# Patient Record
Sex: FEMALE | Race: WHITE | NOT HISPANIC OR LATINO | URBAN - METROPOLITAN AREA
[De-identification: names, ages, dates, MRNs, and addresses within clinical notes are randomized per-mention and may not be internally consistent; named-entity substitution may affect disease eponyms.]

---

## 2017-08-09 ENCOUNTER — EMERGENCY (EMERGENCY)
Facility: HOSPITAL | Age: 52
LOS: 0 days | Discharge: HOME | End: 2017-08-09
Admitting: INTERNAL MEDICINE

## 2017-08-09 DIAGNOSIS — R07.9 CHEST PAIN, UNSPECIFIED: ICD-10-CM

## 2017-08-09 DIAGNOSIS — G89.29 OTHER CHRONIC PAIN: ICD-10-CM

## 2017-08-09 DIAGNOSIS — M54.9 DORSALGIA, UNSPECIFIED: ICD-10-CM

## 2017-08-09 DIAGNOSIS — E78.5 HYPERLIPIDEMIA, UNSPECIFIED: ICD-10-CM

## 2017-08-09 DIAGNOSIS — Z87.891 PERSONAL HISTORY OF NICOTINE DEPENDENCE: ICD-10-CM

## 2017-08-09 DIAGNOSIS — I10 ESSENTIAL (PRIMARY) HYPERTENSION: ICD-10-CM

## 2017-08-09 DIAGNOSIS — E11.9 TYPE 2 DIABETES MELLITUS WITHOUT COMPLICATIONS: ICD-10-CM

## 2017-08-09 DIAGNOSIS — I95.9 HYPOTENSION, UNSPECIFIED: ICD-10-CM

## 2017-08-09 DIAGNOSIS — Z98.890 OTHER SPECIFIED POSTPROCEDURAL STATES: ICD-10-CM

## 2017-08-11 ENCOUNTER — INPATIENT (INPATIENT)
Facility: HOSPITAL | Age: 52
LOS: 1 days | Discharge: LEFT BEFORE DISCHARGE | End: 2017-08-13
Attending: INTERNAL MEDICINE | Admitting: INTERNAL MEDICINE

## 2017-08-11 DIAGNOSIS — R07.9 CHEST PAIN, UNSPECIFIED: ICD-10-CM

## 2017-08-16 DIAGNOSIS — E03.9 HYPOTHYROIDISM, UNSPECIFIED: ICD-10-CM

## 2017-08-16 DIAGNOSIS — R55 SYNCOPE AND COLLAPSE: ICD-10-CM

## 2017-08-16 DIAGNOSIS — T40.2X5A ADVERSE EFFECT OF OTHER OPIOIDS, INITIAL ENCOUNTER: ICD-10-CM

## 2017-08-16 DIAGNOSIS — I10 ESSENTIAL (PRIMARY) HYPERTENSION: ICD-10-CM

## 2017-08-16 DIAGNOSIS — E11.9 TYPE 2 DIABETES MELLITUS WITHOUT COMPLICATIONS: ICD-10-CM

## 2017-08-16 DIAGNOSIS — E78.5 HYPERLIPIDEMIA, UNSPECIFIED: ICD-10-CM

## 2017-08-16 DIAGNOSIS — E83.42 HYPOMAGNESEMIA: ICD-10-CM

## 2017-08-16 DIAGNOSIS — G89.29 OTHER CHRONIC PAIN: ICD-10-CM

## 2017-08-16 DIAGNOSIS — E87.6 HYPOKALEMIA: ICD-10-CM

## 2017-08-16 DIAGNOSIS — E78.00 PURE HYPERCHOLESTEROLEMIA, UNSPECIFIED: ICD-10-CM

## 2017-08-16 DIAGNOSIS — F17.200 NICOTINE DEPENDENCE, UNSPECIFIED, UNCOMPLICATED: ICD-10-CM

## 2017-08-16 DIAGNOSIS — I25.2 OLD MYOCARDIAL INFARCTION: ICD-10-CM

## 2017-08-16 DIAGNOSIS — I95.9 HYPOTENSION, UNSPECIFIED: ICD-10-CM

## 2017-08-24 ENCOUNTER — OUTPATIENT (OUTPATIENT)
Dept: OUTPATIENT SERVICES | Facility: HOSPITAL | Age: 52
LOS: 1 days | Discharge: HOME | End: 2017-08-24

## 2017-08-24 DIAGNOSIS — R07.9 CHEST PAIN, UNSPECIFIED: ICD-10-CM

## 2017-08-25 DIAGNOSIS — I10 ESSENTIAL (PRIMARY) HYPERTENSION: ICD-10-CM

## 2017-08-25 DIAGNOSIS — E78.4 OTHER HYPERLIPIDEMIA: ICD-10-CM

## 2017-08-25 DIAGNOSIS — E11.9 TYPE 2 DIABETES MELLITUS WITHOUT COMPLICATIONS: ICD-10-CM

## 2017-08-25 DIAGNOSIS — E87.6 HYPOKALEMIA: ICD-10-CM

## 2017-09-08 ENCOUNTER — OUTPATIENT (OUTPATIENT)
Dept: OUTPATIENT SERVICES | Facility: HOSPITAL | Age: 52
LOS: 1 days | Discharge: HOME | End: 2017-09-08

## 2017-09-08 DIAGNOSIS — I95.9 HYPOTENSION, UNSPECIFIED: ICD-10-CM

## 2017-09-08 DIAGNOSIS — R07.9 CHEST PAIN, UNSPECIFIED: ICD-10-CM

## 2017-09-22 ENCOUNTER — OUTPATIENT (OUTPATIENT)
Dept: OUTPATIENT SERVICES | Facility: HOSPITAL | Age: 52
LOS: 1 days | Discharge: HOME | End: 2017-09-22

## 2017-09-22 DIAGNOSIS — R07.9 CHEST PAIN, UNSPECIFIED: ICD-10-CM

## 2017-09-25 DIAGNOSIS — Z01.419 ENCOUNTER FOR GYNECOLOGICAL EXAMINATION (GENERAL) (ROUTINE) WITHOUT ABNORMAL FINDINGS: ICD-10-CM

## 2017-11-09 PROBLEM — Z00.00 ENCOUNTER FOR PREVENTIVE HEALTH EXAMINATION: Status: ACTIVE | Noted: 2017-11-09

## 2017-11-10 ENCOUNTER — APPOINTMENT (OUTPATIENT)
Dept: UROLOGY | Facility: CLINIC | Age: 52
End: 2017-11-10

## 2017-11-27 ENCOUNTER — OUTPATIENT (OUTPATIENT)
Dept: OUTPATIENT SERVICES | Facility: HOSPITAL | Age: 52
LOS: 1 days | Discharge: HOME | End: 2017-11-27

## 2017-11-27 DIAGNOSIS — I10 ESSENTIAL (PRIMARY) HYPERTENSION: ICD-10-CM

## 2017-11-27 DIAGNOSIS — I25.2 OLD MYOCARDIAL INFARCTION: ICD-10-CM

## 2017-11-27 DIAGNOSIS — Z95.5 PRESENCE OF CORONARY ANGIOPLASTY IMPLANT AND GRAFT: ICD-10-CM

## 2017-11-27 DIAGNOSIS — E03.9 HYPOTHYROIDISM, UNSPECIFIED: ICD-10-CM

## 2017-11-27 DIAGNOSIS — K29.00 ACUTE GASTRITIS WITHOUT BLEEDING: ICD-10-CM

## 2017-11-27 DIAGNOSIS — M54.9 DORSALGIA, UNSPECIFIED: ICD-10-CM

## 2017-11-27 DIAGNOSIS — R07.9 CHEST PAIN, UNSPECIFIED: ICD-10-CM

## 2017-11-27 DIAGNOSIS — E11.9 TYPE 2 DIABETES MELLITUS WITHOUT COMPLICATIONS: ICD-10-CM

## 2017-11-27 DIAGNOSIS — E78.00 PURE HYPERCHOLESTEROLEMIA, UNSPECIFIED: ICD-10-CM

## 2017-11-27 DIAGNOSIS — I25.10 ATHEROSCLEROTIC HEART DISEASE OF NATIVE CORONARY ARTERY WITHOUT ANGINA PECTORIS: ICD-10-CM

## 2017-11-27 DIAGNOSIS — B37.81 CANDIDAL ESOPHAGITIS: ICD-10-CM

## 2017-11-27 DIAGNOSIS — R10.13 EPIGASTRIC PAIN: ICD-10-CM

## 2018-01-11 ENCOUNTER — OUTPATIENT (OUTPATIENT)
Dept: OUTPATIENT SERVICES | Facility: HOSPITAL | Age: 53
LOS: 1 days | Discharge: HOME | End: 2018-01-11

## 2018-01-11 DIAGNOSIS — Z12.31 ENCOUNTER FOR SCREENING MAMMOGRAM FOR MALIGNANT NEOPLASM OF BREAST: ICD-10-CM

## 2018-01-11 DIAGNOSIS — R07.9 CHEST PAIN, UNSPECIFIED: ICD-10-CM

## 2018-01-17 ENCOUNTER — OUTPATIENT (OUTPATIENT)
Dept: OUTPATIENT SERVICES | Facility: HOSPITAL | Age: 53
LOS: 1 days | Discharge: HOME | End: 2018-01-17

## 2018-01-17 DIAGNOSIS — M72.2 PLANTAR FASCIAL FIBROMATOSIS: ICD-10-CM

## 2018-01-17 DIAGNOSIS — R07.9 CHEST PAIN, UNSPECIFIED: ICD-10-CM

## 2018-01-22 ENCOUNTER — OUTPATIENT (OUTPATIENT)
Dept: OUTPATIENT SERVICES | Facility: HOSPITAL | Age: 53
LOS: 1 days | Discharge: HOME | End: 2018-01-22

## 2018-01-22 DIAGNOSIS — R92.8 OTHER ABNORMAL AND INCONCLUSIVE FINDINGS ON DIAGNOSTIC IMAGING OF BREAST: ICD-10-CM

## 2018-01-24 ENCOUNTER — OUTPATIENT (OUTPATIENT)
Dept: OUTPATIENT SERVICES | Facility: HOSPITAL | Age: 53
LOS: 1 days | Discharge: HOME | End: 2018-01-24

## 2018-01-24 DIAGNOSIS — R05 COUGH: ICD-10-CM

## 2018-02-04 DIAGNOSIS — R07.9 CHEST PAIN, UNSPECIFIED: ICD-10-CM

## 2018-03-01 ENCOUNTER — RESULT REVIEW (OUTPATIENT)
Age: 53
End: 2018-03-01

## 2018-03-01 ENCOUNTER — OUTPATIENT (OUTPATIENT)
Dept: OUTPATIENT SERVICES | Facility: HOSPITAL | Age: 53
LOS: 1 days | Discharge: HOME | End: 2018-03-01

## 2018-03-02 DIAGNOSIS — E87.6 HYPOKALEMIA: ICD-10-CM

## 2018-03-02 DIAGNOSIS — E46 UNSPECIFIED PROTEIN-CALORIE MALNUTRITION: ICD-10-CM

## 2018-03-02 DIAGNOSIS — I10 ESSENTIAL (PRIMARY) HYPERTENSION: ICD-10-CM

## 2018-03-02 DIAGNOSIS — E11.9 TYPE 2 DIABETES MELLITUS WITHOUT COMPLICATIONS: ICD-10-CM

## 2018-03-02 DIAGNOSIS — K21.9 GASTRO-ESOPHAGEAL REFLUX DISEASE WITHOUT ESOPHAGITIS: ICD-10-CM

## 2018-05-24 ENCOUNTER — OUTPATIENT (OUTPATIENT)
Dept: OUTPATIENT SERVICES | Facility: HOSPITAL | Age: 53
LOS: 1 days | Discharge: HOME | End: 2018-05-24

## 2018-05-24 DIAGNOSIS — M79.609 PAIN IN UNSPECIFIED LIMB: ICD-10-CM

## 2018-06-18 ENCOUNTER — APPOINTMENT (OUTPATIENT)
Dept: UROLOGY | Facility: CLINIC | Age: 53
End: 2018-06-18
Payer: MEDICAID

## 2018-06-18 ENCOUNTER — OUTPATIENT (OUTPATIENT)
Dept: OUTPATIENT SERVICES | Facility: HOSPITAL | Age: 53
LOS: 1 days | Discharge: HOME | End: 2018-06-18

## 2018-06-18 VITALS
SYSTOLIC BLOOD PRESSURE: 93 MMHG | DIASTOLIC BLOOD PRESSURE: 56 MMHG | WEIGHT: 190 LBS | HEIGHT: 60 IN | BODY MASS INDEX: 37.3 KG/M2 | HEART RATE: 76 BPM

## 2018-06-18 DIAGNOSIS — Z82.49 FAMILY HISTORY OF ISCHEMIC HEART DISEASE AND OTHER DISEASES OF THE CIRCULATORY SYSTEM: ICD-10-CM

## 2018-06-18 DIAGNOSIS — Z83.3 FAMILY HISTORY OF DIABETES MELLITUS: ICD-10-CM

## 2018-06-18 DIAGNOSIS — E07.9 DISORDER OF THYROID, UNSPECIFIED: ICD-10-CM

## 2018-06-18 DIAGNOSIS — E11.9 TYPE 2 DIABETES MELLITUS W/OUT COMPLICATIONS: ICD-10-CM

## 2018-06-18 DIAGNOSIS — I10 ESSENTIAL (PRIMARY) HYPERTENSION: ICD-10-CM

## 2018-06-18 DIAGNOSIS — Z82.0 FAMILY HISTORY OF EPILEPSY AND OTHER DISEASES OF THE NERVOUS SYSTEM: ICD-10-CM

## 2018-06-18 DIAGNOSIS — I25.2 OLD MYOCARDIAL INFARCTION: ICD-10-CM

## 2018-06-18 DIAGNOSIS — F17.210 NICOTINE DEPENDENCE, CIGARETTES, UNCOMPLICATED: ICD-10-CM

## 2018-06-18 PROCEDURE — 99202 OFFICE O/P NEW SF 15 MIN: CPT

## 2018-06-18 RX ORDER — TIZANIDINE 4 MG/1
4 TABLET ORAL
Qty: 90 | Refills: 0 | Status: ACTIVE | COMMUNITY
Start: 2017-06-30

## 2018-06-18 RX ORDER — PREGABALIN 150 MG/1
150 CAPSULE ORAL
Qty: 90 | Refills: 0 | Status: ACTIVE | COMMUNITY
Start: 2017-06-16

## 2018-06-18 RX ORDER — METFORMIN HYDROCHLORIDE 500 MG/1
500 TABLET, COATED ORAL
Qty: 60 | Refills: 0 | Status: COMPLETED | COMMUNITY
Start: 2016-12-15

## 2018-06-18 RX ORDER — ATORVASTATIN CALCIUM 40 MG/1
40 TABLET, FILM COATED ORAL
Qty: 30 | Refills: 0 | Status: ACTIVE | COMMUNITY
Start: 2016-12-15

## 2018-06-18 RX ORDER — DULOXETINE HYDROCHLORIDE 30 MG/1
30 CAPSULE, DELAYED RELEASE PELLETS ORAL
Refills: 0 | Status: ACTIVE | COMMUNITY

## 2018-06-18 RX ORDER — MULTIVITAMIN WITH FOLIC ACID 400 MCG
TABLET ORAL
Qty: 30 | Refills: 0 | Status: ACTIVE | COMMUNITY
Start: 2017-07-24

## 2018-06-18 RX ORDER — FEXOFENADINE HYDROCHLORIDE 180 MG/1
180 TABLET ORAL
Qty: 30 | Refills: 0 | Status: ACTIVE | COMMUNITY
Start: 2016-12-15

## 2018-06-18 RX ORDER — OMEPRAZOLE 40 MG/1
40 CAPSULE, DELAYED RELEASE ORAL
Qty: 30 | Refills: 0 | Status: COMPLETED | COMMUNITY
Start: 2017-10-24

## 2018-06-18 RX ORDER — NAPROXEN 500 MG/1
500 TABLET ORAL
Qty: 60 | Refills: 0 | Status: ACTIVE | COMMUNITY
Start: 2017-06-30

## 2018-06-18 RX ORDER — METOPROLOL SUCCINATE 100 MG/1
100 TABLET, EXTENDED RELEASE ORAL
Qty: 30 | Refills: 0 | Status: ACTIVE | COMMUNITY
Start: 2016-12-15

## 2018-06-18 RX ORDER — HYDROXYZINE HYDROCHLORIDE 10 MG/1
10 TABLET ORAL
Qty: 90 | Refills: 0 | Status: ACTIVE | COMMUNITY
Start: 2016-12-15

## 2018-06-18 RX ORDER — METFORMIN HYDROCHLORIDE 1000 MG/1
1000 TABLET, COATED ORAL
Qty: 60 | Refills: 0 | Status: COMPLETED | COMMUNITY
Start: 2017-02-15

## 2018-06-18 RX ORDER — DIAZEPAM 5 MG/1
5 TABLET ORAL
Refills: 0 | Status: ACTIVE | COMMUNITY

## 2018-06-18 RX ORDER — BENZONATATE 200 MG/1
200 CAPSULE ORAL
Qty: 90 | Refills: 0 | Status: ACTIVE | COMMUNITY
Start: 2016-12-15

## 2018-06-18 RX ORDER — PANTOPRAZOLE 40 MG/1
40 TABLET, DELAYED RELEASE ORAL
Refills: 0 | Status: ACTIVE | COMMUNITY

## 2018-06-18 RX ORDER — EZETIMIBE 10 MG/1
10 TABLET ORAL
Refills: 0 | Status: ACTIVE | COMMUNITY

## 2018-06-18 RX ORDER — BECLOMETHASONE DIPROPIONATE 80 UG/1
80 AEROSOL, METERED RESPIRATORY (INHALATION)
Refills: 0 | Status: ACTIVE | COMMUNITY

## 2018-06-18 RX ORDER — ALBUTEROL SULFATE 90 UG/1
108 (90 BASE) AEROSOL, METERED RESPIRATORY (INHALATION)
Qty: 18 | Refills: 0 | Status: ACTIVE | COMMUNITY
Start: 2016-12-15

## 2018-06-18 RX ORDER — OXYCODONE AND ACETAMINOPHEN 10; 325 MG/1; MG/1
10-325 TABLET ORAL
Qty: 30 | Refills: 0 | Status: ACTIVE | COMMUNITY
Start: 2017-07-12

## 2018-06-18 RX ORDER — ASPIRIN 81 MG
81 TABLET,CHEWABLE ORAL
Qty: 30 | Refills: 0 | Status: ACTIVE | COMMUNITY
Start: 2017-03-20

## 2018-06-18 RX ORDER — LEVOTHYROXINE SODIUM 0.1 MG/1
100 TABLET ORAL
Qty: 30 | Refills: 0 | Status: ACTIVE | COMMUNITY
Start: 2016-12-15

## 2018-06-18 NOTE — HISTORY OF PRESENT ILLNESS
[FreeTextEntry1] : Christa is a pleasant 52-year-old female who was referred for severe lower urinary tract symptoms to the point of nocturia times five as well is reportedly some microhematuria. The symptoms began about a year ago happen slowly getting worse even with her being put on Detrol. Currently her AUA symptom score includes\par Incomplete emptying – two\par Frequency – five\par Urgency – three\par Straining – one\par . She has no intermittency and she says the stream is strong voiding about a cup and a half per void. She’s waking up five times at night each time with almost a cup and a half and this is made him miserable. Her AUA symptom score totals 11/5/6. She has nothing that she could say started this, and she had seen another urologist but found follow-up and getting an answer was difficult so she stopped.\par  [Urinary Urgency] : urinary urgency [Urinary Frequency] : urinary frequency [Nocturia] : nocturia [Hematuria - Microscopic] : microscopic hematuria

## 2018-06-18 NOTE — LETTER HEADER
[Anil Ocampo MD] : Anil Ocampo MD [Director of Urology] : Director of Urology [[address]] : [address] [[address]] : [address] [[place] [address]] : [place] [address] [Tel [phone]] : Tel [phone] [Fax [phone]] : Fax [phone]

## 2018-06-18 NOTE — LETTER BODY
[Dear  ___] : Dear  [unfilled], [Consult Letter:] : I had the pleasure of evaluating your patient, [unfilled]. [Please see my note below.] : Please see my note below. [Consult Closing:] : Thank you very much for allowing me to participate in the care of this patient.  If you have any questions, please do not hesitate to contact me. [Sincerely,] : Sincerely, [FreeTextEntry2] : Jose Garza MD\par [address]\[place] [address]

## 2018-06-18 NOTE — ASSESSMENT
[FreeTextEntry1] : Her exam excluding a pelvic as she tells me she just saw the gynecologist was positive for peripheral edema. It’s not major but her right leg I cannot examine she has the brace on and the left leg had pitting edema up to the proximal tibia. If she is really voiding a cup and a half five times a night this is not going to be urologic but more sugar load, habit, or cardiac in origin. However giving her smoking history we have to make sure that is not something irritative that’s contributing to this.

## 2018-06-18 NOTE — PHYSICAL EXAM
[General Appearance - Well Developed] : well developed [General Appearance - Well Nourished] : well nourished [Normal Appearance] : normal appearance [Well Groomed] : well groomed [General Appearance - In No Acute Distress] : no acute distress [Heart Rate And Rhythm] : Heart rate and rhythm were normal [] : no respiratory distress [Respiration, Rhythm And Depth] : normal respiratory rhythm and effort [Exaggerated Use Of Accessory Muscles For Inspiration] : no accessory muscle use [Abdomen Soft] : soft [Abdomen Tenderness] : non-tender [Costovertebral Angle Tenderness] : no ~M costovertebral angle tenderness [FreeTextEntry1] : walks with brace [No Focal Deficits] : no focal deficits [Oriented To Time, Place, And Person] : oriented to person, place, and time [Affect] : the affect was normal [Mood] : the mood was normal [Not Anxious] : not anxious

## 2018-06-19 DIAGNOSIS — E11.9 TYPE 2 DIABETES MELLITUS WITHOUT COMPLICATIONS: ICD-10-CM

## 2018-06-19 DIAGNOSIS — R35.0 FREQUENCY OF MICTURITION: ICD-10-CM

## 2018-06-19 DIAGNOSIS — R33.9 RETENTION OF URINE, UNSPECIFIED: ICD-10-CM

## 2018-06-19 DIAGNOSIS — R39.15 URGENCY OF URINATION: ICD-10-CM

## 2018-06-19 DIAGNOSIS — R35.1 NOCTURIA: ICD-10-CM

## 2018-06-19 LAB
APPEARANCE: CLEAR
BILIRUBIN URINE: NEGATIVE
BLOOD URINE: NEGATIVE
COLOR: YELLOW
GLUCOSE QUALITATIVE U: NEGATIVE MG/DL
KETONES URINE: NEGATIVE
LEUKOCYTE ESTERASE URINE: NEGATIVE
NITRITE URINE: NEGATIVE
PH URINE: 6
PROTEIN URINE: NEGATIVE MG/DL
SPECIFIC GRAVITY URINE: 1.01
UROBILINOGEN URINE: 0.2 MG/DL (ref 0.2–?)

## 2018-06-21 LAB — BACTERIA UR CULT: NORMAL

## 2018-07-11 ENCOUNTER — LABORATORY RESULT (OUTPATIENT)
Age: 53
End: 2018-07-11

## 2018-07-11 ENCOUNTER — OUTPATIENT (OUTPATIENT)
Dept: OUTPATIENT SERVICES | Facility: HOSPITAL | Age: 53
LOS: 1 days | Discharge: HOME | End: 2018-07-11

## 2018-07-11 DIAGNOSIS — R35.0 FREQUENCY OF MICTURITION: ICD-10-CM

## 2018-07-11 DIAGNOSIS — E11.9 TYPE 2 DIABETES MELLITUS WITHOUT COMPLICATIONS: ICD-10-CM

## 2018-07-11 DIAGNOSIS — R35.1 NOCTURIA: ICD-10-CM

## 2018-07-11 DIAGNOSIS — R33.9 RETENTION OF URINE, UNSPECIFIED: ICD-10-CM

## 2018-08-01 ENCOUNTER — OUTPATIENT (OUTPATIENT)
Dept: OUTPATIENT SERVICES | Facility: HOSPITAL | Age: 53
LOS: 1 days | Discharge: HOME | End: 2018-08-01

## 2018-08-01 DIAGNOSIS — R35.0 FREQUENCY OF MICTURITION: ICD-10-CM

## 2018-08-01 DIAGNOSIS — N63.10 UNSPECIFIED LUMP IN THE RIGHT BREAST, UNSPECIFIED QUADRANT: ICD-10-CM

## 2018-08-01 DIAGNOSIS — R35.1 NOCTURIA: ICD-10-CM

## 2018-08-01 DIAGNOSIS — E11.9 TYPE 2 DIABETES MELLITUS WITHOUT COMPLICATIONS: ICD-10-CM

## 2018-08-01 DIAGNOSIS — R39.15 URGENCY OF URINATION: ICD-10-CM

## 2018-08-01 DIAGNOSIS — R33.9 RETENTION OF URINE, UNSPECIFIED: ICD-10-CM

## 2018-08-02 DIAGNOSIS — Z13.820 ENCOUNTER FOR SCREENING FOR OSTEOPOROSIS: ICD-10-CM

## 2018-08-02 DIAGNOSIS — Z78.0 ASYMPTOMATIC MENOPAUSAL STATE: ICD-10-CM

## 2018-08-02 DIAGNOSIS — F17.200 NICOTINE DEPENDENCE, UNSPECIFIED, UNCOMPLICATED: ICD-10-CM

## 2018-08-02 DIAGNOSIS — M89.9 DISORDER OF BONE, UNSPECIFIED: ICD-10-CM

## 2018-08-03 LAB
APPEARANCE: CLEAR
BACTERIA UR CULT: NORMAL
BILIRUBIN URINE: NEGATIVE
BLOOD URINE: NEGATIVE
COLOR: YELLOW
GLUCOSE QUALITATIVE U: NEGATIVE MG/DL
KETONES URINE: NEGATIVE
LEUKOCYTE ESTERASE URINE: NEGATIVE
NITRITE URINE: NEGATIVE
PH URINE: 6.5
PROTEIN URINE: NEGATIVE MG/DL
SPECIFIC GRAVITY URINE: 1.01
UROBILINOGEN URINE: 0.2 MG/DL (ref 0.2–?)

## 2018-08-04 ENCOUNTER — OUTPATIENT (OUTPATIENT)
Dept: OUTPATIENT SERVICES | Facility: HOSPITAL | Age: 53
LOS: 1 days | Discharge: HOME | End: 2018-08-04

## 2018-08-04 DIAGNOSIS — R35.0 FREQUENCY OF MICTURITION: ICD-10-CM

## 2018-08-04 DIAGNOSIS — R33.9 RETENTION OF URINE, UNSPECIFIED: ICD-10-CM

## 2018-08-04 DIAGNOSIS — E11.9 TYPE 2 DIABETES MELLITUS WITHOUT COMPLICATIONS: ICD-10-CM

## 2018-08-07 ENCOUNTER — OUTPATIENT (OUTPATIENT)
Dept: OUTPATIENT SERVICES | Facility: HOSPITAL | Age: 53
LOS: 1 days | Discharge: HOME | End: 2018-08-07

## 2018-08-07 DIAGNOSIS — R13.10 DYSPHAGIA, UNSPECIFIED: ICD-10-CM

## 2018-08-13 ENCOUNTER — APPOINTMENT (OUTPATIENT)
Dept: UROLOGY | Facility: CLINIC | Age: 53
End: 2018-08-13
Payer: MEDICARE

## 2018-08-13 LAB
BILIRUB UR QL STRIP: NORMAL
CLARITY UR: CLEAR
COLLECTION METHOD: NORMAL
GLUCOSE UR-MCNC: NORMAL
HCG UR QL: NORMAL EU/DL
HGB UR QL STRIP.AUTO: NORMAL
KETONES UR-MCNC: NORMAL
LEUKOCYTE ESTERASE UR QL STRIP: NORMAL
NITRITE UR QL STRIP: NORMAL
PH UR STRIP: 6
PROT UR STRIP-MCNC: NORMAL
SP GR UR STRIP: 1010

## 2018-08-13 PROCEDURE — 51741 ELECTRO-UROFLOWMETRY FIRST: CPT

## 2018-08-13 PROCEDURE — 99214 OFFICE O/P EST MOD 30 MIN: CPT | Mod: 25

## 2018-08-13 PROCEDURE — 81003 URINALYSIS AUTO W/O SCOPE: CPT | Mod: QW

## 2018-08-13 PROCEDURE — 51798 US URINE CAPACITY MEASURE: CPT

## 2018-08-13 RX ORDER — TOLTERODINE TARTRATE 4 MG/1
4 CAPSULE, EXTENDED RELEASE ORAL
Refills: 0 | Status: DISCONTINUED | COMMUNITY
End: 2018-08-13

## 2018-08-13 NOTE — LETTER HEADER
[FreeTextEntry3] : Anil Ocampo M.D.\par Director of Urology\par [facility]\par [address] [place] [address], [address]\[place] [address]

## 2018-08-13 NOTE — ASSESSMENT
[FreeTextEntry1] : We discussed her voiding symptoms in detail. She understands that she is voiding with Sandhya billy, as per her uroflow study, which makes it difficult to increase her dose of medication. After all the options she will begin Sanctura XR 60 mg po qd. All usage, dosage, MoA, and adverse events were reviewed. Additionally, she will begin behavior modifications with decreasing caffeine and fluid intake at night. She will keep a record of her I/O’s before starting the Sanctura and then repeated after she has been on the Sanctura for four weeks, just before coming in for follow up in 4 weeks for review.

## 2018-08-13 NOTE — HISTORY OF PRESENT ILLNESS
[Urinary Urgency] : urinary urgency [Urinary Frequency] : urinary frequency [Nocturia] : nocturia [FreeTextEntry1] : Christa is a 53 year old female who we have been following for bothersome urinary urgency, frequency, and nocturia. She is on Detrol LA 4 mg with some improvement in her frequency and urgency however, she reports 4 episodes of nocturia/night. She does drink fluids up until bed time, including soda. She understands how that could worsen her symptoms. She is here today for results of her sonogram, urines and uroflow study. She was supposed to keep a record of her intake and output but neglected to do so

## 2018-08-13 NOTE — LETTER BODY
[Dear  ___] : Dear  [unfilled], [Courtesy Letter:] : I had the pleasure of seeing your patient, [unfilled], in my office today. [Sincerely,] : Sincerely, [Please see my note below.] : Please see my note below. [FreeTextEntry2] : Jose Garza MD\par [address]\[place] [address]

## 2018-08-13 NOTE — PHYSICAL EXAM
[General Appearance - Well Developed] : well developed [General Appearance - Well Nourished] : well nourished [Normal Appearance] : normal appearance [Well Groomed] : well groomed [General Appearance - In No Acute Distress] : no acute distress [Abdomen Soft] : soft [Abdomen Tenderness] : non-tender [Costovertebral Angle Tenderness] : no ~M costovertebral angle tenderness [Urinary Bladder Findings] : the bladder was normal on palpation [Edema] : no peripheral edema [] : no respiratory distress [Respiration, Rhythm And Depth] : normal respiratory rhythm and effort [Exaggerated Use Of Accessory Muscles For Inspiration] : no accessory muscle use [Oriented To Time, Place, And Person] : oriented to person, place, and time [Affect] : the affect was normal [Mood] : the mood was normal [Not Anxious] : not anxious [No Focal Deficits] : no focal deficits [FreeTextEntry1] : Ambulating with cane

## 2018-09-19 ENCOUNTER — RX RENEWAL (OUTPATIENT)
Age: 53
End: 2018-09-19

## 2018-10-08 ENCOUNTER — APPOINTMENT (OUTPATIENT)
Dept: UROLOGY | Facility: CLINIC | Age: 53
End: 2018-10-08

## 2018-10-17 ENCOUNTER — RX RENEWAL (OUTPATIENT)
Age: 53
End: 2018-10-17

## 2018-11-01 ENCOUNTER — RX RENEWAL (OUTPATIENT)
Age: 53
End: 2018-11-01

## 2018-11-09 ENCOUNTER — RX RENEWAL (OUTPATIENT)
Age: 53
End: 2018-11-09

## 2018-11-12 ENCOUNTER — APPOINTMENT (OUTPATIENT)
Dept: UROLOGY | Facility: CLINIC | Age: 53
End: 2018-11-12
Payer: MEDICAID

## 2018-11-12 VITALS
BODY MASS INDEX: 37.3 KG/M2 | WEIGHT: 190 LBS | SYSTOLIC BLOOD PRESSURE: 99 MMHG | HEART RATE: 69 BPM | HEIGHT: 60 IN | DIASTOLIC BLOOD PRESSURE: 54 MMHG

## 2018-11-12 PROCEDURE — 51798 US URINE CAPACITY MEASURE: CPT

## 2018-11-12 PROCEDURE — 99213 OFFICE O/P EST LOW 20 MIN: CPT

## 2018-11-12 NOTE — LETTER HEADER
[FreeTextEntry3] : Anil Ocampo M.D.\par Director of Urology\par [facility]\par [address] [place] [address], [address]\[place] [address]

## 2018-11-12 NOTE — LETTER BODY
[Dear  ___] : Dear  [unfilled], [Courtesy Letter:] : I had the pleasure of seeing your patient, [unfilled], in my office today. [Please see my note below.] : Please see my note below. [Sincerely,] : Sincerely, [FreeTextEntry2] : Jose Garza MD\par [address]\[place] [address] \par

## 2018-11-12 NOTE — ASSESSMENT
[FreeTextEntry1] : She is on maximal medical therapy, don’t really have much else to offer her for medication viewpoint so I discussed with her seen Dr. Amy Murrieta a consideration of Botox or the interest in. She does not like the concept of injections or of a needle in her back but considering that she’s having episodes of urge incontinence, she will see what Dr. Murrieta has to say and then make a decision.

## 2018-11-12 NOTE — PHYSICAL EXAM
[General Appearance - Well Developed] : well developed [General Appearance - Well Nourished] : well nourished [Normal Appearance] : normal appearance [Well Groomed] : well groomed [General Appearance - In No Acute Distress] : no acute distress [] : no respiratory distress [Respiration, Rhythm And Depth] : normal respiratory rhythm and effort [Exaggerated Use Of Accessory Muscles For Inspiration] : no accessory muscle use [Oriented To Time, Place, And Person] : oriented to person, place, and time [Affect] : the affect was normal [Mood] : the mood was normal [Not Anxious] : not anxious [No Focal Deficits] : no focal deficits [FreeTextEntry1] : walks with brace

## 2019-01-03 ENCOUNTER — OUTPATIENT (OUTPATIENT)
Dept: OUTPATIENT SERVICES | Facility: HOSPITAL | Age: 54
LOS: 1 days | Discharge: HOME | End: 2019-01-03

## 2019-01-03 ENCOUNTER — APPOINTMENT (OUTPATIENT)
Age: 54
End: 2019-01-03

## 2019-01-03 VITALS
BODY MASS INDEX: 37.3 KG/M2 | WEIGHT: 190 LBS | HEIGHT: 60 IN | HEART RATE: 68 BPM | SYSTOLIC BLOOD PRESSURE: 117 MMHG | DIASTOLIC BLOOD PRESSURE: 73 MMHG

## 2019-01-03 DIAGNOSIS — Z87.448 PERSONAL HISTORY OF OTHER DISEASES OF URINARY SYSTEM: ICD-10-CM

## 2019-01-03 DIAGNOSIS — Z87.898 PERSONAL HISTORY OF OTHER SPECIFIED CONDITIONS: ICD-10-CM

## 2019-01-03 DIAGNOSIS — R33.9 RETENTION OF URINE, UNSPECIFIED: ICD-10-CM

## 2019-01-03 DIAGNOSIS — R35.1 NOCTURIA: ICD-10-CM

## 2019-01-03 RX ORDER — TROSPIUM CHLORIDE 20 MG/1
20 TABLET, FILM COATED ORAL
Qty: 60 | Refills: 2 | Status: DISCONTINUED | COMMUNITY
Start: 2018-08-13 | End: 2019-01-03

## 2019-01-04 DIAGNOSIS — R35.1 NOCTURIA: ICD-10-CM

## 2019-01-07 LAB — BACTERIA UR CULT: NORMAL

## 2019-01-10 ENCOUNTER — INPATIENT (INPATIENT)
Facility: HOSPITAL | Age: 54
LOS: 6 days | Discharge: HOME HEALTH | End: 2019-01-17
Attending: INTERNAL MEDICINE | Admitting: INTERNAL MEDICINE
Payer: MEDICARE

## 2019-01-10 VITALS
HEART RATE: 59 BPM | DIASTOLIC BLOOD PRESSURE: 54 MMHG | OXYGEN SATURATION: 95 % | TEMPERATURE: 98 F | RESPIRATION RATE: 18 BRPM | SYSTOLIC BLOOD PRESSURE: 121 MMHG

## 2019-01-10 DIAGNOSIS — Z98.890 OTHER SPECIFIED POSTPROCEDURAL STATES: Chronic | ICD-10-CM

## 2019-01-10 LAB
ALBUMIN SERPL ELPH-MCNC: 4.1 G/DL — SIGNIFICANT CHANGE UP (ref 3.5–5.2)
ALP SERPL-CCNC: 69 U/L — SIGNIFICANT CHANGE UP (ref 30–115)
ALT FLD-CCNC: 17 U/L — SIGNIFICANT CHANGE UP (ref 0–41)
ANION GAP SERPL CALC-SCNC: 17 MMOL/L — HIGH (ref 7–14)
APTT BLD: 35.5 SEC — SIGNIFICANT CHANGE UP (ref 27–39.2)
AST SERPL-CCNC: 30 U/L — SIGNIFICANT CHANGE UP (ref 0–41)
BASOPHILS # BLD AUTO: 0.06 K/UL — SIGNIFICANT CHANGE UP (ref 0–0.2)
BASOPHILS NFR BLD AUTO: 0.7 % — SIGNIFICANT CHANGE UP (ref 0–1)
BILIRUB SERPL-MCNC: 0.4 MG/DL — SIGNIFICANT CHANGE UP (ref 0.2–1.2)
BLD GP AB SCN SERPL QL: SIGNIFICANT CHANGE UP
BUN SERPL-MCNC: 6 MG/DL — LOW (ref 10–20)
CALCIUM SERPL-MCNC: 9 MG/DL — SIGNIFICANT CHANGE UP (ref 8.5–10.1)
CHLORIDE SERPL-SCNC: 92 MMOL/L — LOW (ref 98–110)
CO2 SERPL-SCNC: 22 MMOL/L — SIGNIFICANT CHANGE UP (ref 17–32)
CREAT SERPL-MCNC: 0.7 MG/DL — SIGNIFICANT CHANGE UP (ref 0.7–1.5)
EOSINOPHIL # BLD AUTO: 0.48 K/UL — SIGNIFICANT CHANGE UP (ref 0–0.7)
EOSINOPHIL NFR BLD AUTO: 5.2 % — SIGNIFICANT CHANGE UP (ref 0–8)
ERYTHROCYTE [SEDIMENTATION RATE] IN BLOOD: 3 MM/HR — SIGNIFICANT CHANGE UP (ref 0–20)
GLUCOSE SERPL-MCNC: 84 MG/DL — SIGNIFICANT CHANGE UP (ref 70–99)
HCG SERPL QL: NEGATIVE — SIGNIFICANT CHANGE UP
HCT VFR BLD CALC: 40.2 % — SIGNIFICANT CHANGE UP (ref 37–47)
HGB BLD-MCNC: 13 G/DL — SIGNIFICANT CHANGE UP (ref 12–16)
IMM GRANULOCYTES NFR BLD AUTO: 0.3 % — SIGNIFICANT CHANGE UP (ref 0.1–0.3)
INR BLD: 1.01 RATIO — SIGNIFICANT CHANGE UP (ref 0.65–1.3)
LYMPHOCYTES # BLD AUTO: 1.66 K/UL — SIGNIFICANT CHANGE UP (ref 1.2–3.4)
LYMPHOCYTES # BLD AUTO: 18.1 % — LOW (ref 20.5–51.1)
MCHC RBC-ENTMCNC: 28.9 PG — SIGNIFICANT CHANGE UP (ref 27–31)
MCHC RBC-ENTMCNC: 32.3 G/DL — SIGNIFICANT CHANGE UP (ref 32–37)
MCV RBC AUTO: 89.3 FL — SIGNIFICANT CHANGE UP (ref 81–99)
MONOCYTES # BLD AUTO: 0.63 K/UL — HIGH (ref 0.1–0.6)
MONOCYTES NFR BLD AUTO: 6.9 % — SIGNIFICANT CHANGE UP (ref 1.7–9.3)
NEUTROPHILS # BLD AUTO: 6.33 K/UL — SIGNIFICANT CHANGE UP (ref 1.4–6.5)
NEUTROPHILS NFR BLD AUTO: 68.8 % — SIGNIFICANT CHANGE UP (ref 42.2–75.2)
PLATELET # BLD AUTO: 244 K/UL — SIGNIFICANT CHANGE UP (ref 130–400)
POTASSIUM SERPL-MCNC: 4.9 MMOL/L — SIGNIFICANT CHANGE UP (ref 3.5–5)
POTASSIUM SERPL-SCNC: 4.9 MMOL/L — SIGNIFICANT CHANGE UP (ref 3.5–5)
PROT SERPL-MCNC: 6.8 G/DL — SIGNIFICANT CHANGE UP (ref 6–8)
PROTHROM AB SERPL-ACNC: 11.6 SEC — SIGNIFICANT CHANGE UP (ref 9.95–12.87)
RBC # BLD: 4.5 M/UL — SIGNIFICANT CHANGE UP (ref 4.2–5.4)
RBC # FLD: 14.7 % — HIGH (ref 11.5–14.5)
SODIUM SERPL-SCNC: 131 MMOL/L — LOW (ref 135–146)
TYPE + AB SCN PNL BLD: SIGNIFICANT CHANGE UP
WBC # BLD: 9.19 K/UL — SIGNIFICANT CHANGE UP (ref 4.8–10.8)
WBC # FLD AUTO: 9.19 K/UL — SIGNIFICANT CHANGE UP (ref 4.8–10.8)

## 2019-01-10 PROCEDURE — 93925 LOWER EXTREMITY STUDY: CPT | Mod: 26

## 2019-01-10 RX ORDER — ATORVASTATIN CALCIUM 80 MG/1
40 TABLET, FILM COATED ORAL AT BEDTIME
Qty: 0 | Refills: 0 | Status: DISCONTINUED | OUTPATIENT
Start: 2019-01-10 | End: 2019-01-11

## 2019-01-10 RX ORDER — METOPROLOL TARTRATE 50 MG
50 TABLET ORAL DAILY
Qty: 0 | Refills: 0 | Status: DISCONTINUED | OUTPATIENT
Start: 2019-01-10 | End: 2019-01-11

## 2019-01-10 RX ORDER — LISINOPRIL 2.5 MG/1
10 TABLET ORAL DAILY
Qty: 0 | Refills: 0 | Status: DISCONTINUED | OUTPATIENT
Start: 2019-01-10 | End: 2019-01-11

## 2019-01-10 RX ORDER — CHLORHEXIDINE GLUCONATE 213 G/1000ML
1 SOLUTION TOPICAL
Qty: 0 | Refills: 0 | Status: DISCONTINUED | OUTPATIENT
Start: 2019-01-10 | End: 2019-01-11

## 2019-01-10 RX ORDER — PANTOPRAZOLE SODIUM 20 MG/1
40 TABLET, DELAYED RELEASE ORAL
Qty: 0 | Refills: 0 | Status: DISCONTINUED | OUTPATIENT
Start: 2019-01-10 | End: 2019-01-11

## 2019-01-10 RX ORDER — ENOXAPARIN SODIUM 100 MG/ML
40 INJECTION SUBCUTANEOUS EVERY 24 HOURS
Qty: 0 | Refills: 0 | Status: DISCONTINUED | OUTPATIENT
Start: 2019-01-10 | End: 2019-01-11

## 2019-01-10 RX ORDER — ALBUTEROL 90 UG/1
2 AEROSOL, METERED ORAL EVERY 6 HOURS
Qty: 0 | Refills: 0 | Status: DISCONTINUED | OUTPATIENT
Start: 2019-01-10 | End: 2019-01-11

## 2019-01-10 RX ORDER — SENNA PLUS 8.6 MG/1
2 TABLET ORAL AT BEDTIME
Qty: 0 | Refills: 0 | Status: DISCONTINUED | OUTPATIENT
Start: 2019-01-10 | End: 2019-01-11

## 2019-01-10 RX ORDER — LEVOTHYROXINE SODIUM 125 MCG
100 TABLET ORAL DAILY
Qty: 0 | Refills: 0 | Status: DISCONTINUED | OUTPATIENT
Start: 2019-01-10 | End: 2019-01-11

## 2019-01-10 RX ORDER — OXYCODONE AND ACETAMINOPHEN 5; 325 MG/1; MG/1
2 TABLET ORAL EVERY 8 HOURS
Qty: 0 | Refills: 0 | Status: DISCONTINUED | OUTPATIENT
Start: 2019-01-10 | End: 2019-01-11

## 2019-01-10 RX ORDER — BUDESONIDE AND FORMOTEROL FUMARATE DIHYDRATE 160; 4.5 UG/1; UG/1
2 AEROSOL RESPIRATORY (INHALATION)
Qty: 0 | Refills: 0 | Status: DISCONTINUED | OUTPATIENT
Start: 2019-01-10 | End: 2019-01-11

## 2019-01-10 RX ORDER — DIAZEPAM 5 MG
5 TABLET ORAL
Qty: 0 | Refills: 0 | Status: DISCONTINUED | OUTPATIENT
Start: 2019-01-10 | End: 2019-01-11

## 2019-01-10 RX ORDER — DULOXETINE HYDROCHLORIDE 30 MG/1
60 CAPSULE, DELAYED RELEASE ORAL DAILY
Qty: 0 | Refills: 0 | Status: DISCONTINUED | OUTPATIENT
Start: 2019-01-10 | End: 2019-01-11

## 2019-01-10 RX ORDER — VANCOMYCIN HCL 1 G
1500 VIAL (EA) INTRAVENOUS EVERY 12 HOURS
Qty: 0 | Refills: 0 | Status: DISCONTINUED | OUTPATIENT
Start: 2019-01-10 | End: 2019-01-11

## 2019-01-10 RX ORDER — HYDROXYZINE HCL 10 MG
20 TABLET ORAL THREE TIMES A DAY
Qty: 0 | Refills: 0 | Status: DISCONTINUED | OUTPATIENT
Start: 2019-01-10 | End: 2019-01-10

## 2019-01-10 RX ORDER — HYDROXYZINE HCL 10 MG
20 TABLET ORAL THREE TIMES A DAY
Qty: 0 | Refills: 0 | Status: DISCONTINUED | OUTPATIENT
Start: 2019-01-10 | End: 2019-01-11

## 2019-01-10 RX ORDER — ASPIRIN/CALCIUM CARB/MAGNESIUM 324 MG
81 TABLET ORAL DAILY
Qty: 0 | Refills: 0 | Status: DISCONTINUED | OUTPATIENT
Start: 2019-01-10 | End: 2019-01-11

## 2019-01-10 RX ADMIN — ATORVASTATIN CALCIUM 40 MILLIGRAM(S): 80 TABLET, FILM COATED ORAL at 21:24

## 2019-01-10 RX ADMIN — Medication 150 MILLIGRAM(S): at 17:25

## 2019-01-10 RX ADMIN — Medication 150 MILLIGRAM(S): at 21:21

## 2019-01-10 RX ADMIN — Medication 300 MILLIGRAM(S): at 13:55

## 2019-01-10 RX ADMIN — Medication 50 MILLIGRAM(S): at 21:21

## 2019-01-10 RX ADMIN — OXYCODONE AND ACETAMINOPHEN 2 TABLET(S): 5; 325 TABLET ORAL at 17:25

## 2019-01-10 RX ADMIN — DULOXETINE HYDROCHLORIDE 60 MILLIGRAM(S): 30 CAPSULE, DELAYED RELEASE ORAL at 23:21

## 2019-01-10 RX ADMIN — LISINOPRIL 10 MILLIGRAM(S): 2.5 TABLET ORAL at 21:24

## 2019-01-10 NOTE — ED ADULT NURSE NOTE - OBJECTIVE STATEMENT
Pt sent in from podiatrist for IV abx and surgery for right charcot foot. Pt c/o pain to right foot, worse when ambulating

## 2019-01-10 NOTE — H&P ADULT - HISTORY OF PRESENT ILLNESS
52yo F coming from home with PMHx DM II on oral meds, HTN, neuropathy, sciatica, sent by podiatrist Dr. Diaz for right charcot foot, admission/OR. Per pt, has been having foot pain for 1 year 2/2 charcot joint and was managed conservatively. Pt was on a CAM boot for 6 months, she has constant pain and limping. She walks with a walker/cane and currently is under disability.   She went to see podiatrist in office yesterday and told to go to hospital for Charcot foot surgery and admission.   Pt discussed surgical treatment with Dr. Diaz.    Denies ulceration or skin breakdown. Denies fever. Denies headache, lightheadedness, CP, SOB, cough, nausea, vomiting, diarrhea, abd pain, dysuria, leg swelling.    In the ED she was hemodynamically stable and was given Vancomycin one dose for unknown reason.

## 2019-01-10 NOTE — ED PROVIDER NOTE - PROGRESS NOTE DETAILS
Patient came with note from podiatry Dr. Diaz. Requesting pre-op labs, foot XR, ABx, Arterial Doppler. Plan for OR tomorrow. Spoke with Podiatry resident who is aware of patient. Attending Note: 54 y/o F presents with R foot ulcer and pain that has progressed over x1 year. Pt sent in by podiatrist Dr. Diaz for further evaluation and admission. No fevers. No vomiting. Pt with no other complaints. PE: Vital Signs: I have reviewed the initial vital signs. Constitutional: NAD, well-nourished, appears stated age, no acute distress. EOMI, PERRLA. CV: regular rate, regular rhythm, well-perfused extremities, 2+ b/l DP and radial pulses equal. Lungs: BCTA, no increased WOB. Integ: R foot no ulceration or skin breaking, pt with small bump on bottom of foot, TTP. No fluctuance or erythema. No other tenderness. Pulses intact. Plan: Podiatry consult and likely admission.

## 2019-01-10 NOTE — H&P ADULT - NSHPPHYSICALEXAM_GEN_ALL_CORE
PHYSICAL EXAM:      Constitutional: obese    Respiratory: lungs B/L clear on auscultation    Cardiovascular: S1S2 normal although apex beat on the R side as patient has dextrocardia.    Gastrointestinal: Abd soft, non distended, non tender, BS+    Extremities: No pedal edema    Musculoskeletal: R flat foot, with some bony prominences but no ulcers or signs of inflammation.    Neurological: AAOX3, No FND

## 2019-01-10 NOTE — H&P ADULT - ASSESSMENT
# R Charcot foot of unknown etiology:   - Admit the patient under medicine    Labs: WBC, HG, INR, CR, ESR, K is normal  - Hyponatremia: No IV fluids as of now, fluid restriction as of now, and repeat in am.    XRAY foot: Charcot joint R foot, no OM, no Need of Abx    Plan  - Podiatry taking for surgery (Charcot correction Sx) tomorrow.  - NPO after 8am  - Pain Mgmnt: c/w naproxen, percocet as needed.  - Needs medical clearance (EKG NSR), patient gives no history of heart problems other than dextrocardia, although takes meds for CAD and follows with Dr. Chakraborty. Seems to be low to moderate risk for procedure (Pending attending clearance)    Peripheral neuropathy: c/w lyrica and duloxetine    DM II: Hold oral medications and switch to insulin lispro and lantus. (Will give half dose of lantus at night    Cardiovascular disease possibly CAD: c/w asa, lipitor, Metoprolol, Ramipril    H/O Pulmonary Nodules/Bronchospasm: follows Dr. Rao, was never told that she was having COPD/Asthma  c/w inhalers as of now, smoking cessation    Full code  DVT PPX: Lovenox  Diet: Carb consistent  Activity: OOB  CHG bath daily

## 2019-01-10 NOTE — H&P ADULT - PMH
Charcot's arthropathy  R foot  Chronic midline low back pain with bilateral sciatica    Dextrocardia    Diabetes 1.5, managed as type 2    Essential hypertension    Peripheral neuralgia

## 2019-01-10 NOTE — H&P ADULT - NSHPLABSRESULTS_GEN_ALL_CORE
13.0   9.19  )-----------( 244      ( 10 Ty 2019 11:18 )             40.2       01-10    131<L>  |  92<L>  |  6<L>  ----------------------------<  84  4.9   |  22  |  0.7    Ca    9.0      10 Ty 2019 11:18    TPro  6.8  /  Alb  4.1  /  TBili  0.4  /  DBili  x   /  AST  30  /  ALT  17  /  AlkPhos  69  01-10                  PT/INR - ( 10 Ty 2019 11:18 )   PT: 11.60 sec;   INR: 1.01 ratio         PTT - ( 10 Ty 2019 11:18 )  PTT:35.5 sec    Lactate Trend            CAPILLARY BLOOD GLUCOSE

## 2019-01-10 NOTE — ED PROVIDER NOTE - MEDICAL DECISION MAKING DETAILS
Patient presented with charcot foot, sent by podiatry for admission for medical clearance prior to operative repair. Patient otherwise without systemic symptoms, well appearing. Spoke with podiatry to confirm plan. Otherwise work up in ED negative for acute findings. Will admit for further management and monitoring.

## 2019-01-10 NOTE — ED PROVIDER NOTE - OBJECTIVE STATEMENT
54yo F with PMHx DM, HTN, neuropathy, sciatica, sent by podiatrist Dr. Diaz for right charcot foot, admission/OR. Per pt, has been having foot pain for 1 year, went to see podiatrist in office yesterday and told to go to hospital. Denies ulceration or skin breakdown. Denies fever. Denies headache, lightheadedness, CP, SOB, cough, nausea, vomiting, diarrhea, abd pain, dysuria, leg swelling.

## 2019-01-10 NOTE — PROGRESS NOTE ADULT - SUBJECTIVE AND OBJECTIVE BOX
Podiatry Pre-Operative Note   [id number] CALLY HARTMAN is a 53y year old Female patient presenting to the operating room for surgical management of the Right foot. The patient has failed all conservative measures and requires surgical intervention at this time.  PAST MEDICAL & SURGICAL HISTORY:  Dextrocardia  Chronic midline low back pain with bilateral sciatica  Peripheral neuralgia  Essential hypertension  Diabetes 1.5, managed as type 2  Charcot's arthropathy: R foot  H/O shoulder surgery: Right shoulder surgery   delivery delivered      Medications:   ALBUTerol    90 MICROgram(s) HFA Inhaler 2 Puff(s) Inhalation every 6 hours PRN  aspirin enteric coated 81 milliGRAM(s) Oral daily  atorvastatin 40 milliGRAM(s) Oral at bedtime  buDESOnide  80 MICROgram(s)/formoterol 4.5 MICROgram(s) Inhaler 2 Puff(s) Inhalation two times a day  chlorhexidine 4% Liquid 1 Application(s) Topical <User Schedule>  diazepam    Tablet 5 milliGRAM(s) Oral two times a day PRN  DULoxetine 60 milliGRAM(s) Oral daily  enoxaparin Injectable 40 milliGRAM(s) SubCutaneous every 24 hours  hydrOXYzine  Oral Tab/Cap - Peds 20 milliGRAM(s) Oral three times a day  levothyroxine 100 MICROGram(s) Oral daily  lisinopril 10 milliGRAM(s) Oral daily  metoprolol succinate ER 50 milliGRAM(s) Oral daily  oxyCODONE    5 mG/acetaminophen 325 mG 2 Tablet(s) Oral every 8 hours PRN  pantoprazole    Tablet 40 milliGRAM(s) Oral before breakfast  pregabalin 150 milliGRAM(s) Oral three times a day  senna 2 Tablet(s) Oral at bedtime PRN  vancomycin  IVPB 1500 milliGRAM(s) IV Intermittent every 12 hours    aspirin 81 mg oral delayed release tablet: 1 tab(s) orally once a day  atorvastatin 40 mg oral tablet: 1 tab(s) orally once a day  diazePAM 5 mg oral tablet: 1 tab(s) orally 2 times a day, As Needed  DULoxetine 60 mg oral delayed release capsule: 1 cap(s) orally once a day  ezetimibe 10 mg oral tablet: 1 tab(s) orally once a day  fexofenadine 180 mg oral tablet: 1 tab(s) orally once a day  hydrOXYzine hydrochloride 10 mg oral tablet: 2 tab(s) orally 3 times a day  Janumet 50 mg-1000 mg oral tablet: 1 tab(s) orally 2 times a day  levothyroxine 100 mcg (0.1 mg) oral capsule: 1 cap(s) orally once a day  Lyrica 150 mg oral capsule: 1 cap(s) orally 3 times a day  metoprolol succinate 50 mg oral tablet, extended release: 1 tab(s) orally once a day  Myrbetriq 25 mg oral tablet, extended release: 1 tab(s) orally once a day  naproxen 500 mg oral delayed release tablet: 1 tab(s) orally 2 times a day  pantoprazole 40 mg oral delayed release tablet: 1 tab(s) orally once a day  Percocet 10/325 oral tablet: 1 tab(s) orally every 8 hours, As Needed  Qvar 80 mcg/inh inhalation aerosol: 1 puff(s) inhaled 2 times a day  ramipril 2.5 mg oral capsule: 1 cap(s) orally once a day  tiZANidine 4 mg oral tablet: 2 tab(s) orally every 8 hours  trospium 20 mg oral tablet: 1 tab(s) orally 2 times a day  Ventolin HFA 90 mcg/inh inhalation aerosol: 2 puff(s) inhaled 4 times a day, As Needed    Allergies    No Known Allergies    Intolerances        Consent [x_]  H&P [_x]  Medical Clearance [_x]  EKG [_x]  CXR [_x]  UCG [_x]  T&S [_x]                          13.0   19  )-----------( 244      ( 10 Ty 2019 11:18 )             40.2     01-10    131<L>  |  92<L>  |  6<L>  ----------------------------<  84  4.9   |  22  |  0.7    Ca    9.0      10 Ty 2019 11:18    TPro  6.8  /  Alb  4.1  /  TBili  0.4  /  DBili  x   /  AST  30  /  ALT  17  /  AlkPhos  69  01-10    PT/INR - ( 10 Ty 2019 11:18 )   PT: 11.60 sec;   INR: 1.01 ratio         PTT - ( 10 Ty 2019 11:18 )  PTT:35.5 sec    CAPILLARY BLOOD GLUCOSE            T(C): 36.6 (01-10-19 @ 18:30), Max: 36.6 (01-10-19 @ 18:30)  HR: 83 (01-10-19 @ 18:30) (59 - 83)  BP: 102/51 (01-10-19 @ 18:30) (102/51 - 121/54)  RR: 18 (01-10-19 @ 18:30) (18 - 18)  SpO2: 95% (01-10-19 @ 18:30) (95% - 95%)    Surgeon: Dr. Rory Diaz DPM and Dr. shields  Assistant (s): [facility] Residents  Pre Operative Diagnosis: Charcot breakdown right foot  Planned Procedure: charcot breakdown w/ multiple osteotomies and/or internal and external fixator Right foot    The patient has been educated on the above procedure, with all risks and benefits described. No guarantees were given or implied for the aforementioned procedure.  The above assistant(s) have introduced themselves to the patient. The patient consents to their participation in the procedure listed above.     Pt to OR for Charcot reconstruction Surgery tomorrow  Medical clearance with stratification requested  NPO after 8am tomorrow  Please normalize labs for surgery  01-10-19 @ 18:39

## 2019-01-10 NOTE — H&P ADULT - NSHPREVIEWOFSYSTEMS_GEN_ALL_CORE
REVIEW OF SYSTEMS:    CONSTITUTIONAL: obese  EYES/ENT: No visual changes;  No vertigo or throat pain   NECK: No pain or stiffness  RESPIRATORY: No cough, wheezing, hemoptysis; No shortness of breath  CARDIOVASCULAR: No chest pain or palpitations  GASTROINTESTINAL: No abdominal or epigastric pain. No nausea, vomiting, or hematemesis; No diarrhea or constipation. No melena or hematochezia.  GENITOURINARY: No dysuria, frequency or hematuria  NEUROLOGICAL: Sciatica  SKIN: No itching, rashes

## 2019-01-10 NOTE — ED PROVIDER NOTE - ATTENDING CONTRIBUTION TO CARE
Attending Note: 54 y/o F presents with R foot ulcer and pain that has progressed over x1 year. Pt sent in by podiatrist Dr. Diaz for further evaluation and admission. No fevers. No vomiting. Pt with no other complaints. PE: Vital Signs: I have reviewed the initial vital signs. Constitutional: NAD, well-nourished, appears stated age, no acute distress. EOMI, PERRLA. CV: regular rate, regular rhythm, well-perfused extremities, 2+ b/l DP and radial pulses equal. Lungs: BCTA, no increased WOB. Integ: R foot no ulceration or skin breaking, pt with small bump on bottom of foot, TTP. No fluctuance or erythema. No other tenderness. Pulses intact. Plan: Podiatry consult and likely admission.

## 2019-01-10 NOTE — ED PROVIDER NOTE - NS ED ROS FT
Constitutional: No fever  Eyes:  No visual changes  ENMT:  No neck pain  Cardiac:  No chest pain  Respiratory:  No cough, SOB  GI:  No nausea, vomiting, diarrhea, abdominal pain  :  No dysuria  MS:  +foot pain  Neuro:  No headache or lightheadedness  Skin:  No skin rash  Endocrine: h/o DM  Except as documented in the HPI,  all other systems are negative

## 2019-01-10 NOTE — CONSULT NOTE ADULT - SUBJECTIVE AND OBJECTIVE BOX
PODIATRY CONSULT   CALLY HARTMAN is a 53y old  Female who presents with a chief complaint of Right foot pain  HPI:  52yo F with PMHx DM, HTN, neuropathy, sciatica, sent by podiatrist Dr. Diaz for right charcot foot, admission/OR. Per pt, has been having foot pain for 1 year, went to see podiatrist in office yesterday and told to go to hospital. Pt was on a CAM boot for 6 months. Pt discussed surgical treatment with Dr. Diaz.  Denies ulceration or skin breakdown. Denies fever. Denies headache, lightheadedness, CP, SOB, cough, nausea, vomiting, diarrhea, abd pain, dysuria, leg swelling.      PMH:   PSH:   Medication vancomycin  IVPB 1500 milliGRAM(s) IV Intermittent every 12 hours    Allergy: No Known Allergies        Labs:                        13.0   9.19  )-----------( 244      ( 10 Ty 2019 11:18 )             40.2                     O:   Derm: No Ulcerations. No active sign of infection. No skin lesions.  Vascular: Dorsalis Pedis and Posterior Tibial pulses 1/4.  Capillary re-fill time less then 3 seconds digits 1-5 bilateral.  B/L feet warm to touch  Neuro: Protective sensation diminished to the level of the digits bilateral.  MSK: Collapsed Mid R foot. Limited ROM R foot    < from: Xray Foot AP + Lateral + Oblique, Right (01.10.19 @ 11:30) >    EXAM:  XR FOOT COMP MIN 3 VIEWS RT            PROCEDURE DATE:  01/10/2019            INTERPRETATION:  Clinical History / Reason for exam: Charcot foot.    3 views.    Findings/  impression: No acute fracture or dislocation. Neuropathic degenerative  changes. First metatarsotarsal joint degenerative changes. Hallux valgus.   Lateral subluxation of the tarsometatarsal joints.. . .. Fifth metatarsal   neck and fifth metatarsal base old fractures.    KAIT VIDAL M.D., ATTENDING RADIOLOGIST  This document has been electronically signed. Ty 10 2019 11:49AM        < end of copied text >      Assessment:   Charcot R foot  P:  Chart reviewed and Patient evaluated  Discussed diagnosis and treatment with patient  X-rays reviewed: Charcot foot changes. See radiologist note   Arterial duplex ordered  Podiatry will follow while in house.  discussed care plan  with  Attending  Pt to OR for Charcot reconstruction Surgery  Medical clearance with stratification requested  NPO after 8am  Please normalize labs for surgery

## 2019-01-10 NOTE — ED PROVIDER NOTE - PHYSICAL EXAMINATION
Vital signs reviewed  GENERAL: Patient well appearing, NAD  HEAD: NCAT  EYES: Anicteric  ENT: MMM  NECK: Supple, non tender  RESPIRATORY: Normal respiratory effort. CTA B/L. No wheezing, rales, rhonchi  CARDIOVASCULAR: Regular rate and rhythm  ABDOMEN: Soft. Nondistended. Nontender. No guarding or rebound.  MUSCULOSKELETAL/EXTREMITIES: Brisk cap refill. 1+ pedal pulses. Mild distal foot TTP. Normal ROM. Small bump to sole of foot without ulceration  SKIN:  Warm and dry  NEURO: AAOx3. No gross FND.  PSYCHIATRIC: Cooperative. Affect appropriate.

## 2019-01-11 ENCOUNTER — RESULT REVIEW (OUTPATIENT)
Age: 54
End: 2019-01-11

## 2019-01-11 LAB
ANION GAP SERPL CALC-SCNC: 14 MMOL/L — SIGNIFICANT CHANGE UP (ref 7–14)
APPEARANCE UR: ABNORMAL
BILIRUB UR-MCNC: NEGATIVE — SIGNIFICANT CHANGE UP
BUN SERPL-MCNC: 7 MG/DL — LOW (ref 10–20)
CALCIUM SERPL-MCNC: 9.3 MG/DL — SIGNIFICANT CHANGE UP (ref 8.5–10.1)
CHLORIDE SERPL-SCNC: 98 MMOL/L — SIGNIFICANT CHANGE UP (ref 98–110)
CHOLEST SERPL-MCNC: 121 MG/DL — SIGNIFICANT CHANGE UP (ref 100–200)
CO2 SERPL-SCNC: 28 MMOL/L — SIGNIFICANT CHANGE UP (ref 17–32)
COLOR SPEC: YELLOW — SIGNIFICANT CHANGE UP
CREAT SERPL-MCNC: 0.6 MG/DL — LOW (ref 0.7–1.5)
CRP SERPL-MCNC: <0.1 MG/DL — SIGNIFICANT CHANGE UP (ref 0–0.4)
DIFF PNL FLD: NEGATIVE — SIGNIFICANT CHANGE UP
EPI CELLS # UR: ABNORMAL /HPF
ESTIMATED AVERAGE GLUCOSE: 126 MG/DL — HIGH (ref 68–114)
GLUCOSE BLDC GLUCOMTR-MCNC: 127 MG/DL — HIGH (ref 70–99)
GLUCOSE BLDC GLUCOMTR-MCNC: 154 MG/DL — HIGH (ref 70–99)
GLUCOSE BLDC GLUCOMTR-MCNC: 187 MG/DL — HIGH (ref 70–99)
GLUCOSE BLDC GLUCOMTR-MCNC: 96 MG/DL — SIGNIFICANT CHANGE UP (ref 70–99)
GLUCOSE SERPL-MCNC: 111 MG/DL — HIGH (ref 70–99)
GLUCOSE UR QL: 100 MG/DL
HBA1C BLD-MCNC: 6 % — HIGH (ref 4–5.6)
HDLC SERPL-MCNC: 49 MG/DL — LOW
KETONES UR-MCNC: NEGATIVE — SIGNIFICANT CHANGE UP
LEUKOCYTE ESTERASE UR-ACNC: NEGATIVE — SIGNIFICANT CHANGE UP
LIPID PNL WITH DIRECT LDL SERPL: 51 MG/DL — SIGNIFICANT CHANGE UP (ref 4–129)
MAGNESIUM SERPL-MCNC: 1.9 MG/DL — SIGNIFICANT CHANGE UP (ref 1.8–2.4)
NITRITE UR-MCNC: NEGATIVE — SIGNIFICANT CHANGE UP
OSMOLALITY UR: 200 MOS/KG — SIGNIFICANT CHANGE UP (ref 50–1400)
PH UR: 6.5 — SIGNIFICANT CHANGE UP (ref 5–8)
PHOSPHATE SERPL-MCNC: 3.6 MG/DL — SIGNIFICANT CHANGE UP (ref 2.1–4.9)
POTASSIUM SERPL-MCNC: 4.5 MMOL/L — SIGNIFICANT CHANGE UP (ref 3.5–5)
POTASSIUM SERPL-SCNC: 4.5 MMOL/L — SIGNIFICANT CHANGE UP (ref 3.5–5)
PROT UR-MCNC: NEGATIVE MG/DL — SIGNIFICANT CHANGE UP
SODIUM SERPL-SCNC: 140 MMOL/L — SIGNIFICANT CHANGE UP (ref 135–146)
SODIUM UR-SCNC: 32 MMOL/L — SIGNIFICANT CHANGE UP
SP GR SPEC: 1.01 — SIGNIFICANT CHANGE UP (ref 1.01–1.03)
TOTAL CHOLESTEROL/HDL RATIO MEASUREMENT: 2.5 RATIO — LOW (ref 4–5.5)
TRIGL SERPL-MCNC: 156 MG/DL — HIGH (ref 10–149)
UROBILINOGEN FLD QL: 0.2 MG/DL — SIGNIFICANT CHANGE UP (ref 0.2–0.2)

## 2019-01-11 RX ORDER — SODIUM CHLORIDE 9 MG/ML
1000 INJECTION INTRAMUSCULAR; INTRAVENOUS; SUBCUTANEOUS
Qty: 0 | Refills: 0 | Status: DISCONTINUED | OUTPATIENT
Start: 2019-01-11 | End: 2019-01-11

## 2019-01-11 RX ORDER — DULOXETINE HYDROCHLORIDE 30 MG/1
60 CAPSULE, DELAYED RELEASE ORAL DAILY
Qty: 0 | Refills: 0 | Status: DISCONTINUED | OUTPATIENT
Start: 2019-01-11 | End: 2019-01-17

## 2019-01-11 RX ORDER — OXYCODONE AND ACETAMINOPHEN 5; 325 MG/1; MG/1
2 TABLET ORAL EVERY 8 HOURS
Qty: 0 | Refills: 0 | Status: DISCONTINUED | OUTPATIENT
Start: 2019-01-11 | End: 2019-01-12

## 2019-01-11 RX ORDER — ATORVASTATIN CALCIUM 80 MG/1
40 TABLET, FILM COATED ORAL AT BEDTIME
Qty: 0 | Refills: 0 | Status: DISCONTINUED | OUTPATIENT
Start: 2019-01-11 | End: 2019-01-17

## 2019-01-11 RX ORDER — DIAZEPAM 5 MG
5 TABLET ORAL
Qty: 0 | Refills: 0 | Status: DISCONTINUED | OUTPATIENT
Start: 2019-01-11 | End: 2019-01-17

## 2019-01-11 RX ORDER — SENNA PLUS 8.6 MG/1
2 TABLET ORAL AT BEDTIME
Qty: 0 | Refills: 0 | Status: DISCONTINUED | OUTPATIENT
Start: 2019-01-11 | End: 2019-01-17

## 2019-01-11 RX ORDER — INFLUENZA VIRUS VACCINE 15; 15; 15; 15 UG/.5ML; UG/.5ML; UG/.5ML; UG/.5ML
0.5 SUSPENSION INTRAMUSCULAR ONCE
Qty: 0 | Refills: 0 | Status: DISCONTINUED | OUTPATIENT
Start: 2019-01-11 | End: 2019-01-17

## 2019-01-11 RX ORDER — ONDANSETRON 8 MG/1
4 TABLET, FILM COATED ORAL ONCE
Qty: 0 | Refills: 0 | Status: DISCONTINUED | OUTPATIENT
Start: 2019-01-11 | End: 2019-01-11

## 2019-01-11 RX ORDER — PANTOPRAZOLE SODIUM 20 MG/1
40 TABLET, DELAYED RELEASE ORAL
Qty: 0 | Refills: 0 | Status: DISCONTINUED | OUTPATIENT
Start: 2019-01-11 | End: 2019-01-17

## 2019-01-11 RX ORDER — CEFAZOLIN SODIUM 1 G
2000 VIAL (EA) INJECTION EVERY 8 HOURS
Qty: 0 | Refills: 0 | Status: DISCONTINUED | OUTPATIENT
Start: 2019-01-11 | End: 2019-01-17

## 2019-01-11 RX ORDER — HYDROMORPHONE HYDROCHLORIDE 2 MG/ML
0.5 INJECTION INTRAMUSCULAR; INTRAVENOUS; SUBCUTANEOUS
Qty: 0 | Refills: 0 | Status: DISCONTINUED | OUTPATIENT
Start: 2019-01-11 | End: 2019-01-11

## 2019-01-11 RX ORDER — METOPROLOL TARTRATE 50 MG
50 TABLET ORAL EVERY 12 HOURS
Qty: 0 | Refills: 0 | Status: DISCONTINUED | OUTPATIENT
Start: 2019-01-11 | End: 2019-01-12

## 2019-01-11 RX ORDER — METOPROLOL TARTRATE 50 MG
50 TABLET ORAL EVERY 12 HOURS
Qty: 0 | Refills: 0 | Status: DISCONTINUED | OUTPATIENT
Start: 2019-01-11 | End: 2019-01-11

## 2019-01-11 RX ORDER — ACETAMINOPHEN 500 MG
1000 TABLET ORAL ONCE
Qty: 0 | Refills: 0 | Status: COMPLETED | OUTPATIENT
Start: 2019-01-11 | End: 2019-01-11

## 2019-01-11 RX ORDER — LEVOTHYROXINE SODIUM 125 MCG
100 TABLET ORAL DAILY
Qty: 0 | Refills: 0 | Status: DISCONTINUED | OUTPATIENT
Start: 2019-01-11 | End: 2019-01-17

## 2019-01-11 RX ORDER — ASPIRIN/CALCIUM CARB/MAGNESIUM 324 MG
81 TABLET ORAL DAILY
Qty: 0 | Refills: 0 | Status: DISCONTINUED | OUTPATIENT
Start: 2019-01-11 | End: 2019-01-17

## 2019-01-11 RX ORDER — ALBUTEROL 90 UG/1
2 AEROSOL, METERED ORAL EVERY 6 HOURS
Qty: 0 | Refills: 0 | Status: DISCONTINUED | OUTPATIENT
Start: 2019-01-11 | End: 2019-01-17

## 2019-01-11 RX ORDER — ENOXAPARIN SODIUM 100 MG/ML
40 INJECTION SUBCUTANEOUS EVERY 24 HOURS
Qty: 0 | Refills: 0 | Status: DISCONTINUED | OUTPATIENT
Start: 2019-01-11 | End: 2019-01-17

## 2019-01-11 RX ORDER — LISINOPRIL 2.5 MG/1
10 TABLET ORAL DAILY
Qty: 0 | Refills: 0 | Status: DISCONTINUED | OUTPATIENT
Start: 2019-01-11 | End: 2019-01-12

## 2019-01-11 RX ORDER — HYDROMORPHONE HYDROCHLORIDE 2 MG/ML
0.25 INJECTION INTRAMUSCULAR; INTRAVENOUS; SUBCUTANEOUS
Qty: 0 | Refills: 0 | Status: DISCONTINUED | OUTPATIENT
Start: 2019-01-11 | End: 2019-01-11

## 2019-01-11 RX ORDER — HYDROXYZINE HCL 10 MG
10 TABLET ORAL THREE TIMES A DAY
Qty: 0 | Refills: 0 | Status: DISCONTINUED | OUTPATIENT
Start: 2019-01-11 | End: 2019-01-17

## 2019-01-11 RX ORDER — HYDROXYZINE HCL 10 MG
10 TABLET ORAL THREE TIMES A DAY
Qty: 0 | Refills: 0 | Status: DISCONTINUED | OUTPATIENT
Start: 2019-01-11 | End: 2019-01-11

## 2019-01-11 RX ORDER — CHLORHEXIDINE GLUCONATE 213 G/1000ML
1 SOLUTION TOPICAL
Qty: 0 | Refills: 0 | Status: DISCONTINUED | OUTPATIENT
Start: 2019-01-11 | End: 2019-01-17

## 2019-01-11 RX ORDER — BUDESONIDE AND FORMOTEROL FUMARATE DIHYDRATE 160; 4.5 UG/1; UG/1
2 AEROSOL RESPIRATORY (INHALATION)
Qty: 0 | Refills: 0 | Status: DISCONTINUED | OUTPATIENT
Start: 2019-01-11 | End: 2019-01-17

## 2019-01-11 RX ADMIN — Medication 150 MILLIGRAM(S): at 23:21

## 2019-01-11 RX ADMIN — OXYCODONE AND ACETAMINOPHEN 2 TABLET(S): 5; 325 TABLET ORAL at 23:21

## 2019-01-11 RX ADMIN — ATORVASTATIN CALCIUM 40 MILLIGRAM(S): 80 TABLET, FILM COATED ORAL at 22:42

## 2019-01-11 RX ADMIN — Medication 81 MILLIGRAM(S): at 11:35

## 2019-01-11 RX ADMIN — Medication 100 MICROGRAM(S): at 07:04

## 2019-01-11 RX ADMIN — LISINOPRIL 10 MILLIGRAM(S): 2.5 TABLET ORAL at 07:04

## 2019-01-11 RX ADMIN — OXYCODONE AND ACETAMINOPHEN 2 TABLET(S): 5; 325 TABLET ORAL at 10:03

## 2019-01-11 RX ADMIN — Medication 50 MILLIGRAM(S): at 10:04

## 2019-01-11 RX ADMIN — Medication 150 MILLIGRAM(S): at 07:04

## 2019-01-11 RX ADMIN — Medication 400 MILLIGRAM(S): at 22:43

## 2019-01-11 RX ADMIN — BUDESONIDE AND FORMOTEROL FUMARATE DIHYDRATE 2 PUFF(S): 160; 4.5 AEROSOL RESPIRATORY (INHALATION) at 10:05

## 2019-01-11 RX ADMIN — SODIUM CHLORIDE 75 MILLILITER(S): 9 INJECTION INTRAMUSCULAR; INTRAVENOUS; SUBCUTANEOUS at 21:33

## 2019-01-11 RX ADMIN — PANTOPRAZOLE SODIUM 40 MILLIGRAM(S): 20 TABLET, DELAYED RELEASE ORAL at 10:04

## 2019-01-11 RX ADMIN — Medication 10 MILLIGRAM(S): at 14:17

## 2019-01-11 RX ADMIN — DULOXETINE HYDROCHLORIDE 60 MILLIGRAM(S): 30 CAPSULE, DELAYED RELEASE ORAL at 11:35

## 2019-01-11 RX ADMIN — Medication 300 MILLIGRAM(S): at 08:07

## 2019-01-11 RX ADMIN — HYDROMORPHONE HYDROCHLORIDE 0.25 MILLIGRAM(S): 2 INJECTION INTRAMUSCULAR; INTRAVENOUS; SUBCUTANEOUS at 22:01

## 2019-01-11 RX ADMIN — DULOXETINE HYDROCHLORIDE 60 MILLIGRAM(S): 30 CAPSULE, DELAYED RELEASE ORAL at 22:42

## 2019-01-11 RX ADMIN — Medication 81 MILLIGRAM(S): at 22:42

## 2019-01-11 RX ADMIN — Medication 10 MILLIGRAM(S): at 22:42

## 2019-01-11 RX ADMIN — Medication 10 MILLIGRAM(S): at 07:04

## 2019-01-11 NOTE — PROGRESS NOTE ADULT - SUBJECTIVE AND OBJECTIVE BOX
CALLY HARTMAN 53y Female  MRN#: [id number]   CODE STATUS Full      SUBJECTIVE  Patient is a 53y old lady with history of DMII with diabetic neuropathy, HTN, COPD sent by her podiatrist for right Charcot foot reconstruction surgery.  Today is hospital day 1d, and this morning she is having no complaints and reports no overnight events.      OBJECTIVE  PAST MEDICAL & SURGICAL HISTORY  Dextrocardia  Chronic midline low back pain with bilateral sciatica  Peripheral neuralgia  Essential hypertension  Diabetes 1.5, managed as type 2  Charcot's arthropathy: R foot  H/O shoulder surgery: Right shoulder surgery   delivery delivered    ALLERGIES:  No Known Allergies    MEDICATIONS:  STANDING MEDICATIONS  aspirin enteric coated 81 milliGRAM(s) Oral daily  atorvastatin 40 milliGRAM(s) Oral at bedtime  buDESOnide  80 MICROgram(s)/formoterol 4.5 MICROgram(s) Inhaler 2 Puff(s) Inhalation two times a day  chlorhexidine 4% Liquid 1 Application(s) Topical <User Schedule>  DULoxetine 60 milliGRAM(s) Oral daily  enoxaparin Injectable 40 milliGRAM(s) SubCutaneous every 24 hours  hydrOXYzine  Oral Tab/Cap - Peds 10 milliGRAM(s) Oral three times a day  influenza   Vaccine 0.5 milliLiter(s) IntraMuscular once  levothyroxine 100 MICROGram(s) Oral daily  lisinopril 10 milliGRAM(s) Oral daily  metoprolol succinate ER 50 milliGRAM(s) Oral daily  pantoprazole    Tablet 40 milliGRAM(s) Oral before breakfast  pregabalin 150 milliGRAM(s) Oral three times a day  vancomycin  IVPB 1500 milliGRAM(s) IV Intermittent every 12 hours    PRN MEDICATIONS  ALBUTerol    90 MICROgram(s) HFA Inhaler 2 Puff(s) Inhalation every 6 hours PRN  diazepam    Tablet 5 milliGRAM(s) Oral two times a day PRN  oxyCODONE    5 mG/acetaminophen 325 mG 2 Tablet(s) Oral every 8 hours PRN  senna 2 Tablet(s) Oral at bedtime PRN      VITAL SIGNS: Last 24 Hours  T(C): 36.5 (2019 10:45), Max: 36.6 (10 Ty 2019 18:30)  T(F): 97.7 (2019 10:45), Max: 97.8 (10 Ty 2019 18:30)  HR: 88 (2019 10:45) (72 - 88)  BP: 118/66 (2019 10:45) (102/51 - 118/66)  BP(mean): --  RR: 17 (2019 10:45) (17 - 18)  SpO2: 94% (2019 10:45) (93% - 95%)    LABS:                        13.0   9.19  )-----------( 244      ( 10 Ty 2019 11:18 )             40.2     -10    131<L>  |  92<L>  |  6<L>  ----------------------------<  84  4.9   |  22  |  0.7    Ca    9.0      10 Ty 2019 11:18  Phos  3.6     11  Mg     1.9     11    TPro  6.8  /  Alb  4.1  /  TBili  0.4  /  DBili  x   /  AST  30  /  ALT  17  /  AlkPhos  69  01-10    PT/INR - ( 10 Ty 2019 11:18 )   PT: 11.60 sec;   INR: 1.01 ratio         PTT - ( 10 Ty 2019 11:18 )  PTT:35.5 sec              RADIOLOGY:  < from: VA Duplex Low Ext Arterial, Ltd, Right (01.10.19 @ 11:59) >  Normal arterial flow in the right lower extremity.    < end of copied text >      PHYSICAL EXAM:  Constitutional: obese  Respiratory: lungs B/L clear on auscultation  Cardiovascular: S1S2 normal , no murmurs  Gastrointestinal: Abd soft, non distended, non tender, BS+  Extremities: No pedal edema  Musculoskeletal: R flat foot, with some bony prominences but no ulcers or signs of inflammation.  Neurological: AAOX3, No FND    ASSESSMENT & PLAN    # R Charcot foot   -Secondary to diabetic neuropathy  -XRAY foot: Charcot joint R foot, no signs of OM  - Arterial doppler: normal flow  - Preop clearance: Patient with q waves on ECG, spoke to Dr Ontiveros her [facility] cardiologist. He confirmed that she had a nuclear stress stest back in  which was normal. He cleared the patient for surgery. Attending Hospitalist note to follow.  - Going for surgery today  - Pain Management: c/w naproxen, percocet as needed.    #Hyponatremia  -mild Na 131  -will repeat level and workup if still low (r/o hypothyroidism, r/o SIADH)    #Peripheral neuropathy  - c/w lyrica and duloxetine    #DM II  - Hold oral medications for now  - Start insulin if BS> 180    #COPD  - Not in exacerabtion right now  -c/w inhalers as of now    Full code  DVT PPX: Lovenox  Diet: Carb consistent  Activity: OOB  CHG bath daily

## 2019-01-11 NOTE — PACU DISCHARGE NOTE - COMMENTS
Patient transferred to PACU without incident. VS BP 99/48, , RR 18, O2 92% on 2L NC. Disposition as per Primary Team.

## 2019-01-11 NOTE — CONSULT NOTE ADULT - SUBJECTIVE AND OBJECTIVE BOX
Patient is a 53y old  Female who presents with a chief complaint of R charcot foot Sx (2019 11:32)      HPI:  54yo F coming from home with PMHx DM II on oral meds, HTN, neuropathy, sciatica, sent by podiatrist Dr. Diaz for right charcot foot, admission/OR. Per pt, has been having foot pain for 1 year 2/2 charcot joint and was managed conservatively. Pt was on a CAM boot for 6 months, she has constant pain and limping. She walks with a walker/cane and currently is under disability.   She went to see podiatrist in office yesterday and told to go to hospital for Charcot foot surgery and admission.   Pt discussed surgical treatment with Dr. Diaz.    Denies ulceration or skin breakdown. Denies fever. Denies headache, lightheadedness, CP, SOB, cough, nausea, vomiting, diarrhea, abd pain, dysuria, leg swelling.    In the ED she was hemodynamically stable and was given Vancomycin one dose for unknown reason. (10 Ty 2019 14:09)      PAST MEDICAL & SURGICAL HISTORY:  Dextrocardia  Chronic midline low back pain with bilateral sciatica  Peripheral neuralgia  Essential hypertension  Diabetes 1.5, managed as type 2  Charcot's arthropathy: R foot  H/O shoulder surgery: Right shoulder surgery   delivery delivered      PREVIOUS DIAGNOSTIC TESTING:      ECHO  FINDINGS:    STRESS  FINDINGS:    CATHETERIZATION  FINDINGS:    MEDICATIONS  (STANDING):  aspirin enteric coated 81 milliGRAM(s) Oral daily  atorvastatin 40 milliGRAM(s) Oral at bedtime  buDESOnide  80 MICROgram(s)/formoterol 4.5 MICROgram(s) Inhaler 2 Puff(s) Inhalation two times a day  chlorhexidine 4% Liquid 1 Application(s) Topical <User Schedule>  DULoxetine 60 milliGRAM(s) Oral daily  enoxaparin Injectable 40 milliGRAM(s) SubCutaneous every 24 hours  hydrOXYzine  Oral Tab/Cap - Peds 10 milliGRAM(s) Oral three times a day  influenza   Vaccine 0.5 milliLiter(s) IntraMuscular once  levothyroxine 100 MICROGram(s) Oral daily  lisinopril 10 milliGRAM(s) Oral daily  metoprolol succinate ER 50 milliGRAM(s) Oral every 12 hours  pantoprazole    Tablet 40 milliGRAM(s) Oral before breakfast  pregabalin 150 milliGRAM(s) Oral three times a day  vancomycin  IVPB 1500 milliGRAM(s) IV Intermittent every 12 hours    MEDICATIONS  (PRN):  ALBUTerol    90 MICROgram(s) HFA Inhaler 2 Puff(s) Inhalation every 6 hours PRN Bronchospasm  diazepam    Tablet 5 milliGRAM(s) Oral two times a day PRN anxiety  oxyCODONE    5 mG/acetaminophen 325 mG 2 Tablet(s) Oral every 8 hours PRN Moderate Pain (4 - 6)  senna 2 Tablet(s) Oral at bedtime PRN Constipation      FAMILY HISTORY:  No pertinent family history in first degree relatives      SOCIAL HISTORY:  CIGARETTES:    ALCOHOL:    REVIEW OF SYSTEMS:  CONSTITUTIONAL: No fever, weight loss, or fatigue  NECK: No pain or stiffness  RESPIRATORY: No cough, wheezing, chills or hemoptysis; No shortness of breath  CARDIOVASCULAR: No chest pain, palpitations, dizziness, or leg swelling  GASTROINTESTINAL: No abdominal or epigastric pain. No nausea, vomiting, or hematemesis; No diarrhea or constipation. No melena or hematochezia.  GENITOURINARY: No dysuria, frequency, hematuria, or incontinence  NEUROLOGICAL: No headaches, memory loss, loss of strength, numbness, or tremors  SKIN: No itching, burning, rashes, or lesions   ENDOCRINE: No heat or cold intolerance; No hair loss  MUSCULOSKELETAL: No joint pain or swelling; No muscle, back, or extremity pain  HEME/LYMPH: No easy bruising, or bleeding gums          Vital Signs Last 24 Hrs  T(C): 36.5 (2019 10:45), Max: 36.6 (10 Ty 2019 18:30)  T(F): 97.7 (2019 10:45), Max: 97.8 (10 Ty 2019 18:30)  HR: 88 (2019 10:45) (72 - 88)  BP: 118/66 (2019 10:45) (102/51 - 118/66)  BP(mean): --  RR: 17 (2019 10:45) (17 - 18)  SpO2: 94% (2019 10:45) (93% - 95%)        PHYSICAL EXAM:  GENERAL: NAD, well-groomed, well-developed  HEAD:  Atraumatic, Normocephalic  NECK: Supple, No JVD, Normal thyroid  NERVOUS SYSTEM:  Alert & Oriented X3, Good concentration  CHEST/LUNG: Clear to percussion bilaterally; No rales, rhonchi, wheezing, or rubs  HEART: Regular rate and rhythm; No murmurs, rubs, or gallops  ABDOMEN: Soft, Nontender, Nondistended; Bowel sounds present  EXTREMITIES:  2+ Peripheral Pulses, No clubbing, cyanosis, or edema  SKIN: No rashes or lesions    INTERPRETATION OF TELEMETRY:    ECG:    I&O's Detail      LABS:                        13.0   9.19  )-----------( 244      ( 10 Ty 2019 11:18 )             40.2     01-10    131<L>  |  92<L>  |  6<L>  ----------------------------<  84  4.9   |  22  |  0.7    Ca    9.0      10 Ty 2019 11:18  Phos  3.6     -11  Mg     1.9     -11    TPro  6.8  /  Alb  4.1  /  TBili  0.4  /  DBili  x   /  AST  30  /  ALT  17  /  AlkPhos  69  01-10        PT/INR - ( 10 Ty 2019 11:18 )   PT: 11.60 sec;   INR: 1.01 ratio         PTT - ( 10 Ty 2019 11:18 )  PTT:35.5 sec    I&O's Summary      RADIOLOGY & ADDITIONAL STUDIES:

## 2019-01-12 LAB
ANION GAP SERPL CALC-SCNC: 12 MMOL/L — SIGNIFICANT CHANGE UP (ref 7–14)
BASOPHILS # BLD AUTO: 0.03 K/UL — SIGNIFICANT CHANGE UP (ref 0–0.2)
BASOPHILS NFR BLD AUTO: 0.2 % — SIGNIFICANT CHANGE UP (ref 0–1)
BUN SERPL-MCNC: 7 MG/DL — LOW (ref 10–20)
CALCIUM SERPL-MCNC: 8.2 MG/DL — LOW (ref 8.5–10.1)
CHLORIDE SERPL-SCNC: 100 MMOL/L — SIGNIFICANT CHANGE UP (ref 98–110)
CO2 SERPL-SCNC: 24 MMOL/L — SIGNIFICANT CHANGE UP (ref 17–32)
CREAT SERPL-MCNC: 0.7 MG/DL — SIGNIFICANT CHANGE UP (ref 0.7–1.5)
EOSINOPHIL # BLD AUTO: 0.03 K/UL — SIGNIFICANT CHANGE UP (ref 0–0.7)
EOSINOPHIL NFR BLD AUTO: 0.2 % — SIGNIFICANT CHANGE UP (ref 0–8)
GLUCOSE BLDC GLUCOMTR-MCNC: 112 MG/DL — HIGH (ref 70–99)
GLUCOSE BLDC GLUCOMTR-MCNC: 177 MG/DL — HIGH (ref 70–99)
GLUCOSE BLDC GLUCOMTR-MCNC: 178 MG/DL — HIGH (ref 70–99)
GLUCOSE BLDC GLUCOMTR-MCNC: 180 MG/DL — HIGH (ref 70–99)
GLUCOSE SERPL-MCNC: 259 MG/DL — HIGH (ref 70–99)
HCT VFR BLD CALC: 31.3 % — LOW (ref 37–47)
HGB BLD-MCNC: 9.9 G/DL — LOW (ref 12–16)
IMM GRANULOCYTES NFR BLD AUTO: 0.5 % — HIGH (ref 0.1–0.3)
LYMPHOCYTES # BLD AUTO: 1.31 K/UL — SIGNIFICANT CHANGE UP (ref 1.2–3.4)
LYMPHOCYTES # BLD AUTO: 8.5 % — LOW (ref 20.5–51.1)
MCHC RBC-ENTMCNC: 29.1 PG — SIGNIFICANT CHANGE UP (ref 27–31)
MCHC RBC-ENTMCNC: 31.6 G/DL — LOW (ref 32–37)
MCV RBC AUTO: 92.1 FL — SIGNIFICANT CHANGE UP (ref 81–99)
MONOCYTES # BLD AUTO: 0.76 K/UL — HIGH (ref 0.1–0.6)
MONOCYTES NFR BLD AUTO: 5 % — SIGNIFICANT CHANGE UP (ref 1.7–9.3)
NEUTROPHILS # BLD AUTO: 13.12 K/UL — HIGH (ref 1.4–6.5)
NEUTROPHILS NFR BLD AUTO: 85.6 % — HIGH (ref 42.2–75.2)
NRBC # BLD: 0 /100 WBCS — SIGNIFICANT CHANGE UP (ref 0–0)
PLATELET # BLD AUTO: 201 K/UL — SIGNIFICANT CHANGE UP (ref 130–400)
POTASSIUM SERPL-MCNC: 4.3 MMOL/L — SIGNIFICANT CHANGE UP (ref 3.5–5)
POTASSIUM SERPL-SCNC: 4.3 MMOL/L — SIGNIFICANT CHANGE UP (ref 3.5–5)
RBC # BLD: 3.4 M/UL — LOW (ref 4.2–5.4)
RBC # FLD: 15.2 % — HIGH (ref 11.5–14.5)
SODIUM SERPL-SCNC: 136 MMOL/L — SIGNIFICANT CHANGE UP (ref 135–146)
WBC # BLD: 15.33 K/UL — HIGH (ref 4.8–10.8)
WBC # FLD AUTO: 15.33 K/UL — HIGH (ref 4.8–10.8)

## 2019-01-12 RX ORDER — INSULIN GLARGINE 100 [IU]/ML
30 INJECTION, SOLUTION SUBCUTANEOUS AT BEDTIME
Qty: 0 | Refills: 0 | Status: DISCONTINUED | OUTPATIENT
Start: 2019-01-12 | End: 2019-01-17

## 2019-01-12 RX ORDER — METOPROLOL TARTRATE 50 MG
50 TABLET ORAL DAILY
Qty: 0 | Refills: 0 | Status: DISCONTINUED | OUTPATIENT
Start: 2019-01-12 | End: 2019-01-12

## 2019-01-12 RX ORDER — SODIUM CHLORIDE 9 MG/ML
1000 INJECTION INTRAMUSCULAR; INTRAVENOUS; SUBCUTANEOUS
Qty: 0 | Refills: 0 | Status: COMPLETED | OUTPATIENT
Start: 2019-01-12 | End: 2019-01-12

## 2019-01-12 RX ORDER — MORPHINE SULFATE 50 MG/1
2 CAPSULE, EXTENDED RELEASE ORAL EVERY 4 HOURS
Qty: 0 | Refills: 0 | Status: DISCONTINUED | OUTPATIENT
Start: 2019-01-12 | End: 2019-01-13

## 2019-01-12 RX ORDER — SODIUM CHLORIDE 9 MG/ML
1000 INJECTION INTRAMUSCULAR; INTRAVENOUS; SUBCUTANEOUS
Qty: 0 | Refills: 0 | Status: DISCONTINUED | OUTPATIENT
Start: 2019-01-12 | End: 2019-01-14

## 2019-01-12 RX ORDER — METOPROLOL TARTRATE 50 MG
12.5 TABLET ORAL
Qty: 0 | Refills: 0 | Status: DISCONTINUED | OUTPATIENT
Start: 2019-01-12 | End: 2019-01-17

## 2019-01-12 RX ORDER — INSULIN LISPRO 100/ML
10 VIAL (ML) SUBCUTANEOUS
Qty: 0 | Refills: 0 | Status: DISCONTINUED | OUTPATIENT
Start: 2019-01-12 | End: 2019-01-17

## 2019-01-12 RX ORDER — SODIUM CHLORIDE 9 MG/ML
500 INJECTION INTRAMUSCULAR; INTRAVENOUS; SUBCUTANEOUS ONCE
Qty: 0 | Refills: 0 | Status: COMPLETED | OUTPATIENT
Start: 2019-01-12 | End: 2019-01-12

## 2019-01-12 RX ORDER — INSULIN LISPRO 100/ML
VIAL (ML) SUBCUTANEOUS
Qty: 0 | Refills: 0 | Status: DISCONTINUED | OUTPATIENT
Start: 2019-01-12 | End: 2019-01-17

## 2019-01-12 RX ORDER — MORPHINE SULFATE 50 MG/1
2 CAPSULE, EXTENDED RELEASE ORAL EVERY 6 HOURS
Qty: 0 | Refills: 0 | Status: DISCONTINUED | OUTPATIENT
Start: 2019-01-12 | End: 2019-01-12

## 2019-01-12 RX ORDER — MORPHINE SULFATE 50 MG/1
2 CAPSULE, EXTENDED RELEASE ORAL ONCE
Qty: 0 | Refills: 0 | Status: DISCONTINUED | OUTPATIENT
Start: 2019-01-12 | End: 2019-01-12

## 2019-01-12 RX ORDER — NICOTINE POLACRILEX 2 MG
1 GUM BUCCAL DAILY
Qty: 0 | Refills: 0 | Status: DISCONTINUED | OUTPATIENT
Start: 2019-01-12 | End: 2019-01-17

## 2019-01-12 RX ORDER — IBUPROFEN 200 MG
600 TABLET ORAL ONCE
Qty: 0 | Refills: 0 | Status: COMPLETED | OUTPATIENT
Start: 2019-01-12 | End: 2019-01-12

## 2019-01-12 RX ADMIN — Medication 1 PATCH: at 12:19

## 2019-01-12 RX ADMIN — BUDESONIDE AND FORMOTEROL FUMARATE DIHYDRATE 2 PUFF(S): 160; 4.5 AEROSOL RESPIRATORY (INHALATION) at 08:28

## 2019-01-12 RX ADMIN — DULOXETINE HYDROCHLORIDE 60 MILLIGRAM(S): 30 CAPSULE, DELAYED RELEASE ORAL at 11:29

## 2019-01-12 RX ADMIN — SODIUM CHLORIDE 70 MILLILITER(S): 9 INJECTION INTRAMUSCULAR; INTRAVENOUS; SUBCUTANEOUS at 10:08

## 2019-01-12 RX ADMIN — OXYCODONE AND ACETAMINOPHEN 2 TABLET(S): 5; 325 TABLET ORAL at 18:47

## 2019-01-12 RX ADMIN — MORPHINE SULFATE 2 MILLIGRAM(S): 50 CAPSULE, EXTENDED RELEASE ORAL at 17:50

## 2019-01-12 RX ADMIN — ATORVASTATIN CALCIUM 40 MILLIGRAM(S): 80 TABLET, FILM COATED ORAL at 22:40

## 2019-01-12 RX ADMIN — SODIUM CHLORIDE 100 MILLILITER(S): 9 INJECTION INTRAMUSCULAR; INTRAVENOUS; SUBCUTANEOUS at 22:37

## 2019-01-12 RX ADMIN — Medication 10 MILLIGRAM(S): at 15:17

## 2019-01-12 RX ADMIN — INSULIN GLARGINE 30 UNIT(S): 100 INJECTION, SOLUTION SUBCUTANEOUS at 22:40

## 2019-01-12 RX ADMIN — PANTOPRAZOLE SODIUM 40 MILLIGRAM(S): 20 TABLET, DELAYED RELEASE ORAL at 06:17

## 2019-01-12 RX ADMIN — Medication 150 MILLIGRAM(S): at 22:39

## 2019-01-12 RX ADMIN — SODIUM CHLORIDE 100 MILLILITER(S): 9 INJECTION INTRAMUSCULAR; INTRAVENOUS; SUBCUTANEOUS at 17:21

## 2019-01-12 RX ADMIN — OXYCODONE AND ACETAMINOPHEN 2 TABLET(S): 5; 325 TABLET ORAL at 19:17

## 2019-01-12 RX ADMIN — Medication 10 MILLIGRAM(S): at 06:17

## 2019-01-12 RX ADMIN — MORPHINE SULFATE 2 MILLIGRAM(S): 50 CAPSULE, EXTENDED RELEASE ORAL at 01:47

## 2019-01-12 RX ADMIN — Medication 150 MILLIGRAM(S): at 06:16

## 2019-01-12 RX ADMIN — MORPHINE SULFATE 2 MILLIGRAM(S): 50 CAPSULE, EXTENDED RELEASE ORAL at 21:44

## 2019-01-12 RX ADMIN — Medication 600 MILLIGRAM(S): at 12:20

## 2019-01-12 RX ADMIN — Medication 10 UNIT(S): at 17:06

## 2019-01-12 RX ADMIN — ENOXAPARIN SODIUM 40 MILLIGRAM(S): 100 INJECTION SUBCUTANEOUS at 22:40

## 2019-01-12 RX ADMIN — OXYCODONE AND ACETAMINOPHEN 2 TABLET(S): 5; 325 TABLET ORAL at 00:08

## 2019-01-12 RX ADMIN — Medication 150 MILLIGRAM(S): at 15:17

## 2019-01-12 RX ADMIN — Medication 1 PATCH: at 19:30

## 2019-01-12 RX ADMIN — Medication 600 MILLIGRAM(S): at 18:40

## 2019-01-12 RX ADMIN — Medication 100 MICROGRAM(S): at 06:17

## 2019-01-12 RX ADMIN — Medication 81 MILLIGRAM(S): at 11:29

## 2019-01-12 RX ADMIN — Medication 10 MILLIGRAM(S): at 22:40

## 2019-01-12 RX ADMIN — SODIUM CHLORIDE 1000 MILLILITER(S): 9 INJECTION INTRAMUSCULAR; INTRAVENOUS; SUBCUTANEOUS at 15:45

## 2019-01-12 RX ADMIN — MORPHINE SULFATE 2 MILLIGRAM(S): 50 CAPSULE, EXTENDED RELEASE ORAL at 17:20

## 2019-01-12 RX ADMIN — MORPHINE SULFATE 2 MILLIGRAM(S): 50 CAPSULE, EXTENDED RELEASE ORAL at 02:17

## 2019-01-12 RX ADMIN — OXYCODONE AND ACETAMINOPHEN 2 TABLET(S): 5; 325 TABLET ORAL at 09:28

## 2019-01-12 RX ADMIN — Medication 1: at 17:06

## 2019-01-12 RX ADMIN — MORPHINE SULFATE 2 MILLIGRAM(S): 50 CAPSULE, EXTENDED RELEASE ORAL at 21:29

## 2019-01-12 NOTE — PROGRESS NOTE ADULT - SUBJECTIVE AND OBJECTIVE BOX
CALLY HARTMAN  53y  Female      Patient is a 53y old  Female who presents with a chief complaint of R charcot foot Sx (2019 10:02)      INTERVAL HPI/OVERNIGHT EVENTS: had some bleeding at foot surgical site when placed leg down towards the ground this morning. denies palpitations sob or presyncope. pain is moderately controlled      REVIEW OF SYSTEMS:  as above  All other review of systems negative    T(C): 37.4 (19 @ 14:00), Max: 37.4 (19 @ 14:00)  HR: 94 (19 @ 14:00) (89 - 106)  BP: 83/50 (19 @ 14:00) (79/48 - 102/51)  RR: 18 (19 @ 14:00) ( - )  SpO2: 96% (19 @ 22:47) (96% - 96%)  Wt(kg): --Vital Signs Last 24 Hrs  T(C): 37.4 (2019 14:00), Max: 37.4 (2019 14:00)  T(F): 99.3 (2019 14:00), Max: 99.3 (2019 14:00)  HR: 94 (2019 14:00) (89 - 106)  BP: 83/50 (2019 14:00) (79/48 - 102/51)  BP(mean): --  RR: 18 (2019 14:00) ()  SpO2: 96% (2019 22:47) (96% - 96%)      19 @ 07:01  -  19 @ 07:00  --------------------------------------------------------  IN: 75 mL / OUT: 0 mL / NET: 75 mL        PHYSICAL EXAM:  GENERAL: NAD  PSYCH: no agitation, baseline mentation  NERVOUS SYSTEM:  Alert & Oriented X3, no new focal deficits  PULMONARY: Clear to percussion bilaterally; No rales, rhonchi, wheezing, or rubs  CARDIOVASCULAR: Regular rate and rhythm; No murmurs, rubs, or gallops  GI: Soft, Nontender, Nondistended; Bowel sounds present obese  EXTREMITIES:  R charcot foot wound dressed post op    Consultant(s) Notes Reviewed:  [x ] YES  [ ] NO    Discussed with Consultants/Other Providers [ x] YES     LABS                          9.9    15.33 )-----------( 201      ( 2019 10:03 )             31.3     01-12    136  |  100  |  7<L>  ----------------------------<  259<H>  4.3   |  24  |  0.7    Ca    8.2<L>      2019 10:03  Phos  3.6       Mg     1.9             Urinalysis Basic - ( 2019 16:00 )    Color: Yellow / Appearance: Cloudy / S.010 / pH: x  Gluc: x / Ketone: Negative  / Bili: Negative / Urobili: 0.2 mg/dL   Blood: x / Protein: Negative mg/dL / Nitrite: Negative   Leuk Esterase: Negative / RBC: x / WBC x   Sq Epi: x / Non Sq Epi: Occasional /HPF / Bacteria: x        Lactate Trend        CAPILLARY BLOOD GLUCOSE      POCT Blood Glucose.: 178 mg/dL (2019 12:36)        RADIOLOGY & ADDITIONAL TESTS:    Imaging Personally Reviewed:  [ ] YES  [ ] NO    HEALTH ISSUES - PROBLEM Dx:

## 2019-01-12 NOTE — PROGRESS NOTE ADULT - SUBJECTIVE AND OBJECTIVE BOX
CALLY HARTMAN 53y Female  MRN#: [id number]   CODE STATUS: Full      SUBJECTIVE  Patient is a 53y old lady with history of DMII with diabetic neuropathy, HTN, COPD sent by her podiatrist for right Charcot foot reconstruction surgery.  Patient went to OR yesterday with no post op complications  Today is hospital day 2, and this morning she is having no complaints and reports no overnight events.    OBJECTIVE  PAST MEDICAL & SURGICAL HISTORY  Dextrocardia  Chronic midline low back pain with bilateral sciatica  Peripheral neuralgia  Essential hypertension  Diabetes 1.5, managed as type 2  Charcot's arthropathy: R foot  H/O shoulder surgery: Right shoulder surgery   delivery delivered    ALLERGIES:  No Known Allergies    MEDICATIONS:  STANDING MEDICATIONS  aspirin enteric coated 81 milliGRAM(s) Oral daily  atorvastatin 40 milliGRAM(s) Oral at bedtime  buDESOnide  80 MICROgram(s)/formoterol 4.5 MICROgram(s) Inhaler 2 Puff(s) Inhalation two times a day  ceFAZolin   IVPB 2000 milliGRAM(s) IV Intermittent every 8 hours  chlorhexidine 4% Liquid 1 Application(s) Topical <User Schedule>  DULoxetine 60 milliGRAM(s) Oral daily  enoxaparin Injectable 40 milliGRAM(s) SubCutaneous every 24 hours  hydrOXYzine  Oral Tab/Cap - Peds 10 milliGRAM(s) Oral three times a day  influenza   Vaccine 0.5 milliLiter(s) IntraMuscular once  levothyroxine 100 MICROGram(s) Oral daily  metoprolol succinate ER 50 milliGRAM(s) Oral daily  pantoprazole    Tablet 40 milliGRAM(s) Oral before breakfast  pregabalin 150 milliGRAM(s) Oral three times a day  sodium chloride 0.9%. 1000 milliLiter(s) IV Continuous <Continuous>    PRN MEDICATIONS  ALBUTerol    90 MICROgram(s) HFA Inhaler 2 Puff(s) Inhalation every 6 hours PRN  diazepam    Tablet 5 milliGRAM(s) Oral two times a day PRN  morphine  - Injectable 2 milliGRAM(s) IV Push every 6 hours PRN  oxyCODONE    5 mG/acetaminophen 325 mG 2 Tablet(s) Oral every 8 hours PRN  senna 2 Tablet(s) Oral at bedtime PRN      VITAL SIGNS: Last 24 Hours  T(C): 36.2 (2019 05:52), Max: 37.2 (2019 21:33)  T(F): 97.1 (2019 05:52), Max: 99 (2019 21:33)  HR: 89 (2019 05:52) (80 - 106)  BP: 87/53 (2019 05:52) (79/48 - 118/66)  BP(mean): --  RR: 18 (2019 23:10) (11 - 24)  SpO2: 96% (2019 22:47) (94% - 96%)    LABS:                        13.0   9.19  )-----------( 244      ( 10 Ty 2019 11:18 )             40.2     01-11    140  |  98  |  7<L>  ----------------------------<  111<H>  4.5   |  28  |  0.6<L>    Ca    9.3      2019 11:16  Phos  3.6     11  Mg     1.9     11    TPro  6.8  /  Alb  4.1  /  TBili  0.4  /  DBili  x   /  AST  30  /  ALT  17  /  AlkPhos  69  01-10    PT/INR - ( 10 Ty 2019 11:18 )   PT: 11.60 sec;   INR: 1.01 ratio         PTT - ( 10 Ty 2019 11:18 )  PTT:35.5 sec  Urinalysis Basic - ( 2019 16:00 )    Color: Yellow / Appearance: Cloudy / S.010 / pH: x  Gluc: x / Ketone: Negative  / Bili: Negative / Urobili: 0.2 mg/dL   Blood: x / Protein: Negative mg/dL / Nitrite: Negative   Leuk Esterase: Negative / RBC: x / WBC x   Sq Epi: x / Non Sq Epi: Occasional /HPF / Bacteria: x                RADIOLOGY:      PHYSICAL EXAM:    GENERAL: NAD, well-developed, AAOx3  HEENT:  Atraumatic, Normocephalic. EOMI, PERRLA, conjunctiva and sclera clear, No JVD  PULMONARY: Clear to auscultation bilaterally; No wheeze  CARDIOVASCULAR: Regular rate and rhythm; No murmurs, rubs, or gallops  GASTROINTESTINAL: Soft, Nontender, Nondistended; Bowel sounds present  MUSCULOSKELETAL:  2+ Peripheral Pulses, No clubbing, cyanosis, or edema  NEUROLOGY: non-focal  SKIN: No rashes or lesions      ASSESSMENT & PLAN      # R Charcot foot   - S/p uncomplicated reconstruction yesterday  - Podiatry following  - Strict bedrest for 24 hrs  -Plan for first takedown on Monday  - Pain Management: c/w naproxen, gabapentin ,morphine percocet as needed.      #HTN  Patient currently with low BB  will hold isinopril for now  Received 1l of IV  NS      #Peripheral neuropathy  - c/w lyrica and duloxetine    #DM II  - Hold oral medications for now  - Will start basal bolus insulin    #COPD  - Not in exacerabtion right now  -c/w inhalers as of now    Full code  DVT PPX: Lovenox  Diet: Carb consistent  Activity: OOB  CHG bath daily CALLY HARTMAN 53y Female  MRN#: [id number]   CODE STATUS: Full    SUBJECTIVE  Patient is a 53y old lady with history of DMII with diabetic neuropathy, HTN, COPD sent by her podiatrist for right Charcot foot reconstruction surgery.  Patient went to OR yesterday with no post op complications  Today is hospital day 2, and this morning she is having no complaints and reports no overnight events.    OBJECTIVE  PAST MEDICAL & SURGICAL HISTORY  Dextrocardia  Chronic midline low back pain with bilateral sciatica  Peripheral neuralgia  Essential hypertension  Diabetes 1.5, managed as type 2  Charcot's arthropathy: R foot  H/O shoulder surgery: Right shoulder surgery   delivery delivered    ALLERGIES:  No Known Allergies    MEDICATIONS:  STANDING MEDICATIONS  aspirin enteric coated 81 milliGRAM(s) Oral daily  atorvastatin 40 milliGRAM(s) Oral at bedtime  buDESOnide  80 MICROgram(s)/formoterol 4.5 MICROgram(s) Inhaler 2 Puff(s) Inhalation two times a day  ceFAZolin   IVPB 2000 milliGRAM(s) IV Intermittent every 8 hours  chlorhexidine 4% Liquid 1 Application(s) Topical <User Schedule>  DULoxetine 60 milliGRAM(s) Oral daily  enoxaparin Injectable 40 milliGRAM(s) SubCutaneous every 24 hours  hydrOXYzine  Oral Tab/Cap - Peds 10 milliGRAM(s) Oral three times a day  influenza   Vaccine 0.5 milliLiter(s) IntraMuscular once  levothyroxine 100 MICROGram(s) Oral daily  metoprolol succinate ER 50 milliGRAM(s) Oral daily  pantoprazole    Tablet 40 milliGRAM(s) Oral before breakfast  pregabalin 150 milliGRAM(s) Oral three times a day  sodium chloride 0.9%. 1000 milliLiter(s) IV Continuous <Continuous>    PRN MEDICATIONS  ALBUTerol    90 MICROgram(s) HFA Inhaler 2 Puff(s) Inhalation every 6 hours PRN  diazepam    Tablet 5 milliGRAM(s) Oral two times a day PRN  morphine  - Injectable 2 milliGRAM(s) IV Push every 6 hours PRN  oxyCODONE    5 mG/acetaminophen 325 mG 2 Tablet(s) Oral every 8 hours PRN  senna 2 Tablet(s) Oral at bedtime PRN      VITAL SIGNS: Last 24 Hours  T(C): 36.2 (2019 05:52), Max: 37.2 (2019 21:33)  T(F): 97.1 (2019 05:52), Max: 99 (2019 21:33)  HR: 89 (2019 05:52) (80 - 106)  BP: 87/53 (2019 05:52) (79/48 - 118/66)  BP(mean): --  RR: 18 (2019 23:10) (11 - 24)  SpO2: 96% (2019 22:47) (94% - 96%)    LABS:                        13.0   9.19  )-----------( 244      ( 10 Ty 2019 11:18 )             40.2     01-11    140  |  98  |  7<L>  ----------------------------<  111<H>  4.5   |  28  |  0.6<L>    Ca    9.3      2019 11:16  Phos  3.6     -11  Mg     1.9     11    TPro  6.8  /  Alb  4.1  /  TBili  0.4  /  DBili  x   /  AST  30  /  ALT  17  /  AlkPhos  69  01-10    PT/INR - ( 10 Ty 2019 11:18 )   PT: 11.60 sec;   INR: 1.01 ratio         PTT - ( 10 Ty 2019 11:18 )  PTT:35.5 sec  Urinalysis Basic - ( 2019 16:00 )    Color: Yellow / Appearance: Cloudy / S.010 / pH: x  Gluc: x / Ketone: Negative  / Bili: Negative / Urobili: 0.2 mg/dL   Blood: x / Protein: Negative mg/dL / Nitrite: Negative   Leuk Esterase: Negative / RBC: x / WBC x   Sq Epi: x / Non Sq Epi: Occasional /HPF / Bacteria: x              PHYSICAL EXAM:    Constitutional: obese  Respiratory: lungs B/L clear on auscultation  Cardiovascular: S1S2 normal , no murmurs  Gastrointestinal: Abd soft, non distended, non tender, BS+  Extremities: No pedal edema  Musculoskeletal: R foot covered with cast, some trikethrough blood on plantar aspect  Neurological: AAOX3,    ASSESSMENT & PLAN      # R Charcot foot   - S/p uncomplicated reconstruction yesterday  - Podiatry following  - Strict bedrest for 24 hrs  -Plan for first takedown on Monday  - Pain Management: c/w naproxen, gabapentin ,morphine percocet as needed.      #HTN  Patient currently with low BB  will hold isinopril for now  Received 1l of IV  NS      #Peripheral neuropathy  - c/w lyrica and duloxetine    #DM II  - Hold oral medications for now  - Will start basal bolus insulin    #COPD  - Not in exacerabtion right now  -c/w inhalers as of now    Full code  DVT PPX: Lovenox  Diet: Carb consistent  Activity: OOB  CHG bath daily

## 2019-01-12 NOTE — PROGRESS NOTE ADULT - ASSESSMENT
54yo F coming from home with PMHx smoker, DM II on oral meds, HTN, neuropathy, sciatica, charcot foot sent in by podiatry for elective surgery for progressively worsening foot pain, course complicated by borderline hypotension    glycemic control  dvt ppx  home analgesia for neuralgia  nicotine patch while inhouse  smoking cessation counselling provided  decrease metoprolol dosing to 12.5 BID with hold parameters. gentle IVH 1L today 54yo F coming from home with PMHx smoker, DM II on oral meds, HTN, neuropathy, sciatica, charcot foot sent in by podiatry for elective surgery for progressively worsening foot pain, course complicated by borderline hypotension, leukocytosis likely reactive    glycemic control  dvt ppx  home analgesia for neuralgia  nicotine patch while inhouse  smoking cessation counselling provided  decrease metoprolol dosing to 12.5 BID with hold parameters. gentle IVH 1L today  f/u fever curve, wbc  send UCx, repeat CXR if febrile

## 2019-01-12 NOTE — PROGRESS NOTE ADULT - SUBJECTIVE AND OBJECTIVE BOX
POST OP Check    CALLY HARTMAN  MRN-[id number]                          13.0   9.19  )-----------( 244      ( 10 Ty 2019 11:18 )             40.2     01-11    140  |  98  |  7<L>  ----------------------------<  111<H>  4.5   |  28  |  0.6<L>    Ca    9.3      11 Jan 2019 11:16  Phos  3.6     01-11  Mg     1.9     01-11    TPro  6.8  /  Alb  4.1  /  TBili  0.4  /  DBili  x   /  AST  30  /  ALT  17  /  AlkPhos  69  01-10    Vital Signs Last 24 Hrs  T(C): 36.2 (12 Jan 2019 05:52), Max: 37.2 (11 Jan 2019 21:33)  T(F): 97.1 (12 Jan 2019 05:52), Max: 99 (11 Jan 2019 21:33)  HR: 89 (12 Jan 2019 05:52) (80 - 106)  BP: 87/53 (12 Jan 2019 05:52) (79/48 - 118/66)  BP(mean): --  RR: 18 (11 Jan 2019 23:10) (11 - 24)  SpO2: 96% (11 Jan 2019 22:47) (94% - 96%)    Patient seen at bedside NAD, AAOx3/resting comfortably with pain controlled. Patient tolerated procedure well without incident.   Patient denies nausea, vomiting, chills, fever, SOB.    Procedure: right foot charcot recon  Surgeon: Dr. Joe DANG Focused: CFT shows adequate perfusion b/l with no signs of ischemic compromise. No neuromuscular deficits appreciated. Patient with strikethrough bleeding on plantar aspect of cast with a line of demarcation noted at 1:33AM with very little spread of strikethrough noted beyond the line. Nurse relates that patient had foot down to use bedside commode and states that she did not bear any weight to the foot. Discussed with patient importance of strict bedrest for the next 24-48 hours. Patient requested catheter stating that she "couldn't pee in a mast". Will F/u with medicine. Will f/u with podiatry attending for any further recommendations. At this time it is still planned for first takedown on Monday.

## 2019-01-12 NOTE — PROGRESS NOTE ADULT - ATTENDING COMMENTS
I discussed with patient in length in regard to Charcot foot reconstruction   Patient understands that with severe bone deformation the right foot will develop plantar ulceration which can lead to severe bone infection, loss of limb and loss of life  Patient understands this is a salvage type procedure, which can yield to serious complication such as bone infection and loss of limb   At this time, the patient failed conservative treatment including off loading in CAM equalizer brace for 6 weeks with a walker, and [place] brace   At this time, the patient is starting to develop a plantar bony prominence and surgical intervention could resolved this issue keeping in mind the above complications  I discussed everything with patient and her ex- the details of the surgical procedure, recovery and all possible complications  Patient to OR Friday 1/11/19
Patient seen and examined independently of resident. Case discussed with housestaff, nursing and patient

## 2019-01-13 LAB
ANION GAP SERPL CALC-SCNC: 12 MMOL/L — SIGNIFICANT CHANGE UP (ref 7–14)
BASOPHILS # BLD AUTO: 0.03 K/UL — SIGNIFICANT CHANGE UP (ref 0–0.2)
BASOPHILS # BLD AUTO: 0.03 K/UL — SIGNIFICANT CHANGE UP (ref 0–0.2)
BASOPHILS NFR BLD AUTO: 0.3 % — SIGNIFICANT CHANGE UP (ref 0–1)
BASOPHILS NFR BLD AUTO: 0.4 % — SIGNIFICANT CHANGE UP (ref 0–1)
BUN SERPL-MCNC: 7 MG/DL — LOW (ref 10–20)
CALCIUM SERPL-MCNC: 8.2 MG/DL — LOW (ref 8.5–10.1)
CHLORIDE SERPL-SCNC: 103 MMOL/L — SIGNIFICANT CHANGE UP (ref 98–110)
CO2 SERPL-SCNC: 24 MMOL/L — SIGNIFICANT CHANGE UP (ref 17–32)
CREAT SERPL-MCNC: 0.6 MG/DL — LOW (ref 0.7–1.5)
EOSINOPHIL # BLD AUTO: 0.24 K/UL — SIGNIFICANT CHANGE UP (ref 0–0.7)
EOSINOPHIL # BLD AUTO: 0.36 K/UL — SIGNIFICANT CHANGE UP (ref 0–0.7)
EOSINOPHIL NFR BLD AUTO: 3 % — SIGNIFICANT CHANGE UP (ref 0–8)
EOSINOPHIL NFR BLD AUTO: 3.6 % — SIGNIFICANT CHANGE UP (ref 0–8)
GLUCOSE BLDC GLUCOMTR-MCNC: 151 MG/DL — HIGH (ref 70–99)
GLUCOSE BLDC GLUCOMTR-MCNC: 196 MG/DL — HIGH (ref 70–99)
GLUCOSE BLDC GLUCOMTR-MCNC: 67 MG/DL — LOW (ref 70–99)
GLUCOSE BLDC GLUCOMTR-MCNC: 81 MG/DL — SIGNIFICANT CHANGE UP (ref 70–99)
GLUCOSE SERPL-MCNC: 91 MG/DL — SIGNIFICANT CHANGE UP (ref 70–99)
HCT VFR BLD CALC: 27.8 % — LOW (ref 37–47)
HCT VFR BLD CALC: 31.2 % — LOW (ref 37–47)
HGB BLD-MCNC: 8.8 G/DL — LOW (ref 12–16)
HGB BLD-MCNC: 9.8 G/DL — LOW (ref 12–16)
IMM GRANULOCYTES NFR BLD AUTO: 0.4 % — HIGH (ref 0.1–0.3)
IMM GRANULOCYTES NFR BLD AUTO: 0.4 % — HIGH (ref 0.1–0.3)
LYMPHOCYTES # BLD AUTO: 1.44 K/UL — SIGNIFICANT CHANGE UP (ref 1.2–3.4)
LYMPHOCYTES # BLD AUTO: 1.68 K/UL — SIGNIFICANT CHANGE UP (ref 1.2–3.4)
LYMPHOCYTES # BLD AUTO: 14.4 % — LOW (ref 20.5–51.1)
LYMPHOCYTES # BLD AUTO: 20.8 % — SIGNIFICANT CHANGE UP (ref 20.5–51.1)
MCHC RBC-ENTMCNC: 29 PG — SIGNIFICANT CHANGE UP (ref 27–31)
MCHC RBC-ENTMCNC: 29 PG — SIGNIFICANT CHANGE UP (ref 27–31)
MCHC RBC-ENTMCNC: 31.4 G/DL — LOW (ref 32–37)
MCHC RBC-ENTMCNC: 31.7 G/DL — LOW (ref 32–37)
MCV RBC AUTO: 91.7 FL — SIGNIFICANT CHANGE UP (ref 81–99)
MCV RBC AUTO: 92.3 FL — SIGNIFICANT CHANGE UP (ref 81–99)
MONOCYTES # BLD AUTO: 0.8 K/UL — HIGH (ref 0.1–0.6)
MONOCYTES # BLD AUTO: 0.93 K/UL — HIGH (ref 0.1–0.6)
MONOCYTES NFR BLD AUTO: 9.3 % — SIGNIFICANT CHANGE UP (ref 1.7–9.3)
MONOCYTES NFR BLD AUTO: 9.9 % — HIGH (ref 1.7–9.3)
NEUTROPHILS # BLD AUTO: 5.3 K/UL — SIGNIFICANT CHANGE UP (ref 1.4–6.5)
NEUTROPHILS # BLD AUTO: 7.23 K/UL — HIGH (ref 1.4–6.5)
NEUTROPHILS NFR BLD AUTO: 65.5 % — SIGNIFICANT CHANGE UP (ref 42.2–75.2)
NEUTROPHILS NFR BLD AUTO: 72 % — SIGNIFICANT CHANGE UP (ref 42.2–75.2)
NRBC # BLD: 0 /100 WBCS — SIGNIFICANT CHANGE UP (ref 0–0)
NRBC # BLD: 0 /100 WBCS — SIGNIFICANT CHANGE UP (ref 0–0)
PLATELET # BLD AUTO: 168 K/UL — SIGNIFICANT CHANGE UP (ref 130–400)
PLATELET # BLD AUTO: 199 K/UL — SIGNIFICANT CHANGE UP (ref 130–400)
POTASSIUM SERPL-MCNC: 4.1 MMOL/L — SIGNIFICANT CHANGE UP (ref 3.5–5)
POTASSIUM SERPL-SCNC: 4.1 MMOL/L — SIGNIFICANT CHANGE UP (ref 3.5–5)
RBC # BLD: 3.03 M/UL — LOW (ref 4.2–5.4)
RBC # BLD: 3.38 M/UL — LOW (ref 4.2–5.4)
RBC # FLD: 15.5 % — HIGH (ref 11.5–14.5)
RBC # FLD: 15.8 % — HIGH (ref 11.5–14.5)
SODIUM SERPL-SCNC: 139 MMOL/L — SIGNIFICANT CHANGE UP (ref 135–146)
WBC # BLD: 10.03 K/UL — SIGNIFICANT CHANGE UP (ref 4.8–10.8)
WBC # BLD: 8.08 K/UL — SIGNIFICANT CHANGE UP (ref 4.8–10.8)
WBC # FLD AUTO: 10.03 K/UL — SIGNIFICANT CHANGE UP (ref 4.8–10.8)
WBC # FLD AUTO: 8.08 K/UL — SIGNIFICANT CHANGE UP (ref 4.8–10.8)

## 2019-01-13 RX ORDER — MORPHINE SULFATE 50 MG/1
30 CAPSULE, EXTENDED RELEASE ORAL EVERY 8 HOURS
Qty: 0 | Refills: 0 | Status: DISCONTINUED | OUTPATIENT
Start: 2019-01-13 | End: 2019-01-17

## 2019-01-13 RX ORDER — ACETAMINOPHEN 500 MG
650 TABLET ORAL ONCE
Qty: 0 | Refills: 0 | Status: COMPLETED | OUTPATIENT
Start: 2019-01-13 | End: 2019-01-13

## 2019-01-13 RX ORDER — MORPHINE SULFATE 50 MG/1
2 CAPSULE, EXTENDED RELEASE ORAL
Qty: 0 | Refills: 0 | Status: DISCONTINUED | OUTPATIENT
Start: 2019-01-13 | End: 2019-01-17

## 2019-01-13 RX ORDER — MORPHINE SULFATE 50 MG/1
30 CAPSULE, EXTENDED RELEASE ORAL
Qty: 0 | Refills: 0 | Status: DISCONTINUED | OUTPATIENT
Start: 2019-01-13 | End: 2019-01-13

## 2019-01-13 RX ADMIN — Medication 1 PATCH: at 12:00

## 2019-01-13 RX ADMIN — Medication 1 PATCH: at 06:32

## 2019-01-13 RX ADMIN — Medication 650 MILLIGRAM(S): at 23:20

## 2019-01-13 RX ADMIN — Medication 81 MILLIGRAM(S): at 11:37

## 2019-01-13 RX ADMIN — MORPHINE SULFATE 2 MILLIGRAM(S): 50 CAPSULE, EXTENDED RELEASE ORAL at 01:58

## 2019-01-13 RX ADMIN — Medication 650 MILLIGRAM(S): at 23:50

## 2019-01-13 RX ADMIN — Medication 1: at 17:12

## 2019-01-13 RX ADMIN — SODIUM CHLORIDE 100 MILLILITER(S): 9 INJECTION INTRAMUSCULAR; INTRAVENOUS; SUBCUTANEOUS at 22:47

## 2019-01-13 RX ADMIN — Medication 12.5 MILLIGRAM(S): at 05:32

## 2019-01-13 RX ADMIN — ENOXAPARIN SODIUM 40 MILLIGRAM(S): 100 INJECTION SUBCUTANEOUS at 22:46

## 2019-01-13 RX ADMIN — Medication 10 UNIT(S): at 08:20

## 2019-01-13 RX ADMIN — MORPHINE SULFATE 2 MILLIGRAM(S): 50 CAPSULE, EXTENDED RELEASE ORAL at 10:12

## 2019-01-13 RX ADMIN — Medication 10 UNIT(S): at 17:12

## 2019-01-13 RX ADMIN — Medication 12.5 MILLIGRAM(S): at 17:15

## 2019-01-13 RX ADMIN — Medication 100 MICROGRAM(S): at 05:32

## 2019-01-13 RX ADMIN — MORPHINE SULFATE 2 MILLIGRAM(S): 50 CAPSULE, EXTENDED RELEASE ORAL at 13:57

## 2019-01-13 RX ADMIN — Medication 10 MILLIGRAM(S): at 14:20

## 2019-01-13 RX ADMIN — PANTOPRAZOLE SODIUM 40 MILLIGRAM(S): 20 TABLET, DELAYED RELEASE ORAL at 05:32

## 2019-01-13 RX ADMIN — Medication 1 PATCH: at 11:37

## 2019-01-13 RX ADMIN — CHLORHEXIDINE GLUCONATE 1 APPLICATION(S): 213 SOLUTION TOPICAL at 05:31

## 2019-01-13 RX ADMIN — Medication 10 MILLIGRAM(S): at 22:46

## 2019-01-13 RX ADMIN — ATORVASTATIN CALCIUM 40 MILLIGRAM(S): 80 TABLET, FILM COATED ORAL at 22:46

## 2019-01-13 RX ADMIN — Medication 150 MILLIGRAM(S): at 05:35

## 2019-01-13 RX ADMIN — MORPHINE SULFATE 30 MILLIGRAM(S): 50 CAPSULE, EXTENDED RELEASE ORAL at 14:51

## 2019-01-13 RX ADMIN — BUDESONIDE AND FORMOTEROL FUMARATE DIHYDRATE 2 PUFF(S): 160; 4.5 AEROSOL RESPIRATORY (INHALATION) at 08:20

## 2019-01-13 RX ADMIN — Medication 150 MILLIGRAM(S): at 14:20

## 2019-01-13 RX ADMIN — MORPHINE SULFATE 30 MILLIGRAM(S): 50 CAPSULE, EXTENDED RELEASE ORAL at 22:45

## 2019-01-13 RX ADMIN — MORPHINE SULFATE 2 MILLIGRAM(S): 50 CAPSULE, EXTENDED RELEASE ORAL at 11:41

## 2019-01-13 RX ADMIN — Medication 10 MILLIGRAM(S): at 05:32

## 2019-01-13 RX ADMIN — Medication 1: at 08:20

## 2019-01-13 RX ADMIN — Medication 150 MILLIGRAM(S): at 22:45

## 2019-01-13 RX ADMIN — MORPHINE SULFATE 2 MILLIGRAM(S): 50 CAPSULE, EXTENDED RELEASE ORAL at 05:34

## 2019-01-13 RX ADMIN — Medication 1 PATCH: at 20:01

## 2019-01-13 RX ADMIN — MORPHINE SULFATE 2 MILLIGRAM(S): 50 CAPSULE, EXTENDED RELEASE ORAL at 05:49

## 2019-01-13 RX ADMIN — MORPHINE SULFATE 2 MILLIGRAM(S): 50 CAPSULE, EXTENDED RELEASE ORAL at 01:43

## 2019-01-13 RX ADMIN — MORPHINE SULFATE 2 MILLIGRAM(S): 50 CAPSULE, EXTENDED RELEASE ORAL at 13:47

## 2019-01-13 RX ADMIN — MORPHINE SULFATE 30 MILLIGRAM(S): 50 CAPSULE, EXTENDED RELEASE ORAL at 23:15

## 2019-01-13 RX ADMIN — DULOXETINE HYDROCHLORIDE 60 MILLIGRAM(S): 30 CAPSULE, DELAYED RELEASE ORAL at 11:37

## 2019-01-13 NOTE — PROGRESS NOTE ADULT - ASSESSMENT
1.	Complicated charcot's foot, planned reconstruction surgery  2.	Uncontrolled pain, on modified regimen  3.	Strict bedrest requested by podiatry team in order to reinforce strict NWB status on affected limb  4.	Type-2 diabetes mellitus, uncontrolled; on therapy   5.	Hypothyroidism, on therapy     P L A N   ·	Modified immediate analgesics (IV morphine) therapy   ·	Added long-acting agent (MS CONTIN)  ·	Patient understands she can't use bedside commode at this time as per podiatry recommendation for NWB status (noted minor bleed from foot)  ·	Case discussed with nursing staff assigned  ·	Planned reconstructive surgery on Monday

## 2019-01-13 NOTE — PROGRESS NOTE ADULT - SUBJECTIVE AND OBJECTIVE BOX
CALLY HARTMAN  53y, Female  Allergy: No Known Allergies    Hospital Day: 3d    Patient seen and examined earlier today.     PMH/PSH:  PAST MEDICAL & SURGICAL HISTORY:  Dextrocardia  Chronic midline low back pain with bilateral sciatica  Peripheral neuralgia  Essential hypertension  Diabetes 1.5, managed as type 2  Charcot's arthropathy: R foot  H/O shoulder surgery: Right shoulder surgery   delivery delivered      LAST 24-Hr EVENTS:    VITALS:  T(F): 96.2 (19 @ 05:47), Max: 99.3 (19 @ 14:00)  HR: 95 (19 @ 05:47)  BP: 118/66 (19 @ 05:47) (83/50 - 118/66)  RR: 17 (19 @ 05:47)  SpO2: 94% (19 @ 07:35)    TESTS & MEASUREMENTS:  Weight (Kg): 190 (19 @ 13:14)  BMI (kg/m2): 79.1 ()    19 @ 07:01  -  19 @ 07:00  --------------------------------------------------------  IN: 75 mL / OUT: 0 mL / NET: 75 mL                            9.8    10.03 )-----------( 199      ( 2019 09:54 )             31.2           139  |  103  |  7<L>  ----------------------------<  91  4.1   |  24  |  0.6<L>    Ca    8.2<L>      2019 09:54              Urinalysis Basic - ( 2019 16:00 )    Color: Yellow / Appearance: Cloudy / S.010 / pH: x  Gluc: x / Ketone: Negative  / Bili: Negative / Urobili: 0.2 mg/dL   Blood: x / Protein: Negative mg/dL / Nitrite: Negative   Leuk Esterase: Negative / RBC: x / WBC x   Sq Epi: x / Non Sq Epi: Occasional /HPF / Bacteria: x        RADIOLOGY & ADDITIONAL TESTS:    RECENT DIAGNOSTIC ORDERS:      MEDICATIONS:  MEDICATIONS  (STANDING):  aspirin enteric coated 81 milliGRAM(s) Oral daily  atorvastatin 40 milliGRAM(s) Oral at bedtime  buDESOnide  80 MICROgram(s)/formoterol 4.5 MICROgram(s) Inhaler 2 Puff(s) Inhalation two times a day  ceFAZolin   IVPB 2000 milliGRAM(s) IV Intermittent every 8 hours  chlorhexidine 4% Liquid 1 Application(s) Topical <User Schedule>  DULoxetine 60 milliGRAM(s) Oral daily  enoxaparin Injectable 40 milliGRAM(s) SubCutaneous every 24 hours  hydrOXYzine  Oral Tab/Cap - Peds 10 milliGRAM(s) Oral three times a day  influenza   Vaccine 0.5 milliLiter(s) IntraMuscular once  insulin glargine Injectable (LANTUS) 30 Unit(s) SubCutaneous at bedtime  insulin lispro (HumaLOG) corrective regimen sliding scale   SubCutaneous three times a day before meals  insulin lispro Injectable (HumaLOG) 10 Unit(s) SubCutaneous three times a day before meals  levothyroxine 100 MICROGram(s) Oral daily  metoprolol tartrate 12.5 milliGRAM(s) Oral two times a day  morphine  - Injectable 2 milliGRAM(s) IV Push every 4 hours  nicotine -  14 mG/24Hr(s) Patch 1 patch Transdermal daily  pantoprazole    Tablet 40 milliGRAM(s) Oral before breakfast  pregabalin 150 milliGRAM(s) Oral three times a day  sodium chloride 0.9%. 1000 milliLiter(s) (100 mL/Hr) IV Continuous <Continuous>    MEDICATIONS  (PRN):  ALBUTerol    90 MICROgram(s) HFA Inhaler 2 Puff(s) Inhalation every 6 hours PRN Bronchospasm  diazepam    Tablet 5 milliGRAM(s) Oral two times a day PRN anxiety  senna 2 Tablet(s) Oral at bedtime PRN Constipation      HOME MEDICATIONS:  aspirin 81 mg oral delayed release tablet (01-10)  atorvastatin 40 mg oral tablet (01-10)  diazePAM 5 mg oral tablet (01-10)  DULoxetine 60 mg oral delayed release capsule (01-10)  ezetimibe 10 mg oral tablet (01-10)  fexofenadine 180 mg oral tablet (01-10)  hydrOXYzine hydrochloride 10 mg oral tablet (01-10)  Janumet 50 mg-1000 mg oral tablet (01-10)  levothyroxine 100 mcg (0.1 mg) oral capsule (01-10)  Lyrica 150 mg oral capsule (01-10)  metoprolol succinate 50 mg oral tablet, extended release (01-10)  Myrbetriq 25 mg oral tablet, extended release (01-10)  naproxen 500 mg oral delayed release tablet (01-10)  pantoprazole 40 mg oral delayed release tablet (01-10)  Percocet 10/325 oral tablet (01-10)  Qvar 80 mcg/inh inhalation aerosol (01-10)  ramipril 2.5 mg oral capsule (01-10)  tiZANidine 4 mg oral tablet (01-10)  trospium 20 mg oral tablet (01-10)  Ventolin HFA 90 mcg/inh inhalation aerosol (01-10)      PHYSICAL EXAM:  GENERAL: A&O x3,  in NAD/P,   NECK: No Swelling  CHEST/LUNG: Good air entry, No wheezing  HEART: RRR, No murmurs  ABDOMEN: Soft, Bowel sounds present  EXTREMITIES:  No clubbing, DEVAN CALLY  53y, Female  Allergy: No Known Allergies    Hospital Day: 3d    Patient seen and examined earlier today.     LAST 24-Hr EVENTS:  Uncontrolled pain     VITALS:  T(F): 96.2 (19 @ 05:47), Max: 99.3 (19 @ 14:00)  HR: 95 (19 @ 05:47)  BP: 118/66 (19 @ 05:47) (83/50 - 118/66)  RR: 17 (19 @ 05:47)  SpO2: 94% (19 @ 07:35)    TESTS & MEASUREMENTS:  Weight (Kg): 190 (19 @ 13:14)  BMI (kg/m2): 79.1 ()    19 @ 07:  -  19 @ 07:00  --------------------------------------------------------  IN: 75 mL / OUT: 0 mL / NET: 75 mL                            9.8    10.03 )-----------( 199      ( 2019 09:54 )             31.2           139  |  103  |  7<L>  ----------------------------<  91  4.1   |  24  |  0.6<L>    Ca    8.2<L>      2019 09:54        Urinalysis Basic - ( 2019 16:00 )    Color: Yellow / Appearance: Cloudy / S.010 / pH: x  Gluc: x / Ketone: Negative  / Bili: Negative / Urobili: 0.2 mg/dL   Blood: x / Protein: Negative mg/dL / Nitrite: Negative   Leuk Esterase: Negative / RBC: x / WBC x   Sq Epi: x / Non Sq Epi: Occasional /HPF / Bacteria: x      MEDICATIONS:  MEDICATIONS  (STANDING):  aspirin enteric coated 81 milliGRAM(s) Oral daily  atorvastatin 40 milliGRAM(s) Oral at bedtime  buDESOnide  80 MICROgram(s)/formoterol 4.5 MICROgram(s) Inhaler 2 Puff(s) Inhalation two times a day  ceFAZolin   IVPB 2000 milliGRAM(s) IV Intermittent every 8 hours  chlorhexidine 4% Liquid 1 Application(s) Topical <User Schedule>  DULoxetine 60 milliGRAM(s) Oral daily  enoxaparin Injectable 40 milliGRAM(s) SubCutaneous every 24 hours  hydrOXYzine  Oral Tab/Cap - Peds 10 milliGRAM(s) Oral three times a day  influenza   Vaccine 0.5 milliLiter(s) IntraMuscular once  insulin glargine Injectable (LANTUS) 30 Unit(s) SubCutaneous at bedtime  insulin lispro (HumaLOG) corrective regimen sliding scale   SubCutaneous three times a day before meals  insulin lispro Injectable (HumaLOG) 10 Unit(s) SubCutaneous three times a day before meals  levothyroxine 100 MICROGram(s) Oral daily  metoprolol tartrate 12.5 milliGRAM(s) Oral two times a day  morphine  - Injectable 2 milliGRAM(s) IV Push every 4 hours  nicotine -  14 mG/24Hr(s) Patch 1 patch Transdermal daily  pantoprazole    Tablet 40 milliGRAM(s) Oral before breakfast  pregabalin 150 milliGRAM(s) Oral three times a day  sodium chloride 0.9%. 1000 milliLiter(s) (100 mL/Hr) IV Continuous <Continuous>    MEDICATIONS  (PRN):  ALBUTerol    90 MICROgram(s) HFA Inhaler 2 Puff(s) Inhalation every 6 hours PRN Bronchospasm  diazepam    Tablet 5 milliGRAM(s) Oral two times a day PRN anxiety  senna 2 Tablet(s) Oral at bedtime PRN Constipation      HOME MEDICATIONS:  aspirin 81 mg oral delayed release tablet (01-10)  atorvastatin 40 mg oral tablet (01-10)  diazePAM 5 mg oral tablet (01-10)  DULoxetine 60 mg oral delayed release capsule (01-10)  ezetimibe 10 mg oral tablet (01-10)  fexofenadine 180 mg oral tablet (01-10)  hydrOXYzine hydrochloride 10 mg oral tablet (01-10)  Janumet 50 mg-1000 mg oral tablet (01-10)  levothyroxine 100 mcg (0.1 mg) oral capsule (01-10)  Lyrica 150 mg oral capsule (01-10)  metoprolol succinate 50 mg oral tablet, extended release (01-10)  Myrbetriq 25 mg oral tablet, extended release (01-10)  naproxen 500 mg oral delayed release tablet (01-10)  pantoprazole 40 mg oral delayed release tablet (01-10)  Percocet 10/325 oral tablet (01-10)  Qvar 80 mcg/inh inhalation aerosol (01-10)  ramipril 2.5 mg oral capsule (01-10)  tiZANidine 4 mg oral tablet (01-10)  trospium 20 mg oral tablet (01-10)  Ventolin HFA 90 mcg/inh inhalation aerosol (01-10)      PHYSICAL EXAM:  GENERAL: A&O x3,  in NAD/P,   NECK: No Swelling  CHEST/LUNG: Good air entry, No wheezing  HEART: RRR, No murmurs  ABDOMEN: Soft, Bowel sounds present  EXTREMITIES:  No clubbing, ++ cast RLE

## 2019-01-13 NOTE — PROGRESS NOTE ADULT - SUBJECTIVE AND OBJECTIVE BOX
PODIATRY PROGRESS NOTE   [id number] CALLY HARTMAN is a pleasant well-nourished, well developed 53y Female in no acute distress, alert awake, and oriented to person, place and time.   Patient is a 53y old  Female who presents with a chief complaint of R charcot foot Sx (12 Jan 2019 15:08)    HPI:  52yo F coming from home with PMHx DM II on oral meds, HTN, neuropathy, sciatica, sent by podiatrist Dr. Diaz for right charcot foot, admission/OR. Per pt, has been having foot pain for 1 year 2/2 charcot joint and was managed conservatively. Pt was on a CAM boot for 6 months, she has constant pain and limping. She walks with a walker/cane and currently is under disability.   She went to see podiatrist in office yesterday and told to go to hospital for Charcot foot surgery and admission.   Pt discussed surgical treatment with Dr. Diaz.    Denies ulceration or skin breakdown. Denies fever. Denies headache, lightheadedness, CP, SOB, cough, nausea, vomiting, diarrhea, abd pain, dysuria, leg swelling.    In the ED she was hemodynamically stable and was given Vancomycin one dose for unknown reason. (10 Ty 2019 14:09)    pt was seen and assessed pm rounds with attending Dr. gayle.  pt complaints that she didn't receive her pain meds last night although she had 3 IV infuse and po morphine 30mg tablet at 2:51pm today.  RN states that her bp was low last night and was not able to give her pain meds at that time and sunrise was down last night.  pt states that she has a lot of pain right foot. Cast intact w/ ACE bandage.      Vital Signs Last 24 Hrs  T(C): 36.2 (13 Jan 2019 14:25), Max: 36.2 (13 Jan 2019 14:25)  T(F): 97.2 (13 Jan 2019 14:25), Max: 97.2 (13 Jan 2019 14:25)  HR: 104 (13 Jan 2019 14:25) (94 - 104)  BP: 121/58 (13 Jan 2019 14:25) (92/55 - 121/58)  BP(mean): --  RR: 18 (13 Jan 2019 14:25) (17 - 18)  SpO2: 94% (13 Jan 2019 07:35) (94% - 94%)                        9.8    10.03 )-----------( 199      ( 13 Jan 2019 09:54 )             31.2                 01-13    139  |  103  |  7<L>  ----------------------------<  91  4.1   |  24  |  0.6<L>    Ca    8.2<L>      13 Jan 2019 09:54        A:  s/p charcot recon R 1/11  P:  pt evaluated and treated  capillary return time <3 secs all 5 toes.  Ordered morphine 30mg ER tablet q8 hours, last dose was taken at 2:51pm today.    Podiatry will remove the cast tomorrow and evaluate the surgical site and reapply cast RLE.  Strict bedrest until tomorrow  req Physical Therapy tomorrow  req pain management  Instruction given to patient how to use incentive spirometry at bedside, pt strongly encourage to administer 4 times every hour for 10 sets when awake  Attending updated and added to note for review.   01-13-19 @ 16:59

## 2019-01-13 NOTE — PHARMACOTHERAPY INTERVENTION NOTE - COMMENTS
Provider was contacted with clarification of dose and frequency.  Ms contin isn't patient's home med.

## 2019-01-14 LAB
ALBUMIN SERPL ELPH-MCNC: 3.5 G/DL — SIGNIFICANT CHANGE UP (ref 3.5–5.2)
ALP SERPL-CCNC: 106 U/L — SIGNIFICANT CHANGE UP (ref 30–115)
ALT FLD-CCNC: 46 U/L — HIGH (ref 0–41)
ANION GAP SERPL CALC-SCNC: 16 MMOL/L — HIGH (ref 7–14)
AST SERPL-CCNC: 56 U/L — HIGH (ref 0–41)
BASOPHILS # BLD AUTO: 0.05 K/UL — SIGNIFICANT CHANGE UP (ref 0–0.2)
BASOPHILS NFR BLD AUTO: 0.6 % — SIGNIFICANT CHANGE UP (ref 0–1)
BILIRUB SERPL-MCNC: 0.2 MG/DL — SIGNIFICANT CHANGE UP (ref 0.2–1.2)
BUN SERPL-MCNC: 10 MG/DL — SIGNIFICANT CHANGE UP (ref 10–20)
CALCIUM SERPL-MCNC: 8.6 MG/DL — SIGNIFICANT CHANGE UP (ref 8.5–10.1)
CHLORIDE SERPL-SCNC: 98 MMOL/L — SIGNIFICANT CHANGE UP (ref 98–110)
CO2 SERPL-SCNC: 23 MMOL/L — SIGNIFICANT CHANGE UP (ref 17–32)
CREAT SERPL-MCNC: 0.7 MG/DL — SIGNIFICANT CHANGE UP (ref 0.7–1.5)
EOSINOPHIL # BLD AUTO: 0.29 K/UL — SIGNIFICANT CHANGE UP (ref 0–0.7)
EOSINOPHIL NFR BLD AUTO: 3.7 % — SIGNIFICANT CHANGE UP (ref 0–8)
GLUCOSE BLDC GLUCOMTR-MCNC: 129 MG/DL — HIGH (ref 70–99)
GLUCOSE BLDC GLUCOMTR-MCNC: 132 MG/DL — HIGH (ref 70–99)
GLUCOSE BLDC GLUCOMTR-MCNC: 139 MG/DL — HIGH (ref 70–99)
GLUCOSE BLDC GLUCOMTR-MCNC: 172 MG/DL — HIGH (ref 70–99)
GLUCOSE SERPL-MCNC: 127 MG/DL — HIGH (ref 70–99)
HCT VFR BLD CALC: 29.9 % — LOW (ref 37–47)
HGB BLD-MCNC: 9.4 G/DL — LOW (ref 12–16)
IMM GRANULOCYTES NFR BLD AUTO: 0.4 % — HIGH (ref 0.1–0.3)
LYMPHOCYTES # BLD AUTO: 1.68 K/UL — SIGNIFICANT CHANGE UP (ref 1.2–3.4)
LYMPHOCYTES # BLD AUTO: 21.3 % — SIGNIFICANT CHANGE UP (ref 20.5–51.1)
MAGNESIUM SERPL-MCNC: 1.8 MG/DL — SIGNIFICANT CHANGE UP (ref 1.8–2.4)
MCHC RBC-ENTMCNC: 29.5 PG — SIGNIFICANT CHANGE UP (ref 27–31)
MCHC RBC-ENTMCNC: 31.4 G/DL — LOW (ref 32–37)
MCV RBC AUTO: 93.7 FL — SIGNIFICANT CHANGE UP (ref 81–99)
MONOCYTES # BLD AUTO: 0.83 K/UL — HIGH (ref 0.1–0.6)
MONOCYTES NFR BLD AUTO: 10.5 % — HIGH (ref 1.7–9.3)
NEUTROPHILS # BLD AUTO: 5 K/UL — SIGNIFICANT CHANGE UP (ref 1.4–6.5)
NEUTROPHILS NFR BLD AUTO: 63.5 % — SIGNIFICANT CHANGE UP (ref 42.2–75.2)
NRBC # BLD: 0 /100 WBCS — SIGNIFICANT CHANGE UP (ref 0–0)
PLATELET # BLD AUTO: 190 K/UL — SIGNIFICANT CHANGE UP (ref 130–400)
POTASSIUM SERPL-MCNC: 4.5 MMOL/L — SIGNIFICANT CHANGE UP (ref 3.5–5)
POTASSIUM SERPL-SCNC: 4.5 MMOL/L — SIGNIFICANT CHANGE UP (ref 3.5–5)
PROT SERPL-MCNC: 5.9 G/DL — LOW (ref 6–8)
RBC # BLD: 3.19 M/UL — LOW (ref 4.2–5.4)
RBC # FLD: 15.6 % — HIGH (ref 11.5–14.5)
SODIUM SERPL-SCNC: 137 MMOL/L — SIGNIFICANT CHANGE UP (ref 135–146)
WBC # BLD: 7.88 K/UL — SIGNIFICANT CHANGE UP (ref 4.8–10.8)
WBC # FLD AUTO: 7.88 K/UL — SIGNIFICANT CHANGE UP (ref 4.8–10.8)

## 2019-01-14 RX ORDER — IBUPROFEN 200 MG
200 TABLET ORAL ONCE
Qty: 0 | Refills: 0 | Status: DISCONTINUED | OUTPATIENT
Start: 2019-01-14 | End: 2019-01-14

## 2019-01-14 RX ORDER — IBUPROFEN 200 MG
400 TABLET ORAL ONCE
Qty: 0 | Refills: 0 | Status: COMPLETED | OUTPATIENT
Start: 2019-01-14 | End: 2019-01-14

## 2019-01-14 RX ORDER — IBUPROFEN 200 MG
600 TABLET ORAL ONCE
Qty: 0 | Refills: 0 | Status: COMPLETED | OUTPATIENT
Start: 2019-01-14 | End: 2019-01-14

## 2019-01-14 RX ADMIN — Medication 12.5 MILLIGRAM(S): at 05:31

## 2019-01-14 RX ADMIN — MORPHINE SULFATE 30 MILLIGRAM(S): 50 CAPSULE, EXTENDED RELEASE ORAL at 05:29

## 2019-01-14 RX ADMIN — Medication 10 MILLIGRAM(S): at 13:50

## 2019-01-14 RX ADMIN — DULOXETINE HYDROCHLORIDE 60 MILLIGRAM(S): 30 CAPSULE, DELAYED RELEASE ORAL at 11:52

## 2019-01-14 RX ADMIN — MORPHINE SULFATE 30 MILLIGRAM(S): 50 CAPSULE, EXTENDED RELEASE ORAL at 14:30

## 2019-01-14 RX ADMIN — Medication 1 PATCH: at 11:52

## 2019-01-14 RX ADMIN — Medication 600 MILLIGRAM(S): at 18:45

## 2019-01-14 RX ADMIN — Medication 10 MILLIGRAM(S): at 05:30

## 2019-01-14 RX ADMIN — MORPHINE SULFATE 30 MILLIGRAM(S): 50 CAPSULE, EXTENDED RELEASE ORAL at 21:18

## 2019-01-14 RX ADMIN — MORPHINE SULFATE 30 MILLIGRAM(S): 50 CAPSULE, EXTENDED RELEASE ORAL at 13:50

## 2019-01-14 RX ADMIN — ATORVASTATIN CALCIUM 40 MILLIGRAM(S): 80 TABLET, FILM COATED ORAL at 21:18

## 2019-01-14 RX ADMIN — MORPHINE SULFATE 30 MILLIGRAM(S): 50 CAPSULE, EXTENDED RELEASE ORAL at 06:09

## 2019-01-14 RX ADMIN — Medication 1 PATCH: at 11:37

## 2019-01-14 RX ADMIN — CHLORHEXIDINE GLUCONATE 1 APPLICATION(S): 213 SOLUTION TOPICAL at 05:29

## 2019-01-14 RX ADMIN — PANTOPRAZOLE SODIUM 40 MILLIGRAM(S): 20 TABLET, DELAYED RELEASE ORAL at 05:31

## 2019-01-14 RX ADMIN — Medication 400 MILLIGRAM(S): at 06:40

## 2019-01-14 RX ADMIN — Medication 10 UNIT(S): at 12:03

## 2019-01-14 RX ADMIN — Medication 150 MILLIGRAM(S): at 05:29

## 2019-01-14 RX ADMIN — INSULIN GLARGINE 30 UNIT(S): 100 INJECTION, SOLUTION SUBCUTANEOUS at 22:04

## 2019-01-14 RX ADMIN — Medication 10 MILLIGRAM(S): at 21:18

## 2019-01-14 RX ADMIN — Medication 100 MICROGRAM(S): at 05:31

## 2019-01-14 RX ADMIN — Medication 10 UNIT(S): at 08:06

## 2019-01-14 RX ADMIN — Medication 10 UNIT(S): at 17:23

## 2019-01-14 RX ADMIN — Medication 400 MILLIGRAM(S): at 07:01

## 2019-01-14 RX ADMIN — Medication 150 MILLIGRAM(S): at 14:17

## 2019-01-14 RX ADMIN — Medication 150 MILLIGRAM(S): at 21:19

## 2019-01-14 RX ADMIN — Medication 1 PATCH: at 06:14

## 2019-01-14 RX ADMIN — BUDESONIDE AND FORMOTEROL FUMARATE DIHYDRATE 2 PUFF(S): 160; 4.5 AEROSOL RESPIRATORY (INHALATION) at 08:08

## 2019-01-14 RX ADMIN — Medication 1: at 17:24

## 2019-01-14 RX ADMIN — BUDESONIDE AND FORMOTEROL FUMARATE DIHYDRATE 2 PUFF(S): 160; 4.5 AEROSOL RESPIRATORY (INHALATION) at 21:17

## 2019-01-14 RX ADMIN — MORPHINE SULFATE 30 MILLIGRAM(S): 50 CAPSULE, EXTENDED RELEASE ORAL at 21:50

## 2019-01-14 RX ADMIN — Medication 81 MILLIGRAM(S): at 11:52

## 2019-01-14 NOTE — PROGRESS NOTE ADULT - SUBJECTIVE AND OBJECTIVE BOX
SUBJECTIVE:    Patient is a 53y old Female who presents with a chief complaint of R charcot foot Sx (2019 16:01)    Today patient complains of less pain in her foot. But does complain of a headache. Denies chest pain, SOB, abdominal pain, urinary complaints    PAST MEDICAL & SURGICAL HISTORY  Dextrocardia  Chronic midline low back pain with bilateral sciatica  Peripheral neuralgia  Essential hypertension  Diabetes 1.5, managed as type 2  Charcot's arthropathy: R foot  H/O shoulder surgery: Right shoulder surgery   delivery delivered       ALLERGIES:  No Known Allergies    MEDICATIONS:  STANDING MEDICATIONS  aspirin enteric coated 81 milliGRAM(s) Oral daily  atorvastatin 40 milliGRAM(s) Oral at bedtime  buDESOnide  80 MICROgram(s)/formoterol 4.5 MICROgram(s) Inhaler 2 Puff(s) Inhalation two times a day  ceFAZolin   IVPB 2000 milliGRAM(s) IV Intermittent every 8 hours  chlorhexidine 4% Liquid 1 Application(s) Topical <User Schedule>  DULoxetine 60 milliGRAM(s) Oral daily  enoxaparin Injectable 40 milliGRAM(s) SubCutaneous every 24 hours  hydrOXYzine  Oral Tab/Cap - Peds 10 milliGRAM(s) Oral three times a day  ibuprofen  Tablet. 200 milliGRAM(s) Oral once  influenza   Vaccine 0.5 milliLiter(s) IntraMuscular once  insulin glargine Injectable (LANTUS) 30 Unit(s) SubCutaneous at bedtime  insulin lispro (HumaLOG) corrective regimen sliding scale   SubCutaneous three times a day before meals  insulin lispro Injectable (HumaLOG) 10 Unit(s) SubCutaneous three times a day before meals  levothyroxine 100 MICROGram(s) Oral daily  metoprolol tartrate 12.5 milliGRAM(s) Oral two times a day  morphine ER Tablet 30 milliGRAM(s) Oral every 8 hours  nicotine -  14 mG/24Hr(s) Patch 1 patch Transdermal daily  pantoprazole    Tablet 40 milliGRAM(s) Oral before breakfast  pregabalin 150 milliGRAM(s) Oral three times a day    PRN MEDICATIONS  ALBUTerol    90 MICROgram(s) HFA Inhaler 2 Puff(s) Inhalation every 6 hours PRN  diazepam    Tablet 5 milliGRAM(s) Oral two times a day PRN  morphine  - Injectable 2 milliGRAM(s) IV Push every 3 hours PRN  senna 2 Tablet(s) Oral at bedtime PRN    VITALS:   T(F): 97.8  HR: 101  BP: 91/50  RR: 19  SpO2: --    LABS:                        9.8    10.03 )-----------( 199      ( 2019 09:54 )             31.2     01-13    139  |  103  |  7<L>  ----------------------------<  91  4.1   |  24  |  0.6<L>    Ca    8.2<L>      2019 09:54                            PHYSICAL EXAM:  GEN: NAD, comfortable  LUNGS: CTAB, no w/r/r  HEART: tachycardic, regular rhythm   ABD: soft, +BS  EXT: no edema  NEURO: AAOx3

## 2019-01-14 NOTE — PROGRESS NOTE ADULT - SUBJECTIVE AND OBJECTIVE BOX
PODIATRY PROGRESS NOTE   [id number] CALLY HARTMAN is a pleasant well-nourished, well developed 53y Female in no acute distress, alert awake, and oriented to person, place and time.   Patient is a 53y old  Female who presents with a chief complaint of R charcot foot Sx (14 Jan 2019 16:22)    HPI:  54yo F coming from home with PMHx DM II on oral meds, HTN, neuropathy, sciatica, sent by podiatrist Dr. Diaz for right charcot foot, admission/OR. Per pt, has been having foot pain for 1 year 2/2 charcot joint and was managed conservatively. Pt was on a CAM boot for 6 months, she has constant pain and limping. She walks with a walker/cane and currently is under disability.   She went to see podiatrist in office yesterday and told to go to hospital for Charcot foot surgery and admission.   Pt discussed surgical treatment with Dr. Diaz.    Denies ulceration or skin breakdown. Denies fever. Denies headache, lightheadedness, CP, SOB, cough, nausea, vomiting, diarrhea, abd pain, dysuria, leg swelling.    In the ED she was hemodynamically stable and was given Vancomycin one dose for unknown reason. (10 Ty 2019 14:09)    pt was seen and assessed at bedside pm rounds with Attending Dr. Diaz.  pt complaints pain but she is getting regular dose of pain medications and req consult for pain management.      Vital Signs Last 24 Hrs  T(C): 36.6 (14 Jan 2019 14:20), Max: 38.4 (14 Jan 2019 06:35)  T(F): 97.8 (14 Jan 2019 14:20), Max: 101.1 (14 Jan 2019 06:35)  HR: 101 (14 Jan 2019 14:20) (101 - 124)  BP: 91/50 (14 Jan 2019 14:20) (91/50 - 126/61)  RR: 19 (14 Jan 2019 14:20) (17 - 19)                        9.4    7.88  )-----------( 190      ( 14 Jan 2019 16:00 )             29.9                 01-13    139  |  103  |  7<L>  ----------------------------<  91  4.1   |  24  |  0.6<L>    Ca    8.2<L>      13 Jan 2019 09:54    derm: suture intact medial and dorsal lateral and dorsal aspect of the 1st/3rd/4th met head right foot.  no signs of infection, mild to moderate edema, no drainage, no erythema.   ecchymosis noted medial lateral aspect of the right ankle.  A:  s/p charcot recon right foot 1/11  P:  pt evaluated and treated  cast removed at bedside without any incident  surgical dressing removed and surgical site cleansed with peroxide   applied adaptic on the surgical sites and 4x4 guaze, webril, 3 inches and 4 inches of fiberglass and stockinette applied with right ankle at 90 degrees.  Wound Care Orders: no need to remove the cast for 2 weeks, pt will need to f/u as an outpatient with dr. gayle on 1/28/19 for removal of cast.  req Physical Therapy for transfer from bed to chair and bed to bathroom  non weight bearing right foot  pt good to go from podiatry standpoint  01-14-19 @ 17:06 PODIATRY PROGRESS NOTE   [id number] CALLY HARTMAN is a pleasant well-nourished, well developed 53y Female in no acute distress, alert awake, and oriented to person, place and time.   Patient is a 53y old  Female who presents with a chief complaint of R charcot foot Sx (14 Jan 2019 16:22)    HPI:  54yo F coming from home with PMHx DM II on oral meds, HTN, neuropathy, sciatica, sent by podiatrist Dr. Diaz for right charcot foot, admission/OR. Per pt, has been having foot pain for 1 year 2/2 charcot joint and was managed conservatively. Pt was on a CAM boot for 6 months, she has constant pain and limping. She walks with a walker/cane and currently is under disability.   She went to see podiatrist in office yesterday and told to go to hospital for Charcot foot surgery and admission.   Pt discussed surgical treatment with Dr. Diaz.    Denies ulceration or skin breakdown. Denies fever. Denies headache, lightheadedness, CP, SOB, cough, nausea, vomiting, diarrhea, abd pain, dysuria, leg swelling.    In the ED she was hemodynamically stable and was given Vancomycin one dose for unknown reason. (10 Ty 2019 14:09)    pt was seen and assessed at bedside pm rounds with Attending Dr. Diaz.  pt complaints pain but she is getting regular dose of pain medications and req consult for pain management.      Vital Signs Last 24 Hrs  T(C): 36.6 (14 Jan 2019 14:20), Max: 38.4 (14 Jan 2019 06:35)  T(F): 97.8 (14 Jan 2019 14:20), Max: 101.1 (14 Jan 2019 06:35)  HR: 101 (14 Jan 2019 14:20) (101 - 124)  BP: 91/50 (14 Jan 2019 14:20) (91/50 - 126/61)  RR: 19 (14 Jan 2019 14:20) (17 - 19)                        9.4    7.88  )-----------( 190      ( 14 Jan 2019 16:00 )             29.9                 01-13    139  |  103  |  7<L>  ----------------------------<  91  4.1   |  24  |  0.6<L>    Ca    8.2<L>      13 Jan 2019 09:54    derm: suture intact medial and dorsal lateral and dorsal aspect of the 1st/3rd/4th met head right foot.  no signs of infection, mild to moderate edema, no drainage, no erythema.   ecchymosis noted medial lateral aspect of the right ankle.  A:  s/p charcot recon right foot 1/11  P:  pt evaluated and treated  cast removed at bedside without any incident  surgical dressing removed and surgical site cleansed with peroxide   applied adaptic on the surgical sites and 4x4 guaze, webril, 3 inches and 4 inches of fiberglass and stockinette applied with right ankle at 90 degrees.  Wound Care Orders: no need to remove the cast for 2 weeks, pt will need to f/u as an outpatient with dr. gayle on 1/28/19 for removal of cast.  req Physical Therapy for transfer from bed to chair and bed to bathroom  non weight bearing right foot  req pain management  continue use of incentive spirometry, pt strongly encourage to administer 4 times every hour for 10 sets when awake  pt good to go from podiatry standpoint  01-14-19 @ 17:06

## 2019-01-14 NOTE — PROGRESS NOTE ADULT - SUBJECTIVE AND OBJECTIVE BOX
CALLY HARTMAN [id number] 53y Female     CHIEF PRESENTING COMPLAINT:  Patient is a 53y old  Female who presents with a chief complaint of R charcot foot Sx (13 Jan 2019 16:58)        SUBJECTIVE:  Patient was seen and examined at bedside. Reports severe pain right foot. No significant overnight events reported except for fever upto 101 F.       VITAL SIGNS:  Vital Signs Last 24 Hrs  T(C): 36.6 (14 Jan 2019 14:20), Max: 38.4 (14 Jan 2019 06:35)  T(F): 97.8 (14 Jan 2019 14:20), Max: 101.1 (14 Jan 2019 06:35)  HR: 101 (14 Jan 2019 14:20) (101 - 124)  BP: 91/50 (14 Jan 2019 14:20) (91/50 - 126/61)  BP(mean): --  RR: 19 (14 Jan 2019 14:20) (17 - 19)  SpO2: --    PHYSICAL EXAMINATION:  Not in acute distress  General: No pallor, or icterus, afebrile  HEENT:  CHRISTIAN, EOMI, no JVD, no Bruit.  Heart: S1+S2 audible, no murmur  Lungs: bilateral  fair air entry, no wheezing, no crepitations.  Abdomen: Soft, non-tender, non-distended , no  rigidity or guarding.  CNS: AAOx3, CN  grossly intact.  Extremities:  No edema  , RLE cast in place        REVIEW OF SYSTEMS:   Patient denies any headache, any vision complaints, runny nose, fever, chills, sore throat. Denies chest pain, shortness of breath, palpitation. Denies nausea, vomiting, abdominal pain, diarrhoea, Denies urinary burning, urgency, frequency, dysuria. Denies weakness in any part of the body or numbness.   At least 10 systems were reviewed in ROS. All systems reviewed  are within normal limits except for the complaints as described in Subjective.    MEDICATIONS:  MEDICATIONS  (STANDING):  aspirin enteric coated 81 milliGRAM(s) Oral daily  atorvastatin 40 milliGRAM(s) Oral at bedtime  buDESOnide  80 MICROgram(s)/formoterol 4.5 MICROgram(s) Inhaler 2 Puff(s) Inhalation two times a day  ceFAZolin   IVPB 2000 milliGRAM(s) IV Intermittent every 8 hours  chlorhexidine 4% Liquid 1 Application(s) Topical <User Schedule>  DULoxetine 60 milliGRAM(s) Oral daily  enoxaparin Injectable 40 milliGRAM(s) SubCutaneous every 24 hours  hydrOXYzine  Oral Tab/Cap - Peds 10 milliGRAM(s) Oral three times a day  ibuprofen  Tablet. 200 milliGRAM(s) Oral once  influenza   Vaccine 0.5 milliLiter(s) IntraMuscular once  insulin glargine Injectable (LANTUS) 30 Unit(s) SubCutaneous at bedtime  insulin lispro (HumaLOG) corrective regimen sliding scale   SubCutaneous three times a day before meals  insulin lispro Injectable (HumaLOG) 10 Unit(s) SubCutaneous three times a day before meals  levothyroxine 100 MICROGram(s) Oral daily  metoprolol tartrate 12.5 milliGRAM(s) Oral two times a day  morphine ER Tablet 30 milliGRAM(s) Oral every 8 hours  nicotine -  14 mG/24Hr(s) Patch 1 patch Transdermal daily  pantoprazole    Tablet 40 milliGRAM(s) Oral before breakfast  pregabalin 150 milliGRAM(s) Oral three times a day    MEDICATIONS  (PRN):  ALBUTerol    90 MICROgram(s) HFA Inhaler 2 Puff(s) Inhalation every 6 hours PRN Bronchospasm  diazepam    Tablet 5 milliGRAM(s) Oral two times a day PRN anxiety  morphine  - Injectable 2 milliGRAM(s) IV Push every 3 hours PRN Severe Pain (7 - 10)  senna 2 Tablet(s) Oral at bedtime PRN Constipation      LABS:                        9.8    10.03 )-----------( 199      ( 13 Jan 2019 09:54 )             31.2     01-13    139  |  103  |  7<L>  ----------------------------<  91  4.1   |  24  |  0.6<L>    Ca    8.2<L>      13 Jan 2019 09:54          CAPILLARY BLOOD GLUCOSE      POCT Blood Glucose.: 129 mg/dL (14 Jan 2019 11:36)  POCT Blood Glucose.: 139 mg/dL (14 Jan 2019 07:35)  POCT Blood Glucose.: 67 mg/dL (13 Jan 2019 21:18)  POCT Blood Glucose.: 151 mg/dL (13 Jan 2019 17:04)                          ASSESSMENT:  54yo F coming from home with PMHx smoker, DM II on oral meds, HTN, neuropathy, sciatica, charcot foot sent in by podiatry for elective surgery for     progressively worsening foot pain, course complicated by borderline hypotension, leukocytosis likely reactive          Assessment:  Complicated charcot's foot s/p reconstructive surgery on 01/11  Post-op pyexia, unclear etiology  Type-2 diabetes mellitus, uncontrolled; on therapy  Active nicotine dependence  hypothyroidism   HTN- controlled    PLAN:  - f/u with surgery for post-op evaluation ( clarify for any further procedure for reconstruction is warranted or no)  - send sepsis w/u, no clear etiology, CXR not showing infiltrates, f/u blood cx, CBC drawn   - pain management long acting opioids, NSAID and PRn morphine for breakthrough  -nicotine patch while inhouse  - activity as per podiatry  -Hypothyroidism- c/w levothyroxine 100 mcg daily  -DM2-well controlled on sliding scale, Lantus 30 and preprandial 1o U with meals  - DVT pro- Enoxaparin  Disposition- continues to require hospitalization

## 2019-01-14 NOTE — PROGRESS NOTE ADULT - ASSESSMENT
# R Charcot foot   - S/p uncomplicated reconstruction on Jan, 11th  - Podiatry following - today cast removed and new cast placed - no further in patient intervention as per podiatry  - Patient remains NWB on right lower ext - will get PT consult  - pain management    #Fever, tachycardia  - STAT CBC, BMP, blood cultures, CXR, UA, urinalysis, EKG  - patient has no resp, abdominal,  complaints  - CXR shows to acute pathology  - WBC is normal  - TSH in am  - spoke to attending - hold of abx while WBC is normal and continue to monitor    #DM II  - Hold oral medications for now  - Will start basal bolus insulin    #COPD  - Not in exacerbation right now  - c/w inhalers as of now    Full code  DVT PPX: Lovenox  Diet: Carb consistent  Activity: OOB  CHG bath daily

## 2019-01-15 LAB
ANION GAP SERPL CALC-SCNC: 12 MMOL/L — SIGNIFICANT CHANGE UP (ref 7–14)
APPEARANCE UR: CLEAR — SIGNIFICANT CHANGE UP
BASOPHILS # BLD AUTO: 0.04 K/UL — SIGNIFICANT CHANGE UP (ref 0–0.2)
BASOPHILS NFR BLD AUTO: 0.7 % — SIGNIFICANT CHANGE UP (ref 0–1)
BILIRUB UR-MCNC: NEGATIVE — SIGNIFICANT CHANGE UP
BUN SERPL-MCNC: 8 MG/DL — LOW (ref 10–20)
CALCIUM SERPL-MCNC: 8.6 MG/DL — SIGNIFICANT CHANGE UP (ref 8.5–10.1)
CHLORIDE SERPL-SCNC: 100 MMOL/L — SIGNIFICANT CHANGE UP (ref 98–110)
CO2 SERPL-SCNC: 27 MMOL/L — SIGNIFICANT CHANGE UP (ref 17–32)
COLOR SPEC: YELLOW — SIGNIFICANT CHANGE UP
CREAT SERPL-MCNC: 0.6 MG/DL — LOW (ref 0.7–1.5)
D DIMER BLD IA.RAPID-MCNC: 141 NG/ML DDU — SIGNIFICANT CHANGE UP (ref 0–230)
DIFF PNL FLD: NEGATIVE — SIGNIFICANT CHANGE UP
EOSINOPHIL # BLD AUTO: 0.33 K/UL — SIGNIFICANT CHANGE UP (ref 0–0.7)
EOSINOPHIL NFR BLD AUTO: 5.8 % — SIGNIFICANT CHANGE UP (ref 0–8)
GLUCOSE BLDC GLUCOMTR-MCNC: 103 MG/DL — HIGH (ref 70–99)
GLUCOSE BLDC GLUCOMTR-MCNC: 109 MG/DL — HIGH (ref 70–99)
GLUCOSE BLDC GLUCOMTR-MCNC: 163 MG/DL — HIGH (ref 70–99)
GLUCOSE BLDC GLUCOMTR-MCNC: 185 MG/DL — HIGH (ref 70–99)
GLUCOSE SERPL-MCNC: 170 MG/DL — HIGH (ref 70–99)
GLUCOSE UR QL: NEGATIVE MG/DL — SIGNIFICANT CHANGE UP
HCT VFR BLD CALC: 30.6 % — LOW (ref 37–47)
HGB BLD-MCNC: 9.7 G/DL — LOW (ref 12–16)
IMM GRANULOCYTES NFR BLD AUTO: 0.2 % — SIGNIFICANT CHANGE UP (ref 0.1–0.3)
KETONES UR-MCNC: NEGATIVE — SIGNIFICANT CHANGE UP
LEUKOCYTE ESTERASE UR-ACNC: NEGATIVE — SIGNIFICANT CHANGE UP
LYMPHOCYTES # BLD AUTO: 1.2 K/UL — SIGNIFICANT CHANGE UP (ref 1.2–3.4)
LYMPHOCYTES # BLD AUTO: 20.9 % — SIGNIFICANT CHANGE UP (ref 20.5–51.1)
MAGNESIUM SERPL-MCNC: 1.8 MG/DL — SIGNIFICANT CHANGE UP (ref 1.8–2.4)
MCHC RBC-ENTMCNC: 29.7 PG — SIGNIFICANT CHANGE UP (ref 27–31)
MCHC RBC-ENTMCNC: 31.7 G/DL — LOW (ref 32–37)
MCV RBC AUTO: 93.6 FL — SIGNIFICANT CHANGE UP (ref 81–99)
MONOCYTES # BLD AUTO: 0.43 K/UL — SIGNIFICANT CHANGE UP (ref 0.1–0.6)
MONOCYTES NFR BLD AUTO: 7.5 % — SIGNIFICANT CHANGE UP (ref 1.7–9.3)
NEUTROPHILS # BLD AUTO: 3.72 K/UL — SIGNIFICANT CHANGE UP (ref 1.4–6.5)
NEUTROPHILS NFR BLD AUTO: 64.9 % — SIGNIFICANT CHANGE UP (ref 42.2–75.2)
NITRITE UR-MCNC: NEGATIVE — SIGNIFICANT CHANGE UP
NRBC # BLD: 0 /100 WBCS — SIGNIFICANT CHANGE UP (ref 0–0)
PH UR: 6 — SIGNIFICANT CHANGE UP (ref 5–8)
PLATELET # BLD AUTO: 183 K/UL — SIGNIFICANT CHANGE UP (ref 130–400)
POTASSIUM SERPL-MCNC: 4.2 MMOL/L — SIGNIFICANT CHANGE UP (ref 3.5–5)
POTASSIUM SERPL-SCNC: 4.2 MMOL/L — SIGNIFICANT CHANGE UP (ref 3.5–5)
PROT UR-MCNC: NEGATIVE MG/DL — SIGNIFICANT CHANGE UP
RBC # BLD: 3.27 M/UL — LOW (ref 4.2–5.4)
RBC # FLD: 15.3 % — HIGH (ref 11.5–14.5)
SODIUM SERPL-SCNC: 139 MMOL/L — SIGNIFICANT CHANGE UP (ref 135–146)
SP GR SPEC: 1.01 — SIGNIFICANT CHANGE UP (ref 1.01–1.03)
TSH SERPL-MCNC: 0.37 UIU/ML — SIGNIFICANT CHANGE UP (ref 0.27–4.2)
UROBILINOGEN FLD QL: 0.2 MG/DL — SIGNIFICANT CHANGE UP (ref 0.2–0.2)
WBC # BLD: 5.73 K/UL — SIGNIFICANT CHANGE UP (ref 4.8–10.8)
WBC # FLD AUTO: 5.73 K/UL — SIGNIFICANT CHANGE UP (ref 4.8–10.8)

## 2019-01-15 RX ORDER — IBUPROFEN 200 MG
600 TABLET ORAL ONCE
Qty: 0 | Refills: 0 | Status: COMPLETED | OUTPATIENT
Start: 2019-01-15 | End: 2019-01-15

## 2019-01-15 RX ADMIN — INSULIN GLARGINE 30 UNIT(S): 100 INJECTION, SOLUTION SUBCUTANEOUS at 21:32

## 2019-01-15 RX ADMIN — Medication 600 MILLIGRAM(S): at 12:50

## 2019-01-15 RX ADMIN — Medication 10 MILLIGRAM(S): at 21:32

## 2019-01-15 RX ADMIN — Medication 600 MILLIGRAM(S): at 12:19

## 2019-01-15 RX ADMIN — Medication 10 UNIT(S): at 12:51

## 2019-01-15 RX ADMIN — Medication 10 MILLIGRAM(S): at 05:20

## 2019-01-15 RX ADMIN — ATORVASTATIN CALCIUM 40 MILLIGRAM(S): 80 TABLET, FILM COATED ORAL at 21:32

## 2019-01-15 RX ADMIN — Medication 100 MICROGRAM(S): at 05:20

## 2019-01-15 RX ADMIN — MORPHINE SULFATE 30 MILLIGRAM(S): 50 CAPSULE, EXTENDED RELEASE ORAL at 13:55

## 2019-01-15 RX ADMIN — Medication 1 PATCH: at 07:57

## 2019-01-15 RX ADMIN — Medication 10 MILLIGRAM(S): at 13:56

## 2019-01-15 RX ADMIN — MORPHINE SULFATE 30 MILLIGRAM(S): 50 CAPSULE, EXTENDED RELEASE ORAL at 05:20

## 2019-01-15 RX ADMIN — Medication 150 MILLIGRAM(S): at 13:55

## 2019-01-15 RX ADMIN — Medication 150 MILLIGRAM(S): at 21:33

## 2019-01-15 RX ADMIN — Medication 1 PATCH: at 10:00

## 2019-01-15 RX ADMIN — DULOXETINE HYDROCHLORIDE 60 MILLIGRAM(S): 30 CAPSULE, DELAYED RELEASE ORAL at 12:20

## 2019-01-15 RX ADMIN — BUDESONIDE AND FORMOTEROL FUMARATE DIHYDRATE 2 PUFF(S): 160; 4.5 AEROSOL RESPIRATORY (INHALATION) at 08:49

## 2019-01-15 RX ADMIN — MORPHINE SULFATE 30 MILLIGRAM(S): 50 CAPSULE, EXTENDED RELEASE ORAL at 21:31

## 2019-01-15 RX ADMIN — BUDESONIDE AND FORMOTEROL FUMARATE DIHYDRATE 2 PUFF(S): 160; 4.5 AEROSOL RESPIRATORY (INHALATION) at 21:31

## 2019-01-15 RX ADMIN — Medication 10 UNIT(S): at 08:45

## 2019-01-15 RX ADMIN — Medication 81 MILLIGRAM(S): at 12:22

## 2019-01-15 RX ADMIN — PANTOPRAZOLE SODIUM 40 MILLIGRAM(S): 20 TABLET, DELAYED RELEASE ORAL at 05:21

## 2019-01-15 RX ADMIN — Medication 150 MILLIGRAM(S): at 05:21

## 2019-01-15 RX ADMIN — Medication 10 UNIT(S): at 17:24

## 2019-01-15 RX ADMIN — CHLORHEXIDINE GLUCONATE 1 APPLICATION(S): 213 SOLUTION TOPICAL at 05:24

## 2019-01-15 RX ADMIN — MORPHINE SULFATE 30 MILLIGRAM(S): 50 CAPSULE, EXTENDED RELEASE ORAL at 22:11

## 2019-01-15 NOTE — PROGRESS NOTE ADULT - SUBJECTIVE AND OBJECTIVE BOX
CALLY HARTMAN [id number] 53y Female     CHIEF PRESENTING COMPLAINT:  Patient is a 53y old  Female who presents with a chief complaint of R charcot foot Sx (2019 17:05)        SUBJECTIVE:  Patient was seen and examined at bedside. Reports improvement in  presenting complaint. Pt already out of bed.   No significant overnight events reported.       VITAL SIGNS:  Vital Signs Last 24 Hrs  T(C): 36.3 (15 Ty 2019 06:33), Max: 36.7 (2019 21:16)  T(F): 97.4 (15 Ty 2019 06:33), Max: 98 (2019 21:16)  HR: 103 (15 Ty 2019 06:33) (97 - 104)  BP: 98/49 (15 Ty 2019 06:33) (91/50 - 114/55)  BP(mean): --  RR: 17 (15 Ty 2019 06:33) (17 - 19)  SpO2: --    PHYSICAL EXAMINATION:  Not in acute distress  General: No pallor, or icterus, afebrile  HEENT:  CHRISTIAN, EOMI, no JVD, no Bruit.  Heart: S1+S2 audible, no murmur  Lungs: bilateral  fair air entry, no wheezing, no crepitations.  Abdomen: Soft, non-tender, non-distended , no  rigidity or guarding.  CNS: AAOx3, CN  grossly intact.  Extremities:  No edema  , RLE cast in place        REVIEW OF SYSTEMS:   Patient denies any headache, any vision complaints, runny nose, fever, chills, sore throat. Denies chest pain, shortness of breath, palpitation. Denies nausea, vomiting, abdominal pain, diarrhoea, Denies urinary burning, urgency, frequency, dysuria. Denies weakness in any part of the body or numbness.   At least 10 systems were reviewed in ROS. All systems reviewed  are within normal limits except for the complaints as described in Subjective.    MEDICATIONS:  MEDICATIONS  (STANDING):  aspirin enteric coated 81 milliGRAM(s) Oral daily  atorvastatin 40 milliGRAM(s) Oral at bedtime  buDESOnide  80 MICROgram(s)/formoterol 4.5 MICROgram(s) Inhaler 2 Puff(s) Inhalation two times a day  ceFAZolin   IVPB 2000 milliGRAM(s) IV Intermittent every 8 hours  chlorhexidine 4% Liquid 1 Application(s) Topical <User Schedule>  DULoxetine 60 milliGRAM(s) Oral daily  enoxaparin Injectable 40 milliGRAM(s) SubCutaneous every 24 hours  hydrOXYzine  Oral Tab/Cap - Peds 10 milliGRAM(s) Oral three times a day  ibuprofen  Tablet. 600 milliGRAM(s) Oral once  influenza   Vaccine 0.5 milliLiter(s) IntraMuscular once  insulin glargine Injectable (LANTUS) 30 Unit(s) SubCutaneous at bedtime  insulin lispro (HumaLOG) corrective regimen sliding scale   SubCutaneous three times a day before meals  insulin lispro Injectable (HumaLOG) 10 Unit(s) SubCutaneous three times a day before meals  levothyroxine 100 MICROGram(s) Oral daily  metoprolol tartrate 12.5 milliGRAM(s) Oral two times a day  morphine ER Tablet 30 milliGRAM(s) Oral every 8 hours  nicotine -  14 mG/24Hr(s) Patch 1 patch Transdermal daily  pantoprazole    Tablet 40 milliGRAM(s) Oral before breakfast  pregabalin 150 milliGRAM(s) Oral three times a day    MEDICATIONS  (PRN):  ALBUTerol    90 MICROgram(s) HFA Inhaler 2 Puff(s) Inhalation every 6 hours PRN Bronchospasm  diazepam    Tablet 5 milliGRAM(s) Oral two times a day PRN anxiety  morphine  - Injectable 2 milliGRAM(s) IV Push every 3 hours PRN Severe Pain (7 - 10)  senna 2 Tablet(s) Oral at bedtime PRN Constipation      LABS:                        9.7    5.73  )-----------( 183      ( 15 Ty 2019 07:41 )             30.6     01-15    139  |  100  |  8<L>  ----------------------------<  170<H>  4.2   |  27  |  0.6<L>    Ca    8.6      15 Ty 2019 07:41  Mg     1.8     01-15    TPro  5.9<L>  /  Alb  3.5  /  TBili  0.2  /  DBili  x   /  AST  56<H>  /  ALT  46<H>  /  AlkPhos  106  01-14      Urinalysis Basic - ( 15 Ty 2019 01:00 )    Color: Yellow / Appearance: Clear / S.010 / pH: x  Gluc: x / Ketone: Negative  / Bili: Negative / Urobili: 0.2 mg/dL   Blood: x / Protein: Negative mg/dL / Nitrite: Negative   Leuk Esterase: Negative / RBC: x / WBC x   Sq Epi: x / Non Sq Epi: x / Bacteria: x      CAPILLARY BLOOD GLUCOSE      POCT Blood Glucose.: 185 mg/dL (15 Ty 2019 07:27)  POCT Blood Glucose.: 132 mg/dL (2019 21:47)  POCT Blood Glucose.: 172 mg/dL (2019 16:38)  POCT Blood Glucose.: 129 mg/dL (2019 11:36)        Urinalysis Basic - ( 15 Ty 2019 01:00 )    Color: Yellow / Appearance: Clear / S.010 / pH: x  Gluc: x / Ketone: Negative  / Bili: Negative / Urobili: 0.2 mg/dL   Blood: x / Protein: Negative mg/dL / Nitrite: Negative   Leuk Esterase: Negative / RBC: x / WBC x   Sq Epi: x / Non Sq Epi: x / Bacteria: x                      ASSESSMENT:

## 2019-01-15 NOTE — PROGRESS NOTE ADULT - SUBJECTIVE AND OBJECTIVE BOX
SUBJECTIVE:    Patient is a 53y old Female who presents with a chief complaint of R charcot foot Sx (15 Ty 2019 11:30)    Stable, no acute events.    PAST MEDICAL & SURGICAL HISTORY  Dextrocardia  Chronic midline low back pain with bilateral sciatica  Peripheral neuralgia  Essential hypertension  Diabetes 1.5, managed as type 2  Charcot's arthropathy: R foot  H/O shoulder surgery: Right shoulder surgery   delivery delivered       ALLERGIES:  No Known Allergies    MEDICATIONS:  STANDING MEDICATIONS  aspirin enteric coated 81 milliGRAM(s) Oral daily  atorvastatin 40 milliGRAM(s) Oral at bedtime  buDESOnide  80 MICROgram(s)/formoterol 4.5 MICROgram(s) Inhaler 2 Puff(s) Inhalation two times a day  ceFAZolin   IVPB 2000 milliGRAM(s) IV Intermittent every 8 hours  chlorhexidine 4% Liquid 1 Application(s) Topical <User Schedule>  DULoxetine 60 milliGRAM(s) Oral daily  enoxaparin Injectable 40 milliGRAM(s) SubCutaneous every 24 hours  hydrOXYzine  Oral Tab/Cap - Peds 10 milliGRAM(s) Oral three times a day  influenza   Vaccine 0.5 milliLiter(s) IntraMuscular once  insulin glargine Injectable (LANTUS) 30 Unit(s) SubCutaneous at bedtime  insulin lispro (HumaLOG) corrective regimen sliding scale   SubCutaneous three times a day before meals  insulin lispro Injectable (HumaLOG) 10 Unit(s) SubCutaneous three times a day before meals  levothyroxine 100 MICROGram(s) Oral daily  metoprolol tartrate 12.5 milliGRAM(s) Oral two times a day  morphine ER Tablet 30 milliGRAM(s) Oral every 8 hours  nicotine -  14 mG/24Hr(s) Patch 1 patch Transdermal daily  pantoprazole    Tablet 40 milliGRAM(s) Oral before breakfast  pregabalin 150 milliGRAM(s) Oral three times a day    PRN MEDICATIONS  ALBUTerol    90 MICROgram(s) HFA Inhaler 2 Puff(s) Inhalation every 6 hours PRN  diazepam    Tablet 5 milliGRAM(s) Oral two times a day PRN  morphine  - Injectable 2 milliGRAM(s) IV Push every 3 hours PRN  senna 2 Tablet(s) Oral at bedtime PRN    VITALS:   T(F): 96.7  HR: 120  BP: 112/75  RR: 19  SpO2: --    LABS:                        9.7    5.73  )-----------( 183      ( 15 Ty 2019 07:41 )             30.6     01-15    139  |  100  |  8<L>  ----------------------------<  170<H>  4.2   |  27  |  0.6<L>    Ca    8.6      15 Ty 2019 07:41  Mg     1.8     01-15    TPro  5.9<L>  /  Alb  3.5  /  TBili  0.2  /  DBili  x   /  AST  56<H>  /  ALT  46<H>  /  AlkPhos  106        Urinalysis Basic - ( 15 Ty 2019 01:00 )    Color: Yellow / Appearance: Clear / S.010 / pH: x  Gluc: x / Ketone: Negative  / Bili: Negative / Urobili: 0.2 mg/dL   Blood: x / Protein: Negative mg/dL / Nitrite: Negative   Leuk Esterase: Negative / RBC: x / WBC x   Sq Epi: x / Non Sq Epi: x / Bacteria: x                    01-15-19 @ 07:01  -  01-15-19 @ 15:56  --------------------------------------------------------  IN: 0 mL / OUT: 400 mL / NET: -400 mL          PHYSICAL EXAM:  GEN: NAD, comfortable  LUNGS: CTAB, no w/r/r  HEART: RRR, no m/r/g  ABD: soft, NT/ND, +BS  EXT: no edema, PP b/l  NEURO: AAOx3 SUBJECTIVE:    Patient is a 53y old Female who presents with a chief complaint of R charcot foot Sx (15 Ty 2019 11:30)    Stable, no acute events.    PAST MEDICAL & SURGICAL HISTORY  Dextrocardia  Chronic midline low back pain with bilateral sciatica  Peripheral neuralgia  Essential hypertension  Diabetes 1.5, managed as type 2  Charcot's arthropathy: R foot  H/O shoulder surgery: Right shoulder surgery   delivery delivered       ALLERGIES:  No Known Allergies    MEDICATIONS:  STANDING MEDICATIONS  aspirin enteric coated 81 milliGRAM(s) Oral daily  atorvastatin 40 milliGRAM(s) Oral at bedtime  buDESOnide  80 MICROgram(s)/formoterol 4.5 MICROgram(s) Inhaler 2 Puff(s) Inhalation two times a day  ceFAZolin   IVPB 2000 milliGRAM(s) IV Intermittent every 8 hours  chlorhexidine 4% Liquid 1 Application(s) Topical <User Schedule>  DULoxetine 60 milliGRAM(s) Oral daily  enoxaparin Injectable 40 milliGRAM(s) SubCutaneous every 24 hours  hydrOXYzine  Oral Tab/Cap - Peds 10 milliGRAM(s) Oral three times a day  influenza   Vaccine 0.5 milliLiter(s) IntraMuscular once  insulin glargine Injectable (LANTUS) 30 Unit(s) SubCutaneous at bedtime  insulin lispro (HumaLOG) corrective regimen sliding scale   SubCutaneous three times a day before meals  insulin lispro Injectable (HumaLOG) 10 Unit(s) SubCutaneous three times a day before meals  levothyroxine 100 MICROGram(s) Oral daily  metoprolol tartrate 12.5 milliGRAM(s) Oral two times a day  morphine ER Tablet 30 milliGRAM(s) Oral every 8 hours  nicotine -  14 mG/24Hr(s) Patch 1 patch Transdermal daily  pantoprazole    Tablet 40 milliGRAM(s) Oral before breakfast  pregabalin 150 milliGRAM(s) Oral three times a day    PRN MEDICATIONS  ALBUTerol    90 MICROgram(s) HFA Inhaler 2 Puff(s) Inhalation every 6 hours PRN  diazepam    Tablet 5 milliGRAM(s) Oral two times a day PRN  morphine  - Injectable 2 milliGRAM(s) IV Push every 3 hours PRN  senna 2 Tablet(s) Oral at bedtime PRN    VITALS:   T(F): 96.7  HR: 120  BP: 112/75  RR: 19  SpO2: --    LABS:                        9.7    5.73  )-----------( 183      ( 15 Ty 2019 07:41 )             30.6     01-15    139  |  100  |  8<L>  ----------------------------<  170<H>  4.2   |  27  |  0.6<L>    Ca    8.6      15 Ty 2019 07:41  Mg     1.8     01-15    TPro  5.9<L>  /  Alb  3.5  /  TBili  0.2  /  DBili  x   /  AST  56<H>  /  ALT  46<H>  /  AlkPhos  106        Urinalysis Basic - ( 15 Ty 2019 01:00 )    Color: Yellow / Appearance: Clear / S.010 / pH: x  Gluc: x / Ketone: Negative  / Bili: Negative / Urobili: 0.2 mg/dL   Blood: x / Protein: Negative mg/dL / Nitrite: Negative   Leuk Esterase: Negative / RBC: x / WBC x   Sq Epi: x / Non Sq Epi: x / Bacteria: x                    01-15-19 @ 07:01  -  01-15-19 @ 15:56  --------------------------------------------------------  IN: 0 mL / OUT: 400 mL / NET: -400 mL          PHYSICAL EXAM:  GEN: NAD, comfortable  LUNGS: CTAB, no w/r/r  HEART: RRR, no m/r/g  ABD: soft, NT/ND, +BS  EXT:  NEURO: AAOx3

## 2019-01-15 NOTE — PROGRESS NOTE ADULT - ASSESSMENT
52yo F coming from home with PMHx smoker, DM II on oral meds, HTN, neuropathy, sciatica, charcot foot sent in by podiatry for elective surgery for     progressively worsening foot pain, course complicated by borderline hypotension, leukocytosis likely reactive          Assessment:  Complicated charcot's foot s/p reconstructive surgery on 01/11  Post-op pyexia, unclear etiology  Type-2 diabetes mellitus, uncontrolled; on therapy  Active nicotine dependence  hypothyroidism   HTN- controlled    PLAN:  -OOB  - f/u with surgery for post-op   - Afebrile since yesterday, CXR not showing infiltrates, f/u blood cx  - pain management long acting opioids, NSAID and PRn morphine for breakthrough  -nicotine patch while inhouse  - activity as per podiatry  -Hypothyroidism- c/w levothyroxine 100 mcg daily  -DM2-well controlled on sliding scale, Lantus 30 and preprandial 1o U with meals  - DVT pro- Enoxaparin  Disposition- stable for discharge pending PT assessment. May require home services/PT

## 2019-01-15 NOTE — PROGRESS NOTE ADULT - ASSESSMENT
# R Charcot foot   - S/p uncomplicated reconstruction on Jan, 11th  - Podiatry following - today cast removed and new cast placed - no further in patient intervention as per podiatry  - Patient remains NWB on right lower ext - will get PT consult  - pain management    #Fever, tachycardia  - STAT CBC, BMP, blood cultures, CXR, UA, urinalysis, EKG  - patient has no resp, abdominal,  complaints  - CXR shows to acute pathology  - WBC is normal  - TSH in am  - spoke to attending - hold of abx while WBC is normal and continue to monitor    #DM II  - Hold oral medications for now  - Will start basal bolus insulin    #COPD  - Not in exacerbation right now  - c/w inhalers as of now    Full code  DVT PPX: Lovenox  Diet: Carb consistent  Activity: OOB  CHG bath daily    54 y/o female with Charcot foot who is non-weighting bearing right foot. The patient has a mobility limitation that significantly impairs the patients ability to participate in one or more MRADLs such as toileting, eating, dressing, and bathing in customary locations in the home. The patients home provides adequate access between rooms for the use of the manual wheelchair. The wheelchair will significantly improve the patients ability to participate in MRADLs and will be used on a regular basis in the home. The patients mobility limitation cannot be resolved by the use of a walker or cane. The patients mobility limitation cannot be resolved by the use of a walker or cane. The patient has sufficient upper extremity function to safely propel the manual wheelchair in the home during a typical day. The patient has agreed to use the manual wheelchair that is provided in the home. # R Charcot foot   - S/p uncomplicated reconstruction on Jan, 11th  - Podiatry following no further in patient intervention as per podiatry  - PT following  - pain management    #Fever, tachycardia  - resolved - workup negative thus far    #DM II  - Hold oral medications for now  - Will start basal bolus insulin    #COPD  - Not in exacerbation right now  - c/w inhalers as of now    Full code  DVT PPX: Lovenox  Diet: Carb consistent  Activity: OOB  CHG bath daily    52 y/o female with Charcot foot who is non-weighting bearing right foot. The patient has a mobility limitation that significantly impairs the patients ability to participate in one or more MRADLs such as toileting, eating, dressing, and bathing in customary locations in the home. The patients home provides adequate access between rooms for the use of the manual wheelchair. The wheelchair will significantly improve the patients ability to participate in MRADLs and will be used on a regular basis in the home. The patients mobility limitation cannot be resolved by the use of a walker or cane. The patients mobility limitation cannot be resolved by the use of a walker or cane. The patient has sufficient upper extremity function to safely propel the manual wheelchair in the home during a typical day. The patient has agreed to use the manual wheelchair that is provided in the home. # R Charcot foot   - S/p uncomplicated reconstruction on Jan, 11th  - Podiatry following no further in patient intervention as per podiatry  - PT following  - pain management    #Fever, tachycardia  - fever has resolved  - tachycardia persists - EKG shows sinus tachycardia, d-dimer is negative, patient does not seem volume deplete, TSH WNL  - possibly pain related    #DM II  - Hold oral medications for now  - Will start basal bolus insulin    #COPD  - Not in exacerbation right now  - c/w inhalers as of now    Full code  DVT PPX: Lovenox  Diet: Carb consistent  Activity: OOB  CHG bath daily    52 y/o female with Charcot foot who is non-weighting bearing right foot. The patient has a mobility limitation that significantly impairs the patients ability to participate in one or more MRADLs such as toileting, eating, dressing, and bathing in customary locations in the home. The patients home provides adequate access between rooms for the use of the manual wheelchair. The wheelchair will significantly improve the patients ability to participate in MRADLs and will be used on a regular basis in the home. The patients mobility limitation cannot be resolved by the use of a walker or cane. The patients mobility limitation cannot be resolved by the use of a walker or cane. The patient has sufficient upper extremity function to safely propel the manual wheelchair in the home during a typical day. The patient has agreed to use the manual wheelchair that is provided in the home.

## 2019-01-15 NOTE — PHYSICAL THERAPY INITIAL EVALUATION ADULT - SPECIFY REASON(S)
Chart reviewed. Pt. currently on strict bedrest. PT evaluation on hold pending OOB orders as appropriate. Will follow.

## 2019-01-15 NOTE — PHYSICAL THERAPY INITIAL EVALUATION ADULT - PERTINENT HX OF CURRENT PROBLEM, REHAB EVAL
Patient is a 53y old  Female who presents with a chief complaint of R charcot foot Sx (14 Jan 2019 17:05)

## 2019-01-15 NOTE — PHYSICAL THERAPY INITIAL EVALUATION ADULT - LEVEL OF INDEPENDENCE: SIT/STAND, REHAB EVAL
From EOB; home care JANN Godoy and JANN Harris in room to discuss acquisition of w/c amd b/s commode for home, and how to personally obtain RW (pt already has Rollator at home) PT recommended use of RW and not Rollator due to NWB RLE for safety precautions and fall prevention due to Rollator having 4 wheels with potentially increased risk to fall. Pt verbalized understanding./supervision

## 2019-01-15 NOTE — PHYSICAL THERAPY INITIAL EVALUATION ADULT - LEVEL OF INDEPENDENCE: STAIR NEGOTIATION, REHAB EVAL
Pt declined performing stair negotiation stating she is entering home via ambulette assistance, however PT thoroughly verbally reviewed stair negotiation via bump up/down technique with demonstration in room using b/s chair and floor. PT educated pt on use of step stool and chair with arm rests and top of stairs to assist with standing up. Pt verbalized understanding, states she has friends and neightbors that can assist as needed.

## 2019-01-16 ENCOUNTER — TRANSCRIPTION ENCOUNTER (OUTPATIENT)
Age: 54
End: 2019-01-16

## 2019-01-16 LAB
ANION GAP SERPL CALC-SCNC: 15 MMOL/L — HIGH (ref 7–14)
BASOPHILS # BLD AUTO: 0.04 K/UL — SIGNIFICANT CHANGE UP (ref 0–0.2)
BASOPHILS NFR BLD AUTO: 0.5 % — SIGNIFICANT CHANGE UP (ref 0–1)
BUN SERPL-MCNC: 7 MG/DL — LOW (ref 10–20)
CALCIUM SERPL-MCNC: 8.7 MG/DL — SIGNIFICANT CHANGE UP (ref 8.5–10.1)
CHLORIDE SERPL-SCNC: 103 MMOL/L — SIGNIFICANT CHANGE UP (ref 98–110)
CO2 SERPL-SCNC: 27 MMOL/L — SIGNIFICANT CHANGE UP (ref 17–32)
CREAT SERPL-MCNC: 0.6 MG/DL — LOW (ref 0.7–1.5)
EOSINOPHIL # BLD AUTO: 0.47 K/UL — SIGNIFICANT CHANGE UP (ref 0–0.7)
EOSINOPHIL NFR BLD AUTO: 5.6 % — SIGNIFICANT CHANGE UP (ref 0–8)
GLUCOSE BLDC GLUCOMTR-MCNC: 100 MG/DL — HIGH (ref 70–99)
GLUCOSE BLDC GLUCOMTR-MCNC: 104 MG/DL — HIGH (ref 70–99)
GLUCOSE BLDC GLUCOMTR-MCNC: 116 MG/DL — HIGH (ref 70–99)
GLUCOSE BLDC GLUCOMTR-MCNC: 73 MG/DL — SIGNIFICANT CHANGE UP (ref 70–99)
GLUCOSE BLDC GLUCOMTR-MCNC: 99 MG/DL — SIGNIFICANT CHANGE UP (ref 70–99)
GLUCOSE SERPL-MCNC: 116 MG/DL — HIGH (ref 70–99)
HCT VFR BLD CALC: 33 % — LOW (ref 37–47)
HGB BLD-MCNC: 10.3 G/DL — LOW (ref 12–16)
IMM GRANULOCYTES NFR BLD AUTO: 0.4 % — HIGH (ref 0.1–0.3)
LYMPHOCYTES # BLD AUTO: 1.52 K/UL — SIGNIFICANT CHANGE UP (ref 1.2–3.4)
LYMPHOCYTES # BLD AUTO: 18 % — LOW (ref 20.5–51.1)
MAGNESIUM SERPL-MCNC: 1.9 MG/DL — SIGNIFICANT CHANGE UP (ref 1.8–2.4)
MCHC RBC-ENTMCNC: 29.4 PG — SIGNIFICANT CHANGE UP (ref 27–31)
MCHC RBC-ENTMCNC: 31.2 G/DL — LOW (ref 32–37)
MCV RBC AUTO: 94.3 FL — SIGNIFICANT CHANGE UP (ref 81–99)
MONOCYTES # BLD AUTO: 0.73 K/UL — HIGH (ref 0.1–0.6)
MONOCYTES NFR BLD AUTO: 8.7 % — SIGNIFICANT CHANGE UP (ref 1.7–9.3)
NEUTROPHILS # BLD AUTO: 5.64 K/UL — SIGNIFICANT CHANGE UP (ref 1.4–6.5)
NEUTROPHILS NFR BLD AUTO: 66.8 % — SIGNIFICANT CHANGE UP (ref 42.2–75.2)
NRBC # BLD: 0 /100 WBCS — SIGNIFICANT CHANGE UP (ref 0–0)
PLATELET # BLD AUTO: 243 K/UL — SIGNIFICANT CHANGE UP (ref 130–400)
POTASSIUM SERPL-MCNC: 4.9 MMOL/L — SIGNIFICANT CHANGE UP (ref 3.5–5)
POTASSIUM SERPL-SCNC: 4.9 MMOL/L — SIGNIFICANT CHANGE UP (ref 3.5–5)
RBC # BLD: 3.5 M/UL — LOW (ref 4.2–5.4)
RBC # FLD: 15.6 % — HIGH (ref 11.5–14.5)
SODIUM SERPL-SCNC: 145 MMOL/L — SIGNIFICANT CHANGE UP (ref 135–146)
WBC # BLD: 8.43 K/UL — SIGNIFICANT CHANGE UP (ref 4.8–10.8)
WBC # FLD AUTO: 8.43 K/UL — SIGNIFICANT CHANGE UP (ref 4.8–10.8)

## 2019-01-16 RX ADMIN — ATORVASTATIN CALCIUM 40 MILLIGRAM(S): 80 TABLET, FILM COATED ORAL at 21:16

## 2019-01-16 RX ADMIN — MORPHINE SULFATE 30 MILLIGRAM(S): 50 CAPSULE, EXTENDED RELEASE ORAL at 21:14

## 2019-01-16 RX ADMIN — Medication 10 UNIT(S): at 18:53

## 2019-01-16 RX ADMIN — MORPHINE SULFATE 30 MILLIGRAM(S): 50 CAPSULE, EXTENDED RELEASE ORAL at 06:39

## 2019-01-16 RX ADMIN — Medication 150 MILLIGRAM(S): at 13:36

## 2019-01-16 RX ADMIN — Medication 10 MILLIGRAM(S): at 13:36

## 2019-01-16 RX ADMIN — Medication 10 MILLIGRAM(S): at 06:40

## 2019-01-16 RX ADMIN — MORPHINE SULFATE 30 MILLIGRAM(S): 50 CAPSULE, EXTENDED RELEASE ORAL at 13:36

## 2019-01-16 RX ADMIN — ENOXAPARIN SODIUM 40 MILLIGRAM(S): 100 INJECTION SUBCUTANEOUS at 22:15

## 2019-01-16 RX ADMIN — Medication 100 MICROGRAM(S): at 06:39

## 2019-01-16 RX ADMIN — Medication 12.5 MILLIGRAM(S): at 06:39

## 2019-01-16 RX ADMIN — Medication 150 MILLIGRAM(S): at 21:16

## 2019-01-16 RX ADMIN — Medication 81 MILLIGRAM(S): at 11:40

## 2019-01-16 RX ADMIN — Medication 10 UNIT(S): at 08:31

## 2019-01-16 RX ADMIN — BUDESONIDE AND FORMOTEROL FUMARATE DIHYDRATE 2 PUFF(S): 160; 4.5 AEROSOL RESPIRATORY (INHALATION) at 08:34

## 2019-01-16 RX ADMIN — Medication 10 UNIT(S): at 12:27

## 2019-01-16 RX ADMIN — Medication 150 MILLIGRAM(S): at 06:39

## 2019-01-16 RX ADMIN — DULOXETINE HYDROCHLORIDE 60 MILLIGRAM(S): 30 CAPSULE, DELAYED RELEASE ORAL at 11:40

## 2019-01-16 RX ADMIN — Medication 10 MILLIGRAM(S): at 21:15

## 2019-01-16 RX ADMIN — PANTOPRAZOLE SODIUM 40 MILLIGRAM(S): 20 TABLET, DELAYED RELEASE ORAL at 06:41

## 2019-01-16 RX ADMIN — Medication 12.5 MILLIGRAM(S): at 18:53

## 2019-01-16 NOTE — PROGRESS NOTE ADULT - ASSESSMENT
52yo F with Past Medical History Dextrocardia, Chronic Back Pain/Sciatica, HTN, DMII, and Charcot Foot referred by her podiatrist for worsening Charcot Foot.  Status post surgical repair (1/11/19).    RLE Charcot Foot status post surgical repair: status post application of cast to the RLE.  Follow-up with Podiatry-Dr. Ortiz as outpatient for cast removal on 1/28/19.  Follow-up with physical therapy  Tachycardia: no further fevers reported.  Tachycardia has not resolved.  Her case was discussed with PGY-I; check CT Chest to exclude pulmonary embolism  Chronic Back Pain/Sciatica: continue Morphine ER 30mg q12 and Lyrica 150mg q8h  DMII: continue Lantus/Lispro and monitor FS with meals  Hypothyroidism: continue Synthroid  GI/DVT prophylaxis  Discharge home with services if CT Chest negative for PE

## 2019-01-16 NOTE — PROGRESS NOTE ADULT - ASSESSMENT
# R Charcot foot   - S/p uncomplicated reconstruction on Jan, 11th  - Podiatry following no further in patient intervention as per podiatry  - PT following  - pain management    #Fever, tachycardia  - fever has resolved  - tachycardia persists - EKG shows sinus tachycardia, d-dimer is negative, patient does not seem volume deplete, TSH WNL  - will send for CTA to rule out PE  - possibly pain related    #DM II  - Hold oral medications for now  - Will start basal bolus insulin    #COPD  - Not in exacerbation right now  - c/w inhalers as of now    Full code  DVT PPX: Lovenox  Diet: Carb consistent  Activity: OOB  CHG bath daily

## 2019-01-16 NOTE — PROGRESS NOTE ADULT - REASON FOR ADMISSION
R charcot foot Sx

## 2019-01-16 NOTE — DISCHARGE NOTE ADULT - MEDICATION SUMMARY - MEDICATIONS TO TAKE
I will START or STAY ON the medications listed below when I get home from the hospital:    Percocet 10/325 oral tablet  -- 1 tab(s) by mouth every 8 hours, As Needed  -- Indication: For Pain    aspirin 81 mg oral delayed release tablet  -- 1 tab(s) by mouth once a day  -- Indication: For Coronary artery disease prevention    ramipril 2.5 mg oral capsule  -- 1 cap(s) by mouth once a day  -- Indication: For Blood pressure    Lyrica 150 mg oral capsule  -- 1 cap(s) by mouth 3 times a day  -- Indication: For Anxiety    diazePAM 5 mg oral tablet  -- 1 tab(s) by mouth 2 times a day, As Needed  -- Indication: For Anxiety    DULoxetine 60 mg oral delayed release capsule  -- 1 cap(s) by mouth once a day  -- Indication: For Depression    Janumet 50 mg-1000 mg oral tablet  -- 1 tab(s) by mouth 2 times a day  -- Indication: For Diabetes    fexofenadine 180 mg oral tablet  -- 1 tab(s) by mouth once a day  -- Indication: For Breathing    atorvastatin 40 mg oral tablet  -- 1 tab(s) by mouth once a day  -- Indication: For Elevated cholesterol    ezetimibe 10 mg oral tablet  -- 1 tab(s) by mouth once a day  -- Indication: For Elevated cholesterol    hydrOXYzine hydrochloride 10 mg oral tablet  -- 2 tab(s) by mouth 3 times a day  -- Indication: For Allergy    metoprolol succinate 50 mg oral tablet, extended release  -- 1 tab(s) by mouth once a day  -- Indication: For Blood pressure    Ventolin HFA 90 mcg/inh inhalation aerosol  -- 2 puff(s) inhaled 4 times a day, As Needed  -- Indication: For Breathing    tiZANidine 4 mg oral tablet  -- 2 tab(s) by mouth every 8 hours  -- Indication: For Muscle relaxant    pantoprazole 40 mg oral delayed release tablet  -- 1 tab(s) by mouth once a day  -- Indication: For Heartburn    Qvar 80 mcg/inh inhalation aerosol  -- 1 puff(s) inhaled 2 times a day  -- Indication: For Breathing    levothyroxine 100 mcg (0.1 mg) oral capsule  -- 1 cap(s) by mouth once a day  -- Indication: For Hypothyroidism    trospium 20 mg oral tablet  -- 1 tab(s) by mouth 2 times a day  -- Indication: For Urinary incontinence    Myrbetriq 25 mg oral tablet, extended release  -- 1 tab(s) by mouth once a day  -- Indication: For Urinary incontinence I will START or STAY ON the medications listed below when I get home from the hospital:    aspirin 81 mg oral delayed release tablet  -- 1 tab(s) by mouth once a day  -- Indication: For Coronary artery disease prevention    Percocet 10/325 oral tablet  -- 1 tab(s) by mouth every 8 hours, As Needed MDD:30/975  -- Indication: For Pain    ramipril 2.5 mg oral capsule  -- 1 cap(s) by mouth once a day  -- Indication: For Blood pressure    Lyrica 150 mg oral capsule  -- 1 cap(s) by mouth 3 times a day  -- Indication: For Anxiety    diazePAM 5 mg oral tablet  -- 1 tab(s) by mouth 2 times a day, As Needed  -- Indication: For Anxiety    DULoxetine 60 mg oral delayed release capsule  -- 1 cap(s) by mouth once a day  -- Indication: For Depression    Janumet 50 mg-1000 mg oral tablet  -- 1 tab(s) by mouth 2 times a day  -- Indication: For Diabetes    fexofenadine 180 mg oral tablet  -- 1 tab(s) by mouth once a day  -- Indication: For Breathing    atorvastatin 40 mg oral tablet  -- 1 tab(s) by mouth once a day  -- Indication: For Elevated cholesterol    ezetimibe 10 mg oral tablet  -- 1 tab(s) by mouth once a day  -- Indication: For Elevated cholesterol    hydrOXYzine hydrochloride 10 mg oral tablet  -- 2 tab(s) by mouth 3 times a day  -- Indication: For Allergy    Metoprolol Tartrate 25 mg oral tablet  -- 1 tab(s) by mouth 2 times a day   -- It is very important that you take or use this exactly as directed.  Do not skip doses or discontinue unless directed by your doctor.  May cause drowsiness.  Alcohol may intensify this effect.  Use care when operating dangerous machinery.  Some non-prescription drugs may aggravate your condition.  Read all labels carefully.  If a warning appears, check with your doctor before taking.  Take with food or milk.  This drug may impair the ability to drive or operate machinery.  Use care until you become familiar with its effects.    -- Indication: For Blood pressure    Ventolin HFA 90 mcg/inh inhalation aerosol  -- 2 puff(s) inhaled 4 times a day, As Needed  -- Indication: For Breathing    tiZANidine 4 mg oral tablet  -- 2 tab(s) by mouth every 8 hours  -- Indication: For Muscle relaxant    pantoprazole 40 mg oral delayed release tablet  -- 1 tab(s) by mouth once a day  -- Indication: For Heartburn    Qvar 80 mcg/inh inhalation aerosol  -- 1 puff(s) inhaled 2 times a day  -- Indication: For Breathing    levothyroxine 100 mcg (0.1 mg) oral capsule  -- 1 cap(s) by mouth once a day  -- Indication: For Hypothyroidism    trospium 20 mg oral tablet  -- 1 tab(s) by mouth 2 times a day  -- Indication: For Urinary incontinence    Myrbetriq 25 mg oral tablet, extended release  -- 1 tab(s) by mouth once a day  -- Indication: For Urinary incontinence (0) swallows foods and liquids w/o difficulty

## 2019-01-16 NOTE — DISCHARGE NOTE ADULT - CARE PLAN
Principal Discharge DX:	Charcot foot due to diabetes mellitus  Goal:	Continue follow up  Assessment and plan of treatment:	You had surgery of your right foot and had a cast placed on your right foot. There is no need to remove the cast for 2 weeks. You will need to follow up as an outpatient with Dr. Anderson on 1/28/19 for removal of cast. You need to continue to avoid putting any weight on your right foot for the interval period.  Secondary Diagnosis:	Diabetes 1.5, managed as type 2  Goal:	Continue monitoring  Assessment and plan of treatment:	Please resume your regular diabetes Principal Discharge DX:	Charcot foot due to diabetes mellitus  Goal:	Continue follow up  Assessment and plan of treatment:	You had surgery of your right foot and had a cast placed on your right foot. There is no need to remove the cast for 2 weeks. You will need to follow up as an outpatient with Dr. Anderson on 1/28/19 for removal of cast. You need to continue to avoid putting any weight on your right foot for the interval period.  Secondary Diagnosis:	Diabetes 1.5, managed as type 2  Goal:	Continue monitoring  Assessment and plan of treatment:	Please resume your regular diabetes medication that you have been taking prior to admission. Please follow up with your primary care doctor within 2 weeks of discharge from the hospital. Principal Discharge DX:	Charcot foot due to diabetes mellitus  Goal:	Continue follow up  Assessment and plan of treatment:	You had surgery of your right foot and had a cast placed on your right foot. There is no need to remove the cast for 2 weeks. You will need to follow up as an outpatient with Dr. Anderson on 1/28/19 for removal of cast. You need to continue to avoid putting any weight on your right foot for the interval period.  Secondary Diagnosis:	Diabetes  Goal:	Continue monitoring  Assessment and plan of treatment:	Please resume your regular diabetes medication that you have been taking prior to admission. Please follow up with your primary care doctor within 2 weeks of discharge from the hospital. Principal Discharge DX:	Charcot foot due to diabetes mellitus  Goal:	Continue follow up  Assessment and plan of treatment:	You had surgery of your right foot and had a cast placed on your right foot. There is no need to remove the cast for 2 weeks. You will need to follow up as an outpatient with Dr. Anderson on 1/28/19 for removal of cast. You need to continue to avoid putting any weight on your right foot for the interval period.  Secondary Diagnosis:	Diabetes  Goal:	Continue monitoring  Assessment and plan of treatment:	Please resume your regular diabetes medication that you have been taking prior to admission. Please follow up with your primary care doctor within 2 weeks of discharge from the hospital.  Secondary Diagnosis:	Tachycardia  Goal:	Continue monitoring  Assessment and plan of treatment:	Your blood pressure was low and so your metoprolol dose was decreased however this caused elevation in your heart rate. We are discharging you with metoprolol 25 twice daily. Please make an appointment with your primary care doctor within 1 week of discharge to monitor your heart rate. Principal Discharge DX:	Charcot foot due to diabetes mellitus  Goal:	Continue follow up  Assessment and plan of treatment:	You had surgery of your right foot and had a cast placed on your right foot. There is no need to remove the cast for 2 weeks. You will need to follow up as an outpatient with Dr. Anderson on 1/28/19 for removal of cast. You need to continue to avoid putting any weight on your right foot for the interval period.  Secondary Diagnosis:	Diabetes  Goal:	Continue monitoring  Assessment and plan of treatment:	Please resume your regular diabetes medication that you have been taking prior to admission. Please follow up with your primary care doctor within 2 weeks of discharge from the hospital.  Secondary Diagnosis:	Tachycardia  Goal:	Continue monitoring  Assessment and plan of treatment:	Your blood pressure was low and so your metoprolol dose was decreased however this caused elevation in your heart rate. We are discharging you with metoprolol 25 twice daily. Please monitor how your BP and HR is on this dose and make an appointment with your cardiologist in 1 week to see if you need to then go back up to 50mg of metoprolol tartarate BID.   HR is partially high also d/t the anxiety. Patient thinks she will feel better on going home.

## 2019-01-16 NOTE — DISCHARGE NOTE ADULT - PATIENT PORTAL LINK FT
You can access the [facility] Patient Portal, offered by [facility], by registering with the following website: [email or web address]

## 2019-01-16 NOTE — DISCHARGE NOTE ADULT - ADDITIONAL INSTRUCTIONS
Please follow up with Dr. Anderson on 1/28/19. Please also follow up with your family doctor within 1 week of discharge from the hospital.

## 2019-01-16 NOTE — PROGRESS NOTE ADULT - SUBJECTIVE AND OBJECTIVE BOX
CALLY HARTMAN MRN-[id number]    Hospitalist Note  54yo F with Past Medical History Dextrocardia, Chronic Back Pain/Sciatica, HTN, DMII, and Charcot Foot referred by her podiatrist for worsening Charcot Foot.  Status post surgical repair (1/11/19).    Overnight events/Updates: Her post-operative course was complicated by fevers, which have since resolved off antibiotics, and persistent tachycardia.  The patient appears comfortable, and her pain is well controlled    Vital Signs Last 24 Hrs  T(C): 36.9 (16 Jan 2019 13:17), Max: 37.1 (16 Jan 2019 05:50)  T(F): 98.5 (16 Jan 2019 13:17), Max: 98.7 (16 Jan 2019 05:50)  HR: 106 (16 Jan 2019 13:17) (106 - 119)  BP: 108/59 (16 Jan 2019 13:17) (104/55 - 117/63)  BP(mean): --  RR: 19 (16 Jan 2019 13:17) (17 - 19)  SpO2: --    Physical Examination:  General: AAO x 3  HEENT: PERRLA, EOMI  CV= S1 & S2 appreciated, tachycardia  Lungs=CTA BL  Abdominal Examination= Obese, + BS, Soft, NT/ND  Extremity Examination= dressing to the right lower extremity    ROS: No chest pain, no shortness of breath.  All other systems reviewed and are within normal limits except for the complaints in the HPI.    MEDICATIONS  (STANDING):  aspirin enteric coated 81 milliGRAM(s) Oral daily  atorvastatin 40 milliGRAM(s) Oral at bedtime  buDESOnide  80 MICROgram(s)/formoterol 4.5 MICROgram(s) Inhaler 2 Puff(s) Inhalation two times a day  ceFAZolin   IVPB 2000 milliGRAM(s) IV Intermittent every 8 hours  chlorhexidine 4% Liquid 1 Application(s) Topical <User Schedule>  DULoxetine 60 milliGRAM(s) Oral daily  enoxaparin Injectable 40 milliGRAM(s) SubCutaneous every 24 hours  hydrOXYzine  Oral Tab/Cap - Peds 10 milliGRAM(s) Oral three times a day  influenza   Vaccine 0.5 milliLiter(s) IntraMuscular once  insulin glargine Injectable (LANTUS) 30 Unit(s) SubCutaneous at bedtime  insulin lispro (HumaLOG) corrective regimen sliding scale   SubCutaneous three times a day before meals  insulin lispro Injectable (HumaLOG) 10 Unit(s) SubCutaneous three times a day before meals  levothyroxine 100 MICROGram(s) Oral daily  metoprolol tartrate 12.5 milliGRAM(s) Oral two times a day  morphine ER Tablet 30 milliGRAM(s) Oral every 8 hours  nicotine -  14 mG/24Hr(s) Patch 1 patch Transdermal daily  pantoprazole    Tablet 40 milliGRAM(s) Oral before breakfast  pregabalin 150 milliGRAM(s) Oral three times a day    MEDICATIONS  (PRN):  ALBUTerol    90 MICROgram(s) HFA Inhaler 2 Puff(s) Inhalation every 6 hours PRN Bronchospasm  diazepam    Tablet 5 milliGRAM(s) Oral two times a day PRN anxiety  morphine  - Injectable 2 milliGRAM(s) IV Push every 3 hours PRN Severe Pain (7 - 10)  senna 2 Tablet(s) Oral at bedtime PRN Constipation                            10.3   8.43  )-----------( 243      ( 16 Jan 2019 06:25 )             33.0     01-16    145  |  103  |  7<L>  ----------------------------<  116<H>  4.9   |  27  |  0.6<L>    Ca    8.7      16 Jan 2019 06:25  Mg     1.9     01-16    TPro  5.9<L>  /  Alb  3.5  /  TBili  0.2  /  DBili  x   /  AST  56<H>  /  ALT  46<H>  /  AlkPhos  106  01-14      Case discussed with housestaff & family  JAN Trent [id number]

## 2019-01-16 NOTE — PROGRESS NOTE ADULT - PROVIDER SPECIALTY LIST ADULT
Hospitalist
Internal Medicine
Podiatry
Internal Medicine

## 2019-01-16 NOTE — DISCHARGE NOTE ADULT - NSFTFHOME1RD_GEN_ALL_CORE
Fall risk/Pain greater than 7 on scale of 10 on ambulation/Chest pain/weakness during/after ambulation greater than 20 feet

## 2019-01-16 NOTE — DISCHARGE NOTE ADULT - HOSPITAL COURSE
54yo F coming from home with PMHx DM II on oral meds, HTN, neuropathy, sciatica, sent by podiatrist Dr. Diaz for right charcot foot. Patient had surgery on 1/11/2019 and underwent: charcot joint repair, right foot with fusion of medial column, right 2nd met beam, right 4th met beam, and calcaneal cuboid articularion with tendo achilles lengthening. During surgery she had cast placed which was replaced on Jan 14th and will need to be taken off  weeks from now in podiatry office. Post op she had two episodes of fever however infectious work up was negative - normal WBC, CXR normal, UA normal, blood culture no growth. So antibiotics were not given and she has been symptom free for over 24 hrs. 52yo F coming from home with PMHx DM II on oral meds, HTN, neuropathy, sciatica, sent by podiatrist Dr. Diaz for right charcot foot. Patient had surgery on 1/11/2019 and underwent: charcot joint repair, right foot with fusion of medial column, right 2nd met beam, right 4th met beam, and calcaneal cuboid articularion with tendo achilles lengthening. During surgery she had cast placed which was replaced on Jan 14th and will need to be taken off  weeks from now in podiatry office. Post op she had two episodes of fever however infectious work up was negative - normal WBC, CXR normal, UA normal, blood culture no growth. So antibiotics were not given and she has been symptom free for over 24 hrs.     Attending Note:    Patient seen and examined independently. I personally had a face-to-face encounter with the patient, examined the patient myself and reviewed the plan of care with the housestaff. Agree with Resident's note but my note supersedes the resident's note in matters hereby listed.     S : No CP/SOB  Other ROS neg.    Corroborate with earlier mentioned exam.    Labs/Rad : Reviewed.       D/c to home.

## 2019-01-16 NOTE — PROGRESS NOTE ADULT - SUBJECTIVE AND OBJECTIVE BOX
SUBJECTIVE:    Patient is a 53y old Female who presents with a chief complaint of R charcot foot Sx (2019 15:19)    Stable, no acute events.    PAST MEDICAL & SURGICAL HISTORY  Dextrocardia  Chronic midline low back pain with bilateral sciatica  Peripheral neuralgia  Essential hypertension  Diabetes 1.5, managed as type 2  Charcot's arthropathy: R foot  H/O shoulder surgery: Right shoulder surgery   delivery delivered       ALLERGIES:  No Known Allergies    MEDICATIONS:  STANDING MEDICATIONS  aspirin enteric coated 81 milliGRAM(s) Oral daily  atorvastatin 40 milliGRAM(s) Oral at bedtime  buDESOnide  80 MICROgram(s)/formoterol 4.5 MICROgram(s) Inhaler 2 Puff(s) Inhalation two times a day  ceFAZolin   IVPB 2000 milliGRAM(s) IV Intermittent every 8 hours  chlorhexidine 4% Liquid 1 Application(s) Topical <User Schedule>  DULoxetine 60 milliGRAM(s) Oral daily  enoxaparin Injectable 40 milliGRAM(s) SubCutaneous every 24 hours  hydrOXYzine  Oral Tab/Cap - Peds 10 milliGRAM(s) Oral three times a day  influenza   Vaccine 0.5 milliLiter(s) IntraMuscular once  insulin glargine Injectable (LANTUS) 30 Unit(s) SubCutaneous at bedtime  insulin lispro (HumaLOG) corrective regimen sliding scale   SubCutaneous three times a day before meals  insulin lispro Injectable (HumaLOG) 10 Unit(s) SubCutaneous three times a day before meals  levothyroxine 100 MICROGram(s) Oral daily  metoprolol tartrate 12.5 milliGRAM(s) Oral two times a day  morphine ER Tablet 30 milliGRAM(s) Oral every 8 hours  nicotine -  14 mG/24Hr(s) Patch 1 patch Transdermal daily  pantoprazole    Tablet 40 milliGRAM(s) Oral before breakfast  pregabalin 150 milliGRAM(s) Oral three times a day    PRN MEDICATIONS  ALBUTerol    90 MICROgram(s) HFA Inhaler 2 Puff(s) Inhalation every 6 hours PRN  diazepam    Tablet 5 milliGRAM(s) Oral two times a day PRN  morphine  - Injectable 2 milliGRAM(s) IV Push every 3 hours PRN  senna 2 Tablet(s) Oral at bedtime PRN    VITALS:   T(F): 98.5  HR: 106  BP: 108/59  RR: 19  SpO2: --    LABS:                        10.3   8.43  )-----------( 243      ( 2019 06:25 )             33.0         145  |  103  |  7<L>  ----------------------------<  116<H>  4.9   |  27  |  0.6<L>    Ca    8.7      2019 06:25  Mg     1.9             Urinalysis Basic - ( 15 Ty 2019 01:00 )    Color: Yellow / Appearance: Clear / S.010 / pH: x  Gluc: x / Ketone: Negative  / Bili: Negative / Urobili: 0.2 mg/dL   Blood: x / Protein: Negative mg/dL / Nitrite: Negative   Leuk Esterase: Negative / RBC: x / WBC x   Sq Epi: x / Non Sq Epi: x / Bacteria: x            Culture - Urine (collected 15 Ty 2019 01:00)  Source: .Urine Catheterized  Preliminary Report (2019 11:29):    10,000 - 49,000 CFU/mL Morganella morganii    Culture - Blood (collected 2019 16:00)  Source: .Blood Blood  Preliminary Report (2019 01:01):    No growth to date.              01-15-19 @ 07:19 @ 07:00  --------------------------------------------------------  IN: 0 mL / OUT: 400 mL / NET: -400 mL    19 @ 07:01  19 @ 18:49  --------------------------------------------------------  IN: 0 mL / OUT: 250 mL / NET: -250 mL          PHYSICAL EXAM:  GEN: NAD, comfortable  LUNGS: CTAB, no w/r/r  HEART: regular ryhthym, irregular rate, no m/r/g  ABD: soft, NT/ND, +BS  EXT: right  NEURO: AAOx3

## 2019-01-16 NOTE — DISCHARGE NOTE ADULT - PLAN OF CARE
Continue follow up You had surgery of your right foot and had a cast placed on your right foot. There is no need to remove the cast for 2 weeks. You will need to follow up as an outpatient with Dr. Anderson on 1/28/19 for removal of cast. You need to continue to avoid putting any weight on your right foot for the interval period. Continue monitoring Please resume your regular diabetes Please resume your regular diabetes medication that you have been taking prior to admission. Please follow up with your primary care doctor within 2 weeks of discharge from the hospital. Your blood pressure was low and so your metoprolol dose was decreased however this caused elevation in your heart rate. We are discharging you with metoprolol 25 twice daily. Please make an appointment with your primary care doctor within 1 week of discharge to monitor your heart rate. Your blood pressure was low and so your metoprolol dose was decreased however this caused elevation in your heart rate. We are discharging you with metoprolol 25 twice daily. Please monitor how your BP and HR is on this dose and make an appointment with your cardiologist in 1 week to see if you need to then go back up to 50mg of metoprolol tartarate BID.   HR is partially high also d/t the anxiety. Patient thinks she will feel better on going home.

## 2019-01-17 VITALS — HEART RATE: 105 BPM | SYSTOLIC BLOOD PRESSURE: 121 MMHG | DIASTOLIC BLOOD PRESSURE: 59 MMHG

## 2019-01-17 LAB
-  AMIKACIN: SIGNIFICANT CHANGE UP
-  AMOXICILLIN/CLAVULANIC ACID: SIGNIFICANT CHANGE UP
-  AMPICILLIN/SULBACTAM: SIGNIFICANT CHANGE UP
-  AMPICILLIN: SIGNIFICANT CHANGE UP
-  AZTREONAM: SIGNIFICANT CHANGE UP
-  CEFAZOLIN: SIGNIFICANT CHANGE UP
-  CEFEPIME: SIGNIFICANT CHANGE UP
-  CEFOXITIN: SIGNIFICANT CHANGE UP
-  CEFTRIAXONE: SIGNIFICANT CHANGE UP
-  CIPROFLOXACIN: SIGNIFICANT CHANGE UP
-  ERTAPENEM: SIGNIFICANT CHANGE UP
-  GENTAMICIN: SIGNIFICANT CHANGE UP
-  IMIPENEM: SIGNIFICANT CHANGE UP
-  LEVOFLOXACIN: SIGNIFICANT CHANGE UP
-  MEROPENEM: SIGNIFICANT CHANGE UP
-  NITROFURANTOIN: SIGNIFICANT CHANGE UP
-  PIPERACILLIN/TAZOBACTAM: SIGNIFICANT CHANGE UP
-  TIGECYCLINE: SIGNIFICANT CHANGE UP
-  TOBRAMYCIN: SIGNIFICANT CHANGE UP
-  TRIMETHOPRIM/SULFAMETHOXAZOLE: SIGNIFICANT CHANGE UP
ANION GAP SERPL CALC-SCNC: 11 MMOL/L — SIGNIFICANT CHANGE UP (ref 7–14)
BASOPHILS # BLD AUTO: 0.06 K/UL — SIGNIFICANT CHANGE UP (ref 0–0.2)
BASOPHILS NFR BLD AUTO: 0.6 % — SIGNIFICANT CHANGE UP (ref 0–1)
BUN SERPL-MCNC: 11 MG/DL — SIGNIFICANT CHANGE UP (ref 10–20)
CALCIUM SERPL-MCNC: 9.5 MG/DL — SIGNIFICANT CHANGE UP (ref 8.5–10.1)
CHLORIDE SERPL-SCNC: 96 MMOL/L — LOW (ref 98–110)
CO2 SERPL-SCNC: 31 MMOL/L — SIGNIFICANT CHANGE UP (ref 17–32)
CREAT SERPL-MCNC: 0.7 MG/DL — SIGNIFICANT CHANGE UP (ref 0.7–1.5)
CULTURE RESULTS: SIGNIFICANT CHANGE UP
EOSINOPHIL # BLD AUTO: 0.57 K/UL — SIGNIFICANT CHANGE UP (ref 0–0.7)
EOSINOPHIL NFR BLD AUTO: 5.5 % — SIGNIFICANT CHANGE UP (ref 0–8)
GLUCOSE BLDC GLUCOMTR-MCNC: 189 MG/DL — HIGH (ref 70–99)
GLUCOSE BLDC GLUCOMTR-MCNC: 74 MG/DL — SIGNIFICANT CHANGE UP (ref 70–99)
GLUCOSE SERPL-MCNC: 119 MG/DL — HIGH (ref 70–99)
HCT VFR BLD CALC: 34.2 % — LOW (ref 37–47)
HGB BLD-MCNC: 10.3 G/DL — LOW (ref 12–16)
IMM GRANULOCYTES NFR BLD AUTO: 0.5 % — HIGH (ref 0.1–0.3)
LYMPHOCYTES # BLD AUTO: 1.78 K/UL — SIGNIFICANT CHANGE UP (ref 1.2–3.4)
LYMPHOCYTES # BLD AUTO: 17.3 % — LOW (ref 20.5–51.1)
MAGNESIUM SERPL-MCNC: 2.4 MG/DL — SIGNIFICANT CHANGE UP (ref 1.8–2.4)
MCHC RBC-ENTMCNC: 28.3 PG — SIGNIFICANT CHANGE UP (ref 27–31)
MCHC RBC-ENTMCNC: 30.1 G/DL — LOW (ref 32–37)
MCV RBC AUTO: 94 FL — SIGNIFICANT CHANGE UP (ref 81–99)
METHOD TYPE: SIGNIFICANT CHANGE UP
MONOCYTES # BLD AUTO: 0.89 K/UL — HIGH (ref 0.1–0.6)
MONOCYTES NFR BLD AUTO: 8.6 % — SIGNIFICANT CHANGE UP (ref 1.7–9.3)
NEUTROPHILS # BLD AUTO: 6.95 K/UL — HIGH (ref 1.4–6.5)
NEUTROPHILS NFR BLD AUTO: 67.5 % — SIGNIFICANT CHANGE UP (ref 42.2–75.2)
NRBC # BLD: 0 /100 WBCS — SIGNIFICANT CHANGE UP (ref 0–0)
ORGANISM # SPEC MICROSCOPIC CNT: SIGNIFICANT CHANGE UP
ORGANISM # SPEC MICROSCOPIC CNT: SIGNIFICANT CHANGE UP
PLATELET # BLD AUTO: 268 K/UL — SIGNIFICANT CHANGE UP (ref 130–400)
POTASSIUM SERPL-MCNC: 5 MMOL/L — SIGNIFICANT CHANGE UP (ref 3.5–5)
POTASSIUM SERPL-SCNC: 5 MMOL/L — SIGNIFICANT CHANGE UP (ref 3.5–5)
RBC # BLD: 3.64 M/UL — LOW (ref 4.2–5.4)
RBC # FLD: 15.1 % — HIGH (ref 11.5–14.5)
SODIUM SERPL-SCNC: 138 MMOL/L — SIGNIFICANT CHANGE UP (ref 135–146)
SPECIMEN SOURCE: SIGNIFICANT CHANGE UP
WBC # BLD: 10.3 K/UL — SIGNIFICANT CHANGE UP (ref 4.8–10.8)
WBC # FLD AUTO: 10.3 K/UL — SIGNIFICANT CHANGE UP (ref 4.8–10.8)

## 2019-01-17 RX ORDER — CHLORHEXIDINE GLUCONATE 213 G/1000ML
1 SOLUTION TOPICAL
Qty: 0 | Refills: 0 | Status: DISCONTINUED | OUTPATIENT
Start: 2019-01-17 | End: 2019-01-17

## 2019-01-17 RX ORDER — METOPROLOL TARTRATE 50 MG
1 TABLET ORAL
Qty: 0 | Refills: 0 | COMMUNITY

## 2019-01-17 RX ORDER — METOPROLOL TARTRATE 50 MG
1 TABLET ORAL
Qty: 60 | Refills: 0 | OUTPATIENT
Start: 2019-01-17 | End: 2019-02-15

## 2019-01-17 RX ADMIN — Medication 10 MILLIGRAM(S): at 16:07

## 2019-01-17 RX ADMIN — Medication 100 MICROGRAM(S): at 05:25

## 2019-01-17 RX ADMIN — MORPHINE SULFATE 30 MILLIGRAM(S): 50 CAPSULE, EXTENDED RELEASE ORAL at 05:25

## 2019-01-17 RX ADMIN — PANTOPRAZOLE SODIUM 40 MILLIGRAM(S): 20 TABLET, DELAYED RELEASE ORAL at 05:25

## 2019-01-17 RX ADMIN — MORPHINE SULFATE 30 MILLIGRAM(S): 50 CAPSULE, EXTENDED RELEASE ORAL at 16:06

## 2019-01-17 RX ADMIN — Medication 150 MILLIGRAM(S): at 16:09

## 2019-01-17 RX ADMIN — Medication 10 MILLIGRAM(S): at 05:25

## 2019-01-17 RX ADMIN — DULOXETINE HYDROCHLORIDE 60 MILLIGRAM(S): 30 CAPSULE, DELAYED RELEASE ORAL at 12:22

## 2019-01-17 RX ADMIN — BUDESONIDE AND FORMOTEROL FUMARATE DIHYDRATE 2 PUFF(S): 160; 4.5 AEROSOL RESPIRATORY (INHALATION) at 08:21

## 2019-01-17 RX ADMIN — Medication 81 MILLIGRAM(S): at 12:23

## 2019-01-17 RX ADMIN — Medication 10 UNIT(S): at 08:17

## 2019-01-17 RX ADMIN — Medication 1: at 08:18

## 2019-01-17 RX ADMIN — Medication 150 MILLIGRAM(S): at 06:29

## 2019-01-17 NOTE — DIETITIAN INITIAL EVALUATION ADULT. - SOURCE
Pt reports stable appetite and PO intake, typically consumes 1 large meal daily. Reports this is typical of pt lifestyle, however, pt w/ 7% wt loss within 3-6 months- reports likely d/t diabetes dx or change of lifestyle (pt stopped working, at home more often), exact etiology unknown. Reports taking multivit/min at home. NKFA. Reports last BM 1/11- not constipated, typical for pt pattern. Pt reports sometimes w/ trouble swallowing. Will discuss w/ LIP. Pt w/ R foot wound as pt s/p surgery. BMI: 33.3. IBW: 47.7kg./patient

## 2019-01-17 NOTE — DIETITIAN INITIAL EVALUATION ADULT. - ENERGY NEEDS
Energy needs: 1350-1490kcal (MSJx1.0-1.1AF) pt obese, pt w/ mild PCM- wt gain not desirable  Protein needs: 57-72g (1.2-1.5g/kgIBW) pt w/ mild PCM, pt obese  Fluid needs: 1 ml/kcal

## 2019-01-17 NOTE — DIETITIAN INITIAL EVALUATION ADULT. - NS AS NUTRI INTERV MEALS SNACK
Cotninue w/ carb consistent diet modifier. Discussed importance of eating more than 1 meal daily- pt denies need for PO supplement

## 2019-01-17 NOTE — CHART NOTE - NSCHARTNOTEFT_GEN_A_CORE
Upon Nutritional Assessment by the Registered Dietitian your patient was determined to meet criteria / has evidence of the following diagnosis/diagnoses:          [x]  Mild Protein Calorie Malnutrition        [ ]  Moderate Protein Calorie Malnutrition        [ ] Severe Protein Calorie Malnutrition        [ ] Unspecified Protein Calorie Malnutrition        [ ] Underweight / BMI <19        [ ] Morbid Obesity / BMI > 40    Nutrient: Malnutrition; Mild PCM in context of chronic illness?	     Etiology: unknown etiology- pt reports possibly d/t new DM dx and change of lifestyle	     Signs/Symptoms: pt w/ 7% wt loss in 3-6 months, moderate temple wasting	     Findings as based on:  •  Comprehensive nutrition assessment and consultation XX  •  Calorie counts (nutrient intake analysis)  •  Food acceptance and intake status from observations by staff  •  Follow up  •  Patient education  •  Intervention secondary to interdisciplinary rounds  •   concerns      Treatment:    The following diet has been recommended: Continue w/ carb consistent diet modifier. Consider multivit/min as pt w/ PO of 1 meal daily for 1+ years. Pt denies need for PO supplement- RD discussed importance of eating more than 1 meal daily.       PROVIDER Section:     By signing this assessment you are acknowledging and agree with the diagnosis/diagnoses assigned by the Registered Dietitian    Comments:

## 2019-01-17 NOTE — DIETITIAN INITIAL EVALUATION ADULT. - PERTINENT MEDS FT
lovenox, ancef, morphine, lantus, humalog sliding scale, humalog injectable, lopressor, albuterol, lipitor, synthroid, protonix,

## 2019-01-17 NOTE — DIETITIAN INITIAL EVALUATION ADULT. - MD RECOMMEND
Continue w/ carb consistent diet modifier. Consider multivit/min as pt consumed approximately 1 meal daily

## 2019-01-17 NOTE — DIETITIAN INITIAL EVALUATION ADULT. - OTHER INFO
Pt presents w/ chief complaint of R charcot foot sx- s/p uncomplicated reconstruction 1/11. Pt w/ DM1.5, treated as type 2.

## 2019-01-20 LAB
CULTURE RESULTS: SIGNIFICANT CHANGE UP
SPECIMEN SOURCE: SIGNIFICANT CHANGE UP

## 2019-01-28 PROBLEM — M14.60 CHARCOT'S JOINT, UNSPECIFIED SITE: Chronic | Status: ACTIVE | Noted: 2019-01-10

## 2019-01-28 PROBLEM — Q24.0 DEXTROCARDIA: Chronic | Status: ACTIVE | Noted: 2019-01-10

## 2019-01-28 PROBLEM — M79.2 NEURALGIA AND NEURITIS, UNSPECIFIED: Chronic | Status: ACTIVE | Noted: 2019-01-10

## 2019-01-28 PROBLEM — E10.9 TYPE 1 DIABETES MELLITUS WITHOUT COMPLICATIONS: Chronic | Status: ACTIVE | Noted: 2019-01-10

## 2019-01-28 PROBLEM — I10 ESSENTIAL (PRIMARY) HYPERTENSION: Chronic | Status: ACTIVE | Noted: 2019-01-10

## 2019-01-28 PROBLEM — M54.41 LUMBAGO WITH SCIATICA, RIGHT SIDE: Chronic | Status: ACTIVE | Noted: 2019-01-10

## 2019-01-30 ENCOUNTER — INPATIENT (INPATIENT)
Facility: HOSPITAL | Age: 54
LOS: 19 days | Discharge: HOME HEALTH | End: 2019-02-19
Attending: HOSPITALIST | Admitting: HOSPITALIST
Payer: MEDICARE

## 2019-01-30 VITALS
HEART RATE: 75 BPM | DIASTOLIC BLOOD PRESSURE: 85 MMHG | RESPIRATION RATE: 18 BRPM | SYSTOLIC BLOOD PRESSURE: 115 MMHG | TEMPERATURE: 97 F | OXYGEN SATURATION: 95 %

## 2019-01-30 DIAGNOSIS — G89.29 OTHER CHRONIC PAIN: ICD-10-CM

## 2019-01-30 DIAGNOSIS — E11.42 TYPE 2 DIABETES MELLITUS WITH DIABETIC POLYNEUROPATHY: ICD-10-CM

## 2019-01-30 DIAGNOSIS — E11.621 TYPE 2 DIABETES MELLITUS WITH FOOT ULCER: ICD-10-CM

## 2019-01-30 DIAGNOSIS — B95.61 METHICILLIN SUSCEPTIBLE STAPHYLOCOCCUS AUREUS INFECTION AS THE CAUSE OF DISEASES CLASSIFIED ELSEWHERE: ICD-10-CM

## 2019-01-30 DIAGNOSIS — F17.210 NICOTINE DEPENDENCE, CIGARETTES, UNCOMPLICATED: ICD-10-CM

## 2019-01-30 DIAGNOSIS — Z79.84 LONG TERM (CURRENT) USE OF ORAL HYPOGLYCEMIC DRUGS: ICD-10-CM

## 2019-01-30 DIAGNOSIS — L97.429 NON-PRESSURE CHRONIC ULCER OF LEFT HEEL AND MIDFOOT WITH UNSPECIFIED SEVERITY: ICD-10-CM

## 2019-01-30 DIAGNOSIS — T81.49XA INFECTION FOLLOWING A PROCEDURE, OTHER SURGICAL SITE, INITIAL ENCOUNTER: ICD-10-CM

## 2019-01-30 DIAGNOSIS — Z79.891 LONG TERM (CURRENT) USE OF OPIATE ANALGESIC: ICD-10-CM
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
## 2019-01-30 DIAGNOSIS — L29.9 PRURITUS, UNSPECIFIED: ICD-10-CM

## 2019-01-30 DIAGNOSIS — M54.31 SCIATICA, RIGHT SIDE: ICD-10-CM

## 2019-01-30 DIAGNOSIS — E78.5 HYPERLIPIDEMIA, UNSPECIFIED: ICD-10-CM

## 2019-01-30 DIAGNOSIS — E03.9 HYPOTHYROIDISM, UNSPECIFIED: ICD-10-CM

## 2019-01-30 DIAGNOSIS — M54.32 SCIATICA, LEFT SIDE: ICD-10-CM

## 2019-01-30 DIAGNOSIS — Z71.6 TOBACCO ABUSE COUNSELING: ICD-10-CM

## 2019-01-30 DIAGNOSIS — T81.31XA DISRUPTION OF EXTERNAL OPERATION (SURGICAL) WOUND, NOT ELSEWHERE CLASSIFIED, INITIAL ENCOUNTER: ICD-10-CM

## 2019-01-30 DIAGNOSIS — Z79.82 LONG TERM (CURRENT) USE OF ASPIRIN: ICD-10-CM

## 2019-01-30 DIAGNOSIS — E11.610 TYPE 2 DIABETES MELLITUS WITH DIABETIC NEUROPATHIC ARTHROPATHY: Chronic | ICD-10-CM

## 2019-01-30 DIAGNOSIS — E11.610 TYPE 2 DIABETES MELLITUS WITH DIABETIC NEUROPATHIC ARTHROPATHY: ICD-10-CM

## 2019-01-30 DIAGNOSIS — I25.10 ATHEROSCLEROTIC HEART DISEASE OF NATIVE CORONARY ARTERY WITHOUT ANGINA PECTORIS: ICD-10-CM

## 2019-01-30 DIAGNOSIS — M71.22 SYNOVIAL CYST OF POPLITEAL SPACE [BAKER], LEFT KNEE: ICD-10-CM

## 2019-01-30 DIAGNOSIS — J44.9 CHRONIC OBSTRUCTIVE PULMONARY DISEASE, UNSPECIFIED: ICD-10-CM

## 2019-01-30 DIAGNOSIS — I80.8 PHLEBITIS AND THROMBOPHLEBITIS OF OTHER SITES: ICD-10-CM

## 2019-01-30 DIAGNOSIS — E11.40 TYPE 2 DIABETES MELLITUS WITH DIABETIC NEUROPATHY, UNSPECIFIED: ICD-10-CM

## 2019-01-30 DIAGNOSIS — M54.5 LOW BACK PAIN: ICD-10-CM

## 2019-01-30 DIAGNOSIS — I10 ESSENTIAL (PRIMARY) HYPERTENSION: ICD-10-CM

## 2019-01-30 DIAGNOSIS — Z98.890 OTHER SPECIFIED POSTPROCEDURAL STATES: Chronic | ICD-10-CM

## 2019-01-30 DIAGNOSIS — Z28.21 IMMUNIZATION NOT CARRIED OUT BECAUSE OF PATIENT REFUSAL: ICD-10-CM

## 2019-01-30 LAB
ANION GAP SERPL CALC-SCNC: 17 MMOL/L — HIGH (ref 7–14)
BASOPHILS # BLD AUTO: 0.06 K/UL — SIGNIFICANT CHANGE UP (ref 0–0.2)
BASOPHILS NFR BLD AUTO: 0.6 % — SIGNIFICANT CHANGE UP (ref 0–1)
BUN SERPL-MCNC: 6 MG/DL — LOW (ref 10–20)
CALCIUM SERPL-MCNC: 9 MG/DL — SIGNIFICANT CHANGE UP (ref 8.5–10.1)
CHLORIDE SERPL-SCNC: 87 MMOL/L — LOW (ref 98–110)
CO2 SERPL-SCNC: 27 MMOL/L — SIGNIFICANT CHANGE UP (ref 17–32)
CREAT SERPL-MCNC: 1 MG/DL — SIGNIFICANT CHANGE UP (ref 0.7–1.5)
EOSINOPHIL # BLD AUTO: 0.56 K/UL — SIGNIFICANT CHANGE UP (ref 0–0.7)
EOSINOPHIL NFR BLD AUTO: 5.7 % — SIGNIFICANT CHANGE UP (ref 0–8)
GLUCOSE SERPL-MCNC: 73 MG/DL — SIGNIFICANT CHANGE UP (ref 70–99)
HCT VFR BLD CALC: 36.8 % — LOW (ref 37–47)
HGB BLD-MCNC: 11.7 G/DL — LOW (ref 12–16)
IMM GRANULOCYTES NFR BLD AUTO: 0.6 % — HIGH (ref 0.1–0.3)
LACTATE SERPL-SCNC: 1.9 MMOL/L — SIGNIFICANT CHANGE UP (ref 0.5–2.2)
LYMPHOCYTES # BLD AUTO: 1.42 K/UL — SIGNIFICANT CHANGE UP (ref 1.2–3.4)
LYMPHOCYTES # BLD AUTO: 14.4 % — LOW (ref 20.5–51.1)
MCHC RBC-ENTMCNC: 27.5 PG — SIGNIFICANT CHANGE UP (ref 27–31)
MCHC RBC-ENTMCNC: 31.8 G/DL — LOW (ref 32–37)
MCV RBC AUTO: 86.6 FL — SIGNIFICANT CHANGE UP (ref 81–99)
MONOCYTES # BLD AUTO: 0.61 K/UL — HIGH (ref 0.1–0.6)
MONOCYTES NFR BLD AUTO: 6.2 % — SIGNIFICANT CHANGE UP (ref 1.7–9.3)
NEUTROPHILS # BLD AUTO: 7.16 K/UL — HIGH (ref 1.4–6.5)
NEUTROPHILS NFR BLD AUTO: 72.5 % — SIGNIFICANT CHANGE UP (ref 42.2–75.2)
NRBC # BLD: 0 /100 WBCS — SIGNIFICANT CHANGE UP (ref 0–0)
PLATELET # BLD AUTO: 407 K/UL — HIGH (ref 130–400)
POTASSIUM SERPL-MCNC: 4.8 MMOL/L — SIGNIFICANT CHANGE UP (ref 3.5–5)
POTASSIUM SERPL-SCNC: 4.8 MMOL/L — SIGNIFICANT CHANGE UP (ref 3.5–5)
RBC # BLD: 4.25 M/UL — SIGNIFICANT CHANGE UP (ref 4.2–5.4)
RBC # FLD: 15.7 % — HIGH (ref 11.5–14.5)
SODIUM SERPL-SCNC: 131 MMOL/L — LOW (ref 135–146)
SURGICAL PATHOLOGY STUDY: SIGNIFICANT CHANGE UP
WBC # BLD: 9.87 K/UL — SIGNIFICANT CHANGE UP (ref 4.8–10.8)
WBC # FLD AUTO: 9.87 K/UL — SIGNIFICANT CHANGE UP (ref 4.8–10.8)

## 2019-01-30 RX ORDER — METOPROLOL TARTRATE 50 MG
25 TABLET ORAL
Qty: 0 | Refills: 0 | Status: DISCONTINUED | OUTPATIENT
Start: 2019-01-30 | End: 2019-02-01

## 2019-01-30 RX ORDER — PIPERACILLIN AND TAZOBACTAM 4; .5 G/20ML; G/20ML
4.5 INJECTION, POWDER, LYOPHILIZED, FOR SOLUTION INTRAVENOUS ONCE
Qty: 0 | Refills: 0 | Status: COMPLETED | OUTPATIENT
Start: 2019-01-30 | End: 2019-01-30

## 2019-01-30 RX ORDER — OXYCODONE AND ACETAMINOPHEN 5; 325 MG/1; MG/1
1 TABLET ORAL ONCE
Qty: 0 | Refills: 0 | Status: DISCONTINUED | OUTPATIENT
Start: 2019-01-30 | End: 2019-01-30

## 2019-01-30 RX ORDER — VANCOMYCIN HCL 1 G
1250 VIAL (EA) INTRAVENOUS EVERY 12 HOURS
Qty: 0 | Refills: 0 | Status: DISCONTINUED | OUTPATIENT
Start: 2019-01-31 | End: 2019-02-03

## 2019-01-30 RX ORDER — LORATADINE 10 MG/1
10 TABLET ORAL DAILY
Qty: 0 | Refills: 0 | Status: DISCONTINUED | OUTPATIENT
Start: 2019-01-30 | End: 2019-02-08

## 2019-01-30 RX ORDER — DULOXETINE HYDROCHLORIDE 30 MG/1
60 CAPSULE, DELAYED RELEASE ORAL DAILY
Qty: 0 | Refills: 0 | Status: DISCONTINUED | OUTPATIENT
Start: 2019-01-30 | End: 2019-02-08

## 2019-01-30 RX ORDER — ALBUTEROL 90 UG/1
2 AEROSOL, METERED ORAL EVERY 6 HOURS
Qty: 0 | Refills: 0 | Status: DISCONTINUED | OUTPATIENT
Start: 2019-01-30 | End: 2019-02-08

## 2019-01-30 RX ORDER — DIAZEPAM 5 MG
5 TABLET ORAL
Qty: 0 | Refills: 0 | Status: DISCONTINUED | OUTPATIENT
Start: 2019-01-30 | End: 2019-01-30

## 2019-01-30 RX ORDER — BUDESONIDE AND FORMOTEROL FUMARATE DIHYDRATE 160; 4.5 UG/1; UG/1
2 AEROSOL RESPIRATORY (INHALATION)
Qty: 0 | Refills: 0 | Status: DISCONTINUED | OUTPATIENT
Start: 2019-01-30 | End: 2019-02-08

## 2019-01-30 RX ORDER — ASPIRIN/CALCIUM CARB/MAGNESIUM 324 MG
81 TABLET ORAL DAILY
Qty: 0 | Refills: 0 | Status: DISCONTINUED | OUTPATIENT
Start: 2019-01-30 | End: 2019-02-08

## 2019-01-30 RX ORDER — LISINOPRIL 2.5 MG/1
10 TABLET ORAL DAILY
Qty: 0 | Refills: 0 | Status: DISCONTINUED | OUTPATIENT
Start: 2019-01-30 | End: 2019-02-01

## 2019-01-30 RX ORDER — PANTOPRAZOLE SODIUM 20 MG/1
40 TABLET, DELAYED RELEASE ORAL
Qty: 0 | Refills: 0 | Status: DISCONTINUED | OUTPATIENT
Start: 2019-01-30 | End: 2019-02-08

## 2019-01-30 RX ORDER — LEVOTHYROXINE SODIUM 125 MCG
100 TABLET ORAL DAILY
Qty: 0 | Refills: 0 | Status: DISCONTINUED | OUTPATIENT
Start: 2019-01-30 | End: 2019-02-08

## 2019-01-30 RX ORDER — OXYBUTYNIN CHLORIDE 5 MG
5 TABLET ORAL
Qty: 0 | Refills: 0 | Status: DISCONTINUED | OUTPATIENT
Start: 2019-01-30 | End: 2019-02-08

## 2019-01-30 RX ORDER — CHLORHEXIDINE GLUCONATE 213 G/1000ML
1 SOLUTION TOPICAL
Qty: 0 | Refills: 0 | Status: DISCONTINUED | OUTPATIENT
Start: 2019-01-30 | End: 2019-02-08

## 2019-01-30 RX ORDER — OXYCODONE AND ACETAMINOPHEN 5; 325 MG/1; MG/1
2 TABLET ORAL EVERY 8 HOURS
Qty: 0 | Refills: 0 | Status: DISCONTINUED | OUTPATIENT
Start: 2019-01-30 | End: 2019-01-31

## 2019-01-30 RX ORDER — ATORVASTATIN CALCIUM 80 MG/1
40 TABLET, FILM COATED ORAL AT BEDTIME
Qty: 0 | Refills: 0 | Status: DISCONTINUED | OUTPATIENT
Start: 2019-01-30 | End: 2019-02-08

## 2019-01-30 RX ORDER — SODIUM CHLORIDE 9 MG/ML
3 INJECTION INTRAMUSCULAR; INTRAVENOUS; SUBCUTANEOUS EVERY 8 HOURS
Qty: 0 | Refills: 0 | Status: DISCONTINUED | OUTPATIENT
Start: 2019-01-30 | End: 2019-02-08

## 2019-01-30 RX ORDER — HYDROXYZINE HCL 10 MG
10 TABLET ORAL THREE TIMES A DAY
Qty: 0 | Refills: 0 | Status: DISCONTINUED | OUTPATIENT
Start: 2019-01-30 | End: 2019-02-08

## 2019-01-30 RX ORDER — ENOXAPARIN SODIUM 100 MG/ML
40 INJECTION SUBCUTANEOUS DAILY
Qty: 0 | Refills: 0 | Status: DISCONTINUED | OUTPATIENT
Start: 2019-01-30 | End: 2019-02-08

## 2019-01-30 RX ORDER — VANCOMYCIN HCL 1 G
1000 VIAL (EA) INTRAVENOUS ONCE
Qty: 0 | Refills: 0 | Status: COMPLETED | OUTPATIENT
Start: 2019-01-30 | End: 2019-01-30

## 2019-01-30 RX ORDER — SODIUM CHLORIDE 9 MG/ML
1000 INJECTION INTRAMUSCULAR; INTRAVENOUS; SUBCUTANEOUS ONCE
Qty: 0 | Refills: 0 | Status: COMPLETED | OUTPATIENT
Start: 2019-01-30 | End: 2019-01-30

## 2019-01-30 RX ADMIN — Medication 25 MILLIGRAM(S): at 18:40

## 2019-01-30 RX ADMIN — Medication 1000 MILLIGRAM(S): at 17:33

## 2019-01-30 RX ADMIN — OXYCODONE AND ACETAMINOPHEN 1 TABLET(S): 5; 325 TABLET ORAL at 17:33

## 2019-01-30 RX ADMIN — PIPERACILLIN AND TAZOBACTAM 200 GRAM(S): 4; .5 INJECTION, POWDER, LYOPHILIZED, FOR SOLUTION INTRAVENOUS at 21:35

## 2019-01-30 RX ADMIN — Medication 150 MILLIGRAM(S): at 21:38

## 2019-01-30 RX ADMIN — OXYCODONE AND ACETAMINOPHEN 2 TABLET(S): 5; 325 TABLET ORAL at 19:34

## 2019-01-30 RX ADMIN — LORATADINE 10 MILLIGRAM(S): 10 TABLET ORAL at 18:42

## 2019-01-30 RX ADMIN — OXYCODONE AND ACETAMINOPHEN 1 TABLET(S): 5; 325 TABLET ORAL at 15:08

## 2019-01-30 RX ADMIN — ATORVASTATIN CALCIUM 40 MILLIGRAM(S): 80 TABLET, FILM COATED ORAL at 21:32

## 2019-01-30 RX ADMIN — Medication 250 MILLIGRAM(S): at 15:08

## 2019-01-30 RX ADMIN — Medication 10 MILLIGRAM(S): at 21:32

## 2019-01-30 RX ADMIN — DULOXETINE HYDROCHLORIDE 60 MILLIGRAM(S): 30 CAPSULE, DELAYED RELEASE ORAL at 18:40

## 2019-01-30 RX ADMIN — PIPERACILLIN AND TAZOBACTAM 200 GRAM(S): 4; .5 INJECTION, POWDER, LYOPHILIZED, FOR SOLUTION INTRAVENOUS at 17:00

## 2019-01-30 RX ADMIN — SODIUM CHLORIDE 3 MILLILITER(S): 9 INJECTION INTRAMUSCULAR; INTRAVENOUS; SUBCUTANEOUS at 21:40

## 2019-01-30 RX ADMIN — SODIUM CHLORIDE 3 MILLILITER(S): 9 INJECTION INTRAMUSCULAR; INTRAVENOUS; SUBCUTANEOUS at 15:08

## 2019-01-30 RX ADMIN — SODIUM CHLORIDE 1000 MILLILITER(S): 9 INJECTION INTRAMUSCULAR; INTRAVENOUS; SUBCUTANEOUS at 18:47

## 2019-01-30 NOTE — H&P ADULT - NSHPREVIEWOFSYSTEMS_GEN_ALL_CORE
REVIEW OF SYSTEMS:    CONSTITUTIONAL: No weakness or fevers.  +mild chills earlier today but none currently  EYES/ENT: No visual changes;  No vertigo or throat pain   NECK: No pain or stiffness  RESPIRATORY: No cough, wheezing, hemoptysis; No shortness of breath  CARDIOVASCULAR: No chest pain or palpitations  GASTROINTESTINAL: No abdominal or epigastric pain. No nausea, vomiting, or hematemesis; No diarrhea or constipation. No melena or hematochezia.  GENITOURINARY: No dysuria, frequency or hematuria  NEUROLOGICAL: No numbness or weakness

## 2019-01-30 NOTE — H&P ADULT - NSHPPHYSICALEXAM_GEN_ALL_CORE
PHYSICAL EXAM:    GENERAL: NAD, speaks in full sentences, no signs of respiratory distress  HEAD:  Atraumatic, Normocephalic  EYES: EOMI, PERRLA, conjunctiva and sclera clear  NECK: Supple, No JVD  CHEST/LUNG: cta b/l; No wheeze; No crackles; No accessory muscles used  HEART: rrr; No murmurs;   ABDOMEN: soft, NT, +BS  EXTREMITIES:  Right foot erythema, warmth; wound dehiscence with drainage.  Foot redressed by podiatry   NEUROLOGY: non-focal

## 2019-01-30 NOTE — PATIENT PROFILE ADULT - NSTOBACCOQUITATTEMPT_GEN_A_CORE_SD
none/pt states she wants to quit but not at this moment. pt states she will consult with PCP about a cessation program.

## 2019-01-30 NOTE — CONSULT NOTE ADULT - SUBJECTIVE AND OBJECTIVE BOX
PODIATRY CONSULT   CALLY HARTMAN is 53y old  Female who presents with a chief complaint of Right foot infection.   HPI:  52 y/o female with hx DM, CAD, HTN, Hypothyroid, s/p Reconstruction surgery R foot for charcot foot 19. Pt left the hospital with no ABx and developed dehiscence and infection from the surgical sites. Pt went to [facility] for a dose of IV ABx one week after D/C. Today pt f/u with Dr. Diaz at his office who sent her for IV ABx, Local wound care, and PICC line. Pt says her foot was painful, red, swollen, and drainage from the surgical sites. Denies n/v/c/d/sob.         CHARCOT FOOT DUE TO DIABETES MELLITUS FOOT INFECTION  Dextrocardia  Chronic midline low back pain with bilateral sciatica  Peripheral neuralgia  Essential hypertension  Diabetes 1.5, managed as type 2  Charcot's arthropathy      PMH: CHARCOT FOOT DUE TO DIABETES MELLITUS FOOT INFECTION  Dextrocardia  Chronic midline low back pain with bilateral sciatica  Peripheral neuralgia  Essential hypertension  Diabetes 1.5, managed as type 2  Charcot's arthropathy    PSH: Charcot foot due to diabetes mellitus  H/O shoulder surgery   delivery delivered    Medication piperacillin/tazobactam IVPB. 4.5 Gram(s) IV Intermittent once    Allergy: No Known Allergies        Labs:                        11.7   9.87  )-----------( 407      ( 2019 16:01 )             36.8       01-    131<L>  |  87<L>  |  6<L>  ----------------------------<  73  4.8   |  27  |  1.0    Ca    9.0      2019 16:01              O:   Derm: Surgical sites R foot 1st and 2nd ray dorsal and medial midfoot - Dehiscence, probe to deep tissue, Erythema, edema, Serous drainage,  painful, Discoloration. wound Bases with fibrotic tissue present. No pus was expressed. No sutures present  Lateral midfoot and posterior heel ulcers - stable , sutures intact, no signs of infection  Vascular: Right Dorsalis Pedis and Posterior Tibial pulses diminished. left foot DP and Pt palpable .  Capillary re-fill time less then 3 seconds digits 1-5 bilateral.  R foot warm to touch  Neuro: Protective sensation intact to the level of the digits bilateral. pain on plaption of R foot   MSK: Muscle strength 5/5 all major muscle groups bilateral.        Assessment:   S/p Charcot Recon, Infected, dehisced.  P:  Chart reviewed and Patient evaluated  Wound flush with normal saline and peroxide  Applied iodoform packing, betadine, Adaptic, kerlix, webril, Posterior splint, ACE.   NWB R foot  Obtained wound culture to be sent to Pathology  X-rays Pending report  Recommend ID consult  Continue IV abx as per ID  recommend PICC line  Podiatry will follow while in house.  Podiatry will continue with local wound Care.    will discuss care plan  with Attending

## 2019-01-30 NOTE — PATIENT PROFILE ADULT - NSPROEDALEARNPREF_GEN_A_NUR
What Is The Reason For Today's Visit?: Skin Lesions
What Is The Reason For Today's Visit? (Being Monitored For X): concerning skin lesions on an annual basis
How Severe Are Your Spot(S)?: mild
verbal instruction

## 2019-01-30 NOTE — ED ADULT NURSE NOTE - PSH
delivery delivered    Charcot foot due to diabetes mellitus    H/O shoulder surgery  Right shoulder surgery

## 2019-01-30 NOTE — ED PROVIDER NOTE - ATTENDING CONTRIBUTION TO CARE
54 y/o female with hx of DM, CAD, HTN, Dextrocardia, Charcot Foot presents to ED for evaluation of right foot infection.  Patient was seen and evaluated by her podiatrist and was sent to ED for admission an IV abx.  PE:  agree with above.  A/P:  Cellulitis, s/p surgery.  Podiatry consult, IV Abx and ADMIT.

## 2019-01-30 NOTE — ED ADULT NURSE NOTE - OBJECTIVE STATEMENT
patient has  right foot infection from recent surgery,deniesn/v/f/d/sob states has chills. foot is red swollen and has draining

## 2019-01-30 NOTE — H&P ADULT - NSHPLABSRESULTS_GEN_ALL_CORE
Labs:                        11.7   9.87  )-----------( 407      ( 30 Jan 2019 16:01 )             36.8             01-30    131<L>  |  87<L>  |  6<L>  ----------------------------<  73  4.8   |  27  |  1.0    Ca    9.0      30 Jan 2019 16:01

## 2019-01-30 NOTE — H&P ADULT - HISTORY OF PRESENT ILLNESS
54 yo female with hx DM, CAD, HTN, Hypothyroid, s/p Reconstruction surgery R foot for charcot foot on 1/11/19. Patient was discharged on 1/18/19, not on antibiotics at time of discharge. Since discharge developed dehiscence and infection at surgical sites. Today she had f/u with Dr. Diaz at his office who sent her to hospital for IV antibiotics. Pt reports few days after discharge foot become swollen, red, painful.  She reports pus and small amount of blood draining from the surgical sites. Denies fevers, sweating, sob, chest pain, nausea, vomiting, diarrhea.

## 2019-01-30 NOTE — ED PROVIDER NOTE - OBJECTIVE STATEMENT
54 y/o female with hx DM, CAD, HTN, Hypothyroid, s/p Reconstruction surgery R foot for charcot foot presents to the ED c/o "I saw my podiatrist today and he sent me here for worsening foot infection. The wound opened and there is drainage." no fever/ chills/ weakness

## 2019-01-30 NOTE — H&P ADULT - ASSESSMENT
52 yo female with hx DM, CAD, HTN, Hypothyroid, s/p reconstruction surgery R foot for charcot foot on 1/11/19.   Readmitted today with R foot erythema, swelling, pain, drainage      #right foot infection following reconstructive surgery   -apyrexic, vitals stable, no elevated WBC  -IV vancomycin 1250mg q12 and zosyn 4.5 q8  -ID consult  -podiatry follow up  -percocet for pain    #diabetes   Hold janumet   monitor glucose, if elevated start lispro, glargine regimen.  Last glucose 73  c/w lyrica and duloxetine for peripheral neuropathy    # history of bronchospasm: follows Dr. Rao, was never told that she was having COPD/Asthma  c/w inhalers    #HTN  c/w metoprolol, ramipril    #hyopthyroidism  -c/w levothyroxine    Full code  DVT PPX: Lovenox  Diet: Carb consistent 52 yo female with hx DM, CAD, HTN, Hypothyroid, s/p reconstruction surgery R foot for charcot foot on 1/11/19.   Readmitted today with R foot erythema, swelling, pain, drainage      #right foot infection following reconstructive surgery   -apyrexic, vitals stable, no elevated WBC  -IV vancomycin 1250mg q12 and zosyn 4.5 q8  -ID consult  -podiatry follow up  -percocet for pain    #diabetes   Hold janumet   monitor glucose, if elevated start lispro, glargine regimen.  Last glucose 73  c/w lyrica and duloxetine for peripheral neuropathy    # history of bronchospasm: follows Dr. Rao, was never told that she was having COPD/Asthma  c/w inhalers   on qvar at home, symbicort in hospital      #HTN  c/w metoprolol, ramipril    #hyopthyroidism  -c/w levothyroxine    Full code  DVT PPX: Lovenox  Diet: Carb consistent

## 2019-01-31 ENCOUNTER — TRANSCRIPTION ENCOUNTER (OUTPATIENT)
Age: 54
End: 2019-01-31

## 2019-01-31 DIAGNOSIS — G58.8 OTHER SPECIFIED MONONEUROPATHIES: ICD-10-CM

## 2019-01-31 DIAGNOSIS — E11.40 TYPE 2 DIABETES MELLITUS WITH DIABETIC NEUROPATHY, UNSPECIFIED: ICD-10-CM

## 2019-01-31 DIAGNOSIS — I10 ESSENTIAL (PRIMARY) HYPERTENSION: ICD-10-CM

## 2019-01-31 DIAGNOSIS — E11.65 TYPE 2 DIABETES MELLITUS WITH HYPERGLYCEMIA: ICD-10-CM

## 2019-01-31 DIAGNOSIS — I95.81 POSTPROCEDURAL HYPOTENSION: ICD-10-CM

## 2019-01-31 DIAGNOSIS — D72.829 ELEVATED WHITE BLOOD CELL COUNT, UNSPECIFIED: ICD-10-CM

## 2019-01-31 DIAGNOSIS — I25.10 ATHEROSCLEROTIC HEART DISEASE OF NATIVE CORONARY ARTERY WITHOUT ANGINA PECTORIS: ICD-10-CM

## 2019-01-31 DIAGNOSIS — E11.610 TYPE 2 DIABETES MELLITUS WITH DIABETIC NEUROPATHIC ARTHROPATHY: ICD-10-CM

## 2019-01-31 DIAGNOSIS — Q24.0 DEXTROCARDIA: ICD-10-CM

## 2019-01-31 DIAGNOSIS — E03.9 HYPOTHYROIDISM, UNSPECIFIED: ICD-10-CM

## 2019-01-31 DIAGNOSIS — E87.1 HYPO-OSMOLALITY AND HYPONATREMIA: ICD-10-CM

## 2019-01-31 DIAGNOSIS — J44.9 CHRONIC OBSTRUCTIVE PULMONARY DISEASE, UNSPECIFIED: ICD-10-CM

## 2019-01-31 DIAGNOSIS — F17.200 NICOTINE DEPENDENCE, UNSPECIFIED, UNCOMPLICATED: ICD-10-CM

## 2019-01-31 DIAGNOSIS — Z79.84 LONG TERM (CURRENT) USE OF ORAL HYPOGLYCEMIC DRUGS: ICD-10-CM

## 2019-01-31 DIAGNOSIS — R00.0 TACHYCARDIA, UNSPECIFIED: ICD-10-CM

## 2019-01-31 DIAGNOSIS — R50.9 FEVER, UNSPECIFIED: ICD-10-CM

## 2019-01-31 DIAGNOSIS — M54.32 SCIATICA, LEFT SIDE: ICD-10-CM

## 2019-01-31 DIAGNOSIS — M54.31 SCIATICA, RIGHT SIDE: ICD-10-CM

## 2019-01-31 LAB
ALBUMIN SERPL ELPH-MCNC: 2.8 G/DL — LOW (ref 3.5–5.2)
ALP SERPL-CCNC: 100 U/L — SIGNIFICANT CHANGE UP (ref 30–115)
ALT FLD-CCNC: 14 U/L — SIGNIFICANT CHANGE UP (ref 0–41)
ANION GAP SERPL CALC-SCNC: 13 MMOL/L — SIGNIFICANT CHANGE UP (ref 7–14)
AST SERPL-CCNC: 18 U/L — SIGNIFICANT CHANGE UP (ref 0–41)
BILIRUB SERPL-MCNC: <0.2 MG/DL — SIGNIFICANT CHANGE UP (ref 0.2–1.2)
BUN SERPL-MCNC: 7 MG/DL — LOW (ref 10–20)
CALCIUM SERPL-MCNC: 8.5 MG/DL — SIGNIFICANT CHANGE UP (ref 8.5–10.1)
CHLORIDE SERPL-SCNC: 101 MMOL/L — SIGNIFICANT CHANGE UP (ref 98–110)
CO2 SERPL-SCNC: 27 MMOL/L — SIGNIFICANT CHANGE UP (ref 17–32)
CREAT SERPL-MCNC: 1 MG/DL — SIGNIFICANT CHANGE UP (ref 0.7–1.5)
GLUCOSE BLDC GLUCOMTR-MCNC: 142 MG/DL — HIGH (ref 70–99)
GLUCOSE BLDC GLUCOMTR-MCNC: 145 MG/DL — HIGH (ref 70–99)
GLUCOSE BLDC GLUCOMTR-MCNC: 154 MG/DL — HIGH (ref 70–99)
GLUCOSE BLDC GLUCOMTR-MCNC: 81 MG/DL — SIGNIFICANT CHANGE UP (ref 70–99)
GLUCOSE SERPL-MCNC: 77 MG/DL — SIGNIFICANT CHANGE UP (ref 70–99)
HCT VFR BLD CALC: 32.1 % — LOW (ref 37–47)
HGB BLD-MCNC: 10.1 G/DL — LOW (ref 12–16)
MCHC RBC-ENTMCNC: 27.5 PG — SIGNIFICANT CHANGE UP (ref 27–31)
MCHC RBC-ENTMCNC: 31.5 G/DL — LOW (ref 32–37)
MCV RBC AUTO: 87.5 FL — SIGNIFICANT CHANGE UP (ref 81–99)
NRBC # BLD: 0 /100 WBCS — SIGNIFICANT CHANGE UP (ref 0–0)
PLATELET # BLD AUTO: 334 K/UL — SIGNIFICANT CHANGE UP (ref 130–400)
POTASSIUM SERPL-MCNC: 4.8 MMOL/L — SIGNIFICANT CHANGE UP (ref 3.5–5)
POTASSIUM SERPL-SCNC: 4.8 MMOL/L — SIGNIFICANT CHANGE UP (ref 3.5–5)
PROT SERPL-MCNC: 5.4 G/DL — LOW (ref 6–8)
RBC # BLD: 3.67 M/UL — LOW (ref 4.2–5.4)
RBC # FLD: 15.8 % — HIGH (ref 11.5–14.5)
SODIUM SERPL-SCNC: 141 MMOL/L — SIGNIFICANT CHANGE UP (ref 135–146)
WBC # BLD: 8.12 K/UL — SIGNIFICANT CHANGE UP (ref 4.8–10.8)
WBC # FLD AUTO: 8.12 K/UL — SIGNIFICANT CHANGE UP (ref 4.8–10.8)

## 2019-01-31 RX ORDER — MORPHINE SULFATE 50 MG/1
15 CAPSULE, EXTENDED RELEASE ORAL
Qty: 0 | Refills: 0 | Status: DISCONTINUED | OUTPATIENT
Start: 2019-01-31 | End: 2019-02-06

## 2019-01-31 RX ORDER — PIPERACILLIN AND TAZOBACTAM 4; .5 G/20ML; G/20ML
4.5 INJECTION, POWDER, LYOPHILIZED, FOR SOLUTION INTRAVENOUS EVERY 8 HOURS
Qty: 0 | Refills: 0 | Status: DISCONTINUED | OUTPATIENT
Start: 2019-01-31 | End: 2019-01-31

## 2019-01-31 RX ORDER — OXYCODONE AND ACETAMINOPHEN 5; 325 MG/1; MG/1
2 TABLET ORAL EVERY 6 HOURS
Qty: 0 | Refills: 0 | Status: DISCONTINUED | OUTPATIENT
Start: 2019-01-31 | End: 2019-02-07

## 2019-01-31 RX ORDER — DOCUSATE SODIUM 100 MG
100 CAPSULE ORAL THREE TIMES A DAY
Qty: 0 | Refills: 0 | Status: DISCONTINUED | OUTPATIENT
Start: 2019-01-31 | End: 2019-02-06

## 2019-01-31 RX ORDER — CEFEPIME 1 G/1
2000 INJECTION, POWDER, FOR SOLUTION INTRAMUSCULAR; INTRAVENOUS EVERY 8 HOURS
Qty: 0 | Refills: 0 | Status: DISCONTINUED | OUTPATIENT
Start: 2019-01-31 | End: 2019-01-31

## 2019-01-31 RX ORDER — CEFEPIME 1 G/1
2000 INJECTION, POWDER, FOR SOLUTION INTRAMUSCULAR; INTRAVENOUS EVERY 12 HOURS
Qty: 0 | Refills: 0 | Status: DISCONTINUED | OUTPATIENT
Start: 2019-01-31 | End: 2019-02-08

## 2019-01-31 RX ORDER — CEFEPIME 1 G/1
INJECTION, POWDER, FOR SOLUTION INTRAMUSCULAR; INTRAVENOUS
Qty: 0 | Refills: 0 | Status: COMPLETED | OUTPATIENT
Start: 2019-01-31 | End: 2019-01-31

## 2019-01-31 RX ORDER — CEFEPIME 1 G/1
2000 INJECTION, POWDER, FOR SOLUTION INTRAMUSCULAR; INTRAVENOUS ONCE
Qty: 0 | Refills: 0 | Status: COMPLETED | OUTPATIENT
Start: 2019-01-31 | End: 2019-01-31

## 2019-01-31 RX ORDER — SENNA PLUS 8.6 MG/1
2 TABLET ORAL AT BEDTIME
Qty: 0 | Refills: 0 | Status: DISCONTINUED | OUTPATIENT
Start: 2019-01-31 | End: 2019-02-08

## 2019-01-31 RX ADMIN — Medication 5 MILLIGRAM(S): at 05:43

## 2019-01-31 RX ADMIN — SODIUM CHLORIDE 3 MILLILITER(S): 9 INJECTION INTRAMUSCULAR; INTRAVENOUS; SUBCUTANEOUS at 22:24

## 2019-01-31 RX ADMIN — Medication 10 MILLIGRAM(S): at 22:06

## 2019-01-31 RX ADMIN — SODIUM CHLORIDE 3 MILLILITER(S): 9 INJECTION INTRAMUSCULAR; INTRAVENOUS; SUBCUTANEOUS at 05:52

## 2019-01-31 RX ADMIN — OXYCODONE AND ACETAMINOPHEN 2 TABLET(S): 5; 325 TABLET ORAL at 13:18

## 2019-01-31 RX ADMIN — Medication 10 MILLIGRAM(S): at 06:01

## 2019-01-31 RX ADMIN — Medication 25 MILLIGRAM(S): at 17:48

## 2019-01-31 RX ADMIN — LISINOPRIL 10 MILLIGRAM(S): 2.5 TABLET ORAL at 05:44

## 2019-01-31 RX ADMIN — Medication 5 MILLIGRAM(S): at 17:48

## 2019-01-31 RX ADMIN — MORPHINE SULFATE 15 MILLIGRAM(S): 50 CAPSULE, EXTENDED RELEASE ORAL at 22:05

## 2019-01-31 RX ADMIN — OXYCODONE AND ACETAMINOPHEN 2 TABLET(S): 5; 325 TABLET ORAL at 06:00

## 2019-01-31 RX ADMIN — ATORVASTATIN CALCIUM 40 MILLIGRAM(S): 80 TABLET, FILM COATED ORAL at 22:06

## 2019-01-31 RX ADMIN — OXYCODONE AND ACETAMINOPHEN 2 TABLET(S): 5; 325 TABLET ORAL at 20:23

## 2019-01-31 RX ADMIN — Medication 150 MILLIGRAM(S): at 22:04

## 2019-01-31 RX ADMIN — Medication 81 MILLIGRAM(S): at 11:37

## 2019-01-31 RX ADMIN — Medication 166.67 MILLIGRAM(S): at 17:48

## 2019-01-31 RX ADMIN — OXYCODONE AND ACETAMINOPHEN 2 TABLET(S): 5; 325 TABLET ORAL at 19:53

## 2019-01-31 RX ADMIN — PANTOPRAZOLE SODIUM 40 MILLIGRAM(S): 20 TABLET, DELAYED RELEASE ORAL at 06:01

## 2019-01-31 RX ADMIN — Medication 10 MILLIGRAM(S): at 13:19

## 2019-01-31 RX ADMIN — Medication 150 MILLIGRAM(S): at 05:51

## 2019-01-31 RX ADMIN — Medication 150 MILLIGRAM(S): at 13:19

## 2019-01-31 RX ADMIN — SODIUM CHLORIDE 3 MILLILITER(S): 9 INJECTION INTRAMUSCULAR; INTRAVENOUS; SUBCUTANEOUS at 13:20

## 2019-01-31 RX ADMIN — DULOXETINE HYDROCHLORIDE 60 MILLIGRAM(S): 30 CAPSULE, DELAYED RELEASE ORAL at 11:37

## 2019-01-31 RX ADMIN — OXYCODONE AND ACETAMINOPHEN 2 TABLET(S): 5; 325 TABLET ORAL at 13:48

## 2019-01-31 RX ADMIN — LORATADINE 10 MILLIGRAM(S): 10 TABLET ORAL at 11:37

## 2019-01-31 RX ADMIN — BUDESONIDE AND FORMOTEROL FUMARATE DIHYDRATE 2 PUFF(S): 160; 4.5 AEROSOL RESPIRATORY (INHALATION) at 10:44

## 2019-01-31 RX ADMIN — Medication 100 MICROGRAM(S): at 05:43

## 2019-01-31 RX ADMIN — CEFEPIME 100 MILLIGRAM(S): 1 INJECTION, POWDER, FOR SOLUTION INTRAMUSCULAR; INTRAVENOUS at 11:36

## 2019-01-31 RX ADMIN — CEFEPIME 100 MILLIGRAM(S): 1 INJECTION, POWDER, FOR SOLUTION INTRAMUSCULAR; INTRAVENOUS at 17:14

## 2019-01-31 RX ADMIN — MORPHINE SULFATE 15 MILLIGRAM(S): 50 CAPSULE, EXTENDED RELEASE ORAL at 22:35

## 2019-01-31 RX ADMIN — CHLORHEXIDINE GLUCONATE 1 APPLICATION(S): 213 SOLUTION TOPICAL at 05:42

## 2019-01-31 RX ADMIN — Medication 166.67 MILLIGRAM(S): at 05:42

## 2019-01-31 RX ADMIN — BUDESONIDE AND FORMOTEROL FUMARATE DIHYDRATE 2 PUFF(S): 160; 4.5 AEROSOL RESPIRATORY (INHALATION) at 22:07

## 2019-01-31 NOTE — MEDICAL STUDENT PROGRESS NOTE(EDUCATION) - NS MD HP STUD ASPLAN ASSES FT
Patient is a 54 yo f HD #with a pmh significant for Diabetes mellitus 1.5, CAD, HTN, DLD, COPD not on home O2, chronic midline low back pain with bilateral sciatica hyothyroid, s/p R. foot reconstruction surgery Jan. 11th 2/2 worsening charcot's arthropathy presenting with worsening pain and pus filled drainage from surgical site, and wound dehiscence, without fever/chills/elevated wbc and non-significant for x-ray admitted with a diagnosis of charcot foot 2/2 DM foot infection being trx with IV vanc and cefepime and pain control

## 2019-01-31 NOTE — DISCHARGE NOTE ADULT - PLAN OF CARE
symptom control, evaluation, prevention of complications Pt was diagnosed with  surgical wound infection s/p reconstruction of right foot 1/11/19. Wound cultures showed few MRSA. Blood cultures were negative. Pt was started on iv antibiotics during hospitalization course, PICC line was placed and is being discharged on IV ceftriaxone 2gm Q24 (to stop after 3/12/19).  Debridement  02/08/2019  excisional debridement of soft tissue right foot with oasis application and wound vac therapy was also done. Wound vac was placed and changed on 2/15, 2/17 and 2/18.  Visiting nurse would come 3 times/week to change the dressing.  dressing with Rukhsana/Moist to dry/Kerlix/webril/Posterior splint by Visiting nurse.  Pt is advised to follow up with Dr Diaz Surgery as out pt. Pt was diagnosed with  surgical wound infection s/p reconstruction of right foot 1/11/19. Wound cultures showed few MRSA. Blood cultures were negative. Pt was started on iv antibiotics during hospitalization course, PICC line was placed and is being discharged on IV ceftriaxone 2gm Q24 (to stop after 3/12/19).  Debridement  02/08/2019  excisional debridement of soft tissue right foot with oasis application and wound vac therapy was also done. Wound vac was placed and changed on 2/15, 2/17 and 2/18.  Visiting nurse would come 3 times/week to change the dressing.  dressing with Rukhsana/Moist to dry/Kerlix/webril/Posterior splint by Visiting nurse.  Pt is advised to follow up with Dr Diaz Surgery as out pt.  ID follow-up [address] [phone]  Weekly CBC, BMP Pt was diagnosed with  surgical wound infection s/p reconstruction of right foot 1/11/19. Wound cultures showed few MRSA. Blood cultures were negative. Pt was started on iv antibiotics during hospitalization course, PICC line was placed and is being discharged on IV clindamycin 900 mg every 8 hours (to stop after 3/12/19).  Debridement  02/08/2019  excisional debridement of soft tissue right foot with oasis application and wound vac therapy was also done. Wound vac was placed and changed on 2/15, 2/17 and 2/18.  Visiting nurse would come 3 times/week to change the dressing.  dressing with Rukhsana/Moist to dry/Kerlix/webril/Posterior splint by Visiting nurse.  Pt is advised to follow up with Dr Diaz Surgery as out pt.  ID follow-up [id number] [phone]  get Weekly CBC, BMP Pt was diagnosed with  surgical wound infection s/p reconstruction of right foot 1/11/19. Wound cultures showed few MSSA. Blood cultures were negative. Pt was started on iv antibiotics during hospitalization course, PICC line was placed and is being discharged on IV clindamycin 900 mg every 8 hours (to stop after 3/12/19).  Debridement  02/08/2019  excisional debridement of soft tissue right foot with oasis application and wound vac therapy was also done. Wound vac was placed and changed on 2/15, 2/17 and 2/18.  Visiting nurse would come 3 times/week to change the dressing.  dressing with Rukhsana/Moist to dry/Kerlix/webril/Posterior splint by Visiting nurse.  Pt is advised to follow up with Dr Diaz Surgery as out pt.  ID follow-up [id number] [phone]  get Weekly CBC, BMP

## 2019-01-31 NOTE — DISCHARGE NOTE ADULT - CARE PLAN
Principal Discharge DX:	Charcot foot due to diabetes mellitus  Goal:	symptom control, evaluation, prevention of complications  Assessment and plan of treatment:	Pt was diagnosed with  surgical wound infection s/p reconstruction of right foot 1/11/19. Wound cultures showed few MRSA. Blood cultures were negative. Pt was started on iv antibiotics during hospitalization course, PICC line was placed and is being discharged on IV ceftriaxone 2gm Q24 (to stop after 3/12/19).  Debridement  02/08/2019  excisional debridement of soft tissue right foot with oasis application and wound vac therapy was also done. Wound vac was placed and changed on 2/15, 2/17 and 2/18.  Visiting nurse would come 3 times/week to change the dressing.  dressing with Rukhsana/Moist to dry/Kerlix/webril/Posterior splint by Visiting nurse.  Pt is advised to follow up with Dr Diaz Surgery as out pt. Principal Discharge DX:	Charcot foot due to diabetes mellitus  Goal:	symptom control, evaluation, prevention of complications  Assessment and plan of treatment:	Pt was diagnosed with  surgical wound infection s/p reconstruction of right foot 1/11/19. Wound cultures showed few MRSA. Blood cultures were negative. Pt was started on iv antibiotics during hospitalization course, PICC line was placed and is being discharged on IV ceftriaxone 2gm Q24 (to stop after 3/12/19).  Debridement  02/08/2019  excisional debridement of soft tissue right foot with oasis application and wound vac therapy was also done. Wound vac was placed and changed on 2/15, 2/17 and 2/18.  Visiting nurse would come 3 times/week to change the dressing.  dressing with Rukhsana/Moist to dry/Kerlix/webril/Posterior splint by Visiting nurse.  Pt is advised to follow up with Dr Diaz Surgery as out pt.  ID follow-up [address] [phone]  Weekly CBC, BMP Principal Discharge DX:	Charcot foot due to diabetes mellitus  Goal:	symptom control, evaluation, prevention of complications  Assessment and plan of treatment:	Pt was diagnosed with  surgical wound infection s/p reconstruction of right foot 1/11/19. Wound cultures showed few MRSA. Blood cultures were negative. Pt was started on iv antibiotics during hospitalization course, PICC line was placed and is being discharged on IV clindamycin 900 mg every 8 hours (to stop after 3/12/19).  Debridement  02/08/2019  excisional debridement of soft tissue right foot with oasis application and wound vac therapy was also done. Wound vac was placed and changed on 2/15, 2/17 and 2/18.  Visiting nurse would come 3 times/week to change the dressing.  dressing with Rukhsana/Moist to dry/Kerlix/webril/Posterior splint by Visiting nurse.  Pt is advised to follow up with Dr Diaz Surgery as out pt.  ID follow-up [address] [phone]  get Weekly CBC, BMP Principal Discharge DX:	Charcot foot due to diabetes mellitus  Goal:	symptom control, evaluation, prevention of complications  Assessment and plan of treatment:	Pt was diagnosed with  surgical wound infection s/p reconstruction of right foot 1/11/19. Wound cultures showed few MSSA. Blood cultures were negative. Pt was started on iv antibiotics during hospitalization course, PICC line was placed and is being discharged on IV clindamycin 900 mg every 8 hours (to stop after 3/12/19).  Debridement  02/08/2019  excisional debridement of soft tissue right foot with oasis application and wound vac therapy was also done. Wound vac was placed and changed on 2/15, 2/17 and 2/18.  Visiting nurse would come 3 times/week to change the dressing.  dressing with Rukhsana/Moist to dry/Kerlix/webril/Posterior splint by Visiting nurse.  Pt is advised to follow up with Dr Diaz Surgery as out pt.  ID follow-up [address] [phone]  get Weekly CBC, BMP

## 2019-01-31 NOTE — DISCHARGE NOTE ADULT - MEDICATION SUMMARY - MEDICATIONS TO TAKE
I will START or STAY ON the medications listed below when I get home from the hospital:    aspirin 81 mg oral delayed release tablet  -- 1 tab(s) by mouth once a day  -- Indication: For CAD    oxyCODONE 20 mg oral tablet, extended release  -- 1 tab(s) by mouth every 8 hours   -- Indication: For PAIN    Percocet 5/325 oral tablet  -- 1 tab(s) by mouth every 6 hours, As Needed MDD:4  -- Caution federal law prohibits the transfer of this drug to any person other  than the person for whom it was prescribed.  May cause drowsiness.  Alcohol may intensify this effect.  Use care when operating dangerous machinery.  This prescription cannot be refilled.  This product contains acetaminophen.  Do not use  with any other product containing acetaminophen to prevent possible liver damage.  Using more of this medication than prescribed may cause serious breathing problems.    -- Indication: For PAIN    Lyrica 150 mg oral capsule  -- 1 cap(s) by mouth 3 times a day MDD:3  -- Check with your doctor before becoming pregnant.  Do not drink alcoholic beverages when taking this medication.  May cause drowsiness or dizziness.  This drug may impair the ability to drive or operate machinery.  Use care until you become familiar with its effects.    -- Indication: For NEUROPATHY    DULoxetine 60 mg oral delayed release capsule  -- 1 cap(s) by mouth once a day  -- Indication: For NEUROPATHY    Janumet 50 mg-1000 mg oral tablet  -- 1 tab(s) by mouth 2 times a day  -- Indication: For DM    ezetimibe 10 mg oral tablet  -- 1 tab(s) by mouth once a day  -- Indication: For DYSLIPIDEMIA    atorvastatin 40 mg oral tablet  -- 1 tab(s) by mouth once a day (at bedtime)  -- Indication: For DYSLIPIDEMIA    hydrOXYzine hydrochloride 10 mg oral tablet  -- 1 tab(s) by mouth 3 times a day, As Needed  -- Indication: For ANXIETY    Metoprolol Tartrate 25 mg oral tablet  -- 0.5 tab(s) by mouth 2 times a day   -- It is very important that you take or use this exactly as directed.  Do not skip doses or discontinue unless directed by your doctor.  May cause drowsiness.  Alcohol may intensify this effect.  Use care when operating dangerous machinery.  Some non-prescription drugs may aggravate your condition.  Read all labels carefully.  If a warning appears, check with your doctor before taking.  Take with food or milk.  This drug may impair the ability to drive or operate machinery.  Use care until you become familiar with its effects.    -- Indication: For HEART RATE CONTROL    budesonide-formoterol 80 mcg-4.5 mcg/inh inhalation aerosol  -- 2 puff(s) inhaled 2 times a day  -- Indication: For SHORTNESS OF BREATH    Ventolin HFA 90 mcg/inh inhalation aerosol  -- 2 puff(s) inhaled 4 times a day, As Needed  -- Indication: For SHORTNESS OF BREATH    docusate sodium 100 mg oral capsule  -- 1 cap(s) by mouth 2 times a day  -- Indication: For CONSTIPATION    senna oral tablet  -- 2 tab(s) by mouth once a day (at bedtime)  -- Indication: For CONSTIPATION    clindamycin  -- 900 milligram(s) intravenous every 8 hours  -- Indication: For INFECTION    methocarbamol 500 mg oral tablet  -- 1 tab(s) by mouth 3 times a day, As Needed   -- Indication: For MUSCLE RELAXANT    tiZANidine 4 mg oral tablet  -- 2 tab(s) by mouth every 8 hours  -- Indication: For MUSCLE RELAXANT    pantoprazole 40 mg oral delayed release tablet  -- 1 tab(s) by mouth once a day  -- Indication: For GERD    Qvar 80 mcg/inh inhalation aerosol  -- 1 puff(s) inhaled 2 times a day  -- Indication: For SHORTNESS OF BREATH    levothyroxine 100 mcg (0.1 mg) oral capsule  -- 1 cap(s) by mouth once a day  -- Indication: For HYPOTHYROIDISM I will START or STAY ON the medications listed below when I get home from the hospital:    aspirin 81 mg oral delayed release tablet  -- 1 tab(s) by mouth once a day  -- Indication: For Cad    DULoxetine 60 mg oral delayed release capsule  -- 1 cap(s) by mouth once a day  -- Indication: For NEUROPATHY    Janumet 50 mg-1000 mg oral tablet  -- 1 tab(s) by mouth 2 times a day  -- Indication: For DM    atorvastatin 40 mg oral tablet  -- 1 tab(s) by mouth once a day (at bedtime)  -- Indication: For DYSLIPIDEMIA    ezetimibe 10 mg oral tablet  -- 1 tab(s) by mouth once a day  -- Indication: For DYSLIPIDEMIA    hydrOXYzine hydrochloride 10 mg oral tablet  -- 1 tab(s) by mouth 3 times a day, As Needed  -- Indication: For ANXIETY    Metoprolol Tartrate 25 mg oral tablet  -- 0.5 tab(s) by mouth 2 times a day   -- It is very important that you take or use this exactly as directed.  Do not skip doses or discontinue unless directed by your doctor.  May cause drowsiness.  Alcohol may intensify this effect.  Use care when operating dangerous machinery.  Some non-prescription drugs may aggravate your condition.  Read all labels carefully.  If a warning appears, check with your doctor before taking.  Take with food or milk.  This drug may impair the ability to drive or operate machinery.  Use care until you become familiar with its effects.    -- Indication: For HEART RATE CONTROL    budesonide-formoterol 80 mcg-4.5 mcg/inh inhalation aerosol  -- 2 puff(s) inhaled 2 times a day  -- Indication: For SHORTNESS OF BREATH    Ventolin HFA 90 mcg/inh inhalation aerosol  -- 2 puff(s) inhaled 4 times a day, As Needed  -- Indication: For SHORTNESS OF BREATH    docusate sodium 100 mg oral capsule  -- 1 cap(s) by mouth 2 times a day  -- Indication: For CONSTIPATION    senna oral tablet  -- 2 tab(s) by mouth once a day (at bedtime)  -- Indication: For CONSTIPATION    clindamycin  -- 900 milligram(s) intravenous every 8 hours  -- Indication: For INFECTION    tiZANidine 4 mg oral tablet  -- 2 tab(s) by mouth every 8 hours  -- Indication: For MUSCLE RELAXANT    pantoprazole 40 mg oral delayed release tablet  -- 1 tab(s) by mouth once a day  -- Indication: For GERD    Qvar 80 mcg/inh inhalation aerosol  -- 1 puff(s) inhaled 2 times a day  -- Indication: For SHORTNESS OF BREATH    levothyroxine 100 mcg (0.1 mg) oral capsule  -- 1 cap(s) by mouth once a day  -- Indication: For HYPOTHYROIDISM I will START or STAY ON the medications listed below when I get home from the hospital:    aspirin 81 mg oral delayed release tablet  -- 1 tab(s) by mouth once a day  -- Indication: For Cad    oxyCODONE 20 mg oral tablet, extended release  -- 1 tab(s) by mouth every 8 hours, stop after 2/25  -- Indication: For pain     Percocet 5/325 oral tablet  -- 1 tab(s) by mouth every 6 hours, stop after 2/25  -- Indication: For pain    Lyrica 150 mg oral capsule  -- 1 tab(s) by mouth 3 times a day, stop after 2/25  -- Indication: For neuropathy    DULoxetine 60 mg oral delayed release capsule  -- 1 cap(s) by mouth once a day  -- Indication: For NEUROPATHY    Janumet 50 mg-1000 mg oral tablet  -- 1 tab(s) by mouth 2 times a day  -- Indication: For DM    atorvastatin 40 mg oral tablet  -- 1 tab(s) by mouth once a day (at bedtime)  -- Indication: For DYSLIPIDEMIA    ezetimibe 10 mg oral tablet  -- 1 tab(s) by mouth once a day  -- Indication: For DYSLIPIDEMIA    hydrOXYzine hydrochloride 10 mg oral tablet  -- 1 tab(s) by mouth 3 times a day, As Needed  -- Indication: For ANXIETY    Metoprolol Tartrate 25 mg oral tablet  -- 0.5 tab(s) by mouth 2 times a day   -- It is very important that you take or use this exactly as directed.  Do not skip doses or discontinue unless directed by your doctor.  May cause drowsiness.  Alcohol may intensify this effect.  Use care when operating dangerous machinery.  Some non-prescription drugs may aggravate your condition.  Read all labels carefully.  If a warning appears, check with your doctor before taking.  Take with food or milk.  This drug may impair the ability to drive or operate machinery.  Use care until you become familiar with its effects.    -- Indication: For HEART RATE CONTROL    budesonide-formoterol 80 mcg-4.5 mcg/inh inhalation aerosol  -- 2 puff(s) inhaled 2 times a day  -- Indication: For SHORTNESS OF BREATH    Ventolin HFA 90 mcg/inh inhalation aerosol  -- 2 puff(s) inhaled 4 times a day, As Needed  -- Indication: For SHORTNESS OF BREATH    docusate sodium 100 mg oral capsule  -- 1 cap(s) by mouth 2 times a day  -- Indication: For CONSTIPATION    senna oral tablet  -- 2 tab(s) by mouth once a day (at bedtime)  -- Indication: For CONSTIPATION    clindamycin  -- 900 milligram(s) intravenous every 8 hours  -- Indication: For INFECTION    tiZANidine 4 mg oral tablet  -- 2 tab(s) by mouth every 8 hours  -- Indication: For MUSCLE RELAXANT    pantoprazole 40 mg oral delayed release tablet  -- 1 tab(s) by mouth once a day  -- Indication: For GERD    Qvar 80 mcg/inh inhalation aerosol  -- 1 puff(s) inhaled 2 times a day  -- Indication: For SHORTNESS OF BREATH    levothyroxine 100 mcg (0.1 mg) oral capsule  -- 1 cap(s) by mouth once a day  -- Indication: For HYPOTHYROIDISM

## 2019-01-31 NOTE — DISCHARGE NOTE ADULT - ADDITIONAL INSTRUCTIONS
Follow up with Dr Diaz in 1 week.  Visiting nurses is going to comes 3 times/week to change vac and dressing. Follow up with Dr Diaz in 1 week.  Visiting nurses is going to comes 3 times/week to change vac and dressing.  Follow up with ID - ID follow-up [address] [phone]  and get Weekly CBC, BMP Follow up with Dr Diaz in 1 week.  Visiting nurses is going to comes 3 times/week to change vac and dressing.    Follow up with ID - ID follow-up [address] [phone]  and get Weekly CBC, BMP Follow up with Dr Diaz in 1 week.  Visiting nurses is going to comes 3 times/week to change vac and dressing.    Follow up with ID next week on Tuesday 2/26 - ID follow-up [address] [phone]  and get Weekly CBC, BMP Follow up with Dr Diaz in 1 week.  Visiting nurses is going to comes 3 times/week to change vac and dressing.    Follow up with ID next week on Tuesday 2/26 - ID follow-up [address] [phone]  and get Weekly CBC, BMP    Non weight bearing on the left leg

## 2019-01-31 NOTE — DISCHARGE NOTE ADULT - PATIENT PORTAL LINK FT
You can access the [facility] Patient Portal, offered by [facility], by registering with the following website: [email or web address]

## 2019-01-31 NOTE — MEDICAL STUDENT PROGRESS NOTE(EDUCATION) - NS MD HP STUD ASPLAN PLAN FT
#Surgical wound infection  - evidence of wound dehiscence and erythema with reported drainage   - Vancomycin 1250 mg IV q12h + Cefepime 2000 mg IV q12h as per ID  - Possible picc placement as per podiatry  - f/u wound cx  - f/u blood cx  - Pain control with home regimen including PO morphine 15mg BID PRN + Naproxen 500 mg BID PO PRN     #Diabetes  - c/w Janumet 50 mg-1000 mg PO BID  - Monitor fingersticks     #CAD/ DLD  - c/w aspirin + statin + ezetimibe    #HTN  - c/w ramipril + metoprolol    #COPD  - Qvar 80 mcg/inh inhalation BID PRN + Ventolin HFA 90 mcg/inh i2 puff(s) inhaled 4 times a day PRN #Surgical wound infection  - evidence of wound dehiscence and erythema with reported drainage   - Vancomycin 1250 mg IV q12h + Cefepime 2000 mg IV q12h as per ID  - Possible picc placement as per podiatry  - f/u wound cx  - f/u blood cx  - Pain control with home regimen including PO morphine 15mg BID PRN + Naproxen 500 mg BID PO PRN     #Diabetes  - c/w Janumet 50 mg-1000 mg PO BID  - Monitor fingersticks     #CAD/ DLD  - c/w aspirin + statin + ezetimibe    #HTN  - c/w ramipril + metoprolol    #COPD  - Qvar 80 mcg/inh inhalation BID PRN + Ventolin HFA 90 mcg/inh i2 puff(s) inhaled 4 times a day PRN    #Sciatica/Peripheral neuralgia   - c/w duloxetine   - c/w lyrica    Dispo: from home  DVT ppx: Lovenox  GI ppx: Protonics   Pharmacy [facility] #Surgical wound infection  - evidence of wound dehiscence and erythema with reported drainage   - Vancomycin 1250 mg IV q12h (1st dose 01/30 night, today day 2/?) + Cefepime 2000 mg IV q12h (today 01/31 day 1/?) as per ID  - Begin rifampin per ID  - Possible picc placement as per podiatry  - f/u wound cx  - f/u blood cx  - Pain control with home regimen including PO morphine 15mg BID PRN + Naproxen 500 mg BID PO PRN     #Diabetes  - c/w Janumet 50 mg-1000 mg PO BID  - Monitor fingersticks     #CAD/ DLD  - c/w aspirin + statin + ezetimibe    #HTN  - c/w ramipril + metoprolol    #COPD  - Qvar 80 mcg/inh inhalation BID PRN + Ventolin HFA 90 mcg/inh i2 puff(s) inhaled 4 times a day PRN    #Sciatica/Peripheral neuralgia   - c/w duloxetine   - c/w lyrica    Dispo: from home  DVT ppx: Lovenox  GI ppx: Protonics   Pharmacy [facility] #Surgical wound infection  - evidence of wound dehiscence and erythema with reported drainage   - Vancomycin 1250 mg IV q12h (1st dose 01/30 night, today day 2/?) + Cefepime 2000 mg IV q12h (today 01/31 day 1/?) as per ID  - Begin rifampin 600 mg IV day number 1 01/31  - Possible picc placement as per podiatry  - f/u wound cx  - f/u blood cx  - Pain control with home regimen including PO morphine 15mg BID PRN + Naproxen 500 mg BID PO PRN     #Diabetes  - c/w Janumet 50 mg-1000 mg PO BID  - Monitor fingersticks     #CAD/ DLD  - c/w aspirin + statin + ezetimibe    #HTN  - c/w ramipril + metoprolol    #COPD  - Qvar 80 mcg/inh inhalation BID PRN + Ventolin HFA 90 mcg/inh i2 puff(s) inhaled 4 times a day PRN    #Sciatica/Peripheral neuralgia   - c/w duloxetine   - c/w lyrica    Dispo: from home  DVT ppx: Lovenox  GI ppx: Protonics   Pharmacy [facility] #Surgical wound infection s/p reconstruction of R foot 1/11/19  - evidence of wound dehiscence and erythema with reported drainage   - Vancomycin 1250 mg IV q12h (Day 1) + Cefepime 2000 mg IV q12h (Day 1) as per ID  - Begin rifampin 600 mg PO day number 1 01/31  - Will need PICC placement, PICC team consulted  - f/u wound cx  - f/u blood cx  - Check ESR/CRP   - Pain control with home regimen including PO morphine 15mg BID PRN + Naproxen 500 mg BID PO PRN + Percocet 10 q 6 PRN  - Podiatry and ID following     #Diabetes  - hold Janumet 50 mg-1000 mg PO BID  - Monitor fingersticks   - Will start insulin coverage if FSG consistently > 150    #CAD/ DLD  - c/w aspirin + statin  - also takes ezetimibe at home    #HTN-   - c/w ACE-i + metoprolol    #COPD  - symbicort and albuterol PRN   - Home meds: Qvar & Ventolin    #Sciatica/Peripheral neuralgia   - c/w duloxetine and lyrica    Dispo: from home  DVT ppx: Lovenox  GI ppx: Protonics   Pharmacy [facility]

## 2019-01-31 NOTE — DISCHARGE NOTE ADULT - CARE PROVIDER_API CALL
Rory Diaz (DPM)  Surgery  [address] [place] [address]  Phone: [phone]  Fax: [phone]  Follow Up Time: Rory Diaz (DPMALACHI)  Surgery  [address] [place] [address]  Phone: [phone]  Fax: [phone]  Follow Up Time:     Velasquez Millard)  Infectious Disease; Internal Medicine  [address] [place] [id number]  Phone: [phone]  Fax: [phone]  Follow Up Time:

## 2019-01-31 NOTE — DISCHARGE NOTE ADULT - HOSPITAL COURSE
52 yo female with hx DM, CAD, HTN, Hypothyroid, s/p Reconstruction surgery R foot for charcot foot on 1/11/19. Patient was discharged on 1/18/19, not on antibiotics at time of discharge. Since discharge developed dehiscence and infection at surgical sites. She had f/u with Dr. Diaz at his office who sent her to hospital for IV antibiotics. Pt reports few days after discharge foot become swollen, red, painful.  She reported pus and small amount of blood draining from the surgical sites.     >>> 52 yo female with hx DM, CAD, HTN, Hypothyroid, s/p Reconstruction surgery R foot for charcot foot on 1/11/19. Patient was discharged on 1/18/19, not on antibiotics at time of discharge. Since discharge developed dehiscence and infection at surgical sites. She had f/u with Dr. Diaz at his office who sent her to hospital for IV antibiotics. Pt reports few days after discharge foot become swollen, red, painful.  She reported pus and small amount of blood draining from the surgical sites.     >>>Pt was diagnosed with surgical wound infection s/p reconstruction of right foot 1/11/19. Wound cultures showed few MRSA. Blood cultures were negative. Pt was started on iv antibiotics during hospitalization course, PICC line was placed and is being discharged on IV ceftriaxone 2gm Q24 (to stop after 3/12/19).  Podiatry did daily wound dressing changes. Debridement  02/08/2019  excisional debridement of soft tissue right foot with oasis application and wound vac therapy was also done. Wound vac was changed on 2/15, 2/17 and 2/18.  Pt is advised to follow up with Dr Diaz Surgery as out pt.  Visiting nurse would go 3 times/week to change wound vac and dressing. 52 yo female with hx DM, CAD, HTN, Hypothyroid, s/p Reconstruction surgery R foot for charcot foot on 1/11/19. Patient was discharged on 1/18/19, not on antibiotics at time of discharge. Since discharge developed dehiscence and infection at surgical sites. She had f/u with Dr. Diaz at his office who sent her to hospital for IV antibiotics. Pt reports few days after discharge foot become swollen, red, painful.  She reported pus and small amount of blood draining from the surgical sites.     >>>Pt was diagnosed with surgical wound infection s/p reconstruction of right foot 1/11/19. Wound cultures showed few MRSA. Blood cultures were negative. Pt was started on iv antibiotics during hospitalization course, PICC line was placed and is being discharged on IV clindamycin 900mg Q8 (to stop after 3/12/19).  Podiatry did daily wound dressing changes. Debridement  02/08/2019  excisional debridement of soft tissue right foot with oasis application and wound vac therapy was also done. Wound vac was changed on 2/15, 2/17 and 2/18.  Pt is advised to follow up with Dr Diaz Surgery as out pt.  Visiting nurse would go 3 times/week to change wound vac and dressing. 54 yo female with hx DM, CAD, HTN, Hypothyroid, s/p Reconstruction surgery R foot for charcot foot on 1/11/19. Patient was discharged on 1/18/19, not on antibiotics at time of discharge. Since discharge developed dehiscence and infection at surgical sites. She had f/u with Dr. Diaz at his office who sent her to hospital for IV antibiotics. Pt reports few days after discharge foot become swollen, red, painful.  She reported pus and small amount of blood draining from the surgical sites.     >>>Pt was diagnosed with surgical wound infection s/p reconstruction of right foot 1/11/19. Wound cultures showed few MRSA. Blood cultures were negative. Pt was started on iv antibiotics during hospitalization course, PICC line was placed and is being discharged on IV clindamycin 900mg Q8 (to stop after 3/12/19).  Podiatry did daily wound dressing changes. Debridement  02/08/2019  excisional debridement of soft tissue right foot with oasis application and wound vac therapy was also done. Wound vac was changed on 2/15, 2/17 and 2/18.  Pt is advised to follow up with Dr Diaz Surgery as out pt.  Visiting nurse would go 3 times/week to change wound vac and dressing.  Pt is advised to follow up with Dr Millard in 1 week.  Pt is also advised to get weekly cbc and bmp. 52 yo female with hx DM, CAD, HTN, Hypothyroid, s/p Reconstruction surgery R foot for charcot foot on 1/11/19. Patient was discharged on 1/18/19, not on antibiotics at time of discharge. Since discharge developed dehiscence and infection at surgical sites. She had f/u with Dr. Diaz at his office who sent her to hospital for IV antibiotics. Pt reports few days after discharge foot become swollen, red, painful.  She reported pus and small amount of blood draining from the surgical sites.     >>>Pt was diagnosed with surgical wound infection s/p reconstruction of right foot 1/11/19. Wound cultures showed few MSSA. Blood cultures were negative. Pt was started on iv antibiotics during hospitalization course, PICC line was placed and is being discharged on IV clindamycin 900mg Q8 (to stop after 3/12/19).  Podiatry did daily wound dressing changes. Debridement  02/08/2019  excisional debridement of soft tissue right foot with oasis application and wound vac therapy was also done. Wound vac was changed on 2/15, 2/17 and 2/18.  Pt is advised to follow up with Dr Diaz Surgery as out pt.  Visiting nurse would go 3 times/week to change wound vac and dressing.  Pt is advised to follow up with Dr Millard in 1 week.  Pt is also advised to get weekly cbc and bmp.

## 2019-01-31 NOTE — CONSULT NOTE ADULT - ATTENDING COMMENTS
53F    Charcot foot s/p recent surgery with hardware  Chronic midline low back pain with bilateral sciatica  Peripheral neuralgia  Essential hypertension  Diabetes   H/O shoulder surgery: Right shoulder surgery    Admitted with wound dehiscence   Sepsis ruled out on admission    - Add rifampin 600mg PO daily given hardware  - Vanc 1.25 q12h  - Please check vanc trough 30 min prior to 4th dose  - Cefepime 2g q12h  - f/u wcx  - Podiatry following  - esr/crp  - Will need PICC    Spectra [id number]

## 2019-01-31 NOTE — CONSULT NOTE ADULT - CONSULT REASON
recent reconstructive surgery for Charcot foot  readmitted with erythema, drainage over surgical site  started on vancomycin and zosyn

## 2019-01-31 NOTE — MEDICAL STUDENT PROGRESS NOTE(EDUCATION) - SUBJECTIVE AND OBJECTIVE BOX
Subjective  Patient is a 54 yo f with a pmh significant for Diabetes mellitus 1.5, CAD, HTN, DLD, COPD not on home O2, chronic midline low back pain with bilateral sciatica hyothyroid, s/p R. foot reconstruction surgery Jan. 11th 2/2 worsening charcot's arthropathy presenting with worsening pain and pus filled drainage from surgical site     HPI: Patient is a 54 yo f with a pmh significant for Diabetes mellitus 1.5, CAD, HTN, DLD, COPD not on home O2, chronic midline low back pain with bilateral sciatica hyothyroid, s/p R. foot reconstruction surgery Jan. 11th 2/2 worsening charcot's arthropathy. Patient present states she went to f/u with her podiatrist Dr. Diaz last Friday who suspected infection at the wound site and sent her to the ED. Patient went to [facility] and stated she received a course of IV antibiotics and was then sent some from the ED. Early this week she mentions she began noticing pus streaking out of her cast, and that her foot was becoming more painful. She then went back to Dr. Diaz who again suggested she go to the ED due to wound dehiscience and infection. In the ED the patient received IV zosyn as well as IV vancomycin. Physical exam noted wound dehiscence as well as infection of surgical sites. The patient denied fevers/chills, n/v, abdominal pain, changes in bowel movements, as well as headache, dysuria, shortness of breath, and chest pain.     Besides the pertinent positives and negatives described above, ROS wnl    Interval History:  The patient reports that her pain has been moderately controlled with percocet overnight, stating without the pain medication pain reaches "100/10." She states she did not have diarrhea overnight, chills, fever, SOB, chest pain, or any more drainage from the wound. The patient had an episode of hypotension with bp going down to 82/48. Patient was not symptomatic including feeling dizzy, confused, light headed, or nauseous and  was encouraged to eat crackers and drink more fluids, after which bp hafsa to 98/56. No acute events overnight.     PAST MEDICAL & SURGICAL HISTORY  Charcot's arthropathy  R foot  Chronic midline low back pain with bilateral sciatica    Dextrocardia    Diabetes 1.5, managed as type 2    Essential hypertension    Peripheral neuralgia  COPD  DLD  CAD    ALLERGIES:  No known allergies    MEDICATIONS:  Standing medications:  Metoprolol Tartrate 25 mg oral tablet: 1 tab(s) orally 2 times a day   ramipril 2.5 mg oral capsule: 1 cap(s) orally once a day  Lyrica 150 mg oral capsule: 1 cap(s) orally 3 times a day  hydrOXYzine hydrochloride 10 mg oral tablet: 2 tab(s) orally 3 times a day  fexofenadine 180 mg oral tablet: 1 tab(s) orally once a day  aspirin 81 mg oral delayed release tablet: 1 tab(s) orally once a day  atorvastatin 40 mg oral tablet: 1 tab(s) orally once a day  levothyroxine 100 mcg (0.1 mg) oral capsule: 1 cap(s) orally once a day  tiZANidine 4 mg oral tablet: 2 tab(s) orally every 8 hours  pantoprazole 40 mg oral delayed release tablet: 1 tab(s) orally once a day  DULoxetine 60 mg oral delayed release capsule: 1 cap(s) orally once a day  Janumet 50 mg-1000 mg oral tablet: 1 tab(s) orally 2 times a day  diazePAM 5 mg oral tablet: 1 tab(s) orally 2 times a day, As Needed  Myrbetriq 25 mg oral tablet, extended release: 1 tab(s) orally once a day  ezetimibe 10 mg oral tablet: 1 tab(s) orally once a day  Vancomycin 1250 mg IV in dextrose 5% 250 ml q12h infused over 90 min  Cefepime 2000 mg IV in dextrose 5% 50 ml q12h over infused 30 min    PRN medications:  Percocet 10/325 oral tablet: 1 tab(s) PO q8h PRN MDD:30/975  Morphine 15 mg PO BID PRN  Naproxen 500 mg PO BID PRN  Qvar 80 mcg/inh inhalation aerosol: 1 puff(s) inhaled 2 times a day  Ventolin HFA 90 mcg/inh inhalation aerosol: 2 puff(s) inhaled 4 times a day, As Needed    VITALS: (31 Jan 2019 05:31)  T(F): 99.1  HR: 87  BP: 98/52  RR: 18 SpO2- 96% ra    LABS:                        10.1   8.12----------( 334   (31 Jan 2019 06:12)             32.1    (31 Jan 2019 06:12)    141 |  101  |  7  ----------------------------<  145  4.8|  27 |  1.0    Ca    8.5     (31 Jan 2019 06:12)    Lactate- 1.9 (30 Jan 2019 16:01)    TPro  5.4  /  Alb  2.8  TBili  <.2  /  DBili  x   /  AST  18 /  ALT 14/  AlkPhos  100    (31 Jan 2019 06:12)        12 Lead ECG- 01/30/2019 @ 14:13  Diagnosis Line Normal sinus rhythm  Anterolateral infarct , age undetermined  Abnormal ECG      Radiology:  EXAM:  XR FOOT COMP MIN 3 VIEWS RT        PROCEDURE DATE:  01/30/2019      Impression: Status post Charcot reconstruction with talar first metatarsal, calcaneal   cuboid and cuneiform second metatarsal arthrodeses. No evidence of   hardware complication.  Overall stable exam.    PHYSICAL EXAM:  GEN: Patient lying in bed, appears comfortable  LUNGS: Clear to auscultation, no wheezing or crackles    HEART: Regular, normal S1/S2 no rubs, murmurs, or gallops  ABD: Soft, non-distended, bowel sounds present, no tenderness to palpation  EXT: R. foot was bound, waiting for podiatry wound change to visualize, however as per podiatry evidence of wound dehiscence, as well as erythema overlying surgical site with no drainage currently apparent    NEURO: AAOX3, 5/5 muscle strength bilaterally Subjective  Patient is a 54 yo f with a pmh significant for Diabetes mellitus 1.5, CAD, HTN, DLD, COPD not on home O2, chronic midline low back pain with bilateral sciatica hyothyroid, s/p R. foot reconstruction surgery Jan. 11th 2/2 worsening charcot's arthropathy presenting with worsening pain and pus filled drainage from surgical site     HPI: Patient is a 54 yo f with a pmh significant for Diabetes mellitus 1.5, CAD, HTN, DLD, COPD not on home O2, chronic midline low back pain with bilateral sciatica, hyothyroid, s/p R. foot reconstruction surgery Jan. 11th 2/2 worsening charcot's arthropathy. Patient present states she went to f/u with her podiatrist Dr. Diaz last Friday who suspected infection at the wound site and sent her to the ED. Patient went to [facility] and stated she received a course of IV antibiotics and was then sent some from the ED. Early this week she mentions she began noticing pus streaking out of her cast, and that her foot was becoming more painful. She then went back to Dr. Diaz who again suggested she go to the ED due to wound dehiscence and infection. In the ED the patient received IV zosyn as well as IV vancomycin. Physical exam noted wound dehiscence as well as infection of surgical sites. The patient denied fevers/chills, n/v, abdominal pain, changes in bowel movements, as well as headache, dysuria, shortness of breath, and chest pain.     Besides the pertinent positives and negatives described above, ROS wnl    Interval History:  The patient reports that her pain has been moderately controlled with percocet overnight, stating without the pain medication pain reaches "100/10." She states she did not have diarrhea overnight, chills, fever, SOB, chest pain, or any more drainage from the wound. The patient had an episode of hypotension with bp going down to 82/48. Patient was not symptomatic including feeling dizzy, confused, light headed, or nauseous and  was encouraged to eat crackers and drink more fluids, after which bp hafsa to 98/56. No acute events overnight.     PAST MEDICAL & SURGICAL HISTORY  Charcot's arthropathy  R foot  Chronic midline low back pain with bilateral sciatica    Dextrocardia    Diabetes 1.5, managed as type 2    Essential hypertension    Peripheral neuralgia  COPD  DLD  CAD    ALLERGIES:  No known allergies    MEDICATIONS:  Standing medications:  Metoprolol Tartrate 25 mg oral tablet: 1 tab(s) orally 2 times a day   ramipril 2.5 mg oral capsule: 1 cap(s) orally once a day  Lyrica 150 mg oral capsule: 1 cap(s) orally 3 times a day  hydrOXYzine hydrochloride 10 mg oral tablet: 2 tab(s) orally 3 times a day  fexofenadine 180 mg oral tablet: 1 tab(s) orally once a day  aspirin 81 mg oral delayed release tablet: 1 tab(s) orally once a day  atorvastatin 40 mg oral tablet: 1 tab(s) orally once a day  levothyroxine 100 mcg (0.1 mg) oral capsule: 1 cap(s) orally once a day  tiZANidine 4 mg oral tablet: 2 tab(s) orally every 8 hours  pantoprazole 40 mg oral delayed release tablet: 1 tab(s) orally once a day  DULoxetine 60 mg oral delayed release capsule: 1 cap(s) orally once a day  Janumet 50 mg-1000 mg oral tablet: 1 tab(s) orally 2 times a day  diazePAM 5 mg oral tablet: 1 tab(s) orally 2 times a day, As Needed  Myrbetriq 25 mg oral tablet, extended release: 1 tab(s) orally once a day  ezetimibe 10 mg oral tablet: 1 tab(s) orally once a day  Vancomycin 1250 mg IV in dextrose 5% 250 ml q12h infused over 90 min  Cefepime 2000 mg IV in dextrose 5% 50 ml q12h over infused 30 min    PRN medications:  Percocet 10/325 oral tablet: 1 tab(s) PO q8h PRN MDD:30/975  Morphine 15 mg PO BID PRN  Naproxen 500 mg PO BID PRN  Qvar 80 mcg/inh inhalation aerosol: 1 puff(s) inhaled 2 times a day  Ventolin HFA 90 mcg/inh inhalation aerosol: 2 puff(s) inhaled 4 times a day, As Needed    VITALS: (31 Jan 2019 05:31)  T(F): 99.1  HR: 87  BP: 98/52  RR: 18 SpO2- 96% ra    LABS:                        10.1   8.12----------( 334   (31 Jan 2019 06:12)             32.1    (31 Jan 2019 06:12)    141 |  101  |  7  ----------------------------<  145  4.8|  27 |  1.0    Ca    8.5     (31 Jan 2019 06:12)    Lactate- 1.9 (30 Jan 2019 16:01)    TPro  5.4  /  Alb  2.8  TBili  <.2  /  DBili  x   /  AST  18 /  ALT 14/  AlkPhos  100    (31 Jan 2019 06:12)        12 Lead ECG- 01/30/2019 @ 14:13  Diagnosis Line Normal sinus rhythm  Anterolateral infarct , age undetermined  Abnormal ECG      Radiology:  EXAM:  XR FOOT COMP MIN 3 VIEWS RT        PROCEDURE DATE:  01/30/2019      Impression: Status post Charcot reconstruction with talar first metatarsal, calcaneal   cuboid and cuneiform second metatarsal arthrodeses. No evidence of   hardware complication.  Overall stable exam.    PHYSICAL EXAM:  GEN: Patient lying in bed, appears comfortable  LUNGS: Clear to auscultation, no wheezing or crackles    HEART: Regular, normal S1/S2 no rubs, murmurs, or gallops  ABD: Soft, non-distended, bowel sounds present, no tenderness to palpation  EXT: R. foot was bound, waiting for podiatry wound change to visualize, however as per podiatry evidence of wound dehiscence, as well as erythema overlying surgical site with no drainage currently apparent    NEURO: AAOX3, 5/5 muscle strength bilaterally Subjective  Patient is a 54 yo f with a pmh significant for Diabetes mellitus 1.5, CAD, HTN, DLD, COPD not on home O2, chronic midline low back pain with bilateral sciatica hyothyroid, s/p R. foot reconstruction surgery Jan. 11th 2/2 worsening charcot's arthropathy presenting with worsening pain and pus filled drainage from surgical site     HPI: Patient is a 54 yo f with a pmh significant for Diabetes mellitus 1.5, CAD, HTN, DLD, COPD not on home O2, chronic midline low back pain with bilateral sciatica, hyothyroid, s/p R. foot reconstruction surgery Jan. 11th 2/2 worsening charcot's arthropathy. Patient present states she went to f/u with her podiatrist Dr. Diaz last Friday who suspected infection at the wound site and sent her to the ED. Patient went to [facility] and stated she received a course of IV antibiotics and was then sent some from the ED. Early this week she mentions she began noticing pus streaking out of her cast, and that her foot was becoming more painful. She then went back to Dr. Diaz who again suggested she go to the ED due to wound dehiscence and infection. In the ED the patient received IV zosyn as well as IV vancomycin. Physical exam noted wound dehiscence as well as infection of surgical sites. The patient denied fevers/chills, n/v, abdominal pain, changes in bowel movements, as well as headache, dysuria, shortness of breath, and chest pain.     Besides the pertinent positives and negatives described above, ROS wnl    Interval History:  The patient reports that her pain has been moderately controlled with percocet overnight, stating without the pain medication pain reaches "100/10." She states she did not have diarrhea overnight, chills, fever, SOB, chest pain, or any more drainage from the wound. The patient had an episode of hypotension with bp going down to 82/48. Patient was not symptomatic including feeling dizzy, confused, light headed, or nauseous and  was encouraged to eat crackers and drink more fluids, after which bp hafsa to 98/56. No acute events overnight.     PAST MEDICAL & SURGICAL HISTORY  Charcot's arthropathy  R foot  Chronic midline low back pain with bilateral sciatica    Dextrocardia    Diabetes 1.5, managed as type 2    Essential hypertension    Peripheral neuralgia  COPD  DLD  CAD    ALLERGIES:  No known allergies    MEDICATIONS:  Standing medications:  Metoprolol Tartrate 25 mg oral tablet: 1 tab(s) orally 2 times a day   ramipril 2.5 mg oral capsule: 1 cap(s) orally once a day  Lyrica 150 mg oral capsule: 1 cap(s) orally 3 times a day  hydrOXYzine hydrochloride 10 mg oral tablet: 2 tab(s) orally 3 times a day  fexofenadine 180 mg oral tablet: 1 tab(s) orally once a day  aspirin 81 mg oral delayed release tablet: 1 tab(s) orally once a day  atorvastatin 40 mg oral tablet: 1 tab(s) orally once a day  levothyroxine 100 mcg (0.1 mg) oral capsule: 1 cap(s) orally once a day  tiZANidine 4 mg oral tablet: 2 tab(s) orally every 8 hours  pantoprazole 40 mg oral delayed release tablet: 1 tab(s) orally once a day  DULoxetine 60 mg oral delayed release capsule: 1 cap(s) orally once a day  Janumet 50 mg-1000 mg oral tablet: 1 tab(s) orally 2 times a day  diazePAM 5 mg oral tablet: 1 tab(s) orally 2 times a day, As Needed  Myrbetriq 25 mg oral tablet, extended release: 1 tab(s) orally once a day  ezetimibe 10 mg oral tablet: 1 tab(s) orally once a day  Vancomycin 1250 mg IV in dextrose 5% 250 ml q12h infused over 90 min  Cefepime 2000 mg IV in dextrose 5% 50 ml q12h over infused 30 min  Rifampin 600 mg IV in dextrose 5% 100 ml q24h infused over 60 minutes    PRN medications:  Percocet 10/325 oral tablet: 1 tab(s) PO q8h PRN MDD:30/975  Morphine 15 mg PO BID PRN  Naproxen 500 mg PO BID PRN  Qvar 80 mcg/inh inhalation aerosol: 1 puff(s) inhaled 2 times a day  Ventolin HFA 90 mcg/inh inhalation aerosol: 2 puff(s) inhaled 4 times a day, As Needed    VITALS: (31 Jan 2019 05:31)  T(F): 99.1  HR: 87  BP: 98/52  RR: 18 SpO2- 96% ra    LABS:                        10.1   8.12----------( 334   (31 Jan 2019 06:12)             32.1    (31 Jan 2019 06:12)    141 |  101  |  7  ----------------------------<  145  4.8|  27 |  1.0    Ca    8.5     (31 Jan 2019 06:12)    Lactate- 1.9 (30 Jan 2019 16:01)    TPro  5.4  /  Alb  2.8  TBili  <.2  /  DBili  x   /  AST  18 /  ALT 14/  AlkPhos  100    (31 Jan 2019 06:12)        12 Lead ECG- 01/30/2019 @ 14:13  Diagnosis Line Normal sinus rhythm  Anterolateral infarct , age undetermined  Abnormal ECG      Radiology:  EXAM:  XR FOOT COMP MIN 3 VIEWS RT        PROCEDURE DATE:  01/30/2019      Impression: Status post Charcot reconstruction with talar first metatarsal, calcaneal   cuboid and cuneiform second metatarsal arthrodeses. No evidence of   hardware complication.  Overall stable exam.    PHYSICAL EXAM:  GEN: Patient lying in bed, appears comfortable  LUNGS: Clear to auscultation, no wheezing or crackles    HEART: Regular, normal S1/S2 no rubs, murmurs, or gallops  ABD: Soft, non-distended, bowel sounds present, no tenderness to palpation  EXT: R. foot was bound, waiting for podiatry wound change to visualize, however as per podiatry evidence of wound dehiscence, as well as erythema overlying surgical site with no drainage currently apparent    NEURO: AAOX3, 5/5 muscle strength bilaterally Subjective  Patient is a 52 yo f with a pmh significant for Diabetes mellitus 1.5, CAD, HTN, DLD, COPD not on home O2, chronic midline low back pain with bilateral sciatica hyothyroid, s/p R. foot reconstruction surgery Jan. 11th 2/2 worsening charcot's arthropathy presenting with worsening pain and pus filled drainage from surgical site     HPI: Patient is a 52 yo f with a pmh significant for Diabetes mellitus 1.5, CAD, HTN, DLD, COPD not on home O2, chronic midline low back pain with bilateral sciatica, hyothyroid, s/p R. foot reconstruction surgery Jan. 11th 2/2 worsening charcot's arthropathy. Patient present states she went to f/u with her podiatrist Dr. Diaz last Friday who suspected infection at the wound site and sent her to the ED. Patient went to [facility] and stated she received a course of IV antibiotics and was then sent some from the ED. Early this week she mentions she began noticing pus streaking out of her cast, and that her foot was becoming more painful. She then went back to Dr. Diaz who again suggested she go to the ED due to wound dehiscence and infection. In the ED the patient received IV zosyn as well as IV vancomycin. Physical exam noted wound dehiscence as well as infection of surgical sites. The patient denied fevers/chills, n/v, abdominal pain, changes in bowel movements, as well as headache, dysuria, shortness of breath, and chest pain.     Besides the pertinent positives and negatives described above, ROS wnl    Interval History:  The patient reports that her pain has been moderately controlled with percocet overnight, stating without the pain medication pain reaches "100/10." She states she did not have diarrhea overnight, chills, fever, SOB, chest pain, or any more drainage from the wound. The patient had an episode of hypotension with bp going down to 82/48. Patient was not symptomatic including feeling dizzy, confused, light headed, or nauseous and  was encouraged to eat crackers and drink more fluids, after which bp hafsa to 98/56. Otherwise, no acute events overnight.     PAST MEDICAL & SURGICAL HISTORY  Charcot's arthropathy  R foot  Chronic midline low back pain with bilateral sciatica    Dextrocardia    Diabetes 1.5, managed as type 2    Essential hypertension    Peripheral neuralgia  COPD  DLD  CAD    ALLERGIES:  No known allergies    MEDICATIONS:  Standing medications:  Metoprolol Tartrate 25 mg oral tablet: 1 tab(s) orally 2 times a day   ramipril 2.5 mg oral capsule: 1 cap(s) orally once a day  Lyrica 150 mg oral capsule: 1 cap(s) orally 3 times a day  hydrOXYzine hydrochloride 10 mg oral tablet: 2 tab(s) orally 3 times a day  fexofenadine 180 mg oral tablet: 1 tab(s) orally once a day  aspirin 81 mg oral delayed release tablet: 1 tab(s) orally once a day  atorvastatin 40 mg oral tablet: 1 tab(s) orally once a day  levothyroxine 100 mcg (0.1 mg) oral capsule: 1 cap(s) orally once a day  tiZANidine 4 mg oral tablet: 2 tab(s) orally every 8 hours  pantoprazole 40 mg oral delayed release tablet: 1 tab(s) orally once a day  DULoxetine 60 mg oral delayed release capsule: 1 cap(s) orally once a day  Janumet 50 mg-1000 mg oral tablet: 1 tab(s) orally 2 times a day  diazePAM 5 mg oral tablet: 1 tab(s) orally 2 times a day, As Needed  Myrbetriq 25 mg oral tablet, extended release: 1 tab(s) orally once a day  ezetimibe 10 mg oral tablet: 1 tab(s) orally once a day  Vancomycin 1250 mg IV in dextrose 5% 250 ml q12h infused over 90 min  Cefepime 2000 mg IV in dextrose 5% 50 ml q12h over infused 30 min  Rifampin 600 mg IV in dextrose 5% 100 ml q24h infused over 60 minutes    PRN medications:  Percocet 10/325 oral tablet: 1 tab(s) PO q8h PRN MDD:30/975  Morphine 15 mg PO BID PRN  Naproxen 500 mg PO BID PRN  Qvar 80 mcg/inh inhalation aerosol: 1 puff(s) inhaled 2 times a day  Ventolin HFA 90 mcg/inh inhalation aerosol: 2 puff(s) inhaled 4 times a day, As Needed    VITALS: (31 Jan 2019 05:31)  T(F): 99.1  HR: 87  BP: 98/52  RR: 18 SpO2- 96% ra    LABS:                        10.1   8.12----------( 334   (31 Jan 2019 06:12)             32.1    (31 Jan 2019 06:12)    141 |  101  |  7  ----------------------------<  145  4.8|  27 |  1.0    Ca    8.5     (31 Jan 2019 06:12)    Lactate- 1.9 (30 Jan 2019 16:01)    TPro  5.4  /  Alb  2.8  TBili  <.2  /  DBili  x   /  AST  18 /  ALT 14/  AlkPhos  100    (31 Jan 2019 06:12)        12 Lead ECG- 01/30/2019 @ 14:13  Diagnosis Line Normal sinus rhythm  Anterolateral infarct , age undetermined  Abnormal ECG      Radiology:  EXAM:  XR FOOT COMP MIN 3 VIEWS RT        PROCEDURE DATE:  01/30/2019      Impression: Status post Charcot reconstruction with talar first metatarsal, calcaneal   cuboid and cuneiform second metatarsal arthrodeses. No evidence of   hardware complication.  Overall stable exam.    PHYSICAL EXAM:  GEN: Patient lying in bed, appears comfortable  LUNGS: Clear to auscultation, no wheezing or crackles    HEART: Regular, normal S1/S2 no rubs, murmurs, or gallops  ABD: Soft, non-distended, bowel sounds present, no tenderness to palpation  EXT: R. foot was bound, waiting for podiatry wound change to visualize, however as per podiatry evidence of wound dehiscence, as well as erythema overlying surgical site with no drainage currently apparent    NEURO: AAOX3, 5/5 muscle strength bilaterally

## 2019-01-31 NOTE — CONSULT NOTE ADULT - ASSESSMENT
52 y/o F PMHx significant for DM II, CAD, HTN, hypothyroidism, s/p R foot reconstruction surgery for Charcot foot on 1/11/19. D/c 1/18/19 without antibiotics. Noticed tender, erythematous & edematous foot after d/c and went to [facility] on 1/25 where she got IV antibiotics for 2-3 hours (doesn't know name of antibiotics). Went to f/u with Dr. Diaz at office and was sent to ED yesterday for dehiscence and infection at surgical site. Denies fever, chills, SOB, CP, N/V, D/C, recent travel or sick contacts.    IMPRESSION:  Right foot dehiscence and infection at surgical site s/p reconstruction surgery  Afebrile, vitals WNL and no elevated WBC    RECOMMENDATIONS:  F/U wound cultures  F/U blood cultures 54 y/o F PMHx significant for DM II, CAD, HTN, hypothyroidism, s/p R foot reconstruction surgery for Charcot foot on 1/11/19. D/c 1/18/19 without antibiotics. Noticed tender, erythematous & edematous foot after d/c and went to [facility] on 1/25 where she got IV antibiotics for 2-3 hours (doesn't know name of antibiotics). Went to f/u with Dr. Diaz at office and was sent to ED yesterday for dehiscence and infection at surgical site. Denies fever, chills, SOB, CP, N/V, D/C, recent travel or sick contacts.    IMPRESSION:  Right foot dehiscence and infection at surgical site s/p reconstruction surgery  Afebrile, vitals WNL and no elevated WBC  No evidence of sepsis    RECOMMENDATIONS:  Rifampin 600 mg PO q24  Continue IV Vancomycin 1.25 g q12 and cefepime 2 g q12  F/U wound cultures  F/U blood cultures

## 2019-01-31 NOTE — PROGRESS NOTE ADULT - ASSESSMENT
CALLY HARTMAN 53y Female  MRN#: [id number]       SUBJECTIVE  Patient is a 53y old Female who presents with a chief complaint of right foot infection (2019 12:29)  Currently admitted to medicine with the primary diagnosis of dehiscence and infection s/p Charcot foot revision    Today is hospital day 1d, and this morning she is _________ and reports ________ overnight events.     OBJECTIVE  PAST MEDICAL & SURGICAL HISTORY  Dextrocardia  Chronic midline low back pain with bilateral sciatica  Peripheral neuralgia  Essential hypertension  Diabetes 1.5, managed as type 2  Charcot's arthropathy: R foot  Charcot foot due to diabetes mellitus  H/O shoulder surgery: Right shoulder surgery   delivery delivered    ALLERGIES:  No Known Allergies    MEDICATIONS:  STANDING MEDICATIONS  aspirin enteric coated 81 milliGRAM(s) Oral daily  atorvastatin 40 milliGRAM(s) Oral at bedtime  buDESOnide  80 MICROgram(s)/formoterol 4.5 MICROgram(s) Inhaler 2 Puff(s) Inhalation two times a day  cefepime   IVPB 2000 milliGRAM(s) IV Intermittent every 12 hours  chlorhexidine 4% Liquid 1 Application(s) Topical <User Schedule>  DULoxetine 60 milliGRAM(s) Oral daily  enoxaparin Injectable 40 milliGRAM(s) SubCutaneous daily  hydrOXYzine  Oral Tab/Cap - Peds 10 milliGRAM(s) Oral three times a day  levothyroxine 100 MICROGram(s) Oral daily  lisinopril 10 milliGRAM(s) Oral daily  loratadine 10 milliGRAM(s) Oral daily  metoprolol tartrate 25 milliGRAM(s) Oral two times a day  morphine ER Tablet 15 milliGRAM(s) Oral two times a day  oxybutynin 5 milliGRAM(s) Oral two times a day  pantoprazole    Tablet 40 milliGRAM(s) Oral before breakfast  pregabalin 150 milliGRAM(s) Oral three times a day  rifampin IVPB      rifampin IVPB 600 milliGRAM(s) IV Intermittent once  sodium chloride 0.9% lock flush 3 milliLiter(s) IV Push every 8 hours  vancomycin  IVPB 1250 milliGRAM(s) IV Intermittent every 12 hours    PRN MEDICATIONS  ALBUTerol    90 MICROgram(s) HFA Inhaler 2 Puff(s) Inhalation every 6 hours PRN  diazepam    Tablet 5 milliGRAM(s) Oral two times a day PRN  oxyCODONE    5 mG/acetaminophen 325 mG 2 Tablet(s) Oral every 6 hours PRN      VITAL SIGNS: Last 24 Hours  T(C): 36 (2019 05:31), Max: 37.3 (2019 19:37)  T(F): 96.8 (2019 05:31), Max: 99.1 (2019 19:37)  HR: 84 (2019 06:46) (83 - 101)  BP: 98/56 (2019 08:42) (82/48 - 110/64)  BP(mean): --  RR: 18 (2019 05:31) (17 - 18)  SpO2: 96% (2019 23:19) (96% - 96%)    LABS:                        10.1   8.12  )-----------( 334      ( 2019 06:12 )             32.1         141  |  101  |  7<L>  ----------------------------<  77  4.8   |  27  |  1.0    Ca    8.5      2019 06:12    TPro  5.4<L>  /  Alb  2.8<L>  /  TBili  <0.2  /  DBili  x   /  AST  18  /  ALT  14  /  AlkPhos  100            Lactate, Blood: 1.9 mmol/L (19 @ 16:01)          RADIOLOGY:      PHYSICAL EXAM:    GENERAL: NAD, well-developed, AAOx3  HEENT:  Atraumatic, Normocephalic. EOMI, PERRLA, conjunctiva and sclera clear, No JVD  PULMONARY: Clear to auscultation bilaterally; No wheeze  CARDIOVASCULAR: Regular rate and rhythm; No murmurs, rubs, or gallops  GASTROINTESTINAL: Soft, Nontender, Nondistended; Bowel sounds present  MUSCULOSKELETAL:  2+ Peripheral Pulses, No clubbing, cyanosis, or edema  NEUROLOGY: non-focal  SKIN: No rashes or lesions      ADMISSION SUMMARY  Patient is a 53y old Female who presents with a chief complaint of right foot infection (2019 12:29)  Currently admitted to medicine with the primary diagnosis of Charcot foot due to diabetes mellitus  Hospital course has been complicated by _______.       ASSESSMENT & PLAN    1 CALLY HARTMAN 53y Female  MRN#: [id number]     SUBJECTIVE  Patient is a 53y old Female who presents with a chief complaint of right foot infection (31 Jan 2019 12:29)  Currently admitted to medicine with the primary diagnosis of dehiscence and infection s/p Charcot foot revision    Today is hospital day 1d, her pain in       MEDICATIONS:  STANDING MEDICATIONS  aspirin enteric coated 81 milliGRAM(s) Oral daily  atorvastatin 40 milliGRAM(s) Oral at bedtime  buDESOnide  80 MICROgram(s)/formoterol 4.5 MICROgram(s) Inhaler 2 Puff(s) Inhalation two times a day  cefepime   IVPB 2000 milliGRAM(s) IV Intermittent every 12 hours  chlorhexidine 4% Liquid 1 Application(s) Topical <User Schedule>  DULoxetine 60 milliGRAM(s) Oral daily  enoxaparin Injectable 40 milliGRAM(s) SubCutaneous daily  hydrOXYzine  Oral Tab/Cap - Peds 10 milliGRAM(s) Oral three times a day  levothyroxine 100 MICROGram(s) Oral daily  lisinopril 10 milliGRAM(s) Oral daily  loratadine 10 milliGRAM(s) Oral daily  metoprolol tartrate 25 milliGRAM(s) Oral two times a day  morphine ER Tablet 15 milliGRAM(s) Oral two times a day  oxybutynin 5 milliGRAM(s) Oral two times a day  pantoprazole    Tablet 40 milliGRAM(s) Oral before breakfast  pregabalin 150 milliGRAM(s) Oral three times a day  rifampin IVPB      rifampin IVPB 600 milliGRAM(s) IV Intermittent once  sodium chloride 0.9% lock flush 3 milliLiter(s) IV Push every 8 hours  vancomycin  IVPB 1250 milliGRAM(s) IV Intermittent every 12 hours    PRN MEDICATIONS  ALBUTerol    90 MICROgram(s) HFA Inhaler 2 Puff(s) Inhalation every 6 hours PRN  diazepam    Tablet 5 milliGRAM(s) Oral two times a day PRN  oxyCODONE    5 mG/acetaminophen 325 mG 2 Tablet(s) Oral every 6 hours PRN      VITAL SIGNS: Last 24 Hours  T(C): 36 (31 Jan 2019 05:31), Max: 37.3 (30 Jan 2019 19:37)  T(F): 96.8 (31 Jan 2019 05:31), Max: 99.1 (30 Jan 2019 19:37)  HR: 84 (31 Jan 2019 06:46) (83 - 101)  BP: 98/56 (31 Jan 2019 08:42) (82/48 - 110/64)  BP(mean): --  RR: 18 (31 Jan 2019 05:31) (17 - 18)  SpO2: 96% (30 Jan 2019 23:19) (96% - 96%)    LABS:                        10.1   8.12  )-----------( 334      ( 31 Jan 2019 06:12 )             32.1     01-31    141  |  101  |  7<L>  ----------------------------<  77  4.8   |  27  |  1.0    Ca    8.5      31 Jan 2019 06:12    TPro  5.4<L>  /  Alb  2.8<L>  /  TBili  <0.2  /  DBili  x   /  AST  18  /  ALT  14  /  AlkPhos  100  01-31          Lactate, Blood: 1.9 mmol/L (01-30-19 @ 16:01)          RADIOLOGY:  EXAM:  XR FOOT COMP MIN 3 VIEWS RT        PROCEDURE DATE:  01/30/2019      Impression: Status post Charcot reconstruction with talar first metatarsal, calcaneal   cuboid and cuneiform second metatarsal arthrodeses. No evidence of   hardware complication.  Overall stable exam.    PHYSICAL EXAM:    GEN: Patient lying in bed, appears comfortable  LUNGS: Clear to auscultation, no wheezing or crackles    HEART: Regular, normal S1/S2 no rubs, murmurs, or gallops  ABD: Soft, non-distended, bowel sounds present, no tenderness to palpation  EXT: R. foot was bound, waiting for podiatry wound change to visualize, however as per podiatry evidence of wound dehiscence, as well as erythema overlying surgical site with no drainage currently apparent    NEURO: AAOX3, 5/5 muscle strength bilaterally        ADMISSION SUMMARY  Patient is a 52 yo f HD #with a pmh significant for Diabetes mellitus 1.5, CAD, HTN, DLD, COPD not on home O2, chronic midline low back pain with bilateral sciatica hyothyroid, s/p R. foot reconstruction surgery Jan. 11th 2/2 worsening charcot's arthropathy presenting with worsening pain and pus filled drainage from surgical site, and wound dehiscence, without fever/chills/elevated wbc and non-significant for x-ray admitted with a diagnosis of charcot foot 2/2 DM foot infection being trx with IV vanc and cefepime and pain control      ASSESSMENT & PLAN  #Surgical wound infection  - evidence of wound dehiscence and erythema with reported drainage   - Vancomycin 1250 mg IV q12h + Cefepime 2000 mg IV q12h as per ID + Rifampin 600 mg q daily   - Possible picc placement as per podiatry  - f/u wound cx  - f/u blood cx  - Pain control with home regimen including PO morphine 15mg BID PRN + Naproxen 500 mg BID PO PRN     #Diabetes  - c/w Janumet 50 mg-1000 mg PO BID  - Monitor fingersticks   - Will start insulin if FSG consistently > 200     #CAD/ DLD  -   - c/w aspirin + statin + ezetimibe    #HTN  - c/w ACE-I (takes ramipril at home, taking lisinopril here) + metoprolol    #COPD  - Home meds Qvar 80 mcg/inh inhalation BID PRN + Ventolin HFA 90 mcg/inh i2 puff(s) inhaled 4 times a day PRN  - On symbicort and albuterol PRN here     #Sciatica/Peripheral neuralgia   - c/w duloxetine   - c/w lyrica    Dispo: from home  DVT ppx: Lovenox  GI ppx: Protonics   Pharmacy [facility] CALLY HARTMAN 53y Female  MRN#: [id number]     SUBJECTIVE  Patient is a 53y old Female who presents with a chief complaint of right foot infection (31 Jan 2019 12:29)  Currently admitted to medicine with the primary diagnosis of dehiscence and infection s/p Charcot foot revision    Today is hospital day 1d, she still continues to have pain in right foot. Controlled with home morphine and percocet dosing.   BP dipped this morning to 82/48, but patient was asymptomatic, reporting no dizziness, no light headedness,  headache, or nausea. She improved after she was given crackers and fluids. BP hafsa to 98/56.         MEDICATIONS:  STANDING MEDICATIONS  aspirin enteric coated 81 milliGRAM(s) Oral daily  atorvastatin 40 milliGRAM(s) Oral at bedtime  buDESOnide  80 MICROgram(s)/formoterol 4.5 MICROgram(s) Inhaler 2 Puff(s) Inhalation two times a day  cefepime   IVPB 2000 milliGRAM(s) IV Intermittent every 12 hours  chlorhexidine 4% Liquid 1 Application(s) Topical <User Schedule>  DULoxetine 60 milliGRAM(s) Oral daily  enoxaparin Injectable 40 milliGRAM(s) SubCutaneous daily  hydrOXYzine  Oral Tab/Cap - Peds 10 milliGRAM(s) Oral three times a day  levothyroxine 100 MICROGram(s) Oral daily  lisinopril 10 milliGRAM(s) Oral daily  loratadine 10 milliGRAM(s) Oral daily  metoprolol tartrate 25 milliGRAM(s) Oral two times a day  morphine ER Tablet 15 milliGRAM(s) Oral two times a day  oxybutynin 5 milliGRAM(s) Oral two times a day  pantoprazole    Tablet 40 milliGRAM(s) Oral before breakfast  pregabalin 150 milliGRAM(s) Oral three times a day  rifampin IVPB      rifampin IVPB 600 milliGRAM(s) IV Intermittent once  sodium chloride 0.9% lock flush 3 milliLiter(s) IV Push every 8 hours  vancomycin  IVPB 1250 milliGRAM(s) IV Intermittent every 12 hours    PRN MEDICATIONS  ALBUTerol    90 MICROgram(s) HFA Inhaler 2 Puff(s) Inhalation every 6 hours PRN  diazepam    Tablet 5 milliGRAM(s) Oral two times a day PRN  oxyCODONE    5 mG/acetaminophen 325 mG 2 Tablet(s) Oral every 6 hours PRN      VITAL SIGNS: Last 24 Hours  T(C): 36 (31 Jan 2019 05:31), Max: 37.3 (30 Jan 2019 19:37)  T(F): 96.8 (31 Jan 2019 05:31), Max: 99.1 (30 Jan 2019 19:37)  HR: 84 (31 Jan 2019 06:46) (83 - 101)  BP: 98/56 (31 Jan 2019 08:42) (82/48 - 110/64)  BP(mean): --  RR: 18 (31 Jan 2019 05:31) (17 - 18)  SpO2: 96% (30 Jan 2019 23:19) (96% - 96%)    LABS:                        10.1   8.12  )-----------( 334      ( 31 Jan 2019 06:12 )             32.1     01-31    141  |  101  |  7<L>  ----------------------------<  77  4.8   |  27  |  1.0    Ca    8.5      31 Jan 2019 06:12    TPro  5.4<L>  /  Alb  2.8<L>  /  TBili  <0.2  /  DBili  x   /  AST  18  /  ALT  14  /  AlkPhos  100  01-31          Lactate, Blood: 1.9 mmol/L (01-30-19 @ 16:01)          RADIOLOGY:  EXAM:  XR FOOT COMP MIN 3 VIEWS RT        PROCEDURE DATE:  01/30/2019      Impression: Status post Charcot reconstruction with talar first metatarsal, calcaneal   cuboid and cuneiform second metatarsal arthrodeses. No evidence of   hardware complication.  Overall stable exam.    PHYSICAL EXAM:    GEN: Patient lying in bed, appears comfortable  LUNGS: Clear to auscultation, no wheezing or crackles    HEART: Regular, normal S1/S2 no rubs, murmurs, or gallops  ABD: Soft, non-distended, bowel sounds present, no tenderness to palpation  EXT: R. foot was bound, waiting for podiatry wound change to visualize, however as per podiatry evidence of wound dehiscence, as well as erythema overlying surgical site with no drainage currently apparent    NEURO: AAOX3, 5/5 muscle strength bilaterally        ADMISSION SUMMARY  Patient is a 54 yo f HD #with a pmh significant for Diabetes mellitus 1.5, CAD, HTN, DLD, COPD not on home O2, chronic midline low back pain with bilateral sciatica hyothyroid, s/p R. foot reconstruction surgery Jan. 11th 2/2 worsening charcot's arthropathy presenting with worsening pain and pus filled drainage from surgical site, and wound dehiscence, without fever/chills/elevated wbc and non-significant for x-ray admitted with a diagnosis of charcot foot 2/2 DM foot infection being trx with IV vanc and cefepime and PO vanc and pain control      ASSESSMENT & PLAN  #Surgical wound infection s/p reconstruction of R foot 1/11/19  - evidence of wound dehiscence and erythema with reported drainage   - Vancomycin 1250 mg IV q12h (Day 1) + Cefepime 2000 mg IV q12h (Day 1) as per ID  - Begin rifampin 600 mg PO (Day 1)- for biofilm prevention  - Will need PICC placement, PICC team consulted, will likely due on Monday   - f/u wound cx  - f/u blood cx  - Check ESR/CRP   - Pain control with home regimen including PO morphine 15mg BID PRN + Naproxen 500 mg BID PO PRN + Percocet 10 q 6 PRN  - Podiatry and ID following     #Diabetes  - hold Janumet 50 mg-1000 mg PO BID  - Monitor fingersticks   - Will start insulin coverage if FSG consistently > 150    #CAD/ DLD  - c/w aspirin + statin  - takes ezetimibe at home    #HTN-   - c/w ACE-i + metoprolol    #COPD  - symbicort and albuterol PRN   - Home meds: Qvar & Ventolin    #Sciatica/Peripheral neuralgia   - c/w duloxetine and lyrica    Dispo: from home  DVT ppx: Lovenox  GI ppx: Protonics   Pharmacy [facility]

## 2019-01-31 NOTE — DISCHARGE NOTE ADULT - MEDICATION SUMMARY - MEDICATIONS TO STOP TAKING
I will STOP taking the medications listed below when I get home from the hospital:    ramipril 2.5 mg oral capsule  -- 1 cap(s) by mouth once a day    trospium 20 mg oral tablet  -- 1 tab(s) by mouth 2 times a day    diazePAM 5 mg oral tablet  -- 1 tab(s) by mouth 2 times a day, As Needed    Myrbetriq 25 mg oral tablet, extended release  -- 1 tab(s) by mouth once a day I will STOP taking the medications listed below when I get home from the hospital:    ramipril 2.5 mg oral capsule  -- 1 cap(s) by mouth once a day    trospium 20 mg oral tablet  -- 1 tab(s) by mouth 2 times a day    diazePAM 5 mg oral tablet  -- 1 tab(s) by mouth 2 times a day, As Needed    Myrbetriq 25 mg oral tablet, extended release  -- 1 tab(s) by mouth once a day    Percocet 10/325 oral tablet  -- 1 tab(s) by mouth every 8 hours, As Needed MDD:[id number]/975

## 2019-01-31 NOTE — DISCHARGE NOTE ADULT - PROVIDER TOKENS
PROVIDER:[TOKEN:[[id number]]] PROVIDER:[TOKEN:[[id number]]],PROVIDER:[TOKEN:[[id number]]]

## 2019-01-31 NOTE — CONSULT NOTE ADULT - SUBJECTIVE AND OBJECTIVE BOX
CALLY HARTMAN  53y, Female  Allergy: No Known Allergies      HPI:  52 yo female with hx DM, CAD, HTN, Hypothyroid, s/p Reconstruction surgery R foot for charcot foot on 19. Patient was discharged on 19, not on antibiotics at time of discharge. Since discharge developed dehiscence and infection at surgical sites. Today she had f/u with Dr. Diaz at his office who sent her to hospital for IV antibiotics. Pt reports few days after discharge foot become swollen, red, painful.  She reports pus and small amount of blood draining from the surgical sites. Denies fevers, sweating, sob, chest pain, nausea, vomiting, diarrhea. (2019 17:34)    FAMILY HISTORY:    PAST MEDICAL & SURGICAL HISTORY:  Dextrocardia  Chronic midline low back pain with bilateral sciatica  Peripheral neuralgia  Essential hypertension  Diabetes 1.5, managed as type 2  Charcot's arthropathy: R foot  Charcot foot due to diabetes mellitus  H/O shoulder surgery: Right shoulder surgery   delivery delivered        VITALS:  T(F): 96.8, Max: 99.1 (19 @ 19:37)  HR: 84  BP: 98/56  RR: 18Vital Signs Last 24 Hrs  T(C): 36 (2019 05:31), Max: 37.3 (2019 19:37)  T(F): 96.8 (2019 05:31), Max: 99.1 (2019 19:37)  HR: 84 (2019 06:46) (75 - 101)  BP: 98/56 (2019 08:42) (82/48 - 115/85)  BP(mean): --  RR: 18 (2019 05:31) (17 - 18)  SpO2: 96% (2019 23:19) (95% - 96%)    PHYSICAL EXAM:  GENERAL: NAD, well-groomed, well-developed  CHEST/LUNG: Clear to auscultation bilaterally; No rales, rhonchi, wheezing, or rubs  HEART: Regular rate and rhythm; No murmurs, rubs, or gallops  ABDOMEN: Soft, Nontender, Nondistended; Bowel sounds present  EXTREMITIES:        TESTS & MEASUREMENTS:                        10.1   8.12  )-----------( 334      ( 2019 06:12 )             32.1         141  |  101  |  7<L>  ----------------------------<  77  4.8   |  27  |  1.0    Ca    8.5      2019 06:12    TPro  5.4<L>  /  Alb  2.8<L>  /  TBili  <0.2  /  DBili  x   /  AST  18  /  ALT  14  /  AlkPhos  100      LIVER FUNCTIONS - ( 2019 06:12 )  Alb: 2.8 g/dL / Pro: 5.4 g/dL / ALK PHOS: 100 U/L / ALT: 14 U/L / AST: 18 U/L / GGT: x                   RADIOLOGY & ADDITIONAL TESTS:    ANTIBIOTICS:  cefepime   IVPB      cefepime   IVPB 2000 milliGRAM(s) IV Intermittent once  cefepime   IVPB 2000 milliGRAM(s) IV Intermittent every 8 hours  vancomycin  IVPB 1250 milliGRAM(s) IV Intermittent every 12 hours CALLY HARTMAN  53y, Female  Allergy: No Known Allergies      HPI:  54 yo female with hx DM, CAD, HTN, Hypothyroid, s/p Reconstruction surgery R foot for charcot foot on 19. Patient was discharged on 19, not on antibiotics at time of discharge. Since discharge developed dehiscence and infection at surgical sites. Today she had f/u with Dr. Diaz at his office who sent her to hospital for IV antibiotics. Pt reports few days after discharge foot become swollen, red, painful.  She reports pus and small amount of blood draining from the surgical sites. Denies fevers, sweating, sob, chest pain, nausea, vomiting, diarrhea. (2019 17:34)    FAMILY HISTORY:    PAST MEDICAL & SURGICAL HISTORY:  Dextrocardia  Chronic midline low back pain with bilateral sciatica  Peripheral neuralgia  Essential hypertension  Diabetes 1.5, managed as type 2  Charcot's arthropathy: R foot  Charcot foot due to diabetes mellitus  H/O shoulder surgery: Right shoulder surgery   delivery delivered        VITALS:  T(F): 96.8, Max: 99.1 (19 @ 19:37)  HR: 84  BP: 98/56  RR: 18Vital Signs Last 24 Hrs  T(C): 36 (2019 05:31), Max: 37.3 (2019 19:37)  T(F): 96.8 (2019 05:31), Max: 99.1 (2019 19:37)  HR: 84 (2019 06:46) (75 - 101)  BP: 98/56 (2019 08:42) (82/48 - 115/85)  BP(mean): --  RR: 18 (2019 05:31) (17 - 18)  SpO2: 96% (2019 23:19) (95% - 96%)    PHYSICAL EXAM:  GENERAL: NAD, well-groomed, well-developed  CHEST/LUNG: Clear to auscultation bilaterally; No rales, rhonchi, wheezing, or rubs  HEART: Regular rate and rhythm; No murmurs, rubs, or gallops  ABDOMEN: Soft, Nontender, Nondistended; Bowel sounds present  EXTREMITIES:  Right foot wound dehiscence at surgical site, erythematous, edematous and tender      TESTS & MEASUREMENTS:                        10.1   8.12  )-----------( 334      ( 2019 06:12 )             32.1         141  |  101  |  7<L>  ----------------------------<  77  4.8   |  27  |  1.0    Ca    8.5      2019 06:12    TPro  5.4<L>  /  Alb  2.8<L>  /  TBili  <0.2  /  DBili  x   /  AST  18  /  ALT  14  /  AlkPhos  100      LIVER FUNCTIONS - ( 2019 06:12 )  Alb: 2.8 g/dL / Pro: 5.4 g/dL / ALK PHOS: 100 U/L / ALT: 14 U/L / AST: 18 U/L / GGT: x                   RADIOLOGY & ADDITIONAL TESTS:    ANTIBIOTICS:  cefepime   IVPB      cefepime   IVPB 2000 milliGRAM(s) IV Intermittent once  cefepime   IVPB 2000 milliGRAM(s) IV Intermittent every 8 hours  vancomycin  IVPB 1250 milliGRAM(s) IV Intermittent every 12 hours CALLY HARTMAN  53y, Female  Allergy: No Known Allergies      HPI:  52 yo female with hx DM, CAD, HTN, Hypothyroid, s/p Reconstruction surgery R foot for charcot foot on 19. Patient was discharged on 19, not on antibiotics at time of discharge. Since discharge developed dehiscence and infection at surgical sites. Today she had f/u with Dr. Diaz at his office who sent her to hospital for IV antibiotics. Pt reports few days after discharge foot become swollen, red, painful.  She reports pus and small amount of blood draining from the surgical sites. Denies fevers, sweating, sob, chest pain, nausea, vomiting, diarrhea. (2019 17:34)    FAMILY HISTORY:    PAST MEDICAL & SURGICAL HISTORY:  Dextrocardia  Chronic midline low back pain with bilateral sciatica  Peripheral neuralgia  Essential hypertension  Diabetes 1.5, managed as type 2  Charcot's arthropathy: R foot  Charcot foot due to diabetes mellitus  H/O shoulder surgery: Right shoulder surgery   delivery delivered        VITALS:  T(F): 96.8, Max: 99.1 (19 @ 19:37)  HR: 84  BP: 98/56  RR: 18Vital Signs Last 24 Hrs  T(C): 36 (2019 05:31), Max: 37.3 (2019 19:37)  T(F): 96.8 (2019 05:31), Max: 99.1 (2019 19:37)  HR: 84 (2019 06:46) (75 - 101)  BP: 98/56 (2019 08:42) (82/48 - 115/85)  BP(mean): --  RR: 18 (2019 05:31) (17 - 18)  SpO2: 96% (2019 23:19) (95% - 96%)    PHYSICAL EXAM:  GENERAL: NAD, well-groomed, well-developed  CHEST/LUNG: Clear to auscultation bilaterally; No rales, rhonchi, wheezing, or rubs  HEART: Regular rate and rhythm; No murmurs, rubs, or gallops  ABDOMEN: Soft, Nontender, Nondistended; Bowel sounds present  EXTREMITIES:  Right foot wound dehiscence at surgical site, erythematous, edematous and tender, sutures in place, no purulence      TESTS & MEASUREMENTS:                        10.1   8.12  )-----------( 334      ( 2019 06:12 )             32.1         141  |  101  |  7<L>  ----------------------------<  77  4.8   |  27  |  1.0    Ca    8.5      2019 06:12    TPro  5.4<L>  /  Alb  2.8<L>  /  TBili  <0.2  /  DBili  x   /  AST  18  /  ALT  14  /  AlkPhos  100      LIVER FUNCTIONS - ( 2019 06:12 )  Alb: 2.8 g/dL / Pro: 5.4 g/dL / ALK PHOS: 100 U/L / ALT: 14 U/L / AST: 18 U/L / GGT: x                   RADIOLOGY & ADDITIONAL TESTS:    ANTIBIOTICS:  cefepime   IVPB      cefepime   IVPB 2000 milliGRAM(s) IV Intermittent once  cefepime   IVPB 2000 milliGRAM(s) IV Intermittent every 8 hours  vancomycin  IVPB 1250 milliGRAM(s) IV Intermittent every 12 hours

## 2019-02-01 LAB
ALBUMIN SERPL ELPH-MCNC: 2.9 G/DL — LOW (ref 3.5–5.2)
ALP SERPL-CCNC: 101 U/L — SIGNIFICANT CHANGE UP (ref 30–115)
ALT FLD-CCNC: 12 U/L — SIGNIFICANT CHANGE UP (ref 0–41)
ANION GAP SERPL CALC-SCNC: 15 MMOL/L — HIGH (ref 7–14)
AST SERPL-CCNC: 16 U/L — SIGNIFICANT CHANGE UP (ref 0–41)
BASOPHILS # BLD AUTO: 0.07 K/UL — SIGNIFICANT CHANGE UP (ref 0–0.2)
BASOPHILS NFR BLD AUTO: 0.9 % — SIGNIFICANT CHANGE UP (ref 0–1)
BILIRUB SERPL-MCNC: 0.4 MG/DL — SIGNIFICANT CHANGE UP (ref 0.2–1.2)
BUN SERPL-MCNC: 8 MG/DL — LOW (ref 10–20)
CALCIUM SERPL-MCNC: 8.8 MG/DL — SIGNIFICANT CHANGE UP (ref 8.5–10.1)
CHLORIDE SERPL-SCNC: 104 MMOL/L — SIGNIFICANT CHANGE UP (ref 98–110)
CO2 SERPL-SCNC: 24 MMOL/L — SIGNIFICANT CHANGE UP (ref 17–32)
CREAT SERPL-MCNC: 1 MG/DL — SIGNIFICANT CHANGE UP (ref 0.7–1.5)
CRP SERPL-MCNC: 0.44 MG/DL — HIGH (ref 0–0.4)
EOSINOPHIL # BLD AUTO: 0.63 K/UL — SIGNIFICANT CHANGE UP (ref 0–0.7)
EOSINOPHIL NFR BLD AUTO: 8.1 % — HIGH (ref 0–8)
ERYTHROCYTE [SEDIMENTATION RATE] IN BLOOD: 14 MM/HR — SIGNIFICANT CHANGE UP (ref 0–20)
GLUCOSE BLDC GLUCOMTR-MCNC: 119 MG/DL — HIGH (ref 70–99)
GLUCOSE BLDC GLUCOMTR-MCNC: 191 MG/DL — HIGH (ref 70–99)
GLUCOSE BLDC GLUCOMTR-MCNC: 95 MG/DL — SIGNIFICANT CHANGE UP (ref 70–99)
GLUCOSE BLDC GLUCOMTR-MCNC: 98 MG/DL — SIGNIFICANT CHANGE UP (ref 70–99)
GLUCOSE SERPL-MCNC: 94 MG/DL — SIGNIFICANT CHANGE UP (ref 70–99)
HCT VFR BLD CALC: 30.7 % — LOW (ref 37–47)
HGB BLD-MCNC: 9.6 G/DL — LOW (ref 12–16)
IMM GRANULOCYTES NFR BLD AUTO: 0.9 % — HIGH (ref 0.1–0.3)
LYMPHOCYTES # BLD AUTO: 1.92 K/UL — SIGNIFICANT CHANGE UP (ref 1.2–3.4)
LYMPHOCYTES # BLD AUTO: 24.6 % — SIGNIFICANT CHANGE UP (ref 20.5–51.1)
MAGNESIUM SERPL-MCNC: 1.7 MG/DL — LOW (ref 1.8–2.4)
MCHC RBC-ENTMCNC: 27.8 PG — SIGNIFICANT CHANGE UP (ref 27–31)
MCHC RBC-ENTMCNC: 31.3 G/DL — LOW (ref 32–37)
MCV RBC AUTO: 89 FL — SIGNIFICANT CHANGE UP (ref 81–99)
MONOCYTES # BLD AUTO: 0.71 K/UL — HIGH (ref 0.1–0.6)
MONOCYTES NFR BLD AUTO: 9.1 % — SIGNIFICANT CHANGE UP (ref 1.7–9.3)
MRSA PCR RESULT.: NEGATIVE — SIGNIFICANT CHANGE UP
NEUTROPHILS # BLD AUTO: 4.39 K/UL — SIGNIFICANT CHANGE UP (ref 1.4–6.5)
NEUTROPHILS NFR BLD AUTO: 56.4 % — SIGNIFICANT CHANGE UP (ref 42.2–75.2)
NRBC # BLD: 0 /100 WBCS — SIGNIFICANT CHANGE UP (ref 0–0)
PLATELET # BLD AUTO: 328 K/UL — SIGNIFICANT CHANGE UP (ref 130–400)
POTASSIUM SERPL-MCNC: 4.6 MMOL/L — SIGNIFICANT CHANGE UP (ref 3.5–5)
POTASSIUM SERPL-SCNC: 4.6 MMOL/L — SIGNIFICANT CHANGE UP (ref 3.5–5)
PROT SERPL-MCNC: 5.5 G/DL — LOW (ref 6–8)
RBC # BLD: 3.45 M/UL — LOW (ref 4.2–5.4)
RBC # FLD: 15.9 % — HIGH (ref 11.5–14.5)
SODIUM SERPL-SCNC: 143 MMOL/L — SIGNIFICANT CHANGE UP (ref 135–146)
TSH SERPL-MCNC: 4.58 UIU/ML — HIGH (ref 0.27–4.2)
VANCOMYCIN FLD-MCNC: 16.3 UG/ML — HIGH (ref 5–10)
WBC # BLD: 7.79 K/UL — SIGNIFICANT CHANGE UP (ref 4.8–10.8)
WBC # FLD AUTO: 7.79 K/UL — SIGNIFICANT CHANGE UP (ref 4.8–10.8)

## 2019-02-01 RX ORDER — BACITRACIN ZINC 500 UNIT/G
1 OINTMENT IN PACKET (EA) TOPICAL THREE TIMES A DAY
Qty: 0 | Refills: 0 | Status: DISCONTINUED | OUTPATIENT
Start: 2019-02-01 | End: 2019-02-08

## 2019-02-01 RX ORDER — METOPROLOL TARTRATE 50 MG
12.5 TABLET ORAL
Qty: 0 | Refills: 0 | Status: DISCONTINUED | OUTPATIENT
Start: 2019-02-01 | End: 2019-02-08

## 2019-02-01 RX ADMIN — DULOXETINE HYDROCHLORIDE 60 MILLIGRAM(S): 30 CAPSULE, DELAYED RELEASE ORAL at 11:52

## 2019-02-01 RX ADMIN — Medication 12.5 MILLIGRAM(S): at 17:51

## 2019-02-01 RX ADMIN — Medication 10 MILLIGRAM(S): at 21:23

## 2019-02-01 RX ADMIN — BUDESONIDE AND FORMOTEROL FUMARATE DIHYDRATE 2 PUFF(S): 160; 4.5 AEROSOL RESPIRATORY (INHALATION) at 08:05

## 2019-02-01 RX ADMIN — Medication 5 MILLIGRAM(S): at 05:13

## 2019-02-01 RX ADMIN — CEFEPIME 100 MILLIGRAM(S): 1 INJECTION, POWDER, FOR SOLUTION INTRAMUSCULAR; INTRAVENOUS at 17:43

## 2019-02-01 RX ADMIN — MORPHINE SULFATE 15 MILLIGRAM(S): 50 CAPSULE, EXTENDED RELEASE ORAL at 18:03

## 2019-02-01 RX ADMIN — BUDESONIDE AND FORMOTEROL FUMARATE DIHYDRATE 2 PUFF(S): 160; 4.5 AEROSOL RESPIRATORY (INHALATION) at 20:26

## 2019-02-01 RX ADMIN — SODIUM CHLORIDE 3 MILLILITER(S): 9 INJECTION INTRAMUSCULAR; INTRAVENOUS; SUBCUTANEOUS at 21:23

## 2019-02-01 RX ADMIN — Medication 10 MILLIGRAM(S): at 13:33

## 2019-02-01 RX ADMIN — Medication 150 MILLIGRAM(S): at 05:17

## 2019-02-01 RX ADMIN — SODIUM CHLORIDE 3 MILLILITER(S): 9 INJECTION INTRAMUSCULAR; INTRAVENOUS; SUBCUTANEOUS at 15:20

## 2019-02-01 RX ADMIN — Medication 81 MILLIGRAM(S): at 11:52

## 2019-02-01 RX ADMIN — SODIUM CHLORIDE 3 MILLILITER(S): 9 INJECTION INTRAMUSCULAR; INTRAVENOUS; SUBCUTANEOUS at 05:17

## 2019-02-01 RX ADMIN — MORPHINE SULFATE 15 MILLIGRAM(S): 50 CAPSULE, EXTENDED RELEASE ORAL at 17:50

## 2019-02-01 RX ADMIN — Medication 5 MILLIGRAM(S): at 17:51

## 2019-02-01 RX ADMIN — ATORVASTATIN CALCIUM 40 MILLIGRAM(S): 80 TABLET, FILM COATED ORAL at 21:22

## 2019-02-01 RX ADMIN — CEFEPIME 100 MILLIGRAM(S): 1 INJECTION, POWDER, FOR SOLUTION INTRAMUSCULAR; INTRAVENOUS at 05:13

## 2019-02-01 RX ADMIN — Medication 1 APPLICATION(S): at 21:23

## 2019-02-01 RX ADMIN — PANTOPRAZOLE SODIUM 40 MILLIGRAM(S): 20 TABLET, DELAYED RELEASE ORAL at 05:13

## 2019-02-01 RX ADMIN — OXYCODONE AND ACETAMINOPHEN 2 TABLET(S): 5; 325 TABLET ORAL at 12:19

## 2019-02-01 RX ADMIN — Medication 10 MILLIGRAM(S): at 05:13

## 2019-02-01 RX ADMIN — OXYCODONE AND ACETAMINOPHEN 2 TABLET(S): 5; 325 TABLET ORAL at 11:49

## 2019-02-01 RX ADMIN — OXYCODONE AND ACETAMINOPHEN 2 TABLET(S): 5; 325 TABLET ORAL at 05:30

## 2019-02-01 RX ADMIN — OXYCODONE AND ACETAMINOPHEN 2 TABLET(S): 5; 325 TABLET ORAL at 17:49

## 2019-02-01 RX ADMIN — OXYCODONE AND ACETAMINOPHEN 2 TABLET(S): 5; 325 TABLET ORAL at 18:19

## 2019-02-01 RX ADMIN — Medication 150 MILLIGRAM(S): at 13:35

## 2019-02-01 RX ADMIN — LORATADINE 10 MILLIGRAM(S): 10 TABLET ORAL at 11:52

## 2019-02-01 RX ADMIN — Medication 150 MILLIGRAM(S): at 21:22

## 2019-02-01 RX ADMIN — Medication 166.67 MILLIGRAM(S): at 17:43

## 2019-02-01 RX ADMIN — Medication 100 MICROGRAM(S): at 05:13

## 2019-02-01 NOTE — MEDICAL STUDENT PROGRESS NOTE(EDUCATION) - NS MD HP STUD ASPLAN ASSES FT
Patient is a 52 yo f HD #3 with a pmh significant for Diabetes mellitus 1.5, CAD, HTN, DLD, COPD not on home O2, chronic midline low back pain with bilateral sciatica hyothyroid, s/p R. foot reconstruction surgery Jan. 11th 2/2 worsening charcot's arthropathy presenting with worsening pain and pus filled drainage from surgical site, and wound dehiscence, without fever/chills/elevated wbc and non-significant for x-ray admitted with a diagnosis of charcot foot 2/2 DM foot infection being trx with IV vanc, rifampin, and cefepime, with pain control using patient's home regimen Patient is a 52 yo f HD #3 with a pmh significant for Diabetes mellitus 1.5, CAD, HTN, DLD, COPD not on home O2, chronic midline low back pain with bilateral sciatica hyothyroid, s/p R. foot reconstruction surgery Jan. 11th 2/2 worsening charcot's arthropathy presenting with worsening pain and pus filled drainage from surgical site, and wound dehiscence, without fever/chills/elevated wbc and non-significant for x-ray admitted with a diagnosis of charcot's foot 2/2 DM foot infection being trx with IV vanc, rifampin, and cefepime, with pain control using patient's home regimen

## 2019-02-01 NOTE — PROGRESS NOTE ADULT - ASSESSMENT
52 yo female with hx DM2 nonadherent to dietary restrictions, active smoker, CAD, HTN, Hypothyroid, s/p Reconstruction surgery R foot for charcot foot returns with wound dehiscence in R foot with hardware, no sepsis on admission, now with new rash to L foot    continue with abx coverage recommended by ID  continue with local wound care recommended by podiatry  educated patient that she is at risk to introduce cellulitis/ infection to the left foot if she continues to scratch; recommended fragrance free moisturizer  would repeat ultrasound duplex of LE's in light of edema  continue with DVT prophylaxis   smoking cessation once again recommended  diabetic dietary restrictions again recommended- also reaching out to registered dietician to reinforce  plan for PICC line on Monday if possible

## 2019-02-01 NOTE — MEDICAL STUDENT PROGRESS NOTE(EDUCATION) - SUBJECTIVE AND OBJECTIVE BOX
Subjective  Patient is a 52 yo f with a pmh significant for Diabetes mellitus 1.5, CAD, HTN, DLD, COPD not on home O2, chronic midline low back pain with bilateral sciatica hyothyroid, s/p R. foot reconstruction surgery Jan. 11th 2/2 worsening charcot's arthropathy presenting with worsening pain and pus filled drainage from surgical site     Interval History:  The patient reports that her pain has been controlled overnight, however when I was talking to her mentioned it was a 10/10. She states she did not have diarrhea overnight, chills, fever, SOB, chest pain, or any more drainage from the wound that she noted. The patient had an episode of hypotension with bp going down to 92/53. Patient was not symptomatic including feeling dizzy, confused, light headed, or nauseous. Otherwise, no acute events overnight.     PAST MEDICAL & SURGICAL HISTORY  Charcot's arthropathy  R foot  Chronic midline low back pain with bilateral sciatica    Dextrocardia    Diabetes 1.5, managed as type 2    Essential hypertension    Peripheral neuralgia  COPD  DLD  CAD    ALLERGIES:  No known allergies    MEDICATIONS:  Standing medications:  Metoprolol Tartrate 12.5 mg oral tablet: 1 tab(s) orally 2 times a day   Lyrica 150 mg oral capsule: 1 cap(s) orally 3 times a day  hydrOXYzine hydrochloride 10 mg oral tablet: 2 tab(s) orally 3 times a day  fexofenadine 180 mg oral tablet: 1 tab(s) orally once a day  aspirin 81 mg oral delayed release tablet: 1 tab(s) orally once a day  atorvastatin 40 mg oral tablet: 1 tab(s) orally once a day  levothyroxine 100 mcg (0.1 mg) oral capsule: 1 cap(s) orally once a day  tiZANidine 4 mg oral tablet: 2 tab(s) orally every 8 hours  pantoprazole 40 mg oral delayed release tablet: 1 tab(s) orally once a day  DULoxetine 60 mg oral delayed release capsule: 1 cap(s) orally once a day  Janumet 50 mg-1000 mg oral tablet: 1 tab(s) orally 2 times a day  diazePAM 5 mg oral tablet: 1 tab(s) orally 2 times a day, As Needed  Myrbetriq 25 mg oral tablet, extended release: 1 tab(s) orally once a day  ezetimibe 10 mg oral tablet: 1 tab(s) orally once a day  Vancomycin 1250 mg IV in dextrose 5% 250 ml q12h infused over 90 min  Cefepime 2000 mg IV in dextrose 5% 50 ml q12h over infused 30 min  Rifampin 600 mg IV in dextrose 5% 100 ml q24h infused over 60 minutes    PRN medications:  Percocet 10/325 oral tablet: 1 tab(s) PO q8h PRN MDD:30/975  Morphine 15 mg PO BID PRN  Naproxen 500 mg PO BID PRN  Qvar 80 mcg/inh inhalation aerosol: 1 puff(s) inhaled 2 times a day  Ventolin HFA 90 mcg/inh inhalation aerosol: 2 puff(s) inhaled 4 times a day, As Needed    VITALS: (01 Feb 2019 05:26)  T(F): 98.7  HR: 95  BP: 92/53  RR: 18    LABS:                        9.6   7.79----------( 328   (01 Feb 2019 05:45)             30.7    ( 328   (01 Feb 2019 05:45)    143 |  104  |  8  ----------------------------<  119  4.6|  24 |  1.0    Ca    8.8    (01 Feb 2019 05:45)  Mg   1.7    (01 Feb 2019 05:45)    Lactate- 1.9 (30 Jan 2019 16:01)    TPro  5.5  /  Alb  2.9  TBili  .4  /  DBili  x   /  AST  16 /  ALT 12/  AlkPhos  101   (01 Feb 2019 05:45)        12 Lead ECG- 01/30/2019 @ 14:13  Diagnosis Line Normal sinus rhythm  Anterolateral infarct , age undetermined  Abnormal ECG      Radiology:  EXAM:  XR FOOT COMP MIN 3 VIEWS RT        PROCEDURE DATE:  01/30/2019      Impression: Status post Charcot reconstruction with talar first metatarsal, calcaneal   cuboid and cuneiform second metatarsal arthrodeses. No evidence of   hardware complication.  Overall stable exam.    PHYSICAL EXAM:  GEN: Patient lying in bed, appears comfortable  LUNGS: Clear to auscultation, no wheezing or crackles    HEART: Regular, normal S1/S2 no rubs, murmurs, or gallops  ABD: Soft, non-distended, bowel sounds present, no tenderness to palpation  EXT: R. foot wound dehiscence, erythema, improving overall  NEURO: AAOX3, 5/5 muscle strength bilaterally Subjective  Patient is a 54 yo f with a pmh significant for Diabetes mellitus 1.5, CAD, HTN, DLD, COPD not on home O2, chronic midline low back pain with bilateral sciatica hyothyroid, s/p R. foot reconstruction surgery Jan. 11th 2/2 worsening charcot's arthropathy presenting with worsening pain and pus filled drainage from surgical site     Interval History:  The patient reports that her pain has been controlled overnight, however she mentioned it was a 10/10 this morning. She states she did not have diarrhea overnight, chills, fever, SOB, chest pain, or any more drainage from the wound that she noted. The patient had an episode of hypotension with bp going down to 92/53. Patient was not symptomatic including feeling dizzy, confused, light headed, or nauseous. Otherwise, no acute events overnight.     PAST MEDICAL & SURGICAL HISTORY  Charcot's arthropathy  R foot  Chronic midline low back pain with bilateral sciatica    Dextrocardia    Diabetes 1.5, managed as type 2    Essential hypertension    Peripheral neuralgia  COPD  DLD  CAD    ALLERGIES:  No known allergies    MEDICATIONS:  Standing medications:  Metoprolol Tartrate 12.5 mg oral tablet: 1 tab(s) orally 2 times a day   Lyrica 150 mg oral capsule: 1 cap(s) orally 3 times a day  hydrOXYzine hydrochloride 10 mg oral tablet: 2 tab(s) orally 3 times a day  fexofenadine 180 mg oral tablet: 1 tab(s) orally once a day  aspirin 81 mg oral delayed release tablet: 1 tab(s) orally once a day  atorvastatin 40 mg oral tablet: 1 tab(s) orally once a day  levothyroxine 100 mcg (0.1 mg) oral capsule: 1 cap(s) orally once a day  tiZANidine 4 mg oral tablet: 2 tab(s) orally every 8 hours  pantoprazole 40 mg oral delayed release tablet: 1 tab(s) orally once a day  DULoxetine 60 mg oral delayed release capsule: 1 cap(s) orally once a day  Janumet 50 mg-1000 mg oral tablet: 1 tab(s) orally 2 times a day  diazePAM 5 mg oral tablet: 1 tab(s) orally 2 times a day, As Needed  Myrbetriq 25 mg oral tablet, extended release: 1 tab(s) orally once a day  ezetimibe 10 mg oral tablet: 1 tab(s) orally once a day  Vancomycin 1250 mg IV in dextrose 5% 250 ml q12h infused over 90 min  Cefepime 2000 mg IV in dextrose 5% 50 ml q12h over infused 30 min  Rifampin 600 mg IV in dextrose 5% 100 ml q24h infused over 60 minutes    PRN medications:  Percocet 10/325 oral tablet: 1 tab(s) PO q8h PRN MDD:30/975  Morphine 15 mg PO BID PRN  Naproxen 500 mg PO BID PRN  Qvar 80 mcg/inh inhalation aerosol: 1 puff(s) inhaled 2 times a day  Ventolin HFA 90 mcg/inh inhalation aerosol: 2 puff(s) inhaled 4 times a day, As Needed    VITALS: (01 Feb 2019 05:26)  T(F): 98.7  HR: 95  BP: 92/53  RR: 18    LABS:                        9.6   7.79----------( 328   (01 Feb 2019 05:45)             30.7    ( 328   (01 Feb 2019 05:45)    143 |  104  |  8  ----------------------------<  119  4.6|  24 |  1.0    Ca    8.8    (01 Feb 2019 05:45)  Mg   1.7    (01 Feb 2019 05:45)    Lactate- 1.9 (30 Jan 2019 16:01)    TPro  5.5  /  Alb  2.9  TBili  .4  /  DBili  x   /  AST  16 /  ALT 12/  AlkPhos  101   (01 Feb 2019 05:45)        12 Lead ECG- 01/30/2019 @ 14:13  Diagnosis Line Normal sinus rhythm  Anterolateral infarct , age undetermined  Abnormal ECG      Radiology:  EXAM:  XR FOOT COMP MIN 3 VIEWS RT        PROCEDURE DATE:  01/30/2019      Impression: Status post Charcot reconstruction with talar first metatarsal, calcaneal   cuboid and cuneiform second metatarsal arthrodeses. No evidence of   hardware complication.  Overall stable exam.    PHYSICAL EXAM:  GEN: Patient lying in bed, appears comfortable  LUNGS: Clear to auscultation, no wheezing or crackles    HEART: Regular, normal S1/S2 no rubs, murmurs, or gallops  ABD: Soft, non-distended, bowel sounds present, no tenderness to palpation  EXT: R. foot wound dehiscence, erythema, improving overall  NEURO: AAOX3, 5/5 muscle strength bilaterally

## 2019-02-01 NOTE — PROGRESS NOTE ADULT - ASSESSMENT
CALLY HARTMAN 53y Female  MRN#: [id number]       SUBJECTIVE  Patient is a 53y old Female who presents with a chief complaint of right foot infection (31 Jan 2019 12:29)  Currently admitted to medicine with the primary diagnosis of dehiscence and infection s/p Charcot foot revision    Today is hospital day 2d, and this morning she is _________ and reports ________ overnight events.     MEDICATIONS:  STANDING MEDICATIONS  aspirin enteric coated 81 milliGRAM(s) Oral daily  atorvastatin 40 milliGRAM(s) Oral at bedtime  BACItracin   Ointment 1 Application(s) Topical three times a day  buDESOnide  80 MICROgram(s)/formoterol 4.5 MICROgram(s) Inhaler 2 Puff(s) Inhalation two times a day  cefepime   IVPB 2000 milliGRAM(s) IV Intermittent every 12 hours  chlorhexidine 4% Liquid 1 Application(s) Topical <User Schedule>  docusate sodium 100 milliGRAM(s) Oral three times a day  DULoxetine 60 milliGRAM(s) Oral daily  enoxaparin Injectable 40 milliGRAM(s) SubCutaneous daily  hydrOXYzine  Oral Tab/Cap - Peds 10 milliGRAM(s) Oral three times a day  levothyroxine 100 MICROGram(s) Oral daily  loratadine 10 milliGRAM(s) Oral daily  metoprolol tartrate 12.5 milliGRAM(s) Oral two times a day  morphine ER Tablet 15 milliGRAM(s) Oral two times a day  oxybutynin 5 milliGRAM(s) Oral two times a day  pantoprazole    Tablet 40 milliGRAM(s) Oral before breakfast  pregabalin 150 milliGRAM(s) Oral three times a day  rifampin 600 milliGRAM(s) Oral daily  senna 2 Tablet(s) Oral at bedtime  sodium chloride 0.9% lock flush 3 milliLiter(s) IV Push every 8 hours  vancomycin  IVPB 1250 milliGRAM(s) IV Intermittent every 12 hours    PRN MEDICATIONS  ALBUTerol    90 MICROgram(s) HFA Inhaler 2 Puff(s) Inhalation every 6 hours PRN  diazepam    Tablet 5 milliGRAM(s) Oral two times a day PRN  oxyCODONE    5 mG/acetaminophen 325 mG 2 Tablet(s) Oral every 6 hours PRN      VITAL SIGNS: Last 24 Hours  T(C): 36.2 (01 Feb 2019 15:04), Max: 36.5 (31 Jan 2019 21:56)  T(F): 97.2 (01 Feb 2019 15:04), Max: 97.7 (31 Jan 2019 21:56)  HR: 107 (01 Feb 2019 13:56) (85 - 107)  BP: 124/61 (01 Feb 2019 13:56) (92/53 - 124/61)  BP(mean): --  RR: 18 (01 Feb 2019 13:56) (18 - 18)  SpO2: --    LABS:                        9.6    7.79  )-----------( 328      ( 01 Feb 2019 05:45 )             30.7     02-01    143  |  104  |  8<L>  ----------------------------<  94  4.6   |  24  |  1.0    Ca    8.8      01 Feb 2019 05:45  Mg     1.7     02-01    TPro  5.5<L>  /  Alb  2.9<L>  /  TBili  0.4  /  DBili  x   /  AST  16  /  ALT  12  /  AlkPhos  101  02-01          Sedimentation Rate, Erythrocyte: 14 mm/hr (02-01-19 @ 05:45)      Culture - Other (collected 30 Jan 2019 13:57)  Source: .Other Right 2nd toe ucler  Preliminary Report (31 Jan 2019 20:44):    Insufficient growth-culture reincubated    Culture - Blood (collected 30 Jan 2019 13:56)  Source: .Blood Blood  Preliminary Report (31 Jan 2019 23:01):    No growth to date.          RADIOLOGY:  RADIOLOGY:  EXAM:  XR FOOT COMP MIN 3 VIEWS RT        PROCEDURE DATE:  01/30/2019      Impression: Status post Charcot reconstruction with talar first metatarsal, calcaneal   cuboid and cuneiform second metatarsal arthrodeses. No evidence of   hardware complication.  Overall stable exam.    PHYSICAL EXAM:    GEN: Patient lying in bed, appears comfortable  LUNGS: Clear to auscultation, no wheezing or crackles    HEART: Regular, normal S1/S2 no rubs, murmurs, or gallops  ABD: Soft, non-distended, bowel sounds present, no tenderness to palpation  EXT: R. foot was bound, waiting for podiatry wound change to visualize, however as per podiatry evidence of wound dehiscence, as well as erythema overlying surgical site with no drainage currently apparent    NEURO: AAOX3, 5/5 muscle strength bilaterally        ADMISSION SUMMARY  Patient is a 54 yo f HD #with a pmh significant for Diabetes mellitus 1.5, CAD, HTN, DLD, COPD not on home O2, chronic midline low back pain with bilateral sciatica hyothyroid, s/p R. foot reconstruction surgery Jan. 11th 2/2 worsening charcot's arthropathy presenting with worsening pain and pus filled drainage from surgical site, and wound dehiscence, without fever/chills/elevated wbc and non-significant for x-ray admitted with a diagnosis of charcot foot 2/2 DM foot infection being trx with IV vanc and cefepime and PO vanc and pain control      ASSESSMENT & PLAN  #Surgical wound infection s/p reconstruction of R foot 1/11/19  - evidence of wound dehiscence and erythema with reported drainage   - Vancomycin 1250 mg IV q12h (Day 1) + Cefepime 2000 mg IV q12h (Day 1) as per ID  - Begin rifampin 600 mg PO (Day 1)- for biofilm prevention  - Will need PICC placement, PICC team consulted, will likely due on Monday   - f/u wound cx  - f/u blood cx  - Check ESR/CRP   - Pain control with home regimen including PO morphine 15mg BID PRN + Naproxen 500 mg BID PO PRN + Percocet 10 q 6 PRN  - Podiatry and ID following     #Diabetes  - hold Janumet 50 mg-1000 mg PO BID  - Monitor fingersticks   - Will start insulin coverage if FSG consistently > 150    #CAD/ DLD  - c/w aspirin + statin  - takes ezetimibe at home    #HTN-   - c/w ACE-i + metoprolol    #COPD  - symbicort and albuterol PRN   - Home meds: Qvar & Ventolin    #Sciatica/Peripheral neuralgia   - c/w duloxetine and lyrica    Dispo: from home  DVT ppx: Lovenox  GI ppx: Protonics   Pharmacy [facility] CALLY HARTMAN 53y Female  MRN#: [id number]       SUBJECTIVE  Patient is a 53y old Female who presents with a chief complaint of right foot infection (31 Jan 2019 12:29)  Currently admitted to medicine with the primary diagnosis of dehiscence and infection s/p Charcot foot revision    Today is hospital day 2d,  no acute events overnight. Patient's BP was low again this morning and BP meds were held. She was asymptomatic at that time, denies headache, dizziness, shortness of breath, or chest pain.   Pain is well controlled with current pain regimen.  However, she did ask about some new onset redness and itchiness on her left foot.     MEDICATIONS:  STANDING MEDICATIONS  aspirin enteric coated 81 milliGRAM(s) Oral daily  atorvastatin 40 milliGRAM(s) Oral at bedtime  BACItracin   Ointment 1 Application(s) Topical three times a day  buDESOnide  80 MICROgram(s)/formoterol 4.5 MICROgram(s) Inhaler 2 Puff(s) Inhalation two times a day  cefepime   IVPB 2000 milliGRAM(s) IV Intermittent every 12 hours  chlorhexidine 4% Liquid 1 Application(s) Topical <User Schedule>  docusate sodium 100 milliGRAM(s) Oral three times a day  DULoxetine 60 milliGRAM(s) Oral daily  enoxaparin Injectable 40 milliGRAM(s) SubCutaneous daily  hydrOXYzine  Oral Tab/Cap - Peds 10 milliGRAM(s) Oral three times a day  levothyroxine 100 MICROGram(s) Oral daily  loratadine 10 milliGRAM(s) Oral daily  metoprolol tartrate 12.5 milliGRAM(s) Oral two times a day  morphine ER Tablet 15 milliGRAM(s) Oral two times a day  oxybutynin 5 milliGRAM(s) Oral two times a day  pantoprazole    Tablet 40 milliGRAM(s) Oral before breakfast  pregabalin 150 milliGRAM(s) Oral three times a day  rifampin 600 milliGRAM(s) Oral daily  senna 2 Tablet(s) Oral at bedtime  sodium chloride 0.9% lock flush 3 milliLiter(s) IV Push every 8 hours  vancomycin  IVPB 1250 milliGRAM(s) IV Intermittent every 12 hours    PRN MEDICATIONS  ALBUTerol    90 MICROgram(s) HFA Inhaler 2 Puff(s) Inhalation every 6 hours PRN  diazepam    Tablet 5 milliGRAM(s) Oral two times a day PRN  oxyCODONE    5 mG/acetaminophen 325 mG 2 Tablet(s) Oral every 6 hours PRN      VITAL SIGNS: Last 24 Hours  T(C): 36.2 (01 Feb 2019 15:04), Max: 36.5 (31 Jan 2019 21:56)  T(F): 97.2 (01 Feb 2019 15:04), Max: 97.7 (31 Jan 2019 21:56)  HR: 107 (01 Feb 2019 13:56) (85 - 107)  BP: 124/61 (01 Feb 2019 13:56) (92/53 - 124/61)  BP(mean): --  RR: 18 (01 Feb 2019 13:56) (18 - 18)  SpO2: --    LABS:                        9.6    7.79  )-----------( 328      ( 01 Feb 2019 05:45 )             30.7     02-01    143  |  104  |  8<L>  ----------------------------<  94  4.6   |  24  |  1.0    Ca    8.8      01 Feb 2019 05:45  Mg     1.7     02-01    TPro  5.5<L>  /  Alb  2.9<L>  /  TBili  0.4  /  DBili  x   /  AST  16  /  ALT  12  /  AlkPhos  101  02-01          Sedimentation Rate, Erythrocyte: 14 mm/hr (02-01-19 @ 05:45)      Culture - Other (collected 30 Jan 2019 13:57)  Source: .Other Right 2nd toe ucler  Preliminary Report (31 Jan 2019 20:44):    Insufficient growth-culture reincubated    Culture - Blood (collected 30 Jan 2019 13:56)  Source: .Blood Blood  Preliminary Report (31 Jan 2019 23:01):    No growth to date.          RADIOLOGY:  EXAM:  XR FOOT COMP MIN 3 VIEWS RT        PROCEDURE DATE:  01/30/2019      Impression: Status post Charcot reconstruction with talar first metatarsal, calcaneal   cuboid and cuneiform second metatarsal arthrodeses. No evidence of   hardware complication.  Overall stable exam.    PHYSICAL EXAM:    GEN: Patient lying in bed, appears comfortable  LUNGS: Clear to auscultation, no wheezing or crackles    HEART: Regular, normal S1/S2 no rubs, murmurs, or gallops  ABD: Soft, non-distended, bowel sounds present, no tenderness to palpation  EXT: R. foot was bound, waiting for podiatry wound change to visualize, however as per podiatry evidence of wound dehiscence, as well as erythema overlying surgical site with no drainage currently apparent    LLE: blanching patches erythema on right foot up to ankle associated with itchiness, no burning,   NEURO: AAOX3, 5/5 muscle strength bilaterally        ADMISSION SUMMARY  Patient is a 54 yo f HD #with a pmh significant for Diabetes mellitus 1.5, CAD, HTN, DLD, COPD not on home O2, chronic midline low back pain with bilateral sciatica hyothyroid, s/p R. foot reconstruction surgery Jan. 11th 2/2 worsening charcot's arthropathy presenting with worsening pain and pus filled drainage from surgical site, and wound dehiscence, without fever/chills/elevated wbc and non-significant for x-ray admitted with a diagnosis of charcot foot 2/2 DM foot infection being trx with IV vanc and cefepime and PO vanc and pain control      ASSESSMENT & PLAN  #Surgical wound infection s/p reconstruction of R foot 1/11/19  - evidence of wound dehiscence and erythema with reported drainage   - Vancomycin 1250 mg IV q12h (Day 2) + Cefepime 2000 mg IV q12h (Day 2) + Rifampin 600 mg PO (Day2) as per ID  - Will need PICC placement, PICC team consulted, plan for PICC on Monday   - ESR WNL  - f/u wound cx  - Pain control with home regimen including PO morphine 15mg BID PRN + Naproxen 500 mg BID PO PRN + Percocet 10 q 6 PRN  - Podiatry and ID following     #Diabetes  - hold Janumet 50 mg-1000 mg PO BID  - Monitor fingersticks   - Will start insulin coverage if FSG consistently > 150    #Dry Skin on Right LE  - gave topical ointment  - will also give bacitracin to put on fingernails in order to prevent spread of infection to feet      #CAD/ DLD  - c/w aspirin + statin  - takes ezetimibe at home    #HTN-   - c/w ACE-i + metoprolol    #COPD  - symbicort and albuterol PRN   - Home meds: Qvar & Ventolin    #Sciatica/Peripheral neuralgia   - c/w duloxetine and lyrica    Dispo: from home  DVT ppx: Lovenox  GI ppx: Protonics   Pharmacy [facility]

## 2019-02-01 NOTE — MEDICAL STUDENT PROGRESS NOTE(EDUCATION) - NS MD HP STUD ASPLAN PLAN FT
#Surgical wound infection s/p reconstruction of R foot 1/11/19  - evidence of wound dehiscence and erythema with reported drainage   - Vancomycin 1250 mg IV q12h (Day 2) + Cefepime 2000 mg IV q12h (Day 2) as per ID  - Rifampin 600 mg PO day number 1 01/31. Day #2 today  - Picc placement most likely Monday 02/04  - f/u wound cx  - blood cx neg  - ESR wnl   - watch for fever and trend CBC  - Pain control with home regimen including PO morphine + Naproxen  + Percocet   - Podiatry and ID following     #Diabetes  - hold Janumet 50 mg-1000 mg PO BID  - Monitor fingersticks   - Will start insulin coverage if FSG consistently > 150    #CAD/ DLD  - c/w aspirin + statin  - also takes ezetimibe at home    #HTN-   - Ptnt having episodes of hypotension  - Will hold aceI and dec metoprolol to min dose     #COPD  - symbicort and albuterol PRN   - Home meds: Qvar & Ventolin    #Sciatica/Peripheral neuralgia   - c/w duloxetine and lyrica    Dispo: from home  DVT ppx: Lovenox  GI ppx: Protonics   Pharmacy [facility] #Surgical wound infection s/p reconstruction of R foot 1/11/19  - evidence of wound dehiscence and erythema with reported drainage   - Vancomycin 1250 mg IV q12h (Day 2) + Cefepime 2000 mg IV q12h (Day 2) as per ID  - Rifampin 600 mg PO day number 1 01/31. Day #2 today  - Picc placement most likely Monday 02/04  - f/u wound cx  - blood cx neg  - ESR wnl   - watch for fever and trend CBC  - Pain control with home regimen including PO morphine + Naproxen  + Percocet   - Podiatry and ID following     #Diabetes  - hold Janumet 50 mg-1000 mg PO BID  - Monitor fingersticks   - Will start insulin coverage if FSG consistently > 150    #CAD/ DLD  - c/w aspirin + statin  - also takes ezetimibe at home    #HTN-   - Ptnt having episodes of hypotension  - Will hold ace-I and dec metoprolol to min dose, hold parameters (systolic BP <90, and/or HR< 50)     #COPD  - symbicort and albuterol PRN   - Home meds: Qvar & Ventolin    #Sciatica/Peripheral neuralgia   - c/w duloxetine and lyrica    Dispo: from home  DVT ppx: Lovenox  GI ppx: Protonics   Pharmacy [facility]

## 2019-02-01 NOTE — PROGRESS NOTE ADULT - ASSESSMENT
53F    Charcot foot s/p recent surgery with hardware  Chronic midline low back pain with bilateral sciatica  Peripheral neuralgia  Essential hypertension  Diabetes   H/O shoulder surgery: Right shoulder surgery    Admitted with wound dehiscence   Sepsis ruled out on admission  wcx reincubated  ESR 14    - Please send MRSA nasal PCR  - Rifampin 600mg PO daily given hardware  - Vanc 1.25 q12h  - Please check vanc trough 30 min prior to 4th dose  - Cefepime 2g q12h  - f/u wcx, thus far needed to be re-intubated  - Podiatry following  - Will need PICC    Spectra [id number]

## 2019-02-02 LAB
-  AMPICILLIN/SULBACTAM: SIGNIFICANT CHANGE UP
-  CEFAZOLIN: SIGNIFICANT CHANGE UP
-  CLINDAMYCIN: SIGNIFICANT CHANGE UP
-  ERYTHROMYCIN: SIGNIFICANT CHANGE UP
-  GENTAMICIN: SIGNIFICANT CHANGE UP
-  OXACILLIN: SIGNIFICANT CHANGE UP
-  PENICILLIN: SIGNIFICANT CHANGE UP
-  RIFAMPIN: SIGNIFICANT CHANGE UP
-  TETRACYCLINE: SIGNIFICANT CHANGE UP
-  TRIMETHOPRIM/SULFAMETHOXAZOLE: SIGNIFICANT CHANGE UP
-  VANCOMYCIN: SIGNIFICANT CHANGE UP
ALBUMIN SERPL ELPH-MCNC: 3.4 G/DL — LOW (ref 3.5–5.2)
ALP SERPL-CCNC: 112 U/L — SIGNIFICANT CHANGE UP (ref 30–115)
ALT FLD-CCNC: 12 U/L — SIGNIFICANT CHANGE UP (ref 0–41)
ANION GAP SERPL CALC-SCNC: 14 MMOL/L — SIGNIFICANT CHANGE UP (ref 7–14)
AST SERPL-CCNC: 19 U/L — SIGNIFICANT CHANGE UP (ref 0–41)
BASOPHILS # BLD AUTO: 0.1 K/UL — SIGNIFICANT CHANGE UP (ref 0–0.2)
BASOPHILS NFR BLD AUTO: 0.9 % — SIGNIFICANT CHANGE UP (ref 0–1)
BILIRUB SERPL-MCNC: 0.5 MG/DL — SIGNIFICANT CHANGE UP (ref 0.2–1.2)
BUN SERPL-MCNC: 6 MG/DL — LOW (ref 10–20)
CALCIUM SERPL-MCNC: 9.2 MG/DL — SIGNIFICANT CHANGE UP (ref 8.5–10.1)
CHLORIDE SERPL-SCNC: 97 MMOL/L — LOW (ref 98–110)
CO2 SERPL-SCNC: 28 MMOL/L — SIGNIFICANT CHANGE UP (ref 17–32)
CREAT SERPL-MCNC: 0.7 MG/DL — SIGNIFICANT CHANGE UP (ref 0.7–1.5)
CULTURE RESULTS: SIGNIFICANT CHANGE UP
EOSINOPHIL # BLD AUTO: 0.69 K/UL — SIGNIFICANT CHANGE UP (ref 0–0.7)
EOSINOPHIL NFR BLD AUTO: 6.2 % — SIGNIFICANT CHANGE UP (ref 0–8)
GLUCOSE BLDC GLUCOMTR-MCNC: 168 MG/DL — HIGH (ref 70–99)
GLUCOSE BLDC GLUCOMTR-MCNC: 174 MG/DL — HIGH (ref 70–99)
GLUCOSE BLDC GLUCOMTR-MCNC: 209 MG/DL — HIGH (ref 70–99)
GLUCOSE SERPL-MCNC: 122 MG/DL — HIGH (ref 70–99)
HCT VFR BLD CALC: 36.8 % — LOW (ref 37–47)
HGB BLD-MCNC: 11.7 G/DL — LOW (ref 12–16)
IMM GRANULOCYTES NFR BLD AUTO: 0.6 % — HIGH (ref 0.1–0.3)
LYMPHOCYTES # BLD AUTO: 19.9 % — LOW (ref 20.5–51.1)
LYMPHOCYTES # BLD AUTO: 2.22 K/UL — SIGNIFICANT CHANGE UP (ref 1.2–3.4)
MAGNESIUM SERPL-MCNC: 1.8 MG/DL — SIGNIFICANT CHANGE UP (ref 1.8–2.4)
MCHC RBC-ENTMCNC: 28.1 PG — SIGNIFICANT CHANGE UP (ref 27–31)
MCHC RBC-ENTMCNC: 31.8 G/DL — LOW (ref 32–37)
MCV RBC AUTO: 88.5 FL — SIGNIFICANT CHANGE UP (ref 81–99)
METHOD TYPE: SIGNIFICANT CHANGE UP
MONOCYTES # BLD AUTO: 0.77 K/UL — HIGH (ref 0.1–0.6)
MONOCYTES NFR BLD AUTO: 6.9 % — SIGNIFICANT CHANGE UP (ref 1.7–9.3)
NEUTROPHILS # BLD AUTO: 7.31 K/UL — HIGH (ref 1.4–6.5)
NEUTROPHILS NFR BLD AUTO: 65.5 % — SIGNIFICANT CHANGE UP (ref 42.2–75.2)
NRBC # BLD: 0 /100 WBCS — SIGNIFICANT CHANGE UP (ref 0–0)
ORGANISM # SPEC MICROSCOPIC CNT: SIGNIFICANT CHANGE UP
ORGANISM # SPEC MICROSCOPIC CNT: SIGNIFICANT CHANGE UP
PLATELET # BLD AUTO: 393 K/UL — SIGNIFICANT CHANGE UP (ref 130–400)
POTASSIUM SERPL-MCNC: 4.6 MMOL/L — SIGNIFICANT CHANGE UP (ref 3.5–5)
POTASSIUM SERPL-SCNC: 4.6 MMOL/L — SIGNIFICANT CHANGE UP (ref 3.5–5)
PROT SERPL-MCNC: 6.8 G/DL — SIGNIFICANT CHANGE UP (ref 6–8)
RBC # BLD: 4.16 M/UL — LOW (ref 4.2–5.4)
RBC # FLD: 15.9 % — HIGH (ref 11.5–14.5)
SODIUM SERPL-SCNC: 139 MMOL/L — SIGNIFICANT CHANGE UP (ref 135–146)
SPECIMEN SOURCE: SIGNIFICANT CHANGE UP
WBC # BLD: 11.16 K/UL — HIGH (ref 4.8–10.8)
WBC # FLD AUTO: 11.16 K/UL — HIGH (ref 4.8–10.8)

## 2019-02-02 PROCEDURE — 93970 EXTREMITY STUDY: CPT | Mod: 26

## 2019-02-02 RX ORDER — PETROLATUM,WHITE
1 JELLY (GRAM) TOPICAL
Qty: 0 | Refills: 0 | Status: DISCONTINUED | OUTPATIENT
Start: 2019-02-02 | End: 2019-02-08

## 2019-02-02 RX ORDER — HYDROCORTISONE 1 %
1 OINTMENT (GRAM) TOPICAL
Qty: 0 | Refills: 0 | Status: DISCONTINUED | OUTPATIENT
Start: 2019-02-02 | End: 2019-02-08

## 2019-02-02 RX ORDER — DIPHENHYDRAMINE HCL 50 MG
25 CAPSULE ORAL EVERY 6 HOURS
Qty: 0 | Refills: 0 | Status: DISCONTINUED | OUTPATIENT
Start: 2019-02-02 | End: 2019-02-05

## 2019-02-02 RX ADMIN — MORPHINE SULFATE 15 MILLIGRAM(S): 50 CAPSULE, EXTENDED RELEASE ORAL at 05:22

## 2019-02-02 RX ADMIN — Medication 1 APPLICATION(S): at 05:23

## 2019-02-02 RX ADMIN — CEFEPIME 100 MILLIGRAM(S): 1 INJECTION, POWDER, FOR SOLUTION INTRAMUSCULAR; INTRAVENOUS at 17:27

## 2019-02-02 RX ADMIN — OXYCODONE AND ACETAMINOPHEN 2 TABLET(S): 5; 325 TABLET ORAL at 16:49

## 2019-02-02 RX ADMIN — OXYCODONE AND ACETAMINOPHEN 2 TABLET(S): 5; 325 TABLET ORAL at 23:05

## 2019-02-02 RX ADMIN — Medication 1 APPLICATION(S): at 13:10

## 2019-02-02 RX ADMIN — OXYCODONE AND ACETAMINOPHEN 2 TABLET(S): 5; 325 TABLET ORAL at 16:19

## 2019-02-02 RX ADMIN — Medication 12.5 MILLIGRAM(S): at 05:20

## 2019-02-02 RX ADMIN — Medication 10 MILLIGRAM(S): at 13:09

## 2019-02-02 RX ADMIN — LORATADINE 10 MILLIGRAM(S): 10 TABLET ORAL at 11:42

## 2019-02-02 RX ADMIN — ATORVASTATIN CALCIUM 40 MILLIGRAM(S): 80 TABLET, FILM COATED ORAL at 21:37

## 2019-02-02 RX ADMIN — CHLORHEXIDINE GLUCONATE 1 APPLICATION(S): 213 SOLUTION TOPICAL at 05:20

## 2019-02-02 RX ADMIN — Medication 1 APPLICATION(S): at 17:28

## 2019-02-02 RX ADMIN — OXYCODONE AND ACETAMINOPHEN 2 TABLET(S): 5; 325 TABLET ORAL at 08:28

## 2019-02-02 RX ADMIN — Medication 1 APPLICATION(S): at 13:11

## 2019-02-02 RX ADMIN — OXYCODONE AND ACETAMINOPHEN 2 TABLET(S): 5; 325 TABLET ORAL at 08:58

## 2019-02-02 RX ADMIN — Medication 1 APPLICATION(S): at 21:37

## 2019-02-02 RX ADMIN — Medication 150 MILLIGRAM(S): at 21:40

## 2019-02-02 RX ADMIN — Medication 5 MILLIGRAM(S): at 05:20

## 2019-02-02 RX ADMIN — SODIUM CHLORIDE 3 MILLILITER(S): 9 INJECTION INTRAMUSCULAR; INTRAVENOUS; SUBCUTANEOUS at 05:24

## 2019-02-02 RX ADMIN — Medication 100 MICROGRAM(S): at 05:21

## 2019-02-02 RX ADMIN — OXYCODONE AND ACETAMINOPHEN 2 TABLET(S): 5; 325 TABLET ORAL at 23:35

## 2019-02-02 RX ADMIN — Medication 150 MILLIGRAM(S): at 13:08

## 2019-02-02 RX ADMIN — DULOXETINE HYDROCHLORIDE 60 MILLIGRAM(S): 30 CAPSULE, DELAYED RELEASE ORAL at 11:42

## 2019-02-02 RX ADMIN — Medication 150 MILLIGRAM(S): at 05:22

## 2019-02-02 RX ADMIN — Medication 166.67 MILLIGRAM(S): at 17:27

## 2019-02-02 RX ADMIN — MORPHINE SULFATE 15 MILLIGRAM(S): 50 CAPSULE, EXTENDED RELEASE ORAL at 18:00

## 2019-02-02 RX ADMIN — SODIUM CHLORIDE 3 MILLILITER(S): 9 INJECTION INTRAMUSCULAR; INTRAVENOUS; SUBCUTANEOUS at 13:05

## 2019-02-02 RX ADMIN — Medication 5 MILLIGRAM(S): at 17:29

## 2019-02-02 RX ADMIN — MORPHINE SULFATE 15 MILLIGRAM(S): 50 CAPSULE, EXTENDED RELEASE ORAL at 17:30

## 2019-02-02 RX ADMIN — Medication 10 MILLIGRAM(S): at 05:22

## 2019-02-02 RX ADMIN — SODIUM CHLORIDE 3 MILLILITER(S): 9 INJECTION INTRAMUSCULAR; INTRAVENOUS; SUBCUTANEOUS at 22:23

## 2019-02-02 RX ADMIN — BUDESONIDE AND FORMOTEROL FUMARATE DIHYDRATE 2 PUFF(S): 160; 4.5 AEROSOL RESPIRATORY (INHALATION) at 19:50

## 2019-02-02 RX ADMIN — Medication 81 MILLIGRAM(S): at 11:43

## 2019-02-02 RX ADMIN — MORPHINE SULFATE 15 MILLIGRAM(S): 50 CAPSULE, EXTENDED RELEASE ORAL at 05:52

## 2019-02-02 RX ADMIN — Medication 10 MILLIGRAM(S): at 21:37

## 2019-02-02 RX ADMIN — PANTOPRAZOLE SODIUM 40 MILLIGRAM(S): 20 TABLET, DELAYED RELEASE ORAL at 06:16

## 2019-02-02 RX ADMIN — Medication 100 MILLIGRAM(S): at 05:21

## 2019-02-02 RX ADMIN — Medication 12.5 MILLIGRAM(S): at 17:29

## 2019-02-02 NOTE — PROGRESS NOTE ADULT - ASSESSMENT
52 yo female with hx DM2 nonadherent to dietary restrictions, active smoker, CAD, HTN, Hypothyroid, s/p Reconstruction surgery R foot for charcot foot returns with wound dehiscence in R foot with hardware, no sepsis on admission, now with new rash to L foot    continue with abx coverage recommended by ID  continue with local wound care recommended by podiatry  educated patient that she is at risk to introduce cellulitis/ infection to the left foot if she continues to scratch; appreciated podiatry f/u, creams/benadryl ordered   RUE possible resolving thrombophlebitis- c/w elevation and local hot packs; clinical exam is not impressive  would repeat ultrasound duplex of LE's in light of edema- pending result  continue with DVT prophylaxis   smoking cessation once again recommended  diabetic dietary restrictions again recommended- also reached out to registered dietician to reinforce  plan for PICC line on Monday if possible

## 2019-02-02 NOTE — PROGRESS NOTE ADULT - ASSESSMENT
CALLY HARTMAN 53y Female  MRN#: [id number]     SUBJECTIVE  Patient is a 53y old Female who presents with a chief complaint of right foot infection (31 Jan 2019 12:29)  Currently admitted to medicine with the primary diagnosis of dehiscence and infection s/p Charcot foot revision    Today is hospital day 3d,     MEDICATIONS:  STANDING MEDICATIONS  aspirin enteric coated 81 milliGRAM(s) Oral daily  atorvastatin 40 milliGRAM(s) Oral at bedtime  BACItracin   Ointment 1 Application(s) Topical three times a day  buDESOnide  80 MICROgram(s)/formoterol 4.5 MICROgram(s) Inhaler 2 Puff(s) Inhalation two times a day  cefepime   IVPB 2000 milliGRAM(s) IV Intermittent every 12 hours  chlorhexidine 4% Liquid 1 Application(s) Topical <User Schedule>  docusate sodium 100 milliGRAM(s) Oral three times a day  DULoxetine 60 milliGRAM(s) Oral daily  enoxaparin Injectable 40 milliGRAM(s) SubCutaneous daily  hydrOXYzine  Oral Tab/Cap - Peds 10 milliGRAM(s) Oral three times a day  levothyroxine 100 MICROGram(s) Oral daily  loratadine 10 milliGRAM(s) Oral daily  metoprolol tartrate 12.5 milliGRAM(s) Oral two times a day  morphine ER Tablet 15 milliGRAM(s) Oral two times a day  oxybutynin 5 milliGRAM(s) Oral two times a day  pantoprazole    Tablet 40 milliGRAM(s) Oral before breakfast  pregabalin 150 milliGRAM(s) Oral three times a day  rifampin 600 milliGRAM(s) Oral daily  senna 2 Tablet(s) Oral at bedtime  sodium chloride 0.9% lock flush 3 milliLiter(s) IV Push every 8 hours  vancomycin  IVPB 1250 milliGRAM(s) IV Intermittent every 12 hours    PRN MEDICATIONS  ALBUTerol    90 MICROgram(s) HFA Inhaler 2 Puff(s) Inhalation every 6 hours PRN  diazepam    Tablet 5 milliGRAM(s) Oral two times a day PRN  oxyCODONE    5 mG/acetaminophen 325 mG 2 Tablet(s) Oral every 6 hours PRN      VITAL SIGNS: Last 24 Hours  T(C): 36.1 (02 Feb 2019 05:09), Max: 36.4 (01 Feb 2019 21:37)  T(F): 97 (02 Feb 2019 05:09), Max: 97.5 (01 Feb 2019 21:37)  HR: 107 (02 Feb 2019 05:09) (76 - 107)  BP: 104/56 (02 Feb 2019 05:09) (104/56 - 124/61)  BP(mean): --  RR: 18 (02 Feb 2019 05:09) (18 - 18)  SpO2: 94% (01 Feb 2019 19:49) (94% - 94%)    LABS:                        11.7   11.16 )-----------( 393      ( 02 Feb 2019 06:03 )             36.8     02-02    139  |  97<L>  |  6<L>  ----------------------------<  122<H>  4.6   |  28  |  0.7    Ca    9.2      02 Feb 2019 06:03  Mg     1.8     02-02    TPro  6.8  /  Alb  3.4<L>  /  TBili  0.5  /  DBili  x   /  AST  19  /  ALT  12  /  AlkPhos  112  02-02              Culture - Other (collected 30 Jan 2019 13:57)  Source: .Other Right 2nd toe ucler  Preliminary Report (01 Feb 2019 18:28):    Few Staphylococcus aureus    Culture - Blood (collected 30 Jan 2019 13:56)  Source: .Blood Blood  Preliminary Report (31 Jan 2019 23:01):    No growth to date.      RADIOLOGY:  EXAM:  XR FOOT COMP MIN 3 VIEWS RT        PROCEDURE DATE:  01/30/2019      Impression: Status post Charcot reconstruction with talar first metatarsal, calcaneal   cuboid and cuneiform second metatarsal arthrodeses. No evidence of   hardware complication.  Overall stable exam.    PHYSICAL EXAM:    GEN: Patient lying in bed, appears comfortable  LUNGS: Clear to auscultation, no wheezing or crackles    HEART: Regular, normal S1/S2 no rubs, murmurs, or gallops  ABD: Soft, non-distended, bowel sounds present, no tenderness to palpation  EXT: R. foot was bound, waiting for podiatry wound change to visualize, however as per podiatry evidence of wound dehiscence, as well as erythema overlying surgical site with no drainage currently apparent    LLE: blanching patches erythema on right foot up to ankle associated with itchiness, no burning,   NEURO: AAOX3, 5/5 muscle strength bilaterally        ADMISSION SUMMARY  Patient is a 52 yo f HD #with a pmh significant for Diabetes mellitus 1.5, CAD, HTN, DLD, COPD not on home O2, chronic midline low back pain with bilateral sciatica hyothyroid, s/p R. foot reconstruction surgery Jan. 11th 2/2 worsening charcot's arthropathy presenting with worsening pain and pus filled drainage from surgical site, and wound dehiscence, without fever/chills/elevated wbc and non-significant for x-ray admitted with a diagnosis of charcot foot 2/2 DM foot infection being trx with IV vanc and cefepime and PO vanc and pain control      ASSESSMENT & PLAN  #Surgical wound infection s/p reconstruction of R foot 1/11/19  - evidence of wound dehiscence and erythema with reported drainage   - Vancomycin 1250 mg IV q12h (Day 3) + Cefepime 2000 mg IV q12h (Day 3) + Rifampin 600 mg PO (Day3) as per ID  - Will need PICC placement, PICC team consulted, plan for PICC on Monday   - ESR WNL  - f/u wound cx  - Pain control with home regimen including PO morphine 15mg BID PRN + Naproxen 500 mg BID PO PRN + Percocet 10 q 6 PRN  - Podiatry and ID following     #Diabetes  - hold Janumet 50 mg-1000 mg PO BID  - Monitor fingersticks   - Will start insulin coverage if FSG consistently > 150    #Dry Skin on Right LE  - gave topical ointment  - will also give bacitracin to put on fingernails in order to prevent spread of infection to feet      #CAD/ DLD  - c/w aspirin + statin  - takes ezetimibe at home    #HTN-   - c/w ACE-i + metoprolol    #COPD  - symbicort and albuterol PRN   - Home meds: Qvar & Ventolin    #Sciatica/Peripheral neuralgia   - c/w duloxetine and lyrica    Dispo: from home  DVT ppx: Lovenox  GI ppx: Protonics   Pharmacy [facility] CALLY HARTMAN 53y Female  MRN#: [id number]     SUBJECTIVE  Patient is a 53y old Female who presents with a chief complaint of right foot infection (31 Jan 2019 12:29)  Currently admitted to medicine with the primary diagnosis of dehiscence and infection s/p Charcot foot revision    Today is hospital day 3d, no acute events overnight. Pain well controlled with current regimen.   Left foot- still remains erythematous and painful but not itchy and no burning.     MEDICATIONS:  STANDING MEDICATIONS  aspirin enteric coated 81 milliGRAM(s) Oral daily  atorvastatin 40 milliGRAM(s) Oral at bedtime  BACItracin   Ointment 1 Application(s) Topical three times a day  buDESOnide  80 MICROgram(s)/formoterol 4.5 MICROgram(s) Inhaler 2 Puff(s) Inhalation two times a day  cefepime   IVPB 2000 milliGRAM(s) IV Intermittent every 12 hours  chlorhexidine 4% Liquid 1 Application(s) Topical <User Schedule>  docusate sodium 100 milliGRAM(s) Oral three times a day  DULoxetine 60 milliGRAM(s) Oral daily  enoxaparin Injectable 40 milliGRAM(s) SubCutaneous daily  hydrOXYzine  Oral Tab/Cap - Peds 10 milliGRAM(s) Oral three times a day  levothyroxine 100 MICROGram(s) Oral daily  loratadine 10 milliGRAM(s) Oral daily  metoprolol tartrate 12.5 milliGRAM(s) Oral two times a day  morphine ER Tablet 15 milliGRAM(s) Oral two times a day  oxybutynin 5 milliGRAM(s) Oral two times a day  pantoprazole    Tablet 40 milliGRAM(s) Oral before breakfast  pregabalin 150 milliGRAM(s) Oral three times a day  rifampin 600 milliGRAM(s) Oral daily  senna 2 Tablet(s) Oral at bedtime  sodium chloride 0.9% lock flush 3 milliLiter(s) IV Push every 8 hours  vancomycin  IVPB 1250 milliGRAM(s) IV Intermittent every 12 hours    PRN MEDICATIONS  ALBUTerol    90 MICROgram(s) HFA Inhaler 2 Puff(s) Inhalation every 6 hours PRN  diazepam    Tablet 5 milliGRAM(s) Oral two times a day PRN  oxyCODONE    5 mG/acetaminophen 325 mG 2 Tablet(s) Oral every 6 hours PRN      VITAL SIGNS: Last 24 Hours  T(C): 36.1 (02 Feb 2019 05:09), Max: 36.4 (01 Feb 2019 21:37)  T(F): 97 (02 Feb 2019 05:09), Max: 97.5 (01 Feb 2019 21:37)  HR: 107 (02 Feb 2019 05:09) (76 - 107)  BP: 104/56 (02 Feb 2019 05:09) (104/56 - 124/61)  BP(mean): --  RR: 18 (02 Feb 2019 05:09) (18 - 18)  SpO2: 94% (01 Feb 2019 19:49) (94% - 94%)    LABS:                        11.7   11.16 )-----------( 393      ( 02 Feb 2019 06:03 )             36.8     02-02    139  |  97<L>  |  6<L>  ----------------------------<  122<H>  4.6   |  28  |  0.7    Ca    9.2      02 Feb 2019 06:03  Mg     1.8     02-02    TPro  6.8  /  Alb  3.4<L>  /  TBili  0.5  /  DBili  x   /  AST  19  /  ALT  12  /  AlkPhos  112  02-02        Culture - Other (collected 30 Jan 2019 13:57)  Source: .Other Right 2nd toe ucler  Preliminary Report (01 Feb 2019 18:28):    Few Staphylococcus aureus    Culture - Blood (collected 30 Jan 2019 13:56)  Source: .Blood Blood  Preliminary Report (31 Jan 2019 23:01):    No growth to date.      RADIOLOGY:  EXAM:  XR FOOT COMP MIN 3 VIEWS RT        PROCEDURE DATE:  01/30/2019      Impression: Status post Charcot reconstruction with talar first metatarsal, calcaneal   cuboid and cuneiform second metatarsal arthrodeses. No evidence of   hardware complication.  Overall stable exam.    PHYSICAL EXAM:    GEN: Patient lying in bed, appears comfortable  LUNGS: Clear to auscultation, no wheezing or crackles    HEART: Regular, normal S1/S2 no rubs, murmurs, or gallops  ABD: Soft, non-distended, bowel sounds present, no tenderness to palpation  EXT: R. foot was bound with Ace-wrap, dressing clean/dry/intact  As per podiatry note: "Surgical sites R foot 1st and 2nd ray dorsal and medial midfoot - Dehiscence, probe to deep tissue, Improved  Erythema and  edema,  painful,  wound Bases with fibrotic tissue present. No pus was expressed. No sutures present"  LLE: patches of erythema and teleangiectasia on right foot up to just below the ankle- no pruritus or burning   NEURO: AAOX3, 5/5 muscle strength bilaterally      ADMISSION SUMMARY  Patient is a 54 yo f HD #with a pmh significant for Diabetes mellitus 1.5, CAD, HTN, DLD, COPD not on home O2, chronic midline low back pain with bilateral sciatica hyothyroid, s/p R. foot reconstruction surgery Jan. 11th 2/2 worsening charcot's arthropathy presenting with worsening pain and pus filled drainage from surgical site, and wound dehiscence, without fever/chills/elevated wbc and non-significant for x-ray admitted with a diagnosis of charcot foot 2/2 DM foot infection being trx with IV vanc and cefepime and PO vanc and pain control. will need PICC placed for long term abx.       ASSESSMENT & PLAN  #Surgical wound infection s/p reconstruction of R foot 1/11/19  - evidence of wound dehiscence and erythema with reported drainage   - Vancomycin 1250 mg IV q12h (Day 3) + Cefepime 2000 mg IV q12h (Day 3) + Rifampin 600 mg PO (Day3) as per ID  - Will need PICC placement, PICC team consulted, plan for PICC on Monday   - ESR WNL  - wound cx- few Staph aureus   - Pain control with home regimen including PO morphine 15mg BID PRN + Naproxen 500 mg BID PO PRN + Percocet 10 q 6 PRN  - Podiatry and ID following     #Diabetes  - hold Janumet 50 mg-1000 mg PO BID  - Monitor fingersticks   - Will start insulin coverage if FSG consistently > 180    #Right LE Erythema likely xerotic skin  - gave topical ointment  - will also give bacitracin to put on fingernails in order to prevent spread of infection to feet  - continue to monitor for local spread  - If podiatry attending agrees, can give topical steroids      #CAD/ DLD  - c/w aspirin + statin  - takes ezetimibe at home    #HTN-   - cw metoprolol 12 BID  - Held lisinopil given that patient had a few episodes of hypotension    #COPD  - symbicort and albuterol PRN   - Home meds: Qvar & Ventolin    #Sciatica/Peripheral neuralgia   - c/w duloxetine and lyrica    Dispo: from home, will likely need PT/Rehab eval  DVT ppx: Lovenox  GI ppx: Protonics   Pharmacy [facility]

## 2019-02-03 LAB
ALBUMIN SERPL ELPH-MCNC: 3.2 G/DL — LOW (ref 3.5–5.2)
ALP SERPL-CCNC: 103 U/L — SIGNIFICANT CHANGE UP (ref 30–115)
ALT FLD-CCNC: 11 U/L — SIGNIFICANT CHANGE UP (ref 0–41)
ANION GAP SERPL CALC-SCNC: 16 MMOL/L — HIGH (ref 7–14)
AST SERPL-CCNC: 16 U/L — SIGNIFICANT CHANGE UP (ref 0–41)
BILIRUB SERPL-MCNC: 0.3 MG/DL — SIGNIFICANT CHANGE UP (ref 0.2–1.2)
BUN SERPL-MCNC: 7 MG/DL — LOW (ref 10–20)
CALCIUM SERPL-MCNC: 8.6 MG/DL — SIGNIFICANT CHANGE UP (ref 8.5–10.1)
CHLORIDE SERPL-SCNC: 98 MMOL/L — SIGNIFICANT CHANGE UP (ref 98–110)
CO2 SERPL-SCNC: 25 MMOL/L — SIGNIFICANT CHANGE UP (ref 17–32)
CREAT SERPL-MCNC: 0.7 MG/DL — SIGNIFICANT CHANGE UP (ref 0.7–1.5)
GLUCOSE BLDC GLUCOMTR-MCNC: 126 MG/DL — HIGH (ref 70–99)
GLUCOSE BLDC GLUCOMTR-MCNC: 158 MG/DL — HIGH (ref 70–99)
GLUCOSE BLDC GLUCOMTR-MCNC: 171 MG/DL — HIGH (ref 70–99)
GLUCOSE SERPL-MCNC: 105 MG/DL — HIGH (ref 70–99)
HCT VFR BLD CALC: 34.4 % — LOW (ref 37–47)
HGB BLD-MCNC: 10.7 G/DL — LOW (ref 12–16)
INR BLD: 1.08 RATIO — SIGNIFICANT CHANGE UP (ref 0.65–1.3)
MAGNESIUM SERPL-MCNC: 1.7 MG/DL — LOW (ref 1.8–2.4)
MCHC RBC-ENTMCNC: 27.3 PG — SIGNIFICANT CHANGE UP (ref 27–31)
MCHC RBC-ENTMCNC: 31.1 G/DL — LOW (ref 32–37)
MCV RBC AUTO: 87.8 FL — SIGNIFICANT CHANGE UP (ref 81–99)
NRBC # BLD: 0 /100 WBCS — SIGNIFICANT CHANGE UP (ref 0–0)
PLATELET # BLD AUTO: 377 K/UL — SIGNIFICANT CHANGE UP (ref 130–400)
POTASSIUM SERPL-MCNC: 4.8 MMOL/L — SIGNIFICANT CHANGE UP (ref 3.5–5)
POTASSIUM SERPL-SCNC: 4.8 MMOL/L — SIGNIFICANT CHANGE UP (ref 3.5–5)
PROT SERPL-MCNC: 5.9 G/DL — LOW (ref 6–8)
PROTHROM AB SERPL-ACNC: 12.4 SEC — SIGNIFICANT CHANGE UP (ref 9.95–12.87)
RBC # BLD: 3.92 M/UL — LOW (ref 4.2–5.4)
RBC # FLD: 15.9 % — HIGH (ref 11.5–14.5)
SODIUM SERPL-SCNC: 139 MMOL/L — SIGNIFICANT CHANGE UP (ref 135–146)
WBC # BLD: 10.33 K/UL — SIGNIFICANT CHANGE UP (ref 4.8–10.8)
WBC # FLD AUTO: 10.33 K/UL — SIGNIFICANT CHANGE UP (ref 4.8–10.8)

## 2019-02-03 RX ORDER — SIMETHICONE 80 MG/1
80 TABLET, CHEWABLE ORAL
Qty: 0 | Refills: 0 | Status: DISCONTINUED | OUTPATIENT
Start: 2019-02-03 | End: 2019-02-06

## 2019-02-03 RX ADMIN — Medication 81 MILLIGRAM(S): at 11:24

## 2019-02-03 RX ADMIN — Medication 12.5 MILLIGRAM(S): at 17:18

## 2019-02-03 RX ADMIN — Medication 5 MILLIGRAM(S): at 05:08

## 2019-02-03 RX ADMIN — CEFEPIME 100 MILLIGRAM(S): 1 INJECTION, POWDER, FOR SOLUTION INTRAMUSCULAR; INTRAVENOUS at 17:18

## 2019-02-03 RX ADMIN — Medication 166.67 MILLIGRAM(S): at 17:18

## 2019-02-03 RX ADMIN — Medication 10 MILLIGRAM(S): at 06:00

## 2019-02-03 RX ADMIN — Medication 1 APPLICATION(S): at 17:19

## 2019-02-03 RX ADMIN — LORATADINE 10 MILLIGRAM(S): 10 TABLET ORAL at 11:24

## 2019-02-03 RX ADMIN — Medication 1 APPLICATION(S): at 05:14

## 2019-02-03 RX ADMIN — Medication 10 MILLIGRAM(S): at 22:10

## 2019-02-03 RX ADMIN — Medication 10 MILLIGRAM(S): at 13:16

## 2019-02-03 RX ADMIN — ATORVASTATIN CALCIUM 40 MILLIGRAM(S): 80 TABLET, FILM COATED ORAL at 22:08

## 2019-02-03 RX ADMIN — Medication 25 MILLIGRAM(S): at 11:24

## 2019-02-03 RX ADMIN — Medication 5 MILLIGRAM(S): at 17:18

## 2019-02-03 RX ADMIN — OXYCODONE AND ACETAMINOPHEN 2 TABLET(S): 5; 325 TABLET ORAL at 15:58

## 2019-02-03 RX ADMIN — DULOXETINE HYDROCHLORIDE 60 MILLIGRAM(S): 30 CAPSULE, DELAYED RELEASE ORAL at 11:24

## 2019-02-03 RX ADMIN — MORPHINE SULFATE 15 MILLIGRAM(S): 50 CAPSULE, EXTENDED RELEASE ORAL at 17:17

## 2019-02-03 RX ADMIN — SODIUM CHLORIDE 3 MILLILITER(S): 9 INJECTION INTRAMUSCULAR; INTRAVENOUS; SUBCUTANEOUS at 06:05

## 2019-02-03 RX ADMIN — Medication 1 APPLICATION(S): at 05:06

## 2019-02-03 RX ADMIN — Medication 150 MILLIGRAM(S): at 22:13

## 2019-02-03 RX ADMIN — Medication 150 MILLIGRAM(S): at 13:16

## 2019-02-03 RX ADMIN — OXYCODONE AND ACETAMINOPHEN 2 TABLET(S): 5; 325 TABLET ORAL at 09:47

## 2019-02-03 RX ADMIN — OXYCODONE AND ACETAMINOPHEN 2 TABLET(S): 5; 325 TABLET ORAL at 17:02

## 2019-02-03 RX ADMIN — OXYCODONE AND ACETAMINOPHEN 2 TABLET(S): 5; 325 TABLET ORAL at 23:33

## 2019-02-03 RX ADMIN — SODIUM CHLORIDE 3 MILLILITER(S): 9 INJECTION INTRAMUSCULAR; INTRAVENOUS; SUBCUTANEOUS at 17:02

## 2019-02-03 RX ADMIN — CHLORHEXIDINE GLUCONATE 1 APPLICATION(S): 213 SOLUTION TOPICAL at 05:08

## 2019-02-03 RX ADMIN — MORPHINE SULFATE 15 MILLIGRAM(S): 50 CAPSULE, EXTENDED RELEASE ORAL at 06:29

## 2019-02-03 RX ADMIN — MORPHINE SULFATE 15 MILLIGRAM(S): 50 CAPSULE, EXTENDED RELEASE ORAL at 05:59

## 2019-02-03 RX ADMIN — SIMETHICONE 80 MILLIGRAM(S): 80 TABLET, CHEWABLE ORAL at 22:19

## 2019-02-03 RX ADMIN — OXYCODONE AND ACETAMINOPHEN 2 TABLET(S): 5; 325 TABLET ORAL at 10:17

## 2019-02-03 RX ADMIN — MORPHINE SULFATE 15 MILLIGRAM(S): 50 CAPSULE, EXTENDED RELEASE ORAL at 19:03

## 2019-02-03 RX ADMIN — Medication 166.67 MILLIGRAM(S): at 05:05

## 2019-02-03 RX ADMIN — Medication 100 MICROGRAM(S): at 05:07

## 2019-02-03 RX ADMIN — Medication 1 APPLICATION(S): at 22:08

## 2019-02-03 RX ADMIN — BUDESONIDE AND FORMOTEROL FUMARATE DIHYDRATE 2 PUFF(S): 160; 4.5 AEROSOL RESPIRATORY (INHALATION) at 20:21

## 2019-02-03 RX ADMIN — PANTOPRAZOLE SODIUM 40 MILLIGRAM(S): 20 TABLET, DELAYED RELEASE ORAL at 05:08

## 2019-02-03 RX ADMIN — Medication 150 MILLIGRAM(S): at 05:59

## 2019-02-03 RX ADMIN — SODIUM CHLORIDE 3 MILLILITER(S): 9 INJECTION INTRAMUSCULAR; INTRAVENOUS; SUBCUTANEOUS at 22:22

## 2019-02-03 RX ADMIN — Medication 1 APPLICATION(S): at 05:30

## 2019-02-03 RX ADMIN — Medication 12.5 MILLIGRAM(S): at 06:00

## 2019-02-03 RX ADMIN — CEFEPIME 100 MILLIGRAM(S): 1 INJECTION, POWDER, FOR SOLUTION INTRAMUSCULAR; INTRAVENOUS at 05:06

## 2019-02-03 NOTE — PROGRESS NOTE ADULT - ASSESSMENT
54 yo female with hx DM2 nonadherent to dietary restrictions, active smoker, CAD, HTN, Hypothyroid, s/p Reconstruction surgery R foot for charcot foot returns with wound dehiscence in R foot with hardware, no sepsis on admission, now with new rash to L foot    continue with abx coverage recommended by ID - plan for PICC monday  continue with local wound care recommended by podiatry  previously educated patient that she is at risk to introduce cellulitis/ infection to the left foot if she continues to scratch; appreciated podiatry f/u, creams/benadryl ordered   RUE possible resolving thrombophlebitis- c/w elevation and local hot packs; clinical exam is not impressive; improved  ultrasound duplex of LE's in light of edema- pending official result- prelim showing no DVT but L bakers cyst  continue with DVT prophylaxis   smoking cessation recommended  diabetic dietary restrictions

## 2019-02-04 LAB
ALBUMIN SERPL ELPH-MCNC: 3.1 G/DL — LOW (ref 3.5–5.2)
ALP SERPL-CCNC: 104 U/L — SIGNIFICANT CHANGE UP (ref 30–115)
ALT FLD-CCNC: 11 U/L — SIGNIFICANT CHANGE UP (ref 0–41)
ANION GAP SERPL CALC-SCNC: 16 MMOL/L — HIGH (ref 7–14)
AST SERPL-CCNC: 17 U/L — SIGNIFICANT CHANGE UP (ref 0–41)
BILIRUB SERPL-MCNC: 0.2 MG/DL — SIGNIFICANT CHANGE UP (ref 0.2–1.2)
BUN SERPL-MCNC: 7 MG/DL — LOW (ref 10–20)
CALCIUM SERPL-MCNC: 9.2 MG/DL — SIGNIFICANT CHANGE UP (ref 8.5–10.1)
CHLORIDE SERPL-SCNC: 100 MMOL/L — SIGNIFICANT CHANGE UP (ref 98–110)
CO2 SERPL-SCNC: 25 MMOL/L — SIGNIFICANT CHANGE UP (ref 17–32)
CREAT SERPL-MCNC: 0.6 MG/DL — LOW (ref 0.7–1.5)
CULTURE RESULTS: SIGNIFICANT CHANGE UP
GLUCOSE BLDC GLUCOMTR-MCNC: 136 MG/DL — HIGH (ref 70–99)
GLUCOSE BLDC GLUCOMTR-MCNC: 170 MG/DL — HIGH (ref 70–99)
GLUCOSE BLDC GLUCOMTR-MCNC: 184 MG/DL — HIGH (ref 70–99)
GLUCOSE BLDC GLUCOMTR-MCNC: 218 MG/DL — HIGH (ref 70–99)
GLUCOSE SERPL-MCNC: 111 MG/DL — HIGH (ref 70–99)
HCT VFR BLD CALC: 34.4 % — LOW (ref 37–47)
HGB BLD-MCNC: 10.8 G/DL — LOW (ref 12–16)
MAGNESIUM SERPL-MCNC: 1.9 MG/DL — SIGNIFICANT CHANGE UP (ref 1.8–2.4)
MCHC RBC-ENTMCNC: 27.3 PG — SIGNIFICANT CHANGE UP (ref 27–31)
MCHC RBC-ENTMCNC: 31.4 G/DL — LOW (ref 32–37)
MCV RBC AUTO: 87.1 FL — SIGNIFICANT CHANGE UP (ref 81–99)
NRBC # BLD: 0 /100 WBCS — SIGNIFICANT CHANGE UP (ref 0–0)
PLATELET # BLD AUTO: 365 K/UL — SIGNIFICANT CHANGE UP (ref 130–400)
POTASSIUM SERPL-MCNC: 4.3 MMOL/L — SIGNIFICANT CHANGE UP (ref 3.5–5)
POTASSIUM SERPL-SCNC: 4.3 MMOL/L — SIGNIFICANT CHANGE UP (ref 3.5–5)
PROT SERPL-MCNC: 6 G/DL — SIGNIFICANT CHANGE UP (ref 6–8)
RBC # BLD: 3.95 M/UL — LOW (ref 4.2–5.4)
RBC # FLD: 15.9 % — HIGH (ref 11.5–14.5)
SODIUM SERPL-SCNC: 141 MMOL/L — SIGNIFICANT CHANGE UP (ref 135–146)
SPECIMEN SOURCE: SIGNIFICANT CHANGE UP
WBC # BLD: 10.52 K/UL — SIGNIFICANT CHANGE UP (ref 4.8–10.8)
WBC # FLD AUTO: 10.52 K/UL — SIGNIFICANT CHANGE UP (ref 4.8–10.8)

## 2019-02-04 RX ORDER — VANCOMYCIN HCL 1 G
1250 VIAL (EA) INTRAVENOUS EVERY 12 HOURS
Qty: 0 | Refills: 0 | Status: DISCONTINUED | OUTPATIENT
Start: 2019-02-04 | End: 2019-02-04

## 2019-02-04 RX ADMIN — OXYCODONE AND ACETAMINOPHEN 2 TABLET(S): 5; 325 TABLET ORAL at 21:21

## 2019-02-04 RX ADMIN — Medication 150 MILLIGRAM(S): at 13:44

## 2019-02-04 RX ADMIN — MORPHINE SULFATE 15 MILLIGRAM(S): 50 CAPSULE, EXTENDED RELEASE ORAL at 18:27

## 2019-02-04 RX ADMIN — Medication 5 MILLIGRAM(S): at 05:23

## 2019-02-04 RX ADMIN — BUDESONIDE AND FORMOTEROL FUMARATE DIHYDRATE 2 PUFF(S): 160; 4.5 AEROSOL RESPIRATORY (INHALATION) at 21:24

## 2019-02-04 RX ADMIN — Medication 1 APPLICATION(S): at 18:09

## 2019-02-04 RX ADMIN — Medication 100 MICROGRAM(S): at 05:23

## 2019-02-04 RX ADMIN — ATORVASTATIN CALCIUM 40 MILLIGRAM(S): 80 TABLET, FILM COATED ORAL at 21:19

## 2019-02-04 RX ADMIN — OXYCODONE AND ACETAMINOPHEN 2 TABLET(S): 5; 325 TABLET ORAL at 08:04

## 2019-02-04 RX ADMIN — Medication 150 MILLIGRAM(S): at 21:23

## 2019-02-04 RX ADMIN — Medication 150 MILLIGRAM(S): at 05:28

## 2019-02-04 RX ADMIN — Medication 12.5 MILLIGRAM(S): at 18:03

## 2019-02-04 RX ADMIN — Medication 25 MILLIGRAM(S): at 23:35

## 2019-02-04 RX ADMIN — Medication 81 MILLIGRAM(S): at 12:11

## 2019-02-04 RX ADMIN — CEFEPIME 100 MILLIGRAM(S): 1 INJECTION, POWDER, FOR SOLUTION INTRAMUSCULAR; INTRAVENOUS at 05:21

## 2019-02-04 RX ADMIN — Medication 5 MILLIGRAM(S): at 18:04

## 2019-02-04 RX ADMIN — Medication 1 APPLICATION(S): at 13:46

## 2019-02-04 RX ADMIN — CEFEPIME 100 MILLIGRAM(S): 1 INJECTION, POWDER, FOR SOLUTION INTRAMUSCULAR; INTRAVENOUS at 18:02

## 2019-02-04 RX ADMIN — SODIUM CHLORIDE 3 MILLILITER(S): 9 INJECTION INTRAMUSCULAR; INTRAVENOUS; SUBCUTANEOUS at 13:47

## 2019-02-04 RX ADMIN — Medication 12.5 MILLIGRAM(S): at 05:22

## 2019-02-04 RX ADMIN — OXYCODONE AND ACETAMINOPHEN 2 TABLET(S): 5; 325 TABLET ORAL at 08:34

## 2019-02-04 RX ADMIN — MORPHINE SULFATE 15 MILLIGRAM(S): 50 CAPSULE, EXTENDED RELEASE ORAL at 05:57

## 2019-02-04 RX ADMIN — MORPHINE SULFATE 15 MILLIGRAM(S): 50 CAPSULE, EXTENDED RELEASE ORAL at 18:57

## 2019-02-04 RX ADMIN — Medication 10 MILLIGRAM(S): at 05:22

## 2019-02-04 RX ADMIN — OXYCODONE AND ACETAMINOPHEN 2 TABLET(S): 5; 325 TABLET ORAL at 00:03

## 2019-02-04 RX ADMIN — Medication 10 MILLIGRAM(S): at 21:19

## 2019-02-04 RX ADMIN — Medication 1 APPLICATION(S): at 05:22

## 2019-02-04 RX ADMIN — OXYCODONE AND ACETAMINOPHEN 2 TABLET(S): 5; 325 TABLET ORAL at 16:30

## 2019-02-04 RX ADMIN — Medication 1 APPLICATION(S): at 06:16

## 2019-02-04 RX ADMIN — Medication 1 APPLICATION(S): at 06:17

## 2019-02-04 RX ADMIN — SODIUM CHLORIDE 3 MILLILITER(S): 9 INJECTION INTRAMUSCULAR; INTRAVENOUS; SUBCUTANEOUS at 21:24

## 2019-02-04 RX ADMIN — Medication 1 APPLICATION(S): at 21:19

## 2019-02-04 RX ADMIN — Medication 10 MILLIGRAM(S): at 13:45

## 2019-02-04 RX ADMIN — PANTOPRAZOLE SODIUM 40 MILLIGRAM(S): 20 TABLET, DELAYED RELEASE ORAL at 05:22

## 2019-02-04 RX ADMIN — SODIUM CHLORIDE 3 MILLILITER(S): 9 INJECTION INTRAMUSCULAR; INTRAVENOUS; SUBCUTANEOUS at 06:17

## 2019-02-04 RX ADMIN — LORATADINE 10 MILLIGRAM(S): 10 TABLET ORAL at 12:10

## 2019-02-04 RX ADMIN — DULOXETINE HYDROCHLORIDE 60 MILLIGRAM(S): 30 CAPSULE, DELAYED RELEASE ORAL at 12:10

## 2019-02-04 RX ADMIN — CHLORHEXIDINE GLUCONATE 1 APPLICATION(S): 213 SOLUTION TOPICAL at 05:23

## 2019-02-04 RX ADMIN — OXYCODONE AND ACETAMINOPHEN 2 TABLET(S): 5; 325 TABLET ORAL at 16:00

## 2019-02-04 RX ADMIN — BUDESONIDE AND FORMOTEROL FUMARATE DIHYDRATE 2 PUFF(S): 160; 4.5 AEROSOL RESPIRATORY (INHALATION) at 08:08

## 2019-02-04 RX ADMIN — OXYCODONE AND ACETAMINOPHEN 2 TABLET(S): 5; 325 TABLET ORAL at 21:51

## 2019-02-04 RX ADMIN — MORPHINE SULFATE 15 MILLIGRAM(S): 50 CAPSULE, EXTENDED RELEASE ORAL at 05:27

## 2019-02-04 NOTE — PROCEDURE NOTE - NSPOSTCAREGUIDE_GEN_A_CORE
Verbal/written post procedure instructions were given to patient/caregiver/Instructed patient/caregiver regarding signs and symptoms of infection/Instructed patient/caregiver to follow-up with primary care physician/Care for catheter as per unit/ICU protocols/Keep the cast/splint/dressing clean and dry

## 2019-02-04 NOTE — DIETITIAN INITIAL EVALUATION ADULT. - PERTINENT LABORATORY DATA
2/4: RBC-3.95, H/H-10.8/34.4, BUN-7, creat-0.6, gluc-111; per previous admit records - 1/11: HpoY3D-7.0%

## 2019-02-04 NOTE — DIETITIAN INITIAL EVALUATION ADULT. - SOURCE
Fair appetite & po intake PTP. Reports recent DM nutrition education. Takes MVI at home, no other supplements. NKFA. Report recent intentional wt loss d/t adopting healthier eating habits for DM control.

## 2019-02-04 NOTE — PROGRESS NOTE ADULT - ASSESSMENT
CALLY HARTMAN   53y   Female    MRN#: [id number]         SUBJECTIVE  Patient is a 53y old Female who presents with a chief complaint of right foot infection (2019 13:42)  Currently admitted to medicine with the primary diagnosis of dehiscence and infection s/p Charcot foot revision  Hospital course has been uncomplicated so far : patient was being treated with Cefepime and Vancomycin - vancomycin discontinued today as cultures (+) for MSSA. Venous duplex done for RLE erythema and was negative for DVT. Patient scheduled for PICC line placement today and will be discharged on IV ertapenem   Today is hospital day 5d, and this morning she endorses right foot pain and denies dyspnea, chest pain, abdominal pain. Patient scheduled to have a PICC line placed today     Present Today:           Bautista Catheter X          Central Line X          IV Fluids X          Drains X      OBJECTIVE  ------------------------------------    PAST MEDICAL & SURGICAL HISTORY  Dextrocardia  Chronic midline low back pain with bilateral sciatica  Peripheral neuralgia  Essential hypertension  Diabetes 1.5, managed as type 2  Charcot's arthropathy: R foot  Charcot foot due to diabetes mellitus  H/O shoulder surgery: Right shoulder surgery   delivery delivered      ALLERGIES:  No Known Allergies      MEDICATIONS:  STANDING MEDICATIONS  AQUAPHOR (petrolatum Ointment) 1 Application(s) Topical two times a day  aspirin enteric coated 81 milliGRAM(s) Oral daily  atorvastatin 40 milliGRAM(s) Oral at bedtime  BACItracin   Ointment 1 Application(s) Topical three times a day  buDESOnide  80 MICROgram(s)/formoterol 4.5 MICROgram(s) Inhaler 2 Puff(s) Inhalation two times a day  cefepime   IVPB 2000 milliGRAM(s) IV Intermittent every 12 hours  chlorhexidine 4% Liquid 1 Application(s) Topical <User Schedule>  docusate sodium 100 milliGRAM(s) Oral three times a day  DULoxetine 60 milliGRAM(s) Oral daily  enoxaparin Injectable 40 milliGRAM(s) SubCutaneous daily  hydrocortisone 1% Cream 1 Application(s) Topical two times a day  hydrOXYzine  Oral Tab/Cap - Peds 10 milliGRAM(s) Oral three times a day  levothyroxine 100 MICROGram(s) Oral daily  loratadine 10 milliGRAM(s) Oral daily  metoprolol tartrate 12.5 milliGRAM(s) Oral two times a day  morphine ER Tablet 15 milliGRAM(s) Oral two times a day  oxybutynin 5 milliGRAM(s) Oral two times a day  pantoprazole    Tablet 40 milliGRAM(s) Oral before breakfast  pregabalin 150 milliGRAM(s) Oral three times a day  rifampin 600 milliGRAM(s) Oral daily  senna 2 Tablet(s) Oral at bedtime  sodium chloride 0.9% lock flush 3 milliLiter(s) IV Push every 8 hours    PRN MEDICATIONS  ALBUTerol    90 MICROgram(s) HFA Inhaler 2 Puff(s) Inhalation every 6 hours PRN  diazepam    Tablet 5 milliGRAM(s) Oral two times a day PRN  diphenhydrAMINE 25 milliGRAM(s) Oral every 6 hours PRN  oxyCODONE    5 mG/acetaminophen 325 mG 2 Tablet(s) Oral every 6 hours PRN  simethicone 80 milliGRAM(s) Chew two times a day PRN        LABS:                        10.8   10.52 )-----------( 365      ( 2019 05:11 )             34.4     02-04    141  |  100  |  7<L>  ----------------------------<  111<H>  4.3   |  25  |  0.6<L>    Ca    9.2      2019 05:11  Mg     1.9     -    TPro  6.0  /  Alb  3.1<L>  /  TBili  0.2  /  DBili  x   /  AST  17  /  ALT  11  /  AlkPhos  104  02-04    PT/INR - ( 2019 06:55 )   PT: 12.40 sec;   INR: 1.08 ratio         RADIOLOGY:    No imaging studies performed today     PHYSICAL EXAM:  VITAL SIGNS: Last 24 Hours  T(C): 36.5 (2019 05:00), Max: 36.5 (2019 05:00)  T(F): 97.7 (2019 05:00), Max: 97.7 (2019 05:00)  HR: 106 (2019 05:00) (101 - 107)  BP: 104/53 (2019 05:00) (104/53 - 136/76)  BP(mean): --  RR: 18 (2019 05:00) (18 - 18)  SpO2: --    GENERAL: NAD, alert   PULMONARY: Clear to auscultation bilaterally; No wheeze - no respiratory distress   CARDIOVASCULAR: Regular rate and rhythm, (+) peripheral pulses   GASTROINTESTINAL: Soft, Nontender, Nondistended  MUSCULOSKELETAL:  No lower extremity edema. bandage Right foot       ASSESSMENT & PLAN    #Surgical wound infection s/p reconstruction of R foot 19  - evidence of wound dehiscence and erythema with reported drainage   - Vancomycin 1250 mg IV q12h discontinued today as culture positive for MSSA / Cefepime 2000 mg IV q12h (Day 3) + Rifampin 600 mg PO (Day3) as per ID / Patient to be discharged on Ertapenem 1 g IV daily - scheduled for PICC line placement today   - ESR 14  - wound cx : few Staph aureus MSSA - Blood cultures negative   - Pain control with home regimen including PO morphine 15mg BID PRN + Naproxen 500 mg BID PO PRN + Percocet 10 q 6 PRN  - Podiatry : NWB R foot - IV ABx per ID - PICC line - local wound care with wet to dry packing, Kerlix, Webril, posterior splint and ACE    #Diabetes  - holding Janumet 50 mg-1000 mg PO BID  - fingersticks controlled - Not on insulin coverage currently   - Will start insulin coverage if FSG consistently > 180    #Right LE Erythema likely xerotic skin  - bacitracin to put on fingernails in order to prevent spread of infection to feet  - venous duplex LE negative for DVT; Left Bakers cyst       #CAD/ DLD  - c/w aspirin + statin  - takes ezetimibe at home    #HTN-   - cw metoprolol 12 BID  - Lisinopril on hold as previous episodes of hypotension     #COPD  - Symbicort and albuterol PRN   - Home meds: Qvar & Ventolin    #Sciatica/Peripheral neuralgia   - c/w duloxetine and Lyrica    Dispo: from home, will likely need PT/Rehab eval  DVT ppx: Lovenox  GI ppx: Protonics   Pharmacy [facility]      # DVT prophylaxis : Lovenox   # GI prophylaxis : Protonix   # Activity : Full weight bearing LLE and non weight bearing RLE  # Diet : Consistent carbohydrate with evening snack   # Code : FULL   # Disposition : From home - can be discharged once PICC line placed and set up for home care for IV Abx CALLY HARTMAN   53y   Female    MRN#: [id number]         SUBJECTIVE  Patient is a 53y old Female who presents with a chief complaint of right foot infection (2019 13:42)  Currently admitted to medicine with the primary diagnosis of dehiscence and infection s/p Charcot foot revision  Hospital course has been uncomplicated so far : patient was being treated with Cefepime and Vancomycin - vancomycin discontinued today as cultures (+) for MSSA. Venous duplex done for RLE erythema and was negative for DVT. Patient scheduled for PICC line placement today and will be discharged on IV ertapenem   Today is hospital day 5d, and this morning she endorses right foot pain and denies dyspnea, chest pain, abdominal pain. Patient scheduled to have a PICC line placed today     Present Today:           Bautista Catheter X          Central Line X          IV Fluids X          Drains X      OBJECTIVE  ------------------------------------    PAST MEDICAL & SURGICAL HISTORY  Dextrocardia  Chronic midline low back pain with bilateral sciatica  Peripheral neuralgia  Essential hypertension  Diabetes 1.5, managed as type 2  Charcot's arthropathy: R foot  Charcot foot due to diabetes mellitus  H/O shoulder surgery: Right shoulder surgery   delivery delivered      ALLERGIES:  No Known Allergies      MEDICATIONS:  STANDING MEDICATIONS  AQUAPHOR (petrolatum Ointment) 1 Application(s) Topical two times a day  aspirin enteric coated 81 milliGRAM(s) Oral daily  atorvastatin 40 milliGRAM(s) Oral at bedtime  BACItracin   Ointment 1 Application(s) Topical three times a day  buDESOnide  80 MICROgram(s)/formoterol 4.5 MICROgram(s) Inhaler 2 Puff(s) Inhalation two times a day  cefepime   IVPB 2000 milliGRAM(s) IV Intermittent every 12 hours  chlorhexidine 4% Liquid 1 Application(s) Topical <User Schedule>  docusate sodium 100 milliGRAM(s) Oral three times a day  DULoxetine 60 milliGRAM(s) Oral daily  enoxaparin Injectable 40 milliGRAM(s) SubCutaneous daily  hydrocortisone 1% Cream 1 Application(s) Topical two times a day  hydrOXYzine  Oral Tab/Cap - Peds 10 milliGRAM(s) Oral three times a day  levothyroxine 100 MICROGram(s) Oral daily  loratadine 10 milliGRAM(s) Oral daily  metoprolol tartrate 12.5 milliGRAM(s) Oral two times a day  morphine ER Tablet 15 milliGRAM(s) Oral two times a day  oxybutynin 5 milliGRAM(s) Oral two times a day  pantoprazole    Tablet 40 milliGRAM(s) Oral before breakfast  pregabalin 150 milliGRAM(s) Oral three times a day  rifampin 600 milliGRAM(s) Oral daily  senna 2 Tablet(s) Oral at bedtime  sodium chloride 0.9% lock flush 3 milliLiter(s) IV Push every 8 hours    PRN MEDICATIONS  ALBUTerol    90 MICROgram(s) HFA Inhaler 2 Puff(s) Inhalation every 6 hours PRN  diazepam    Tablet 5 milliGRAM(s) Oral two times a day PRN  diphenhydrAMINE 25 milliGRAM(s) Oral every 6 hours PRN  oxyCODONE    5 mG/acetaminophen 325 mG 2 Tablet(s) Oral every 6 hours PRN  simethicone 80 milliGRAM(s) Chew two times a day PRN        LABS:                        10.8   10.52 )-----------( 365      ( 2019 05:11 )             34.4     02-04    141  |  100  |  7<L>  ----------------------------<  111<H>  4.3   |  25  |  0.6<L>    Ca    9.2      2019 05:11  Mg     1.9     -    TPro  6.0  /  Alb  3.1<L>  /  TBili  0.2  /  DBili  x   /  AST  17  /  ALT  11  /  AlkPhos  104  02-04    PT/INR - ( 2019 06:55 )   PT: 12.40 sec;   INR: 1.08 ratio         RADIOLOGY:    No imaging studies performed today     PHYSICAL EXAM:  VITAL SIGNS: Last 24 Hours  T(C): 36.5 (2019 05:00), Max: 36.5 (2019 05:00)  T(F): 97.7 (2019 05:00), Max: 97.7 (2019 05:00)  HR: 106 (2019 05:00) (101 - 107)  BP: 104/53 (2019 05:00) (104/53 - 136/76)  BP(mean): --  RR: 18 (2019 05:00) (18 - 18)  SpO2: --    GENERAL: NAD, alert   PULMONARY: Clear to auscultation bilaterally; No wheeze - no respiratory distress   CARDIOVASCULAR: Regular rate and rhythm, (+) peripheral pulses   GASTROINTESTINAL: Soft, Nontender, Nondistended  MUSCULOSKELETAL:  No lower extremity edema. bandage Right foot       ASSESSMENT & PLAN    #Surgical wound infection s/p reconstruction of R foot 19  - evidence of wound dehiscence and erythema with reported drainage   - Vancomycin 1250 mg IV q12h discontinued today as culture positive for MSSA / Cefepime 2000 mg IV q12h (Day 3) + Rifampin 600 mg PO (Day3) as per ID / Patient to be discharged on Ertapenem 1 g IV daily - scheduled for PICC line placement today   - ESR 14  - wound cx : few Staph aureus MSSA - Blood cultures negative   - Pain control with home regimen including PO morphine 15mg BID PRN + Naproxen 500 mg BID PO PRN + Percocet 10 q 6 PRN  - Podiatry : NWB R foot - IV ABx per ID - PICC line - local wound care with wet to dry packing, Kerlix, Webril, posterior splint and ACE    #Diabetes  - holding Janumet 50 mg-1000 mg PO BID  - fingersticks controlled - Not on insulin coverage currently   - Will start insulin coverage if FSG consistently > 180    #Right LE Erythema likely xerotic skin  - bacitracin to put on fingernails in order to prevent spread of infection to feet  - venous duplex LE negative for DVT; Left Bakers cyst       #CAD/ DLD  - c/w aspirin + statin  - takes ezetimibe at home    #HTN-   - cw metoprolol 12 BID  - Lisinopril on hold as previous episodes of hypotension     #COPD  - Symbicort and albuterol PRN   - Home meds: Qvar & Ventolin    #Sciatica/Peripheral neuralgia   - c/w duloxetine and Lyrica    # DVT prophylaxis : Lovenox   # GI prophylaxis : Protonix   # Activity : Full weight bearing LLE and non weight bearing RLE  # Diet : Consistent carbohydrate with evening snack   # Code : FULL   # Disposition : From home - can be discharged once PICC line placed and set up for home care for IV Abx

## 2019-02-04 NOTE — PROGRESS NOTE ADULT - ASSESSMENT
53F    Charcot foot s/p recent surgery with hardware  Chronic midline low back pain with bilateral sciatica  Peripheral neuralgia  Essential hypertension  Diabetes   H/O shoulder surgery: Right shoulder surgery    Admitted with wound dehiscence   Sepsis ruled out on admission  wcx MSSA  ESR 14    - Rifampin 600mg PO daily given hardware  - Cefepime 2g q12h for now  - Podiatry following  - Will need PICC and plan for outpatient Ceftriaxone 2g q24h x 6 weeks   - ID follow-up [address] [phone]  - Weekly CBC, BMP    Spectra [phone]

## 2019-02-04 NOTE — PROCEDURE NOTE - NSINFORMCONSENT_GEN_A_CORE
patient/Benefits, risks, and possible complications of procedure explained to patient/caregiver who verbalized understanding and gave written consent.

## 2019-02-04 NOTE — DIETITIAN INITIAL EVALUATION ADULT. - NUTRITIONGOAL OUTCOME1
Pt to maintain at least 75% po intake over next 7 days; to demonstrate understanding of DM education

## 2019-02-04 NOTE — DIETITIAN INITIAL EVALUATION ADULT. - OTHER INFO
Reason for RD assessment: Nutrition services consult. Pertinent Medical Information: p/w R foot infection. Admitted with dehiscence and infection s/p Charcot foot revision. Surgical wound infection s/p reconstruction of R foot 1/11/19. Right LE Erythema likely xerotic skin. Can be discharged once PICC line placed and set up for home care.

## 2019-02-04 NOTE — DIETITIAN INITIAL EVALUATION ADULT. - PHYSICAL APPEARANCE
BMI: 32.9. Alert & oriented. Last BM 2/2. No chewing/swallowing difficulty reported at this time. R foot wound.

## 2019-02-04 NOTE — PROCEDURE NOTE - NSPROCDETAILS_GEN_ALL_CORE
sterile dressing applied/ultrasound guidance/supine position/sterile technique, catheter placed/location identified, draped/prepped, sterile technique used

## 2019-02-04 NOTE — CHART NOTE - NSCHARTNOTEFT_GEN_A_CORE
patient not in room x 3 attempt, will reevaluate tomorrow  PICC, abx per ID, podiatry dressing changes, glycemic control, dvt ppx, analgesia

## 2019-02-05 LAB
ANION GAP SERPL CALC-SCNC: 19 MMOL/L — HIGH (ref 7–14)
BASOPHILS # BLD AUTO: 0.13 K/UL — SIGNIFICANT CHANGE UP (ref 0–0.2)
BASOPHILS NFR BLD AUTO: 0.9 % — SIGNIFICANT CHANGE UP (ref 0–1)
BUN SERPL-MCNC: 8 MG/DL — LOW (ref 10–20)
CALCIUM SERPL-MCNC: 8.9 MG/DL — SIGNIFICANT CHANGE UP (ref 8.5–10.1)
CHLORIDE SERPL-SCNC: 97 MMOL/L — LOW (ref 98–110)
CO2 SERPL-SCNC: 22 MMOL/L — SIGNIFICANT CHANGE UP (ref 17–32)
CREAT SERPL-MCNC: 0.7 MG/DL — SIGNIFICANT CHANGE UP (ref 0.7–1.5)
EOSINOPHIL # BLD AUTO: 0.89 K/UL — HIGH (ref 0–0.7)
EOSINOPHIL NFR BLD AUTO: 6.5 % — SIGNIFICANT CHANGE UP (ref 0–8)
GLUCOSE BLDC GLUCOMTR-MCNC: 111 MG/DL — HIGH (ref 70–99)
GLUCOSE BLDC GLUCOMTR-MCNC: 118 MG/DL — HIGH (ref 70–99)
GLUCOSE BLDC GLUCOMTR-MCNC: 150 MG/DL — HIGH (ref 70–99)
GLUCOSE BLDC GLUCOMTR-MCNC: 200 MG/DL — HIGH (ref 70–99)
GLUCOSE SERPL-MCNC: 211 MG/DL — HIGH (ref 70–99)
HCT VFR BLD CALC: 35.4 % — LOW (ref 37–47)
HGB BLD-MCNC: 11 G/DL — LOW (ref 12–16)
IMM GRANULOCYTES NFR BLD AUTO: 0.7 % — HIGH (ref 0.1–0.3)
LYMPHOCYTES # BLD AUTO: 1.87 K/UL — SIGNIFICANT CHANGE UP (ref 1.2–3.4)
LYMPHOCYTES # BLD AUTO: 13.6 % — LOW (ref 20.5–51.1)
MAGNESIUM SERPL-MCNC: 1.8 MG/DL — SIGNIFICANT CHANGE UP (ref 1.8–2.4)
MCHC RBC-ENTMCNC: 27.5 PG — SIGNIFICANT CHANGE UP (ref 27–31)
MCHC RBC-ENTMCNC: 31.1 G/DL — LOW (ref 32–37)
MCV RBC AUTO: 88.5 FL — SIGNIFICANT CHANGE UP (ref 81–99)
MONOCYTES # BLD AUTO: 1.1 K/UL — HIGH (ref 0.1–0.6)
MONOCYTES NFR BLD AUTO: 8 % — SIGNIFICANT CHANGE UP (ref 1.7–9.3)
NEUTROPHILS # BLD AUTO: 9.66 K/UL — HIGH (ref 1.4–6.5)
NEUTROPHILS NFR BLD AUTO: 70.3 % — SIGNIFICANT CHANGE UP (ref 42.2–75.2)
NRBC # BLD: 0 /100 WBCS — SIGNIFICANT CHANGE UP (ref 0–0)
PLATELET # BLD AUTO: 433 K/UL — HIGH (ref 130–400)
POTASSIUM SERPL-MCNC: 4.7 MMOL/L — SIGNIFICANT CHANGE UP (ref 3.5–5)
POTASSIUM SERPL-SCNC: 4.7 MMOL/L — SIGNIFICANT CHANGE UP (ref 3.5–5)
RBC # BLD: 4 M/UL — LOW (ref 4.2–5.4)
RBC # FLD: 15.9 % — HIGH (ref 11.5–14.5)
SODIUM SERPL-SCNC: 138 MMOL/L — SIGNIFICANT CHANGE UP (ref 135–146)
WBC # BLD: 13.74 K/UL — HIGH (ref 4.8–10.8)
WBC # FLD AUTO: 13.74 K/UL — HIGH (ref 4.8–10.8)

## 2019-02-05 RX ORDER — FLUOCINONIDE/EMOLLIENT BASE 0.05 %
1 CREAM (GRAM) TOPICAL
Qty: 0 | Refills: 0 | Status: DISCONTINUED | OUTPATIENT
Start: 2019-02-05 | End: 2019-02-08

## 2019-02-05 RX ORDER — DIPHENHYDRAMINE HCL 50 MG
50 CAPSULE ORAL EVERY 6 HOURS
Qty: 0 | Refills: 0 | Status: DISCONTINUED | OUTPATIENT
Start: 2019-02-05 | End: 2019-02-08

## 2019-02-05 RX ORDER — IBUPROFEN 200 MG
600 TABLET ORAL EVERY 6 HOURS
Qty: 0 | Refills: 0 | Status: DISCONTINUED | OUTPATIENT
Start: 2019-02-05 | End: 2019-02-08

## 2019-02-05 RX ADMIN — Medication 1 APPLICATION(S): at 17:36

## 2019-02-05 RX ADMIN — MORPHINE SULFATE 15 MILLIGRAM(S): 50 CAPSULE, EXTENDED RELEASE ORAL at 06:02

## 2019-02-05 RX ADMIN — Medication 1 APPLICATION(S): at 05:26

## 2019-02-05 RX ADMIN — OXYCODONE AND ACETAMINOPHEN 2 TABLET(S): 5; 325 TABLET ORAL at 09:23

## 2019-02-05 RX ADMIN — Medication 1 APPLICATION(S): at 22:22

## 2019-02-05 RX ADMIN — Medication 81 MILLIGRAM(S): at 11:43

## 2019-02-05 RX ADMIN — CHLORHEXIDINE GLUCONATE 1 APPLICATION(S): 213 SOLUTION TOPICAL at 05:26

## 2019-02-05 RX ADMIN — Medication 5 MILLIGRAM(S): at 05:27

## 2019-02-05 RX ADMIN — Medication 5 MILLIGRAM(S): at 17:36

## 2019-02-05 RX ADMIN — Medication 600 MILLIGRAM(S): at 18:35

## 2019-02-05 RX ADMIN — LORATADINE 10 MILLIGRAM(S): 10 TABLET ORAL at 11:43

## 2019-02-05 RX ADMIN — Medication 1 APPLICATION(S): at 13:17

## 2019-02-05 RX ADMIN — Medication 100 MILLIGRAM(S): at 13:18

## 2019-02-05 RX ADMIN — OXYCODONE AND ACETAMINOPHEN 2 TABLET(S): 5; 325 TABLET ORAL at 22:50

## 2019-02-05 RX ADMIN — OXYCODONE AND ACETAMINOPHEN 2 TABLET(S): 5; 325 TABLET ORAL at 18:35

## 2019-02-05 RX ADMIN — Medication 1 APPLICATION(S): at 17:37

## 2019-02-05 RX ADMIN — Medication 100 MICROGRAM(S): at 05:33

## 2019-02-05 RX ADMIN — Medication 1 APPLICATION(S): at 05:27

## 2019-02-05 RX ADMIN — Medication 50 MILLIGRAM(S): at 15:05

## 2019-02-05 RX ADMIN — ATORVASTATIN CALCIUM 40 MILLIGRAM(S): 80 TABLET, FILM COATED ORAL at 22:21

## 2019-02-05 RX ADMIN — Medication 50 MILLIGRAM(S): at 23:12

## 2019-02-05 RX ADMIN — Medication 10 MILLIGRAM(S): at 13:18

## 2019-02-05 RX ADMIN — OXYCODONE AND ACETAMINOPHEN 2 TABLET(S): 5; 325 TABLET ORAL at 22:20

## 2019-02-05 RX ADMIN — SODIUM CHLORIDE 3 MILLILITER(S): 9 INJECTION INTRAMUSCULAR; INTRAVENOUS; SUBCUTANEOUS at 05:33

## 2019-02-05 RX ADMIN — PANTOPRAZOLE SODIUM 40 MILLIGRAM(S): 20 TABLET, DELAYED RELEASE ORAL at 05:27

## 2019-02-05 RX ADMIN — Medication 12.5 MILLIGRAM(S): at 05:27

## 2019-02-05 RX ADMIN — MORPHINE SULFATE 15 MILLIGRAM(S): 50 CAPSULE, EXTENDED RELEASE ORAL at 05:32

## 2019-02-05 RX ADMIN — Medication 10 MILLIGRAM(S): at 22:21

## 2019-02-05 RX ADMIN — Medication 1 APPLICATION(S): at 05:29

## 2019-02-05 RX ADMIN — Medication 12.5 MILLIGRAM(S): at 17:35

## 2019-02-05 RX ADMIN — MORPHINE SULFATE 15 MILLIGRAM(S): 50 CAPSULE, EXTENDED RELEASE ORAL at 17:39

## 2019-02-05 RX ADMIN — CEFEPIME 100 MILLIGRAM(S): 1 INJECTION, POWDER, FOR SOLUTION INTRAMUSCULAR; INTRAVENOUS at 17:38

## 2019-02-05 RX ADMIN — SODIUM CHLORIDE 3 MILLILITER(S): 9 INJECTION INTRAMUSCULAR; INTRAVENOUS; SUBCUTANEOUS at 13:24

## 2019-02-05 RX ADMIN — Medication 150 MILLIGRAM(S): at 13:21

## 2019-02-05 RX ADMIN — Medication 600 MILLIGRAM(S): at 15:08

## 2019-02-05 RX ADMIN — DULOXETINE HYDROCHLORIDE 60 MILLIGRAM(S): 30 CAPSULE, DELAYED RELEASE ORAL at 11:43

## 2019-02-05 RX ADMIN — BUDESONIDE AND FORMOTEROL FUMARATE DIHYDRATE 2 PUFF(S): 160; 4.5 AEROSOL RESPIRATORY (INHALATION) at 20:55

## 2019-02-05 RX ADMIN — Medication 150 MILLIGRAM(S): at 22:20

## 2019-02-05 RX ADMIN — CEFEPIME 100 MILLIGRAM(S): 1 INJECTION, POWDER, FOR SOLUTION INTRAMUSCULAR; INTRAVENOUS at 05:27

## 2019-02-05 RX ADMIN — SODIUM CHLORIDE 3 MILLILITER(S): 9 INJECTION INTRAMUSCULAR; INTRAVENOUS; SUBCUTANEOUS at 21:15

## 2019-02-05 RX ADMIN — Medication 150 MILLIGRAM(S): at 05:32

## 2019-02-05 RX ADMIN — BUDESONIDE AND FORMOTEROL FUMARATE DIHYDRATE 2 PUFF(S): 160; 4.5 AEROSOL RESPIRATORY (INHALATION) at 13:24

## 2019-02-05 RX ADMIN — Medication 10 MILLIGRAM(S): at 05:28

## 2019-02-05 NOTE — PROGRESS NOTE ADULT - ASSESSMENT
CALLY HARTMAN 53y Female  MRN#: [id number]   CODE STATUS: Full code      SUBJECTIVE    Patient is a 52 yo F who presents with a chief complaint of painful nonhealing right foot ulcer.  Currently admitted to medicine with the primary diagnosis of wound infection.    Interval History: Today is hospital day 6d. Overnight there were no events. Today she is doing well; no new events.    HPI:   HPI:  52 yo female with hx DM, CAD, HTN, Hypothyroid, s/p Reconstruction surgery R foot for charcot foot on 19. Patient was discharged on 19, not on antibiotics at time of discharge. Since discharge developed dehiscence and infection at surgical sites. Today she had f/u with Dr. Diaz at his office who sent her to hospital for IV antibiotics. Pt reports few days after discharge foot become swollen, red, painful.  She reports pus and small amount of blood draining from the surgical sites. Denies fevers, sweating, sob, chest pain, nausea, vomiting, diarrhea. (2019 17:34)    Hospital Course:       PAST MEDICAL & SURGICAL HISTORY  Dextrocardia  Chronic midline low back pain with bilateral sciatica  Peripheral neuralgia  Essential hypertension  Diabetes 1.5, managed as type 2  Charcot's arthropathy: R foot  Charcot foot due to diabetes mellitus  H/O shoulder surgery: Right shoulder surgery   delivery delivered      ALLERGIES:  No Known Allergies      MEDICATIONS:  STANDING MEDICATIONS  AQUAPHOR (petrolatum Ointment) 1 Application(s) Topical two times a day  aspirin enteric coated 81 milliGRAM(s) Oral daily  atorvastatin 40 milliGRAM(s) Oral at bedtime  BACItracin   Ointment 1 Application(s) Topical three times a day  buDESOnide  80 MICROgram(s)/formoterol 4.5 MICROgram(s) Inhaler 2 Puff(s) Inhalation two times a day  cefepime   IVPB 2000 milliGRAM(s) IV Intermittent every 12 hours  chlorhexidine 4% Liquid 1 Application(s) Topical <User Schedule>  docusate sodium 100 milliGRAM(s) Oral three times a day  DULoxetine 60 milliGRAM(s) Oral daily  enoxaparin Injectable 40 milliGRAM(s) SubCutaneous daily  fluocinonide 0.05% Ointment 1 Application(s) Topical two times a day  hydrocortisone 1% Cream 1 Application(s) Topical two times a day  hydrOXYzine  Oral Tab/Cap - Peds 10 milliGRAM(s) Oral three times a day  levothyroxine 100 MICROGram(s) Oral daily  loratadine 10 milliGRAM(s) Oral daily  metoprolol tartrate 12.5 milliGRAM(s) Oral two times a day  morphine ER Tablet 15 milliGRAM(s) Oral two times a day  oxybutynin 5 milliGRAM(s) Oral two times a day  pantoprazole    Tablet 40 milliGRAM(s) Oral before breakfast  pregabalin 150 milliGRAM(s) Oral three times a day  rifampin 600 milliGRAM(s) Oral daily  senna 2 Tablet(s) Oral at bedtime  sodium chloride 0.9% lock flush 3 milliLiter(s) IV Push every 8 hours    PRN MEDICATIONS  ALBUTerol    90 MICROgram(s) HFA Inhaler 2 Puff(s) Inhalation every 6 hours PRN  diazepam    Tablet 5 milliGRAM(s) Oral two times a day PRN  diphenhydrAMINE 50 milliGRAM(s) Oral every 6 hours PRN  ibuprofen  Tablet. 600 milliGRAM(s) Oral every 6 hours PRN  oxyCODONE    5 mG/acetaminophen 325 mG 2 Tablet(s) Oral every 6 hours PRN  simethicone 80 milliGRAM(s) Chew two times a day PRN        OBJECTIVE    VITAL SIGNS: Last 24 Hours  T(C): 37.1 (2019 13:05), Max: 37.1 (2019 06:05)  T(F): 98.7 (2019 13:05), Max: 98.8 (2019 06:05)  HR: 109 (2019 13:05) (98 - 114)  BP: 110/65 (2019 13:05) (110/65 - 135/61)  BP(mean): --  RR: 18 (2019 13:05) (18 - 18)  SpO2: --      PHYSICAL EXAM:    GENERAL: No acute distress.  HEENT:  Atraumatic, Normocephalic. EOMI, PERRLA.  PULMONARY: Clear to auscultation bilaterally.  CARDIOVASCULAR: Regular rate and rhythm; No murmurs, rubs, or gallops.  MUSCULOSKELETAL:  Refused physical exam of the foot as does not want dressing removed multiple times per day.      LABS:                        11.0   13.74 )-----------( 433      ( 2019 08:08 )             35.4     02-05    138  |  97<L>  |  8<L>  ----------------------------<  211<H>  4.7   |  22  |  0.7    Ca    8.9      2019 08:08  Mg     1.8     02-05    TPro  6.0  /  Alb  3.1<L>  /  TBili  0.2  /  DBili  x   /  AST  17  /  ALT  11  /  AlkPhos  104  02-04      RADIOLOGY:        ASSESSMENT AND PLAN    Patient is a 52 yo F who presents with a chief complaint of painful nonhealing right foot ulcer.  Currently admitted to medicine with the primary diagnosis of wound infection.    Surgical wound infection s/p reconstruction of R foot 19  - Evidence of wound dehiscence and erythema with reported drainage   - wound cx : few Staph aureus MSSA - Blood cultures negative   - Per ID c/w cefepime + rifampin while inpatient. Rpcephin 1 g Q24H on discharge (PICC line placed).  - Pain control with home regimen including PO morphine 15mg BID PRN + Percocet 10 q 6 PRN + motrin 600 mg PO PRN  - Podiatry: Local wound care with wet to dry packing, kerlix, webril, posterior splint and ACE. Will need to remain inpatient likely through end of week.    Diabetes  - Monitor FS  - Will start insulin coverage if FSG consistently > 180    Right LE Erythema likely xerotic skin  - bacitracin to put on fingernails in order to prevent spread of infection to feet  - venous duplex LE negative for DVT; Left Bakers cyst     CAD/ DLD  - C/w aspirin + statin  - Takes ezetimibe at home    HTN  - C/w metoprolol 12 BID  - Lisinopril on hold as previous episodes of hypotension     COPD  - Symbicort and albuterol PRN   - Home meds: Qvar & Ventolin    Sciatica/Peripheral neuralgia   - c/w duloxetine and Lyrica    DVT prophylaxis : Lovenox   GI prophylaxis : Protonix       Activity : Full weight bearing LLE and non weight bearing RLE  Diet : Consistent carbohydrate with evening snack    Disposition : Home with PICC line and IV antibiotics when

## 2019-02-05 NOTE — PROGRESS NOTE ADULT - ASSESSMENT
52 yo female with hx DM2 nonadherent to dietary restrictions, active smoker, CAD, HTN, Hypothyroid, s/p Reconstruction surgery R foot for charcot foot returns with wound dehiscence in R foot with hardware, no sepsis on admission, now with new rash to L foot    continue with abx coverage recommended by ID, s/p PICC, growing MSSA in wound  continue with local wound care recommended by podiatry- daily follow up/ will await podiatry clearance prior to a discharge plan, must ensure no further surgical revision needed on this admission  elevate when in bed  using knee scooter for NWB to the RLE  continue with DVT prophylaxis   smoking cessation recommended  diabetic dietary restrictions   for itch using both atarax and benadryl, watch for antihistamine synergy/ patient notes that she still has itching and has not experienced lethargy for urinary retention

## 2019-02-06 LAB
GLUCOSE BLDC GLUCOMTR-MCNC: 144 MG/DL — HIGH (ref 70–99)
GLUCOSE BLDC GLUCOMTR-MCNC: 180 MG/DL — HIGH (ref 70–99)
GLUCOSE BLDC GLUCOMTR-MCNC: 220 MG/DL — HIGH (ref 70–99)
GLUCOSE BLDC GLUCOMTR-MCNC: 256 MG/DL — HIGH (ref 70–99)
HCT VFR BLD CALC: 35.1 % — LOW (ref 37–47)
HGB BLD-MCNC: 10.8 G/DL — LOW (ref 12–16)
MCHC RBC-ENTMCNC: 27.2 PG — SIGNIFICANT CHANGE UP (ref 27–31)
MCHC RBC-ENTMCNC: 30.8 G/DL — LOW (ref 32–37)
MCV RBC AUTO: 88.4 FL — SIGNIFICANT CHANGE UP (ref 81–99)
NRBC # BLD: 0 /100 WBCS — SIGNIFICANT CHANGE UP (ref 0–0)
PLATELET # BLD AUTO: 374 K/UL — SIGNIFICANT CHANGE UP (ref 130–400)
RBC # BLD: 3.97 M/UL — LOW (ref 4.2–5.4)
RBC # FLD: 15.9 % — HIGH (ref 11.5–14.5)
WBC # BLD: 13.06 K/UL — HIGH (ref 4.8–10.8)
WBC # FLD AUTO: 13.06 K/UL — HIGH (ref 4.8–10.8)

## 2019-02-06 RX ORDER — OXYCODONE HYDROCHLORIDE 5 MG/1
10 TABLET ORAL EVERY 12 HOURS
Qty: 0 | Refills: 0 | Status: DISCONTINUED | OUTPATIENT
Start: 2019-02-06 | End: 2019-02-07

## 2019-02-06 RX ADMIN — Medication 1 APPLICATION(S): at 05:46

## 2019-02-06 RX ADMIN — Medication 1 APPLICATION(S): at 13:53

## 2019-02-06 RX ADMIN — Medication 1 APPLICATION(S): at 17:42

## 2019-02-06 RX ADMIN — Medication 150 MILLIGRAM(S): at 05:47

## 2019-02-06 RX ADMIN — CEFEPIME 100 MILLIGRAM(S): 1 INJECTION, POWDER, FOR SOLUTION INTRAMUSCULAR; INTRAVENOUS at 17:43

## 2019-02-06 RX ADMIN — OXYCODONE AND ACETAMINOPHEN 2 TABLET(S): 5; 325 TABLET ORAL at 08:06

## 2019-02-06 RX ADMIN — SODIUM CHLORIDE 3 MILLILITER(S): 9 INJECTION INTRAMUSCULAR; INTRAVENOUS; SUBCUTANEOUS at 05:49

## 2019-02-06 RX ADMIN — Medication 81 MILLIGRAM(S): at 11:51

## 2019-02-06 RX ADMIN — MORPHINE SULFATE 15 MILLIGRAM(S): 50 CAPSULE, EXTENDED RELEASE ORAL at 05:47

## 2019-02-06 RX ADMIN — DULOXETINE HYDROCHLORIDE 60 MILLIGRAM(S): 30 CAPSULE, DELAYED RELEASE ORAL at 11:51

## 2019-02-06 RX ADMIN — MORPHINE SULFATE 15 MILLIGRAM(S): 50 CAPSULE, EXTENDED RELEASE ORAL at 06:18

## 2019-02-06 RX ADMIN — Medication 150 MILLIGRAM(S): at 13:52

## 2019-02-06 RX ADMIN — ATORVASTATIN CALCIUM 40 MILLIGRAM(S): 80 TABLET, FILM COATED ORAL at 21:24

## 2019-02-06 RX ADMIN — Medication 12.5 MILLIGRAM(S): at 17:40

## 2019-02-06 RX ADMIN — Medication 1 APPLICATION(S): at 05:43

## 2019-02-06 RX ADMIN — OXYCODONE AND ACETAMINOPHEN 2 TABLET(S): 5; 325 TABLET ORAL at 08:36

## 2019-02-06 RX ADMIN — LORATADINE 10 MILLIGRAM(S): 10 TABLET ORAL at 11:51

## 2019-02-06 RX ADMIN — OXYCODONE HYDROCHLORIDE 10 MILLIGRAM(S): 5 TABLET ORAL at 17:48

## 2019-02-06 RX ADMIN — Medication 1 APPLICATION(S): at 21:26

## 2019-02-06 RX ADMIN — Medication 10 MILLIGRAM(S): at 21:24

## 2019-02-06 RX ADMIN — OXYCODONE AND ACETAMINOPHEN 2 TABLET(S): 5; 325 TABLET ORAL at 20:47

## 2019-02-06 RX ADMIN — Medication 1 APPLICATION(S): at 05:45

## 2019-02-06 RX ADMIN — BUDESONIDE AND FORMOTEROL FUMARATE DIHYDRATE 2 PUFF(S): 160; 4.5 AEROSOL RESPIRATORY (INHALATION) at 21:25

## 2019-02-06 RX ADMIN — Medication 10 MILLIGRAM(S): at 13:52

## 2019-02-06 RX ADMIN — Medication 5 MILLIGRAM(S): at 05:44

## 2019-02-06 RX ADMIN — PANTOPRAZOLE SODIUM 40 MILLIGRAM(S): 20 TABLET, DELAYED RELEASE ORAL at 05:44

## 2019-02-06 RX ADMIN — OXYCODONE AND ACETAMINOPHEN 2 TABLET(S): 5; 325 TABLET ORAL at 20:17

## 2019-02-06 RX ADMIN — OXYCODONE AND ACETAMINOPHEN 2 TABLET(S): 5; 325 TABLET ORAL at 13:54

## 2019-02-06 RX ADMIN — SODIUM CHLORIDE 3 MILLILITER(S): 9 INJECTION INTRAMUSCULAR; INTRAVENOUS; SUBCUTANEOUS at 13:55

## 2019-02-06 RX ADMIN — Medication 10 MILLIGRAM(S): at 05:44

## 2019-02-06 RX ADMIN — SODIUM CHLORIDE 3 MILLILITER(S): 9 INJECTION INTRAMUSCULAR; INTRAVENOUS; SUBCUTANEOUS at 21:27

## 2019-02-06 RX ADMIN — CEFEPIME 100 MILLIGRAM(S): 1 INJECTION, POWDER, FOR SOLUTION INTRAMUSCULAR; INTRAVENOUS at 05:45

## 2019-02-06 RX ADMIN — Medication 5 MILLIGRAM(S): at 17:40

## 2019-02-06 RX ADMIN — Medication 50 MILLIGRAM(S): at 09:15

## 2019-02-06 RX ADMIN — Medication 12.5 MILLIGRAM(S): at 05:44

## 2019-02-06 RX ADMIN — OXYCODONE AND ACETAMINOPHEN 2 TABLET(S): 5; 325 TABLET ORAL at 14:24

## 2019-02-06 RX ADMIN — Medication 50 MILLIGRAM(S): at 17:41

## 2019-02-06 RX ADMIN — Medication 100 MICROGRAM(S): at 05:49

## 2019-02-06 RX ADMIN — Medication 1 APPLICATION(S): at 17:43

## 2019-02-06 RX ADMIN — Medication 150 MILLIGRAM(S): at 21:24

## 2019-02-06 NOTE — PROGRESS NOTE ADULT - ASSESSMENT
CALLY HARTMAN 53y Female  MRN#: [id number]   CODE STATUS: Full code      SUBJECTIVE    Patient is a 54 yo F who presents with a chief complaint of painful nonhealing right foot ulcer.  Currently admitted to medicine with the primary diagnosis of wound infection.    Interval History: Today is hospital day 7d. Overnight there were no events. Today she continues to complain of episodic pruritis BID. Rash improving.    HPI:   HPI:  54 yo female with hx DM, CAD, HTN, Hypothyroid, s/p Reconstruction surgery R foot for charcot foot on 19. Patient was discharged on 19, not on antibiotics at time of discharge. Since discharge developed dehiscence and infection at surgical sites. Today she had f/u with Dr. Diaz at his office who sent her to hospital for IV antibiotics. Pt reports few days after discharge foot become swollen, red, painful.  She reports pus and small amount of blood draining from the surgical sites. Denies fevers, sweating, sob, chest pain, nausea, vomiting, diarrhea. (2019 17:34)    Hospital Course:       PAST MEDICAL & SURGICAL HISTORY  Dextrocardia  Chronic midline low back pain with bilateral sciatica  Peripheral neuralgia  Essential hypertension  Diabetes 1.5, managed as type 2  Charcot's arthropathy: R foot  Charcot foot due to diabetes mellitus  H/O shoulder surgery: Right shoulder surgery   delivery delivered      ALLERGIES:  No Known Allergies      MEDICATIONS:  STANDING MEDICATIONS  AQUAPHOR (petrolatum Ointment) 1 Application(s) Topical two times a day  aspirin enteric coated 81 milliGRAM(s) Oral daily  atorvastatin 40 milliGRAM(s) Oral at bedtime  BACItracin   Ointment 1 Application(s) Topical three times a day  buDESOnide  80 MICROgram(s)/formoterol 4.5 MICROgram(s) Inhaler 2 Puff(s) Inhalation two times a day  cefepime   IVPB 2000 milliGRAM(s) IV Intermittent every 12 hours  chlorhexidine 4% Liquid 1 Application(s) Topical <User Schedule>  DULoxetine 60 milliGRAM(s) Oral daily  enoxaparin Injectable 40 milliGRAM(s) SubCutaneous daily  fluocinonide 0.05% Ointment 1 Application(s) Topical two times a day  hydrocortisone 1% Cream 1 Application(s) Topical two times a day  hydrOXYzine  Oral Tab/Cap - Peds 10 milliGRAM(s) Oral three times a day  levothyroxine 100 MICROGram(s) Oral daily  loratadine 10 milliGRAM(s) Oral daily  metoprolol tartrate 12.5 milliGRAM(s) Oral two times a day  morphine ER Tablet 15 milliGRAM(s) Oral two times a day  oxybutynin 5 milliGRAM(s) Oral two times a day  pantoprazole    Tablet 40 milliGRAM(s) Oral before breakfast  pregabalin 150 milliGRAM(s) Oral three times a day  rifampin 600 milliGRAM(s) Oral daily  senna 2 Tablet(s) Oral at bedtime  sodium chloride 0.9% lock flush 3 milliLiter(s) IV Push every 8 hours    PRN MEDICATIONS  ALBUTerol    90 MICROgram(s) HFA Inhaler 2 Puff(s) Inhalation every 6 hours PRN  diazepam    Tablet 5 milliGRAM(s) Oral two times a day PRN  diphenhydrAMINE 50 milliGRAM(s) Oral every 6 hours PRN  ibuprofen  Tablet. 600 milliGRAM(s) Oral every 6 hours PRN  oxyCODONE    5 mG/acetaminophen 325 mG 2 Tablet(s) Oral every 6 hours PRN        OBJECTIVE    VITAL SIGNS: Last 24 Hours  T(C): 37.1 (2019 13:01), Max: 37.1 (2019 13:01)  T(F): 98.7 (2019 13:01), Max: 98.7 (2019 13:01)  HR: 119 (2019 13:01) (85 - 119)  BP: 127/60 (2019 13:01) (114/56 - 129/58)  BP(mean): --  RR: 18 (2019 13:01) (18 - 18)  SpO2: --      PHYSICAL EXAM:    GENERAL: No acute distress.  HEENT:  Atraumatic, Normocephalic. EOMI, PERRLA.  PULMONARY: Clear to auscultation bilaterally.  CARDIOVASCULAR: Regular rate and rhythm; No murmurs, rubs, or gallops.  MUSCULOSKELETAL:  Refused physical exam of the right foot as does not want dressing removed multiple times per day. Left foot with decreased rash.      LABS:                        10.8   13.06 )-----------( 374      ( 2019 06:23 )             35.1     02-    138  |  97<L>  |  8<L>  ----------------------------<  211<H>  4.7   |  22  |  0.7    Ca    8.9      2019 08:08  Mg     1.8           RADIOLOGY:        ASSESSMENT AND PLAN    Patient is a 54 yo F who presents with a chief complaint of painful nonhealing right foot ulcer.  Currently admitted to medicine with the primary diagnosis of wound infection.    Surgical wound infection s/p reconstruction of R foot 19  - Evidence of wound dehiscence and erythema with reported drainage   - Wound cx: few Staph aureus MSSA - Blood cultures negative   - Per ID c/w cefepime + rifampin while inpatient. Rocephin 1 g Q24H on discharge (PICC line placed).  - Pain control: Switch MS contin to oxycontin given possible allergy. C/w percocet 10 q 6 PRN + motrin 600 mg PO PRN  - Podiatry: Local wound care with wet to dry packing, kerlix, webril, posterior splint and ACE. Will need to remain inpatient likely through end of week.    Lower extremity rash  - Lidex and benadryl PRN    Diabetes  - Monitor FS  - Will start insulin coverage if FSG consistently > 180    Right LE Erythema likely xerotic skin  - bacitracin to put on fingernails in order to prevent spread of infection to feet  - venous duplex LE negative for DVT; Left Bakers cyst     CAD/ DLD  - C/w aspirin + statin  - Takes ezetimibe at home    HTN  - C/w metoprolol 12 BID  - Lisinopril on hold as previous episodes of hypotension     COPD  - Symbicort and albuterol PRN   - Home meds: Qvar & Ventolin    Sciatica/Peripheral neuralgia   - c/w duloxetine and Lyrica    DVT prophylaxis : Lovenox   GI prophylaxis : Protonix       Activity : Full weight bearing LLE and non weight bearing RLE  Diet : Consistent carbohydrate with evening snack    Disposition : Home with PICC line and IV antibiotics when

## 2019-02-06 NOTE — PROGRESS NOTE ADULT - ASSESSMENT
52 yo female with hx DM2 nonadherent to dietary restrictions, active smoker, CAD, HTN, Hypothyroid, s/p Reconstruction surgery R foot for charcot foot returns with wound dehiscence in R foot with hardware, no sepsis on admission,   now with new rash to L foot    continue with abx coverage recommended by ID, s/p PICC, growing MSSA in wound  continue with local wound care recommended by podiatry- daily follow up/ will await podiatry clearance prior to a discharge plan, must ensure no further surgical revision needed on this admission  elevate when in bed  using knee scooter for NWB to the RLE  continue with DVT prophylaxis   smoking cessation recommended  diabetic dietary restrictions   for itch using both atarax and benadryl, watch for antihistamine synergy/ patient notes that she still has itching and has not experienced lethargy for urinary retention >> Would switch morphine ER to Oxycontin with breakthrough

## 2019-02-07 LAB
ANION GAP SERPL CALC-SCNC: 20 MMOL/L — HIGH (ref 7–14)
BASOPHILS # BLD AUTO: 0.14 K/UL — SIGNIFICANT CHANGE UP (ref 0–0.2)
BASOPHILS NFR BLD AUTO: 1.3 % — HIGH (ref 0–1)
BUN SERPL-MCNC: 7 MG/DL — LOW (ref 10–20)
CALCIUM SERPL-MCNC: 9.4 MG/DL — SIGNIFICANT CHANGE UP (ref 8.5–10.1)
CHLORIDE SERPL-SCNC: 96 MMOL/L — LOW (ref 98–110)
CO2 SERPL-SCNC: 22 MMOL/L — SIGNIFICANT CHANGE UP (ref 17–32)
CREAT SERPL-MCNC: 0.6 MG/DL — LOW (ref 0.7–1.5)
EOSINOPHIL # BLD AUTO: 0.61 K/UL — SIGNIFICANT CHANGE UP (ref 0–0.7)
EOSINOPHIL NFR BLD AUTO: 5.5 % — SIGNIFICANT CHANGE UP (ref 0–8)
GLUCOSE BLDC GLUCOMTR-MCNC: 123 MG/DL — HIGH (ref 70–99)
GLUCOSE BLDC GLUCOMTR-MCNC: 249 MG/DL — HIGH (ref 70–99)
GLUCOSE BLDC GLUCOMTR-MCNC: 276 MG/DL — HIGH (ref 70–99)
GLUCOSE BLDC GLUCOMTR-MCNC: 82 MG/DL — SIGNIFICANT CHANGE UP (ref 70–99)
GLUCOSE SERPL-MCNC: 111 MG/DL — HIGH (ref 70–99)
HCT VFR BLD CALC: 37.9 % — SIGNIFICANT CHANGE UP (ref 37–47)
HGB BLD-MCNC: 11.7 G/DL — LOW (ref 12–16)
IMM GRANULOCYTES NFR BLD AUTO: 0.4 % — HIGH (ref 0.1–0.3)
LYMPHOCYTES # BLD AUTO: 1.59 K/UL — SIGNIFICANT CHANGE UP (ref 1.2–3.4)
LYMPHOCYTES # BLD AUTO: 14.3 % — LOW (ref 20.5–51.1)
MCHC RBC-ENTMCNC: 27.3 PG — SIGNIFICANT CHANGE UP (ref 27–31)
MCHC RBC-ENTMCNC: 30.9 G/DL — LOW (ref 32–37)
MCV RBC AUTO: 88.6 FL — SIGNIFICANT CHANGE UP (ref 81–99)
MONOCYTES # BLD AUTO: 0.87 K/UL — HIGH (ref 0.1–0.6)
MONOCYTES NFR BLD AUTO: 7.8 % — SIGNIFICANT CHANGE UP (ref 1.7–9.3)
NEUTROPHILS # BLD AUTO: 7.87 K/UL — HIGH (ref 1.4–6.5)
NEUTROPHILS NFR BLD AUTO: 70.7 % — SIGNIFICANT CHANGE UP (ref 42.2–75.2)
NRBC # BLD: 0 /100 WBCS — SIGNIFICANT CHANGE UP (ref 0–0)
PLATELET # BLD AUTO: 405 K/UL — HIGH (ref 130–400)
POTASSIUM SERPL-MCNC: 4.5 MMOL/L — SIGNIFICANT CHANGE UP (ref 3.5–5)
POTASSIUM SERPL-SCNC: 4.5 MMOL/L — SIGNIFICANT CHANGE UP (ref 3.5–5)
RBC # BLD: 4.28 M/UL — SIGNIFICANT CHANGE UP (ref 4.2–5.4)
RBC # FLD: 15.9 % — HIGH (ref 11.5–14.5)
SODIUM SERPL-SCNC: 138 MMOL/L — SIGNIFICANT CHANGE UP (ref 135–146)
WBC # BLD: 11.12 K/UL — HIGH (ref 4.8–10.8)
WBC # FLD AUTO: 11.12 K/UL — HIGH (ref 4.8–10.8)

## 2019-02-07 RX ORDER — OXYCODONE AND ACETAMINOPHEN 5; 325 MG/1; MG/1
2 TABLET ORAL EVERY 6 HOURS
Qty: 0 | Refills: 0 | Status: DISCONTINUED | OUTPATIENT
Start: 2019-02-07 | End: 2019-02-08

## 2019-02-07 RX ORDER — OXYCODONE HYDROCHLORIDE 5 MG/1
20 TABLET ORAL EVERY 12 HOURS
Qty: 0 | Refills: 0 | Status: DISCONTINUED | OUTPATIENT
Start: 2019-02-07 | End: 2019-02-08

## 2019-02-07 RX ORDER — OXYCODONE HYDROCHLORIDE 5 MG/1
15 TABLET ORAL EVERY 12 HOURS
Qty: 0 | Refills: 0 | Status: DISCONTINUED | OUTPATIENT
Start: 2019-02-07 | End: 2019-02-07

## 2019-02-07 RX ORDER — SALICYLIC ACID 0.5 %
1 CLEANSER (GRAM) TOPICAL DAILY
Qty: 0 | Refills: 0 | Status: DISCONTINUED | OUTPATIENT
Start: 2019-02-07 | End: 2019-02-08

## 2019-02-07 RX ADMIN — BUDESONIDE AND FORMOTEROL FUMARATE DIHYDRATE 2 PUFF(S): 160; 4.5 AEROSOL RESPIRATORY (INHALATION) at 21:23

## 2019-02-07 RX ADMIN — Medication 1 APPLICATION(S): at 05:24

## 2019-02-07 RX ADMIN — OXYCODONE AND ACETAMINOPHEN 2 TABLET(S): 5; 325 TABLET ORAL at 08:51

## 2019-02-07 RX ADMIN — Medication 5 MILLIGRAM(S): at 17:38

## 2019-02-07 RX ADMIN — Medication 10 MILLIGRAM(S): at 05:23

## 2019-02-07 RX ADMIN — SODIUM CHLORIDE 3 MILLILITER(S): 9 INJECTION INTRAMUSCULAR; INTRAVENOUS; SUBCUTANEOUS at 05:30

## 2019-02-07 RX ADMIN — Medication 1 APPLICATION(S): at 21:22

## 2019-02-07 RX ADMIN — Medication 1 APPLICATION(S): at 17:39

## 2019-02-07 RX ADMIN — SODIUM CHLORIDE 3 MILLILITER(S): 9 INJECTION INTRAMUSCULAR; INTRAVENOUS; SUBCUTANEOUS at 13:35

## 2019-02-07 RX ADMIN — CHLORHEXIDINE GLUCONATE 1 APPLICATION(S): 213 SOLUTION TOPICAL at 05:23

## 2019-02-07 RX ADMIN — Medication 1 APPLICATION(S): at 21:23

## 2019-02-07 RX ADMIN — OXYCODONE AND ACETAMINOPHEN 2 TABLET(S): 5; 325 TABLET ORAL at 21:55

## 2019-02-07 RX ADMIN — Medication 100 MICROGRAM(S): at 05:23

## 2019-02-07 RX ADMIN — Medication 10 MILLIGRAM(S): at 13:41

## 2019-02-07 RX ADMIN — OXYCODONE AND ACETAMINOPHEN 2 TABLET(S): 5; 325 TABLET ORAL at 15:13

## 2019-02-07 RX ADMIN — Medication 10 MILLIGRAM(S): at 21:22

## 2019-02-07 RX ADMIN — Medication 81 MILLIGRAM(S): at 11:47

## 2019-02-07 RX ADMIN — Medication 12.5 MILLIGRAM(S): at 17:38

## 2019-02-07 RX ADMIN — Medication 150 MILLIGRAM(S): at 21:22

## 2019-02-07 RX ADMIN — LORATADINE 10 MILLIGRAM(S): 10 TABLET ORAL at 11:47

## 2019-02-07 RX ADMIN — CEFEPIME 100 MILLIGRAM(S): 1 INJECTION, POWDER, FOR SOLUTION INTRAMUSCULAR; INTRAVENOUS at 05:23

## 2019-02-07 RX ADMIN — OXYCODONE HYDROCHLORIDE 20 MILLIGRAM(S): 5 TABLET ORAL at 18:09

## 2019-02-07 RX ADMIN — OXYCODONE AND ACETAMINOPHEN 2 TABLET(S): 5; 325 TABLET ORAL at 21:26

## 2019-02-07 RX ADMIN — OXYCODONE AND ACETAMINOPHEN 2 TABLET(S): 5; 325 TABLET ORAL at 08:21

## 2019-02-07 RX ADMIN — Medication 5 MILLIGRAM(S): at 05:23

## 2019-02-07 RX ADMIN — OXYCODONE HYDROCHLORIDE 10 MILLIGRAM(S): 5 TABLET ORAL at 05:22

## 2019-02-07 RX ADMIN — Medication 1 APPLICATION(S): at 13:41

## 2019-02-07 RX ADMIN — DULOXETINE HYDROCHLORIDE 60 MILLIGRAM(S): 30 CAPSULE, DELAYED RELEASE ORAL at 11:47

## 2019-02-07 RX ADMIN — Medication 150 MILLIGRAM(S): at 13:40

## 2019-02-07 RX ADMIN — ATORVASTATIN CALCIUM 40 MILLIGRAM(S): 80 TABLET, FILM COATED ORAL at 21:22

## 2019-02-07 RX ADMIN — Medication 1 APPLICATION(S): at 05:26

## 2019-02-07 RX ADMIN — SODIUM CHLORIDE 3 MILLILITER(S): 9 INJECTION INTRAMUSCULAR; INTRAVENOUS; SUBCUTANEOUS at 21:24

## 2019-02-07 RX ADMIN — BUDESONIDE AND FORMOTEROL FUMARATE DIHYDRATE 2 PUFF(S): 160; 4.5 AEROSOL RESPIRATORY (INHALATION) at 08:15

## 2019-02-07 RX ADMIN — PANTOPRAZOLE SODIUM 40 MILLIGRAM(S): 20 TABLET, DELAYED RELEASE ORAL at 05:23

## 2019-02-07 RX ADMIN — Medication 12.5 MILLIGRAM(S): at 05:23

## 2019-02-07 RX ADMIN — OXYCODONE HYDROCHLORIDE 10 MILLIGRAM(S): 5 TABLET ORAL at 05:52

## 2019-02-07 RX ADMIN — OXYCODONE AND ACETAMINOPHEN 2 TABLET(S): 5; 325 TABLET ORAL at 15:43

## 2019-02-07 RX ADMIN — CEFEPIME 100 MILLIGRAM(S): 1 INJECTION, POWDER, FOR SOLUTION INTRAMUSCULAR; INTRAVENOUS at 17:38

## 2019-02-07 NOTE — PROGRESS NOTE ADULT - ASSESSMENT
CALLY HARTMAN 53y Female  MRN#: [id number]   CODE STATUS: Full code      SUBJECTIVE    Patient is a 54 yo F who presents with a chief complaint of painful nonhealing right foot ulcer.  Currently admitted to medicine with the primary diagnosis of wound infection.    Interval History: Today is hospital day 8d. Overnight there were no events. Today she is doing well; no longer with pruritis. Complains of frequent breakthrough pain on oxycontin.    HPI:   HPI:  54 yo female with hx DM, CAD, HTN, Hypothyroid, s/p Reconstruction surgery R foot for charcot foot on 19. Patient was discharged on 19, not on antibiotics at time of discharge. Since discharge developed dehiscence and infection at surgical sites. Today she had f/u with Dr. Diaz at his office who sent her to hospital for IV antibiotics. Pt reports few days after discharge foot become swollen, red, painful.  She reports pus and small amount of blood draining from the surgical sites. Denies fevers, sweating, sob, chest pain, nausea, vomiting, diarrhea. (2019 17:34)    Hospital Course:       PAST MEDICAL & SURGICAL HISTORY  Dextrocardia  Chronic midline low back pain with bilateral sciatica  Peripheral neuralgia  Essential hypertension  Diabetes 1.5, managed as type 2  Charcot's arthropathy: R foot  Charcot foot due to diabetes mellitus  H/O shoulder surgery: Right shoulder surgery   delivery delivered      ALLERGIES:  No Known Allergies      MEDICATIONS:  STANDING MEDICATIONS  AQUAPHOR (petrolatum Ointment) 1 Application(s) Topical two times a day  aspirin enteric coated 81 milliGRAM(s) Oral daily  atorvastatin 40 milliGRAM(s) Oral at bedtime  BACItracin   Ointment 1 Application(s) Topical three times a day  buDESOnide  80 MICROgram(s)/formoterol 4.5 MICROgram(s) Inhaler 2 Puff(s) Inhalation two times a day  cefepime   IVPB 2000 milliGRAM(s) IV Intermittent every 12 hours  chlorhexidine 4% Liquid 1 Application(s) Topical <User Schedule>  DULoxetine 60 milliGRAM(s) Oral daily  enoxaparin Injectable 40 milliGRAM(s) SubCutaneous daily  fluocinonide 0.05% Ointment 1 Application(s) Topical two times a day  hydrocortisone 1% Cream 1 Application(s) Topical two times a day  hydrOXYzine  Oral Tab/Cap - Peds 10 milliGRAM(s) Oral three times a day  levothyroxine 100 MICROGram(s) Oral daily  loratadine 10 milliGRAM(s) Oral daily  metoprolol tartrate 12.5 milliGRAM(s) Oral two times a day  oxybutynin 5 milliGRAM(s) Oral two times a day  oxyCODONE  ER Tablet 15 milliGRAM(s) Oral every 12 hours  pantoprazole    Tablet 40 milliGRAM(s) Oral before breakfast  pregabalin 150 milliGRAM(s) Oral three times a day  rifampin 600 milliGRAM(s) Oral daily  senna 2 Tablet(s) Oral at bedtime  sodium chloride 0.9% lock flush 3 milliLiter(s) IV Push every 8 hours  vitamin A &amp; D Ointment 1 Application(s) Topical daily    PRN MEDICATIONS  ALBUTerol    90 MICROgram(s) HFA Inhaler 2 Puff(s) Inhalation every 6 hours PRN  diphenhydrAMINE 50 milliGRAM(s) Oral every 6 hours PRN  ibuprofen  Tablet. 600 milliGRAM(s) Oral every 6 hours PRN  oxyCODONE    5 mG/acetaminophen 325 mG 2 Tablet(s) Oral every 6 hours PRN        OBJECTIVE    VITAL SIGNS: Last 24 Hours  T(C): 36.2 (2019 14:41), Max: 37.1 (2019 04:38)  T(F): 97.2 (2019 14:41), Max: 98.8 (2019 04:38)  HR: 100 (2019 17:37) (93 - 113)  BP: 144/75 (2019 17:37) (104/60 - 144/75)  BP(mean): --  RR: 18 (2019 14:41) (18 - 18)  SpO2: 97% (2019 10:39) (97% - 97%)      PHYSICAL EXAM:    GENERAL: No acute distress.  HEENT:  Atraumatic, normocephalic. EOMI, PERRLA.  PULMONARY: Clear to auscultation bilaterally.  CARDIOVASCULAR: Regular rate and rhythm; No murmurs, rubs, or gallops.  MUSCULOSKELETAL:  Refused physical exam of the right foot as does not want dressing removed multiple times per day. Left foot with decreased rash.      LABS:                        11.7   11.12 )-----------( 405      ( 2019 12:04 )             37.9     02-07    138  |  96<L>  |  7<L>  ----------------------------<  111<H>  4.5   |  22  |  0.6<L>    Ca    9.4      2019 12:04      RADIOLOGY:        ASSESSMENT AND PLAN    Patient is a 54 yo F who presents with a chief complaint of painful nonhealing right foot ulcer.  Currently admitted to medicine with the primary diagnosis of wound infection.    Surgical wound infection s/p reconstruction of R foot 19  - Evidence of wound dehiscence and erythema with reported drainage   - Wound cx: few Staph aureus MSSA - Blood cultures negative   - Per ID c/w cefepime + rifampin while inpatient. Rocephin 1 g Q24H on discharge (PICC line placed).  - Pain control: Increase oxycontin to 20 mg BID (15 not available). C/w percocet 10 q 6 PRN + motrin 600 mg PO PRN  - Podiatry: For further debridement tomorrow; NPO after midnight.    Lower extremity rash  - Lidex and benadryl PRN    Diabetes  - Monitor FS  - Will start insulin coverage if FSG consistently > 180    Right LE Erythema likely xerotic skin  - bacitracin to put on fingernails in order to prevent spread of infection to feet  - venous duplex LE negative for DVT; Left Bakers cyst     CAD/ DLD  - C/w aspirin + statin  - Takes ezetimibe at home    HTN  - C/w metoprolol 12 BID  - Lisinopril on hold as previous episodes of hypotension     COPD  - Symbicort and albuterol PRN   - Home meds: Qvar & Ventolin    Sciatica/Peripheral neuralgia   - c/w duloxetine and Lyrica    DVT prophylaxis : Lovenox   GI prophylaxis : Protonix       Activity : Full weight bearing LLE and non weight bearing RLE  Diet : Consistent carbohydrate with evening snack    Disposition : Home with PICC line and IV antibiotics when cleared by podiatry.

## 2019-02-07 NOTE — PROGRESS NOTE ADULT - ASSESSMENT
54 yo female with hx DM2 nonadherent to dietary restrictions, active smoker, CAD, HTN, Hypothyroid, s/p Reconstruction surgery R foot for charcot foot returns with wound dehiscence in R foot with hardware, no sepsis on admission,   now with new rash to L foot which is improving.    continue with abx coverage recommended by ID, s/p PICC, growing MSSA in wound  continue with local wound care recommended by podiatry- daily follow up/ will await podiatry clearance prior to a discharge plan, must ensure no further surgical revision needed on this admission  elevate when in bed  using knee scooter for NWB to the RLE  continue with DVT prophylaxis   smoking cessation recommended  diabetic dietary restrictions   for itch using both atarax and benadryl, watch for antihistamine synergy/ patient notes that she still has itching and has not experienced lethargy for urinary retention >>    switched morphine ER to Oxycontin with breakthrough > will increase oxycontin to 15 mg q12.    Will be awaiting Ex Dbx tomorrow by podiatry.

## 2019-02-08 LAB
ANION GAP SERPL CALC-SCNC: 17 MMOL/L — HIGH (ref 7–14)
APTT BLD: 36 SEC — SIGNIFICANT CHANGE UP (ref 27–39.2)
BLD GP AB SCN SERPL QL: SIGNIFICANT CHANGE UP
BUN SERPL-MCNC: 6 MG/DL — LOW (ref 10–20)
CALCIUM SERPL-MCNC: 9.5 MG/DL — SIGNIFICANT CHANGE UP (ref 8.5–10.1)
CHLORIDE SERPL-SCNC: 101 MMOL/L — SIGNIFICANT CHANGE UP (ref 98–110)
CO2 SERPL-SCNC: 23 MMOL/L — SIGNIFICANT CHANGE UP (ref 17–32)
CREAT SERPL-MCNC: 0.6 MG/DL — LOW (ref 0.7–1.5)
GLUCOSE BLDC GLUCOMTR-MCNC: 120 MG/DL — HIGH (ref 70–99)
GLUCOSE BLDC GLUCOMTR-MCNC: 152 MG/DL — HIGH (ref 70–99)
GLUCOSE BLDC GLUCOMTR-MCNC: 154 MG/DL — HIGH (ref 70–99)
GLUCOSE SERPL-MCNC: 102 MG/DL — HIGH (ref 70–99)
HCG SERPL QL: NEGATIVE — SIGNIFICANT CHANGE UP
HCT VFR BLD CALC: 34.2 % — LOW (ref 37–47)
HGB BLD-MCNC: 10.8 G/DL — LOW (ref 12–16)
INR BLD: 1.11 RATIO — SIGNIFICANT CHANGE UP (ref 0.65–1.3)
MAGNESIUM SERPL-MCNC: 1.6 MG/DL — LOW (ref 1.8–2.4)
MCHC RBC-ENTMCNC: 28 PG — SIGNIFICANT CHANGE UP (ref 27–31)
MCHC RBC-ENTMCNC: 31.6 G/DL — LOW (ref 32–37)
MCV RBC AUTO: 88.6 FL — SIGNIFICANT CHANGE UP (ref 81–99)
NRBC # BLD: 0 /100 WBCS — SIGNIFICANT CHANGE UP (ref 0–0)
PHOSPHATE SERPL-MCNC: 4.1 MG/DL — SIGNIFICANT CHANGE UP (ref 2.1–4.9)
PLATELET # BLD AUTO: 355 K/UL — SIGNIFICANT CHANGE UP (ref 130–400)
POTASSIUM SERPL-MCNC: 4.1 MMOL/L — SIGNIFICANT CHANGE UP (ref 3.5–5)
POTASSIUM SERPL-SCNC: 4.1 MMOL/L — SIGNIFICANT CHANGE UP (ref 3.5–5)
PROTHROM AB SERPL-ACNC: 12.7 SEC — SIGNIFICANT CHANGE UP (ref 9.95–12.87)
RBC # BLD: 3.86 M/UL — LOW (ref 4.2–5.4)
RBC # FLD: 16 % — HIGH (ref 11.5–14.5)
SODIUM SERPL-SCNC: 141 MMOL/L — SIGNIFICANT CHANGE UP (ref 135–146)
TYPE + AB SCN PNL BLD: SIGNIFICANT CHANGE UP
WBC # BLD: 10.83 K/UL — HIGH (ref 4.8–10.8)
WBC # FLD AUTO: 10.83 K/UL — HIGH (ref 4.8–10.8)

## 2019-02-08 RX ORDER — MORPHINE SULFATE 50 MG/1
2 CAPSULE, EXTENDED RELEASE ORAL
Qty: 0 | Refills: 0 | Status: DISCONTINUED | OUTPATIENT
Start: 2019-02-08 | End: 2019-02-08

## 2019-02-08 RX ORDER — OXYCODONE HYDROCHLORIDE 5 MG/1
5 TABLET ORAL ONCE
Qty: 0 | Refills: 0 | Status: DISCONTINUED | OUTPATIENT
Start: 2019-02-08 | End: 2019-02-08

## 2019-02-08 RX ORDER — LEVOTHYROXINE SODIUM 125 MCG
100 TABLET ORAL DAILY
Qty: 0 | Refills: 0 | Status: DISCONTINUED | OUTPATIENT
Start: 2019-02-08 | End: 2019-02-19

## 2019-02-08 RX ORDER — OXYBUTYNIN CHLORIDE 5 MG
5 TABLET ORAL
Qty: 0 | Refills: 0 | Status: DISCONTINUED | OUTPATIENT
Start: 2019-02-08 | End: 2019-02-19

## 2019-02-08 RX ORDER — ENOXAPARIN SODIUM 100 MG/ML
40 INJECTION SUBCUTANEOUS DAILY
Qty: 0 | Refills: 0 | Status: DISCONTINUED | OUTPATIENT
Start: 2019-02-08 | End: 2019-02-19

## 2019-02-08 RX ORDER — BUDESONIDE AND FORMOTEROL FUMARATE DIHYDRATE 160; 4.5 UG/1; UG/1
2 AEROSOL RESPIRATORY (INHALATION)
Qty: 0 | Refills: 0 | Status: DISCONTINUED | OUTPATIENT
Start: 2019-02-08 | End: 2019-02-19

## 2019-02-08 RX ORDER — HYDROXYZINE HCL 10 MG
10 TABLET ORAL THREE TIMES A DAY
Qty: 0 | Refills: 0 | Status: DISCONTINUED | OUTPATIENT
Start: 2019-02-08 | End: 2019-02-19

## 2019-02-08 RX ORDER — HYDROMORPHONE HYDROCHLORIDE 2 MG/ML
0.5 INJECTION INTRAMUSCULAR; INTRAVENOUS; SUBCUTANEOUS
Qty: 0 | Refills: 0 | Status: DISCONTINUED | OUTPATIENT
Start: 2019-02-08 | End: 2019-02-08

## 2019-02-08 RX ORDER — ATORVASTATIN CALCIUM 80 MG/1
40 TABLET, FILM COATED ORAL AT BEDTIME
Qty: 0 | Refills: 0 | Status: DISCONTINUED | OUTPATIENT
Start: 2019-02-08 | End: 2019-02-19

## 2019-02-08 RX ORDER — SALICYLIC ACID 0.5 %
1 CLEANSER (GRAM) TOPICAL DAILY
Qty: 0 | Refills: 0 | Status: DISCONTINUED | OUTPATIENT
Start: 2019-02-08 | End: 2019-02-19

## 2019-02-08 RX ORDER — ALBUTEROL 90 UG/1
2 AEROSOL, METERED ORAL EVERY 6 HOURS
Qty: 0 | Refills: 0 | Status: DISCONTINUED | OUTPATIENT
Start: 2019-02-08 | End: 2019-02-19

## 2019-02-08 RX ORDER — PETROLATUM,WHITE
1 JELLY (GRAM) TOPICAL
Qty: 0 | Refills: 0 | Status: DISCONTINUED | OUTPATIENT
Start: 2019-02-08 | End: 2019-02-19

## 2019-02-08 RX ORDER — HYDROCORTISONE 1 %
1 OINTMENT (GRAM) TOPICAL
Qty: 0 | Refills: 0 | Status: DISCONTINUED | OUTPATIENT
Start: 2019-02-08 | End: 2019-02-19

## 2019-02-08 RX ORDER — DULOXETINE HYDROCHLORIDE 30 MG/1
60 CAPSULE, DELAYED RELEASE ORAL DAILY
Qty: 0 | Refills: 0 | Status: DISCONTINUED | OUTPATIENT
Start: 2019-02-08 | End: 2019-02-19

## 2019-02-08 RX ORDER — FLUOCINONIDE/EMOLLIENT BASE 0.05 %
1 CREAM (GRAM) TOPICAL
Qty: 0 | Refills: 0 | Status: DISCONTINUED | OUTPATIENT
Start: 2019-02-08 | End: 2019-02-19

## 2019-02-08 RX ORDER — OXYCODONE AND ACETAMINOPHEN 5; 325 MG/1; MG/1
2 TABLET ORAL EVERY 6 HOURS
Qty: 0 | Refills: 0 | Status: DISCONTINUED | OUTPATIENT
Start: 2019-02-08 | End: 2019-02-15

## 2019-02-08 RX ORDER — IBUPROFEN 200 MG
600 TABLET ORAL EVERY 6 HOURS
Qty: 0 | Refills: 0 | Status: DISCONTINUED | OUTPATIENT
Start: 2019-02-08 | End: 2019-02-19

## 2019-02-08 RX ORDER — PANTOPRAZOLE SODIUM 20 MG/1
40 TABLET, DELAYED RELEASE ORAL
Qty: 0 | Refills: 0 | Status: DISCONTINUED | OUTPATIENT
Start: 2019-02-08 | End: 2019-02-19

## 2019-02-08 RX ORDER — MAGNESIUM SULFATE 500 MG/ML
2 VIAL (ML) INJECTION ONCE
Qty: 0 | Refills: 0 | Status: COMPLETED | OUTPATIENT
Start: 2019-02-08 | End: 2019-02-08

## 2019-02-08 RX ORDER — OXYCODONE HYDROCHLORIDE 5 MG/1
20 TABLET ORAL EVERY 12 HOURS
Qty: 0 | Refills: 0 | Status: DISCONTINUED | OUTPATIENT
Start: 2019-02-08 | End: 2019-02-13

## 2019-02-08 RX ORDER — BACITRACIN ZINC 500 UNIT/G
1 OINTMENT IN PACKET (EA) TOPICAL THREE TIMES A DAY
Qty: 0 | Refills: 0 | Status: DISCONTINUED | OUTPATIENT
Start: 2019-02-08 | End: 2019-02-19

## 2019-02-08 RX ORDER — SODIUM CHLORIDE 9 MG/ML
3 INJECTION INTRAMUSCULAR; INTRAVENOUS; SUBCUTANEOUS EVERY 8 HOURS
Qty: 0 | Refills: 0 | Status: DISCONTINUED | OUTPATIENT
Start: 2019-02-08 | End: 2019-02-19

## 2019-02-08 RX ORDER — ONDANSETRON 8 MG/1
4 TABLET, FILM COATED ORAL ONCE
Qty: 0 | Refills: 0 | Status: DISCONTINUED | OUTPATIENT
Start: 2019-02-08 | End: 2019-02-08

## 2019-02-08 RX ORDER — DIPHENHYDRAMINE HCL 50 MG
50 CAPSULE ORAL EVERY 6 HOURS
Qty: 0 | Refills: 0 | Status: DISCONTINUED | OUTPATIENT
Start: 2019-02-08 | End: 2019-02-12

## 2019-02-08 RX ORDER — METOPROLOL TARTRATE 50 MG
12.5 TABLET ORAL
Qty: 0 | Refills: 0 | Status: DISCONTINUED | OUTPATIENT
Start: 2019-02-08 | End: 2019-02-19

## 2019-02-08 RX ORDER — SENNA PLUS 8.6 MG/1
2 TABLET ORAL AT BEDTIME
Qty: 0 | Refills: 0 | Status: DISCONTINUED | OUTPATIENT
Start: 2019-02-08 | End: 2019-02-19

## 2019-02-08 RX ORDER — CEFEPIME 1 G/1
2000 INJECTION, POWDER, FOR SOLUTION INTRAMUSCULAR; INTRAVENOUS EVERY 12 HOURS
Qty: 0 | Refills: 0 | Status: DISCONTINUED | OUTPATIENT
Start: 2019-02-08 | End: 2019-02-17

## 2019-02-08 RX ORDER — ASPIRIN/CALCIUM CARB/MAGNESIUM 324 MG
81 TABLET ORAL DAILY
Qty: 0 | Refills: 0 | Status: DISCONTINUED | OUTPATIENT
Start: 2019-02-08 | End: 2019-02-19

## 2019-02-08 RX ORDER — SODIUM CHLORIDE 9 MG/ML
1000 INJECTION, SOLUTION INTRAVENOUS
Qty: 0 | Refills: 0 | Status: DISCONTINUED | OUTPATIENT
Start: 2019-02-08 | End: 2019-02-08

## 2019-02-08 RX ADMIN — Medication 150 MILLIGRAM(S): at 22:17

## 2019-02-08 RX ADMIN — Medication 1 APPLICATION(S): at 05:33

## 2019-02-08 RX ADMIN — CEFEPIME 100 MILLIGRAM(S): 1 INJECTION, POWDER, FOR SOLUTION INTRAMUSCULAR; INTRAVENOUS at 22:34

## 2019-02-08 RX ADMIN — Medication 5 MILLIGRAM(S): at 05:32

## 2019-02-08 RX ADMIN — OXYCODONE AND ACETAMINOPHEN 2 TABLET(S): 5; 325 TABLET ORAL at 10:44

## 2019-02-08 RX ADMIN — Medication 81 MILLIGRAM(S): at 12:30

## 2019-02-08 RX ADMIN — Medication 150 MILLIGRAM(S): at 05:31

## 2019-02-08 RX ADMIN — OXYCODONE HYDROCHLORIDE 20 MILLIGRAM(S): 5 TABLET ORAL at 06:04

## 2019-02-08 RX ADMIN — Medication 1 APPLICATION(S): at 12:32

## 2019-02-08 RX ADMIN — CEFEPIME 100 MILLIGRAM(S): 1 INJECTION, POWDER, FOR SOLUTION INTRAMUSCULAR; INTRAVENOUS at 05:32

## 2019-02-08 RX ADMIN — Medication 600 MILLIGRAM(S): at 12:58

## 2019-02-08 RX ADMIN — OXYCODONE AND ACETAMINOPHEN 2 TABLET(S): 5; 325 TABLET ORAL at 10:14

## 2019-02-08 RX ADMIN — HYDROMORPHONE HYDROCHLORIDE 0.5 MILLIGRAM(S): 2 INJECTION INTRAMUSCULAR; INTRAVENOUS; SUBCUTANEOUS at 17:20

## 2019-02-08 RX ADMIN — Medication 1 APPLICATION(S): at 05:34

## 2019-02-08 RX ADMIN — Medication 100 MICROGRAM(S): at 05:32

## 2019-02-08 RX ADMIN — SODIUM CHLORIDE 3 MILLILITER(S): 9 INJECTION INTRAMUSCULAR; INTRAVENOUS; SUBCUTANEOUS at 22:18

## 2019-02-08 RX ADMIN — OXYCODONE HYDROCHLORIDE 20 MILLIGRAM(S): 5 TABLET ORAL at 05:32

## 2019-02-08 RX ADMIN — Medication 12.5 MILLIGRAM(S): at 05:32

## 2019-02-08 RX ADMIN — PANTOPRAZOLE SODIUM 40 MILLIGRAM(S): 20 TABLET, DELAYED RELEASE ORAL at 05:32

## 2019-02-08 RX ADMIN — Medication 25 GRAM(S): at 10:15

## 2019-02-08 RX ADMIN — HYDROMORPHONE HYDROCHLORIDE 0.5 MILLIGRAM(S): 2 INJECTION INTRAMUSCULAR; INTRAVENOUS; SUBCUTANEOUS at 17:05

## 2019-02-08 RX ADMIN — ATORVASTATIN CALCIUM 40 MILLIGRAM(S): 80 TABLET, FILM COATED ORAL at 22:17

## 2019-02-08 RX ADMIN — SODIUM CHLORIDE 100 MILLILITER(S): 9 INJECTION, SOLUTION INTRAVENOUS at 17:33

## 2019-02-08 RX ADMIN — CHLORHEXIDINE GLUCONATE 1 APPLICATION(S): 213 SOLUTION TOPICAL at 05:31

## 2019-02-08 RX ADMIN — Medication 600 MILLIGRAM(S): at 12:28

## 2019-02-08 RX ADMIN — Medication 10 MILLIGRAM(S): at 05:32

## 2019-02-08 RX ADMIN — DULOXETINE HYDROCHLORIDE 60 MILLIGRAM(S): 30 CAPSULE, DELAYED RELEASE ORAL at 12:30

## 2019-02-08 RX ADMIN — SODIUM CHLORIDE 3 MILLILITER(S): 9 INJECTION INTRAMUSCULAR; INTRAVENOUS; SUBCUTANEOUS at 06:04

## 2019-02-08 RX ADMIN — HYDROMORPHONE HYDROCHLORIDE 0.5 MILLIGRAM(S): 2 INJECTION INTRAMUSCULAR; INTRAVENOUS; SUBCUTANEOUS at 17:30

## 2019-02-08 RX ADMIN — SODIUM CHLORIDE 3 MILLILITER(S): 9 INJECTION INTRAMUSCULAR; INTRAVENOUS; SUBCUTANEOUS at 14:01

## 2019-02-08 RX ADMIN — Medication 10 MILLIGRAM(S): at 22:17

## 2019-02-08 NOTE — PROGRESS NOTE ADULT - ASSESSMENT
54 yo female with hx DM2 nonadherent to dietary restrictions, active smoker, CAD, HTN, Hypothyroid, s/p Reconstruction surgery R foot for charcot foot returns with wound dehiscence in R foot with hardware, no sepsis on admission,   now with new rash to L foot which is improving.    continue with abx coverage recommended by ID, s/p PICC, growing MSSA in wound  continue with local wound care recommended by podiatry- daily follow up/ will await podiatry clearance prior to a discharge plan, must ensure no further surgical revision needed on this admission  elevate when in bed  using knee scooter for NWB to the RLE  continue with DVT prophylaxis   smoking cessation recommended  diabetic dietary restrictions   for itch using both atarax and benadryl, watch for antihistamine synergy/ patient notes that she still has itching and has not experienced lethargy for urinary retention >>    switched morphine ER to Oxycontin with breakthrough >  increased oxycontin dose.    Awaiting Ex Dbx for which patient poses moderate risk .

## 2019-02-08 NOTE — CHART NOTE - NSCHARTNOTEFT_GEN_A_CORE
Anesthesia Post Op Assessment  		(    ) Intubated           TV _____	Rate _sv____	FiO2_40% FM____  		(   x ) Patent airway. Full return of protective reflexes  		(   x )Full recovery from anesthesia/sedation to baseline status      Cardiovascular Function:  		BP:	100/51	                  Pulse:		100                  RR:		12                  Temp:		98.3                  O2Sat:         99%      Mental Status:  	        (  x  ) awake		  ( x   ) alert		 (    ) drowsy	               (    ) sedated      Nausea/Vomiting:  		(    ) Yes, See post-op orders		   ( x   ) No      Pain Scale: (0-10):			Treatment:     (    ) None	            ( x   ) See Post-Op/PCA Orders      Post-operative Fluids: 	   (    ) Oral	          (  x  ) See post-op Orders        Comments:    Uneventful. No complications from anesthesia.  Discharge when criteria met.

## 2019-02-08 NOTE — PROGRESS NOTE ADULT - ASSESSMENT
53F    Charcot foot s/p recent surgery with hardware  Chronic midline low back pain with bilateral sciatica  Peripheral neuralgia  Essential hypertension  Diabetes   H/O shoulder surgery: Right shoulder surgery    Admitted with wound dehiscence   Sepsis ruled out on admission  wcx MSSA  ESR 14    - Rifampin 600mg PO daily given hardware  - Cefepime 2g q12h for now  - OR today, please send cultures  - Podiatry following  - Will need PICC and plan for outpatient Ceftriaxone 2g q24h x 6 weeks   - ID follow-up [address] [phone]  - Weekly CBC, BMP    Spectra [phone]

## 2019-02-08 NOTE — BRIEF OPERATIVE NOTE - PROCEDURE
<<-----Click on this checkbox to enter Procedure Debridement  02/08/2019  excisional debridement of soft tissue right foot with oasis application and wound vac therapy  Active  MMILAD

## 2019-02-08 NOTE — PROGRESS NOTE ADULT - ASSESSMENT
CALLY HARTMAN 53y Female  MRN#: [id number]   CODE STATUS: Full code      SUBJECTIVE    Patient is a 52 yo F who presents with a chief complaint of painful nonhealing right foot ulcer.  Currently admitted to medicine with the primary diagnosis of wound infection.    Interval History: Today is hospital day 9d. Overnight there were no events. Today she is doing well; no longer with pruritis.    HPI:   HPI:  52 yo female with hx DM, CAD, HTN, Hypothyroid, s/p Reconstruction surgery R foot for charcot foot on 19. Patient was discharged on 19, not on antibiotics at time of discharge. Since discharge developed dehiscence and infection at surgical sites. Today she had f/u with Dr. Diaz at his office who sent her to hospital for IV antibiotics. Pt reports few days after discharge foot become swollen, red, painful.  She reports pus and small amount of blood draining from the surgical sites. Denies fevers, sweating, sob, chest pain, nausea, vomiting, diarrhea. (2019 17:34)    Hospital Course:       PAST MEDICAL & SURGICAL HISTORY  Dextrocardia  Chronic midline low back pain with bilateral sciatica  Peripheral neuralgia  Essential hypertension  Diabetes 1.5, managed as type 2  Charcot's arthropathy: R foot  Charcot foot due to diabetes mellitus  H/O shoulder surgery: Right shoulder surgery   delivery delivered      ALLERGIES:  No Known Allergies      MEDICATIONS:  STANDING MEDICATIONS  lactated ringers. 1000 milliLiter(s) IV Continuous <Continuous>    PRN MEDICATIONS  HYDROmorphone  Injectable 0.5 milliGRAM(s) IV Push every 15 minutes PRN  morphine  - Injectable 2 milliGRAM(s) IV Push every 10 minutes PRN  ondansetron Injectable 4 milliGRAM(s) IV Push once PRN  oxyCODONE    IR 5 milliGRAM(s) Oral once PRN        OBJECTIVE    VITAL SIGNS: Last 24 Hours  T(C): 37.2 (2019 07:17), Max: 37.2 (2019 07:17)  T(F): 98.9 (2019 07:17), Max: 98.9 (2019 07:17)  HR: 96 (2019 14:28) (73 - 117)  BP: 128/71 (2019 14:28) (116/53 - 145/70)  BP(mean): --  RR: 18 (2019 14:28) (18 - 18)  SpO2: 94% (2019 07:17) (94% - 94%)      PHYSICAL EXAM:    GENERAL: No acute distress.  HEENT:  Atraumatic, normocephalic. EOMI, PERRLA.  PULMONARY: Clear to auscultation bilaterally.  CARDIOVASCULAR: Regular rate and rhythm; No murmurs, rubs, or gallops.  MUSCULOSKELETAL:  Refused physical exam of the right foot as does not want dressing removed multiple times per day. Left foot with decreased rash.      LABS:                        10.8   10.83 )-----------( 355      ( 2019 02:52 )             34.2     02-08    141  |  101  |  6<L>  ----------------------------<  102<H>  4.1   |  23  |  0.6<L>    Ca    9.5      2019 02:52  Phos  4.1     02-08  Mg     1.6     02-08      PT/INR - ( 2019 02:52 )   PT: 12.70 sec;   INR: 1.11 ratio         PTT - ( 2019 02:52 )  PTT:36.0 sec      RADIOLOGY:        ASSESSMENT AND PLAN    Patient is a 52 yo F who presents with a chief complaint of painful nonhealing right foot ulcer.  Currently admitted to medicine with the primary diagnosis of wound infection.    Surgical wound infection s/p reconstruction of R foot 19  - Evidence of wound dehiscence and erythema with reported drainage   - Wound cx: few Staph aureus MSSA - blood cultures negative   - Per ID c/w cefepime + rifampin while inpatient. Rocephin 1 g Q24H on discharge (PICC line placed).  - Pain control: C/w oxycontin 20 mg BID (15 not available). C/w percocet 10 q 6 PRN + motrin 600 mg PO PRN.  - Podiatry: For further debridement today    Lower extremity rash  - Lidex and benadryl PRN  - Improving, possibly attributable to MS contin    Diabetes  - Monitor FS  - Will start insulin coverage if FSG consistently > 180    Right LE Erythema likely xerotic skin  - Bacitracin to put on fingernails in order to prevent spread of infection to feet  - Venous duplex LE negative for DVT; Left Bakers cyst     CAD/ DLD  - C/w aspirin + statin  - Takes ezetimibe at home    HTN  - C/w metoprolol 12.5 BID  - Lisinopril on hold as previous episodes of hypotension     COPD  - Symbicort and albuterol PRN   - Home meds: Qvar & Ventolin    Sciatica/Peripheral neuralgia   - c/w duloxetine and Lyrica    DVT prophylaxis : Lovenox   GI prophylaxis : Protonix       Activity : Full weight bearing LLE and non weight bearing RLE  Diet : Consistent carbohydrate with evening snack    Disposition : Home with PICC line and IV antibiotics when cleared by podiatry.

## 2019-02-08 NOTE — PRE-ANESTHESIA EVALUATION ADULT - NSANTHOSAYNRD_GEN_A_CORE
No. HILARY screening performed.  STOP BANG Legend: 0-2 = LOW Risk; 3-4 = INTERMEDIATE Risk; 5-8 = HIGH Risk

## 2019-02-08 NOTE — PRE-ANESTHESIA EVALUATION ADULT - NSPROPOSEDPROCEDFT_GEN_ALL_CORE
soft tissue and bone debridement right foot excisional debridement of soft tissue and bone with possible application of wound vac and possible application of skin graft, right foot

## 2019-02-09 LAB
GLUCOSE BLDC GLUCOMTR-MCNC: 116 MG/DL — HIGH (ref 70–99)
GLUCOSE BLDC GLUCOMTR-MCNC: 140 MG/DL — HIGH (ref 70–99)
GLUCOSE BLDC GLUCOMTR-MCNC: 349 MG/DL — HIGH (ref 70–99)
GLUCOSE BLDC GLUCOMTR-MCNC: 96 MG/DL — SIGNIFICANT CHANGE UP (ref 70–99)
HCT VFR BLD CALC: 33 % — LOW (ref 37–47)
HGB BLD-MCNC: 10.3 G/DL — LOW (ref 12–16)
MAGNESIUM SERPL-MCNC: 2 MG/DL — SIGNIFICANT CHANGE UP (ref 1.8–2.4)
MCHC RBC-ENTMCNC: 27.3 PG — SIGNIFICANT CHANGE UP (ref 27–31)
MCHC RBC-ENTMCNC: 31.2 G/DL — LOW (ref 32–37)
MCV RBC AUTO: 87.5 FL — SIGNIFICANT CHANGE UP (ref 81–99)
NRBC # BLD: 0 /100 WBCS — SIGNIFICANT CHANGE UP (ref 0–0)
PLATELET # BLD AUTO: 334 K/UL — SIGNIFICANT CHANGE UP (ref 130–400)
RBC # BLD: 3.77 M/UL — LOW (ref 4.2–5.4)
RBC # FLD: 15.9 % — HIGH (ref 11.5–14.5)
WBC # BLD: 8.37 K/UL — SIGNIFICANT CHANGE UP (ref 4.8–10.8)
WBC # FLD AUTO: 8.37 K/UL — SIGNIFICANT CHANGE UP (ref 4.8–10.8)

## 2019-02-09 RX ORDER — ONDANSETRON 8 MG/1
4 TABLET, FILM COATED ORAL EVERY 6 HOURS
Qty: 0 | Refills: 0 | Status: COMPLETED | OUTPATIENT
Start: 2019-02-09 | End: 2019-02-10

## 2019-02-09 RX ADMIN — Medication 600 MILLIGRAM(S): at 15:35

## 2019-02-09 RX ADMIN — OXYCODONE HYDROCHLORIDE 20 MILLIGRAM(S): 5 TABLET ORAL at 20:39

## 2019-02-09 RX ADMIN — Medication 150 MILLIGRAM(S): at 05:45

## 2019-02-09 RX ADMIN — OXYCODONE HYDROCHLORIDE 20 MILLIGRAM(S): 5 TABLET ORAL at 06:14

## 2019-02-09 RX ADMIN — Medication 1 APPLICATION(S): at 05:42

## 2019-02-09 RX ADMIN — Medication 100 MICROGRAM(S): at 05:42

## 2019-02-09 RX ADMIN — SODIUM CHLORIDE 3 MILLILITER(S): 9 INJECTION INTRAMUSCULAR; INTRAVENOUS; SUBCUTANEOUS at 21:53

## 2019-02-09 RX ADMIN — OXYCODONE AND ACETAMINOPHEN 2 TABLET(S): 5; 325 TABLET ORAL at 18:41

## 2019-02-09 RX ADMIN — Medication 1 APPLICATION(S): at 18:47

## 2019-02-09 RX ADMIN — Medication 10 MILLIGRAM(S): at 05:42

## 2019-02-09 RX ADMIN — Medication 600 MILLIGRAM(S): at 15:30

## 2019-02-09 RX ADMIN — Medication 81 MILLIGRAM(S): at 12:00

## 2019-02-09 RX ADMIN — OXYCODONE AND ACETAMINOPHEN 2 TABLET(S): 5; 325 TABLET ORAL at 09:53

## 2019-02-09 RX ADMIN — SODIUM CHLORIDE 3 MILLILITER(S): 9 INJECTION INTRAMUSCULAR; INTRAVENOUS; SUBCUTANEOUS at 06:14

## 2019-02-09 RX ADMIN — Medication 10 MILLIGRAM(S): at 22:09

## 2019-02-09 RX ADMIN — Medication 1 APPLICATION(S): at 18:45

## 2019-02-09 RX ADMIN — SENNA PLUS 2 TABLET(S): 8.6 TABLET ORAL at 22:08

## 2019-02-09 RX ADMIN — SODIUM CHLORIDE 3 MILLILITER(S): 9 INJECTION INTRAMUSCULAR; INTRAVENOUS; SUBCUTANEOUS at 15:21

## 2019-02-09 RX ADMIN — Medication 10 MILLIGRAM(S): at 15:31

## 2019-02-09 RX ADMIN — OXYCODONE HYDROCHLORIDE 20 MILLIGRAM(S): 5 TABLET ORAL at 20:09

## 2019-02-09 RX ADMIN — OXYCODONE AND ACETAMINOPHEN 2 TABLET(S): 5; 325 TABLET ORAL at 10:42

## 2019-02-09 RX ADMIN — BUDESONIDE AND FORMOTEROL FUMARATE DIHYDRATE 2 PUFF(S): 160; 4.5 AEROSOL RESPIRATORY (INHALATION) at 20:10

## 2019-02-09 RX ADMIN — CEFEPIME 100 MILLIGRAM(S): 1 INJECTION, POWDER, FOR SOLUTION INTRAMUSCULAR; INTRAVENOUS at 20:09

## 2019-02-09 RX ADMIN — CEFEPIME 100 MILLIGRAM(S): 1 INJECTION, POWDER, FOR SOLUTION INTRAMUSCULAR; INTRAVENOUS at 09:54

## 2019-02-09 RX ADMIN — ATORVASTATIN CALCIUM 40 MILLIGRAM(S): 80 TABLET, FILM COATED ORAL at 22:08

## 2019-02-09 RX ADMIN — Medication 12.5 MILLIGRAM(S): at 18:43

## 2019-02-09 RX ADMIN — Medication 12.5 MILLIGRAM(S): at 05:42

## 2019-02-09 RX ADMIN — DULOXETINE HYDROCHLORIDE 60 MILLIGRAM(S): 30 CAPSULE, DELAYED RELEASE ORAL at 12:01

## 2019-02-09 RX ADMIN — Medication 1 APPLICATION(S): at 22:09

## 2019-02-09 RX ADMIN — Medication 5 MILLIGRAM(S): at 05:42

## 2019-02-09 RX ADMIN — Medication 150 MILLIGRAM(S): at 18:48

## 2019-02-09 RX ADMIN — Medication 1 APPLICATION(S): at 05:41

## 2019-02-09 RX ADMIN — OXYCODONE AND ACETAMINOPHEN 2 TABLET(S): 5; 325 TABLET ORAL at 18:57

## 2019-02-09 RX ADMIN — Medication 1 APPLICATION(S): at 15:33

## 2019-02-09 RX ADMIN — OXYCODONE HYDROCHLORIDE 20 MILLIGRAM(S): 5 TABLET ORAL at 05:45

## 2019-02-09 RX ADMIN — PANTOPRAZOLE SODIUM 40 MILLIGRAM(S): 20 TABLET, DELAYED RELEASE ORAL at 05:46

## 2019-02-09 RX ADMIN — Medication 1 APPLICATION(S): at 15:59

## 2019-02-09 RX ADMIN — Medication 5 MILLIGRAM(S): at 18:43

## 2019-02-09 NOTE — PROGRESS NOTE ADULT - ASSESSMENT
54 yo female with hx DM2 nonadherent to dietary restrictions, active smoker, CAD, HTN, Hypothyroid, s/p Reconstruction surgery R foot for charcot foot returns with wound dehiscence in R foot with hardware, no sepsis on admission,   now with new rash to L foot which is improving.    continue with abx coverage recommended by ID, s/p PICC, growing MSSA in wound  continue with local wound care recommended by podiatry- daily follow up/ will await podiatry clearance prior to a discharge plan,    elevate when in bed  using knee scooter for NWB to the RLE  continue with DVT prophylaxis   smoking cessation recommended  diabetic dietary restrictions   for itch using both atarax and benadryl, watch for antihistamine synergy/ patient notes that she still has itching and has not experienced lethargy for urinary retention >>    switched morphine ER to Oxycontin with breakthrough >  increased oxycontin dose.    S/p  Ex Dbx right foot yesterday,  currently on wound vac .

## 2019-02-10 LAB
GLUCOSE BLDC GLUCOMTR-MCNC: 116 MG/DL — HIGH (ref 70–99)
GLUCOSE BLDC GLUCOMTR-MCNC: 118 MG/DL — HIGH (ref 70–99)
GLUCOSE BLDC GLUCOMTR-MCNC: 144 MG/DL — HIGH (ref 70–99)
GLUCOSE BLDC GLUCOMTR-MCNC: 267 MG/DL — HIGH (ref 70–99)

## 2019-02-10 RX ORDER — SODIUM CHLORIDE 9 MG/ML
1000 INJECTION, SOLUTION INTRAVENOUS
Qty: 0 | Refills: 0 | Status: DISCONTINUED | OUTPATIENT
Start: 2019-02-10 | End: 2019-02-19

## 2019-02-10 RX ORDER — DEXTROSE 50 % IN WATER 50 %
15 SYRINGE (ML) INTRAVENOUS ONCE
Qty: 0 | Refills: 0 | Status: DISCONTINUED | OUTPATIENT
Start: 2019-02-10 | End: 2019-02-19

## 2019-02-10 RX ORDER — CHLORHEXIDINE GLUCONATE 213 G/1000ML
1 SOLUTION TOPICAL
Qty: 0 | Refills: 0 | Status: DISCONTINUED | OUTPATIENT
Start: 2019-02-10 | End: 2019-02-19

## 2019-02-10 RX ORDER — INSULIN LISPRO 100/ML
VIAL (ML) SUBCUTANEOUS
Qty: 0 | Refills: 0 | Status: DISCONTINUED | OUTPATIENT
Start: 2019-02-10 | End: 2019-02-19

## 2019-02-10 RX ORDER — DEXTROSE 50 % IN WATER 50 %
25 SYRINGE (ML) INTRAVENOUS ONCE
Qty: 0 | Refills: 0 | Status: DISCONTINUED | OUTPATIENT
Start: 2019-02-10 | End: 2019-02-19

## 2019-02-10 RX ORDER — GLUCAGON INJECTION, SOLUTION 0.5 MG/.1ML
1 INJECTION, SOLUTION SUBCUTANEOUS ONCE
Qty: 0 | Refills: 0 | Status: DISCONTINUED | OUTPATIENT
Start: 2019-02-10 | End: 2019-02-19

## 2019-02-10 RX ORDER — DEXTROSE 50 % IN WATER 50 %
12.5 SYRINGE (ML) INTRAVENOUS ONCE
Qty: 0 | Refills: 0 | Status: DISCONTINUED | OUTPATIENT
Start: 2019-02-10 | End: 2019-02-19

## 2019-02-10 RX ADMIN — Medication 12.5 MILLIGRAM(S): at 18:11

## 2019-02-10 RX ADMIN — BUDESONIDE AND FORMOTEROL FUMARATE DIHYDRATE 2 PUFF(S): 160; 4.5 AEROSOL RESPIRATORY (INHALATION) at 07:37

## 2019-02-10 RX ADMIN — Medication 1 APPLICATION(S): at 21:52

## 2019-02-10 RX ADMIN — Medication 1 APPLICATION(S): at 18:13

## 2019-02-10 RX ADMIN — Medication 1 APPLICATION(S): at 05:55

## 2019-02-10 RX ADMIN — OXYCODONE HYDROCHLORIDE 20 MILLIGRAM(S): 5 TABLET ORAL at 18:34

## 2019-02-10 RX ADMIN — Medication 1 APPLICATION(S): at 05:58

## 2019-02-10 RX ADMIN — OXYCODONE HYDROCHLORIDE 20 MILLIGRAM(S): 5 TABLET ORAL at 06:24

## 2019-02-10 RX ADMIN — SODIUM CHLORIDE 3 MILLILITER(S): 9 INJECTION INTRAMUSCULAR; INTRAVENOUS; SUBCUTANEOUS at 05:59

## 2019-02-10 RX ADMIN — Medication 1 APPLICATION(S): at 05:56

## 2019-02-10 RX ADMIN — CHLORHEXIDINE GLUCONATE 1 APPLICATION(S): 213 SOLUTION TOPICAL at 05:56

## 2019-02-10 RX ADMIN — OXYCODONE AND ACETAMINOPHEN 2 TABLET(S): 5; 325 TABLET ORAL at 21:53

## 2019-02-10 RX ADMIN — PANTOPRAZOLE SODIUM 40 MILLIGRAM(S): 20 TABLET, DELAYED RELEASE ORAL at 06:22

## 2019-02-10 RX ADMIN — Medication 10 MILLIGRAM(S): at 13:24

## 2019-02-10 RX ADMIN — Medication 1 APPLICATION(S): at 11:39

## 2019-02-10 RX ADMIN — Medication 10 MILLIGRAM(S): at 21:52

## 2019-02-10 RX ADMIN — CEFEPIME 100 MILLIGRAM(S): 1 INJECTION, POWDER, FOR SOLUTION INTRAMUSCULAR; INTRAVENOUS at 18:19

## 2019-02-10 RX ADMIN — Medication 100 MICROGRAM(S): at 05:58

## 2019-02-10 RX ADMIN — Medication 10 MILLIGRAM(S): at 05:56

## 2019-02-10 RX ADMIN — OXYCODONE AND ACETAMINOPHEN 2 TABLET(S): 5; 325 TABLET ORAL at 10:40

## 2019-02-10 RX ADMIN — Medication 5 MILLIGRAM(S): at 18:19

## 2019-02-10 RX ADMIN — Medication 50 MILLIGRAM(S): at 11:39

## 2019-02-10 RX ADMIN — Medication 81 MILLIGRAM(S): at 11:39

## 2019-02-10 RX ADMIN — Medication 150 MILLIGRAM(S): at 15:07

## 2019-02-10 RX ADMIN — Medication 5 MILLIGRAM(S): at 05:57

## 2019-02-10 RX ADMIN — BUDESONIDE AND FORMOTEROL FUMARATE DIHYDRATE 2 PUFF(S): 160; 4.5 AEROSOL RESPIRATORY (INHALATION) at 21:51

## 2019-02-10 RX ADMIN — DULOXETINE HYDROCHLORIDE 60 MILLIGRAM(S): 30 CAPSULE, DELAYED RELEASE ORAL at 11:40

## 2019-02-10 RX ADMIN — OXYCODONE HYDROCHLORIDE 20 MILLIGRAM(S): 5 TABLET ORAL at 05:54

## 2019-02-10 RX ADMIN — Medication 12.5 MILLIGRAM(S): at 05:57

## 2019-02-10 RX ADMIN — OXYCODONE AND ACETAMINOPHEN 2 TABLET(S): 5; 325 TABLET ORAL at 22:23

## 2019-02-10 RX ADMIN — OXYCODONE AND ACETAMINOPHEN 2 TABLET(S): 5; 325 TABLET ORAL at 11:35

## 2019-02-10 RX ADMIN — SODIUM CHLORIDE 3 MILLILITER(S): 9 INJECTION INTRAMUSCULAR; INTRAVENOUS; SUBCUTANEOUS at 13:27

## 2019-02-10 RX ADMIN — SODIUM CHLORIDE 3 MILLILITER(S): 9 INJECTION INTRAMUSCULAR; INTRAVENOUS; SUBCUTANEOUS at 21:57

## 2019-02-10 RX ADMIN — CEFEPIME 100 MILLIGRAM(S): 1 INJECTION, POWDER, FOR SOLUTION INTRAMUSCULAR; INTRAVENOUS at 05:58

## 2019-02-10 RX ADMIN — ATORVASTATIN CALCIUM 40 MILLIGRAM(S): 80 TABLET, FILM COATED ORAL at 21:52

## 2019-02-10 RX ADMIN — ONDANSETRON 4 MILLIGRAM(S): 8 TABLET, FILM COATED ORAL at 14:39

## 2019-02-10 RX ADMIN — Medication 150 MILLIGRAM(S): at 21:52

## 2019-02-10 RX ADMIN — Medication 150 MILLIGRAM(S): at 05:57

## 2019-02-10 NOTE — PROGRESS NOTE ADULT - ASSESSMENT
ASSESSMENT AND PLAN    Patient is a 54 yo F who presents with a chief complaint of painful nonhealing right foot ulcer.  Currently admitted to medicine with the primary diagnosis of wound infection.    Surgical wound infection s/p reconstruction of R foot 1/11/19  - Evidence of wound dehiscence and erythema with reported drainage   - Wound cx: few staph aureus MSSA - blood cultures negative   - Per ID c/w cefepime + rifampin while inpatient. Rocephin 1 g Q24H on discharge (PICC line placed).  - Pain control: C/w oxycontin 20 mg BID (15 not available). C/w percocet 10 q 6 PRN + motrin 600 mg PO PRN.  - Podiatry following    Lower extremity rash and cyclic pruritis  - Lidex BID and benadryl PRN  - Improving, possibly attributable to MS contin    Diabetes  - Monitor FS  - Will start insulin coverage if FSG consistently > 180    Right LE Erythema likely xerotic skin  - Bacitracin to put on fingernails in order to prevent spread of infection to feet  - Venous duplex LE negative for DVT; Left Bakers cyst     CAD/ DLD  - C/w aspirin + statin  - Takes ezetimibe at home    HTN  - C/w metoprolol 12.5 BID  - Lisinopril on hold as previous episodes of hypotension     COPD  - Symbicort and albuterol PRN   - Home meds: Qvar & Ventolin    Sciatica/Peripheral neuralgia   - c/w duloxetine and Lyrica    DVT prophylaxis : Lovenox   GI prophylaxis : Protonix       Activity : Full weight bearing LLE and non weight bearing RLE  Diet : Consistent carbohydrate with evening snack    Disposition : Home with PICC line and IV antibiotics when cleared by podiatry.

## 2019-02-10 NOTE — PROGRESS NOTE ADULT - ASSESSMENT
CALLY HARTMAN 53y Female  MRN#: [id number]   CODE STATUS: Full code      SUBJECTIVE    Patient is a 52 yo F who presents with a chief complaint of painful nonhealing right foot ulcer.  Currently admitted to medicine with the primary diagnosis of wound infection.    Interval History: Today is hospital day 11d. Overnight there were no events. Today she is doing well s/p further debridement. Pain is controlled.    HPI:   HPI:  52 yo female with hx DM, CAD, HTN, Hypothyroid, s/p Reconstruction surgery R foot for charcot foot on 19. Patient was discharged on 19, not on antibiotics at time of discharge. Since discharge developed dehiscence and infection at surgical sites. Today she had f/u with Dr. Diaz at his office who sent her to hospital for IV antibiotics. Pt reports few days after discharge foot become swollen, red, painful.  She reports pus and small amount of blood draining from the surgical sites. Denies fevers, sweating, sob, chest pain, nausea, vomiting, diarrhea. (2019 17:34)    Hospital Course:       PAST MEDICAL & SURGICAL HISTORY  Dextrocardia  Chronic midline low back pain with bilateral sciatica  Peripheral neuralgia  Essential hypertension  Diabetes 1.5, managed as type 2  Charcot's arthropathy: R foot  Charcot foot due to diabetes mellitus  H/O shoulder surgery: Right shoulder surgery   delivery delivered      ALLERGIES:  No Known Allergies      MEDICATIONS:  STANDING MEDICATIONS  AQUAPHOR (petrolatum Ointment) 1 Application(s) Topical two times a day  aspirin enteric coated 81 milliGRAM(s) Oral daily  atorvastatin 40 milliGRAM(s) Oral at bedtime  BACItracin   Ointment 1 Application(s) Topical three times a day  buDESOnide  80 MICROgram(s)/formoterol 4.5 MICROgram(s) Inhaler 2 Puff(s) Inhalation two times a day  cefepime   IVPB 2000 milliGRAM(s) IV Intermittent every 12 hours  DULoxetine 60 milliGRAM(s) Oral daily  enoxaparin Injectable 40 milliGRAM(s) SubCutaneous daily  fluocinonide 0.05% Ointment 1 Application(s) Topical two times a day  hydrocortisone 1% Cream 1 Application(s) Topical two times a day  hydrOXYzine  Oral Tab/Cap - Peds 10 milliGRAM(s) Oral three times a day  levothyroxine 100 MICROGram(s) Oral daily  metoprolol tartrate 12.5 milliGRAM(s) Oral two times a day  oxybutynin 5 milliGRAM(s) Oral two times a day  oxyCODONE  ER Tablet 20 milliGRAM(s) Oral every 12 hours  pantoprazole    Tablet 40 milliGRAM(s) Oral before breakfast  pregabalin 150 milliGRAM(s) Oral three times a day  rifampin 600 milliGRAM(s) Oral daily  senna 2 Tablet(s) Oral at bedtime  sodium chloride 0.9% lock flush 3 milliLiter(s) IV Push every 8 hours  vitamin A &amp; D Ointment 1 Application(s) Topical daily    PRN MEDICATIONS  ALBUTerol    90 MICROgram(s) HFA Inhaler 2 Puff(s) Inhalation every 6 hours PRN  diphenhydrAMINE 50 milliGRAM(s) Oral every 6 hours PRN  ibuprofen  Tablet. 600 milliGRAM(s) Oral every 6 hours PRN  ondansetron Injectable 4 milliGRAM(s) IV Push every 6 hours PRN  oxyCODONE    5 mG/acetaminophen 325 mG 2 Tablet(s) Oral every 6 hours PRN        OBJECTIVE    VITAL SIGNS: Last 24 Hours  T(C): 35.8 (2019 22:00), Max: 36.8 (2019 05:23)  T(F): 96.5 (2019 22:00), Max: 98.2 (2019 05:23)  HR: 85 (2019 22:00) (85 - 115)  BP: 103/56 (2019 22:00) (103/56 - 148/71)  BP(mean): --  RR: 18 (2019 12:30) (18 - 18)  SpO2: 95% (2019 20:05) (95% - 95%)      PHYSICAL EXAM:    GENERAL: No acute distress.  HEENT:  Atraumatic, normocephalic. EOMI, PERRLA.  PULMONARY: Clear to auscultation bilaterally.  CARDIOVASCULAR: Regular rate and rhythm; No murmurs, rubs, or gallops.  MUSCULOSKELETAL:  Refused physical exam of the right foot as does not want dressing removed multiple times per day. Left foot with decreased rash.      LABS:                        10.3   8.37  )-----------( 334      ( 2019 06:55 )             33.0     02-08    141  |  101  |  6<L>  ----------------------------<  102<H>  4.1   |  23  |  0.6<L>    Ca    9.5      2019 02:52  Phos  4.1     02-08  Mg     2.0     02-09      PT/INR - ( 2019 02:52 )   PT: 12.70 sec;   INR: 1.11 ratio         PTT - ( 2019 02:52 )  PTT:36.0 sec      RADIOLOGY:        ASSESSMENT AND PLAN    Patient is a 52 yo F who presents with a chief complaint of painful nonhealing right foot ulcer.  Currently admitted to medicine with the primary diagnosis of wound infection.    Surgical wound infection s/p reconstruction of R foot 19  - Evidence of wound dehiscence and erythema with reported drainage   - Wound cx: few staph aureus MSSA - blood cultures negative   - Per ID c/w cefepime + rifampin while inpatient. Rocephin 1 g Q24H on discharge (PICC line placed).  - Pain control: C/w oxycontin 20 mg BID (15 not available). C/w percocet 10 q 6 PRN + motrin 600 mg PO PRN.  - Podiatry following    Lower extremity rash and cyclic pruritis  - Lidex BID and benadryl PRN  - Improving, possibly attributable to MS contin    Diabetes  - Monitor FS  - Will start insulin coverage if FSG consistently > 180    Right LE Erythema likely xerotic skin  - Bacitracin to put on fingernails in order to prevent spread of infection to feet  - Venous duplex LE negative for DVT; Left Bakers cyst     CAD/ DLD  - C/w aspirin + statin  - Takes ezetimibe at home    HTN  - C/w metoprolol 12.5 BID  - Lisinopril on hold as previous episodes of hypotension     COPD  - Symbicort and albuterol PRN   - Home meds: Qvar & Ventolin    Sciatica/Peripheral neuralgia   - c/w duloxetine and Lyrica    DVT prophylaxis : Lovenox   GI prophylaxis : Protonix       Activity : Full weight bearing LLE and non weight bearing RLE  Diet : Consistent carbohydrate with evening snack    Disposition : Home with PICC line and IV antibiotics when cleared by podiatry.

## 2019-02-11 LAB
GLUCOSE BLDC GLUCOMTR-MCNC: 141 MG/DL — HIGH (ref 70–99)
GLUCOSE BLDC GLUCOMTR-MCNC: 175 MG/DL — HIGH (ref 70–99)
GLUCOSE BLDC GLUCOMTR-MCNC: 180 MG/DL — HIGH (ref 70–99)

## 2019-02-11 RX ORDER — DIPHENHYDRAMINE HCL 50 MG
50 CAPSULE ORAL EVERY 4 HOURS
Qty: 0 | Refills: 0 | Status: DISCONTINUED | OUTPATIENT
Start: 2019-02-11 | End: 2019-02-17

## 2019-02-11 RX ORDER — METHOCARBAMOL 500 MG/1
500 TABLET, FILM COATED ORAL THREE TIMES A DAY
Refills: 0 | Status: DISCONTINUED | OUTPATIENT
Start: 2019-02-11 | End: 2019-02-19

## 2019-02-11 RX ORDER — DOCUSATE SODIUM 100 MG
100 CAPSULE ORAL
Refills: 0 | Status: DISCONTINUED | OUTPATIENT
Start: 2019-02-11 | End: 2019-02-19

## 2019-02-11 RX ADMIN — CEFEPIME 100 MILLIGRAM(S): 1 INJECTION, POWDER, FOR SOLUTION INTRAMUSCULAR; INTRAVENOUS at 05:52

## 2019-02-11 RX ADMIN — CHLORHEXIDINE GLUCONATE 1 APPLICATION(S): 213 SOLUTION TOPICAL at 05:47

## 2019-02-11 RX ADMIN — OXYCODONE AND ACETAMINOPHEN 2 TABLET(S): 5; 325 TABLET ORAL at 21:16

## 2019-02-11 RX ADMIN — Medication 1 APPLICATION(S): at 12:44

## 2019-02-11 RX ADMIN — Medication 50 MILLIGRAM(S): at 09:50

## 2019-02-11 RX ADMIN — Medication 600 MILLIGRAM(S): at 14:03

## 2019-02-11 RX ADMIN — Medication 100 MICROGRAM(S): at 05:47

## 2019-02-11 RX ADMIN — OXYCODONE HYDROCHLORIDE 20 MILLIGRAM(S): 5 TABLET ORAL at 05:51

## 2019-02-11 RX ADMIN — Medication 12.5 MILLIGRAM(S): at 05:47

## 2019-02-11 RX ADMIN — Medication 10 MILLIGRAM(S): at 13:32

## 2019-02-11 RX ADMIN — ATORVASTATIN CALCIUM 40 MILLIGRAM(S): 80 TABLET, FILM COATED ORAL at 22:01

## 2019-02-11 RX ADMIN — OXYCODONE AND ACETAMINOPHEN 2 TABLET(S): 5; 325 TABLET ORAL at 14:31

## 2019-02-11 RX ADMIN — Medication 1 APPLICATION(S): at 05:50

## 2019-02-11 RX ADMIN — OXYCODONE AND ACETAMINOPHEN 2 TABLET(S): 5; 325 TABLET ORAL at 14:01

## 2019-02-11 RX ADMIN — METHOCARBAMOL 500 MILLIGRAM(S): 500 TABLET, FILM COATED ORAL at 18:17

## 2019-02-11 RX ADMIN — DULOXETINE HYDROCHLORIDE 60 MILLIGRAM(S): 30 CAPSULE, DELAYED RELEASE ORAL at 12:42

## 2019-02-11 RX ADMIN — Medication 5 MILLIGRAM(S): at 05:48

## 2019-02-11 RX ADMIN — OXYCODONE HYDROCHLORIDE 20 MILLIGRAM(S): 5 TABLET ORAL at 17:53

## 2019-02-11 RX ADMIN — Medication 600 MILLIGRAM(S): at 13:33

## 2019-02-11 RX ADMIN — PANTOPRAZOLE SODIUM 40 MILLIGRAM(S): 20 TABLET, DELAYED RELEASE ORAL at 06:16

## 2019-02-11 RX ADMIN — Medication 150 MILLIGRAM(S): at 22:00

## 2019-02-11 RX ADMIN — Medication 10 MILLIGRAM(S): at 05:48

## 2019-02-11 RX ADMIN — OXYCODONE AND ACETAMINOPHEN 2 TABLET(S): 5; 325 TABLET ORAL at 07:53

## 2019-02-11 RX ADMIN — SODIUM CHLORIDE 3 MILLILITER(S): 9 INJECTION INTRAMUSCULAR; INTRAVENOUS; SUBCUTANEOUS at 22:06

## 2019-02-11 RX ADMIN — Medication 50 MILLIGRAM(S): at 20:47

## 2019-02-11 RX ADMIN — Medication 1 APPLICATION(S): at 22:01

## 2019-02-11 RX ADMIN — Medication 1 APPLICATION(S): at 05:49

## 2019-02-11 RX ADMIN — Medication 150 MILLIGRAM(S): at 13:32

## 2019-02-11 RX ADMIN — SODIUM CHLORIDE 3 MILLILITER(S): 9 INJECTION INTRAMUSCULAR; INTRAVENOUS; SUBCUTANEOUS at 13:26

## 2019-02-11 RX ADMIN — SODIUM CHLORIDE 3 MILLILITER(S): 9 INJECTION INTRAMUSCULAR; INTRAVENOUS; SUBCUTANEOUS at 06:16

## 2019-02-11 RX ADMIN — OXYCODONE AND ACETAMINOPHEN 2 TABLET(S): 5; 325 TABLET ORAL at 08:24

## 2019-02-11 RX ADMIN — OXYCODONE HYDROCHLORIDE 20 MILLIGRAM(S): 5 TABLET ORAL at 06:21

## 2019-02-11 RX ADMIN — Medication 10 MILLIGRAM(S): at 22:01

## 2019-02-11 RX ADMIN — Medication 1 APPLICATION(S): at 05:48

## 2019-02-11 RX ADMIN — BUDESONIDE AND FORMOTEROL FUMARATE DIHYDRATE 2 PUFF(S): 160; 4.5 AEROSOL RESPIRATORY (INHALATION) at 07:54

## 2019-02-11 RX ADMIN — Medication 1 APPLICATION(S): at 13:32

## 2019-02-11 RX ADMIN — Medication 50 MILLIGRAM(S): at 15:35

## 2019-02-11 RX ADMIN — Medication 150 MILLIGRAM(S): at 05:51

## 2019-02-11 RX ADMIN — METHOCARBAMOL 500 MILLIGRAM(S): 500 TABLET, FILM COATED ORAL at 22:01

## 2019-02-11 RX ADMIN — Medication 81 MILLIGRAM(S): at 12:42

## 2019-02-11 RX ADMIN — OXYCODONE AND ACETAMINOPHEN 2 TABLET(S): 5; 325 TABLET ORAL at 20:46

## 2019-02-11 RX ADMIN — Medication 50 MILLIGRAM(S): at 02:52

## 2019-02-11 NOTE — PROGRESS NOTE ADULT - ASSESSMENT
CALLY HARTMAN 53y Female  MRN#: [id number]   CODE STATUS: Full code      SUBJECTIVE    Patient is a 54 yo F who presents with a chief complaint of painful nonhealing right foot ulcer.  Currently admitted to medicine with the primary diagnosis of wound infection.    Interval History: Today is hospital day 12d. Overnight there were no events. Today she continues to do well after her debridement. Pain controlled. Complaining of some pruritis since last night; benadryl provides some relief; LLE rash resolved.    HPI:   HPI:  54 yo female with hx DM, CAD, HTN, Hypothyroid, s/p Reconstruction surgery R foot for charcot foot on 19. Patient was discharged on 19, not on antibiotics at time of discharge. Since discharge developed dehiscence and infection at surgical sites. Today she had f/u with Dr. Diaz at his office who sent her to hospital for IV antibiotics. Pt reports few days after discharge foot become swollen, red, painful.  She reports pus and small amount of blood draining from the surgical sites. Denies fevers, sweating, sob, chest pain, nausea, vomiting, diarrhea. (2019 17:34)    Hospital Course:       PAST MEDICAL & SURGICAL HISTORY  Dextrocardia  Chronic midline low back pain with bilateral sciatica  Peripheral neuralgia  Essential hypertension  Diabetes 1.5, managed as type 2  Charcot's arthropathy: R foot  Charcot foot due to diabetes mellitus  H/O shoulder surgery: Right shoulder surgery   delivery delivered      ALLERGIES:  No Known Allergies      MEDICATIONS:  STANDING MEDICATIONS  AQUAPHOR (petrolatum Ointment) 1 Application(s) Topical two times a day  aspirin enteric coated 81 milliGRAM(s) Oral daily  atorvastatin 40 milliGRAM(s) Oral at bedtime  BACItracin   Ointment 1 Application(s) Topical three times a day  buDESOnide  80 MICROgram(s)/formoterol 4.5 MICROgram(s) Inhaler 2 Puff(s) Inhalation two times a day  cefepime   IVPB 2000 milliGRAM(s) IV Intermittent every 12 hours  chlorhexidine 4% Liquid 1 Application(s) Topical <User Schedule>  dextrose 5%. 1000 milliLiter(s) IV Continuous <Continuous>  dextrose 50% Injectable 12.5 Gram(s) IV Push once  dextrose 50% Injectable 25 Gram(s) IV Push once  dextrose 50% Injectable 25 Gram(s) IV Push once  docusate sodium 100 milliGRAM(s) Oral two times a day  DULoxetine 60 milliGRAM(s) Oral daily  enoxaparin Injectable 40 milliGRAM(s) SubCutaneous daily  fluocinonide 0.05% Ointment 1 Application(s) Topical two times a day  hydrocortisone 1% Cream 1 Application(s) Topical two times a day  hydrOXYzine  Oral Tab/Cap - Peds 10 milliGRAM(s) Oral three times a day  insulin lispro (HumaLOG) corrective regimen sliding scale   SubCutaneous three times a day before meals  levothyroxine 100 MICROGram(s) Oral daily  metoprolol tartrate 12.5 milliGRAM(s) Oral two times a day  oxybutynin 5 milliGRAM(s) Oral two times a day  oxyCODONE  ER Tablet 20 milliGRAM(s) Oral every 12 hours  pantoprazole    Tablet 40 milliGRAM(s) Oral before breakfast  pregabalin 150 milliGRAM(s) Oral three times a day  rifampin 600 milliGRAM(s) Oral daily  senna 2 Tablet(s) Oral at bedtime  sodium chloride 0.9% lock flush 3 milliLiter(s) IV Push every 8 hours  vitamin A &amp; D Ointment 1 Application(s) Topical daily    PRN MEDICATIONS  ALBUTerol    90 MICROgram(s) HFA Inhaler 2 Puff(s) Inhalation every 6 hours PRN  dextrose 40% Gel 15 Gram(s) Oral once PRN  diphenhydrAMINE 50 milliGRAM(s) Oral every 4 hours PRN  diphenhydrAMINE 50 milliGRAM(s) Oral every 6 hours PRN  glucagon  Injectable 1 milliGRAM(s) IntraMuscular once PRN  ibuprofen  Tablet. 600 milliGRAM(s) Oral every 6 hours PRN  oxyCODONE    5 mG/acetaminophen 325 mG 2 Tablet(s) Oral every 6 hours PRN        OBJECTIVE    VITAL SIGNS: Last 24 Hours  T(C): 36.6 (2019 12:15), Max: 37.2 (2019 06:15)  T(F): 97.9 (2019 12:15), Max: 98.9 (2019 06:15)  HR: 107 (2019 12:15) (80 - 107)  BP: 107/69 (2019 12:15) (106/62 - 112/55)  BP(mean): --  RR: 18 (2019 12:15) (18 - 18)  SpO2: 95% (10 Feb 2019 20:03) (95% - 95%)      PHYSICAL EXAM:    GENERAL: No acute distress. Scratching on occasion.  HEENT:  Atraumatic, normocephalic. EOMI, PERRLA.  PULMONARY: Clear to auscultation bilaterally.  CARDIOVASCULAR: Regular rate and rhythm; No murmurs, rubs, or gallops.  MUSCULOSKELETAL:  Refused physical exam of the right foot as does not want dressing removed multiple times per day. Left foot with resolving erythematous papules (now macular in appearance). No other rashes.      LABS:      RADIOLOGY:        ASSESSMENT AND PLAN    Patient is a 54 yo F who presents with a chief complaint of painful nonhealing right foot ulcer.  Currently admitted to medicine with the primary diagnosis of wound infection.    Surgical wound infection s/p reconstruction of R foot 19  - Initial evidence of wound dehiscence and erythema with reported drainage   - Wound cx: few MSSA. Blood cultures negative.  - Per ID c/w cefepime + rifampin while inpatient. Rocephin 1 g Q24H on discharge (PICC line placed).  - Pain control: C/w oxycontin 20 mg BID (15 not available). C/w percocet 10 q 6 PRN + motrin 600 mg PO PRN.  - Podiatry following. Per team to make decision tomorrow regarding inpatient wound care/further procedures.    Lower extremity rash and cyclic pruritis  - Lidex BID to rash. Increase benadryl to Q4H PRN.  - Initial symptoms improved, possibly attributable to MS contin  - New pruritis may be contact irritant effect; has been counseled to change sheets/practice conventional hygiene    Diabetes  - Monitor FS  - Start insulin coverage if FS > 180    Right LE Erythema likely xerotic skin  - Bacitracin to fingernails  - Venous duplex LE negative for DVT    CAD/ DLD  - C/w aspirin + statin  - Takes ezetimibe at home    HTN  - C/w metoprolol 12.5 BID  - Lisinopril on hold as previous episodes of hypotension     COPD  - Symbicort and albuterol PRN   - Home meds: Qvar & Ventolin    Sciatica/Peripheral neuralgia   - c/w duloxetine and Lyrica    DVT prophylaxis : Lovenox   GI prophylaxis : Protonix       Activity : Full weight bearing LLE and non weight bearing RLE. F/u podiatry for change.  Diet : Consistent carbohydrate with evening snack    Disposition : Home with PICC line and IV antibiotics when cleared by podiatry.

## 2019-02-11 NOTE — CHART NOTE - NSCHARTNOTEFT_GEN_A_CORE
Limited Dietitian Follow Up    Pt admitted w/ nonhealing foot ulcer. Now doing well s/p debridement. Pt on carb consistent (evening snack) diet modifier. POCT typically <180. Consistently w/ 100% PO intake. Last BM 2/10, no GI distress. No changes in chew swallow ability. Pt not at risk, f/u 7 days

## 2019-02-12 LAB
ANION GAP SERPL CALC-SCNC: 18 MMOL/L — HIGH (ref 7–14)
BUN SERPL-MCNC: 5 MG/DL — LOW (ref 10–20)
CALCIUM SERPL-MCNC: 9.1 MG/DL — SIGNIFICANT CHANGE UP (ref 8.5–10.1)
CHLORIDE SERPL-SCNC: 96 MMOL/L — LOW (ref 98–110)
CO2 SERPL-SCNC: 21 MMOL/L — SIGNIFICANT CHANGE UP (ref 17–32)
CREAT SERPL-MCNC: 0.7 MG/DL — SIGNIFICANT CHANGE UP (ref 0.7–1.5)
GLUCOSE BLDC GLUCOMTR-MCNC: 122 MG/DL — HIGH (ref 70–99)
GLUCOSE BLDC GLUCOMTR-MCNC: 136 MG/DL — HIGH (ref 70–99)
GLUCOSE BLDC GLUCOMTR-MCNC: 170 MG/DL — HIGH (ref 70–99)
GLUCOSE BLDC GLUCOMTR-MCNC: 171 MG/DL — HIGH (ref 70–99)
GLUCOSE SERPL-MCNC: 228 MG/DL — HIGH (ref 70–99)
HCT VFR BLD CALC: 35.7 % — LOW (ref 37–47)
HGB BLD-MCNC: 10.8 G/DL — LOW (ref 12–16)
MAGNESIUM SERPL-MCNC: 1.8 MG/DL — SIGNIFICANT CHANGE UP (ref 1.8–2.4)
MCHC RBC-ENTMCNC: 27.8 PG — SIGNIFICANT CHANGE UP (ref 27–31)
MCHC RBC-ENTMCNC: 30.3 G/DL — LOW (ref 32–37)
MCV RBC AUTO: 91.8 FL — SIGNIFICANT CHANGE UP (ref 81–99)
NRBC # BLD: 0 /100 WBCS — SIGNIFICANT CHANGE UP (ref 0–0)
PLATELET # BLD AUTO: 357 K/UL — SIGNIFICANT CHANGE UP (ref 130–400)
POTASSIUM SERPL-MCNC: 4.5 MMOL/L — SIGNIFICANT CHANGE UP (ref 3.5–5)
POTASSIUM SERPL-SCNC: 4.5 MMOL/L — SIGNIFICANT CHANGE UP (ref 3.5–5)
RBC # BLD: 3.89 M/UL — LOW (ref 4.2–5.4)
RBC # FLD: 16.3 % — HIGH (ref 11.5–14.5)
SODIUM SERPL-SCNC: 135 MMOL/L — SIGNIFICANT CHANGE UP (ref 135–146)
WBC # BLD: 7.94 K/UL — SIGNIFICANT CHANGE UP (ref 4.8–10.8)
WBC # FLD AUTO: 7.94 K/UL — SIGNIFICANT CHANGE UP (ref 4.8–10.8)

## 2019-02-12 RX ORDER — DOCUSATE SODIUM 100 MG
1 CAPSULE ORAL
Qty: 28 | Refills: 0
Start: 2019-02-12 | End: 2019-02-25

## 2019-02-12 RX ORDER — TROSPIUM CHLORIDE 20 MG/1
1 TABLET, FILM COATED ORAL
Qty: 0 | Refills: 0 | COMMUNITY

## 2019-02-12 RX ORDER — SENNA PLUS 8.6 MG/1
2 TABLET ORAL
Qty: 28 | Refills: 0
Start: 2019-02-12 | End: 2019-02-25

## 2019-02-12 RX ORDER — ONDANSETRON 8 MG/1
4 TABLET, FILM COATED ORAL
Qty: 0 | Refills: 0 | Status: DISCONTINUED | OUTPATIENT
Start: 2019-02-12 | End: 2019-02-14

## 2019-02-12 RX ORDER — OXYCODONE HYDROCHLORIDE 5 MG/1
1 TABLET ORAL
Qty: 0 | Refills: 0 | COMMUNITY
Start: 2019-02-12

## 2019-02-12 RX ADMIN — Medication 50 MILLIGRAM(S): at 10:01

## 2019-02-12 RX ADMIN — Medication 10 MILLIGRAM(S): at 05:40

## 2019-02-12 RX ADMIN — Medication 12.5 MILLIGRAM(S): at 05:40

## 2019-02-12 RX ADMIN — Medication 1 APPLICATION(S): at 13:37

## 2019-02-12 RX ADMIN — Medication 50 MILLIGRAM(S): at 22:53

## 2019-02-12 RX ADMIN — Medication 5 MILLIGRAM(S): at 17:13

## 2019-02-12 RX ADMIN — OXYCODONE HYDROCHLORIDE 20 MILLIGRAM(S): 5 TABLET ORAL at 17:43

## 2019-02-12 RX ADMIN — METHOCARBAMOL 500 MILLIGRAM(S): 500 TABLET, FILM COATED ORAL at 22:00

## 2019-02-12 RX ADMIN — Medication 600 MILLIGRAM(S): at 23:25

## 2019-02-12 RX ADMIN — Medication 50 MILLIGRAM(S): at 18:40

## 2019-02-12 RX ADMIN — METHOCARBAMOL 500 MILLIGRAM(S): 500 TABLET, FILM COATED ORAL at 13:37

## 2019-02-12 RX ADMIN — Medication 1 APPLICATION(S): at 22:00

## 2019-02-12 RX ADMIN — Medication 81 MILLIGRAM(S): at 12:04

## 2019-02-12 RX ADMIN — OXYCODONE AND ACETAMINOPHEN 2 TABLET(S): 5; 325 TABLET ORAL at 10:30

## 2019-02-12 RX ADMIN — Medication 1 APPLICATION(S): at 05:41

## 2019-02-12 RX ADMIN — ONDANSETRON 4 MILLIGRAM(S): 8 TABLET, FILM COATED ORAL at 12:05

## 2019-02-12 RX ADMIN — DULOXETINE HYDROCHLORIDE 60 MILLIGRAM(S): 30 CAPSULE, DELAYED RELEASE ORAL at 12:04

## 2019-02-12 RX ADMIN — Medication 150 MILLIGRAM(S): at 14:24

## 2019-02-12 RX ADMIN — Medication 10 MILLIGRAM(S): at 22:00

## 2019-02-12 RX ADMIN — Medication 1 APPLICATION(S): at 05:40

## 2019-02-12 RX ADMIN — Medication 5 MILLIGRAM(S): at 05:42

## 2019-02-12 RX ADMIN — Medication 1 APPLICATION(S): at 12:09

## 2019-02-12 RX ADMIN — OXYCODONE HYDROCHLORIDE 20 MILLIGRAM(S): 5 TABLET ORAL at 06:10

## 2019-02-12 RX ADMIN — OXYCODONE AND ACETAMINOPHEN 2 TABLET(S): 5; 325 TABLET ORAL at 18:40

## 2019-02-12 RX ADMIN — Medication 150 MILLIGRAM(S): at 22:00

## 2019-02-12 RX ADMIN — ATORVASTATIN CALCIUM 40 MILLIGRAM(S): 80 TABLET, FILM COATED ORAL at 22:00

## 2019-02-12 RX ADMIN — SODIUM CHLORIDE 3 MILLILITER(S): 9 INJECTION INTRAMUSCULAR; INTRAVENOUS; SUBCUTANEOUS at 22:06

## 2019-02-12 RX ADMIN — Medication 1 APPLICATION(S): at 17:13

## 2019-02-12 RX ADMIN — Medication 600 MILLIGRAM(S): at 22:55

## 2019-02-12 RX ADMIN — SODIUM CHLORIDE 3 MILLILITER(S): 9 INJECTION INTRAMUSCULAR; INTRAVENOUS; SUBCUTANEOUS at 06:41

## 2019-02-12 RX ADMIN — Medication 12.5 MILLIGRAM(S): at 17:13

## 2019-02-12 RX ADMIN — CEFEPIME 100 MILLIGRAM(S): 1 INJECTION, POWDER, FOR SOLUTION INTRAMUSCULAR; INTRAVENOUS at 06:37

## 2019-02-12 RX ADMIN — OXYCODONE HYDROCHLORIDE 20 MILLIGRAM(S): 5 TABLET ORAL at 05:40

## 2019-02-12 RX ADMIN — OXYCODONE AND ACETAMINOPHEN 2 TABLET(S): 5; 325 TABLET ORAL at 10:00

## 2019-02-12 RX ADMIN — METHOCARBAMOL 500 MILLIGRAM(S): 500 TABLET, FILM COATED ORAL at 05:39

## 2019-02-12 RX ADMIN — PANTOPRAZOLE SODIUM 40 MILLIGRAM(S): 20 TABLET, DELAYED RELEASE ORAL at 05:42

## 2019-02-12 RX ADMIN — Medication 150 MILLIGRAM(S): at 06:38

## 2019-02-12 RX ADMIN — OXYCODONE HYDROCHLORIDE 20 MILLIGRAM(S): 5 TABLET ORAL at 17:13

## 2019-02-12 RX ADMIN — SODIUM CHLORIDE 3 MILLILITER(S): 9 INJECTION INTRAMUSCULAR; INTRAVENOUS; SUBCUTANEOUS at 13:38

## 2019-02-12 RX ADMIN — Medication 50 MILLIGRAM(S): at 05:40

## 2019-02-12 RX ADMIN — Medication 100 MICROGRAM(S): at 05:40

## 2019-02-12 RX ADMIN — Medication 10 MILLIGRAM(S): at 13:36

## 2019-02-12 RX ADMIN — CEFEPIME 100 MILLIGRAM(S): 1 INJECTION, POWDER, FOR SOLUTION INTRAMUSCULAR; INTRAVENOUS at 17:13

## 2019-02-12 NOTE — PROGRESS NOTE ADULT - ASSESSMENT
ASSESSMENT AND PLAN    Patient is a 54 yo F who presents with a chief complaint of painful nonhealing right foot ulcer.  Currently admitted to medicine with the primary diagnosis of wound infection.    Surgical wound infection s/p reconstruction of R foot 1/11/19  - Evidence of wound dehiscence and erythema with reported drainage   - Wound cx: few staph aureus MSSA - blood cultures negative   - Per ID c/w cefepime + rifampin while inpatient. Rocephin 1 g Q24H on discharge (PICC line placed).  - Pain control: C/w oxycontin 20 mg BID (15 not available). C/w percocet 10 q 6 PRN + motrin 600 mg PO PRN.  - Podiatry following    Lower extremity rash and cyclic pruritis  - Lidex BID and benadryl PRN  - Improving, possibly attributable to MS contin    Diabetes  - Monitor FS  - Will start insulin coverage if FSG consistently > 180    Right LE Erythema likely xerotic skin  - Bacitracin to put on fingernails in order to prevent spread of infection to feet  - Venous duplex LE negative for DVT; Left Bakers cyst     CAD/ DLD  - C/w aspirin + statin  - Takes ezetimibe at home    HTN  - C/w metoprolol 12.5 BID  - Lisinopril on hold as previous episodes of hypotension     COPD  - Symbicort and albuterol PRN   - Home meds: Qvar & Ventolin    Sciatica/Peripheral neuralgia   - c/w duloxetine and Lyrica    DVT prophylaxis : Lovenox   GI prophylaxis : Protonix       Activity : Full weight bearing LLE and non weight bearing RLE  Diet : Consistent carbohydrate with evening snack    Disposition : Home with PICC line and IV antibiotics  with/without Wound vac .

## 2019-02-12 NOTE — PROGRESS NOTE ADULT - ASSESSMENT
CALLY HARTMAN 53y Female  MRN#: [id number]   CODE STATUS: Full code      SUBJECTIVE    Patient is a 52 yo F who presents with a chief complaint of painful nonhealing right foot ulcer.  Currently admitted to medicine with the primary diagnosis of wound infection.    Interval History: Today is hospital day 13d. Overnight there were no events. Today she states her pain is controlled but is concerned about discharge in the near future and outpatient wound care.    HPI:   HPI:  52 yo female with hx DM, CAD, HTN, Hypothyroid, s/p Reconstruction surgery R foot for charcot foot on 19. Patient was discharged on 19, not on antibiotics at time of discharge. Since discharge developed dehiscence and infection at surgical sites. Today she had f/u with Dr. Diaz at his office who sent her to hospital for IV antibiotics. Pt reports few days after discharge foot become swollen, red, painful.  She reports pus and small amount of blood draining from the surgical sites. Denies fevers, sweating, sob, chest pain, nausea, vomiting, diarrhea. (2019 17:34)    Hospital Course:       PAST MEDICAL & SURGICAL HISTORY  Dextrocardia  Chronic midline low back pain with bilateral sciatica  Peripheral neuralgia  Essential hypertension  Diabetes 1.5, managed as type 2  Charcot's arthropathy: R foot  Charcot foot due to diabetes mellitus  H/O shoulder surgery: Right shoulder surgery   delivery delivered      ALLERGIES:  No Known Allergies      MEDICATIONS:  STANDING MEDICATIONS  AQUAPHOR (petrolatum Ointment) 1 Application(s) Topical two times a day  aspirin enteric coated 81 milliGRAM(s) Oral daily  atorvastatin 40 milliGRAM(s) Oral at bedtime  BACItracin   Ointment 1 Application(s) Topical three times a day  buDESOnide  80 MICROgram(s)/formoterol 4.5 MICROgram(s) Inhaler 2 Puff(s) Inhalation two times a day  cefepime   IVPB 2000 milliGRAM(s) IV Intermittent every 12 hours  chlorhexidine 4% Liquid 1 Application(s) Topical <User Schedule>  dextrose 5%. 1000 milliLiter(s) IV Continuous <Continuous>  dextrose 50% Injectable 12.5 Gram(s) IV Push once  dextrose 50% Injectable 25 Gram(s) IV Push once  dextrose 50% Injectable 25 Gram(s) IV Push once  docusate sodium 100 milliGRAM(s) Oral two times a day  DULoxetine 60 milliGRAM(s) Oral daily  enoxaparin Injectable 40 milliGRAM(s) SubCutaneous daily  fluocinonide 0.05% Ointment 1 Application(s) Topical two times a day  hydrocortisone 1% Cream 1 Application(s) Topical two times a day  hydrOXYzine  Oral Tab/Cap - Peds 10 milliGRAM(s) Oral three times a day  insulin lispro (HumaLOG) corrective regimen sliding scale   SubCutaneous three times a day before meals  levothyroxine 100 MICROGram(s) Oral daily  methocarbamol 500 milliGRAM(s) Oral three times a day  metoprolol tartrate 12.5 milliGRAM(s) Oral two times a day  oxybutynin 5 milliGRAM(s) Oral two times a day  oxyCODONE  ER Tablet 20 milliGRAM(s) Oral every 12 hours  pantoprazole    Tablet 40 milliGRAM(s) Oral before breakfast  pregabalin 150 milliGRAM(s) Oral three times a day  rifampin 600 milliGRAM(s) Oral daily  senna 2 Tablet(s) Oral at bedtime  sodium chloride 0.9% lock flush 3 milliLiter(s) IV Push every 8 hours  vitamin A &amp; D Ointment 1 Application(s) Topical daily    PRN MEDICATIONS  ALBUTerol    90 MICROgram(s) HFA Inhaler 2 Puff(s) Inhalation every 6 hours PRN  dextrose 40% Gel 15 Gram(s) Oral once PRN  diphenhydrAMINE 50 milliGRAM(s) Oral every 4 hours PRN  glucagon  Injectable 1 milliGRAM(s) IntraMuscular once PRN  ibuprofen  Tablet. 600 milliGRAM(s) Oral every 6 hours PRN  ondansetron    Tablet 4 milliGRAM(s) Oral two times a day PRN  oxyCODONE    5 mG/acetaminophen 325 mG 2 Tablet(s) Oral every 6 hours PRN        OBJECTIVE    VITAL SIGNS: Last 24 Hours  T(C): 36.3 (2019 14:00), Max: 36.8 (2019 06:02)  T(F): 97.3 (2019 14:00), Max: 98.2 (2019 06:02)  HR: 79 (2019 14:00) (79 - 102)  BP: 142/64 (2019 14:00) (113/69 - 142/64)  BP(mean): --  RR: 18 (2019 14:00) (18 - 18)  SpO2: --      PHYSICAL EXAM:    GENERAL: No acute distress. Scratching on occasion.  HEENT:  Atraumatic, normocephalic. EOMI, PERRLA.  PULMONARY: Clear to auscultation bilaterally.  CARDIOVASCULAR: Regular rate and rhythm; No murmurs, rubs, or gallops.  MUSCULOSKELETAL: Wound vac in place. Refused more detailed physical exam of the right foot. Left foot without rash.      LABS:                        10.8   7.94  )-----------( 357      ( 2019 06:44 )             35.7     02-12    135  |  96<L>  |  5<L>  ----------------------------<  228<H>  4.5   |  21  |  0.7    Ca    9.1      2019 06:44  Mg     1.8     02-12      RADIOLOGY:        ASSESSMENT AND PLAN    Patient is a 52 yo F who presents with a chief complaint of painful nonhealing right foot ulcer.  Currently admitted to medicine with the primary diagnosis of wound infection.    Surgical wound infection s/p reconstruction of R foot 19  - Wound cx: few MSSA. Blood cultures negative.  - Per ID c/w cefepime + rifampin while inpatient. Rocephin 1 g Q24H on discharge (PICC line placed) for 6 weeks total.  - Pain control: C/w oxycontin 20 mg BID (15 not available). C/w percocet 10 q 6 PRN + motrin 600 mg PO PRN + robaxin 500 mg Q8H for muscle cramps.  - Awaiting plan from podiatry    Lower extremity rash and cyclic pruritis  - C/w benadryl. D/c lidex tomorrow as rash essentially resolved.  - Initial symptoms improved, possibly attributable to MS contin  - Resolved    Diabetes  - Monitor FS  - Start insulin coverage if FS > 180    Right LE Erythema likely xerotic skin  - Bacitracin to fingernails  - Venous duplex LE negative for DVT    CAD/ DLD  - C/w aspirin + statin  - Takes ezetimibe at home    HTN  - C/w metoprolol 12.5 BID  - Lisinopril on hold as previous episodes of hypotension     COPD  - Symbicort and albuterol PRN   - Home meds: Qvar & Ventolin    Sciatica/Peripheral neuralgia   - c/w duloxetine and Lyrica    DVT prophylaxis : Lovenox   GI prophylaxis : Protonix       Activity: Full weight bearing LLE and non weight bearing RLE until cleared by podiatry.  Diet: Consistent carbohydrate with evening snack    Disposition: Home with PICC line and IV antibiotics when cleared by podiatry. CALLY HARTMAN 53y Female  MRN#: [id number]   CODE STATUS: Full code      SUBJECTIVE    Patient is a 54 yo F who presents with a chief complaint of painful nonhealing right foot ulcer.  Currently admitted to medicine with the primary diagnosis of wound infection.    Interval History: Today is hospital day 13d. Overnight there were no events. Today she states her pain is controlled but is concerned about discharge in the near future and outpatient wound care.    HPI:   HPI:  54 yo female with hx DM, CAD, HTN, Hypothyroid, s/p Reconstruction surgery R foot for charcot foot on 19. Patient was discharged on 19, not on antibiotics at time of discharge. Since discharge developed dehiscence and infection at surgical sites. Today she had f/u with Dr. Diaz at his office who sent her to hospital for IV antibiotics. Pt reports few days after discharge foot become swollen, red, painful.  She reports pus and small amount of blood draining from the surgical sites. Denies fevers, sweating, sob, chest pain, nausea, vomiting, diarrhea. (2019 17:34)    Hospital Course:       PAST MEDICAL & SURGICAL HISTORY  Dextrocardia  Chronic midline low back pain with bilateral sciatica  Peripheral neuralgia  Essential hypertension  Diabetes 1.5, managed as type 2  Charcot's arthropathy: R foot  Charcot foot due to diabetes mellitus  H/O shoulder surgery: Right shoulder surgery   delivery delivered      ALLERGIES:  No Known Allergies      MEDICATIONS:  STANDING MEDICATIONS  AQUAPHOR (petrolatum Ointment) 1 Application(s) Topical two times a day  aspirin enteric coated 81 milliGRAM(s) Oral daily  atorvastatin 40 milliGRAM(s) Oral at bedtime  BACItracin   Ointment 1 Application(s) Topical three times a day  buDESOnide  80 MICROgram(s)/formoterol 4.5 MICROgram(s) Inhaler 2 Puff(s) Inhalation two times a day  cefepime   IVPB 2000 milliGRAM(s) IV Intermittent every 12 hours  chlorhexidine 4% Liquid 1 Application(s) Topical <User Schedule>  dextrose 5%. 1000 milliLiter(s) IV Continuous <Continuous>  dextrose 50% Injectable 12.5 Gram(s) IV Push once  dextrose 50% Injectable 25 Gram(s) IV Push once  dextrose 50% Injectable 25 Gram(s) IV Push once  docusate sodium 100 milliGRAM(s) Oral two times a day  DULoxetine 60 milliGRAM(s) Oral daily  enoxaparin Injectable 40 milliGRAM(s) SubCutaneous daily  fluocinonide 0.05% Ointment 1 Application(s) Topical two times a day  hydrocortisone 1% Cream 1 Application(s) Topical two times a day  hydrOXYzine  Oral Tab/Cap - Peds 10 milliGRAM(s) Oral three times a day  insulin lispro (HumaLOG) corrective regimen sliding scale   SubCutaneous three times a day before meals  levothyroxine 100 MICROGram(s) Oral daily  methocarbamol 500 milliGRAM(s) Oral three times a day  metoprolol tartrate 12.5 milliGRAM(s) Oral two times a day  oxybutynin 5 milliGRAM(s) Oral two times a day  oxyCODONE  ER Tablet 20 milliGRAM(s) Oral every 12 hours  pantoprazole    Tablet 40 milliGRAM(s) Oral before breakfast  pregabalin 150 milliGRAM(s) Oral three times a day  rifampin 600 milliGRAM(s) Oral daily  senna 2 Tablet(s) Oral at bedtime  sodium chloride 0.9% lock flush 3 milliLiter(s) IV Push every 8 hours  vitamin A &amp; D Ointment 1 Application(s) Topical daily    PRN MEDICATIONS  ALBUTerol    90 MICROgram(s) HFA Inhaler 2 Puff(s) Inhalation every 6 hours PRN  dextrose 40% Gel 15 Gram(s) Oral once PRN  diphenhydrAMINE 50 milliGRAM(s) Oral every 4 hours PRN  glucagon  Injectable 1 milliGRAM(s) IntraMuscular once PRN  ibuprofen  Tablet. 600 milliGRAM(s) Oral every 6 hours PRN  ondansetron    Tablet 4 milliGRAM(s) Oral two times a day PRN  oxyCODONE    5 mG/acetaminophen 325 mG 2 Tablet(s) Oral every 6 hours PRN        OBJECTIVE    VITAL SIGNS: Last 24 Hours  T(C): 36.3 (2019 14:00), Max: 36.8 (2019 06:02)  T(F): 97.3 (2019 14:00), Max: 98.2 (2019 06:02)  HR: 79 (2019 14:00) (79 - 102)  BP: 142/64 (2019 14:00) (113/69 - 142/64)  BP(mean): --  RR: 18 (2019 14:00) (18 - 18)  SpO2: --      PHYSICAL EXAM:    GENERAL: No acute distress. Scratching on occasion.  HEENT:  Atraumatic, normocephalic. EOMI, PERRLA.  PULMONARY: Clear to auscultation bilaterally.  CARDIOVASCULAR: Regular rate and rhythm; No murmurs, rubs, or gallops.  MUSCULOSKELETAL: Wound vac in place. Refused more detailed physical exam of the right foot. Left foot without rash.      LABS:                        10.8   7.94  )-----------( 357      ( 2019 06:44 )             35.7     02-12    135  |  96<L>  |  5<L>  ----------------------------<  228<H>  4.5   |  21  |  0.7    Ca    9.1      2019 06:44  Mg     1.8     02-12      RADIOLOGY:        ASSESSMENT AND PLAN    Patient is a 54 yo F who presents with a chief complaint of painful nonhealing right foot ulcer.  Currently admitted to medicine with the primary diagnosis of wound infection.    Surgical wound infection s/p reconstruction of R foot 19  - Wound cx: few MSSA. Blood cultures negative.  - Per ID c/w cefepime + rifampin while inpatient. Rocephin 1 g Q24H on discharge (PICC line placed) for 6 weeks total.  - Pain control: C/w oxycontin 20 mg BID (15 not available). C/w percocet 10 q 6 PRN + motrin 600 mg PO PRN + robaxin 500 mg Q8H for muscle cramps.  - Per podiatry will need additional take down on Friday, further plan from there    Lower extremity rash and cyclic pruritis  - C/w benadryl. D/c lidex tomorrow as rash essentially resolved.  - Initial symptoms improved, possibly attributable to MS contin  - Resolved    Diabetes  - Monitor FS  - Start insulin coverage if FS > 180    Right LE Erythema likely xerotic skin  - Bacitracin to fingernails  - Venous duplex LE negative for DVT    CAD/ DLD  - C/w aspirin + statin  - Takes ezetimibe at home    HTN  - C/w metoprolol 12.5 BID  - Lisinopril on hold as previous episodes of hypotension     COPD  - Symbicort and albuterol PRN   - Home meds: Qvar & Ventolin    Sciatica/Peripheral neuralgia   - c/w duloxetine and Lyrica    DVT prophylaxis : Lovenox   GI prophylaxis : Protonix       Activity: Full weight bearing LLE and non weight bearing RLE until cleared by podiatry.  Diet: Consistent carbohydrate with evening snack    Disposition: Home with PICC line and IV antibiotics when cleared by podiatry.

## 2019-02-13 LAB
GLUCOSE BLDC GLUCOMTR-MCNC: 147 MG/DL — HIGH (ref 70–99)
GLUCOSE BLDC GLUCOMTR-MCNC: 159 MG/DL — HIGH (ref 70–99)
GLUCOSE BLDC GLUCOMTR-MCNC: 249 MG/DL — HIGH (ref 70–99)
GLUCOSE BLDC GLUCOMTR-MCNC: 84 MG/DL — SIGNIFICANT CHANGE UP (ref 70–99)
HCT VFR BLD CALC: 35.4 % — LOW (ref 37–47)
HGB BLD-MCNC: 10.7 G/DL — LOW (ref 12–16)
MCHC RBC-ENTMCNC: 27.2 PG — SIGNIFICANT CHANGE UP (ref 27–31)
MCHC RBC-ENTMCNC: 30.2 G/DL — LOW (ref 32–37)
MCV RBC AUTO: 90.1 FL — SIGNIFICANT CHANGE UP (ref 81–99)
NRBC # BLD: 0 /100 WBCS — SIGNIFICANT CHANGE UP (ref 0–0)
PLATELET # BLD AUTO: 363 K/UL — SIGNIFICANT CHANGE UP (ref 130–400)
RBC # BLD: 3.93 M/UL — LOW (ref 4.2–5.4)
RBC # FLD: 16.1 % — HIGH (ref 11.5–14.5)
WBC # BLD: 8.83 K/UL — SIGNIFICANT CHANGE UP (ref 4.8–10.8)
WBC # FLD AUTO: 8.83 K/UL — SIGNIFICANT CHANGE UP (ref 4.8–10.8)

## 2019-02-13 RX ORDER — OXYCODONE HYDROCHLORIDE 5 MG/1
20 TABLET ORAL EVERY 8 HOURS
Qty: 0 | Refills: 0 | Status: DISCONTINUED | OUTPATIENT
Start: 2019-02-13 | End: 2019-02-19

## 2019-02-13 RX ADMIN — OXYCODONE AND ACETAMINOPHEN 2 TABLET(S): 5; 325 TABLET ORAL at 21:31

## 2019-02-13 RX ADMIN — Medication 1 APPLICATION(S): at 13:54

## 2019-02-13 RX ADMIN — Medication 12.5 MILLIGRAM(S): at 05:52

## 2019-02-13 RX ADMIN — ATORVASTATIN CALCIUM 40 MILLIGRAM(S): 80 TABLET, FILM COATED ORAL at 21:31

## 2019-02-13 RX ADMIN — Medication 1 APPLICATION(S): at 05:52

## 2019-02-13 RX ADMIN — Medication 600 MILLIGRAM(S): at 20:14

## 2019-02-13 RX ADMIN — DULOXETINE HYDROCHLORIDE 60 MILLIGRAM(S): 30 CAPSULE, DELAYED RELEASE ORAL at 12:32

## 2019-02-13 RX ADMIN — Medication 150 MILLIGRAM(S): at 05:51

## 2019-02-13 RX ADMIN — Medication 1 APPLICATION(S): at 18:15

## 2019-02-13 RX ADMIN — OXYCODONE HYDROCHLORIDE 20 MILLIGRAM(S): 5 TABLET ORAL at 06:21

## 2019-02-13 RX ADMIN — Medication 5 MILLIGRAM(S): at 05:53

## 2019-02-13 RX ADMIN — Medication 10 MILLIGRAM(S): at 05:51

## 2019-02-13 RX ADMIN — Medication 81 MILLIGRAM(S): at 12:32

## 2019-02-13 RX ADMIN — Medication 600 MILLIGRAM(S): at 20:44

## 2019-02-13 RX ADMIN — Medication 5 MILLIGRAM(S): at 17:21

## 2019-02-13 RX ADMIN — Medication 1 APPLICATION(S): at 18:16

## 2019-02-13 RX ADMIN — SODIUM CHLORIDE 3 MILLILITER(S): 9 INJECTION INTRAMUSCULAR; INTRAVENOUS; SUBCUTANEOUS at 06:03

## 2019-02-13 RX ADMIN — Medication 1 APPLICATION(S): at 18:13

## 2019-02-13 RX ADMIN — METHOCARBAMOL 500 MILLIGRAM(S): 500 TABLET, FILM COATED ORAL at 13:55

## 2019-02-13 RX ADMIN — METHOCARBAMOL 500 MILLIGRAM(S): 500 TABLET, FILM COATED ORAL at 05:52

## 2019-02-13 RX ADMIN — Medication 150 MILLIGRAM(S): at 13:54

## 2019-02-13 RX ADMIN — Medication 10 MILLIGRAM(S): at 21:30

## 2019-02-13 RX ADMIN — PANTOPRAZOLE SODIUM 40 MILLIGRAM(S): 20 TABLET, DELAYED RELEASE ORAL at 05:53

## 2019-02-13 RX ADMIN — Medication 12.5 MILLIGRAM(S): at 17:19

## 2019-02-13 RX ADMIN — SODIUM CHLORIDE 3 MILLILITER(S): 9 INJECTION INTRAMUSCULAR; INTRAVENOUS; SUBCUTANEOUS at 21:35

## 2019-02-13 RX ADMIN — BUDESONIDE AND FORMOTEROL FUMARATE DIHYDRATE 2 PUFF(S): 160; 4.5 AEROSOL RESPIRATORY (INHALATION) at 08:09

## 2019-02-13 RX ADMIN — CEFEPIME 100 MILLIGRAM(S): 1 INJECTION, POWDER, FOR SOLUTION INTRAMUSCULAR; INTRAVENOUS at 18:12

## 2019-02-13 RX ADMIN — Medication 1 APPLICATION(S): at 21:32

## 2019-02-13 RX ADMIN — ONDANSETRON 4 MILLIGRAM(S): 8 TABLET, FILM COATED ORAL at 17:05

## 2019-02-13 RX ADMIN — OXYCODONE AND ACETAMINOPHEN 2 TABLET(S): 5; 325 TABLET ORAL at 10:41

## 2019-02-13 RX ADMIN — OXYCODONE HYDROCHLORIDE 20 MILLIGRAM(S): 5 TABLET ORAL at 17:18

## 2019-02-13 RX ADMIN — Medication 10 MILLIGRAM(S): at 13:54

## 2019-02-13 RX ADMIN — Medication 100 MICROGRAM(S): at 05:52

## 2019-02-13 RX ADMIN — Medication 150 MILLIGRAM(S): at 21:31

## 2019-02-13 RX ADMIN — OXYCODONE AND ACETAMINOPHEN 2 TABLET(S): 5; 325 TABLET ORAL at 22:01

## 2019-02-13 RX ADMIN — CEFEPIME 100 MILLIGRAM(S): 1 INJECTION, POWDER, FOR SOLUTION INTRAMUSCULAR; INTRAVENOUS at 05:51

## 2019-02-13 RX ADMIN — SODIUM CHLORIDE 3 MILLILITER(S): 9 INJECTION INTRAMUSCULAR; INTRAVENOUS; SUBCUTANEOUS at 14:23

## 2019-02-13 RX ADMIN — Medication 50 MILLIGRAM(S): at 21:30

## 2019-02-13 RX ADMIN — METHOCARBAMOL 500 MILLIGRAM(S): 500 TABLET, FILM COATED ORAL at 21:30

## 2019-02-13 RX ADMIN — OXYCODONE HYDROCHLORIDE 20 MILLIGRAM(S): 5 TABLET ORAL at 05:51

## 2019-02-13 NOTE — PROGRESS NOTE ADULT - ASSESSMENT
54 yo F who presents with a chief complaint of painful nonhealing right foot ulcer.  Currently admitted to medicine with the primary diagnosis of wound infection.    Surgical wound dehiscence with infection s/p reconstruction of R charcot foot 1/11/19  - Wound cx: few staph aureus MSSA - blood cultures negative   - Per ID c/w cefepime + rifampin while inpatient. Rocephin 1 g Q24H on discharge (PICC line placed).  - Pain control: C/w oxycontin 20 mg uptitrating to TID. C/w percocet 10 q 6 PRN + motrin 600 mg PO PRN.  - Podiatry following- possibly more surgery Friday?, will f/u attending recs  also on neuralgia regimen with home lyrica at high dose, and muscle relaxant  patient reports having chronically high medication tolerance- from what I have seen in the hospital, I would say this is accurate    Lower extremity rash and cyclic pruritis  - Lidex BID and benadryl PRN  - Improving, possibly attributable to MS contin    Diabetes  - Monitor FS  - Will start insulin coverage if FSG consistently > 180    Right LE Erythema likely xerotic skin  - Bacitracin to put on fingernails in order to prevent spread of infection to feet  - Venous duplex LE negative for DVT; Left Bakers cyst     CAD/ DLD  - C/w aspirin + statin  - Takes ezetimibe at home    HTN  - C/w metoprolol 12.5 BID  - Lisinopril on hold as previous episodes of hypotension     COPD  - Symbicort and albuterol PRN   - Home meds: Qvar & Ventolin    Sciatica/Peripheral neuralgia   - c/w duloxetine and Lyrica    DVT prophylaxis : Lovenox   GI prophylaxis : Protonix       Activity : Full weight bearing LLE and non weight bearing RLE  Diet : Consistent carbohydrate with evening snack    Disposition : PICC abx for home, wound care for home when no further inpatient surgical interventions will be needed

## 2019-02-13 NOTE — PHARMACOTHERAPY INTERVENTION NOTE - COMMENTS
recommended [facility]  to change oxycontin  20 mg po q8h to   q12h , as per attending  kelli baughs this

## 2019-02-13 NOTE — PROGRESS NOTE ADULT - ASSESSMENT
CALLY HARTMAN 53y Female  MRN#: [id number]   CODE STATUS: Full code      SUBJECTIVE    Patient is a 54 yo F who presents with a chief complaint of painful nonhealing right foot ulcer.  Currently admitted to medicine with the primary diagnosis of wound infection.    Interval History: Today is hospital day 14d. Overnight there were no events. Today she is doing well; still concerned about her wound healing. No pain or pruritis.    HPI:   HPI:  54 yo female with hx DM, CAD, HTN, Hypothyroid, s/p Reconstruction surgery R foot for charcot foot on 19. Patient was discharged on 19, not on antibiotics at time of discharge. Since discharge developed dehiscence and infection at surgical sites. Today she had f/u with Dr. Diaz at his office who sent her to hospital for IV antibiotics. Pt reports few days after discharge foot become swollen, red, painful.  She reports pus and small amount of blood draining from the surgical sites. Denies fevers, sweating, sob, chest pain, nausea, vomiting, diarrhea. (2019 17:34)    Hospital Course:       PAST MEDICAL & SURGICAL HISTORY  Dextrocardia  Chronic midline low back pain with bilateral sciatica  Peripheral neuralgia  Essential hypertension  Diabetes 1.5, managed as type 2  Charcot's arthropathy: R foot  Charcot foot due to diabetes mellitus  H/O shoulder surgery: Right shoulder surgery   delivery delivered      ALLERGIES:  No Known Allergies      MEDICATIONS:  STANDING MEDICATIONS  AQUAPHOR (petrolatum Ointment) 1 Application(s) Topical two times a day  aspirin enteric coated 81 milliGRAM(s) Oral daily  atorvastatin 40 milliGRAM(s) Oral at bedtime  BACItracin   Ointment 1 Application(s) Topical three times a day  buDESOnide  80 MICROgram(s)/formoterol 4.5 MICROgram(s) Inhaler 2 Puff(s) Inhalation two times a day  cefepime   IVPB 2000 milliGRAM(s) IV Intermittent every 12 hours  chlorhexidine 4% Liquid 1 Application(s) Topical <User Schedule>  dextrose 5%. 1000 milliLiter(s) IV Continuous <Continuous>  dextrose 50% Injectable 12.5 Gram(s) IV Push once  dextrose 50% Injectable 25 Gram(s) IV Push once  dextrose 50% Injectable 25 Gram(s) IV Push once  docusate sodium 100 milliGRAM(s) Oral two times a day  DULoxetine 60 milliGRAM(s) Oral daily  enoxaparin Injectable 40 milliGRAM(s) SubCutaneous daily  fluocinonide 0.05% Ointment 1 Application(s) Topical two times a day  hydrocortisone 1% Cream 1 Application(s) Topical two times a day  hydrOXYzine  Oral Tab/Cap - Peds 10 milliGRAM(s) Oral three times a day  insulin lispro (HumaLOG) corrective regimen sliding scale   SubCutaneous three times a day before meals  levothyroxine 100 MICROGram(s) Oral daily  methocarbamol 500 milliGRAM(s) Oral three times a day  metoprolol tartrate 12.5 milliGRAM(s) Oral two times a day  oxybutynin 5 milliGRAM(s) Oral two times a day  oxyCODONE  ER Tablet 20 milliGRAM(s) Oral every 12 hours  pantoprazole    Tablet 40 milliGRAM(s) Oral before breakfast  pregabalin 150 milliGRAM(s) Oral three times a day  rifampin 600 milliGRAM(s) Oral daily  senna 2 Tablet(s) Oral at bedtime  sodium chloride 0.9% lock flush 3 milliLiter(s) IV Push every 8 hours  vitamin A &amp; D Ointment 1 Application(s) Topical daily    PRN MEDICATIONS  ALBUTerol    90 MICROgram(s) HFA Inhaler 2 Puff(s) Inhalation every 6 hours PRN  dextrose 40% Gel 15 Gram(s) Oral once PRN  diphenhydrAMINE 50 milliGRAM(s) Oral every 4 hours PRN  glucagon  Injectable 1 milliGRAM(s) IntraMuscular once PRN  ibuprofen  Tablet. 600 milliGRAM(s) Oral every 6 hours PRN  ondansetron    Tablet 4 milliGRAM(s) Oral two times a day PRN  oxyCODONE    5 mG/acetaminophen 325 mG 2 Tablet(s) Oral every 6 hours PRN        OBJECTIVE    VITAL SIGNS: Last 24 Hours  T(C): 35.5 (2019 14:19), Max: 36.2 (2019 05:40)  T(F): 95.9 (2019 14:19), Max: 97.2 (2019 05:40)  HR: 104 (2019 14:19) (88 - 105)  BP: 114/57 (2019 14:19) (114/57 - 132/63)  BP(mean): --  RR: 18 (2019 14:19) (18 - 18)  SpO2: --      PHYSICAL EXAM:    GENERAL: No acute distress. Scratching on occasion.  HEENT:  Atraumatic, normocephalic. EOMI, PERRLA.  PULMONARY: Clear to auscultation bilaterally.  CARDIOVASCULAR: Regular rate and rhythm; No murmurs, rubs, or gallops.  MUSCULOSKELETAL: Right foot with dressing in place. Refused more detailed physical exam of the right foot.      LABS:                        10.7   8.83  )-----------( 363      ( 2019 07:38 )             35.4     02-12    135  |  96<L>  |  5<L>  ----------------------------<  228<H>  4.5   |  21  |  0.7    Ca    9.1      2019 06:44  Mg     1.8     -      RADIOLOGY:        ASSESSMENT AND PLAN    Patient is a 54 yo F who presents with a chief complaint of painful nonhealing right foot ulcer.  Currently admitted to medicine with the primary diagnosis of wound infection.    Surgical wound infection s/p reconstruction of R foot 19  - Wound cx: few MSSA. Blood cultures negative.  - Per ID c/w cefepime + rifampin while inpatient. Rocephin 1 g Q24H on discharge (PICC line placed) for 6 weeks total.  - Pain control: C/w oxycontin 20 mg BID (15 not available). C/w percocet 10 q 6 PRN + motrin 600 mg PO PRN + robaxin 500 mg Q8H.  - Per podiatry will need additional take down on Friday, further plan from there.    Lower extremity rash and cyclic pruritis  - C/w benadryl. D/c lidex.  - Initial symptoms improved, possibly attributable to MS contin  - Resolved    Diabetes  - Monitor FS  - Start insulin coverage if FS > 180    Right LE Erythema likely xerotic skin  - Bacitracin to fingernails  - Venous duplex LE negative for DVT    CAD/ DLD  - C/w aspirin + statin  - Takes ezetimibe at home    HTN  - C/w metoprolol 12.5 BID  - Lisinopril on hold as previous episodes of hypotension     COPD  - Symbicort and albuterol PRN   - Home meds: Qvar & Ventolin    Sciatica/Peripheral neuralgia   - c/w duloxetine and Lyrica    DVT prophylaxis : Lovenox   GI prophylaxis : Protonix       Activity: Full weight bearing LLE and non weight bearing RLE until cleared by podiatry.  Diet: Consistent carbohydrate with evening snack    Disposition: Home with PICC line and IV antibiotics when cleared by podiatry.

## 2019-02-14 ENCOUNTER — APPOINTMENT (OUTPATIENT)
Age: 54
End: 2019-02-14

## 2019-02-14 LAB
GLUCOSE BLDC GLUCOMTR-MCNC: 128 MG/DL — HIGH (ref 70–99)
GLUCOSE BLDC GLUCOMTR-MCNC: 146 MG/DL — HIGH (ref 70–99)
GLUCOSE BLDC GLUCOMTR-MCNC: 167 MG/DL — HIGH (ref 70–99)
GLUCOSE BLDC GLUCOMTR-MCNC: 175 MG/DL — HIGH (ref 70–99)

## 2019-02-14 RX ORDER — ONDANSETRON 8 MG/1
4 TABLET, FILM COATED ORAL
Qty: 0 | Refills: 0 | Status: DISCONTINUED | OUTPATIENT
Start: 2019-02-14 | End: 2019-02-17

## 2019-02-14 RX ADMIN — OXYCODONE AND ACETAMINOPHEN 2 TABLET(S): 5; 325 TABLET ORAL at 17:37

## 2019-02-14 RX ADMIN — Medication 5 MILLIGRAM(S): at 17:32

## 2019-02-14 RX ADMIN — PANTOPRAZOLE SODIUM 40 MILLIGRAM(S): 20 TABLET, DELAYED RELEASE ORAL at 06:02

## 2019-02-14 RX ADMIN — OXYCODONE HYDROCHLORIDE 20 MILLIGRAM(S): 5 TABLET ORAL at 06:00

## 2019-02-14 RX ADMIN — OXYCODONE AND ACETAMINOPHEN 2 TABLET(S): 5; 325 TABLET ORAL at 10:55

## 2019-02-14 RX ADMIN — Medication 50 MILLIGRAM(S): at 12:29

## 2019-02-14 RX ADMIN — CEFEPIME 100 MILLIGRAM(S): 1 INJECTION, POWDER, FOR SOLUTION INTRAMUSCULAR; INTRAVENOUS at 17:31

## 2019-02-14 RX ADMIN — Medication 5 MILLIGRAM(S): at 06:03

## 2019-02-14 RX ADMIN — Medication 150 MILLIGRAM(S): at 06:00

## 2019-02-14 RX ADMIN — METHOCARBAMOL 500 MILLIGRAM(S): 500 TABLET, FILM COATED ORAL at 21:49

## 2019-02-14 RX ADMIN — Medication 1: at 12:33

## 2019-02-14 RX ADMIN — Medication 10 MILLIGRAM(S): at 21:49

## 2019-02-14 RX ADMIN — BUDESONIDE AND FORMOTEROL FUMARATE DIHYDRATE 2 PUFF(S): 160; 4.5 AEROSOL RESPIRATORY (INHALATION) at 21:38

## 2019-02-14 RX ADMIN — Medication 1: at 17:35

## 2019-02-14 RX ADMIN — OXYCODONE HYDROCHLORIDE 20 MILLIGRAM(S): 5 TABLET ORAL at 22:52

## 2019-02-14 RX ADMIN — ONDANSETRON 4 MILLIGRAM(S): 8 TABLET, FILM COATED ORAL at 22:51

## 2019-02-14 RX ADMIN — ATORVASTATIN CALCIUM 40 MILLIGRAM(S): 80 TABLET, FILM COATED ORAL at 21:49

## 2019-02-14 RX ADMIN — SODIUM CHLORIDE 3 MILLILITER(S): 9 INJECTION INTRAMUSCULAR; INTRAVENOUS; SUBCUTANEOUS at 06:29

## 2019-02-14 RX ADMIN — Medication 1 APPLICATION(S): at 21:48

## 2019-02-14 RX ADMIN — Medication 150 MILLIGRAM(S): at 21:48

## 2019-02-14 RX ADMIN — Medication 1 APPLICATION(S): at 06:02

## 2019-02-14 RX ADMIN — Medication 1 APPLICATION(S): at 06:01

## 2019-02-14 RX ADMIN — SODIUM CHLORIDE 3 MILLILITER(S): 9 INJECTION INTRAMUSCULAR; INTRAVENOUS; SUBCUTANEOUS at 22:44

## 2019-02-14 RX ADMIN — OXYCODONE HYDROCHLORIDE 20 MILLIGRAM(S): 5 TABLET ORAL at 15:00

## 2019-02-14 RX ADMIN — CEFEPIME 100 MILLIGRAM(S): 1 INJECTION, POWDER, FOR SOLUTION INTRAMUSCULAR; INTRAVENOUS at 06:03

## 2019-02-14 RX ADMIN — Medication 1 APPLICATION(S): at 17:34

## 2019-02-14 RX ADMIN — BUDESONIDE AND FORMOTEROL FUMARATE DIHYDRATE 2 PUFF(S): 160; 4.5 AEROSOL RESPIRATORY (INHALATION) at 08:32

## 2019-02-14 RX ADMIN — METHOCARBAMOL 500 MILLIGRAM(S): 500 TABLET, FILM COATED ORAL at 06:00

## 2019-02-14 RX ADMIN — Medication 50 MILLIGRAM(S): at 21:49

## 2019-02-14 RX ADMIN — OXYCODONE HYDROCHLORIDE 20 MILLIGRAM(S): 5 TABLET ORAL at 15:30

## 2019-02-14 RX ADMIN — Medication 10 MILLIGRAM(S): at 13:51

## 2019-02-14 RX ADMIN — Medication 12.5 MILLIGRAM(S): at 17:32

## 2019-02-14 RX ADMIN — Medication 100 MICROGRAM(S): at 06:00

## 2019-02-14 RX ADMIN — Medication 12.5 MILLIGRAM(S): at 06:00

## 2019-02-14 RX ADMIN — Medication 150 MILLIGRAM(S): at 15:00

## 2019-02-14 RX ADMIN — Medication 1 APPLICATION(S): at 17:33

## 2019-02-14 RX ADMIN — OXYCODONE AND ACETAMINOPHEN 2 TABLET(S): 5; 325 TABLET ORAL at 11:25

## 2019-02-14 RX ADMIN — Medication 1 APPLICATION(S): at 13:50

## 2019-02-14 RX ADMIN — DULOXETINE HYDROCHLORIDE 60 MILLIGRAM(S): 30 CAPSULE, DELAYED RELEASE ORAL at 12:30

## 2019-02-14 RX ADMIN — Medication 1 APPLICATION(S): at 06:03

## 2019-02-14 RX ADMIN — METHOCARBAMOL 500 MILLIGRAM(S): 500 TABLET, FILM COATED ORAL at 13:52

## 2019-02-14 RX ADMIN — Medication 10 MILLIGRAM(S): at 06:00

## 2019-02-14 RX ADMIN — SODIUM CHLORIDE 3 MILLILITER(S): 9 INJECTION INTRAMUSCULAR; INTRAVENOUS; SUBCUTANEOUS at 15:01

## 2019-02-14 RX ADMIN — Medication 1 APPLICATION(S): at 12:35

## 2019-02-14 RX ADMIN — Medication 100 MILLIGRAM(S): at 17:31

## 2019-02-14 RX ADMIN — Medication 81 MILLIGRAM(S): at 12:34

## 2019-02-14 RX ADMIN — OXYCODONE HYDROCHLORIDE 20 MILLIGRAM(S): 5 TABLET ORAL at 06:30

## 2019-02-14 RX ADMIN — OXYCODONE AND ACETAMINOPHEN 2 TABLET(S): 5; 325 TABLET ORAL at 18:10

## 2019-02-14 RX ADMIN — OXYCODONE HYDROCHLORIDE 20 MILLIGRAM(S): 5 TABLET ORAL at 21:48

## 2019-02-14 NOTE — PROGRESS NOTE ADULT - ASSESSMENT
CALLY HARTMAN 53y Female  MRN#: [id number]   CODE STATUS: Full code      SUBJECTIVE    Patient is a 52 yo F who presents with a chief complaint of painful nonhealing right foot ulcer.  Currently admitted to medicine with the primary diagnosis of wound infection.    Interval History: Today is hospital day 15d. Overnight there were no events. Today she is doing well; some pruritis on left arm at PICC dressing.    HPI:   HPI:  52 yo female with hx DM, CAD, HTN, Hypothyroid, s/p Reconstruction surgery R foot for charcot foot on 19. Patient was discharged on 19, not on antibiotics at time of discharge. Since discharge developed dehiscence and infection at surgical sites. Today she had f/u with Dr. Diaz at his office who sent her to hospital for IV antibiotics. Pt reports few days after discharge foot become swollen, red, painful.  She reports pus and small amount of blood draining from the surgical sites. Denies fevers, sweating, sob, chest pain, nausea, vomiting, diarrhea. (2019 17:34)    Hospital Course:       PAST MEDICAL & SURGICAL HISTORY  Dextrocardia  Chronic midline low back pain with bilateral sciatica  Peripheral neuralgia  Essential hypertension  Diabetes 1.5, managed as type 2  Charcot's arthropathy: R foot  Charcot foot due to diabetes mellitus  H/O shoulder surgery: Right shoulder surgery   delivery delivered      ALLERGIES:  No Known Allergies      MEDICATIONS:  STANDING MEDICATIONS  AQUAPHOR (petrolatum Ointment) 1 Application(s) Topical two times a day  aspirin enteric coated 81 milliGRAM(s) Oral daily  atorvastatin 40 milliGRAM(s) Oral at bedtime  BACItracin   Ointment 1 Application(s) Topical three times a day  buDESOnide  80 MICROgram(s)/formoterol 4.5 MICROgram(s) Inhaler 2 Puff(s) Inhalation two times a day  cefepime   IVPB 2000 milliGRAM(s) IV Intermittent every 12 hours  chlorhexidine 4% Liquid 1 Application(s) Topical <User Schedule>  dextrose 5%. 1000 milliLiter(s) IV Continuous <Continuous>  dextrose 50% Injectable 12.5 Gram(s) IV Push once  dextrose 50% Injectable 25 Gram(s) IV Push once  dextrose 50% Injectable 25 Gram(s) IV Push once  docusate sodium 100 milliGRAM(s) Oral two times a day  DULoxetine 60 milliGRAM(s) Oral daily  enoxaparin Injectable 40 milliGRAM(s) SubCutaneous daily  fluocinonide 0.05% Ointment 1 Application(s) Topical two times a day  hydrocortisone 1% Cream 1 Application(s) Topical two times a day  hydrOXYzine  Oral Tab/Cap - Peds 10 milliGRAM(s) Oral three times a day  insulin lispro (HumaLOG) corrective regimen sliding scale   SubCutaneous three times a day before meals  levothyroxine 100 MICROGram(s) Oral daily  methocarbamol 500 milliGRAM(s) Oral three times a day  metoprolol tartrate 12.5 milliGRAM(s) Oral two times a day  oxybutynin 5 milliGRAM(s) Oral two times a day  oxyCODONE  ER Tablet 20 milliGRAM(s) Oral every 8 hours  pantoprazole    Tablet 40 milliGRAM(s) Oral before breakfast  pregabalin 150 milliGRAM(s) Oral three times a day  rifampin 600 milliGRAM(s) Oral daily  senna 2 Tablet(s) Oral at bedtime  sodium chloride 0.9% lock flush 3 milliLiter(s) IV Push every 8 hours  vitamin A &amp; D Ointment 1 Application(s) Topical daily    PRN MEDICATIONS  ALBUTerol    90 MICROgram(s) HFA Inhaler 2 Puff(s) Inhalation every 6 hours PRN  dextrose 40% Gel 15 Gram(s) Oral once PRN  diphenhydrAMINE 50 milliGRAM(s) Oral every 4 hours PRN  glucagon  Injectable 1 milliGRAM(s) IntraMuscular once PRN  ibuprofen  Tablet. 600 milliGRAM(s) Oral every 6 hours PRN  ondansetron    Tablet 4 milliGRAM(s) Oral two times a day PRN  oxyCODONE    5 mG/acetaminophen 325 mG 2 Tablet(s) Oral every 6 hours PRN        OBJECTIVE    VITAL SIGNS: Last 24 Hours  T(C): 36.6 (2019 14:22), Max: 37 (2019 21:55)  T(F): 97.9 (2019 14:22), Max: 98.6 (2019 21:55)  HR: 105 (2019 17:28) (91 - 106)  BP: 132/62 (2019 17:28) (107/56 - 132/62)  BP(mean): --  RR: 18 (2019 14:22) (18 - 18)  SpO2: --      PHYSICAL EXAM:    GENERAL: No acute distress.  HEENT:  Atraumatic, normocephalic. EOMI, PERRLA.  PULMONARY: Clear to auscultation bilaterally.  CARDIOVASCULAR: Regular rate and rhythm; No murmurs, rubs, or gallops.  MUSCULOSKELETAL: Right foot with dressing in place. Refused more detailed physical exam of the right foot.      LABS:                        10.7   8.83  )-----------( 363      ( 2019 07:38 )             35.4       RADIOLOGY:        ASSESSMENT AND PLAN    Patient is a 52 yo F who presents with a chief complaint of painful nonhealing right foot ulcer.  Currently admitted to medicine with the primary diagnosis of wound infection.    Surgical wound infection s/p reconstruction of R foot 19  - Wound cx: few MSSA. Blood cultures negative.  - Per ID c/w cefepime + rifampin while inpatient. Rocephin 1 g Q24H on discharge (PICC line placed) for 6 weeks total.  - Pain control: C/w oxycontin 20 mg BID (15 not available). C/w percocet 10 q 6 PRN + motrin 600 mg PO PRN + robaxin 500 mg Q8H.  - Per podiatry will need additional take down on Friday, further plan from there. Likely cleared Monday per current estimate.    Lower extremity rash and cyclic pruritis  - C/w benadryl.  - Initial symptoms improved, possibly attributable to MS contin  - Resolved    Diabetes  - Monitor FS  - Start insulin coverage if FS > 180    Right LE erythema likely xerotic skin  - Bacitracin to fingernails  - Venous duplex LE negative for DVT    CAD/ DLD  - C/w aspirin + statin  - Takes ezetimibe at home    HTN  - C/w metoprolol 12.5 BID  - Lisinopril on hold as previous episodes of hypotension     COPD  - Symbicort and albuterol PRN   - Home meds: Qvar & Ventolin    Sciatica/Peripheral neuralgia   - c/w duloxetine and Lyrica    DVT prophylaxis : Lovenox   GI prophylaxis : Protonix       Activity: Full weight bearing LLE and weight bearing as tolerated RLE (weight on heel) per podiatry. Ambulate with assist.  Diet: Consistent carbohydrate with evening snack    Disposition: Home with PICC line and IV antibiotics when cleared by podiatry.

## 2019-02-14 NOTE — PROGRESS NOTE ADULT - ASSESSMENT
52 yo F who presents with a chief complaint of painful nonhealing right foot ulcer.  Currently admitted to medicine with the primary diagnosis of wound infection.    Surgical wound dehiscence with infection s/p reconstruction of R charcot foot 1/11/19  - Wound cx: few staph aureus MSSA - blood cultures negative   - Per ID c/w cefepime + rifampin while inpatient. Rocephin 1 g Q24H on discharge (PICC line placed).  - Pain control: C/w oxycontin 20 mg uptitrating to TID. C/w percocet 10 q 6 PRN + motrin 600 mg PO PRN.  - Podiatry following- possibly more surgery Friday?, will f/u attending recs  also on neuralgia regimen with home lyrica at high dose, and muscle relaxant  patient reports having chronically high medication tolerance- from what I have seen in the hospital, I would say this is accurate    Lower extremity rash and cyclic pruritis  - Lidex BID and benadryl PRN  - Improving, possibly attributable to MS contin    Diabetes  - Monitor FS  - Will start insulin coverage if FSG consistently > 180    Right LE Erythema likely xerotic skin  - Bacitracin to put on fingernails in order to prevent spread of infection to feet  - Venous duplex LE negative for DVT; Left Bakers cyst     CAD/ DLD  - C/w aspirin + statin  - Takes ezetimibe at home    HTN  - C/w metoprolol 12.5 BID  - Lisinopril on hold as previous episodes of hypotension     COPD  - Symbicort and albuterol PRN   - Home meds: Qvar & Ventolin    Sciatica/Peripheral neuralgia   - c/w duloxetine and Lyrica    DVT prophylaxis : Lovenox   GI prophylaxis : Protonix       Activity : Full weight bearing LLE and non weight bearing RLE  Diet : Consistent carbohydrate with evening snack    Disposition : PICC abx for home, wound care for home when no further inpatient surgical interventions will be needed; will reevaluate wound on Monday, appreciated podiatry follow up

## 2019-02-15 LAB
GLUCOSE BLDC GLUCOMTR-MCNC: 113 MG/DL — HIGH (ref 70–99)
GLUCOSE BLDC GLUCOMTR-MCNC: 157 MG/DL — HIGH (ref 70–99)
GLUCOSE BLDC GLUCOMTR-MCNC: 246 MG/DL — HIGH (ref 70–99)

## 2019-02-15 RX ADMIN — OXYCODONE AND ACETAMINOPHEN 2 TABLET(S): 5; 325 TABLET ORAL at 21:15

## 2019-02-15 RX ADMIN — Medication 1 APPLICATION(S): at 22:37

## 2019-02-15 RX ADMIN — ONDANSETRON 4 MILLIGRAM(S): 8 TABLET, FILM COATED ORAL at 17:48

## 2019-02-15 RX ADMIN — OXYCODONE AND ACETAMINOPHEN 2 TABLET(S): 5; 325 TABLET ORAL at 12:41

## 2019-02-15 RX ADMIN — Medication 1 APPLICATION(S): at 05:42

## 2019-02-15 RX ADMIN — Medication 1 APPLICATION(S): at 05:43

## 2019-02-15 RX ADMIN — Medication 10 MILLIGRAM(S): at 22:37

## 2019-02-15 RX ADMIN — METHOCARBAMOL 500 MILLIGRAM(S): 500 TABLET, FILM COATED ORAL at 05:43

## 2019-02-15 RX ADMIN — OXYCODONE HYDROCHLORIDE 20 MILLIGRAM(S): 5 TABLET ORAL at 14:56

## 2019-02-15 RX ADMIN — Medication 5 MILLIGRAM(S): at 17:48

## 2019-02-15 RX ADMIN — Medication 12.5 MILLIGRAM(S): at 05:44

## 2019-02-15 RX ADMIN — SODIUM CHLORIDE 3 MILLILITER(S): 9 INJECTION INTRAMUSCULAR; INTRAVENOUS; SUBCUTANEOUS at 14:22

## 2019-02-15 RX ADMIN — Medication 1 APPLICATION(S): at 17:50

## 2019-02-15 RX ADMIN — Medication 5 MILLIGRAM(S): at 05:44

## 2019-02-15 RX ADMIN — BUDESONIDE AND FORMOTEROL FUMARATE DIHYDRATE 2 PUFF(S): 160; 4.5 AEROSOL RESPIRATORY (INHALATION) at 08:24

## 2019-02-15 RX ADMIN — SODIUM CHLORIDE 3 MILLILITER(S): 9 INJECTION INTRAMUSCULAR; INTRAVENOUS; SUBCUTANEOUS at 22:38

## 2019-02-15 RX ADMIN — CEFEPIME 100 MILLIGRAM(S): 1 INJECTION, POWDER, FOR SOLUTION INTRAMUSCULAR; INTRAVENOUS at 05:42

## 2019-02-15 RX ADMIN — PANTOPRAZOLE SODIUM 40 MILLIGRAM(S): 20 TABLET, DELAYED RELEASE ORAL at 09:08

## 2019-02-15 RX ADMIN — Medication 12.5 MILLIGRAM(S): at 17:49

## 2019-02-15 RX ADMIN — Medication 50 MILLIGRAM(S): at 22:36

## 2019-02-15 RX ADMIN — Medication 1 APPLICATION(S): at 11:23

## 2019-02-15 RX ADMIN — Medication 1 APPLICATION(S): at 13:53

## 2019-02-15 RX ADMIN — Medication 1 APPLICATION(S): at 17:49

## 2019-02-15 RX ADMIN — Medication 100 MICROGRAM(S): at 05:44

## 2019-02-15 RX ADMIN — CHLORHEXIDINE GLUCONATE 1 APPLICATION(S): 213 SOLUTION TOPICAL at 05:42

## 2019-02-15 RX ADMIN — Medication 50 MILLIGRAM(S): at 11:27

## 2019-02-15 RX ADMIN — SODIUM CHLORIDE 3 MILLILITER(S): 9 INJECTION INTRAMUSCULAR; INTRAVENOUS; SUBCUTANEOUS at 05:44

## 2019-02-15 RX ADMIN — BUDESONIDE AND FORMOTEROL FUMARATE DIHYDRATE 2 PUFF(S): 160; 4.5 AEROSOL RESPIRATORY (INHALATION) at 22:35

## 2019-02-15 RX ADMIN — OXYCODONE HYDROCHLORIDE 20 MILLIGRAM(S): 5 TABLET ORAL at 05:42

## 2019-02-15 RX ADMIN — METHOCARBAMOL 500 MILLIGRAM(S): 500 TABLET, FILM COATED ORAL at 13:54

## 2019-02-15 RX ADMIN — Medication 150 MILLIGRAM(S): at 22:35

## 2019-02-15 RX ADMIN — DULOXETINE HYDROCHLORIDE 60 MILLIGRAM(S): 30 CAPSULE, DELAYED RELEASE ORAL at 11:20

## 2019-02-15 RX ADMIN — METHOCARBAMOL 500 MILLIGRAM(S): 500 TABLET, FILM COATED ORAL at 22:37

## 2019-02-15 RX ADMIN — CEFEPIME 100 MILLIGRAM(S): 1 INJECTION, POWDER, FOR SOLUTION INTRAMUSCULAR; INTRAVENOUS at 17:46

## 2019-02-15 RX ADMIN — OXYCODONE HYDROCHLORIDE 20 MILLIGRAM(S): 5 TABLET ORAL at 13:56

## 2019-02-15 RX ADMIN — Medication 81 MILLIGRAM(S): at 11:21

## 2019-02-15 RX ADMIN — Medication 150 MILLIGRAM(S): at 13:53

## 2019-02-15 RX ADMIN — Medication 150 MILLIGRAM(S): at 05:42

## 2019-02-15 RX ADMIN — ONDANSETRON 4 MILLIGRAM(S): 8 TABLET, FILM COATED ORAL at 05:46

## 2019-02-15 RX ADMIN — OXYCODONE AND ACETAMINOPHEN 2 TABLET(S): 5; 325 TABLET ORAL at 20:45

## 2019-02-15 RX ADMIN — OXYCODONE HYDROCHLORIDE 20 MILLIGRAM(S): 5 TABLET ORAL at 22:35

## 2019-02-15 RX ADMIN — OXYCODONE AND ACETAMINOPHEN 2 TABLET(S): 5; 325 TABLET ORAL at 11:21

## 2019-02-15 RX ADMIN — ATORVASTATIN CALCIUM 40 MILLIGRAM(S): 80 TABLET, FILM COATED ORAL at 22:37

## 2019-02-15 RX ADMIN — Medication 10 MILLIGRAM(S): at 05:43

## 2019-02-15 NOTE — PROGRESS NOTE ADULT - ASSESSMENT
53F    Charcot foot s/p recent surgery with hardware  Chronic midline low back pain with bilateral sciatica  Peripheral neuralgia  Essential hypertension  Diabetes   H/O shoulder surgery: Right shoulder surgery    Admitted with wound dehiscence   Sepsis ruled out on admission  wcx MSSA, ucx with <100k morganella  ESR 14  Debridement  02/08/2019  excisional debridement of soft tissue right foot with oasis application and wound vac therapy    - Podiatry following  - Rifampin 600mg PO daily given hardware  - Cefepime 2g q12h   - PICC and plan for outpatient Cefepime 2g q12h ********  - ID follow-up [address] [phone]  - Weekly CBC, BMP    Spectra [id number]    Charcot foot s/p recent surgery with hardware  Chronic midline low back pain with bilateral sciatica  Peripheral neuralgia  Essential hypertension  Diabetes   H/O shoulder surgery: Right shoulder surgery    Admitted with wound dehiscence   Sepsis ruled out on admission  wcx MSSA, ucx with <100k morganella  ESR 14  Debridement  02/08/2019  excisional debridement of soft tissue right foot with oasis application and wound vac therapy    - Podiatry following  - Rifampin 600mg PO daily given hardware  - Cefepime 2g q12h   - PICC and plan for outpatient Ceftriaxone 2g q24h to complete 6 weeks (3/12/19)  - ID follow-up [address] [phone]  - Weekly CBC, BMP    Spectra [id number]

## 2019-02-15 NOTE — PROGRESS NOTE ADULT - ASSESSMENT
Patient is a 54 yo F who presents with a chief complaint of painful nonhealing right foot ulcer.  Currently admitted to medicine with the primary diagnosis of wound infection.    # Surgical wound infection s/p reconstruction of R foot 1/11/19  - Wound cx: few MSSA. Blood cultures negative.  - Per ID c/w cefepime + rifampin while inpatient. Rocephin 1 g Q24H on discharge (PICC line placed) for 6 weeks total.  - Pain control: C/w oxycontin 20 mg BID (15 not available). C/w percocet 10 q 6 PRN + motrin 600 mg PO PRN + robaxin 500 mg Q8H.  - Per podiatry will need additional take down on Friday, further plan from there. Likely cleared Monday per current estimate.    Lower extremity rash and cyclic pruritis  - C/w benadryl.  - Initial symptoms improved, possibly attributable to MS contin  - Resolved    Diabetes  - Monitor FS  - Start insulin coverage if FS > 180    Right LE erythema likely xerotic skin  - Bacitracin to fingernails  - Venous duplex LE negative for DVT    CAD/ DLD  - C/w aspirin + statin  - Takes ezetimibe at home    HTN  - C/w metoprolol 12.5 BID  - Lisinopril on hold as previous episodes of hypotension     COPD  - Symbicort and albuterol PRN   - Home meds: Qvar & Ventolin    Sciatica/Peripheral neuralgia   - c/w duloxetine and Lyrica    DVT prophylaxis : Lovenox   GI prophylaxis : Protonix       Activity: Full weight bearing LLE and weight bearing as tolerated RLE (weight on heel) per podiatry. Ambulate with assist.  Diet: Consistent carbohydrate with evening snack    Disposition: Home with PICC line and IV antibiotics when cleared by podiatry. Patient is a 54 yo F who presents with a chief complaint of painful nonhealing right foot ulcer.  Currently admitted to medicine with the primary diagnosis of wound infection.    # Surgical wound infection s/p reconstruction of R foot 1/11/19  - Wound cx: few MSSA. Blood cultures negative.  - Per ID c/w cefepime + rifampin while inpatient. Rocephin 2 g Q24H on discharge (PICC line placed) for 6 weeks total (3/12/19)  - Pain control: C/w oxycontin 20 mg BID (15 not available). C/w percocet 10 q 6 PRN + motrin 600 mg PO PRN + robaxin 500 mg Q8H.  - podiatry changed the wound vac today, planned another on 2/17  - ID follow-up [address] [phone]  - Weekly CBC, BMP as per ID ON DISCHARGE    # Lower extremity rash and cyclic pruritis  - C/w benadryl.  - Initial symptoms improved, possibly attributable to MS contin  - Resolved    # Diabetes  - Monitor FS AC + HS  - On insulin    # Right LE erythema likely xerotic skin  - Bacitracin to fingernails  - Venous duplex LE negative for DVT    # CAD/ DLD  - C/w aspirin + statin  - Takes ezetimibe at home    # HTN  - C/w metoprolol 12.5 BID  - Lisinopril on hold as previous episodes of hypotension     # COPD  - Symbicort and albuterol PRN   - Home meds: Qvar & Ventolin    # Sciatica/Peripheral neuralgia   - c/w duloxetine and Lyrica        DVT prophylaxis : Lovenox   GI prophylaxis : Protonix       Activity: Full weight bearing LLE and weight bearing as tolerated RLE (weight on heel) per podiatry. Ambulate with assist.  Diet: Consistent carbohydrate with evening snack    Disposition: Home with PICC line and IV antibiotics when cleared by podiatry.

## 2019-02-16 LAB
GLUCOSE BLDC GLUCOMTR-MCNC: 127 MG/DL — HIGH (ref 70–99)
GLUCOSE BLDC GLUCOMTR-MCNC: 142 MG/DL — HIGH (ref 70–99)
GLUCOSE BLDC GLUCOMTR-MCNC: 257 MG/DL — HIGH (ref 70–99)
T4 AB SER-ACNC: 7 UG/DL — SIGNIFICANT CHANGE UP (ref 4.6–12)
TSH SERPL-MCNC: 4.43 UIU/ML — HIGH (ref 0.27–4.2)

## 2019-02-16 RX ORDER — OXYCODONE AND ACETAMINOPHEN 5; 325 MG/1; MG/1
2 TABLET ORAL EVERY 4 HOURS
Qty: 0 | Refills: 0 | Status: DISCONTINUED | OUTPATIENT
Start: 2019-02-16 | End: 2019-02-19

## 2019-02-16 RX ORDER — OXYCODONE AND ACETAMINOPHEN 5; 325 MG/1; MG/1
1 TABLET ORAL EVERY 6 HOURS
Qty: 0 | Refills: 0 | Status: DISCONTINUED | OUTPATIENT
Start: 2019-02-16 | End: 2019-02-16

## 2019-02-16 RX ADMIN — OXYCODONE AND ACETAMINOPHEN 2 TABLET(S): 5; 325 TABLET ORAL at 21:14

## 2019-02-16 RX ADMIN — ONDANSETRON 4 MILLIGRAM(S): 8 TABLET, FILM COATED ORAL at 17:07

## 2019-02-16 RX ADMIN — Medication 50 MILLIGRAM(S): at 20:29

## 2019-02-16 RX ADMIN — METHOCARBAMOL 500 MILLIGRAM(S): 500 TABLET, FILM COATED ORAL at 14:17

## 2019-02-16 RX ADMIN — OXYCODONE HYDROCHLORIDE 20 MILLIGRAM(S): 5 TABLET ORAL at 21:53

## 2019-02-16 RX ADMIN — Medication 10 MILLIGRAM(S): at 14:17

## 2019-02-16 RX ADMIN — SODIUM CHLORIDE 3 MILLILITER(S): 9 INJECTION INTRAMUSCULAR; INTRAVENOUS; SUBCUTANEOUS at 22:41

## 2019-02-16 RX ADMIN — OXYCODONE AND ACETAMINOPHEN 2 TABLET(S): 5; 325 TABLET ORAL at 21:44

## 2019-02-16 RX ADMIN — PANTOPRAZOLE SODIUM 40 MILLIGRAM(S): 20 TABLET, DELAYED RELEASE ORAL at 06:06

## 2019-02-16 RX ADMIN — Medication 12.5 MILLIGRAM(S): at 17:11

## 2019-02-16 RX ADMIN — Medication 100 MICROGRAM(S): at 06:06

## 2019-02-16 RX ADMIN — OXYCODONE HYDROCHLORIDE 20 MILLIGRAM(S): 5 TABLET ORAL at 22:23

## 2019-02-16 RX ADMIN — OXYCODONE HYDROCHLORIDE 20 MILLIGRAM(S): 5 TABLET ORAL at 06:11

## 2019-02-16 RX ADMIN — CEFEPIME 100 MILLIGRAM(S): 1 INJECTION, POWDER, FOR SOLUTION INTRAMUSCULAR; INTRAVENOUS at 06:04

## 2019-02-16 RX ADMIN — Medication 1 APPLICATION(S): at 17:14

## 2019-02-16 RX ADMIN — OXYCODONE AND ACETAMINOPHEN 2 TABLET(S): 5; 325 TABLET ORAL at 11:30

## 2019-02-16 RX ADMIN — CEFEPIME 100 MILLIGRAM(S): 1 INJECTION, POWDER, FOR SOLUTION INTRAMUSCULAR; INTRAVENOUS at 18:07

## 2019-02-16 RX ADMIN — Medication 81 MILLIGRAM(S): at 14:15

## 2019-02-16 RX ADMIN — METHOCARBAMOL 500 MILLIGRAM(S): 500 TABLET, FILM COATED ORAL at 21:16

## 2019-02-16 RX ADMIN — Medication 50 MILLIGRAM(S): at 10:52

## 2019-02-16 RX ADMIN — Medication 1 APPLICATION(S): at 06:04

## 2019-02-16 RX ADMIN — OXYCODONE AND ACETAMINOPHEN 2 TABLET(S): 5; 325 TABLET ORAL at 10:52

## 2019-02-16 RX ADMIN — OXYCODONE HYDROCHLORIDE 20 MILLIGRAM(S): 5 TABLET ORAL at 14:24

## 2019-02-16 RX ADMIN — SODIUM CHLORIDE 3 MILLILITER(S): 9 INJECTION INTRAMUSCULAR; INTRAVENOUS; SUBCUTANEOUS at 06:18

## 2019-02-16 RX ADMIN — Medication 5 MILLIGRAM(S): at 06:07

## 2019-02-16 RX ADMIN — Medication 5 MILLIGRAM(S): at 17:11

## 2019-02-16 RX ADMIN — Medication 10 MILLIGRAM(S): at 06:07

## 2019-02-16 RX ADMIN — CHLORHEXIDINE GLUCONATE 1 APPLICATION(S): 213 SOLUTION TOPICAL at 06:04

## 2019-02-16 RX ADMIN — DULOXETINE HYDROCHLORIDE 60 MILLIGRAM(S): 30 CAPSULE, DELAYED RELEASE ORAL at 14:15

## 2019-02-16 RX ADMIN — Medication 12.5 MILLIGRAM(S): at 06:07

## 2019-02-16 RX ADMIN — SODIUM CHLORIDE 3 MILLILITER(S): 9 INJECTION INTRAMUSCULAR; INTRAVENOUS; SUBCUTANEOUS at 17:17

## 2019-02-16 RX ADMIN — METHOCARBAMOL 500 MILLIGRAM(S): 500 TABLET, FILM COATED ORAL at 06:07

## 2019-02-16 RX ADMIN — OXYCODONE HYDROCHLORIDE 20 MILLIGRAM(S): 5 TABLET ORAL at 15:00

## 2019-02-16 RX ADMIN — Medication 10 MILLIGRAM(S): at 21:17

## 2019-02-16 RX ADMIN — BUDESONIDE AND FORMOTEROL FUMARATE DIHYDRATE 2 PUFF(S): 160; 4.5 AEROSOL RESPIRATORY (INHALATION) at 20:13

## 2019-02-16 RX ADMIN — BUDESONIDE AND FORMOTEROL FUMARATE DIHYDRATE 2 PUFF(S): 160; 4.5 AEROSOL RESPIRATORY (INHALATION) at 14:13

## 2019-02-16 RX ADMIN — Medication 1 APPLICATION(S): at 17:13

## 2019-02-16 RX ADMIN — ATORVASTATIN CALCIUM 40 MILLIGRAM(S): 80 TABLET, FILM COATED ORAL at 21:15

## 2019-02-16 RX ADMIN — Medication 1 APPLICATION(S): at 21:16

## 2019-02-16 NOTE — PROGRESS NOTE ADULT - ASSESSMENT
54 yo F who presents with a chief complaint of painful nonhealing right foot ulcer.  Currently admitted to medicine with the primary diagnosis of wound infection.    Surgical wound dehiscence with infection s/p reconstruction of R charcot foot 1/11/19  - Wound cx: few staph aureus MSSA - blood cultures negative   - Per ID c/w cefepime + rifampin while inpatient. Rocephin 1 g Q24H on discharge (PICC line placed).  - Pain control: C/w oxycontin 20 mg uptitrated to TID. C/w percocet 10 q 6 PRN + motrin 600 mg PO PRN.  - Podiatry following- appreciated f/u, wound vac change tomorrow with dressing take down  also on neuralgia regimen with home lyrica at high dose, and muscle relaxant  pain well controlled    Lower extremity rash and cyclic pruritis  - Lidex BID and benadryl PRN  - Improving, possibly attributable to MS contin  - suggested patient gets allergy testing with allergist/immunologist as outpatient    Diabetes  - Monitor FS  - Will start insulin coverage if FSG consistently > 180    Right LE Erythema likely xerotic skin  - Bacitracin to put on fingernails in order to prevent spread of infection to feet  - Venous duplex LE negative for DVT; Left Bakers cyst     CAD/ DLD  - C/w aspirin + statin  - Takes ezetimibe at home    HTN  - C/w metoprolol 12.5 BID  - Lisinopril on hold as previous episodes of hypotension     COPD  - Symbicort and albuterol PRN   - Home meds: Qvar & Ventolin    Sciatica/Peripheral neuralgia   - c/w duloxetine and Lyrica    DVT prophylaxis : Lovenox   GI prophylaxis : Protonix       Activity : Full weight bearing LLE and non weight bearing RLE  Diet : Consistent carbohydrate with evening snack    Disposition : PICC abx for home, wound care for home when no further inpatient surgical interventions will be needed; will reevaluate wound on Sunday/ wound vac change

## 2019-02-17 LAB
ANION GAP SERPL CALC-SCNC: 15 MMOL/L — HIGH (ref 7–14)
BUN SERPL-MCNC: 5 MG/DL — LOW (ref 10–20)
CALCIUM SERPL-MCNC: 9.3 MG/DL — SIGNIFICANT CHANGE UP (ref 8.5–10.1)
CHLORIDE SERPL-SCNC: 105 MMOL/L — SIGNIFICANT CHANGE UP (ref 98–110)
CO2 SERPL-SCNC: 21 MMOL/L — SIGNIFICANT CHANGE UP (ref 17–32)
CREAT SERPL-MCNC: 0.6 MG/DL — LOW (ref 0.7–1.5)
GLUCOSE BLDC GLUCOMTR-MCNC: 118 MG/DL — HIGH (ref 70–99)
GLUCOSE BLDC GLUCOMTR-MCNC: 124 MG/DL — HIGH (ref 70–99)
GLUCOSE BLDC GLUCOMTR-MCNC: 206 MG/DL — HIGH (ref 70–99)
GLUCOSE SERPL-MCNC: 226 MG/DL — HIGH (ref 70–99)
HCT VFR BLD CALC: 36.7 % — LOW (ref 37–47)
HGB BLD-MCNC: 11.2 G/DL — LOW (ref 12–16)
MCHC RBC-ENTMCNC: 27.6 PG — SIGNIFICANT CHANGE UP (ref 27–31)
MCHC RBC-ENTMCNC: 30.5 G/DL — LOW (ref 32–37)
MCV RBC AUTO: 90.4 FL — SIGNIFICANT CHANGE UP (ref 81–99)
NRBC # BLD: 0 /100 WBCS — SIGNIFICANT CHANGE UP (ref 0–0)
PLATELET # BLD AUTO: 330 K/UL — SIGNIFICANT CHANGE UP (ref 130–400)
POTASSIUM SERPL-MCNC: 4.5 MMOL/L — SIGNIFICANT CHANGE UP (ref 3.5–5)
POTASSIUM SERPL-SCNC: 4.5 MMOL/L — SIGNIFICANT CHANGE UP (ref 3.5–5)
RBC # BLD: 4.06 M/UL — LOW (ref 4.2–5.4)
RBC # FLD: 16.2 % — HIGH (ref 11.5–14.5)
SODIUM SERPL-SCNC: 141 MMOL/L — SIGNIFICANT CHANGE UP (ref 135–146)
WBC # BLD: 7.42 K/UL — SIGNIFICANT CHANGE UP (ref 4.8–10.8)
WBC # FLD AUTO: 7.42 K/UL — SIGNIFICANT CHANGE UP (ref 4.8–10.8)

## 2019-02-17 RX ORDER — CEFTRIAXONE 500 MG/1
1 INJECTION, POWDER, FOR SOLUTION INTRAMUSCULAR; INTRAVENOUS EVERY 24 HOURS
Qty: 0 | Refills: 0 | Status: DISCONTINUED | OUTPATIENT
Start: 2019-02-17 | End: 2019-02-18

## 2019-02-17 RX ORDER — DIPHENHYDRAMINE HCL 50 MG
25 CAPSULE ORAL EVERY 4 HOURS
Qty: 0 | Refills: 0 | Status: DISCONTINUED | OUTPATIENT
Start: 2019-02-17 | End: 2019-02-19

## 2019-02-17 RX ORDER — ONDANSETRON 8 MG/1
4 TABLET, FILM COATED ORAL EVERY 6 HOURS
Qty: 0 | Refills: 0 | Status: DISCONTINUED | OUTPATIENT
Start: 2019-02-17 | End: 2019-02-19

## 2019-02-17 RX ADMIN — METHOCARBAMOL 500 MILLIGRAM(S): 500 TABLET, FILM COATED ORAL at 05:23

## 2019-02-17 RX ADMIN — METHOCARBAMOL 500 MILLIGRAM(S): 500 TABLET, FILM COATED ORAL at 21:05

## 2019-02-17 RX ADMIN — Medication 5 MILLIGRAM(S): at 05:24

## 2019-02-17 RX ADMIN — Medication 100 MILLIGRAM(S): at 05:23

## 2019-02-17 RX ADMIN — METHOCARBAMOL 500 MILLIGRAM(S): 500 TABLET, FILM COATED ORAL at 14:05

## 2019-02-17 RX ADMIN — SODIUM CHLORIDE 3 MILLILITER(S): 9 INJECTION INTRAMUSCULAR; INTRAVENOUS; SUBCUTANEOUS at 21:53

## 2019-02-17 RX ADMIN — SODIUM CHLORIDE 3 MILLILITER(S): 9 INJECTION INTRAMUSCULAR; INTRAVENOUS; SUBCUTANEOUS at 06:13

## 2019-02-17 RX ADMIN — OXYCODONE HYDROCHLORIDE 20 MILLIGRAM(S): 5 TABLET ORAL at 14:35

## 2019-02-17 RX ADMIN — SODIUM CHLORIDE 3 MILLILITER(S): 9 INJECTION INTRAMUSCULAR; INTRAVENOUS; SUBCUTANEOUS at 15:19

## 2019-02-17 RX ADMIN — Medication 1 APPLICATION(S): at 19:03

## 2019-02-17 RX ADMIN — ONDANSETRON 4 MILLIGRAM(S): 8 TABLET, FILM COATED ORAL at 05:23

## 2019-02-17 RX ADMIN — DULOXETINE HYDROCHLORIDE 60 MILLIGRAM(S): 30 CAPSULE, DELAYED RELEASE ORAL at 12:27

## 2019-02-17 RX ADMIN — Medication 1 APPLICATION(S): at 19:06

## 2019-02-17 RX ADMIN — SENNA PLUS 2 TABLET(S): 8.6 TABLET ORAL at 21:05

## 2019-02-17 RX ADMIN — Medication 1 APPLICATION(S): at 05:24

## 2019-02-17 RX ADMIN — Medication 81 MILLIGRAM(S): at 12:27

## 2019-02-17 RX ADMIN — BUDESONIDE AND FORMOTEROL FUMARATE DIHYDRATE 2 PUFF(S): 160; 4.5 AEROSOL RESPIRATORY (INHALATION) at 09:20

## 2019-02-17 RX ADMIN — ONDANSETRON 4 MILLIGRAM(S): 8 TABLET, FILM COATED ORAL at 15:17

## 2019-02-17 RX ADMIN — CEFEPIME 100 MILLIGRAM(S): 1 INJECTION, POWDER, FOR SOLUTION INTRAMUSCULAR; INTRAVENOUS at 05:21

## 2019-02-17 RX ADMIN — Medication 1 APPLICATION(S): at 05:25

## 2019-02-17 RX ADMIN — PANTOPRAZOLE SODIUM 40 MILLIGRAM(S): 20 TABLET, DELAYED RELEASE ORAL at 05:23

## 2019-02-17 RX ADMIN — Medication 12.5 MILLIGRAM(S): at 17:45

## 2019-02-17 RX ADMIN — Medication 12.5 MILLIGRAM(S): at 05:23

## 2019-02-17 RX ADMIN — OXYCODONE HYDROCHLORIDE 20 MILLIGRAM(S): 5 TABLET ORAL at 05:30

## 2019-02-17 RX ADMIN — Medication 50 MILLIGRAM(S): at 14:07

## 2019-02-17 RX ADMIN — OXYCODONE HYDROCHLORIDE 20 MILLIGRAM(S): 5 TABLET ORAL at 21:39

## 2019-02-17 RX ADMIN — ATORVASTATIN CALCIUM 40 MILLIGRAM(S): 80 TABLET, FILM COATED ORAL at 21:04

## 2019-02-17 RX ADMIN — OXYCODONE HYDROCHLORIDE 20 MILLIGRAM(S): 5 TABLET ORAL at 14:05

## 2019-02-17 RX ADMIN — Medication 1 APPLICATION(S): at 21:04

## 2019-02-17 RX ADMIN — Medication 1 APPLICATION(S): at 19:04

## 2019-02-17 RX ADMIN — Medication 10 MILLIGRAM(S): at 14:06

## 2019-02-17 RX ADMIN — BUDESONIDE AND FORMOTEROL FUMARATE DIHYDRATE 2 PUFF(S): 160; 4.5 AEROSOL RESPIRATORY (INHALATION) at 21:03

## 2019-02-17 RX ADMIN — Medication 100 MICROGRAM(S): at 05:23

## 2019-02-17 RX ADMIN — Medication 25 MILLIGRAM(S): at 21:52

## 2019-02-17 RX ADMIN — Medication 150 MILLIGRAM(S): at 21:14

## 2019-02-17 RX ADMIN — Medication 5 MILLIGRAM(S): at 17:46

## 2019-02-17 RX ADMIN — Medication 50 MILLIGRAM(S): at 18:15

## 2019-02-17 RX ADMIN — Medication 10 MILLIGRAM(S): at 21:04

## 2019-02-17 RX ADMIN — CHLORHEXIDINE GLUCONATE 1 APPLICATION(S): 213 SOLUTION TOPICAL at 05:24

## 2019-02-17 RX ADMIN — OXYCODONE HYDROCHLORIDE 20 MILLIGRAM(S): 5 TABLET ORAL at 21:09

## 2019-02-17 RX ADMIN — OXYCODONE HYDROCHLORIDE 20 MILLIGRAM(S): 5 TABLET ORAL at 06:00

## 2019-02-17 RX ADMIN — Medication 10 MILLIGRAM(S): at 05:23

## 2019-02-17 NOTE — PROGRESS NOTE ADULT - ASSESSMENT
54 yo F who presents with a chief complaint of painful nonhealing right foot ulcer.  Currently admitted to medicine with the primary diagnosis of wound infection.    Surgical wound dehiscence with infection s/p reconstruction of R charcot foot 1/11/19  - Wound cx: few staph aureus MSSA - blood cultures negative   - Per ID c/w cefepime + rifampin while inpatient. Rocephin 1 g Q24H on discharge (PICC line placed).  - Pain control: C/w oxycontin 20 mg uptitrated to TID. C/w percocet 10 q 6 PRN + motrin 600 mg PO PRN.  - Podiatry following- appreciated f/u, wound vac change tomorrow then possible discharge  also on neuralgia regimen with home lyrica at high dose, and muscle relaxant  pain well controlled    Lower extremity rash and cyclic pruritis  - Lidex BID and benadryl PRN  - Improving, possibly attributable to MS contin  - suggested patient gets allergy testing with allergist/immunologist as outpatient    Diabetes  - Monitor FS  - Will start insulin coverage if FSG consistently > 180    Right LE Erythema likely xerotic skin  - Bacitracin to put on fingernails in order to prevent spread of infection to feet  - Venous duplex LE negative for DVT; Left Bakers cyst     CAD/ DLD  - C/w aspirin + statin  - Takes ezetimibe at home    HTN  - C/w metoprolol 12.5 BID  - Lisinopril on hold as previous episodes of hypotension     COPD  - Symbicort and albuterol PRN   - Home meds: Qvar & Ventolin    Sciatica/Peripheral neuralgia   - c/w duloxetine and Lyrica    DVT prophylaxis : Lovenox   GI prophylaxis : Protonix       Activity : Full weight bearing LLE and non weight bearing RLE  Diet : Consistent carbohydrate with evening snack    Disposition : PICC abx for home, wound vac for home possible discharge tomorrow

## 2019-02-17 NOTE — PROGRESS NOTE ADULT - ASSESSMENT
54 yo F who presents with a chief complaint of painful nonhealing right foot ulcer.  Currently admitted to medicine with the primary diagnosis of wound infection.    Surgical wound dehiscence with infection s/p reconstruction of R charcot foot 1/11/19  - Wound cx: few staph aureus MSSA - blood cultures negative   - Per ID c/w cefepime + rifampin while inpatient. Rocephin 1 g Q24H on discharge (PICC line placed).  - Pain control: C/w oxycontin 20 mg uptitrated to TID. C/w percocet 10 q 6 PRN + motrin 600 mg PO PRN.  - Podiatry following- appreciated f/u, wound vac change today with dressing take down  -also on neuralgia regimen with home lyrica at high dose, and muscle relaxant, pain well controlled    Lower extremity rash and cyclic pruritis  - Lidex BID and benadryl PRN  - Improving, possibly attributable to MS contin  - suggested patient gets allergy testing with allergist/immunologist as outpatient    Diabetes  - Monitor FS  - Will start insulin coverage if FSG consistently > 180    Right LE Erythema likely xerotic skin  - Bacitracin to put on fingernails in order to prevent spread of infection to feet  - Venous duplex LE negative for DVT; Left Bakers cyst     CAD/ DLD  - C/w aspirin + statin  - Takes ezetimibe at home    HTN  - C/w metoprolol 12.5 BID  - Lisinopril on hold as previous episodes of hypotension     COPD  - Symbicort and albuterol PRN   - Home meds: Qvar & Ventolin    Sciatica/Peripheral neuralgia   - c/w duloxetine and Lyrica            DVT prophylaxis : Lovenox   GI prophylaxis : Protonix   Activity : Full weight bearing LLE and non weight bearing RLE  Diet : Consistent carbohydrate with evening snack    Disposition : PICC abx for home, wound care for home when no further inpatient surgical interventions will be needed, wound vac change today

## 2019-02-18 LAB
GLUCOSE BLDC GLUCOMTR-MCNC: 149 MG/DL — HIGH (ref 70–99)
GLUCOSE BLDC GLUCOMTR-MCNC: 169 MG/DL — HIGH (ref 70–99)
GLUCOSE BLDC GLUCOMTR-MCNC: 178 MG/DL — HIGH (ref 70–99)

## 2019-02-18 RX ORDER — FEXOFENADINE HCL 30 MG
1 TABLET ORAL
Qty: 0 | Refills: 0 | COMMUNITY

## 2019-02-18 RX ORDER — OXYCODONE HYDROCHLORIDE 5 MG/1
1 TABLET ORAL
Qty: 15 | Refills: 0
Start: 2019-02-18 | End: 2019-02-22

## 2019-02-18 RX ORDER — OXYCODONE HYDROCHLORIDE 5 MG/1
1 TABLET ORAL
Qty: 15 | Refills: 0 | OUTPATIENT
Start: 2019-02-18 | End: 2019-02-22

## 2019-02-18 RX ORDER — ASPIRIN/CALCIUM CARB/MAGNESIUM 324 MG
1 TABLET ORAL
Qty: 0 | Refills: 0 | COMMUNITY

## 2019-02-18 RX ORDER — RAMIPRIL 5 MG
1 CAPSULE ORAL
Qty: 0 | Refills: 0 | COMMUNITY

## 2019-02-18 RX ORDER — OXYCODONE AND ACETAMINOPHEN 5; 325 MG/1; MG/1
1 TABLET ORAL
Qty: 20 | Refills: 0
Start: 2019-02-18 | End: 2019-02-22

## 2019-02-18 RX ORDER — ATORVASTATIN CALCIUM 80 MG/1
1 TABLET, FILM COATED ORAL
Qty: 30 | Refills: 0
Start: 2019-02-18 | End: 2019-03-19

## 2019-02-18 RX ORDER — CEFTRIAXONE 500 MG/1
2 INJECTION, POWDER, FOR SOLUTION INTRAMUSCULAR; INTRAVENOUS
Qty: 0 | Refills: 0 | COMMUNITY

## 2019-02-18 RX ORDER — HYDROXYZINE HCL 10 MG
2 TABLET ORAL
Qty: 0 | Refills: 0 | COMMUNITY

## 2019-02-18 RX ORDER — LEVOTHYROXINE SODIUM 125 MCG
1 TABLET ORAL
Qty: 30 | Refills: 0
Start: 2019-02-18 | End: 2019-03-19

## 2019-02-18 RX ORDER — ATORVASTATIN CALCIUM 80 MG/1
1 TABLET, FILM COATED ORAL
Qty: 0 | Refills: 0 | COMMUNITY

## 2019-02-18 RX ORDER — ASPIRIN/CALCIUM CARB/MAGNESIUM 324 MG
1 TABLET ORAL
Qty: 30 | Refills: 0
Start: 2019-02-18 | End: 2019-03-19

## 2019-02-18 RX ORDER — METOPROLOL TARTRATE 50 MG
0.5 TABLET ORAL
Qty: 30 | Refills: 0
Start: 2019-02-18 | End: 2019-03-19

## 2019-02-18 RX ORDER — BUDESONIDE AND FORMOTEROL FUMARATE DIHYDRATE 160; 4.5 UG/1; UG/1
2 AEROSOL RESPIRATORY (INHALATION)
Qty: 0 | Refills: 0 | DISCHARGE
Start: 2019-02-18

## 2019-02-18 RX ORDER — SITAGLIPTIN AND METFORMIN HYDROCHLORIDE 500; 50 MG/1; MG/1
1 TABLET, FILM COATED ORAL
Qty: 0 | Refills: 0 | COMMUNITY

## 2019-02-18 RX ORDER — EZETIMIBE 10 MG/1
1 TABLET ORAL
Qty: 0 | Refills: 0 | COMMUNITY

## 2019-02-18 RX ORDER — MIRABEGRON 50 MG/1
1 TABLET, EXTENDED RELEASE ORAL
Qty: 0 | Refills: 0 | COMMUNITY

## 2019-02-18 RX ORDER — DIAZEPAM 5 MG
1 TABLET ORAL
Qty: 0 | Refills: 0 | COMMUNITY

## 2019-02-18 RX ORDER — METHOCARBAMOL 500 MG/1
1 TABLET, FILM COATED ORAL
Qty: 30 | Refills: 0
Start: 2019-02-18 | End: 2019-02-27

## 2019-02-18 RX ORDER — SITAGLIPTIN AND METFORMIN HYDROCHLORIDE 500; 50 MG/1; MG/1
1 TABLET, FILM COATED ORAL
Qty: 60 | Refills: 0
Start: 2019-02-18 | End: 2019-03-19

## 2019-02-18 RX ORDER — METHOCARBAMOL 500 MG/1
1 TABLET, FILM COATED ORAL
Qty: 30 | Refills: 0 | OUTPATIENT
Start: 2019-02-18 | End: 2019-02-27

## 2019-02-18 RX ORDER — METOPROLOL TARTRATE 50 MG
0.5 TABLET ORAL
Qty: 30 | Refills: 0 | OUTPATIENT
Start: 2019-02-18 | End: 2019-03-19

## 2019-02-18 RX ORDER — EZETIMIBE 10 MG/1
1 TABLET ORAL
Qty: 30 | Refills: 0
Start: 2019-02-18 | End: 2019-03-19

## 2019-02-18 RX ORDER — LEVOTHYROXINE SODIUM 125 MCG
1 TABLET ORAL
Qty: 0 | Refills: 0 | COMMUNITY

## 2019-02-18 RX ADMIN — SODIUM CHLORIDE 3 MILLILITER(S): 9 INJECTION INTRAMUSCULAR; INTRAVENOUS; SUBCUTANEOUS at 13:57

## 2019-02-18 RX ADMIN — Medication 1 APPLICATION(S): at 11:34

## 2019-02-18 RX ADMIN — Medication 1 APPLICATION(S): at 05:44

## 2019-02-18 RX ADMIN — Medication 12.5 MILLIGRAM(S): at 18:00

## 2019-02-18 RX ADMIN — Medication 150 MILLIGRAM(S): at 21:04

## 2019-02-18 RX ADMIN — OXYCODONE AND ACETAMINOPHEN 2 TABLET(S): 5; 325 TABLET ORAL at 11:29

## 2019-02-18 RX ADMIN — PANTOPRAZOLE SODIUM 40 MILLIGRAM(S): 20 TABLET, DELAYED RELEASE ORAL at 05:44

## 2019-02-18 RX ADMIN — Medication 81 MILLIGRAM(S): at 11:33

## 2019-02-18 RX ADMIN — Medication 100 MILLIGRAM(S): at 21:04

## 2019-02-18 RX ADMIN — BUDESONIDE AND FORMOTEROL FUMARATE DIHYDRATE 2 PUFF(S): 160; 4.5 AEROSOL RESPIRATORY (INHALATION) at 21:05

## 2019-02-18 RX ADMIN — Medication 1 APPLICATION(S): at 18:00

## 2019-02-18 RX ADMIN — Medication 150 MILLIGRAM(S): at 05:48

## 2019-02-18 RX ADMIN — Medication 5 MILLIGRAM(S): at 18:01

## 2019-02-18 RX ADMIN — Medication 1 APPLICATION(S): at 05:55

## 2019-02-18 RX ADMIN — METHOCARBAMOL 500 MILLIGRAM(S): 500 TABLET, FILM COATED ORAL at 21:06

## 2019-02-18 RX ADMIN — OXYCODONE HYDROCHLORIDE 20 MILLIGRAM(S): 5 TABLET ORAL at 05:48

## 2019-02-18 RX ADMIN — Medication 5 MILLIGRAM(S): at 05:44

## 2019-02-18 RX ADMIN — OXYCODONE HYDROCHLORIDE 20 MILLIGRAM(S): 5 TABLET ORAL at 14:04

## 2019-02-18 RX ADMIN — Medication 100 MILLIGRAM(S): at 05:44

## 2019-02-18 RX ADMIN — CHLORHEXIDINE GLUCONATE 1 APPLICATION(S): 213 SOLUTION TOPICAL at 05:44

## 2019-02-18 RX ADMIN — OXYCODONE AND ACETAMINOPHEN 2 TABLET(S): 5; 325 TABLET ORAL at 23:44

## 2019-02-18 RX ADMIN — Medication 10 MILLIGRAM(S): at 21:06

## 2019-02-18 RX ADMIN — Medication 100 MICROGRAM(S): at 05:44

## 2019-02-18 RX ADMIN — OXYCODONE HYDROCHLORIDE 20 MILLIGRAM(S): 5 TABLET ORAL at 06:18

## 2019-02-18 RX ADMIN — Medication 1 APPLICATION(S): at 17:59

## 2019-02-18 RX ADMIN — METHOCARBAMOL 500 MILLIGRAM(S): 500 TABLET, FILM COATED ORAL at 05:44

## 2019-02-18 RX ADMIN — DULOXETINE HYDROCHLORIDE 60 MILLIGRAM(S): 30 CAPSULE, DELAYED RELEASE ORAL at 11:33

## 2019-02-18 RX ADMIN — Medication 1 APPLICATION(S): at 05:54

## 2019-02-18 RX ADMIN — Medication 10 MILLIGRAM(S): at 14:04

## 2019-02-18 RX ADMIN — ATORVASTATIN CALCIUM 40 MILLIGRAM(S): 80 TABLET, FILM COATED ORAL at 21:06

## 2019-02-18 RX ADMIN — Medication 1 APPLICATION(S): at 14:05

## 2019-02-18 RX ADMIN — Medication 1 APPLICATION(S): at 21:06

## 2019-02-18 RX ADMIN — OXYCODONE AND ACETAMINOPHEN 2 TABLET(S): 5; 325 TABLET ORAL at 23:14

## 2019-02-18 RX ADMIN — SODIUM CHLORIDE 3 MILLILITER(S): 9 INJECTION INTRAMUSCULAR; INTRAVENOUS; SUBCUTANEOUS at 06:32

## 2019-02-18 RX ADMIN — BUDESONIDE AND FORMOTEROL FUMARATE DIHYDRATE 2 PUFF(S): 160; 4.5 AEROSOL RESPIRATORY (INHALATION) at 08:42

## 2019-02-18 RX ADMIN — OXYCODONE AND ACETAMINOPHEN 2 TABLET(S): 5; 325 TABLET ORAL at 10:59

## 2019-02-18 RX ADMIN — OXYCODONE HYDROCHLORIDE 20 MILLIGRAM(S): 5 TABLET ORAL at 21:04

## 2019-02-18 RX ADMIN — Medication 100 MILLIGRAM(S): at 12:03

## 2019-02-18 RX ADMIN — OXYCODONE HYDROCHLORIDE 20 MILLIGRAM(S): 5 TABLET ORAL at 21:34

## 2019-02-18 RX ADMIN — Medication 150 MILLIGRAM(S): at 14:04

## 2019-02-18 RX ADMIN — SODIUM CHLORIDE 3 MILLILITER(S): 9 INJECTION INTRAMUSCULAR; INTRAVENOUS; SUBCUTANEOUS at 21:14

## 2019-02-18 RX ADMIN — METHOCARBAMOL 500 MILLIGRAM(S): 500 TABLET, FILM COATED ORAL at 14:04

## 2019-02-18 RX ADMIN — Medication 10 MILLIGRAM(S): at 05:44

## 2019-02-18 RX ADMIN — CEFTRIAXONE 100 GRAM(S): 500 INJECTION, POWDER, FOR SOLUTION INTRAMUSCULAR; INTRAVENOUS at 05:51

## 2019-02-18 RX ADMIN — Medication 12.5 MILLIGRAM(S): at 05:44

## 2019-02-18 NOTE — CHART NOTE - NSCHARTNOTEFT_GEN_A_CORE
Limited Nutrition Follow Up    Pt continues w/ good appetite and PO intake, on avg 75% PO intake. Pt reports often receiving foods from home. Pt on carb consistent diet (evening snack). BS20, R foot charcot foot + rash. Pt is s/p I&D and wound vac placement 2/8. Last BM 2/18, no GI distress. Denies d/c instructions as pt to be d/c today.

## 2019-02-18 NOTE — PROGRESS NOTE ADULT - ASSESSMENT
54 yo F who presents with a chief complaint of painful nonhealing right foot ulcer.  Currently admitted to medicine with the primary diagnosis of wound infection.    Surgical wound dehiscence with infection s/p reconstruction of R charcot foot 1/11/19  - Wound cx: few staph aureus MSSA - blood cultures negative   - Per ID c/w cefepime + rifampin while inpatient. Clindamycin 900mg IV q8 upon discharge- d/w Dr. Millard after patient did not tolerate rocephin. home care discharge planner to set up abx for home for tomorrow (PICC line placed).  - Pain control: C/w oxycontin 20 mg uptitrated to TID. C/w percocet 10 q 6 PRN + motrin 600 mg PO PRN.  - Podiatry following- appreciated f/u, wound vac change tomorrow then possible discharge  also on neuralgia regimen with home lyrica at high dose, and muscle relaxant  pain well controlled    Lower extremity rash and cyclic pruritis  - Lidex BID and benadryl PRN  - Improving, possibly attributable to MS contin  - suggested patient gets allergy testing with allergist/immunologist as outpatient    Diabetes  - Monitor FS  - Will start insulin coverage if FSG consistently > 180    Right LE Erythema likely xerotic skin  - Bacitracin to put on fingernails in order to prevent spread of infection to feet  - Venous duplex LE negative for DVT; Left Bakers cyst     CAD/ DLD  - C/w aspirin + statin  - Takes ezetimibe at home    HTN  - C/w metoprolol 12.5 BID  - Lisinopril on hold as previous episodes of hypotension     COPD  - Symbicort and albuterol PRN   - Home meds: Qvar & Ventolin    Sciatica/Peripheral neuralgia   - c/w duloxetine and Lyrica    DVT prophylaxis : Lovenox   GI prophylaxis : Protonix       Activity : Full weight bearing LLE and non weight bearing RLE  Diet : Consistent carbohydrate with evening snack    Disposition : PICC abx for home, wound vac for home possible discharge tomorrow

## 2019-02-18 NOTE — PROVIDER CONTACT NOTE (OTHER) - ASSESSMENT
Podiatry was paged to come apply vac but pt had left unit to smoke. Podiatry was then contacted again via [employer] to come back once again. Once resident arrived to pts room 10 minutes later, the pt had left room again. Medicine resident was contacted at [phone] and the situation was explained to resident in detail. Pt is clear for d/c and pt should take vac supplies w/ her and have vac applied by [facility] upon their arrival. Pt has been non-compliant w/ verbal instructions to remain in room until podiatry is able to apply wound vac, but d/c can not be held up due to pts lack of compliance.

## 2019-02-19 VITALS
RESPIRATION RATE: 16 BRPM | SYSTOLIC BLOOD PRESSURE: 122 MMHG | TEMPERATURE: 98 F | HEART RATE: 96 BPM | DIASTOLIC BLOOD PRESSURE: 75 MMHG

## 2019-02-19 LAB
GLUCOSE BLDC GLUCOMTR-MCNC: 109 MG/DL — HIGH (ref 70–99)
GLUCOSE BLDC GLUCOMTR-MCNC: 210 MG/DL — HIGH (ref 70–99)

## 2019-02-19 RX ORDER — OXYCODONE HYDROCHLORIDE 5 MG/1
1 TABLET ORAL
Qty: 0 | Refills: 0 | COMMUNITY
End: 2019-02-24

## 2019-02-19 RX ADMIN — DULOXETINE HYDROCHLORIDE 60 MILLIGRAM(S): 30 CAPSULE, DELAYED RELEASE ORAL at 13:04

## 2019-02-19 RX ADMIN — OXYCODONE HYDROCHLORIDE 20 MILLIGRAM(S): 5 TABLET ORAL at 05:46

## 2019-02-19 RX ADMIN — Medication 100 MILLIGRAM(S): at 05:42

## 2019-02-19 RX ADMIN — Medication 150 MILLIGRAM(S): at 13:12

## 2019-02-19 RX ADMIN — Medication 10 MILLIGRAM(S): at 05:42

## 2019-02-19 RX ADMIN — Medication 1 APPLICATION(S): at 05:43

## 2019-02-19 RX ADMIN — OXYCODONE HYDROCHLORIDE 20 MILLIGRAM(S): 5 TABLET ORAL at 13:20

## 2019-02-19 RX ADMIN — Medication 81 MILLIGRAM(S): at 13:03

## 2019-02-19 RX ADMIN — METHOCARBAMOL 500 MILLIGRAM(S): 500 TABLET, FILM COATED ORAL at 05:42

## 2019-02-19 RX ADMIN — Medication 1 APPLICATION(S): at 05:44

## 2019-02-19 RX ADMIN — BUDESONIDE AND FORMOTEROL FUMARATE DIHYDRATE 2 PUFF(S): 160; 4.5 AEROSOL RESPIRATORY (INHALATION) at 08:45

## 2019-02-19 RX ADMIN — Medication 1 APPLICATION(S): at 13:12

## 2019-02-19 RX ADMIN — CHLORHEXIDINE GLUCONATE 1 APPLICATION(S): 213 SOLUTION TOPICAL at 05:43

## 2019-02-19 RX ADMIN — OXYCODONE HYDROCHLORIDE 20 MILLIGRAM(S): 5 TABLET ORAL at 06:16

## 2019-02-19 RX ADMIN — Medication 10 MILLIGRAM(S): at 13:05

## 2019-02-19 RX ADMIN — Medication 5 MILLIGRAM(S): at 05:44

## 2019-02-19 RX ADMIN — SODIUM CHLORIDE 3 MILLILITER(S): 9 INJECTION INTRAMUSCULAR; INTRAVENOUS; SUBCUTANEOUS at 13:21

## 2019-02-19 RX ADMIN — Medication 1 APPLICATION(S): at 13:05

## 2019-02-19 RX ADMIN — Medication 150 MILLIGRAM(S): at 05:47

## 2019-02-19 RX ADMIN — Medication 12.5 MILLIGRAM(S): at 05:42

## 2019-02-19 RX ADMIN — PANTOPRAZOLE SODIUM 40 MILLIGRAM(S): 20 TABLET, DELAYED RELEASE ORAL at 06:04

## 2019-02-19 RX ADMIN — SODIUM CHLORIDE 3 MILLILITER(S): 9 INJECTION INTRAMUSCULAR; INTRAVENOUS; SUBCUTANEOUS at 06:04

## 2019-02-19 RX ADMIN — METHOCARBAMOL 500 MILLIGRAM(S): 500 TABLET, FILM COATED ORAL at 13:05

## 2019-02-19 RX ADMIN — Medication 100 MICROGRAM(S): at 05:42

## 2019-02-19 RX ADMIN — Medication 1 APPLICATION(S): at 05:47

## 2019-02-19 RX ADMIN — OXYCODONE AND ACETAMINOPHEN 2 TABLET(S): 5; 325 TABLET ORAL at 10:12

## 2019-02-19 RX ADMIN — Medication 100 MILLIGRAM(S): at 13:12

## 2019-02-19 NOTE — PROGRESS NOTE ADULT - REASON FOR ADMISSION
right foot infection

## 2019-02-19 NOTE — PROGRESS NOTE ADULT - NSHPATTENDINGPLANDISCUSS_GEN_ALL_CORE
above
above
the patient at bedside.
above
above
the intern in round.
the patient at bedside.
the patient at bedside.
Floor resident
above

## 2019-02-19 NOTE — PROGRESS NOTE ADULT - PROVIDER SPECIALTY LIST ADULT
Hospitalist
Infectious Disease
Internal Medicine
Podiatry
Internal Medicine
Hospitalist
Internal Medicine
Hospitalist

## 2019-02-19 NOTE — CHART NOTE - NSCHARTNOTEFT_GEN_A_CORE
<<<RESIDENT DISCHARGE NOTE>>>     CALLY HARTMAN  MRN-[id number]    VITAL SIGNS:  T(F): 98.5 (02-19-19 @ 05:26), Max: 98.5 (02-19-19 @ 05:26)  HR: 96 (02-19-19 @ 06:49)  BP: 117/61 (02-19-19 @ 05:26)  SpO2: --      PHYSICAL EXAMINATION:  GEN: No acute distress  LUNGS: Clear to auscultation bilaterally   HEART: S1/S2 present. RRR.   ABD: Soft, non-tender, non-distended. Bowel sounds present  EXT: Right lower extremity in dressing and swollen but non tender and non erythematous (above the dressing), vac placed by podiatry  NEURO: AAOX3    TEST RESULTS:              FINAL DISCHARGE INTERVIEW:  Resident(s) Present: (Name:__Elle Crane__________), RN Present: (Name:  _______Darren____)    DISCHARGE MEDICATION RECONCILIATION  reviewed with Attending (Name:_Dr Villareal__________)    DISPOSITION:   [  ] Home,    [  ] Home with Visiting Nursing Services,   [    ]  SNF/ NH,    [   ] Acute Rehab (4A),   [   ] Other (Specify:_________)

## 2019-02-19 NOTE — PROGRESS NOTE ADULT - ASSESSMENT
52 yo F who presents with a chief complaint of painful nonhealing right foot ulcer.  Currently admitted to medicine with the primary diagnosis of wound infection.    Surgical wound dehiscence with infection s/p reconstruction of R charcot foot 1/11/19  - Wound cx: few staph aureus MSSA - blood cultures negative   - Per ID c/w cefepime + rifampin while inpatient. Clindamycin 900mg IV q8 upon discharge- d/w Dr. Millard after patient did not tolerate rocephin. home care discharge planner to set up abx for home (PICC line placed).  - Pain control: C/w oxycontin 20 mg uptitrated to TID. C/w percocet 10 q 6 PRN + motrin 600 mg PO PRN.  - Podiatry following- appreciated f/u, s/p wound vac change today with podiatry  also on neuralgia regimen with home lyrica at high dose, and muscle relaxant  pain well controlled    Lower extremity rash and cyclic pruritis  - Lidex BID and benadryl PRN  - suggested patient gets allergy testing with allergist/immunologist as outpatient    Diabetes  - Monitor FS  - Will start insulin coverage if FSG consistently > 180    Right LE Erythema likely xerotic skin  - Bacitracin to put on fingernails in order to prevent spread of infection to feet  - Venous duplex LE negative for DVT; Left Bakers cyst     CAD/ DLD  - C/w aspirin + statin  - Takes ezetimibe at home    HTN  - C/w metoprolol 12.5 BID  - Lisinopril on hold as previous episodes of hypotension     COPD  - Symbicort and albuterol PRN   - Home meds: Qvar & Ventolin    Sciatica/Peripheral neuralgia   - c/w duloxetine and Lyrica    DVT prophylaxis : Lovenox   GI prophylaxis : Protonix       Activity : Full weight bearing LLE and non weight bearing RLE  Diet : Consistent carbohydrate with evening snack    Disposition : PICC abx for home, wound vac for home, discharge home today/ home services set up

## 2019-02-19 NOTE — PROGRESS NOTE ADULT - ATTENDING COMMENTS
Patient seen and examined independently of resident. My addendum supersedes resident notation. Case discussed with housestaff, nursing and patient
Patient seen and examined independently of resident. My addendum supersedes resident notation. Case discussed with housestaff, nursing and patient  Patient seen independently of resident.  >30 minutes spent coordinating discharge planning, medicine reconciliation, follow up plan, and direct patient encounter  see discharge summary for further recommendation
Patient seen and evaluated bedside   there is persistent dehiscence to the second MTPJ surgical site.  I discussed with the patient's soft tissue and bone debridement, with application of graft/wound VAC.  Booked tentatively for February 8, 2019.  I discussed with patient possible complications of the procedure, including removal of hardware, failure of second ray reconstruction.  Patient agreed to the procedure
Patient seen and examined independently of resident. My addendum supersedes resident notation. Case discussed with housestaff, nursing and patient
Patient seen and examined independently of resident. My note supersedes resident notation. Case discussed with housestaff, nursing and patient
Patient seen and examined independently of resident. My note supersedes resident notation. Case discussed with housestaff, nursing and patient and  at bedside
Patient seen and examined independently of resident. My note supersedes resident notation. Case discussed with housestaff, nursing and patient and  at bedside
Pt seen bedside   Post op debridement is well granulating   Today wet to dry and restart wound Vac ana   patient is cleared for discharge 02/18/19 with wound vac q48 hrs strict non weightbearing   prognosis guarded

## 2019-02-22 ENCOUNTER — INPATIENT (INPATIENT)
Facility: HOSPITAL | Age: 54
LOS: 2 days | Discharge: HOME HEALTH | End: 2019-02-25
Attending: INTERNAL MEDICINE | Admitting: INTERNAL MEDICINE

## 2019-02-22 VITALS
RESPIRATION RATE: 20 BRPM | DIASTOLIC BLOOD PRESSURE: 67 MMHG | OXYGEN SATURATION: 94 % | SYSTOLIC BLOOD PRESSURE: 147 MMHG | HEART RATE: 102 BPM | TEMPERATURE: 98 F

## 2019-02-22 DIAGNOSIS — E11.610 TYPE 2 DIABETES MELLITUS WITH DIABETIC NEUROPATHIC ARTHROPATHY: Chronic | ICD-10-CM

## 2019-02-22 DIAGNOSIS — Z98.890 OTHER SPECIFIED POSTPROCEDURAL STATES: Chronic | ICD-10-CM

## 2019-02-22 LAB
ALBUMIN SERPL ELPH-MCNC: 4.1 G/DL — SIGNIFICANT CHANGE UP (ref 3.5–5.2)
ALP SERPL-CCNC: 111 U/L — SIGNIFICANT CHANGE UP (ref 30–115)
ALT FLD-CCNC: 10 U/L — SIGNIFICANT CHANGE UP (ref 0–41)
ANION GAP SERPL CALC-SCNC: 13 MMOL/L — SIGNIFICANT CHANGE UP (ref 7–14)
AST SERPL-CCNC: 17 U/L — SIGNIFICANT CHANGE UP (ref 0–41)
BASOPHILS # BLD AUTO: 0.05 K/UL — SIGNIFICANT CHANGE UP (ref 0–0.2)
BASOPHILS NFR BLD AUTO: 0.5 % — SIGNIFICANT CHANGE UP (ref 0–1)
BILIRUB SERPL-MCNC: <0.2 MG/DL — SIGNIFICANT CHANGE UP (ref 0.2–1.2)
BUN SERPL-MCNC: 3 MG/DL — LOW (ref 10–20)
CALCIUM SERPL-MCNC: 9.5 MG/DL — SIGNIFICANT CHANGE UP (ref 8.5–10.1)
CHLORIDE SERPL-SCNC: 103 MMOL/L — SIGNIFICANT CHANGE UP (ref 98–110)
CO2 SERPL-SCNC: 25 MMOL/L — SIGNIFICANT CHANGE UP (ref 17–32)
CREAT SERPL-MCNC: 0.6 MG/DL — LOW (ref 0.7–1.5)
EOSINOPHIL # BLD AUTO: 0.7 K/UL — SIGNIFICANT CHANGE UP (ref 0–0.7)
EOSINOPHIL NFR BLD AUTO: 6.7 % — SIGNIFICANT CHANGE UP (ref 0–8)
GLUCOSE SERPL-MCNC: 117 MG/DL — HIGH (ref 70–99)
HCT VFR BLD CALC: 37.5 % — SIGNIFICANT CHANGE UP (ref 37–47)
HGB BLD-MCNC: 11.3 G/DL — LOW (ref 12–16)
IMM GRANULOCYTES NFR BLD AUTO: 0.3 % — SIGNIFICANT CHANGE UP (ref 0.1–0.3)
LYMPHOCYTES # BLD AUTO: 1.78 K/UL — SIGNIFICANT CHANGE UP (ref 1.2–3.4)
LYMPHOCYTES # BLD AUTO: 17 % — LOW (ref 20.5–51.1)
MCHC RBC-ENTMCNC: 27.3 PG — SIGNIFICANT CHANGE UP (ref 27–31)
MCHC RBC-ENTMCNC: 30.1 G/DL — LOW (ref 32–37)
MCV RBC AUTO: 90.6 FL — SIGNIFICANT CHANGE UP (ref 81–99)
MONOCYTES # BLD AUTO: 0.81 K/UL — HIGH (ref 0.1–0.6)
MONOCYTES NFR BLD AUTO: 7.7 % — SIGNIFICANT CHANGE UP (ref 1.7–9.3)
NEUTROPHILS # BLD AUTO: 7.13 K/UL — HIGH (ref 1.4–6.5)
NEUTROPHILS NFR BLD AUTO: 67.8 % — SIGNIFICANT CHANGE UP (ref 42.2–75.2)
NRBC # BLD: 0 /100 WBCS — SIGNIFICANT CHANGE UP (ref 0–0)
PLATELET # BLD AUTO: 305 K/UL — SIGNIFICANT CHANGE UP (ref 130–400)
POTASSIUM SERPL-MCNC: 3.9 MMOL/L — SIGNIFICANT CHANGE UP (ref 3.5–5)
POTASSIUM SERPL-SCNC: 3.9 MMOL/L — SIGNIFICANT CHANGE UP (ref 3.5–5)
PROT SERPL-MCNC: 7.7 G/DL — SIGNIFICANT CHANGE UP (ref 6–8)
RBC # BLD: 4.14 M/UL — LOW (ref 4.2–5.4)
RBC # FLD: 16.2 % — HIGH (ref 11.5–14.5)
SODIUM SERPL-SCNC: 141 MMOL/L — SIGNIFICANT CHANGE UP (ref 135–146)
WBC # BLD: 10.5 K/UL — SIGNIFICANT CHANGE UP (ref 4.8–10.8)
WBC # FLD AUTO: 10.5 K/UL — SIGNIFICANT CHANGE UP (ref 4.8–10.8)

## 2019-02-22 RX ORDER — PANTOPRAZOLE SODIUM 20 MG/1
40 TABLET, DELAYED RELEASE ORAL
Qty: 0 | Refills: 0 | Status: DISCONTINUED | OUTPATIENT
Start: 2019-02-22 | End: 2019-02-25

## 2019-02-22 RX ORDER — DULOXETINE HYDROCHLORIDE 30 MG/1
60 CAPSULE, DELAYED RELEASE ORAL DAILY
Qty: 0 | Refills: 0 | Status: DISCONTINUED | OUTPATIENT
Start: 2019-02-22 | End: 2019-02-25

## 2019-02-22 RX ORDER — ATORVASTATIN CALCIUM 80 MG/1
40 TABLET, FILM COATED ORAL AT BEDTIME
Qty: 0 | Refills: 0 | Status: DISCONTINUED | OUTPATIENT
Start: 2019-02-22 | End: 2019-02-25

## 2019-02-22 RX ORDER — OXYCODONE AND ACETAMINOPHEN 5; 325 MG/1; MG/1
1 TABLET ORAL EVERY 6 HOURS
Qty: 0 | Refills: 0 | Status: DISCONTINUED | OUTPATIENT
Start: 2019-02-22 | End: 2019-02-23

## 2019-02-22 RX ORDER — METOPROLOL TARTRATE 50 MG
12.5 TABLET ORAL
Qty: 0 | Refills: 0 | Status: DISCONTINUED | OUTPATIENT
Start: 2019-02-22 | End: 2019-02-25

## 2019-02-22 RX ORDER — ASPIRIN/CALCIUM CARB/MAGNESIUM 324 MG
81 TABLET ORAL DAILY
Qty: 0 | Refills: 0 | Status: DISCONTINUED | OUTPATIENT
Start: 2019-02-22 | End: 2019-02-25

## 2019-02-22 RX ORDER — BUDESONIDE AND FORMOTEROL FUMARATE DIHYDRATE 160; 4.5 UG/1; UG/1
2 AEROSOL RESPIRATORY (INHALATION)
Qty: 0 | Refills: 0 | Status: DISCONTINUED | OUTPATIENT
Start: 2019-02-22 | End: 2019-02-25

## 2019-02-22 RX ORDER — DOCUSATE SODIUM 100 MG
100 CAPSULE ORAL
Qty: 0 | Refills: 0 | Status: DISCONTINUED | OUTPATIENT
Start: 2019-02-22 | End: 2019-02-25

## 2019-02-22 RX ORDER — LEVOTHYROXINE SODIUM 125 MCG
100 TABLET ORAL DAILY
Qty: 0 | Refills: 0 | Status: DISCONTINUED | OUTPATIENT
Start: 2019-02-22 | End: 2019-02-25

## 2019-02-22 RX ORDER — ENOXAPARIN SODIUM 100 MG/ML
40 INJECTION SUBCUTANEOUS DAILY
Qty: 0 | Refills: 0 | Status: DISCONTINUED | OUTPATIENT
Start: 2019-02-22 | End: 2019-02-25

## 2019-02-22 RX ORDER — SENNA PLUS 8.6 MG/1
2 TABLET ORAL AT BEDTIME
Qty: 0 | Refills: 0 | Status: DISCONTINUED | OUTPATIENT
Start: 2019-02-22 | End: 2019-02-25

## 2019-02-22 RX ORDER — ALBUTEROL 90 UG/1
2 AEROSOL, METERED ORAL EVERY 6 HOURS
Qty: 0 | Refills: 0 | Status: DISCONTINUED | OUTPATIENT
Start: 2019-02-22 | End: 2019-02-25

## 2019-02-22 RX ORDER — OXYCODONE HYDROCHLORIDE 5 MG/1
20 TABLET ORAL THREE TIMES A DAY
Qty: 0 | Refills: 0 | Status: DISCONTINUED | OUTPATIENT
Start: 2019-02-22 | End: 2019-02-25

## 2019-02-22 RX ADMIN — Medication 100 MILLIGRAM(S): at 23:53

## 2019-02-22 RX ADMIN — ATORVASTATIN CALCIUM 40 MILLIGRAM(S): 80 TABLET, FILM COATED ORAL at 23:53

## 2019-02-22 RX ADMIN — Medication 12.5 MILLIGRAM(S): at 23:53

## 2019-02-22 RX ADMIN — Medication 150 MILLIGRAM(S): at 22:16

## 2019-02-22 RX ADMIN — OXYCODONE HYDROCHLORIDE 20 MILLIGRAM(S): 5 TABLET ORAL at 22:23

## 2019-02-22 RX ADMIN — DULOXETINE HYDROCHLORIDE 60 MILLIGRAM(S): 30 CAPSULE, DELAYED RELEASE ORAL at 23:53

## 2019-02-22 RX ADMIN — Medication 100 MILLIGRAM(S): at 16:44

## 2019-02-22 NOTE — ED PROVIDER NOTE - OBJECTIVE STATEMENT
53y F w PMH R charcot foot arthropathy, HTN, chronic tachycardia, DM, peripheral neuralgia, chronic R foot infection s/p surgery being treated at home with clindamycin via a PICC line. Pt was sent in for evaluation by [facility] because they were having trouble pulling back on her PICC line.

## 2019-02-22 NOTE — H&P ADULT - ATTENDING COMMENTS
53F with PMHx DMII, CAD, HTN, Hypothyroidism, Charcot foot, presents with a chief complaint malfunctioning PICC line. Patient was discharged on 2/19 with PICC line to treat infected surgical wound dehiscence of R charcot foot reconstruction. On recent admission patient was treated with IV abx and followed by podiatry. Podiatry did daily wound dressing changes and excisional Debridement on 02/08/2019 of soft tissue right foot with oasis application and wound vac therapy. Wound cultures showed few MSSA. PICC line was placed and was discharged on IV clindamycin 900mg Q8 (to stop after 3/12/19). Patient followed up with Dr. Diaz today and wound vac was removed and dressing was changed. Patient advised for ED evaluation of malfunctioning PICC. ED unable to fix PICC line and new peripheral line was placed and IR was called for replacement of PICC however the earliest it can be done is Monday.     REVIEW OF SYSTEMS:    CONSTITUTIONAL: No weakness, fevers or chills  EYES/ENT: No visual changes;  No vertigo or throat pain   NECK: No pain or stiffness  RESPIRATORY: No cough, wheezing, hemoptysis; No shortness of breath  CARDIOVASCULAR: No chest pain or palpitations  GASTROINTESTINAL: No abdominal or epigastric pain. No nausea, vomiting, or hematemesis; No diarrhea or constipation. No melena or hematochezia.  GENITOURINARY: No dysuria, frequency or hematuria  NEUROLOGICAL: No numbness or weakness  SKIN: No itching, rashes    Physical Exam:    General: WN/WD NAD  Neurology: A&Ox3, nonfocal, follows commands  Eyes: PERRLA/ EOMI  ENT/Neck: Neck supple, trachea midline, No JVD  Respiratory: CTA B/L, No wheezing, rales, rhonchi  CV: Normal rate regular rhythm, S1S2, no murmurs, rubs or gallops  Abdominal: Soft, NT, ND +BS,   Extremities: No edema, + peripheral pulses  Skin: No Rashes, Hematoma, Ecchymosis  Incisions: n/a  Tubes:n/a    A/P  Malfunctioning PICC line in patient in need of IV abx  -IR eval on Monday    Charcot foot deformity s/p reconstruction /Wound dehiscence / Infected wound  - IV abx  -PRN pain Rx  -Podiatry eval     h/o CAD / HTN   -c/w current Rx    DM type II /hypothyroidism /dyslipidemia   - c/w current Rx  -F/S monitoring     Chronic pain / sciatica/ diabetic neuropathy   -c/w outpatient Rx    DVT prophylaxis 53F with PMHx DMII, CAD, HTN, Hypothyroidism, Charcot foot, presents with a chief complaint malfunctioning PICC line. Patient was discharged on 2/19 with PICC line to treat infected surgical wound dehiscence of R charcot foot reconstruction. On recent admission patient was treated with IV abx and followed by podiatry. Podiatry did daily wound dressing changes and excisional Debridement on 02/08/2019 of soft tissue right foot with oasis application and wound vac therapy. Wound cultures showed few MSSA. PICC line was placed and was discharged on IV clindamycin 900mg Q8 (to stop after 3/12/19). Patient followed up with Dr. Diaz today and wound vac was removed and dressing was changed. Patient advised for ED evaluation of malfunctioning PICC. ED unable to fix PICC line and new peripheral line was placed and IR was called for replacement of PICC however the earliest it can be done is Monday.     REVIEW OF SYSTEMS: see cc/HPI  CONSTITUTIONAL: No weakness, fevers or chills  EYES/ENT: No visual changes;  No vertigo or throat pain   NECK: No pain or stiffness  RESPIRATORY: No cough, wheezing, hemoptysis; No shortness of breath  CARDIOVASCULAR: No chest pain or palpitations  GASTROINTESTINAL: No abdominal or epigastric pain. No nausea, vomiting, or hematemesis; No diarrhea or constipation. No melena or hematochezia.  GENITOURINARY: No dysuria, frequency or hematuria  NEUROLOGICAL: No numbness or weakness  SKIN: No itching, rashes    Physical Exam:  General: WN/WD NAD  Neurology: A&Ox3, nonfocal, follows commands  Eyes: PERRLA/ EOMI  ENT/Neck: Neck supple, trachea midline, No JVD  Respiratory: CTA B/L, No wheezing, rales, rhonchi  CV: Normal rate regular rhythm, S1S2, no murmurs, rubs or gallops  Abdominal: Soft, NT, ND +BS,   Extremities: R LE soft cast in place, No edema, + peripheral pulses  Skin: No Rashes, Hematoma, Ecchymosis  Incisions: n/a  Tubes:n/a    A/P  Malfunctioning PICC line in patient in need of IV abx  -IR eval on Monday    Charcot foot deformity s/p reconstruction /Wound dehiscence / Infected wound  - IV abx  -PRN pain Rx  -Podiatry eval     h/o CAD / HTN   -c/w current Rx    DM type II /hypothyroidism /dyslipidemia   - c/w current Rx  -F/S monitoring     Chronic pain / sciatica/ diabetic neuropathy   -c/w outpatient Rx    DVT prophylaxis

## 2019-02-22 NOTE — ED PROVIDER NOTE - NS ED ROS FT
Constitutional: No fever or chills. Normal appetite. No unintended weight loss.   Eyes: No vision changes.  ENT: No hearing changes. No ear pain. No sore throat.  Neck: No neck pain or stiffness.  Cardiovascular: No chest pain, palpitations, or edema.  Pulmonary: No cough or SOB. No hemoptysis.  Abdominal: No abdominal pain, nausea, vomiting, or diarrhea.   : No dysuria or frequency. No hematuria.   Neuro: No headache, syncope, or dizziness.  MS: Chronic back pain. No calf pain/swelling.  Psych: No suicidal or homicidal ideations.

## 2019-02-22 NOTE — ED PROVIDER NOTE - ATTENDING CONTRIBUTION TO CARE
53 year old female, pmhx as documented above, PICC Line in place for outpatient IV clindamycin for foot infection, presenting for decreased function of the PICC line. Patient was sent by visiting nurses since the picc line was having slow infusion rates and they were unable to draw back on it. Patient otherwise denies symptoms or complaints including fevers, headache, vision changes, weakness/numbness, confusion, URI symptoms, neck pain, chest pain, back pain, dyspnea, cough, palpitations, nausea, vomiting, abdominal pain, diarrhea, constipation, blood in stool/dark stools, urinary symptoms, vaginal bleeding/discharge,  leg swelling, rash, recent travel or sick contacts.    Vital Signs: I have reviewed the initial vital signs.  Constitutional: NAD, well-nourished, appears stated age, no acute distress.  HEENT: Airway patent, moist MM, no erythema/swelling/deformity of oral structures. EOMI, PERRLA.  CV: regular rate, regular rhythm, well-perfused extremities, 2+ b/l DP and radial pulses equal.  Lungs: BCTA, no increased WOB.  ABD: NTND, no guarding or rebound, no pulsatile mass, no hernias.   MSK: Neck supple, nontender, nl ROM, no stepoff. Chest nontender. Back nontender in TLS spine or to b/l bony structures or flanks. Ext nontender, nl rom, no deformity. (+) PICC line in place, no surrounding erythema or fluctuance, no drainage. PICC flushes with moderate difficulty but no drawback of blood.  INTEG: Skin warm, dry, no rash.  NEURO: A&Ox3, normal strength, nl sensation throughout, normal speech.   PSYCH: Calm, cooperative, normal affect and interaction.    Will attempt to see if IR can do replacement PICC line within 48 hr (patient 2 midnight and could be obs candidate). If IR unable to do this, will require admission since PICC line is not fully functional and patient would require a wait of >48hr, therefore not obs candidate

## 2019-02-22 NOTE — ED ADULT NURSE NOTE - NSIMPLEMENTINTERV_GEN_ALL_ED
Implemented All Fall Risk Interventions:  [place] to call system. Call bell, personal items and telephone within reach. Instruct patient to call for assistance. Room bathroom lighting operational. Non-slip footwear when patient is off stretcher. Physically safe environment: no spills, clutter or unnecessary equipment. Stretcher in lowest position, wheels locked, appropriate side rails in place. Provide visual cue, wrist band, yellow gown, etc. Monitor gait and stability. Monitor for mental status changes and reorient to person, place, and time. Review medications for side effects contributing to fall risk. Reinforce activity limits and safety measures with patient and family.

## 2019-02-22 NOTE — H&P ADULT - NSHPPHYSICALEXAM_GEN_ALL_CORE
PHYSICAL EXAM:  GEN: No acute distress  LUNGS: Clear to auscultation bilaterally   HEART: S1/S2 present. RRR.   ABD: Soft, non-tender, non-distended. Bowel sounds present  EXT: NC/NC/NE/2+PP/LING  NEURO: AAOX3 PHYSICAL EXAM:  GEN: No acute distress  LUNGS: Clear to auscultation bilaterally   HEART: S1/S2 present. RRR.   ABD: Soft, non-tender, non-distended. Bowel sounds present  EXT: R foot dressing in tact clean and dry   NEURO: AAOX3

## 2019-02-22 NOTE — ED PROVIDER NOTE - CARE PLAN
Principal Discharge DX:	Complication associated with peripherally inserted central catheter, initial encounter

## 2019-02-22 NOTE — ED PROVIDER NOTE - CLINICAL SUMMARY MEDICAL DECISION MAKING FREE TEXT BOX
Patient presented with dysfunctional PICC line, no other symptoms, HD stable, afebrile. Can flush PICC line but with moderate difficulty, and no drawback of blood from line. Due to this dysfunction, spoke with IR who said case is not emergent and therefore patient can be placed on schedule for Monday (today is Friday and IR not doing procedures over the weekend). Patient is 2-midnight based on insurance but since PICC line cannot be done within that timeframe, she will require admission, rather than obs, for IV clindamycin while awaiting new PICC line placement. Patient agreeable with this plan.

## 2019-02-22 NOTE — H&P ADULT - NSHPLABSRESULTS_GEN_ALL_CORE
Vitals:    Vital Signs Last 24 Hrs  T(C): 36.4 (22 Feb 2019 15:09), Max: 36.4 (22 Feb 2019 15:09)  T(F): 97.6 (22 Feb 2019 15:09), Max: 97.6 (22 Feb 2019 15:09)  HR: 102 (22 Feb 2019 15:09) (102 - 102)  BP: 147/67 (22 Feb 2019 15:09) (147/67 - 147/67)  BP(mean): --  RR: 20 (22 Feb 2019 15:09) (20 - 20)  SpO2: 94% (22 Feb 2019 15:09) (94% - 94%)    Labs:     02-22    141  |  103  |  3<L>  ----------------------------<  117<H>  3.9   |  25  |  0.6<L>    Ca    9.5      22 Feb 2019 18:35    TPro  7.7  /  Alb  4.1  /  TBili  <0.2  /  DBili  x   /  AST  17  /  ALT  10  /  AlkPhos  111  02-22                          11.3   10.50 )-----------( 305      ( 22 Feb 2019 18:35 )             37.5       LIVER FUNCTIONS - ( 22 Feb 2019 18:35 )  Alb: 4.1 g/dL / Pro: 7.7 g/dL / ALK PHOS: 111 U/L / ALT: 10 U/L / AST: 17 U/L / GGT: x

## 2019-02-22 NOTE — ED PROVIDER NOTE - PROGRESS NOTE DETAILS
Attempted to draw back on the PICC line. Was able to draw back immediately after flushing hard, but shortly afterward was unable to draw back. Attempted to contact IR to schedule procedure for tomorrow, but the case is not emergent. Pt will have to be admitted.

## 2019-02-22 NOTE — H&P ADULT - ASSESSMENT
53F with PMHx DMII, CAD, HTN, Hypothyroidism, Charcot foot, presents with a chief complaint malfunctioning PICC line. Patient was discharged on 2/20 with PICC line to treat infected surgical wound dehiscence of R charcot foot reconstruction on 1/11/2019. On most recent admission patient was treated with IV abx and followed by podiatry. Podiatry did daily wound dressing changes. Excisional Debridement on 02/08/2019 of soft tissue right foot with oasis application and wound vac therapy. Wound vac was changed on 2/15, 2/17 and 2/18. Wound cultures showed few MSSA. PICC line was placed and was discharged on IV clindamycin 900mg Q8 (to stop after 3/12/19).    #Surgical wound dehiscence with infection s/p reconstruction of R charcot foot 1/11/19  - Wound cx: few staph aureus MSSA    - Pain control: C/w oxycontin 20 mg uptitrated to TID. C/w percocet 10 q 6 PRN + motrin 600 mg PO PRN.  - Podiatry following- appreciated f/u, s/p wound vac change today with podiatry also on neuralgia regimen with home lyrica at high dose, and muscle relaxant pain well controlled    #Diabetes  - Monitor FS  - Will start insulin coverage if FSG consistently > 180    Right LE Erythema likely xerotic skin  - Bacitracin to put on fingernails in order to prevent spread of infection to feet  - Venous duplex LE negative for DVT; Left Bakers cyst     #CAD/ DLD  - C/w aspirin + statin  - Takes ezetimibe at home    #HTN  - C/w metoprolol 12.5 BID  - Lisinopril on hold as previous episodes of hypotension     #COPD  - Symbicort and albuterol PRN   - Home meds: Qvar & Ventolin    #Sciatica/Peripheral neuralgia   - c/w duloxetine and Lyrica    DVT prophylaxis : Lovenox   GI prophylaxis : Protonix     Activity : Full weight bearing LLE and non weight bearing RLE  Diet : Consistent carbohydrate with evening snack    Disposition : PICC abx for home, wound vac for home, discharge home today/ home services set up 53F with PMHx DMII, CAD, HTN, Hypothyroidism, Charcot foot, presents with a chief complaint malfunctioning PICC line. Patient was discharged on 2/19 with PICC line to treat infected surgical wound dehiscence of R charcot foot reconstruction     #Malfunctioning PICC  - IR evaluation on monday   - unable to successfully fix in ED     #Surgical wound dehiscence with infection s/p reconstruction of R charcot foot    - Wound cx: few staph aureus MSSA    - Excisional Debridement on 02/08/2019 of soft tissue right foot with oasis application and wound vac - wound vac now discontinued   - Pain control: C/w oxycontin 20 mg TID. C/w percocet 5q 6 PRN    - Podiatry eval    #Diabetes II   - Monitor FS  - Will start insulin coverage if FSG consistently > 180    #CAD/ DLD  - C/w aspirin + statin    #HTN  - C/w metoprolol 12.5 BID    #COPD  - Symbicort and albuterol PRN     #Sciatica/Peripheral neuralgia   - c/w duloxetine and Lyrica    DVT prophylaxis : Lovenox   GI prophylaxis : Protonix   Activity : Full weight bearing LLE and non weight bearing RLE  Diet : Consistent carbohydrate with evening snack    Disposition : dc home with services after PICC line evaluation by IR

## 2019-02-22 NOTE — H&P ADULT - HISTORY OF PRESENT ILLNESS
53F with PMHx DMII, CAD, HTN, Hypothyroidism, Charcot foot, presents with a chief complaint malfunctioning PICC line. Patient was discharged on 2/20 with PICC line to treat infected surgical wound dehiscence of R charcot foot reconstruction on 1/11/2019. On most recent admission patient was treated with IV abx and followed by podiatry. Podiatry did daily wound dressing changes. Excisional Debridement on 02/08/2019 of soft tissue right foot with oasis application and wound vac therapy. Wound vac was changed on 2/15, 2/17 and 2/18. Wound cultures showed few MSSA. PICC line was placed and was discharged on IV clindamycin 900mg Q8 (to stop after 3/12/19). 53F with PMHx DMII, CAD, HTN, Hypothyroidism, Charcot foot, presents with a chief complaint malfunctioning PICC line. Patient was discharged on 2/19 with PICC line to treat infected surgical wound dehiscence of R charcot foot reconstruction. On recent admission patient was treated with IV abx and followed by podiatry. Podiatry did daily wound dressing changes and excisional Debridement on 02/08/2019 of soft tissue right foot with oasis application and wound vac therapy. Wound cultures showed few MSSA. PICC line was placed and was discharged on IV clindamycin 900mg Q8 (to stop after 3/12/19). Patient followed up with Dr. Diaz today and wound vac was removed and dressing was changed. Patient advised for ED evaluation of malfunctioning PICC. ED unable to fix PICC line and new peripheral line was placed and IR was called for replacement of PICC however the earliest it can be done is Monday.

## 2019-02-22 NOTE — ED PROVIDER NOTE - PHYSICAL EXAMINATION
Constitutional: Well developed, well nourished. NAD. Obese  Head: Atraumatic.  Eyes: EOMI without discomfort.   ENT: No nasal discharge. Mucous membranes moist.  Neck: Supple. Painless ROM.  Cardiovascular: Regular rhythm. Borderline tachycardia. Normal S1 and S2. No murmurs. 2+ pulses in all extremities.   Pulmonary: Normal respiratory rate and effort. Lungs clear to auscultation bilaterally. No wheezing, rales, or rhonchi. Bilateral, equal lung expansion.   Abdominal: Soft. Nondistended. Nontender. No rebound or guarding.   Extremities: RLE wound with cast.   Skin: No rashes.   Neuro: AAOx3. No focal neurological deficits.  Psych: Normal mood. Normal affect.

## 2019-02-23 LAB
ANION GAP SERPL CALC-SCNC: 20 MMOL/L — HIGH (ref 7–14)
BASOPHILS # BLD AUTO: 0.07 K/UL — SIGNIFICANT CHANGE UP (ref 0–0.2)
BASOPHILS NFR BLD AUTO: 0.8 % — SIGNIFICANT CHANGE UP (ref 0–1)
BUN SERPL-MCNC: <3 MG/DL — LOW (ref 10–20)
CALCIUM SERPL-MCNC: 8.9 MG/DL — SIGNIFICANT CHANGE UP (ref 8.5–10.1)
CHLORIDE SERPL-SCNC: 100 MMOL/L — SIGNIFICANT CHANGE UP (ref 98–110)
CO2 SERPL-SCNC: 20 MMOL/L — SIGNIFICANT CHANGE UP (ref 17–32)
CREAT SERPL-MCNC: 0.6 MG/DL — LOW (ref 0.7–1.5)
EOSINOPHIL # BLD AUTO: 0.62 K/UL — SIGNIFICANT CHANGE UP (ref 0–0.7)
EOSINOPHIL NFR BLD AUTO: 7.1 % — SIGNIFICANT CHANGE UP (ref 0–8)
GLUCOSE BLDC GLUCOMTR-MCNC: 129 MG/DL — HIGH (ref 70–99)
GLUCOSE BLDC GLUCOMTR-MCNC: 135 MG/DL — HIGH (ref 70–99)
GLUCOSE BLDC GLUCOMTR-MCNC: 167 MG/DL — HIGH (ref 70–99)
GLUCOSE SERPL-MCNC: 169 MG/DL — HIGH (ref 70–99)
HCT VFR BLD CALC: 35 % — LOW (ref 37–47)
HGB BLD-MCNC: 11 G/DL — LOW (ref 12–16)
IMM GRANULOCYTES NFR BLD AUTO: 0.6 % — HIGH (ref 0.1–0.3)
LYMPHOCYTES # BLD AUTO: 1.13 K/UL — LOW (ref 1.2–3.4)
LYMPHOCYTES # BLD AUTO: 12.9 % — LOW (ref 20.5–51.1)
MCHC RBC-ENTMCNC: 27.9 PG — SIGNIFICANT CHANGE UP (ref 27–31)
MCHC RBC-ENTMCNC: 31.4 G/DL — LOW (ref 32–37)
MCV RBC AUTO: 88.8 FL — SIGNIFICANT CHANGE UP (ref 81–99)
MONOCYTES # BLD AUTO: 0.71 K/UL — HIGH (ref 0.1–0.6)
MONOCYTES NFR BLD AUTO: 8.1 % — SIGNIFICANT CHANGE UP (ref 1.7–9.3)
NEUTROPHILS # BLD AUTO: 6.16 K/UL — SIGNIFICANT CHANGE UP (ref 1.4–6.5)
NEUTROPHILS NFR BLD AUTO: 70.5 % — SIGNIFICANT CHANGE UP (ref 42.2–75.2)
NRBC # BLD: 0 /100 WBCS — SIGNIFICANT CHANGE UP (ref 0–0)
PLATELET # BLD AUTO: 322 K/UL — SIGNIFICANT CHANGE UP (ref 130–400)
POTASSIUM SERPL-MCNC: 4 MMOL/L — SIGNIFICANT CHANGE UP (ref 3.5–5)
POTASSIUM SERPL-SCNC: 4 MMOL/L — SIGNIFICANT CHANGE UP (ref 3.5–5)
RBC # BLD: 3.94 M/UL — LOW (ref 4.2–5.4)
RBC # FLD: 16.3 % — HIGH (ref 11.5–14.5)
SODIUM SERPL-SCNC: 140 MMOL/L — SIGNIFICANT CHANGE UP (ref 135–146)
WBC # BLD: 8.74 K/UL — SIGNIFICANT CHANGE UP (ref 4.8–10.8)
WBC # FLD AUTO: 8.74 K/UL — SIGNIFICANT CHANGE UP (ref 4.8–10.8)

## 2019-02-23 RX ORDER — OXYCODONE AND ACETAMINOPHEN 5; 325 MG/1; MG/1
2 TABLET ORAL EVERY 6 HOURS
Qty: 0 | Refills: 0 | Status: DISCONTINUED | OUTPATIENT
Start: 2019-02-23 | End: 2019-02-25

## 2019-02-23 RX ORDER — OXYBUTYNIN CHLORIDE 5 MG
5 TABLET ORAL
Qty: 0 | Refills: 0 | Status: DISCONTINUED | OUTPATIENT
Start: 2019-02-23 | End: 2019-02-25

## 2019-02-23 RX ORDER — METHOCARBAMOL 500 MG/1
500 TABLET, FILM COATED ORAL EVERY 8 HOURS
Qty: 0 | Refills: 0 | Status: DISCONTINUED | OUTPATIENT
Start: 2019-02-23 | End: 2019-02-25

## 2019-02-23 RX ORDER — METHOCARBAMOL 500 MG/1
500 TABLET, FILM COATED ORAL ONCE
Qty: 0 | Refills: 0 | Status: COMPLETED | OUTPATIENT
Start: 2019-02-23 | End: 2019-02-23

## 2019-02-23 RX ADMIN — Medication 12.5 MILLIGRAM(S): at 18:07

## 2019-02-23 RX ADMIN — DULOXETINE HYDROCHLORIDE 60 MILLIGRAM(S): 30 CAPSULE, DELAYED RELEASE ORAL at 11:43

## 2019-02-23 RX ADMIN — OXYCODONE AND ACETAMINOPHEN 1 TABLET(S): 5; 325 TABLET ORAL at 12:12

## 2019-02-23 RX ADMIN — Medication 150 MILLIGRAM(S): at 13:28

## 2019-02-23 RX ADMIN — OXYCODONE HYDROCHLORIDE 20 MILLIGRAM(S): 5 TABLET ORAL at 06:21

## 2019-02-23 RX ADMIN — Medication 81 MILLIGRAM(S): at 11:43

## 2019-02-23 RX ADMIN — OXYCODONE HYDROCHLORIDE 20 MILLIGRAM(S): 5 TABLET ORAL at 22:29

## 2019-02-23 RX ADMIN — OXYCODONE AND ACETAMINOPHEN 2 TABLET(S): 5; 325 TABLET ORAL at 16:15

## 2019-02-23 RX ADMIN — Medication 5 MILLIGRAM(S): at 18:06

## 2019-02-23 RX ADMIN — Medication 100 MICROGRAM(S): at 06:21

## 2019-02-23 RX ADMIN — METHOCARBAMOL 500 MILLIGRAM(S): 500 TABLET, FILM COATED ORAL at 16:15

## 2019-02-23 RX ADMIN — OXYCODONE AND ACETAMINOPHEN 2 TABLET(S): 5; 325 TABLET ORAL at 22:59

## 2019-02-23 RX ADMIN — OXYCODONE HYDROCHLORIDE 20 MILLIGRAM(S): 5 TABLET ORAL at 13:34

## 2019-02-23 RX ADMIN — Medication 100 MILLIGRAM(S): at 22:31

## 2019-02-23 RX ADMIN — OXYCODONE HYDROCHLORIDE 20 MILLIGRAM(S): 5 TABLET ORAL at 22:59

## 2019-02-23 RX ADMIN — OXYCODONE AND ACETAMINOPHEN 1 TABLET(S): 5; 325 TABLET ORAL at 11:42

## 2019-02-23 RX ADMIN — ATORVASTATIN CALCIUM 40 MILLIGRAM(S): 80 TABLET, FILM COATED ORAL at 22:31

## 2019-02-23 RX ADMIN — Medication 150 MILLIGRAM(S): at 22:31

## 2019-02-23 RX ADMIN — BUDESONIDE AND FORMOTEROL FUMARATE DIHYDRATE 2 PUFF(S): 160; 4.5 AEROSOL RESPIRATORY (INHALATION) at 22:29

## 2019-02-23 RX ADMIN — OXYCODONE AND ACETAMINOPHEN 2 TABLET(S): 5; 325 TABLET ORAL at 16:45

## 2019-02-23 RX ADMIN — METHOCARBAMOL 500 MILLIGRAM(S): 500 TABLET, FILM COATED ORAL at 06:22

## 2019-02-23 RX ADMIN — Medication 12.5 MILLIGRAM(S): at 06:21

## 2019-02-23 RX ADMIN — Medication 150 MILLIGRAM(S): at 06:21

## 2019-02-23 RX ADMIN — OXYCODONE AND ACETAMINOPHEN 2 TABLET(S): 5; 325 TABLET ORAL at 22:29

## 2019-02-23 RX ADMIN — BUDESONIDE AND FORMOTEROL FUMARATE DIHYDRATE 2 PUFF(S): 160; 4.5 AEROSOL RESPIRATORY (INHALATION) at 07:43

## 2019-02-23 RX ADMIN — PANTOPRAZOLE SODIUM 40 MILLIGRAM(S): 20 TABLET, DELAYED RELEASE ORAL at 06:21

## 2019-02-23 RX ADMIN — Medication 100 MILLIGRAM(S): at 13:38

## 2019-02-23 RX ADMIN — OXYCODONE HYDROCHLORIDE 20 MILLIGRAM(S): 5 TABLET ORAL at 14:04

## 2019-02-23 RX ADMIN — Medication 100 MILLIGRAM(S): at 06:21

## 2019-02-23 NOTE — CONSULT NOTE ADULT - SUBJECTIVE AND OBJECTIVE BOX
PROGRESS NOTE   Patient is a 53y old  Female who presents with a chief complaint of PICC line malfunction (2019 19:44)      HPI:  53F with PMHx DMII, CAD, HTN, Hypothyroidism, Charcot foot, presents with a chief complaint malfunctioning PICC line. Patient was discharged on  with PICC line to treat infected surgical wound dehiscence of R charcot foot reconstruction. On recent admission patient was treated with IV abx and followed by podiatry. Podiatry did daily wound dressing changes and excisional Debridement on 2019 of soft tissue right foot with oasis application and wound vac therapy. Wound cultures showed few MSSA. PICC line was placed and was discharged on IV clindamycin 900mg Q8 (to stop after 3/12/19). Patient followed up with Dr. Diaz today and wound vac was removed and dressing was changed. Patient advised for ED evaluation of malfunctioning PICC. ED unable to fix PICC line and new peripheral line was placed and IR was called for replacement of PICC however the earliest it can be done is Monday. (2019 19:44)      COMPLICATION ASSOCIATED WITH PERIPHERALLY INSERTED CENTRAL CATHETER  ^COMPLICATION ASSOCIATED WITH PERIPHERALLY INSERTED CENTRAL CATHETER  No pertinent family history in first degree relatives  No pertinent family history in first degree relatives  No pertinent family history in first degree relatives  MEWS Score  Dextrocardia  Chronic midline low back pain with bilateral sciatica  Peripheral neuralgia  Essential hypertension  Diabetes 1.5, managed as type 2  Charcot's arthropathy  Complication associated with peripherally inserted central catheter, initial encounter  Charcot foot due to diabetes mellitus  H/O shoulder surgery   delivery delivered  PICC LINE ADJUSTMENT  2      VITALS:  Vital Signs Last 24 Hrs  T(C): 35.8 (2019 13:14), Max: 36.3 (2019 22:24)  T(F): 96.5 (2019 13:14), Max: 97.4 (2019 22:24)  HR: 95 (2019 13:14) (95 - 105)  BP: 128/66 (2019 13:14) (123/70 - 131/63)  BP(mean): --  RR: 16 (2019 13:14) (16 - 18)  SpO2: 95% (2019 08:21) (95% - 95%)    LABS:                        11.0   8.74  )-----------( 322      ( 2019 08:04 )             35.0     02-23    140  |  100  |  <3<L>  ----------------------------<  169<H>  4.0   |  20  |  0.6<L>    Ca    8.9      2019 08:04    TPro  7.7  /  Alb  4.1  /  TBili  <0.2  /  DBili  x   /  AST  17  /  ALT  10  /  AlkPhos  111  02-22      Hemoglobin A1C     PHYSICAL EXAM  GEN: CALLY HARTMAN is a pleasant well-nourished, well developed 53y Female in no acute distress, alert awake, and oriented to person, place and time.     Medication(s):   ALBUTerol    90 MICROgram(s) HFA Inhaler 2 Puff(s) Inhalation every 6 hours PRN  aspirin enteric coated 81 milliGRAM(s) Oral daily  atorvastatin 40 milliGRAM(s) Oral at bedtime  buDESOnide  80 MICROgram(s)/formoterol 4.5 MICROgram(s) Inhaler 2 Puff(s) Inhalation two times a day  clindamycin IVPB 900 milliGRAM(s) IV Intermittent every 8 hours  docusate sodium 100 milliGRAM(s) Oral two times a day  DULoxetine 60 milliGRAM(s) Oral daily  enoxaparin Injectable 40 milliGRAM(s) SubCutaneous daily  levothyroxine 100 MICROGram(s) Oral daily  methocarbamol 500 milliGRAM(s) Oral every 8 hours PRN  metoprolol tartrate 12.5 milliGRAM(s) Oral two times a day  oxyCODONE    5 mG/acetaminophen 325 mG 2 Tablet(s) Oral every 6 hours PRN  oxyCODONE  ER Tablet 20 milliGRAM(s) Oral three times a day  pantoprazole    Tablet 40 milliGRAM(s) Oral before breakfast  pregabalin 150 milliGRAM(s) Oral three times a day  senna 2 Tablet(s) Oral at bedtime      LE Focused Exam:      Vasc:    - DP/PT pulses 1/4 LLE  - Skin temp warm to warm, proximal to distal LLE  - CFT < 3 sec LLE    Neuro:   - Gross sensation in tact LLE    Derm:   - No interdigital macerations B/L   - Right foot wound.     MSK:   - Muscle strength 4/5 in all quadrants w/ no defects B/L   - right foot charcot deformity

## 2019-02-23 NOTE — CONSULT NOTE ADULT - ASSESSMENT
Assessment:   - right foot charcot deformity. Right foot was dressing was applied by  in his office on 2/22 and will remain in tact until 2/24    Plan:   - pt seen/evaluated @ bedside  - PICC placement pending   - once PICC is completed, pt will be good for d/c from podiatric standpoint  - dressing will be broken down and reapplied on 2/24  - plan discussed w/ attending   - podiatry will f/u

## 2019-02-24 LAB
GLUCOSE BLDC GLUCOMTR-MCNC: 128 MG/DL — HIGH (ref 70–99)
GLUCOSE BLDC GLUCOMTR-MCNC: 136 MG/DL — HIGH (ref 70–99)
GLUCOSE BLDC GLUCOMTR-MCNC: 95 MG/DL — SIGNIFICANT CHANGE UP (ref 70–99)

## 2019-02-24 RX ORDER — HYDROXYZINE HCL 10 MG
10 TABLET ORAL THREE TIMES A DAY
Qty: 0 | Refills: 0 | Status: DISCONTINUED | OUTPATIENT
Start: 2019-02-24 | End: 2019-02-25

## 2019-02-24 RX ORDER — PETROLATUM,WHITE
1 JELLY (GRAM) TOPICAL
Qty: 0 | Refills: 0 | Status: DISCONTINUED | OUTPATIENT
Start: 2019-02-24 | End: 2019-02-25

## 2019-02-24 RX ORDER — SALICYLIC ACID 0.5 %
1 CLEANSER (GRAM) TOPICAL DAILY
Qty: 0 | Refills: 0 | Status: DISCONTINUED | OUTPATIENT
Start: 2019-02-24 | End: 2019-02-25

## 2019-02-24 RX ORDER — HYDROCORTISONE 1 %
1 OINTMENT (GRAM) TOPICAL
Qty: 0 | Refills: 0 | Status: DISCONTINUED | OUTPATIENT
Start: 2019-02-24 | End: 2019-02-25

## 2019-02-24 RX ADMIN — Medication 12.5 MILLIGRAM(S): at 06:36

## 2019-02-24 RX ADMIN — Medication 100 MILLIGRAM(S): at 06:33

## 2019-02-24 RX ADMIN — OXYCODONE HYDROCHLORIDE 20 MILLIGRAM(S): 5 TABLET ORAL at 22:17

## 2019-02-24 RX ADMIN — Medication 150 MILLIGRAM(S): at 13:11

## 2019-02-24 RX ADMIN — OXYCODONE HYDROCHLORIDE 20 MILLIGRAM(S): 5 TABLET ORAL at 13:41

## 2019-02-24 RX ADMIN — Medication 81 MILLIGRAM(S): at 12:06

## 2019-02-24 RX ADMIN — Medication 12.5 MILLIGRAM(S): at 18:05

## 2019-02-24 RX ADMIN — Medication 1 APPLICATION(S): at 12:06

## 2019-02-24 RX ADMIN — OXYCODONE AND ACETAMINOPHEN 2 TABLET(S): 5; 325 TABLET ORAL at 21:45

## 2019-02-24 RX ADMIN — Medication 1 APPLICATION(S): at 18:05

## 2019-02-24 RX ADMIN — Medication 100 MILLIGRAM(S): at 13:11

## 2019-02-24 RX ADMIN — Medication 100 MILLIGRAM(S): at 18:05

## 2019-02-24 RX ADMIN — OXYCODONE AND ACETAMINOPHEN 2 TABLET(S): 5; 325 TABLET ORAL at 10:09

## 2019-02-24 RX ADMIN — Medication 100 MICROGRAM(S): at 06:35

## 2019-02-24 RX ADMIN — OXYCODONE HYDROCHLORIDE 20 MILLIGRAM(S): 5 TABLET ORAL at 13:11

## 2019-02-24 RX ADMIN — OXYCODONE HYDROCHLORIDE 20 MILLIGRAM(S): 5 TABLET ORAL at 06:49

## 2019-02-24 RX ADMIN — Medication 150 MILLIGRAM(S): at 21:45

## 2019-02-24 RX ADMIN — Medication 10 MILLIGRAM(S): at 06:40

## 2019-02-24 RX ADMIN — Medication 150 MILLIGRAM(S): at 06:49

## 2019-02-24 RX ADMIN — PANTOPRAZOLE SODIUM 40 MILLIGRAM(S): 20 TABLET, DELAYED RELEASE ORAL at 06:35

## 2019-02-24 RX ADMIN — Medication 5 MILLIGRAM(S): at 06:36

## 2019-02-24 RX ADMIN — OXYCODONE HYDROCHLORIDE 20 MILLIGRAM(S): 5 TABLET ORAL at 21:47

## 2019-02-24 RX ADMIN — BUDESONIDE AND FORMOTEROL FUMARATE DIHYDRATE 2 PUFF(S): 160; 4.5 AEROSOL RESPIRATORY (INHALATION) at 21:45

## 2019-02-24 RX ADMIN — OXYCODONE AND ACETAMINOPHEN 2 TABLET(S): 5; 325 TABLET ORAL at 22:15

## 2019-02-24 RX ADMIN — Medication 100 MILLIGRAM(S): at 23:19

## 2019-02-24 RX ADMIN — DULOXETINE HYDROCHLORIDE 60 MILLIGRAM(S): 30 CAPSULE, DELAYED RELEASE ORAL at 12:06

## 2019-02-24 RX ADMIN — BUDESONIDE AND FORMOTEROL FUMARATE DIHYDRATE 2 PUFF(S): 160; 4.5 AEROSOL RESPIRATORY (INHALATION) at 08:19

## 2019-02-24 RX ADMIN — OXYCODONE HYDROCHLORIDE 20 MILLIGRAM(S): 5 TABLET ORAL at 06:36

## 2019-02-24 RX ADMIN — OXYCODONE AND ACETAMINOPHEN 2 TABLET(S): 5; 325 TABLET ORAL at 09:39

## 2019-02-24 RX ADMIN — Medication 5 MILLIGRAM(S): at 18:05

## 2019-02-24 RX ADMIN — Medication 10 MILLIGRAM(S): at 21:50

## 2019-02-24 RX ADMIN — ATORVASTATIN CALCIUM 40 MILLIGRAM(S): 80 TABLET, FILM COATED ORAL at 21:47

## 2019-02-24 NOTE — PROGRESS NOTE ADULT - SUBJECTIVE AND OBJECTIVE BOX
PROGRESS NOTE   Patient is a 53y old  Female who presents with a chief complaint of PICC line malfunction (23 Feb 2019 15:54)    Patient seen at bedside. Patient has no new complaints at this time.     HPI:  53F with PMHx DMII, CAD, HTN, Hypothyroidism, Charcot foot, presents with a chief complaint malfunctioning PICC line. Patient was discharged on 2/19 with PICC line to treat infected surgical wound dehiscence of R charcot foot reconstruction. On recent admission patient was treated with IV abx and followed by podiatry. Podiatry did daily wound dressing changes and excisional Debridement on 02/08/2019 of soft tissue right foot with oasis application and wound vac therapy. Wound cultures showed few MSSA. PICC line was placed and was discharged on IV clindamycin 900mg Q8 (to stop after 3/12/19). Patient followed up with Dr. Diaz today and wound vac was removed and dressing was changed. Patient advised for ED evaluation of malfunctioning PICC. ED unable to fix PICC line and new peripheral line was placed and IR was called for replacement of PICC however the earliest it can be done is Monday. (22 Feb 2019 19:44)      REVIEW OF SYSTEMS  Constitutional: No weakness, fever, or chills  Eyes/ENT: No visual changes; no vertigo r throat pain  Neck: No pain or stiffness  Respiratory: No cough, wheezing, or shortness of breath  Cardiovascular: No chest pain or palpitations  Gastrointestinal: No abdominal or epigastric pain. No nausea or vomiting  Genitourinary: No dysuria, urgency or incontinence   Neurological: No numbness or tingling  Skin: No itching burning, rashes or lesions  Vascular: no Edema  All other review of systems is negative unless indicated above.    Vital Signs Last 24 Hrs  T(C): 36.7 (24 Feb 2019 05:56), Max: 36.7 (24 Feb 2019 05:56)  T(F): 98.1 (24 Feb 2019 05:56), Max: 98.1 (24 Feb 2019 05:56)  HR: 94 (24 Feb 2019 05:56) (86 - 99)  BP: 111/58 (24 Feb 2019 05:56) (111/58 - 154/92)  BP(mean): --  RR: 18 (24 Feb 2019 05:56) (16 - 18)  SpO2: --                          11.0   8.74  )-----------( 322      ( 23 Feb 2019 08:04 )             35.0               02-23    140  |  100  |  <3<L>  ----------------------------<  169<H>  4.0   |  20  |  0.6<L>    Ca    8.9      23 Feb 2019 08:04    TPro  7.7  /  Alb  4.1  /  TBili  <0.2  /  DBili  x   /  AST  17  /  ALT  10  /  AlkPhos  111  02-22      PHYSICAL EXAM  GEN: CALLY HARTMAN is a pleasant well-nourished, well developed 53y Female in no acute distress, alert awake, and oriented to person, place and time.   LE Focused:  right foot s/p post op infection with pink granulating wound bases on dorsal hallux and 2nd digit and medial foot    A:  post op infection    P:  Patient examined and evaluated   Patient dressed with GARDENIA/DSD/Webril/posterior splint and ACE bandage  Continue IV abx per ID recommendations   Will f/u with attending for any further recommendations  Next bandage take down Tuesday

## 2019-02-24 NOTE — PROGRESS NOTE ADULT - SUBJECTIVE AND OBJECTIVE BOX
Pt seen and examined. Ptfeels tired today. Denies weakness.     T(F): , Max: 98.8 (02-24-19 @ 12:24)  HR: 83 (02-24-19 @ 12:24) (83 - 94)  BP: 140/74 (02-24-19 @ 12:24)  RR: 16 (02-24-19 @ 12:24)  SpO2: --  General: No apparent distress  Cardiovascular: S1, S2  Gastrointestinal: Soft, Non-tender, Non-distended  Respiratory: Good air entry bilaterally  Musculoskeletal: Moves all extremities  Lymphatic: No edema  Neurologic: No gross motor deficit  Dermatologic: Skin dry, R foot dressing CDI                          11.0   8.74  )-----------( 322      ( 23 Feb 2019 08:04 )             35.0     02-23    140  |  100  |  <3<L>  ----------------------------<  169<H>  4.0   |  20  |  0.6<L>    Ca    8.9      23 Feb 2019 08:04    TPro  7.7  /  Alb  4.1  /  TBili  <0.2  /  DBili  x   /  AST  17  /  ALT  10  /  AlkPhos  111  02-22

## 2019-02-25 ENCOUNTER — RX RENEWAL (OUTPATIENT)
Age: 54
End: 2019-02-25

## 2019-02-25 ENCOUNTER — TRANSCRIPTION ENCOUNTER (OUTPATIENT)
Age: 54
End: 2019-02-25

## 2019-02-25 VITALS
SYSTOLIC BLOOD PRESSURE: 115 MMHG | HEIGHT: 63 IN | DIASTOLIC BLOOD PRESSURE: 69 MMHG | TEMPERATURE: 98 F | WEIGHT: 165.35 LBS | HEART RATE: 75 BPM | RESPIRATION RATE: 18 BRPM

## 2019-02-25 LAB
ANION GAP SERPL CALC-SCNC: 15 MMOL/L — HIGH (ref 7–14)
BASOPHILS # BLD AUTO: 0.05 K/UL — SIGNIFICANT CHANGE UP (ref 0–0.2)
BASOPHILS NFR BLD AUTO: 0.6 % — SIGNIFICANT CHANGE UP (ref 0–1)
BUN SERPL-MCNC: 4 MG/DL — LOW (ref 10–20)
CALCIUM SERPL-MCNC: 9.4 MG/DL — SIGNIFICANT CHANGE UP (ref 8.5–10.1)
CHLORIDE SERPL-SCNC: 103 MMOL/L — SIGNIFICANT CHANGE UP (ref 98–110)
CO2 SERPL-SCNC: 19 MMOL/L — SIGNIFICANT CHANGE UP (ref 17–32)
CREAT SERPL-MCNC: 0.6 MG/DL — LOW (ref 0.7–1.5)
EOSINOPHIL # BLD AUTO: 0.56 K/UL — SIGNIFICANT CHANGE UP (ref 0–0.7)
EOSINOPHIL NFR BLD AUTO: 6.9 % — SIGNIFICANT CHANGE UP (ref 0–8)
GLUCOSE BLDC GLUCOMTR-MCNC: 154 MG/DL — HIGH (ref 70–99)
GLUCOSE BLDC GLUCOMTR-MCNC: 88 MG/DL — SIGNIFICANT CHANGE UP (ref 70–99)
GLUCOSE SERPL-MCNC: 192 MG/DL — HIGH (ref 70–99)
HCT VFR BLD CALC: 37.1 % — SIGNIFICANT CHANGE UP (ref 37–47)
HGB BLD-MCNC: 11.1 G/DL — LOW (ref 12–16)
IMM GRANULOCYTES NFR BLD AUTO: 0.4 % — HIGH (ref 0.1–0.3)
LYMPHOCYTES # BLD AUTO: 1.14 K/UL — LOW (ref 1.2–3.4)
LYMPHOCYTES # BLD AUTO: 14.1 % — LOW (ref 20.5–51.1)
MAGNESIUM SERPL-MCNC: 1.7 MG/DL — LOW (ref 1.8–2.4)
MCHC RBC-ENTMCNC: 27.9 PG — SIGNIFICANT CHANGE UP (ref 27–31)
MCHC RBC-ENTMCNC: 29.9 G/DL — LOW (ref 32–37)
MCV RBC AUTO: 93.2 FL — SIGNIFICANT CHANGE UP (ref 81–99)
MONOCYTES # BLD AUTO: 0.61 K/UL — HIGH (ref 0.1–0.6)
MONOCYTES NFR BLD AUTO: 7.5 % — SIGNIFICANT CHANGE UP (ref 1.7–9.3)
NEUTROPHILS # BLD AUTO: 5.71 K/UL — SIGNIFICANT CHANGE UP (ref 1.4–6.5)
NEUTROPHILS NFR BLD AUTO: 70.5 % — SIGNIFICANT CHANGE UP (ref 42.2–75.2)
NRBC # BLD: 0 /100 WBCS — SIGNIFICANT CHANGE UP (ref 0–0)
PLATELET # BLD AUTO: 204 K/UL — SIGNIFICANT CHANGE UP (ref 130–400)
POTASSIUM SERPL-MCNC: 5 MMOL/L — SIGNIFICANT CHANGE UP (ref 3.5–5)
POTASSIUM SERPL-SCNC: 5 MMOL/L — SIGNIFICANT CHANGE UP (ref 3.5–5)
RBC # BLD: 3.98 M/UL — LOW (ref 4.2–5.4)
RBC # FLD: 16.6 % — HIGH (ref 11.5–14.5)
SODIUM SERPL-SCNC: 137 MMOL/L — SIGNIFICANT CHANGE UP (ref 135–146)
WBC # BLD: 8.1 K/UL — SIGNIFICANT CHANGE UP (ref 4.8–10.8)
WBC # FLD AUTO: 8.1 K/UL — SIGNIFICANT CHANGE UP (ref 4.8–10.8)

## 2019-02-25 RX ORDER — DIPHENHYDRAMINE HCL 50 MG
25 CAPSULE ORAL EVERY 6 HOURS
Qty: 0 | Refills: 0 | Status: DISCONTINUED | OUTPATIENT
Start: 2019-02-25 | End: 2019-02-25

## 2019-02-25 RX ORDER — MAGNESIUM OXIDE 400 MG ORAL TABLET 241.3 MG
400 TABLET ORAL
Qty: 0 | Refills: 0 | Status: DISCONTINUED | OUTPATIENT
Start: 2019-02-25 | End: 2019-02-25

## 2019-02-25 RX ORDER — TIZANIDINE 4 MG/1
2 TABLET ORAL
Qty: 0 | Refills: 0 | COMMUNITY

## 2019-02-25 RX ADMIN — Medication 0.5 MILLIGRAM(S): at 10:14

## 2019-02-25 RX ADMIN — OXYCODONE AND ACETAMINOPHEN 2 TABLET(S): 5; 325 TABLET ORAL at 06:12

## 2019-02-25 RX ADMIN — Medication 150 MILLIGRAM(S): at 05:41

## 2019-02-25 RX ADMIN — Medication 100 MILLIGRAM(S): at 05:40

## 2019-02-25 RX ADMIN — OXYCODONE AND ACETAMINOPHEN 2 TABLET(S): 5; 325 TABLET ORAL at 05:42

## 2019-02-25 RX ADMIN — BUDESONIDE AND FORMOTEROL FUMARATE DIHYDRATE 2 PUFF(S): 160; 4.5 AEROSOL RESPIRATORY (INHALATION) at 08:06

## 2019-02-25 RX ADMIN — Medication 150 MILLIGRAM(S): at 14:16

## 2019-02-25 RX ADMIN — DULOXETINE HYDROCHLORIDE 60 MILLIGRAM(S): 30 CAPSULE, DELAYED RELEASE ORAL at 11:53

## 2019-02-25 RX ADMIN — Medication 1 APPLICATION(S): at 11:52

## 2019-02-25 RX ADMIN — Medication 1 APPLICATION(S): at 05:41

## 2019-02-25 RX ADMIN — OXYCODONE AND ACETAMINOPHEN 2 TABLET(S): 5; 325 TABLET ORAL at 12:01

## 2019-02-25 RX ADMIN — OXYCODONE AND ACETAMINOPHEN 2 TABLET(S): 5; 325 TABLET ORAL at 12:30

## 2019-02-25 RX ADMIN — Medication 12.5 MILLIGRAM(S): at 05:43

## 2019-02-25 RX ADMIN — PANTOPRAZOLE SODIUM 40 MILLIGRAM(S): 20 TABLET, DELAYED RELEASE ORAL at 05:44

## 2019-02-25 RX ADMIN — MAGNESIUM OXIDE 400 MG ORAL TABLET 400 MILLIGRAM(S): 241.3 TABLET ORAL at 12:01

## 2019-02-25 RX ADMIN — OXYCODONE HYDROCHLORIDE 20 MILLIGRAM(S): 5 TABLET ORAL at 05:41

## 2019-02-25 RX ADMIN — Medication 5 MILLIGRAM(S): at 05:43

## 2019-02-25 RX ADMIN — Medication 100 MICROGRAM(S): at 05:43

## 2019-02-25 RX ADMIN — Medication 81 MILLIGRAM(S): at 11:53

## 2019-02-25 RX ADMIN — OXYCODONE HYDROCHLORIDE 20 MILLIGRAM(S): 5 TABLET ORAL at 14:16

## 2019-02-25 RX ADMIN — Medication 100 MILLIGRAM(S): at 14:16

## 2019-02-25 RX ADMIN — OXYCODONE HYDROCHLORIDE 20 MILLIGRAM(S): 5 TABLET ORAL at 06:57

## 2019-02-25 RX ADMIN — Medication 25 MILLIGRAM(S): at 01:22

## 2019-02-25 NOTE — DISCHARGE NOTE ADULT - HOSPITAL COURSE
53F with PMHx DMII, CAD, HTN, Hypothyroidism, Charcot foot, presents with a chief complaint malfunctioning PICC line. Patient was discharged on 2/19 with PICC line to treat infected surgical wound dehiscence of R charcot foot reconstruction. PICC line was replaced by IR and is now functional. Patient is being discharged with instruction to follow up with podiatry and PMD after discharge.

## 2019-02-25 NOTE — DISCHARGE NOTE ADULT - CARE PROVIDER_API CALL
Rory Diaz ([email or web address])  Surgery  [address] [place] [address]  Phone: [phone]  Fax: [phone]  Follow Up Time:     Jose Garza)  [address] [facility]  [address] [facility], [address]  [place] [address]  Phone: [phone]  Fax: [phone]  Follow Up Time:

## 2019-02-25 NOTE — PROGRESS NOTE ADULT - SUBJECTIVE AND OBJECTIVE BOX
S:  Pt was in process of going down to radiology for change of her PICC line when seen.  She had no other complaints other than her PICC line was not drawing blood out properly.    O:  Vital Signs Last 24 Hrs  T(C): 36.1 (25 Feb 2019 13:13), Max: 36.7 (25 Feb 2019 05:31)  T(F): 97 (25 Feb 2019 13:13), Max: 98.1 (25 Feb 2019 05:31)  HR: 94 (25 Feb 2019 13:13) (94 - 102)  BP: 138/60 (25 Feb 2019 13:13) (128/59 - 138/69)  BP(mean): --  RR: 18 (25 Feb 2019 13:13) (18 - 18)  SpO2: 94% (25 Feb 2019 07:30) (94% - 96%)    PE:  General: No apparent distress  Cardiovascular: S1, S2  Gastrointestinal: Soft, Non-tender, Non-distended  Respiratory: Good air entry bilaterally  Musculoskeletal: Moves all extremities  Lymphatic: No edema  Neurologic: No gross motor deficit  Dermatologic: Skin dry, R foot dressing CDI

## 2019-02-25 NOTE — DISCHARGE NOTE ADULT - CARE PLAN
Principal Discharge DX:	Complication associated with peripherally inserted central catheter, initial encounter  Goal:	replaced  Assessment and plan of treatment:	Your PICC line was replaced on this admission.  Secondary Diagnosis:	Charcot foot due to diabetes mellitus  Goal:	wound care and antibiotics  Assessment and plan of treatment:	Complete antibiotic course with PICC. Dressing with Rukhsana/Moist to dry/Kerlix/webril/Posterior splint by Visiting nurse. Weekly CBC, BMP.

## 2019-02-25 NOTE — PROCEDURE NOTE - NSCOMPLICATION_GEN_A_CORE
loss of guidewire/hematoma/leakage at site/no complications/malpositioned device/pain at site/pneumothorax/swelling at site

## 2019-02-25 NOTE — PROCEDURE NOTE - ADDITIONAL PROCEDURE DETAILS
Pt tolerated procedure well.   Left picc placed, 32.5cm 5Fr single lumen picc exchange. Pt noted to have dextrocardia with duplicated left sided SVC.  ok to use

## 2019-02-25 NOTE — DISCHARGE NOTE ADULT - PATIENT PORTAL LINK FT
You can access the [facility] Patient Portal, offered by [facility], by registering with the following website: [email or web address]

## 2019-02-25 NOTE — PROGRESS NOTE ADULT - ASSESSMENT
SUBJECTIVE:    Patient is a 53y old  Female who presents with a chief complaint of PICC line malfunction (2019 16:55)    Currently admitted to medicine with the primary diagnosis of Complication associated with peripherally inserted central catheter, initial encounter     Today is hospital day 2d. This morning she is resting comfortably in bed and reports no new issues or overnight events. No fevers/chills. Ambulating on scooter. Pain well controlled.    PAST MEDICAL & SURGICAL HISTORY  PAST MEDICAL & SURGICAL HISTORY:  Dextrocardia  Chronic midline low back pain with bilateral sciatica  Peripheral neuralgia  Essential hypertension  Diabetes 1.5, managed as type 2  Charcot's arthropathy: R foot  Charcot foot due to diabetes mellitus  H/O shoulder surgery: Right shoulder surgery   delivery delivered      ALLERGIES:  No Known Allergies    MEDICATIONS:  STANDING MEDICATIONS  AQUAPHOR (petrolatum Ointment) 1 Application(s) Topical two times a day  aspirin enteric coated 81 milliGRAM(s) Oral daily  atorvastatin 40 milliGRAM(s) Oral at bedtime  buDESOnide  80 MICROgram(s)/formoterol 4.5 MICROgram(s) Inhaler 2 Puff(s) Inhalation two times a day  clindamycin IVPB 900 milliGRAM(s) IV Intermittent every 8 hours  docusate sodium 100 milliGRAM(s) Oral two times a day  DULoxetine 60 milliGRAM(s) Oral daily  enoxaparin Injectable 40 milliGRAM(s) SubCutaneous daily  hydrocortisone 1% Cream 1 Application(s) Topical two times a day  levothyroxine 100 MICROGram(s) Oral daily  metoprolol tartrate 12.5 milliGRAM(s) Oral two times a day  oxybutynin 5 milliGRAM(s) Oral two times a day  oxyCODONE  ER Tablet 20 milliGRAM(s) Oral three times a day  pantoprazole    Tablet 40 milliGRAM(s) Oral before breakfast  pregabalin 150 milliGRAM(s) Oral three times a day  senna 2 Tablet(s) Oral at bedtime  vitamin A &amp; D Ointment 1 Application(s) Topical daily    PRN MEDICATIONS  ALBUTerol    90 MICROgram(s) HFA Inhaler 2 Puff(s) Inhalation every 6 hours PRN  hydrOXYzine hydrochloride 10 milliGRAM(s) Oral three times a day PRN  methocarbamol 500 milliGRAM(s) Oral every 8 hours PRN  oxyCODONE    5 mG/acetaminophen 325 mG 2 Tablet(s) Oral every 6 hours PRN    VITALS:   T(F): 98.8  HR: 98  BP: 135/66  RR: 16  SpO2: --    LABS:                        11.0   8.74  )-----------( 322      ( 2019 08:04 )             35.0     02-23    140  |  100  |  <3<L>  ----------------------------<  169<H>  4.0   |  20  |  0.6<L>    Ca    8.9      2019 08:04                    RADIOLOGY:    PHYSICAL EXAM:  GEN: No acute distress  HEENT: NCAT  LUNGS: Clear to auscultation bilaterally   HEART: RRR. no murmurs  ABD: Soft, non-tender, non-distended  EXT: R foot in dressing  NEURO: AAOX3    Assessment and Plan:  53F with PMHx DMII, CAD, HTN, Hypothyroidism, Charcot foot, presents with a chief complaint malfunctioning PICC line.     #Malfunctioning PICC  - IR replacement of PICC tomorrow     #Surgical wound dehiscence with infection s/p reconstruction of R charcot foot    - Wound cx: few staph aureus MSSA    - Excisional Debridement on 2019 of soft tissue right foot with oasis application and wound vac - wound vac now discontinued   - c/w clindamycin  900 IV q8 hours as per ID  - Pain control: C/w oxycontin 20 mg TID. C/w percocet 5q 6 PRN    - Podiatry following, changed dressing today, next bandage take down Tuesday     #Diabetes II - controlled  - Monitor FSG  - Will start insulin coverage if FSG consistently > 180    #CAD/ DLD  - C/w aspirin + statin    #HTN  - C/w metoprolol 12.5 BID    #COPD  - Symbicort BID and albuterol PRN     #Sciatica/Peripheral neuralgia   - c/w duloxetine and Lyrica    #Hypothyroidism  - c/w synthroid 100 qd    DVT prophylaxis : Lovenox   GI prophylaxis : Protonix   Activity : Full weight bearing LLE and non weight bearing RLE  Diet : Consistent carbohydrate / DASH  Disposition : dc home with services after PICC line placement (likely tomorrow)
A/P   Malfunctioning PICC line  -IR today to replace PICC line then patient can be DC home today and resume OP treatment for foot    Charcot foot deformity s/p reconstruction with infected wound  -cont outpt IV abx as planned  -Podiatry following    h/o CAD / HTN   - controlled    DM type II /hypothyroidism /dyslipidemia   - controlled for inpt. Outpt f/u within 2 weeks    D/C today
A/P   Malfunctioning PICC line  -IR tomorrow    Charcot foot deformity s/p reconstruction with infected wound  - IV abx as planned  -Podiatry following    h/o CAD / HTN   - controlled    DM type II /hypothyroidism /dyslipidemia   - controlled for inpt. Outpt f/u within 2 weeks    D/C planning once PICC replaced

## 2019-02-25 NOTE — DISCHARGE NOTE ADULT - CARE PROVIDERS DIRECT ADDRESSES
,DirectAddress_Unknown,[email or web address]

## 2019-02-25 NOTE — CHART NOTE - NSCHARTNOTEFT_GEN_A_CORE
<<<RESIDENT DISCHARGE NOTE>>>     CALLY HARTMAN  MRN-[id number]    VITAL SIGNS:  T(F): 97 (02-25-19 @ 13:13), Max: 98.1 (02-25-19 @ 05:31)  HR: 94 (02-25-19 @ 13:13)  BP: 138/60 (02-25-19 @ 13:13)  SpO2: 94% (02-25-19 @ 07:30)      PHYSICAL EXAMINATION:  General: No apparent distress  Cardiovascular: S1, S2  Gastrointestinal: Soft, Non-tender, Non-distended  Respiratory: Good air entry bilaterally  Musculoskeletal: Moves all extremities  Lymphatic: No edema  Neurologic: No gross motor deficit  Dermatologic: Skin dry, R foot dressing CDI    TEST RESULTS:                        11.1   8.10  )-----------( 204      ( 25 Feb 2019 08:38 )             37.1       02-25    137  |  103  |  4<L>  ----------------------------<  192<H>  5.0   |  19  |  0.6<L>    Ca    9.4      25 Feb 2019 08:38  Mg     1.7     02-25        FINAL DISCHARGE INTERVIEW:  Resident(s) Present: (Name:_____Eliel________)    DISCHARGE MEDICATION RECONCILIATION  reviewed with Attending (Name:Maria E_Martell_______)    DISPOSITION:   [  ] Home,    [ x ] Home with Visiting Nursing Services,   [    ]  SNF/ NH,    [   ] Acute Rehab (4A),   [   ] Other (Specify:_________)

## 2019-02-25 NOTE — PROCEDURE NOTE - NSPROCDETAILS_GEN_ALL_CORE
sterile dressing applied/supine position/ultrasound assessment/sterile technique, catheter placed/Trendelenburg position/ultrasound guidance/location identified, draped/prepped, sterile technique used

## 2019-02-25 NOTE — PROGRESS NOTE ADULT - SUBJECTIVE AND OBJECTIVE BOX
INTERVENTIONAL RADIOLOGY BRIEF-OPERATIVE NOTE    Procedure: PICC exchange    Pre-Op Diagnosis: PICC exchange    Post-Op Diagnosis: PICC exchange    Attending: Terry  Resident: Red    Anesthesia (type):  [ ] General Anesthesia  [x ] Sedation  [ ] Spinal Anesthesia  [ x] Local/Regional    Contrast: None    Estimated Blood Loss: Minimal, < 5 cc    Condition:   [ ] Critical  [ ] Serious  [ ] Fair   [ c] Good    Findings/Follow up Plan of Care: Pt with dextrocardia and duplicated SVC. Left picc placed with tip terminating in the left sided distal SVC/cavoatrial junction.    Specimens Removed: none    Implants: none    Complications: none    Disposition: Discharge to floor  ok to use PICC      Please call Interventional Radiology [phone] with any questions, concerns, or issues.

## 2019-02-25 NOTE — PROGRESS NOTE ADULT - REASON FOR ADMISSION
PICC line malfunction

## 2019-02-25 NOTE — DISCHARGE NOTE ADULT - PLAN OF CARE
replaced Your PICC line was replaced on this admission. wound care and antibiotics Complete antibiotic course with PICC. Dressing with Rukhsana/Moist to dry/Kerlix/webril/Posterior splint by Visiting nurse. Weekly CBC, BMP.

## 2019-02-25 NOTE — DISCHARGE NOTE ADULT - MEDICATION SUMMARY - MEDICATIONS TO TAKE
I will START or STAY ON the medications listed below when I get home from the hospital:    oxyCODONE 20 mg oral tablet, extended release  -- 1 tab(s) by mouth every 8 hours, stop after 2/25  -- Indication: For pain    Percocet 5/325 oral tablet  -- 1 tab(s) by mouth every 6 hours, stop after 2/25  -- Indication: For pain    aspirin 81 mg oral delayed release tablet  -- 1 tab(s) by mouth once a day  -- Indication: For Cad prevention    Lyrica 150 mg oral capsule  -- 1 tab(s) by mouth 3 times a day, stop after 2/25  -- Indication: For pain    DULoxetine 60 mg oral delayed release capsule  -- 1 cap(s) by mouth once a day  -- Indication: For pain    Janumet 50 mg-1000 mg oral tablet  -- 1 tab(s) by mouth 2 times a day  -- Indication: For diabetes    atorvastatin 40 mg oral tablet  -- 1 tab(s) by mouth once a day (at bedtime)  -- Indication: For Cholesterol    ezetimibe 10 mg oral tablet  -- 1 tab(s) by mouth once a day  -- Indication: For Cholesterol    hydrOXYzine hydrochloride 10 mg oral tablet  -- 1 tab(s) by mouth 3 times a day, As Needed  -- Indication: For itching    Metoprolol Tartrate 25 mg oral tablet  -- 0.5 tab(s) by mouth 2 times a day   -- It is very important that you take or use this exactly as directed.  Do not skip doses or discontinue unless directed by your doctor.  May cause drowsiness.  Alcohol may intensify this effect.  Use care when operating dangerous machinery.  Some non-prescription drugs may aggravate your condition.  Read all labels carefully.  If a warning appears, check with your doctor before taking.  Take with food or milk.  This drug may impair the ability to drive or operate machinery.  Use care until you become familiar with its effects.    -- Indication: For blood pressure    budesonide-formoterol 80 mcg-4.5 mcg/inh inhalation aerosol  -- 2 puff(s) inhaled 2 times a day  -- Indication: For COpd    Ventolin HFA 90 mcg/inh inhalation aerosol  -- 2 puff(s) inhaled 4 times a day, As Needed  -- Indication: For COpd    docusate sodium 100 mg oral capsule  -- 1 cap(s) by mouth 2 times a day  -- Indication: For COnstipation    senna oral tablet  -- 2 tab(s) by mouth once a day (at bedtime)  -- Indication: For COnstipation    clindamycin  -- 900 milligram(s) intravenous every 8 hours until 3/12  -- Indication: For foot infection    tiZANidine 4 mg oral tablet  -- 2 tab(s) by mouth every 8 hours, As Needed  -- Indication: For muscle spasm    pantoprazole 40 mg oral delayed release tablet  -- 1 tab(s) by mouth once a day  -- Indication: For gerd    Qvar 80 mcg/inh inhalation aerosol  -- 1 puff(s) inhaled 2 times a day  -- Indication: For COpd    levothyroxine 100 mcg (0.1 mg) oral capsule  -- 1 cap(s) by mouth once a day  -- Indication: For hypothyroidism

## 2019-02-28 DIAGNOSIS — M54.30 SCIATICA, UNSPECIFIED SIDE: ICD-10-CM

## 2019-02-28 DIAGNOSIS — T82.514A BREAKDOWN (MECHANICAL) OF INFUSION CATHETER, INITIAL ENCOUNTER: ICD-10-CM

## 2019-02-28 DIAGNOSIS — E78.5 HYPERLIPIDEMIA, UNSPECIFIED: ICD-10-CM

## 2019-02-28 DIAGNOSIS — T81.31XA DISRUPTION OF EXTERNAL OPERATION (SURGICAL) WOUND, NOT ELSEWHERE CLASSIFIED, INITIAL ENCOUNTER: ICD-10-CM

## 2019-02-28 DIAGNOSIS — I25.10 ATHEROSCLEROTIC HEART DISEASE OF NATIVE CORONARY ARTERY WITHOUT ANGINA PECTORIS: ICD-10-CM

## 2019-02-28 DIAGNOSIS — J44.9 CHRONIC OBSTRUCTIVE PULMONARY DISEASE, UNSPECIFIED: ICD-10-CM

## 2019-02-28 DIAGNOSIS — E03.9 HYPOTHYROIDISM, UNSPECIFIED: ICD-10-CM

## 2019-02-28 DIAGNOSIS — Z79.84 LONG TERM (CURRENT) USE OF ORAL HYPOGLYCEMIC DRUGS: ICD-10-CM
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
## 2019-02-28 DIAGNOSIS — Q24.0 DEXTROCARDIA: ICD-10-CM

## 2019-02-28 DIAGNOSIS — I10 ESSENTIAL (PRIMARY) HYPERTENSION: ICD-10-CM

## 2019-02-28 DIAGNOSIS — F17.210 NICOTINE DEPENDENCE, CIGARETTES, UNCOMPLICATED: ICD-10-CM

## 2019-02-28 DIAGNOSIS — E11.610 TYPE 2 DIABETES MELLITUS WITH DIABETIC NEUROPATHIC ARTHROPATHY: ICD-10-CM
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
## 2019-03-19 ENCOUNTER — INPATIENT (INPATIENT)
Facility: HOSPITAL | Age: 54
LOS: 2 days | Discharge: HOME HEALTH | End: 2019-03-22
Attending: INTERNAL MEDICINE | Admitting: INTERNAL MEDICINE
Payer: MEDICARE

## 2019-03-19 VITALS
RESPIRATION RATE: 18 BRPM | OXYGEN SATURATION: 96 % | SYSTOLIC BLOOD PRESSURE: 109 MMHG | DIASTOLIC BLOOD PRESSURE: 55 MMHG | TEMPERATURE: 98 F | HEART RATE: 71 BPM

## 2019-03-19 DIAGNOSIS — E11.610 TYPE 2 DIABETES MELLITUS WITH DIABETIC NEUROPATHIC ARTHROPATHY: Chronic | ICD-10-CM

## 2019-03-19 DIAGNOSIS — Z98.890 OTHER SPECIFIED POSTPROCEDURAL STATES: Chronic | ICD-10-CM

## 2019-03-19 LAB
ALBUMIN SERPL ELPH-MCNC: 3.5 G/DL — SIGNIFICANT CHANGE UP (ref 3.5–5.2)
ALP SERPL-CCNC: 92 U/L — SIGNIFICANT CHANGE UP (ref 30–115)
ALT FLD-CCNC: 16 U/L — SIGNIFICANT CHANGE UP (ref 0–41)
ANION GAP SERPL CALC-SCNC: 14 MMOL/L — SIGNIFICANT CHANGE UP (ref 7–14)
ANION GAP SERPL CALC-SCNC: 15 MMOL/L — HIGH (ref 7–14)
APTT BLD: 35.6 SEC — SIGNIFICANT CHANGE UP (ref 27–39.2)
AST SERPL-CCNC: 73 U/L — HIGH (ref 0–41)
BASOPHILS # BLD AUTO: 0.09 K/UL — SIGNIFICANT CHANGE UP (ref 0–0.2)
BASOPHILS NFR BLD AUTO: 0.6 % — SIGNIFICANT CHANGE UP (ref 0–1)
BILIRUB SERPL-MCNC: 0.5 MG/DL — SIGNIFICANT CHANGE UP (ref 0.2–1.2)
BLD GP AB SCN SERPL QL: SIGNIFICANT CHANGE UP
BUN SERPL-MCNC: 10 MG/DL — SIGNIFICANT CHANGE UP (ref 10–20)
BUN SERPL-MCNC: 10 MG/DL — SIGNIFICANT CHANGE UP (ref 10–20)
CALCIUM SERPL-MCNC: 7.4 MG/DL — LOW (ref 8.5–10.1)
CALCIUM SERPL-MCNC: 7.6 MG/DL — LOW (ref 8.5–10.1)
CHLORIDE SERPL-SCNC: 82 MMOL/L — LOW (ref 98–110)
CHLORIDE SERPL-SCNC: 83 MMOL/L — LOW (ref 98–110)
CO2 SERPL-SCNC: 17 MMOL/L — SIGNIFICANT CHANGE UP (ref 17–32)
CO2 SERPL-SCNC: 19 MMOL/L — SIGNIFICANT CHANGE UP (ref 17–32)
CREAT SERPL-MCNC: 1.1 MG/DL — SIGNIFICANT CHANGE UP (ref 0.7–1.5)
CREAT SERPL-MCNC: 1.1 MG/DL — SIGNIFICANT CHANGE UP (ref 0.7–1.5)
EOSINOPHIL # BLD AUTO: 0.4 K/UL — SIGNIFICANT CHANGE UP (ref 0–0.7)
EOSINOPHIL NFR BLD AUTO: 2.5 % — SIGNIFICANT CHANGE UP (ref 0–8)
ERYTHROCYTE [SEDIMENTATION RATE] IN BLOOD: 8 MM/HR — SIGNIFICANT CHANGE UP (ref 0–20)
GLUCOSE SERPL-MCNC: 103 MG/DL — HIGH (ref 70–99)
GLUCOSE SERPL-MCNC: 90 MG/DL — SIGNIFICANT CHANGE UP (ref 70–99)
HCT VFR BLD CALC: 30.7 % — LOW (ref 37–47)
HGB BLD-MCNC: 9.5 G/DL — LOW (ref 12–16)
IMM GRANULOCYTES NFR BLD AUTO: 0.6 % — HIGH (ref 0.1–0.3)
INR BLD: 1.49 RATIO — HIGH (ref 0.65–1.3)
LACTATE SERPL-SCNC: 3 MMOL/L — HIGH (ref 0.5–2.2)
LYMPHOCYTES # BLD AUTO: 1.41 K/UL — SIGNIFICANT CHANGE UP (ref 1.2–3.4)
LYMPHOCYTES # BLD AUTO: 8.7 % — LOW (ref 20.5–51.1)
MCHC RBC-ENTMCNC: 26 PG — LOW (ref 27–31)
MCHC RBC-ENTMCNC: 30.9 G/DL — LOW (ref 32–37)
MCV RBC AUTO: 84.1 FL — SIGNIFICANT CHANGE UP (ref 81–99)
MONOCYTES # BLD AUTO: 1.05 K/UL — HIGH (ref 0.1–0.6)
MONOCYTES NFR BLD AUTO: 6.5 % — SIGNIFICANT CHANGE UP (ref 1.7–9.3)
NEUTROPHILS # BLD AUTO: 13.16 K/UL — HIGH (ref 1.4–6.5)
NEUTROPHILS NFR BLD AUTO: 81.1 % — HIGH (ref 42.2–75.2)
NRBC # BLD: 0 /100 WBCS — SIGNIFICANT CHANGE UP (ref 0–0)
NT-PROBNP SERPL-SCNC: 4201 PG/ML — HIGH (ref 0–300)
PLATELET # BLD AUTO: 362 K/UL — SIGNIFICANT CHANGE UP (ref 130–400)
POTASSIUM SERPL-MCNC: 5.2 MMOL/L — HIGH (ref 3.5–5)
POTASSIUM SERPL-MCNC: 6.3 MMOL/L — CRITICAL HIGH (ref 3.5–5)
POTASSIUM SERPL-SCNC: 5.2 MMOL/L — HIGH (ref 3.5–5)
POTASSIUM SERPL-SCNC: 6.3 MMOL/L — CRITICAL HIGH (ref 3.5–5)
PROT SERPL-MCNC: 6.9 G/DL — SIGNIFICANT CHANGE UP (ref 6–8)
PROTHROM AB SERPL-ACNC: 17.1 SEC — HIGH (ref 9.95–12.87)
RBC # BLD: 3.65 M/UL — LOW (ref 4.2–5.4)
RBC # FLD: 17.2 % — HIGH (ref 11.5–14.5)
SODIUM SERPL-SCNC: 114 MMOL/L — CRITICAL LOW (ref 135–146)
SODIUM SERPL-SCNC: 116 MMOL/L — CRITICAL LOW (ref 135–146)
TROPONIN T SERPL-MCNC: 0.01 NG/ML — SIGNIFICANT CHANGE UP
TYPE + AB SCN PNL BLD: SIGNIFICANT CHANGE UP
WBC # BLD: 16.2 K/UL — HIGH (ref 4.8–10.8)
WBC # FLD AUTO: 16.2 K/UL — HIGH (ref 4.8–10.8)

## 2019-03-19 RX ORDER — SODIUM CHLORIDE 9 MG/ML
2000 INJECTION INTRAMUSCULAR; INTRAVENOUS; SUBCUTANEOUS ONCE
Qty: 0 | Refills: 0 | Status: COMPLETED | OUTPATIENT
Start: 2019-03-19 | End: 2019-03-19

## 2019-03-19 RX ORDER — CEFEPIME 1 G/1
2000 INJECTION, POWDER, FOR SOLUTION INTRAMUSCULAR; INTRAVENOUS ONCE
Qty: 0 | Refills: 0 | Status: COMPLETED | OUTPATIENT
Start: 2019-03-19 | End: 2019-03-19

## 2019-03-19 RX ORDER — CEFTRIAXONE 500 MG/1
1 INJECTION, POWDER, FOR SOLUTION INTRAMUSCULAR; INTRAVENOUS ONCE
Qty: 0 | Refills: 0 | Status: DISCONTINUED | OUTPATIENT
Start: 2019-03-19 | End: 2019-03-19

## 2019-03-19 RX ADMIN — SODIUM CHLORIDE 2000 MILLILITER(S): 9 INJECTION INTRAMUSCULAR; INTRAVENOUS; SUBCUTANEOUS at 22:25

## 2019-03-19 RX ADMIN — CEFEPIME 100 MILLIGRAM(S): 1 INJECTION, POWDER, FOR SOLUTION INTRAMUSCULAR; INTRAVENOUS at 20:00

## 2019-03-19 NOTE — ED PROVIDER NOTE - ATTENDING CONTRIBUTION TO CARE
53yF p/w PICC line malfunction, sent in by vascular for IV abx for chronic foot infection.  Pt has had prolonged cellulitis/soft tissue infection of R foot, on PICC line for abx x months.  She was supposed to stop the clindamycin, but at her f/u today w/ vascular, she had new leukocytosis WBC 14.  No fevers.  Reports that clindamycin was her 3rd abx and she still has large wounds on her foot.    VSS  CONSTITUTIONAL: well developed; well nourished; well appearing in no acute distress  HEAD: normocephalic; atraumatic  EYES: no conjunctival injection, no scleral icterus  ENT: no nasal discharge; airway clear.  NECK: supple; non tender. + full passive ROM in all directions  CARD: S1, S2 normal; no murmurs, gallops, or rubs. Regular rate and rhythm  RESP: no wheezes, rales or rhonchi. Good air movement bilaterally without significant accessory muscle use  ABD: soft; non-distended; non-tender. No rebound, no guarding, no pulsatile abdominal mass  EXT: moving all extremities spontaneously, normal ROM. No clubbing, cyanosis, +b/l LE edema, R foot wrapped in ACE wrap  SKIN: warm and dry, no lesions noted  NEURO: alert, oriented, CN II-XII grossly intact, motor and sensory grossly intact, speech nonslurred, no focal deficits. GCS 15  PSYCH: calm, cooperative, appropriate, good eye contact, logical thought process, no apparent danger to self or others    spoke to vascular surgeon - w/ new leukocytosis, will need ESR, septic workup, admission, vasc consult, switch to cefepime as that worked better for her in the past 53yF p/w PICC line malfunction, sent in by vascular for IV abx for chronic foot infection.  Pt has had prolonged cellulitis/soft tissue infection of R foot, on PICC line for abx x months.  She was supposed to stop the clindamycin, but at her f/u today w/ vascular, she had new leukocytosis WBC 14.  No fevers.  Reports that clindamycin was her 3rd abx and she still has large wounds on her foot.    VSS  CONSTITUTIONAL: well developed; well nourished; well appearing in no acute distress  HEAD: normocephalic; atraumatic  EYES: no conjunctival injection, no scleral icterus  ENT: no nasal discharge; airway clear.  NECK: supple; non tender. + full passive ROM in all directions  CARD: S1, S2 normal; no murmurs, gallops, or rubs. Regular rate and rhythm  RESP: no wheezes, rales or rhonchi. Good air movement bilaterally without significant accessory muscle use  ABD: soft; non-distended; non-tender. No rebound, no guarding, no pulsatile abdominal mass  EXT: moving all extremities spontaneously, normal ROM. No clubbing, cyanosis, +b/l LE edema, R foot wrapped in ACE wrap  SKIN: warm and dry, no lesions noted  NEURO: alert, oriented, CN II-XII grossly intact, motor and sensory grossly intact, speech nonslurred, no focal deficits. GCS 15  PSYCH: calm, cooperative, appropriate, good eye contact, logical thought process, no apparent danger to self or others    spoke to podiatrist Dr. Gunn - w/ new leukocytosis, will need ESR, septic workup, admission, vasc consult, switch to cefepime as that worked better for her in the past

## 2019-03-19 NOTE — ED PROVIDER NOTE - CARE PLAN
Principal Discharge DX:	Wound infection after surgery  Secondary Diagnosis:	Displacement of peripherally inserted central catheter (PICC) Principal Discharge DX:	Wound infection after surgery  Secondary Diagnosis:	Displacement of peripherally inserted central catheter (PICC)  Secondary Diagnosis:	Hyponatremia

## 2019-03-19 NOTE — ED PROVIDER NOTE - NS ED ROS FT
Review of Systems    Constitutional: (-) fever  Eyes/ENT: (-) blurry vision  Cardiovascular: (-) chest pain, (-) syncope  Respiratory: (-) cough, (-) shortness of breath  Gastrointestinal: (-) vomiting, (-) diarrhea  Genitourinary:  (-) dysuria   Musculoskeletal: (-) neck pain, (-) back pain  Integumentary: see hpi   Neurological: (-) headache  Hematologic: (-) easy bruising

## 2019-03-19 NOTE — ED ADULT TRIAGE NOTE - CHIEF COMPLAINT QUOTE
sent in by PMD for elevated WBC and PICC line is half out, and B/L LE swelling/ pt. on IV clindamycin, s/p right foot surgery on January 11

## 2019-03-19 NOTE — ED PROVIDER NOTE - OBJECTIVE STATEMENT
52 y/o F with PMH CAD, +smoker, HTN, HLD, hypothyroidism, COPD not on home O2, DM charcot foot s/p reconstruction with 3 pins in place 1/11/19 complicated by wound infection s/p debridement 2/8/19 on IV Clindamycin via PICC (end date meant to be 3/12/19 per EMR, but pt still using) presents after calling podiatrist Dr. Chiu who relates WBC steadily increasing.  pt also relates PICC "fell out" while she was ?sleeping-- pt is unsure. +took extra dose oxycontin PTA. no palliating/provoking factors. Denies CP, SOB, back pain, abdominal pain, n/v/d, black or bloody stools, fevers, sweats, chills, HA, trauma, fall, cough, recent travel, sick contacts, leg pain/swelling, urinary symptoms, rash. pt relates minimal pain in leg since surg/debridement. she has wound care come to her house 3x/wk. dressing last changed yesterday.

## 2019-03-19 NOTE — ED ADULT NURSE NOTE - OBJECTIVE STATEMENT
Pt states her picc line came out, recently had surgery on right foot, was without abx for 3 days, sent in for IV abx

## 2019-03-19 NOTE — ED PROVIDER NOTE - PHYSICAL EXAMINATION
PHYSICAL EXAM:    GENERAL: Alert, appears stated age, well appearing, non-toxic  SKIN: Warm, pink and dry. MMM.   EYE: Normal lids/conjunctiva, PERRL, EOMI  ENT: Normal hearing, patent oropharynx  NECK: +supple. No meningismus, or JVD   Pulm: Bilateral BS, normal resp effort, no wheezes, stridor, or retractions  CV: RRR, no M/R/G, 2+and = radial pulses  Abd: soft, non-tender, non-distended  Mskel: no erythema, cyanosis, edema. no calf tenderness. R foot in splint and wrapped-- pt declines removal for exam.   Neuro: AAOx3, no sensory/motor deficits, l. 5/5 strength throughout.

## 2019-03-19 NOTE — ED ADULT NURSE NOTE - NSIMPLEMENTINTERV_GEN_ALL_ED
Implemented All Fall with Harm Risk Interventions:  [place] to call system. Call bell, personal items and telephone within reach. Instruct patient to call for assistance. Room bathroom lighting operational. Non-slip footwear when patient is off stretcher. Physically safe environment: no spills, clutter or unnecessary equipment. Stretcher in lowest position, wheels locked, appropriate side rails in place. Provide visual cue, wrist band, yellow gown, etc. Monitor gait and stability. Monitor for mental status changes and reorient to person, place, and time. Review medications for side effects contributing to fall risk. Reinforce activity limits and safety measures with patient and family. Provide visual clues: red socks.

## 2019-03-19 NOTE — ED PROVIDER NOTE - CLINICAL SUMMARY MEDICAL DECISION MAKING FREE TEXT BOX
49yF p/w worsening leukocytosis during treatment for chronic soft tissue infection of R foot postoperatively.  Pt also c/o PICC line malfunction - line apparently came out today and pt cannot explain how/why.  WBC 14, ESR normal, normal electrolytes except for marked hyponatremia, worsened compared to prior but asymptomatic.  Pt given 1L IV NS, d/w podiatry, started on cefepime and will adm to medicine for further management.

## 2019-03-20 LAB
ANION GAP SERPL CALC-SCNC: 14 MMOL/L — SIGNIFICANT CHANGE UP (ref 7–14)
ANION GAP SERPL CALC-SCNC: 18 MMOL/L — HIGH (ref 7–14)
APPEARANCE UR: CLEAR — SIGNIFICANT CHANGE UP
BASOPHILS # BLD AUTO: 0.06 K/UL — SIGNIFICANT CHANGE UP (ref 0–0.2)
BASOPHILS NFR BLD AUTO: 0.7 % — SIGNIFICANT CHANGE UP (ref 0–1)
BILIRUB UR-MCNC: NEGATIVE — SIGNIFICANT CHANGE UP
BUN SERPL-MCNC: 10 MG/DL — SIGNIFICANT CHANGE UP (ref 10–20)
BUN SERPL-MCNC: 9 MG/DL — LOW (ref 10–20)
CALCIUM SERPL-MCNC: 8.4 MG/DL — LOW (ref 8.5–10.1)
CALCIUM SERPL-MCNC: 8.5 MG/DL — SIGNIFICANT CHANGE UP (ref 8.5–10.1)
CHLORIDE SERPL-SCNC: 89 MMOL/L — LOW (ref 98–110)
CHLORIDE SERPL-SCNC: 89 MMOL/L — LOW (ref 98–110)
CO2 SERPL-SCNC: 20 MMOL/L — SIGNIFICANT CHANGE UP (ref 17–32)
CO2 SERPL-SCNC: 24 MMOL/L — SIGNIFICANT CHANGE UP (ref 17–32)
COLOR SPEC: YELLOW — SIGNIFICANT CHANGE UP
CREAT SERPL-MCNC: 0.8 MG/DL — SIGNIFICANT CHANGE UP (ref 0.7–1.5)
CREAT SERPL-MCNC: 0.9 MG/DL — SIGNIFICANT CHANGE UP (ref 0.7–1.5)
DIFF PNL FLD: NEGATIVE — SIGNIFICANT CHANGE UP
EOSINOPHIL # BLD AUTO: 0.29 K/UL — SIGNIFICANT CHANGE UP (ref 0–0.7)
EOSINOPHIL NFR BLD AUTO: 3.2 % — SIGNIFICANT CHANGE UP (ref 0–8)
EPI CELLS # UR: ABNORMAL /HPF
GLUCOSE BLDC GLUCOMTR-MCNC: 101 MG/DL — HIGH (ref 70–99)
GLUCOSE BLDC GLUCOMTR-MCNC: 135 MG/DL — HIGH (ref 70–99)
GLUCOSE BLDC GLUCOMTR-MCNC: 229 MG/DL — HIGH (ref 70–99)
GLUCOSE BLDC GLUCOMTR-MCNC: 79 MG/DL — SIGNIFICANT CHANGE UP (ref 70–99)
GLUCOSE BLDC GLUCOMTR-MCNC: 99 MG/DL — SIGNIFICANT CHANGE UP (ref 70–99)
GLUCOSE SERPL-MCNC: 100 MG/DL — HIGH (ref 70–99)
GLUCOSE SERPL-MCNC: 148 MG/DL — HIGH (ref 70–99)
GLUCOSE UR QL: NEGATIVE MG/DL — SIGNIFICANT CHANGE UP
HCT VFR BLD CALC: 33.5 % — LOW (ref 37–47)
HGB BLD-MCNC: 10.5 G/DL — LOW (ref 12–16)
IMM GRANULOCYTES NFR BLD AUTO: 0.9 % — HIGH (ref 0.1–0.3)
KETONES UR-MCNC: NEGATIVE — SIGNIFICANT CHANGE UP
LACTATE SERPL-SCNC: 1.1 MMOL/L — SIGNIFICANT CHANGE UP (ref 0.5–2.2)
LEUKOCYTE ESTERASE UR-ACNC: NEGATIVE — SIGNIFICANT CHANGE UP
LYMPHOCYTES # BLD AUTO: 0.89 K/UL — LOW (ref 1.2–3.4)
LYMPHOCYTES # BLD AUTO: 9.7 % — LOW (ref 20.5–51.1)
MAGNESIUM SERPL-MCNC: 1.1 MG/DL — LOW (ref 1.8–2.4)
MAGNESIUM SERPL-MCNC: 1.2 MG/DL — LOW (ref 1.8–2.4)
MCHC RBC-ENTMCNC: 26.1 PG — LOW (ref 27–31)
MCHC RBC-ENTMCNC: 31.3 G/DL — LOW (ref 32–37)
MCV RBC AUTO: 83.1 FL — SIGNIFICANT CHANGE UP (ref 81–99)
MONOCYTES # BLD AUTO: 0.74 K/UL — HIGH (ref 0.1–0.6)
MONOCYTES NFR BLD AUTO: 8.1 % — SIGNIFICANT CHANGE UP (ref 1.7–9.3)
NEUTROPHILS # BLD AUTO: 7.1 K/UL — HIGH (ref 1.4–6.5)
NEUTROPHILS NFR BLD AUTO: 77.4 % — HIGH (ref 42.2–75.2)
NITRITE UR-MCNC: NEGATIVE — SIGNIFICANT CHANGE UP
NRBC # BLD: 0 /100 WBCS — SIGNIFICANT CHANGE UP (ref 0–0)
OSMOLALITY SERPL: 245 MOS/KG — LOW (ref 289–308)
OSMOLALITY UR: 196 MOS/KG — SIGNIFICANT CHANGE UP (ref 50–1400)
PH UR: 6 — SIGNIFICANT CHANGE UP (ref 5–8)
PHOSPHATE SERPL-MCNC: 3.2 MG/DL — SIGNIFICANT CHANGE UP (ref 2.1–4.9)
PLATELET # BLD AUTO: 373 K/UL — SIGNIFICANT CHANGE UP (ref 130–400)
POTASSIUM SERPL-MCNC: 4.2 MMOL/L — SIGNIFICANT CHANGE UP (ref 3.5–5)
POTASSIUM SERPL-MCNC: 4.3 MMOL/L — SIGNIFICANT CHANGE UP (ref 3.5–5)
POTASSIUM SERPL-SCNC: 4.2 MMOL/L — SIGNIFICANT CHANGE UP (ref 3.5–5)
POTASSIUM SERPL-SCNC: 4.3 MMOL/L — SIGNIFICANT CHANGE UP (ref 3.5–5)
PROT UR-MCNC: ABNORMAL MG/DL
RBC # BLD: 4.03 M/UL — LOW (ref 4.2–5.4)
RBC # FLD: 17.4 % — HIGH (ref 11.5–14.5)
SODIUM SERPL-SCNC: 127 MMOL/L — LOW (ref 135–146)
SODIUM SERPL-SCNC: 127 MMOL/L — LOW (ref 135–146)
SODIUM UR-SCNC: <20 MMOL/L — SIGNIFICANT CHANGE UP
SP GR SPEC: 1.01 — SIGNIFICANT CHANGE UP (ref 1.01–1.03)
UROBILINOGEN FLD QL: 0.2 MG/DL — SIGNIFICANT CHANGE UP (ref 0.2–0.2)
WBC # BLD: 9.16 K/UL — SIGNIFICANT CHANGE UP (ref 4.8–10.8)
WBC # FLD AUTO: 9.16 K/UL — SIGNIFICANT CHANGE UP (ref 4.8–10.8)

## 2019-03-20 RX ORDER — CEFEPIME 1 G/1
2000 INJECTION, POWDER, FOR SOLUTION INTRAMUSCULAR; INTRAVENOUS ONCE
Qty: 0 | Refills: 0 | Status: COMPLETED | OUTPATIENT
Start: 2019-03-20 | End: 2019-03-20

## 2019-03-20 RX ORDER — INSULIN GLARGINE 100 [IU]/ML
20 INJECTION, SOLUTION SUBCUTANEOUS AT BEDTIME
Qty: 0 | Refills: 0 | Status: DISCONTINUED | OUTPATIENT
Start: 2019-03-20 | End: 2019-03-22

## 2019-03-20 RX ORDER — DEXTROSE 50 % IN WATER 50 %
25 SYRINGE (ML) INTRAVENOUS ONCE
Qty: 0 | Refills: 0 | Status: DISCONTINUED | OUTPATIENT
Start: 2019-03-20 | End: 2019-03-22

## 2019-03-20 RX ORDER — CEFEPIME 1 G/1
2000 INJECTION, POWDER, FOR SOLUTION INTRAMUSCULAR; INTRAVENOUS EVERY 12 HOURS
Qty: 0 | Refills: 0 | Status: DISCONTINUED | OUTPATIENT
Start: 2019-03-21 | End: 2019-03-22

## 2019-03-20 RX ORDER — DEXTROSE 50 % IN WATER 50 %
15 SYRINGE (ML) INTRAVENOUS ONCE
Qty: 0 | Refills: 0 | Status: DISCONTINUED | OUTPATIENT
Start: 2019-03-20 | End: 2019-03-22

## 2019-03-20 RX ORDER — MAGNESIUM SULFATE 500 MG/ML
2 VIAL (ML) INJECTION EVERY 4 HOURS
Qty: 0 | Refills: 0 | Status: DISCONTINUED | OUTPATIENT
Start: 2019-03-20 | End: 2019-03-20

## 2019-03-20 RX ORDER — ATORVASTATIN CALCIUM 80 MG/1
40 TABLET, FILM COATED ORAL AT BEDTIME
Qty: 0 | Refills: 0 | Status: DISCONTINUED | OUTPATIENT
Start: 2019-03-20 | End: 2019-03-22

## 2019-03-20 RX ORDER — METOPROLOL TARTRATE 50 MG
25 TABLET ORAL
Qty: 0 | Refills: 0 | Status: DISCONTINUED | OUTPATIENT
Start: 2019-03-20 | End: 2019-03-22

## 2019-03-20 RX ORDER — BUDESONIDE AND FORMOTEROL FUMARATE DIHYDRATE 160; 4.5 UG/1; UG/1
2 AEROSOL RESPIRATORY (INHALATION)
Qty: 0 | Refills: 0 | Status: DISCONTINUED | OUTPATIENT
Start: 2019-03-20 | End: 2019-03-22

## 2019-03-20 RX ORDER — ASPIRIN/CALCIUM CARB/MAGNESIUM 324 MG
81 TABLET ORAL DAILY
Qty: 0 | Refills: 0 | Status: DISCONTINUED | OUTPATIENT
Start: 2019-03-20 | End: 2019-03-22

## 2019-03-20 RX ORDER — INSULIN LISPRO 100/ML
VIAL (ML) SUBCUTANEOUS
Qty: 0 | Refills: 0 | Status: DISCONTINUED | OUTPATIENT
Start: 2019-03-20 | End: 2019-03-22

## 2019-03-20 RX ORDER — GLUCAGON INJECTION, SOLUTION 0.5 MG/.1ML
1 INJECTION, SOLUTION SUBCUTANEOUS ONCE
Qty: 0 | Refills: 0 | Status: DISCONTINUED | OUTPATIENT
Start: 2019-03-20 | End: 2019-03-22

## 2019-03-20 RX ORDER — CEFEPIME 1 G/1
INJECTION, POWDER, FOR SOLUTION INTRAMUSCULAR; INTRAVENOUS
Qty: 0 | Refills: 0 | Status: DISCONTINUED | OUTPATIENT
Start: 2019-03-20 | End: 2019-03-22

## 2019-03-20 RX ORDER — PANTOPRAZOLE SODIUM 20 MG/1
40 TABLET, DELAYED RELEASE ORAL
Qty: 0 | Refills: 0 | Status: DISCONTINUED | OUTPATIENT
Start: 2019-03-20 | End: 2019-03-22

## 2019-03-20 RX ORDER — HYDROXYZINE HCL 10 MG
25 TABLET ORAL DAILY
Qty: 0 | Refills: 0 | Status: DISCONTINUED | OUTPATIENT
Start: 2019-03-20 | End: 2019-03-22

## 2019-03-20 RX ORDER — OXYCODONE HYDROCHLORIDE 5 MG/1
20 TABLET ORAL EVERY 12 HOURS
Qty: 0 | Refills: 0 | Status: DISCONTINUED | OUTPATIENT
Start: 2019-03-20 | End: 2019-03-22

## 2019-03-20 RX ORDER — SODIUM CHLORIDE 9 MG/ML
1000 INJECTION, SOLUTION INTRAVENOUS
Qty: 0 | Refills: 0 | Status: DISCONTINUED | OUTPATIENT
Start: 2019-03-20 | End: 2019-03-22

## 2019-03-20 RX ORDER — LEVOTHYROXINE SODIUM 125 MCG
100 TABLET ORAL DAILY
Qty: 0 | Refills: 0 | Status: DISCONTINUED | OUTPATIENT
Start: 2019-03-20 | End: 2019-03-22

## 2019-03-20 RX ORDER — ENOXAPARIN SODIUM 100 MG/ML
40 INJECTION SUBCUTANEOUS EVERY 24 HOURS
Qty: 0 | Refills: 0 | Status: DISCONTINUED | OUTPATIENT
Start: 2019-03-20 | End: 2019-03-22

## 2019-03-20 RX ORDER — ALBUTEROL 90 UG/1
2 AEROSOL, METERED ORAL EVERY 6 HOURS
Qty: 0 | Refills: 0 | Status: DISCONTINUED | OUTPATIENT
Start: 2019-03-20 | End: 2019-03-22

## 2019-03-20 RX ORDER — DULOXETINE HYDROCHLORIDE 30 MG/1
60 CAPSULE, DELAYED RELEASE ORAL DAILY
Qty: 0 | Refills: 0 | Status: DISCONTINUED | OUTPATIENT
Start: 2019-03-20 | End: 2019-03-22

## 2019-03-20 RX ORDER — INSULIN LISPRO 100/ML
10 VIAL (ML) SUBCUTANEOUS
Qty: 0 | Refills: 0 | Status: DISCONTINUED | OUTPATIENT
Start: 2019-03-20 | End: 2019-03-22

## 2019-03-20 RX ORDER — CHLORHEXIDINE GLUCONATE 213 G/1000ML
1 SOLUTION TOPICAL
Qty: 0 | Refills: 0 | Status: DISCONTINUED | OUTPATIENT
Start: 2019-03-20 | End: 2019-03-22

## 2019-03-20 RX ORDER — DOCUSATE SODIUM 100 MG
100 CAPSULE ORAL
Qty: 0 | Refills: 0 | Status: DISCONTINUED | OUTPATIENT
Start: 2019-03-20 | End: 2019-03-22

## 2019-03-20 RX ORDER — DEXTROSE 50 % IN WATER 50 %
12.5 SYRINGE (ML) INTRAVENOUS ONCE
Qty: 0 | Refills: 0 | Status: DISCONTINUED | OUTPATIENT
Start: 2019-03-20 | End: 2019-03-22

## 2019-03-20 RX ORDER — MAGNESIUM SULFATE 500 MG/ML
2 VIAL (ML) INJECTION EVERY 4 HOURS
Qty: 0 | Refills: 0 | Status: COMPLETED | OUTPATIENT
Start: 2019-03-20 | End: 2019-03-20

## 2019-03-20 RX ORDER — SENNA PLUS 8.6 MG/1
2 TABLET ORAL AT BEDTIME
Qty: 0 | Refills: 0 | Status: DISCONTINUED | OUTPATIENT
Start: 2019-03-20 | End: 2019-03-22

## 2019-03-20 RX ORDER — OXYCODONE AND ACETAMINOPHEN 5; 325 MG/1; MG/1
2 TABLET ORAL EVERY 6 HOURS
Qty: 0 | Refills: 0 | Status: DISCONTINUED | OUTPATIENT
Start: 2019-03-20 | End: 2019-03-22

## 2019-03-20 RX ADMIN — CEFEPIME 100 MILLIGRAM(S): 1 INJECTION, POWDER, FOR SOLUTION INTRAMUSCULAR; INTRAVENOUS at 15:14

## 2019-03-20 RX ADMIN — OXYCODONE HYDROCHLORIDE 20 MILLIGRAM(S): 5 TABLET ORAL at 07:47

## 2019-03-20 RX ADMIN — OXYCODONE HYDROCHLORIDE 20 MILLIGRAM(S): 5 TABLET ORAL at 18:17

## 2019-03-20 RX ADMIN — DULOXETINE HYDROCHLORIDE 60 MILLIGRAM(S): 30 CAPSULE, DELAYED RELEASE ORAL at 11:32

## 2019-03-20 RX ADMIN — OXYCODONE HYDROCHLORIDE 20 MILLIGRAM(S): 5 TABLET ORAL at 06:03

## 2019-03-20 RX ADMIN — Medication 150 MILLIGRAM(S): at 21:45

## 2019-03-20 RX ADMIN — Medication 100 MICROGRAM(S): at 06:03

## 2019-03-20 RX ADMIN — OXYCODONE AND ACETAMINOPHEN 2 TABLET(S): 5; 325 TABLET ORAL at 15:58

## 2019-03-20 RX ADMIN — Medication 25 MILLIGRAM(S): at 06:03

## 2019-03-20 RX ADMIN — Medication 150 MILLIGRAM(S): at 13:18

## 2019-03-20 RX ADMIN — Medication 50 GRAM(S): at 15:22

## 2019-03-20 RX ADMIN — Medication 50 GRAM(S): at 17:34

## 2019-03-20 RX ADMIN — Medication 81 MILLIGRAM(S): at 11:32

## 2019-03-20 RX ADMIN — PANTOPRAZOLE SODIUM 40 MILLIGRAM(S): 20 TABLET, DELAYED RELEASE ORAL at 06:03

## 2019-03-20 RX ADMIN — OXYCODONE AND ACETAMINOPHEN 2 TABLET(S): 5; 325 TABLET ORAL at 21:45

## 2019-03-20 RX ADMIN — Medication 100 MILLIGRAM(S): at 17:33

## 2019-03-20 RX ADMIN — Medication 100 MILLIGRAM(S): at 06:02

## 2019-03-20 RX ADMIN — OXYCODONE AND ACETAMINOPHEN 2 TABLET(S): 5; 325 TABLET ORAL at 22:30

## 2019-03-20 RX ADMIN — OXYCODONE HYDROCHLORIDE 20 MILLIGRAM(S): 5 TABLET ORAL at 17:33

## 2019-03-20 RX ADMIN — ATORVASTATIN CALCIUM 40 MILLIGRAM(S): 80 TABLET, FILM COATED ORAL at 21:45

## 2019-03-20 RX ADMIN — Medication 150 MILLIGRAM(S): at 06:02

## 2019-03-20 RX ADMIN — OXYCODONE AND ACETAMINOPHEN 2 TABLET(S): 5; 325 TABLET ORAL at 14:32

## 2019-03-20 RX ADMIN — Medication 100 MILLIGRAM(S): at 13:18

## 2019-03-20 RX ADMIN — Medication 25 MILLIGRAM(S): at 17:34

## 2019-03-20 NOTE — CONSULT NOTE ADULT - ASSESSMENT
53 F w/ PMHx DMII, CAD, DLD, HTN, Hypothyroidism, COPD, Sciatica/Peripheral Neuralgia, Charcot foot deformity s/p reconstruction on 1/11/19. Pt presented to the ED for PICC line replacement and non healing foot infection.   ID was consulted for WBC count despite 6wks IV Clindamycin coverage.   Pt is clinically stable; afebrile, not tachycardic, upon arrival to ER pt had a WBC count of 16.20 that has reduced down to 9 today. Last admission pt had diffuse body rash reaction to ceftriaxone.     IMPRESSION:   Non-healing Diabetic R Foot Infection  No evidence of sepsis    RECOMMENDATION:  Bactrim IV 20mg/kg IV q6  - f/u xray results  - f/u duplex 53 F w/ PMHx DMII, CAD, DLD, HTN, Hypothyroidism, COPD, Sciatica/Peripheral Neuralgia, Charcot foot deformity s/p reconstruction on 1/11/19. Pt presented to the ED for PICC line replacement and non healing foot infection.   ID was consulted for WBC count despite 6wks IV Clindamycin coverage.   Pt is clinically stable; afebrile, not tachycardic, upon arrival to ER pt had a WBC count of 16.20 that has reduced down to 9 today. Last admission pt had diffuse body rash reaction to ceftriaxone.     IMPRESSION:   Diabetic R Foot Infection  No evidence of sepsis    RECOMMENDATION:  Bactrim PO 1DS tab BID  - f/u xray results  - f/u duplex 53 F w/ PMHx DMII, CAD, DLD, HTN, Hypothyroidism, COPD, Sciatica/Peripheral Neuralgia, Charcot foot deformity s/p reconstruction on 1/11/19. Pt presented to the ED for PICC line replacement and non healing foot infection.   ID was consulted for WBC count despite 6wks IV Clindamycin coverage. (completed 3/12)  Pt is clinically stable; afebrile, not tachycardic, upon arrival to ER pt had a WBC count of 16.20 that has reduced down to 9 today. Last admission pt had diffuse body rash reaction to ceftriaxone.     IMPRESSION:   Diabetic R Foot Infection, previous wcx with MSSA  No evidence of sepsis  Xray Since prior there is increasing forefoot swelling with no recurrent talonavicular neuropathic dislocation. There is apparent osteopenia   worsening along the midfoot and first and second metatarsals which may represent disuse osteoporosis  Sedimentation Rate, Erythrocyte: 8 mm/hr (03.19.19 @ 20:45)    RECOMMENDATION:  - f/u CRP  - While in house continue IV Cefepime 2g q12h (tolerated prior) & PO rifampin 600mg (d/c clinda)  - On d/c change to bactrim 1DS tab PO BID and rifampin 600mg for chronic OM  - f/u duplex  - Podiatry

## 2019-03-20 NOTE — H&P ADULT - NSICDXPASTMEDICALHX_GEN_ALL_CORE_FT
PAST MEDICAL HISTORY:  Charcot's arthropathy R foot    Chronic midline low back pain with bilateral sciatica     Dextrocardia     Diabetes 1.5, managed as type 2     Essential hypertension     Peripheral neuralgia

## 2019-03-20 NOTE — CONSULT NOTE ADULT - ASSESSMENT
Assessment:  - non-healing wounds 2/2 charcot foot reconstruction surgery. + edema, + erythema. Wounds are fibro-granular    Plan:   - pt seen/evaluated in ED  - dressed w/ posterior splint and compression  - consult ID  - no surgical intervention  - plan discussed w/ attending   - podiatry will f/u

## 2019-03-20 NOTE — CONSULT NOTE ADULT - SUBJECTIVE AND OBJECTIVE BOX
CALLY HARTMAN  53y, Female  Allergy: No Known Allergies      HPI:  53 F w/ PMHx DMII, CAD, DLD, HTN, Hypothyroidism, COPD, Sciatica/Peripheral Neuralgia, Charcot foot deformity s/p reconstruction on 19, complicated by surgical wound infected s/p debridement 19, grew MSSA from wound cx s/p Tx w/ IV ABX (Clindamycin) x 6 weeks (end date 3/12/19), presented to ED after podiatrist referred her for non resolving infection, increased wbc count. ED spoke w/ podiatrist who recommended IV Cefepime, and ID consult. The patient also says her PICC line came out, unable to explain how, but per records her abx were to be discontinued 3/12 regardless. Her only other medical complaint was feeling fatigued for the past few days. Pt admits to taking extra dose of her Oxycontin 20mg prior to coming to ED. (20 Mar 2019 00:37)    Pt seen at bedside this morning. Pt is mobile on her scooter. She reports nausea, 2x non bloody vomiting this morning. Pt was recently discharged from [facility] on the  after PICC line replacement. She reports she came in this visit because her PICC line just came out on its own yesterday (3/19). PICC was put in on  for IV clinda x6wks. Pt says both her feet have been swollen for 3-4 days and she has pain localized to her Right foot. She also reports being fatigued last couple days but denies any fevers, chills. She reports some headaches but says its because of disk herniations in her cervical spine. She has a VNA nurse come in to change her foot dressing every M/W/. On last admission, pt had diffuse body rash to ceftriaxone.     FAMILY HISTORY:    PAST MEDICAL & SURGICAL HISTORY:  Dextrocardia  Chronic midline low back pain with bilateral sciatica  Peripheral neuralgia  Essential hypertension  Diabetes 1.5, managed as type 2  Charcot's arthropathy: R foot  Charcot foot due to diabetes mellitus  H/O shoulder surgery: Right shoulder surgery   delivery delivered    SOCIAL HISTORY:  Smokes 1/2 pack/day since Nov/dec, before that 1PPD hx for 20+ years. denies EtOh and illicit drug use.     ROS negative except as per HPI    VITALS:  T(F): 96.6, Max: 98.3 (- @ 05:39)  HR: 82  BP: 140/71  RR: 18Vital Signs Last 24 Hrs  T(C): 35.9 (20 Mar 2019 07:58), Max: 36.8 (20 Mar 2019 05:39)  T(F): 96.6 (20 Mar 2019 07:58), Max: 98.3 (20 Mar 2019 05:39)  HR: 82 (20 Mar 2019 07:58) (65 - 82)  BP: 140/71 (20 Mar 2019 07:58) (109/55 - 140/71)  BP(mean): --  RR: 18 (20 Mar 2019 07:58) (18 - 20)  SpO2: 92% (20 Mar 2019 05:39) (92% - 96%)    PHYSICAL EXAM:  General: no acute distress, well kept.  HEENT: atruamatic, normocephalic, no lymph nodes palpable.  Lungs: Clear to auscultation b/l, no wheezes heard  Heart: S1 and s2 heard, heart is on right side (Hx of Dextrocardia), no murmurs heard  Abdomen: soft, non tender, obese,   Extremities: Right foot bandaged, swelling and 2+ pitting edema noted in b/l LE, no erythema, not warm to touch.   Neuro: AAOx3    TESTS & MEASUREMENTS:                        10.5   9.16  )-----------( 373      ( 20 Mar 2019 07:49 )             33.5     03-20    127<L>  |  89<L>  |  10  ----------------------------<  100<H>  4.2   |  24  |  0.9    Ca    8.4<L>      20 Mar 2019 07:49  Phos  3.2     03-20  Mg     1.1     03-20    TPro  6.9  /  Alb  3.5  /  TBili  0.5  /  DBili  x   /  AST  73<H>  /  ALT  16  /  AlkPhos  92  03-19    LIVER FUNCTIONS - ( 19 Mar 2019 19:50 )  Alb: 3.5 g/dL / Pro: 6.9 g/dL / ALK PHOS: 92 U/L / ALT: 16 U/L / AST: 73 U/L / GGT: x             Urinalysis Basic - ( 20 Mar 2019 00:28 )    Color: Yellow / Appearance: Clear / S.010 / pH: x  Gluc: x / Ketone: Negative  / Bili: Negative / Urobili: 0.2 mg/dL   Blood: x / Protein: Trace mg/dL / Nitrite: Negative   Leuk Esterase: Negative / RBC: x / WBC x   Sq Epi: x / Non Sq Epi: Many /HPF / Bacteria: x    RADIOLOGY & ADDITIONAL TESTS:  ________________    ANTIBIOTICS:  cefepime   IVPB   100 mL/Hr IV Intermittent (19 @ 20:00)    clindamycin IVPB   100 mL/Hr IV Intermittent (19 @ 06:02)    rifampin IVPB   100 mL/Hr IV Intermittent (19 @ 06:02) CALLY HARTMAN  53y, Female  Allergy: No Known Allergies      HPI:  53 F w/ PMHx DMII, CAD, DLD, HTN, Hypothyroidism, COPD, Sciatica/Peripheral Neuralgia, Charcot foot deformity s/p reconstruction on 19, complicated by surgical wound infected s/p debridement 19, grew MSSA from wound cx s/p Tx w/ IV ABX (Clindamycin) x 6 weeks (end date 3/12/19), presented to ED after podiatrist referred her for non resolving infection, increased wbc count. ED spoke w/ podiatrist who recommended IV Cefepime, and ID consult. The patient also says her PICC line came out, unable to explain how, but per records her abx were to be discontinued 3/12 regardless. Her only other medical complaint was feeling fatigued for the past few days. Pt admits to taking extra dose of her Oxycontin 20mg prior to coming to ED. (20 Mar 2019 00:37)    Pt seen at bedside this morning. Pt is mobile on her scooter. Pt was recently discharged from [facility] on the  after PICC line replacement. She reports she came in this visit because her PICC line just came out on its own yesterday (3/19). PICC was put in on  for IV clinda x6wks. Pt says both her feet have been swollen for 3-4 days and she has pain localized to her Right foot. She also reports being fatigued last couple days but denies any fevers, chills. She reports some headaches but says its because of disk herniations in her cervical spine. She has a VNA nurse come in to change her foot dressing every M/W/. On last admission, pt had diffuse body rash to ceftriaxone.     FAMILY HISTORY:    PAST MEDICAL & SURGICAL HISTORY:  Dextrocardia  Chronic midline low back pain with bilateral sciatica  Peripheral neuralgia  Essential hypertension  Diabetes 1.5, managed as type 2  Charcot's arthropathy: R foot  Charcot foot due to diabetes mellitus  H/O shoulder surgery: Right shoulder surgery   delivery delivered    SOCIAL HISTORY:  Smokes 1/2 pack/day since Nov/dec, before that 1PPD hx for 20+ years. denies EtOh and illicit drug use.     ROS negative except as per HPI    VITALS:  T(F): 96.6, Max: 98.3 (19 @ 05:39)  HR: 82  BP: 140/71  RR: 18Vital Signs Last 24 Hrs  T(C): 35.9 (20 Mar 2019 07:58), Max: 36.8 (20 Mar 2019 05:39)  T(F): 96.6 (20 Mar 2019 07:58), Max: 98.3 (20 Mar 2019 05:39)  HR: 82 (20 Mar 2019 07:58) (65 - 82)  BP: 140/71 (20 Mar 2019 07:58) (109/55 - 140/71)  BP(mean): --  RR: 18 (20 Mar 2019 07:58) (18 - 20)  SpO2: 92% (20 Mar 2019 05:39) (92% - 96%)    PHYSICAL EXAM:  General: no acute distress, well kept.  HEENT: atruamatic, normocephalic, no lymph nodes palpable.  Lungs: Clear to auscultation b/l, no wheezes heard  Heart: S1 and s2 heard, heart is on right side (Hx of Dextrocardia), no murmurs heard  Abdomen: soft, non tender, obese,   Extremities: Right foot bandaged, swelling and 2+ pitting edema noted in b/l LE, no erythema, not warm to touch.   Neuro: AAOx3    TESTS & MEASUREMENTS:                        10.5   9.16  )-----------( 373      ( 20 Mar 2019 07:49 )             33.5     03-20    127<L>  |  89<L>  |  10  ----------------------------<  100<H>  4.2   |  24  |  0.9    Ca    8.4<L>      20 Mar 2019 07:49  Phos  3.2     03-20  Mg     1.1     03-20    TPro  6.9  /  Alb  3.5  /  TBili  0.5  /  DBili  x   /  AST  73<H>  /  ALT  16  /  AlkPhos  92  03-19    LIVER FUNCTIONS - ( 19 Mar 2019 19:50 )  Alb: 3.5 g/dL / Pro: 6.9 g/dL / ALK PHOS: 92 U/L / ALT: 16 U/L / AST: 73 U/L / GGT: x             Urinalysis Basic - ( 20 Mar 2019 00:28 )    Color: Yellow / Appearance: Clear / S.010 / pH: x  Gluc: x / Ketone: Negative  / Bili: Negative / Urobili: 0.2 mg/dL   Blood: x / Protein: Trace mg/dL / Nitrite: Negative   Leuk Esterase: Negative / RBC: x / WBC x   Sq Epi: x / Non Sq Epi: Many /HPF / Bacteria: x    RADIOLOGY & ADDITIONAL TESTS:  ________________    ANTIBIOTICS:  cefepime   IVPB   100 mL/Hr IV Intermittent (19 @ 20:00)    clindamycin IVPB   100 mL/Hr IV Intermittent (19 @ 06:02)    rifampin IVPB   100 mL/Hr IV Intermittent (19 @ 06:02) CALLY HARTMAN  53y, Female  Allergy: No Known Allergies      HPI:  53 F w/ PMHx DMII, CAD, DLD, HTN, Hypothyroidism, COPD, Sciatica/Peripheral Neuralgia, Charcot foot deformity s/p reconstruction on 19, complicated by surgical wound infected s/p debridement 19, grew MSSA from wound cx s/p Tx w/ IV ABX (Clindamycin) x 6 weeks (end date 3/12/19), presented to ED after podiatrist referred her for non resolving infection, increased wbc count. ED spoke w/ podiatrist who recommended IV Cefepime, and ID consult. The patient also says her PICC line came out, unable to explain how, but per records her abx were to be discontinued 3/12 regardless. Her only other medical complaint was feeling fatigued for the past few days. Pt admits to taking extra dose of her Oxycontin 20mg prior to coming to ED. (20 Mar 2019 00:37)    Pt seen at bedside this morning. Pt is mobile on her scooter. Pt was recently discharged from [facility] on the  after PICC line replacement. She reports she came in this visit because her PICC line just came out on its own yesterday (3/19). PICC was put in on  for IV clinda x6wks. Pt says both her feet have been swollen for 3-4 days and she has pain localized to her Right foot. She also reports being fatigued last couple days but denies any fevers, chills. She reports some headaches but says its because of disk herniations in her cervical spine. She has a VNA nurse come in to change her foot dressing every M/W/. On last admission, pt had diffuse body rash to ceftriaxone.     FAMILY HISTORY:    PAST MEDICAL & SURGICAL HISTORY:  Dextrocardia  Chronic midline low back pain with bilateral sciatica  Peripheral neuralgia  Essential hypertension  Diabetes 1.5, managed as type 2  Charcot's arthropathy: R foot  Charcot foot due to diabetes mellitus  H/O shoulder surgery: Right shoulder surgery   delivery delivered    SOCIAL HISTORY:  Smokes 1/2 pack/day since Nov/dec, before that 1PPD hx for 20+ years. denies EtOh and illicit drug use.     ROS negative except as per HPI    VITALS:  T(F): 96.6, Max: 98.3 (19 @ 05:39)  HR: 82  BP: 140/71  RR: 18Vital Signs Last 24 Hrs  T(C): 35.9 (20 Mar 2019 07:58), Max: 36.8 (20 Mar 2019 05:39)  T(F): 96.6 (20 Mar 2019 07:58), Max: 98.3 (20 Mar 2019 05:39)  HR: 82 (20 Mar 2019 07:58) (65 - 82)  BP: 140/71 (20 Mar 2019 07:58) (109/55 - 140/71)  BP(mean): --  RR: 18 (20 Mar 2019 07:58) (18 - 20)  SpO2: 92% (20 Mar 2019 05:39) (92% - 96%)    PHYSICAL EXAM:  General: no acute distress, well kept.  HEENT: atruamatic, normocephalic, no lymph nodes palpable.  Lungs: Clear to auscultation b/l, no wheezes heard  Heart: S1 and s2 heard, heart is on right side (Hx of Dextrocardia), no murmurs heard  Abdomen: soft, non tender, obese,   Extremities: Right medial wound no surrounding erythema, dorsal 1st digit wound no purulence, +edema/erythema of 1st, 2nd digits, swelling and 2+ pitting edema noted in b/l LE, no erythema, not warm to touch.   Neuro: AAOx3    TESTS & MEASUREMENTS:                        10.5   9.16  )-----------( 373      ( 20 Mar 2019 07:49 )             33.5     03-20    127<L>  |  89<L>  |  10  ----------------------------<  100<H>  4.2   |  24  |  0.9    Ca    8.4<L>      20 Mar 2019 07:49  Phos  3.2     03-20  Mg     1.1     03-20    TPro  6.9  /  Alb  3.5  /  TBili  0.5  /  DBili  x   /  AST  73<H>  /  ALT  16  /  AlkPhos  92  03-19    LIVER FUNCTIONS - ( 19 Mar 2019 19:50 )  Alb: 3.5 g/dL / Pro: 6.9 g/dL / ALK PHOS: 92 U/L / ALT: 16 U/L / AST: 73 U/L / GGT: x             Urinalysis Basic - ( 20 Mar 2019 00:28 )    Color: Yellow / Appearance: Clear / S.010 / pH: x  Gluc: x / Ketone: Negative  / Bili: Negative / Urobili: 0.2 mg/dL   Blood: x / Protein: Trace mg/dL / Nitrite: Negative   Leuk Esterase: Negative / RBC: x / WBC x   Sq Epi: x / Non Sq Epi: Many /HPF / Bacteria: x    RADIOLOGY & ADDITIONAL TESTS:  ________________    ANTIBIOTICS:  cefepime   IVPB   100 mL/Hr IV Intermittent (19 @ 20:00)    clindamycin IVPB   100 mL/Hr IV Intermittent (19 @ 06:02)    rifampin IVPB   100 mL/Hr IV Intermittent (19 @ 06:02)

## 2019-03-20 NOTE — PHARMACOTHERAPY INTERVENTION NOTE - COMMENTS
Spoke with MD and explained Magnesium sulfate should be ordered as IVPB not IV push, and should be for 1 dose.

## 2019-03-20 NOTE — H&P ADULT - ATTENDING COMMENTS
This is 54 yo female with PMH of HTN, DM type2, CAD, COPD, Hypothyroidism and Charcot foot deformity s/p reconstruction on 1/11/19, complicated by surgical wound infected s/p debridement 2/8/19, patient was referred to ED by her podiatrist for worsening wound infection. Patient was discharged home last month on IV Clindamycin for 6 weeks, last day was 3/12/19 but due to worsening infection she continued on antibiotics, also her PICC line is half out. She denies any fever or chills. She also reports worsening LE edema, no chest pain or SOB.     PHYSICAL EXAM:  GENERAL: NAD, well-developed  HEAD:  Atraumatic, Normocephalic  EYES: EOMI, PERRLA, conjunctiva and sclera clear  NECK: Supple, No JVD  CHEST/LUNG: Clear to auscultation bilaterally; No wheeze  HEART: Regular rate and rhythm; S1S2  ABDOMEN: Soft, Nontender, Nondistended; Bowel sounds present  EXTREMITIES:  Right foot dressing. LE edema 1+  PSYCH: AAOx3  NEUROLOGY: non-focal    A/P:   right foot cellulitis: s/p Charcot foot reconstruction surgery on 1/11/9 and debridement on 2/8/19.   No signs of sepsis on admission, no fever or tachycardia, only leukocytosis 16K.   IV Cefepime and Clindamycin for now  ID and Podiatry consult.   send wound and blood culture.   monitor FS, tight blood sugar control.     Hyponatremia:   repeated Na 127, na 114 on admission is likely lab error.   Patient looks volume overload.   Start on Lasix 40mg, fluid restriction.     LE edema: possible Chronic HFpEF:   Pro-BNP is elevated  Order echo, start on Lasix.     CAD:   Continue ASA, Lipitor and Metoprolol    DM type 2:   Start on Lantus, HISS, diabetic diet.

## 2019-03-20 NOTE — H&P ADULT - ASSESSMENT
53 F w/ PMHx DMII, CAD, DLD, HTN, Hypothyroidism, COPD, Sciatica/Peripheral Neuralgia, Charcot foot deformity s/p reconstruction on 1/11/19, complicated by surgical wound infected s/p debridement 2/8/19, grew MSSA from wound cx s/p Tx w/ IV ABX (Clindamycin) x 6 weeks (end date 3/12/19), presented to ED after podiatrist referred her for non resolving infection, increased wbc count. Found to also have hyponatremia.    # Surgical wound dehiscence with infection s/p reconstruction of R charcot foot 1/11/19  - IV abx  - Pod eval  - ID eval  - A. duplex    # Hypotonic Hyponatremia  - UA / Ur Osm / Ur Na  - s/p 2L NS in ED  - BMP in AM    # Diabetes  - FS / Insulin     # CAD/ DLD  - C/w aspirin + statin  - Takes ezetimibe at home    # HTN  - C/w metoprolol 25 BID    # COPD  - Symbicort and albuterol PRN   - Home meds: Qvar & Ventolin    # Sciatica/Peripheral neuralgia   - c/w duloxetine and Lyrica    # DVT prophylaxis : Lovenox   # GI prophylaxis : Protonix   # Activity : Full weight bearing LLE and non weight bearing RLE  # Diet : Consistent carbohydrate   # Disposition : Pending Clinical Course  # CHG  # Full code 53 F w/ PMHx DMII, CAD, DLD, HTN, Hypothyroidism, COPD, Sciatica/Peripheral Neuralgia, Charcot foot deformity s/p reconstruction on 1/11/19, complicated by surgical wound infected s/p debridement 2/8/19, grew MSSA from wound cx s/p Tx w/ IV ABX (Clindamycin) x 6 weeks (end date 3/12/19), presented to ED after podiatrist referred her for non resolving infection, increased wbc count. Found to also have hyponatremia.    # Surgical wound dehiscence with infection s/p reconstruction of R charcot foot 1/11/19  - IV abx ( s/p 2g cefepime in ED) per sensitivities -> R to Beta lactams.    Will start Clinda + Rifampin until ID recs  - Pod eval  - ID eval  - A. duplex    # Hypotonic Hyponatremia  - UA / Ur Osm / Ur Na  - s/p 2L NS in ED  - BMP in AM  - TSH, AM cortisol, ADH    # Diabetes  - FS / Insulin     # CAD/ DLD  - C/w aspirin + statin  - Takes ezetimibe at home    # HTN  - C/w metoprolol 25 BID    # COPD  - Symbicort and albuterol PRN   - Home meds: Qvar & Ventolin    # Sciatica/Peripheral neuralgia   - c/w duloxetine and Lyrica    # DVT prophylaxis : Lovenox   # GI prophylaxis : Protonix   # Activity : Full weight bearing LLE and non weight bearing RLE  # Diet : Consistent carbohydrate   # Disposition : Pending Clinical Course  # CHG  # Full code

## 2019-03-20 NOTE — H&P ADULT - HISTORY OF PRESENT ILLNESS
53 F w/ PMHx DMII, CAD, DLD, HTN, Hypothyroidism, COPD, Sciatica/Peripheral Neuralgia, Charcot foot deformity s/p reconstruction on 1/11/19, complicated by surgical wound infected s/p debridement 2/8/19, grew MSSA from wound cx s/p Tx w/ IV ABX (Clindamycin) x 6 weeks (end date 3/12/19), presented to ED after podiatrist referred her for non resolving infection, increased wbc count. ED spoke w/ podiatrist who recommended IV Cefepime, and ID consult. The patient also says her PICC line came out, unable to explain how, but per records her abx were to be discontinued 3/12 regardless. Her only other medical complaint was feeling fatigued for the past few days. Pt admits to taking extra dose of her Oxycontin 20mg prior to coming to ED.

## 2019-03-20 NOTE — H&P ADULT - NSICDXPASTSURGICALHX_GEN_ALL_CORE_FT
PAST SURGICAL HISTORY:   delivery delivered     Charcot foot due to diabetes mellitus     H/O shoulder surgery Right shoulder surgery

## 2019-03-20 NOTE — H&P ADULT - NSHPLABSRESULTS_GEN_ALL_CORE
LABS:                        9.5    16.20 )-----------( 362      ( 19 Mar 2019 19:50 )             30.7     03-19    116<LL>  |  83<L>  |  10  ----------------------------<  90  5.2<H>   |  19  |  1.1    Ca    7.4<L>      19 Mar 2019 21:14    TPro  6.9  /  Alb  3.5  /  TBili  0.5  /  DBili  x   /  AST  73<H>  /  ALT  16  /  AlkPhos  92  03-19    PT/INR - ( 19 Mar 2019 19:50 )   PT: 17.10 sec;   INR: 1.49 ratio         PTT - ( 19 Mar 2019 19:50 )  PTT:35.6 sec      Sedimentation Rate, Erythrocyte: 8 mm/hr (03-19-19 @ 20:45)  Lactate, Blood: 3.0 mmol/L <H> (03-19-19 @ 19:50)  Troponin T, Serum: 0.01 ng/mL (03-19-19 @ 19:50)      CARDIAC MARKERS ( 19 Mar 2019 19:50 )  x     / 0.01 ng/mL / x     / x     / x          RADIOLOGY:

## 2019-03-20 NOTE — H&P ADULT - NSHPPHYSICALEXAM_GEN_ALL_CORE
VITALS:   T(F): 97.4  HR: 65  BP: 115/77  RR: 18  SpO2: 94%    PHYSICAL EXAM:  GEN: No acute distress, sluggish  LUNGS: Clear to auscultation bilaterally    ABD: Soft, non-tender, non-distended. Bowel sounds present  NEURO: AAOX3

## 2019-03-21 LAB
ANION GAP SERPL CALC-SCNC: 13 MMOL/L — SIGNIFICANT CHANGE UP (ref 7–14)
BASOPHILS # BLD AUTO: 0.06 K/UL — SIGNIFICANT CHANGE UP (ref 0–0.2)
BASOPHILS NFR BLD AUTO: 0.8 % — SIGNIFICANT CHANGE UP (ref 0–1)
BUN SERPL-MCNC: 5 MG/DL — LOW (ref 10–20)
CALCIUM SERPL-MCNC: 8.6 MG/DL — SIGNIFICANT CHANGE UP (ref 8.5–10.1)
CHLORIDE SERPL-SCNC: 102 MMOL/L — SIGNIFICANT CHANGE UP (ref 98–110)
CO2 SERPL-SCNC: 26 MMOL/L — SIGNIFICANT CHANGE UP (ref 17–32)
CORTIS AM PEAK SERPL-MCNC: 19.6 UG/DL — HIGH (ref 6–18.4)
CREAT SERPL-MCNC: 0.6 MG/DL — LOW (ref 0.7–1.5)
CRP SERPL-MCNC: 2.16 MG/DL — HIGH (ref 0–0.4)
CULTURE RESULTS: NO GROWTH — SIGNIFICANT CHANGE UP
EOSINOPHIL # BLD AUTO: 0.31 K/UL — SIGNIFICANT CHANGE UP (ref 0–0.7)
EOSINOPHIL NFR BLD AUTO: 4.3 % — SIGNIFICANT CHANGE UP (ref 0–8)
GLUCOSE BLDC GLUCOMTR-MCNC: 119 MG/DL — HIGH (ref 70–99)
GLUCOSE BLDC GLUCOMTR-MCNC: 126 MG/DL — HIGH (ref 70–99)
GLUCOSE BLDC GLUCOMTR-MCNC: 132 MG/DL — HIGH (ref 70–99)
GLUCOSE BLDC GLUCOMTR-MCNC: 136 MG/DL — HIGH (ref 70–99)
GLUCOSE SERPL-MCNC: 110 MG/DL — HIGH (ref 70–99)
HCT VFR BLD CALC: 30.6 % — LOW (ref 37–47)
HGB BLD-MCNC: 9.6 G/DL — LOW (ref 12–16)
IMM GRANULOCYTES NFR BLD AUTO: 0.3 % — SIGNIFICANT CHANGE UP (ref 0.1–0.3)
LYMPHOCYTES # BLD AUTO: 0.71 K/UL — LOW (ref 1.2–3.4)
LYMPHOCYTES # BLD AUTO: 9.8 % — LOW (ref 20.5–51.1)
MAGNESIUM SERPL-MCNC: 1.8 MG/DL — SIGNIFICANT CHANGE UP (ref 1.8–2.4)
MCHC RBC-ENTMCNC: 26.2 PG — LOW (ref 27–31)
MCHC RBC-ENTMCNC: 31.4 G/DL — LOW (ref 32–37)
MCV RBC AUTO: 83.6 FL — SIGNIFICANT CHANGE UP (ref 81–99)
MONOCYTES # BLD AUTO: 0.94 K/UL — HIGH (ref 0.1–0.6)
MONOCYTES NFR BLD AUTO: 13 % — HIGH (ref 1.7–9.3)
NEUTROPHILS # BLD AUTO: 5.2 K/UL — SIGNIFICANT CHANGE UP (ref 1.4–6.5)
NEUTROPHILS NFR BLD AUTO: 71.8 % — SIGNIFICANT CHANGE UP (ref 42.2–75.2)
NRBC # BLD: 0 /100 WBCS — SIGNIFICANT CHANGE UP (ref 0–0)
PLATELET # BLD AUTO: 278 K/UL — SIGNIFICANT CHANGE UP (ref 130–400)
POTASSIUM SERPL-MCNC: 3.9 MMOL/L — SIGNIFICANT CHANGE UP (ref 3.5–5)
POTASSIUM SERPL-SCNC: 3.9 MMOL/L — SIGNIFICANT CHANGE UP (ref 3.5–5)
RBC # BLD: 3.66 M/UL — LOW (ref 4.2–5.4)
RBC # FLD: 17.5 % — HIGH (ref 11.5–14.5)
SODIUM SERPL-SCNC: 141 MMOL/L — SIGNIFICANT CHANGE UP (ref 135–146)
SPECIMEN SOURCE: SIGNIFICANT CHANGE UP
TSH SERPL-MCNC: 2.22 UIU/ML — SIGNIFICANT CHANGE UP (ref 0.27–4.2)
WBC # BLD: 7.24 K/UL — SIGNIFICANT CHANGE UP (ref 4.8–10.8)
WBC # FLD AUTO: 7.24 K/UL — SIGNIFICANT CHANGE UP (ref 4.8–10.8)

## 2019-03-21 PROCEDURE — 93925 LOWER EXTREMITY STUDY: CPT | Mod: 26

## 2019-03-21 RX ORDER — METOCLOPRAMIDE HCL 10 MG
4 TABLET ORAL ONCE
Qty: 0 | Refills: 0 | Status: COMPLETED | OUTPATIENT
Start: 2019-03-21 | End: 2019-03-21

## 2019-03-21 RX ORDER — FUROSEMIDE 40 MG
40 TABLET ORAL DAILY
Qty: 0 | Refills: 0 | Status: DISCONTINUED | OUTPATIENT
Start: 2019-03-21 | End: 2019-03-22

## 2019-03-21 RX ORDER — DEXTROSE 50 % IN WATER 50 %
12.5 SYRINGE (ML) INTRAVENOUS ONCE
Qty: 0 | Refills: 0 | Status: DISCONTINUED | OUTPATIENT
Start: 2019-03-21 | End: 2019-03-22

## 2019-03-21 RX ORDER — DEXTROSE 50 % IN WATER 50 %
25 SYRINGE (ML) INTRAVENOUS ONCE
Qty: 0 | Refills: 0 | Status: DISCONTINUED | OUTPATIENT
Start: 2019-03-21 | End: 2019-03-22

## 2019-03-21 RX ORDER — BACITRACIN ZINC 500 UNIT/G
1 OINTMENT IN PACKET (EA) TOPICAL
Qty: 0 | Refills: 0 | Status: DISCONTINUED | OUTPATIENT
Start: 2019-03-21 | End: 2019-03-22

## 2019-03-21 RX ADMIN — PANTOPRAZOLE SODIUM 40 MILLIGRAM(S): 20 TABLET, DELAYED RELEASE ORAL at 08:05

## 2019-03-21 RX ADMIN — Medication 200 MILLIGRAM(S): at 17:54

## 2019-03-21 RX ADMIN — BUDESONIDE AND FORMOTEROL FUMARATE DIHYDRATE 2 PUFF(S): 160; 4.5 AEROSOL RESPIRATORY (INHALATION) at 21:49

## 2019-03-21 RX ADMIN — Medication 100 MICROGRAM(S): at 05:31

## 2019-03-21 RX ADMIN — CEFEPIME 100 MILLIGRAM(S): 1 INJECTION, POWDER, FOR SOLUTION INTRAMUSCULAR; INTRAVENOUS at 17:24

## 2019-03-21 RX ADMIN — Medication 1 TABLET(S): at 12:16

## 2019-03-21 RX ADMIN — Medication 81 MILLIGRAM(S): at 12:16

## 2019-03-21 RX ADMIN — Medication 150 MILLIGRAM(S): at 05:31

## 2019-03-21 RX ADMIN — CEFEPIME 100 MILLIGRAM(S): 1 INJECTION, POWDER, FOR SOLUTION INTRAMUSCULAR; INTRAVENOUS at 08:06

## 2019-03-21 RX ADMIN — OXYCODONE AND ACETAMINOPHEN 2 TABLET(S): 5; 325 TABLET ORAL at 22:14

## 2019-03-21 RX ADMIN — DULOXETINE HYDROCHLORIDE 60 MILLIGRAM(S): 30 CAPSULE, DELAYED RELEASE ORAL at 12:16

## 2019-03-21 RX ADMIN — Medication 25 MILLIGRAM(S): at 17:24

## 2019-03-21 RX ADMIN — Medication 25 MILLIGRAM(S): at 05:31

## 2019-03-21 RX ADMIN — Medication 4 MILLIGRAM(S): at 17:54

## 2019-03-21 RX ADMIN — OXYCODONE HYDROCHLORIDE 20 MILLIGRAM(S): 5 TABLET ORAL at 05:31

## 2019-03-21 RX ADMIN — BUDESONIDE AND FORMOTEROL FUMARATE DIHYDRATE 2 PUFF(S): 160; 4.5 AEROSOL RESPIRATORY (INHALATION) at 08:06

## 2019-03-21 RX ADMIN — OXYCODONE AND ACETAMINOPHEN 2 TABLET(S): 5; 325 TABLET ORAL at 21:44

## 2019-03-21 RX ADMIN — OXYCODONE HYDROCHLORIDE 20 MILLIGRAM(S): 5 TABLET ORAL at 17:24

## 2019-03-21 RX ADMIN — Medication 150 MILLIGRAM(S): at 14:21

## 2019-03-21 RX ADMIN — Medication 150 MILLIGRAM(S): at 21:38

## 2019-03-21 RX ADMIN — OXYCODONE AND ACETAMINOPHEN 2 TABLET(S): 5; 325 TABLET ORAL at 12:19

## 2019-03-21 RX ADMIN — ATORVASTATIN CALCIUM 40 MILLIGRAM(S): 80 TABLET, FILM COATED ORAL at 21:36

## 2019-03-21 NOTE — PROGRESS NOTE ADULT - SUBJECTIVE AND OBJECTIVE BOX
SUBJECTIVE:    Patient is a 53y old Female who presents with a chief complaint of   Currently admitted to medicine with the primary diagnosis of Wound infection after surgery     Today is hospital day 2d. This morning she is resting comfortably in bed and reports no new issues or overnight events.     PAST MEDICAL & SURGICAL HISTORY  Dextrocardia  Chronic midline low back pain with bilateral sciatica  Peripheral neuralgia  Essential hypertension  Diabetes 1.5, managed as type 2  Charcot's arthropathy: R foot  Charcot foot due to diabetes mellitus  H/O shoulder surgery: Right shoulder surgery   delivery delivered    SOCIAL HISTORY:  Negative for smoking/alcohol/drug use.     ALLERGIES:  No Known Allergies    MEDICATIONS:  STANDING MEDICATIONS  aspirin enteric coated 81 milliGRAM(s) Oral daily  atorvastatin 40 milliGRAM(s) Oral at bedtime  buDESOnide  80 MICROgram(s)/formoterol 4.5 MICROgram(s) Inhaler 2 Puff(s) Inhalation two times a day  cefepime   IVPB 2000 milliGRAM(s) IV Intermittent every 12 hours  cefepime   IVPB      chlorhexidine 4% Liquid 1 Application(s) Topical <User Schedule>  dextrose 5%. 1000 milliLiter(s) IV Continuous <Continuous>  dextrose 50% Injectable 12.5 Gram(s) IV Push once  dextrose 50% Injectable 25 Gram(s) IV Push once  dextrose 50% Injectable 25 Gram(s) IV Push once  docusate sodium 100 milliGRAM(s) Oral two times a day  DULoxetine 60 milliGRAM(s) Oral daily  enoxaparin Injectable 40 milliGRAM(s) SubCutaneous every 24 hours  insulin glargine Injectable (LANTUS) 20 Unit(s) SubCutaneous at bedtime  insulin lispro (HumaLOG) corrective regimen sliding scale   SubCutaneous three times a day before meals  insulin lispro Injectable (HumaLOG) 10 Unit(s) SubCutaneous three times a day before meals  levothyroxine 100 MICROGram(s) Oral daily  metoprolol tartrate 25 milliGRAM(s) Oral two times a day  multivitamin 1 Tablet(s) Oral daily  oxyCODONE  ER Tablet 20 milliGRAM(s) Oral every 12 hours  pantoprazole    Tablet 40 milliGRAM(s) Oral before breakfast  pregabalin 150 milliGRAM(s) Oral three times a day  rifampin IVPB 600 milliGRAM(s) IV Intermittent every 24 hours  senna 2 Tablet(s) Oral at bedtime    PRN MEDICATIONS  ALBUTerol    90 MICROgram(s) HFA Inhaler 2 Puff(s) Inhalation every 6 hours PRN  dextrose 40% Gel 15 Gram(s) Oral once PRN  glucagon  Injectable 1 milliGRAM(s) IntraMuscular once PRN  hydrOXYzine hydrochloride 25 milliGRAM(s) Oral daily PRN  oxyCODONE    5 mG/acetaminophen 325 mG 2 Tablet(s) Oral every 6 hours PRN    VITALS:   T(F): 97.2  HR: 87  BP: 139/72  RR: 18  SpO2: 95%    LABS:                        10.5   9.16  )-----------( 373      ( 20 Mar 2019 07:49 )             33.5     03-20    127<L>  |  89<L>  |  9<L>  ----------------------------<  148<H>  4.3   |  20  |  0.8    Ca    8.5      20 Mar 2019 11:23  Phos  3.2     03-20  Mg     1.2     03-20    TPro  6.9  /  Alb  3.5  /  TBili  0.5  /  DBili  x   /  AST  73<H>  /  ALT  16  /  AlkPhos  92  03-19    PT/INR - ( 19 Mar 2019 19:50 )   PT: 17.10 sec;   INR: 1.49 ratio         PTT - ( 19 Mar 2019 19:50 )  PTT:35.6 sec  Urinalysis Basic - ( 20 Mar 2019 00:28 )    Color: Yellow / Appearance: Clear / S.010 / pH: x  Gluc: x / Ketone: Negative  / Bili: Negative / Urobili: 0.2 mg/dL   Blood: x / Protein: Trace mg/dL / Nitrite: Negative   Leuk Esterase: Negative / RBC: x / WBC x   Sq Epi: x / Non Sq Epi: Many /HPF / Bacteria: x      Culture - Blood (collected 19 Mar 2019 19:50)  Source: .Blood Blood-Peripheral  Preliminary Report (21 Mar 2019 03:01):    No growth to date.    Culture - Blood (collected 19 Mar 2019 19:50)  Source: .Blood Blood-Peripheral  Preliminary Report (21 Mar 2019 03:01):    No growth to date.      CARDIAC MARKERS ( 19 Mar 2019 19:50 )  x     / 0.01 ng/mL / x     / x     / x          RADIOLOGY:  Overall evaluation is limited given fiberglass splint. There is Charcot reconstruction with screws along the talar first metatarsal axis midfoot to second metatarsal and along the calcaneocuboid lateral column. Since prior there is increasing forefoot swelling with no recurrent talonavicular neuropathic dislocation. There is apparent osteopenia worsening along the midfoot and first and second metatarsals which may represent disuse osteoporosis, overall limited given foot deformity and overlying artifact.        PHYSICAL EXAM:  GEN: No acute distress  LUNGS: Clear to auscultation bilaterally   HEART: S1/S2 present. RRR.   ABD: Soft, non-tender, non-distended. Bowel sounds present  EXT: NC/NC/NE/2+PP/LING; Right foot wrapped  NEURO: AAOX3

## 2019-03-21 NOTE — PROGRESS NOTE ADULT - SUBJECTIVE AND OBJECTIVE BOX
PODIATRY PROGRESS NOTE   [id number] CALLY HARTMAN is a pleasant well-nourished, well developed 53y Female in no acute distress, alert awake, and oriented to person, place and time.   Patient is a 53y old  Female who presents with a chief complaint of DFU (21 Mar 2019 08:19)    HPI:  53 F w/ PMHx DMII, CAD, DLD, HTN, Hypothyroidism, COPD, Sciatica/Peripheral Neuralgia, Charcot foot deformity s/p reconstruction on 1/11/19, complicated by surgical wound infected s/p debridement 2/8/19, grew MSSA from wound cx s/p Tx w/ IV ABX (Clindamycin) x 6 weeks (end date 3/12/19), presented to ED after podiatrist referred her for non resolving infection, increased wbc count. ED spoke w/ podiatrist who recommended IV Cefepime, and ID consult. The patient also says her PICC line came out, unable to explain how, but per records her abx were to be discontinued 3/12 regardless. Her only other medical complaint was feeling fatigued for the past few days. Pt admits to taking extra dose of her Oxycontin 20mg prior to coming to ED. (20 Mar 2019 00:37)    pt seen and assessed at bedside am rounds.  pt dressing clean dry intact with posterior splint.    Vital Signs Last 24 Hrs  T(C): 36.2 (21 Mar 2019 07:40), Max: 36.2 (21 Mar 2019 07:40)  T(F): 97.2 (21 Mar 2019 07:40), Max: 97.2 (21 Mar 2019 07:40)  HR: 87 (21 Mar 2019 07:40) (87 - 90)  BP: 139/72 (21 Mar 2019 07:40) (121/62 - 139/72)  BP(mean): --  RR: 18 (21 Mar 2019 07:40) (18 - 18)  SpO2: 95% (21 Mar 2019 07:40) (88% - 95%)                        9.6    7.24  )-----------( 278      ( 21 Mar 2019 07:53 )             30.6                 03-21    141  |  102  |  5<L>  ----------------------------<  110<H>  3.9   |  26  |  0.6<L>    Ca    8.6      21 Mar 2019 07:53  Phos  3.2     03-20  Mg     1.8     03-21    TPro  6.9  /  Alb  3.5  /  TBili  0.5  /  DBili  x   /  AST  73<H>  /  ALT  16  /  AlkPhos  92  03-19    LE Focused Exam:      Vasc:    - DP/PT pulses non-palpable B/L   - Skin temp warm to warm, proximal to distal B/L  - CFT < 3 sec B/L    Neuro:   - Gross sensation diminished B/L     Derm:   - No interdigital macerations B/L   - non-healing surgical wounds right foot s/p right foot charcot reconstruction sx    MSK:   - Muscle strength 5/5 in all quadrants w/ no defects B/L         Assessment:  - non-healing wounds 2/2 charcot foot reconstruction surgery. edema improving with compression, + erythema. Wounds are fibro-granular    Plan:   - pt seen/evaluated in ED  - dressed w/ Rukhsana/kerlix/webril/ace/posterior splint and compression  - Id consult appreciated: cont iv abx as per ID  - no surgical intervention  - plan discussed w/ attending   - podiatry continue monitor wound site  03-21-19 @ 10:27 PODIATRY PROGRESS NOTE   [id number] CALLY HARTMAN  Patient is a 53y old  Female who presents with a chief complaint of DFU (21 Mar 2019 08:19)    HPI:  53 F w/ PMHx DMII, CAD, DLD, HTN, Hypothyroidism, COPD, Sciatica/Peripheral Neuralgia, Charcot foot deformity s/p reconstruction on 1/11/19, complicated by surgical wound infected s/p debridement 2/8/19, grew MSSA from wound cx s/p Tx w/ IV ABX (Clindamycin) x 6 weeks (end date 3/12/19), presented to ED after podiatrist referred her for non resolving infection, increased wbc count. ED spoke w/ podiatrist who recommended IV Cefepime, and ID consult. The patient also says her PICC line came out, unable to explain how, but per records her abx were to be discontinued 3/12 regardless. Her only other medical complaint was feeling fatigued for the past few days. Pt admits to taking extra dose of her Oxycontin 20mg prior to coming to ED. (20 Mar 2019 00:37)    pt seen and assessed at bedside am rounds.  pt dressing clean dry intact with posterior splint.    Vital Signs Last 24 Hrs  T(C): 36.2 (21 Mar 2019 07:40), Max: 36.2 (21 Mar 2019 07:40)  T(F): 97.2 (21 Mar 2019 07:40), Max: 97.2 (21 Mar 2019 07:40)  HR: 87 (21 Mar 2019 07:40) (87 - 90)  BP: 139/72 (21 Mar 2019 07:40) (121/62 - 139/72)  BP(mean): --  RR: 18 (21 Mar 2019 07:40) (18 - 18)  SpO2: 95% (21 Mar 2019 07:40) (88% - 95%)                        9.6    7.24  )-----------( 278      ( 21 Mar 2019 07:53 )             30.6                 03-21    141  |  102  |  5<L>  ----------------------------<  110<H>  3.9   |  26  |  0.6<L>    Ca    8.6      21 Mar 2019 07:53  Phos  3.2     03-20  Mg     1.8     03-21    TPro  6.9  /  Alb  3.5  /  TBili  0.5  /  DBili  x   /  AST  73<H>  /  ALT  16  /  AlkPhos  92  03-19    LE Focused Exam:      Vasc:    - DP/PT pulses non-palpable B/L   - Skin temp warm to warm, proximal to distal B/L  - CFT < 3 sec B/L    Neuro:   - Gross sensation diminished B/L     Derm:   - No interdigital macerations B/L   - non-healing surgical wounds right foot s/p right foot charcot reconstruction sx    MSK:   - Muscle strength 5/5 in all quadrants w/ no defects B/L         Assessment:  - non-healing wounds 2/2 charcot foot reconstruction surgery. edema improving with compression, + erythema. Wounds are fibro-granular    Plan:   - pt seen/evaluated in ED  - dressed w/ Rukhsana/kerlix/webril/ace/posterior splint and compression  - Id consult appreciated: cont iv abx as per ID  - no surgical intervention  - plan discussed w/ attending   - podiatry continue monitor wound site  03-21-19 @ 10:27 PODIATRY PROGRESS NOTE   [id number] CALLY HARTMAN  Patient is a 53y old  Female who presents with a chief complaint of DFU (21 Mar 2019 08:19)    HPI:  53 F w/ PMHx DMII, CAD, DLD, HTN, Hypothyroidism, COPD, Sciatica/Peripheral Neuralgia, Charcot foot deformity s/p reconstruction on 1/11/19, complicated by surgical wound infected s/p debridement 2/8/19, grew MSSA from wound cx s/p Tx w/ IV ABX (Clindamycin) x 6 weeks (end date 3/12/19), presented to ED after podiatrist referred her for non resolving infection, increased wbc count. ED spoke w/ podiatrist who recommended IV Cefepime, and ID consult. The patient also says her PICC line came out, unable to explain how, but per records her abx were to be discontinued 3/12 regardless. Her only other medical complaint was feeling fatigued for the past few days. Pt admits to taking extra dose of her Oxycontin 20mg prior to coming to ED. (20 Mar 2019 00:37)    pt seen and assessed at bedside am rounds.  pt dressing clean dry intact with posterior splint.    Vital Signs Last 24 Hrs  T(C): 36.2 (21 Mar 2019 07:40), Max: 36.2 (21 Mar 2019 07:40)  T(F): 97.2 (21 Mar 2019 07:40), Max: 97.2 (21 Mar 2019 07:40)  HR: 87 (21 Mar 2019 07:40) (87 - 90)  BP: 139/72 (21 Mar 2019 07:40) (121/62 - 139/72)  BP(mean): --  RR: 18 (21 Mar 2019 07:40) (18 - 18)  SpO2: 95% (21 Mar 2019 07:40) (88% - 95%)                        9.6    7.24  )-----------( 278      ( 21 Mar 2019 07:53 )             30.6                 03-21    141  |  102  |  5<L>  ----------------------------<  110<H>  3.9   |  26  |  0.6<L>    Ca    8.6      21 Mar 2019 07:53  Phos  3.2     03-20  Mg     1.8     03-21    TPro  6.9  /  Alb  3.5  /  TBili  0.5  /  DBili  x   /  AST  73<H>  /  ALT  16  /  AlkPhos  92  03-19    LE Focused Exam:      Vasc:    - DP/PT pulses non-palpable B/L   - Skin temp warm to warm, proximal to distal B/L  - CFT < 3 sec B/L    Neuro:   - Gross sensation diminished B/L     Derm:   - No interdigital macerations B/L   - non-healing surgical wounds right foot s/p right foot charcot reconstruction sx    MSK:   - Muscle strength 5/5 in all quadrants w/ no defects B/L         Assessment:  - non-healing wounds 2/2 charcot foot reconstruction surgery. edema improving with compression, + erythema. Wounds are fibro-granular    Plan:   - pt seen/evaluated in ED  - dressed w/ Rukhsana/kerlix/webril/ace/posterior splint and compression  - Id consult appreciated: cont iv abx as per ID  - no surgical intervention  - plan discussed w/ attending   - podiatry continue monitor wound sites  03-21-19 @ 10:27 PODIATRY PROGRESS NOTE   [id number] CALLY HARTMAN  Patient is a 53y old  Female who presents with a chief complaint of DFU (21 Mar 2019 08:19)    HPI:  53 F w/ PMHx DMII, CAD, DLD, HTN, Hypothyroidism, COPD, Sciatica/Peripheral Neuralgia, Charcot foot deformity s/p reconstruction on 1/11/19, complicated by surgical wound infected s/p debridement 2/8/19, grew MSSA from wound cx s/p Tx w/ IV ABX (Clindamycin) x 6 weeks (end date 3/12/19), presented to ED after podiatrist referred her for non resolving infection, increased wbc count. ED spoke w/ podiatrist who recommended IV Cefepime, and ID consult. The patient also says her PICC line came out, unable to explain how, but per records her abx were to be discontinued 3/12 regardless. Her only other medical complaint was feeling fatigued for the past few days. Pt admits to taking extra dose of her Oxycontin 20mg prior to coming to ED. (20 Mar 2019 00:37)    pt seen and assessed at bedside am rounds.  pt dressing clean dry intact with posterior splint.    Vital Signs Last 24 Hrs  T(C): 36.2 (21 Mar 2019 07:40), Max: 36.2 (21 Mar 2019 07:40)  T(F): 97.2 (21 Mar 2019 07:40), Max: 97.2 (21 Mar 2019 07:40)  HR: 87 (21 Mar 2019 07:40) (87 - 90)  BP: 139/72 (21 Mar 2019 07:40) (121/62 - 139/72)  BP(mean): --  RR: 18 (21 Mar 2019 07:40) (18 - 18)  SpO2: 95% (21 Mar 2019 07:40) (88% - 95%)                        9.6    7.24  )-----------( 278      ( 21 Mar 2019 07:53 )             30.6                 03-21    141  |  102  |  5<L>  ----------------------------<  110<H>  3.9   |  26  |  0.6<L>    Ca    8.6      21 Mar 2019 07:53  Phos  3.2     03-20  Mg     1.8     03-21    TPro  6.9  /  Alb  3.5  /  TBili  0.5  /  DBili  x   /  AST  73<H>  /  ALT  16  /  AlkPhos  92  03-19    < from: VA Duplex Lower Extrem Arterial, Bilat (03.21.19 @ 09:26) >    EXAM:  ART DUPLEX LOWER EXT BILATERAL            PROCEDURE DATE:  03/21/2019            INTERPRETATION:  Clinical History / Reason for exam: This patient is a   53-year-old female with leg pain. Lower extremity arterial duplex   ultrasound was performed to assess for arterial occlusive disease.    Bilateral common femoral, deep femoral, superficial femoral, popliteal,   anterior tibial, posterior tibial and peroneal arteries were visualized.      Right:    The right common femoral artery is patent with mildly elevated flow   velocities to 201.4 cm/s. The profunda femoral, superficial femoral and   popliteal arteries are patent. With no evidence of hemodynamically   significant disease. The anterior tibial artery is patent. The posterior   tibial peroneal arteries are not visualized due to the presence of a cast.    Left:    The left common femoral artery is patent with mildly elevated flow   velocities to 189.3 cm/s. The profunda femoral, superficial femoral,   popliteal, anterior tibial, posterior tibial and peroneal arteries are   patent with no evidence of hemodynamically significant disease.    Impression:    Mild bilateral femoral artery disease as described above.    ICD-10: I70.211                  ELINOR MAYES M.D., ATTENDING VASCULAR  This document has been electronically signed. Mar 21 2019  9:34AM          < from: Xray Foot AP + Lateral + Oblique, Right (03.20.19 @ 05:34) >    EXAM:  XR FOOT COMP MIN 3 VIEWS RT            PROCEDURE DATE:  03/20/2019            INTERPRETATION:  Clinical History / Reason for exam: Right foot infection    Technique: 3 views of the right foot    Comparison 1/30/2019    Findings: Overall evaluation is limited given fiberglass splint. There is   Charcot reconstruction with screws along the talar first metatarsal axis   midfoot to second metatarsal and along the calcaneocuboid lateral column.   Since prior there is increasing forefoot swelling with no recurrent   talonavicular neuropathic dislocation. There is apparent osteopenia   worsening along the midfoot and first and second metatarsals which may   represent disuse osteoporosis, overall limited given foot deformity and   overlying artifact.                  SUNG SHARMA M.D., ATTENDING RADIOLOGIST  This document has been electronically signed. Mar 20 2019 12:46PM                < end of copied text >        < end of copied text >    LE Focused Exam:      Vasc:    - DP/PT pulses non-palpable B/L   - Skin temp warm to warm, proximal to distal B/L  - CFT < 3 sec B/L    Neuro:   - Gross sensation diminished B/L     Derm:   - No interdigital macerations B/L   - non-healing surgical wounds right foot s/p right foot charcot reconstruction sx    MSK:   - Muscle strength 5/5 in all quadrants w/ no defects B/L         Assessment:  - non-healing wounds 2/2 charcot right foot reconstruction surgery. edema improving with compression, + erythema. Wounds are fibro-granular    Plan:   - pt seen/evaluated in ED  - wounds cleansed with normal saline, dressed w/ Rukhsana/kerlix/webril/ace/posterior splint and compression  - Id consult appreciated: cont iv abx as per ID  - arterial duplex : as above  - xray right foot reviewed: as above  - no surgical intervention  - plan discussed w/ attending   - podiatry continue monitor wound sites  03-21-19 @ 10:27

## 2019-03-21 NOTE — DIETITIAN INITIAL EVALUATION ADULT. - OTHER INFO
Pt p/w nonhealing foot wound with infection and displaced PICC line. Hospital course complicated by Surgical wound dehiscence with infection s/p reconstruction of R charcot foot 1/11/19. ID following. No surgical intervention per podiatry. T2DM--insulin regimen in place. Reason for assessment: calorie count/ initial assessment.

## 2019-03-21 NOTE — DIETITIAN INITIAL EVALUATION ADULT. - NUTRITIONGOAL OUTCOME1
Pt to continue to consume at least 50% or greater of meals and snacks throughout LOS. Reassess 7 day

## 2019-03-21 NOTE — DIETITIAN INITIAL EVALUATION ADULT. - ORAL INTAKE PTA
good/Typically adequate appetite and PO intake PTA. Consumes at least 3 meals/day + multiple snacks. NKFA.

## 2019-03-21 NOTE — PROGRESS NOTE ADULT - ASSESSMENT
This is 52 yo female with PMH of HTN, DM type2, CAD, COPD, Hypothyroidism and Charcot foot deformity s/p reconstruction on 1/11/19, complicated by surgical wound infected s/p debridement 2/8/19, patient was referred to ED by her podiatrist for worsening wound infection. Patient was discharged home last month on IV Clindamycin for 6 weeks, last day was 3/12/19 but due to worsening infection she continued on antibiotics, also her PICC line is half out. She denies any fever or chills. She also reports worsening LE edema, no chest pain or SOB.     A/P:   right foot cellulitis: s/p Charcot foot reconstruction surgery on 1/11/9 and debridement on 2/8/19.   No signs of sepsis on admission, no fever or tachycardia, only leukocytosis 16K.   ID recommended to continue with Cefepime, start on Rifampin PO, and discharge on Bactrim and Rifampin.   Podiatry consult recommended no intervention   monitor FS, tight blood sugar control.     Hyponatremia: resolved  Na 114 on admission is likely lab error.     LE edema: possible Chronic HFpEF:   Pro-BNP is elevated  Order echo, start on Lasix.     CAD:   Continue ASA, Lipitor and Metoprolol    DM type 2:   Start on Lantus, HISS, diabetic diet.      #Progress Note Handoff:  Pending (specify):  Consults: podiatry follow up.   Family discussion: none  Disposition: Home tomorrow if is ok by podiatry.

## 2019-03-21 NOTE — DIETITIAN INITIAL EVALUATION ADULT. - PERTINENT MEDS FT
lovenox, abx, lantus, humalog, albuterol, atorvastatin, symbicort, colace, synthroid, metoprolol, multivitamin, oxy, protonix, senna

## 2019-03-21 NOTE — PROGRESS NOTE ADULT - SUBJECTIVE AND OBJECTIVE BOX
CALLY HARTMAN  53y  Female      Patient is a 53y old  Female who presents with a chief complaint of DFU (21 Mar 2019 08:19)      INTERVAL HPI/OVERNIGHT EVENTS:  No acute events.     Vital Signs Last 24 Hrs  T(C): 36.3 (21 Mar 2019 15:56), Max: 36.3 (21 Mar 2019 15:56)  T(F): 97.4 (21 Mar 2019 15:56), Max: 97.4 (21 Mar 2019 15:56)  HR: 104 (21 Mar 2019 15:56) (87 - 104)  BP: 152/74 (21 Mar 2019 15:56) (122/65 - 152/74)  BP(mean): --  RR: 18 (21 Mar 2019 15:56) (18 - 18)  SpO2: 95% (21 Mar 2019 07:40) (88% - 95%)            Consultant(s) Notes Reviewed:  [x ] YES  [ ] NO          MEDICATIONS  (STANDING):  aspirin enteric coated 81 milliGRAM(s) Oral daily  atorvastatin 40 milliGRAM(s) Oral at bedtime  buDESOnide  80 MICROgram(s)/formoterol 4.5 MICROgram(s) Inhaler 2 Puff(s) Inhalation two times a day  cefepime   IVPB 2000 milliGRAM(s) IV Intermittent every 12 hours  cefepime   IVPB      chlorhexidine 4% Liquid 1 Application(s) Topical <User Schedule>  dextrose 5%. 1000 milliLiter(s) (50 mL/Hr) IV Continuous <Continuous>  dextrose 50% Injectable 12.5 Gram(s) IV Push once  dextrose 50% Injectable 25 Gram(s) IV Push once  dextrose 50% Injectable 12.5 Gram(s) IV Push once  dextrose 50% Injectable 25 Gram(s) IV Push once  dextrose 50% Injectable 25 Gram(s) IV Push once  docusate sodium 100 milliGRAM(s) Oral two times a day  DULoxetine 60 milliGRAM(s) Oral daily  enoxaparin Injectable 40 milliGRAM(s) SubCutaneous every 24 hours  insulin glargine Injectable (LANTUS) 20 Unit(s) SubCutaneous at bedtime  insulin lispro (HumaLOG) corrective regimen sliding scale   SubCutaneous three times a day before meals  insulin lispro Injectable (HumaLOG) 10 Unit(s) SubCutaneous three times a day before meals  levothyroxine 100 MICROGram(s) Oral daily  metoprolol tartrate 25 milliGRAM(s) Oral two times a day  multivitamin 1 Tablet(s) Oral daily  oxyCODONE  ER Tablet 20 milliGRAM(s) Oral every 12 hours  pantoprazole    Tablet 40 milliGRAM(s) Oral before breakfast  pregabalin 150 milliGRAM(s) Oral three times a day  rifampin IVPB 600 milliGRAM(s) IV Intermittent every 24 hours  senna 2 Tablet(s) Oral at bedtime    MEDICATIONS  (PRN):  ALBUTerol    90 MICROgram(s) HFA Inhaler 2 Puff(s) Inhalation every 6 hours PRN Shortness of Breath and/or Wheezing  dextrose 40% Gel 15 Gram(s) Oral once PRN Blood Glucose LESS THAN 70 milliGRAM(s)/deciliter  glucagon  Injectable 1 milliGRAM(s) IntraMuscular once PRN Glucose LESS THAN 70 milligrams/deciliter  hydrOXYzine hydrochloride 25 milliGRAM(s) Oral daily PRN Anxiety  oxyCODONE    5 mG/acetaminophen 325 mG 2 Tablet(s) Oral every 6 hours PRN Severe Pain (7 - 10)      LABS                          9.6    7.24  )-----------( 278      ( 21 Mar 2019 07:53 )             30.6     03-21    141  |  102  |  5<L>  ----------------------------<  110<H>  3.9   |  26  |  0.6<L>    Ca    8.6      21 Mar 2019 07:53  Phos  3.2     03-20  Mg     1.8     -    TPro  6.9  /  Alb  3.5  /  TBili  0.5  /  DBili  x   /  AST  73<H>  /  ALT  16  /  AlkPhos  92  03-19      Urinalysis Basic - ( 20 Mar 2019 00:28 )    Color: Yellow / Appearance: Clear / S.010 / pH: x  Gluc: x / Ketone: Negative  / Bili: Negative / Urobili: 0.2 mg/dL   Blood: x / Protein: Trace mg/dL / Nitrite: Negative   Leuk Esterase: Negative / RBC: x / WBC x   Sq Epi: x / Non Sq Epi: Many /HPF / Bacteria: x      PT/INR - ( 19 Mar 2019 19:50 )   PT: 17.10 sec;   INR: 1.49 ratio         PTT - ( 19 Mar 2019 19:50 )  PTT:35.6 sec  Lactate Trend   @ 07:49 Lactate:1.1    @ 19:50 Lactate:3.0     CARDIAC MARKERS ( 19 Mar 2019 19:50 )  x     / 0.01 ng/mL / x     / x     / x          CAPILLARY BLOOD GLUCOSE      POCT Blood Glucose.: 119 mg/dL (21 Mar 2019 12:27)      Culture - Blood (collected 19 @ 07:49)  Source: .Blood None  Preliminary Report (19 @ 15:00):    No growth to date.    Culture - Blood (collected 19 @ 19:50)  Source: .Blood Blood-Peripheral  Preliminary Report (19 @ 03:01):    No growth to date.    Culture - Blood (collected 19 @ 19:50)  Source: .Blood Blood-Peripheral  Preliminary Report (19 @ 03:01):    No growth to date.        RADIOLOGY & ADDITIONAL TESTS:    Imaging Personally Reviewed:  [ ] YES  [ ] NO    HEALTH ISSUES - PROBLEM Dx:        PHYSICAL EXAM:  GENERAL: NAD, well-developed  HEAD:  Atraumatic, Normocephalic  EYES: EOMI, PERRLA, conjunctiva and sclera clear  NECK: Supple, No JVD  CHEST/LUNG: Clear to auscultation bilaterally; No wheeze  HEART: Regular rate and rhythm; S1S2  ABDOMEN: Soft, Nontender, Nondistended; Bowel sounds present  EXTREMITIES:  Right foot dressing. LE edema 1+  PSYCH: AAOx3  NEUROLOGY: non-focal

## 2019-03-21 NOTE — PROGRESS NOTE ADULT - ASSESSMENT
53 F w/ PMHx DMII, CAD, DLD, HTN, Hypothyroidism, COPD, Sciatica/Peripheral Neuralgia, Charcot foot deformity s/p reconstruction on 1/11/19, complicated by surgical wound infected s/p debridement 2/8/19, grew MSSA from wound cx s/p Tx w/ IV ABX (Clindamycin) x 6 weeks (end date 3/12/19), presented to ED after podiatrist referred her for non resolving infection, increased wbc count.    # Surgical wound dehiscence with infection s/p reconstruction of R charcot foot 1/11/19  - ID following: c/w cefepime and rifampin   - Podiatry following: no surgical intervention; dressing yesterday   - A. duplex pending    # Hypotonic Hyponatremia  - Resolved     # Diabetes  - FS / Insulin     # CAD/ DLD  - c/w aspirin + statin    # HTN  - c/w metoprolol 25 BID    # COPD  - Symbicort and albuterol PRN   - Home meds: Qvar & Ventolin    # Sciatica/Peripheral neuralgia   - c/w duloxetine and Lyrica    # DVT / GI Ppx  - c/w Lovenox / Protonix     # Activity  - Full weight bearing LLE and non weight bearing RLE    # Consistent carbohydrate     # Disposition  - d/c to home with home care services    # Full code 53 F w/ PMHx DMII, CAD, DLD, HTN, Hypothyroidism, COPD, Sciatica/Peripheral Neuralgia, Charcot foot deformity s/p reconstruction on 1/11/19, complicated by surgical wound infected s/p debridement 2/8/19, grew MSSA from wound cx s/p Tx w/ IV ABX (Clindamycin) x 6 weeks (end date 3/12/19), presented to ED after podiatrist referred her for non resolving infection, increased wbc count.    # Surgical wound dehiscence with infection s/p reconstruction of R charcot foot 1/11/19  - ID following: c/w cefepime and rifampin   - blood cx: no growth to date x2  - Podiatry following: no surgical intervention; dressing yesterday   - A. duplex pending    # Hypotonic Hyponatremia  - Resolved     # Diabetes  - FS / Insulin     # CAD/ DLD  - c/w aspirin + statin    # HTN  - c/w metoprolol 25 BID    # COPD  - Symbicort and albuterol PRN   - Home meds: Qvar & Ventolin    # Sciatica/Peripheral neuralgia   - c/w duloxetine and Lyrica    # DVT / GI Ppx  - c/w Lovenox / Protonix     # Activity  - Full weight bearing LLE and non weight bearing RLE    # Consistent carbohydrate     # Disposition  - d/c to home with home care services    # Full code

## 2019-03-21 NOTE — DIETITIAN INITIAL EVALUATION ADULT. - ENERGY NEEDS
estimated calorie needs = ~3036-9401 kcal/day (30-35 kcal/kg IBW as IBW significantly < CBW in pt BMI >30).  estimated protein needs = 57-67 g/day (1.2-1.4 g/kg IBW d/t reason noted above).  estimated fluid needs = 1mL/kcal or per LIP

## 2019-03-21 NOTE — DIETITIAN INITIAL EVALUATION ADULT. - DIET TYPE
consistent carbohydrate (no snacks)/Pt appears agitated today. Reports appetite is fair but PO intake varies as pt states she "doesn't like food here". Observed 1L bottle of Pepsi at bedside. RD attempted to provide diet ed but pt refused stating "I will never follow a diabetic diet so I don't want to hear about it" and deferred written material. Calorie count order not indicated at this time as pt does not appear malnourished and PO intake generally substantial. Discussed with LIP. no further nutrition intervention.

## 2019-03-22 ENCOUNTER — TRANSCRIPTION ENCOUNTER (OUTPATIENT)
Age: 54
End: 2019-03-22

## 2019-03-22 VITALS
HEART RATE: 97 BPM | TEMPERATURE: 96 F | DIASTOLIC BLOOD PRESSURE: 90 MMHG | SYSTOLIC BLOOD PRESSURE: 142 MMHG | RESPIRATION RATE: 17 BRPM

## 2019-03-22 LAB — GLUCOSE BLDC GLUCOMTR-MCNC: 165 MG/DL — HIGH (ref 70–99)

## 2019-03-22 RX ORDER — FLUTICASONE PROPIONATE 50 MCG
1 SPRAY, SUSPENSION NASAL
Qty: 0 | Refills: 0 | Status: DISCONTINUED | OUTPATIENT
Start: 2019-03-22 | End: 2019-03-22

## 2019-03-22 RX ORDER — AZTREONAM 2 G
1 VIAL (EA) INJECTION
Qty: 60 | Refills: 0
Start: 2019-03-22 | End: 2019-04-20

## 2019-03-22 RX ADMIN — Medication 1 TABLET(S): at 11:16

## 2019-03-22 RX ADMIN — Medication 1 APPLICATION(S): at 11:38

## 2019-03-22 RX ADMIN — Medication 150 MILLIGRAM(S): at 05:10

## 2019-03-22 RX ADMIN — Medication 25 MILLIGRAM(S): at 05:08

## 2019-03-22 RX ADMIN — Medication 100 MICROGRAM(S): at 05:08

## 2019-03-22 RX ADMIN — DULOXETINE HYDROCHLORIDE 60 MILLIGRAM(S): 30 CAPSULE, DELAYED RELEASE ORAL at 11:16

## 2019-03-22 RX ADMIN — Medication 1 APPLICATION(S): at 05:17

## 2019-03-22 RX ADMIN — PANTOPRAZOLE SODIUM 40 MILLIGRAM(S): 20 TABLET, DELAYED RELEASE ORAL at 05:08

## 2019-03-22 RX ADMIN — Medication 81 MILLIGRAM(S): at 11:16

## 2019-03-22 RX ADMIN — Medication 600 MILLIGRAM(S): at 06:44

## 2019-03-22 RX ADMIN — OXYCODONE AND ACETAMINOPHEN 2 TABLET(S): 5; 325 TABLET ORAL at 11:38

## 2019-03-22 RX ADMIN — OXYCODONE HYDROCHLORIDE 20 MILLIGRAM(S): 5 TABLET ORAL at 05:10

## 2019-03-22 RX ADMIN — CEFEPIME 100 MILLIGRAM(S): 1 INJECTION, POWDER, FOR SOLUTION INTRAMUSCULAR; INTRAVENOUS at 05:11

## 2019-03-22 NOTE — PROGRESS NOTE ADULT - SUBJECTIVE AND OBJECTIVE BOX
CALLY HARTMAN  53y, Female      OVERNIGHT EVENTS:  no acute events overnight  WBC 7    ROS negative except as per above    VITALS:  T(F): 96, Max: 97.4 (03-21-19 @ 15:56)  HR: 97  BP: 142/90  RR: 17Vital Signs Last 24 Hrs  T(C): 35.6 (22 Mar 2019 05:46), Max: 36.3 (21 Mar 2019 15:56)  T(F): 96 (22 Mar 2019 05:46), Max: 97.4 (21 Mar 2019 15:56)  HR: 97 (22 Mar 2019 05:46) (97 - 104)  BP: 142/90 (22 Mar 2019 05:46) (134/63 - 152/74)  BP(mean): --  RR: 17 (22 Mar 2019 05:46) (17 - 18)  SpO2: 94% (21 Mar 2019 19:27) (94% - 94%)    PHYSICAL EXAM:  General: no acute distress, well kept.  HEENT: atruamatic, normocephalic, no lymph nodes palpable.  Lungs: Clear to auscultation b/l, no wheezes heard  Heart: S1 and s2 heard, heart is on right side (Hx of Dextrocardia), no murmurs heard  Abdomen: soft, non tender, obese,   Extremities: Right medial wound no surrounding erythema, dorsal 1st digit wound no purulence, +edema/erythema of 1st, 2nd digits, swelling and 2+ pitting edema noted in b/l LE, no erythema, not warm to touch.   Neuro: AAOx3    TESTS & MEASUREMENTS:                        9.6    7.24  )-----------( 278      ( 21 Mar 2019 07:53 )             30.6     03-21    141  |  102  |  5<L>  ----------------------------<  110<H>  3.9   |  26  |  0.6<L>    Ca    8.6      21 Mar 2019 07:53  Mg     1.8     03-21          Culture - Blood (collected 03-20-19 @ 07:49)  Source: .Blood None  Preliminary Report (03-21-19 @ 15:00):    No growth to date.    Culture - Urine (collected 03-20-19 @ 00:28)  Source: .Urine Clean Catch (Midstream)  Final Report (03-21-19 @ 19:44):    No growth    Culture - Blood (collected 03-19-19 @ 19:50)  Source: .Blood Blood-Peripheral  Preliminary Report (03-21-19 @ 03:01):    No growth to date.    Culture - Blood (collected 03-19-19 @ 19:50)  Source: .Blood Blood-Peripheral  Preliminary Report (03-21-19 @ 03:01):    No growth to date.          RADIOLOGY & ADDITIONAL TESTS:    ANTIBIOTICS:  cefepime   IVPB   100 mL/Hr IV Intermittent (03-19-19 @ 20:00)    cefepime   IVPB   100 mL/Hr IV Intermittent (03-20-19 @ 15:14)    cefepime   IVPB   100 mL/Hr IV Intermittent (03-22-19 @ 05:11)   100 mL/Hr IV Intermittent (03-21-19 @ 17:24)   100 mL/Hr IV Intermittent (03-21-19 @ 08:06)    clindamycin IVPB   100 mL/Hr IV Intermittent (03-20-19 @ 13:18)   100 mL/Hr IV Intermittent (03-20-19 @ 06:02)    rifampin IVPB   100 mL/Hr IV Intermittent (03-22-19 @ 05:11)   100 mL/Hr IV Intermittent (03-21-19 @ 05:32)   100 mL/Hr IV Intermittent (03-20-19 @ 06:02)        cefepime   IVPB 2000 milliGRAM(s) IV Intermittent every 12 hours  cefepime   IVPB      rifampin IVPB 600 milliGRAM(s) IV Intermittent every 24 hours

## 2019-03-22 NOTE — CHART NOTE - NSCHARTNOTEFT_GEN_A_CORE
<<<RESIDENT DISCHARGE NOTE>>>     CALLY HARTMAN  [id number]    53 F w/ PMHx DMII, CAD, DLD, HTN, Hypothyroidism, COPD, Sciatica/Peripheral Neuralgia, Charcot foot deformity s/p reconstruction on 1/11/19, complicated by surgical wound infected s/p debridement 2/8/19, grew MSSA from wound cx s/p Tx w/ IV ABX (Clindamycin) x 6 weeks (end date 3/12/19), presented to ED after podiatrist referred her for non resolving infection, increased wbc count.    # Surgical wound dehiscence with infection s/p reconstruction of R charcot foot 1/11/19  - ID following: Discharge on Bactrim and Rifampin   - blood cx: no growth to date x2  - Podiatry following: no surgical intervention; OP f/u   - A. duplex - mild bilateral Arterial Disease  - OP f/u with PMD and Dr. Millard      # Hypotonic Hyponatremia  - Resolved     # Diabetes - uncontrolled very non compliant patient.   - FS / Insulin   - OP PMD follow up     # CAD/ DLD  - c/w aspirin + statin  - Echo as Outpatient     # HTN  - c/w metoprolol 25 BID    # COPD  - Symbicort and albuterol PRN   - Home meds: Qvar & Ventolin    # Sciatica/Peripheral neuralgia   - c/w duloxetine and Lyrica    # DVT / GI Ppx  - c/w Lovenox / Protonix     # Activity  - Full weight bearing LLE and non weight bearing RLE    # Consistent carbohydrate       VITAL SIGNS:  T(F): 96 (03-22-19 @ 05:46), Max: 96 (03-21-19 @ 20:18)  HR: 97 (03-22-19 @ 05:46)  BP: 142/90 (03-22-19 @ 05:46)  SpO2: 94% (03-21-19 @ 19:27)      PHYSICAL EXAMINATION:  General: NAD  Head & Neck: CHRISTIAN  Pulmonary: CTA  Cardiovascular: RRR  Gastrointestinal/Abdomen & Pelvis: soft non tender   Neurologic/Motor: non focal .  LE - Non heling wounds     TEST RESULTS:                        9.6    7.24  )-----------( 278      ( 21 Mar 2019 07:53 )             30.6       03-21    141  |  102  |  5<L>  ----------------------------<  110<H>  3.9   |  26  |  0.6<L>    Ca    8.6      21 Mar 2019 07:53  Mg     1.8     03-21        FINAL DISCHARGE INTERVIEW:  Resident(s) Present: (Name:____Any Stubbs ________), RN Present: (Name:  ____Geri _______)    DISCHARGE MEDICATION RECONCILIATION  reviewed with Attending (Name:_____Dr. Yan ______)    DISPOSITION:   [  ] Home,    [ x ] Home with Visiting Nursing Services,   [    ]  SNF/ NH,    [   ] Acute Rehab (4A),   [   ] Other (Specify:_________)

## 2019-03-22 NOTE — PROGRESS NOTE ADULT - ASSESSMENT
53 F w/ PMHx DMII, CAD, DLD, HTN, Hypothyroidism, COPD, Sciatica/Peripheral Neuralgia, Charcot foot deformity s/p reconstruction on 1/11/19. Pt presented to the ED for PICC line replacement and non healing foot infection.   ID was consulted for WBC count despite 6wks IV Clindamycin coverage. (completed 3/12)  Pt is clinically stable; afebrile, not tachycardic, upon arrival to ER pt had a WBC count of 16.20 that has reduced down to 9 today.   Last admission pt had diffuse body rash reaction to ceftriaxone.     IMPRESSION:   Diabetic R Foot Infection, previous wcx with MSSA  No evidence of sepsis  Xray Since prior there is increasing forefoot swelling with no recurrent talonavicular neuropathic dislocation. There is apparent osteopenia   worsening along the midfoot and first and second metatarsals which may represent disuse osteoporosis  Sedimentation Rate, Erythrocyte: 8 mm/hr (03.19.19 @ 20:45)  C-Reactive Protein, Serum: 2.16 mg/dL (03.21.19 @ 07:53)    Possible initial leukocytosis 2/2 viral infection as +rhinorrhea, URI sx    RECOMMENDATION:  - While in house continue IV Cefepime 2g q12h (tolerated prior) & PO rifampin 600mg (d/c clinda)  - On d/c change to bactrim 1DS tab PO BID and rifampin 600mg for chronic OM, plan for 4-6 weeks pending clinical response  - ADD on LFTs to AM labs (admission hemolyzed) as on rifampin  - Podiatry

## 2019-03-22 NOTE — DISCHARGE NOTE NURSING/CASE MANAGEMENT/SOCIAL WORK - NSDCDPATPORTLINK_GEN_ALL_CORE
You can access the [facility] Patient Portal, offered by [facility], by registering with the following website: [email or web address]

## 2019-03-22 NOTE — DISCHARGE NOTE PROVIDER - NSDCFUADDAPPT_GEN_ALL_CORE_FT
Please, with in two weeks of discharge follow up with Dr. Diaz  Please, with in two weeks of discharge follow up with Dr. Garza   Please, with in two weeks of discharge follow up with Dr. Millard

## 2019-03-22 NOTE — PROGRESS NOTE ADULT - SUBJECTIVE AND OBJECTIVE BOX
CALLY HARTMAN  53y, Female  Allergy: No Known Allergies    Hospital Day: 3d    Patient seen and examined earlier today.  c/o nasal congestion and cough.  Rt foot ok    PMH/PSH:  PAST MEDICAL & SURGICAL HISTORY:  Dextrocardia  Chronic midline low back pain with bilateral sciatica  Peripheral neuralgia  Essential hypertension  Diabetes 1.5, managed as type 2  Charcot's arthropathy: R foot  Charcot foot due to diabetes mellitus  H/O shoulder surgery: Right shoulder surgery   delivery delivered    VITALS:  T(F): 96 (19 @ 05:46), Max: 97.4 (19 @ 15:56)  HR: 97 (19 @ 05:46)  BP: 142/90 (19 @ 05:46) (134/63 - 152/74)  RR: 17 (19 @ 05:46)  SpO2: 94% (19 @ 19:27)    TESTS & MEASUREMENTS:  Weight (Kg):                             9.6    7.24  )-----------( 278      ( 21 Mar 2019 07:53 )             30.6           141  |  102  |  5<L>  ----------------------------<  110<H>  3.9   |  26  |  0.6<L>    Ca    8.6      21 Mar 2019 07:53  Mg     1.8               Culture - Blood (collected 19 @ 07:49)  Source: .Blood None  Preliminary Report (19 @ 15:00):    No growth to date.    Culture - Urine (collected 19 @ 00:28)  Source: .Urine Clean Catch (Midstream)  Final Report (19 @ 19:44):    No growth    Culture - Blood (collected 19 @ 19:50)  Source: .Blood Blood-Peripheral  Preliminary Report (19 @ 03:01):    No growth to date.    Culture - Blood (collected 19 @ 19:50)  Source: .Blood Blood-Peripheral  Preliminary Report (19 @ 03:01):    No growth to date.      RADIOLOGY & ADDITIONAL TESTS  < from: Xray Chest 1 View-PORTABLE IMMEDIATE (03.20.19 @ 05:33) >    Impression:      No focal pulmonary consolidation.    Stable exam.    < end of copied text >      MEDICATIONS:  MEDICATIONS  (STANDING):  aspirin enteric coated 81 milliGRAM(s) Oral daily  atorvastatin 40 milliGRAM(s) Oral at bedtime  BACItracin   Ointment 1 Application(s) Topical four times a day  buDESOnide  80 MICROgram(s)/formoterol 4.5 MICROgram(s) Inhaler 2 Puff(s) Inhalation two times a day  cefepime   IVPB 2000 milliGRAM(s) IV Intermittent every 12 hours  cefepime   IVPB      chlorhexidine 4% Liquid 1 Application(s) Topical <User Schedule>  dextrose 5%. 1000 milliLiter(s) (50 mL/Hr) IV Continuous <Continuous>  dextrose 50% Injectable 12.5 Gram(s) IV Push once  dextrose 50% Injectable 25 Gram(s) IV Push once  dextrose 50% Injectable 12.5 Gram(s) IV Push once  dextrose 50% Injectable 25 Gram(s) IV Push once  dextrose 50% Injectable 25 Gram(s) IV Push once  docusate sodium 100 milliGRAM(s) Oral two times a day  DULoxetine 60 milliGRAM(s) Oral daily  enoxaparin Injectable 40 milliGRAM(s) SubCutaneous every 24 hours  fluticasone propionate 50 MICROgram(s)/spray Nasal Spray 1 Spray(s) Both Nostrils two times a day  furosemide    Tablet 40 milliGRAM(s) Oral daily  guaiFENesin    Syrup 100 milliGRAM(s) Oral once  guaiFENesin  milliGRAM(s) Oral every 12 hours  insulin glargine Injectable (LANTUS) 20 Unit(s) SubCutaneous at bedtime  insulin lispro (HumaLOG) corrective regimen sliding scale   SubCutaneous three times a day before meals  insulin lispro Injectable (HumaLOG) 10 Unit(s) SubCutaneous three times a day before meals  levothyroxine 100 MICROGram(s) Oral daily  metoprolol tartrate 25 milliGRAM(s) Oral two times a day  multivitamin 1 Tablet(s) Oral daily  oxyCODONE  ER Tablet 20 milliGRAM(s) Oral every 12 hours  pantoprazole    Tablet 40 milliGRAM(s) Oral before breakfast  pregabalin 150 milliGRAM(s) Oral three times a day  rifampin IVPB 600 milliGRAM(s) IV Intermittent every 24 hours  senna 2 Tablet(s) Oral at bedtime    MEDICATIONS  (PRN):  ALBUTerol    90 MICROgram(s) HFA Inhaler 2 Puff(s) Inhalation every 6 hours PRN Shortness of Breath and/or Wheezing  dextrose 40% Gel 15 Gram(s) Oral once PRN Blood Glucose LESS THAN 70 milliGRAM(s)/deciliter  glucagon  Injectable 1 milliGRAM(s) IntraMuscular once PRN Glucose LESS THAN 70 milligrams/deciliter  hydrOXYzine hydrochloride 25 milliGRAM(s) Oral daily PRN Anxiety  oxyCODONE    5 mG/acetaminophen 325 mG 2 Tablet(s) Oral every 6 hours PRN Severe Pain (7 - 10)      HOME MEDICATIONS:  budesonide-formoterol 80 mcg-4.5 mcg/inh inhalation aerosol ()  DULoxetine 60 mg oral delayed release capsule (01-10)  hydrOXYzine hydrochloride 10 mg oral tablet ()  Lyrica 150 mg oral capsule ()  oxyCODONE 20 mg oral tablet, extended release ()  pantoprazole 40 mg oral delayed release tablet (01-10)  Percocet 5/325 oral tablet ()  Qvar 80 mcg/inh inhalation aerosol (01-10)  tiZANidine 4 mg oral tablet ()  Ventolin HFA 90 mcg/inh inhalation aerosol (01-10)      PHYSICAL EXAM:  GENERAL: A&O x3,  in NAD/P  HEENT:  nasal congestion  CHEST/LUNG: Good air entry, No wheezing  HEART: RRR, No murmurs  ABDOMEN: Soft, Bowel sounds present  EXTREMITIES:  No clubbing, Rt foot in surgical dressing

## 2019-03-22 NOTE — PROGRESS NOTE ADULT - ASSESSMENT
53 F w/ PMHx DMII, CAD, DLD, HTN, Hypothyroidism, COPD, Sciatica/Peripheral Neuralgia, Charcot foot deformity s/p reconstruction on 1/11/19, complicated by surgical wound infected s/p debridement 2/8/19, grew MSSA from wound cx s/p Tx w/ IV ABX (Clindamycin) x 6 weeks (end date 3/12/19), presented to ED after podiatrist referred her for non resolving infection.    # Surgical wound dehiscence with infection s/p reconstruction of R charcot foot 1/11/19  #chronic OM - d/c on bactrim and rifampin   # Hypotonic Hyponatremia - Resolved   # uncontrolled DM2   # CAD/ DLD  # HTN  # COPD  # Sciatica/Peripheral neuralgia     stable for discharge home today   continue bactrim and rifampin x 4 weeks  f/u with ID in 3-4 weeks  f/u podiatry   f/u  PMD within1  week  total time spent 38min

## 2019-03-22 NOTE — DISCHARGE NOTE PROVIDER - HOSPITAL COURSE
53 F w/ PMHx DMII, CAD, DLD, HTN, Hypothyroidism, COPD, Sciatica/Peripheral Neuralgia, Charcot foot deformity s/p reconstruction on 1/11/19, complicated by surgical wound infected s/p debridement 2/8/19, grew MSSA from wound cx s/p Tx w/ IV ABX (Clindamycin) x 6 weeks (end date 3/12/19), presented to ED after podiatrist referred her for non resolving infection, increased wbc count.        # Surgical wound dehiscence with infection s/p reconstruction of R charcot foot 1/11/19    - ID following: Discharge on Bactrim and Rifampin     - blood cx: no growth to date x2    - Podiatry following: no surgical intervention; OP f/u     - A. duplex - mild bilateral Arterial Disease    - OP f/u with PMD and Dr. Millard          # Hypotonic Hyponatremia    - Resolved         # Diabetes - uncontrolled very non compliant patient.     - FS / Insulin     - OP PMD follow up         # CAD/ DLD    - c/w aspirin + statin    - Echo as Outpatient         # HTN    - c/w metoprolol 25 BID        # COPD    - Symbicort and albuterol PRN     - Home meds: Qvar & Ventolin        # Sciatica/Peripheral neuralgia     - c/w duloxetine and Lyrica        # DVT / GI Ppx    - c/w Lovenox / Protonix         # Activity    - Full weight bearing LLE and non weight bearing RLE        # Consistent carbohydrate

## 2019-03-22 NOTE — DISCHARGE NOTE PROVIDER - NSDCCPCAREPLAN_GEN_ALL_CORE_FT
PRINCIPAL DISCHARGE DIAGNOSIS  Diagnosis: Wound infection after surgery  Assessment and Plan of Treatment: Please contine woud dressing as with Rukhsana/kerlix/webril/ace/posterior splint and compression. Please continue with  bactrim 1DS tab PO BID and rifampin 600mg for chronic Osteomylitis. Pleases follow up with Dr. Millard on dscharge. Please follow up with Podiatry on discharge.      SECONDARY DISCHARGE DIAGNOSES  Diagnosis: Diabetes mellitus  Assessment and Plan of Treatment: Please continue your diabtetic medication as directed.
No

## 2019-03-22 NOTE — DISCHARGE NOTE PROVIDER - CARE PROVIDER_API CALL
Rory Diaz ([email or web address])  Surgery  [address] [place] [address]  Phone: [phone]  Fax: [phone]  Follow Up Time:     Jose Garza)  [address] [facility] [id number]  [address] [facility] [facility]  [place] [address]  Phone: [phone]  Fax: [phone]  Follow Up Time:

## 2019-03-22 NOTE — PROGRESS NOTE ADULT - SUBJECTIVE AND OBJECTIVE BOX
PODIATRY PROGRESS NOTE   [id number] [address]ARTMAN   Patient is a 53y old  Female who presents with a chief complaint of DFU (21 Mar 2019 08:19)    HPI:  53 F w/ PMHx DMII, CAD, DLD, HTN, Hypothyroidism, COPD, Sciatica/Peripheral Neuralgia, Charcot foot deformity s/p reconstruction on 1/11/19, complicated by surgical wound infected s/p debridement 2/8/19, grew MSSA from wound cx s/p Tx w/ IV ABX (Clindamycin) x 6 weeks (end date 3/12/19), presented to ED after podiatrist referred her for non resolving infection, increased wbc count. ED spoke w/ podiatrist who recommended IV Cefepime, and ID consult. The patient also says her PICC line came out, unable to explain how, but per records her abx were to be discontinued 3/12 regardless. Her only other medical complaint was feeling fatigued for the past few days. Pt admits to taking extra dose of her Oxycontin 20mg prior to coming to ED. (20 Mar 2019 00:37)    Pt seen and assessed am rounds at bedside.  pt complaints of coughing this morning.  dressing clean dry intact.    Vital Signs Last 24 Hrs  T(C): 35.6 (22 Mar 2019 05:46), Max: 36.3 (21 Mar 2019 15:56)  T(F): 96 (22 Mar 2019 05:46), Max: 97.4 (21 Mar 2019 15:56)  HR: 97 (22 Mar 2019 05:46) (97 - 104)  BP: 142/90 (22 Mar 2019 05:46) (134/63 - 152/74)  BP(mean): --  RR: 17 (22 Mar 2019 05:46) (17 - 18)  SpO2: 94% (21 Mar 2019 19:27) (94% - 94%)                        9.6    7.24  )-----------( 278      ( 21 Mar 2019 07:53 )             30.6                 03-21    141  |  102  |  5<L>  ----------------------------<  110<H>  3.9   |  26  |  0.6<L>    Ca    8.6      21 Mar 2019 07:53  Mg     1.8     03-21      < from: VA Duplex Lower Extrem Arterial, Bilat (03.21.19 @ 09:26) >    EXAM:  ART DUPLEX LOWER EXT BILATERAL            PROCEDURE DATE:  03/21/2019            INTERPRETATION:  Clinical History / Reason for exam: This patient is a   53-year-old female with leg pain. Lower extremity arterial duplex   ultrasound was performed to assess for arterial occlusive disease.    Bilateral common femoral, deep femoral, superficial femoral, popliteal,   anterior tibial, posterior tibial and peroneal arteries were visualized.      Right:    The right common femoral artery is patent with mildly elevated flow   velocities to 201.4 cm/s. The profunda femoral, superficial femoral and   popliteal arteries are patent. With no evidence of hemodynamically   significant disease. The anterior tibial artery is patent. The posterior   tibial peroneal arteries are not visualized due to the presence of a cast.    Left:    The left common femoral artery is patent with mildly elevated flow   velocities to 189.3 cm/s. The profunda femoral, superficial femoral,   popliteal, anterior tibial, posterior tibial and peroneal arteries are   patent with no evidence of hemodynamically significant disease.    Impression:    Mild bilateral femoral artery disease as described above.    ICD-10: I70.211                  ELINOR MAYES M.D., ATTENDING VASCULAR  This document has been electronically signed. Mar 21 2019  9:34AM          < from: Xray Foot AP + Lateral + Oblique, Right (03.20.19 @ 05:34) >    EXAM:  XR FOOT COMP MIN 3 VIEWS RT            PROCEDURE DATE:  03/20/2019            INTERPRETATION:  Clinical History / Reason for exam: Right foot infection    Technique: 3 views of the right foot    Comparison 1/30/2019    Findings: Overall evaluation is limited given fiberglass splint. There is   Charcot reconstruction with screws along the talar first metatarsal axis   midfoot to second metatarsal and along the calcaneocuboid lateral column.   Since prior there is increasing forefoot swelling with no recurrent   talonavicular neuropathic dislocation. There is apparent osteopenia   worsening along the midfoot and first and second metatarsals which may   represent disuse osteoporosis, overall limited given foot deformity and   overlying artifact.                  SUNG SHARMA M.D., ATTENDING RADIOLOGIST  This document has been electronically signed. Mar 20 2019 12:46PM                < end of copied text >        < end of copied text >    LE Focused Exam:      Vasc:    - DP/PT pulses non-palpable B/L   - Skin temp warm to warm, proximal to distal B/L  - CFT < 3 sec B/L    Neuro:   - Gross sensation diminished B/L     Derm:   - No interdigital macerations B/L   - non-healing surgical wounds right foot s/p right foot charcot reconstruction sx    MSK:   - Muscle strength 5/5 in all quadrants w/ no defects B/L         Assessment:  - non-healing wounds 2/2 charcot right foot reconstruction surgery. edema improving with compression, + erythema. Wounds are fibro-granular    Plan:   - pt seen/evaluated in ED  - wounds cleansed with normal saline, dressed w/ Rukhsana/kerlix/webril/ace/posterior splint and compression  - Id consult appreciated: cont iv abx as per ID  - arterial duplex : as above  - xray right foot reviewed: as above  - no surgical intervention  - plan discussed w/ attending   - podiatry continue monitor wound sites  03-22-19 @ 09:26

## 2019-03-22 NOTE — DISCHARGE NOTE PROVIDER - INSTRUCTIONS
Please Limit daily sodium intake to 1.5 grams daily or 1/2 tea spoon of salt. Avoid foods high in cholesterol. Please make a concerted effort to stay hydrated at all times. Please continue low carbohydrate diet

## 2019-03-22 NOTE — DISCHARGE NOTE PROVIDER - CARE PROVIDERS DIRECT ADDRESSES
,DirectAddress_Unknown,[email or web address]

## 2019-03-25 LAB
CULTURE RESULTS: SIGNIFICANT CHANGE UP
SPECIMEN SOURCE: SIGNIFICANT CHANGE UP

## 2019-03-26 DIAGNOSIS — Z79.84 LONG TERM (CURRENT) USE OF ORAL HYPOGLYCEMIC DRUGS: ICD-10-CM

## 2019-03-26 DIAGNOSIS — J44.9 CHRONIC OBSTRUCTIVE PULMONARY DISEASE, UNSPECIFIED: ICD-10-CM

## 2019-03-26 DIAGNOSIS — E13.69 OTHER SPECIFIED DIABETES MELLITUS WITH OTHER SPECIFIED COMPLICATION: ICD-10-CM

## 2019-03-26 DIAGNOSIS — E11.610 TYPE 2 DIABETES MELLITUS WITH DIABETIC NEUROPATHIC ARTHROPATHY: ICD-10-CM

## 2019-03-26 DIAGNOSIS — T81.49XA INFECTION FOLLOWING A PROCEDURE, OTHER SURGICAL SITE, INITIAL ENCOUNTER: ICD-10-CM

## 2019-03-26 DIAGNOSIS — E78.5 HYPERLIPIDEMIA, UNSPECIFIED: ICD-10-CM

## 2019-03-26 DIAGNOSIS — E87.1 HYPO-OSMOLALITY AND HYPONATREMIA: ICD-10-CM

## 2019-03-26 DIAGNOSIS — F17.200 NICOTINE DEPENDENCE, UNSPECIFIED, UNCOMPLICATED: ICD-10-CM

## 2019-03-26 DIAGNOSIS — E11.42 TYPE 2 DIABETES MELLITUS WITH DIABETIC POLYNEUROPATHY: ICD-10-CM

## 2019-03-26 DIAGNOSIS — T81.31XA DISRUPTION OF EXTERNAL OPERATION (SURGICAL) WOUND, NOT ELSEWHERE CLASSIFIED, INITIAL ENCOUNTER: ICD-10-CM

## 2019-03-26 DIAGNOSIS — I10 ESSENTIAL (PRIMARY) HYPERTENSION: ICD-10-CM
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
## 2019-03-26 DIAGNOSIS — M54.32 SCIATICA, LEFT SIDE: ICD-10-CM

## 2019-03-26 DIAGNOSIS — T85.628A DISPLACEMENT OF OTHER SPECIFIED INTERNAL PROSTHETIC DEVICES, IMPLANTS AND GRAFTS, INITIAL ENCOUNTER: ICD-10-CM

## 2019-03-26 DIAGNOSIS — E03.9 HYPOTHYROIDISM, UNSPECIFIED: ICD-10-CM

## 2019-03-26 DIAGNOSIS — Z79.82 LONG TERM (CURRENT) USE OF ASPIRIN: ICD-10-CM

## 2019-03-26 DIAGNOSIS — M54.31 SCIATICA, RIGHT SIDE: ICD-10-CM

## 2019-03-26 DIAGNOSIS — M86.60 OTHER CHRONIC OSTEOMYELITIS, UNSPECIFIED SITE: ICD-10-CM

## 2019-03-26 DIAGNOSIS — I25.10 ATHEROSCLEROTIC HEART DISEASE OF NATIVE CORONARY ARTERY WITHOUT ANGINA PECTORIS: ICD-10-CM

## 2019-03-29 LAB — VASOPRESSIN SERPL-MCNC: SIGNIFICANT CHANGE UP PG/ML

## 2019-04-12 ENCOUNTER — RX RENEWAL (OUTPATIENT)
Age: 54
End: 2019-04-12

## 2019-04-15 ENCOUNTER — RX RENEWAL (OUTPATIENT)
Age: 54
End: 2019-04-15

## 2019-04-25 ENCOUNTER — APPOINTMENT (OUTPATIENT)
Dept: UROGYNECOLOGY | Facility: CLINIC | Age: 54
End: 2019-04-25

## 2019-04-25 VITALS
SYSTOLIC BLOOD PRESSURE: 100 MMHG | DIASTOLIC BLOOD PRESSURE: 63 MMHG | BODY MASS INDEX: 33.66 KG/M2 | WEIGHT: 190 LBS | HEIGHT: 63 IN | HEART RATE: 83 BPM

## 2019-04-25 NOTE — HISTORY OF PRESENT ILLNESS
[FreeTextEntry1] : \par Patient is here for follow up for med/BP check for nocturia.\par Last seen on 1/3/19 as NP for nocturia.\par \par H/o Diabetes\par \par s/p Detrol (no improvement)\par s/p Trospium (no improvement)\par \par Advised behavioral modification for nocturia\par On Hydroxyzine\par Myrbetriq 25 mg daily for nocturia\par \par Today, patient is happy with Myrbetriq 25 mg daily and is noticing improvement. Denies side effects. Patient does not feel she has an infection.\par \par Patient would like to continue Myrbetriq 25 mg daily.

## 2019-04-25 NOTE — COUNSELING
[FreeTextEntry1] : \par Please continue taking Myrbetriq 25 mg for frequency. Refills sent to your pharmacy.\par \par Please call my office if you have any issues with the cost or side effects of the medication. \par \par Schedule a 6 months follow up med check appointment with NED Mckeon.

## 2019-04-25 NOTE — DISCUSSION/SUMMARY
[FreeTextEntry1] : \par Nocturia-\par Patient happy with Myrbetriq 25 mg daily.\par Refills provided. 90 days supply with 1 refill.\par Precautions reviewed.\par Will return in 6 months for follow up or earlier if she has any issues.\par

## 2019-09-20 ENCOUNTER — OUTPATIENT (OUTPATIENT)
Dept: OUTPATIENT SERVICES | Facility: HOSPITAL | Age: 54
LOS: 1 days | Discharge: HOME | End: 2019-09-20
Payer: MEDICARE

## 2019-09-20 DIAGNOSIS — M25.571 PAIN IN RIGHT ANKLE AND JOINTS OF RIGHT FOOT: ICD-10-CM

## 2019-09-20 DIAGNOSIS — E11.610 TYPE 2 DIABETES MELLITUS WITH DIABETIC NEUROPATHIC ARTHROPATHY: Chronic | ICD-10-CM

## 2019-09-20 DIAGNOSIS — M25.671 STIFFNESS OF RIGHT ANKLE, NOT ELSEWHERE CLASSIFIED: ICD-10-CM

## 2019-09-20 DIAGNOSIS — Z98.890 OTHER SPECIFIED POSTPROCEDURAL STATES: Chronic | ICD-10-CM

## 2019-09-20 PROCEDURE — 73700 CT LOWER EXTREMITY W/O DYE: CPT | Mod: 26,RT

## 2019-10-16 ENCOUNTER — RX RENEWAL (OUTPATIENT)
Age: 54
End: 2019-10-16

## 2019-10-28 ENCOUNTER — APPOINTMENT (OUTPATIENT)
Dept: UROGYNECOLOGY | Facility: CLINIC | Age: 54
End: 2019-10-28
Payer: MEDICARE

## 2019-10-28 VITALS
HEART RATE: 94 BPM | BODY MASS INDEX: 33.66 KG/M2 | DIASTOLIC BLOOD PRESSURE: 74 MMHG | SYSTOLIC BLOOD PRESSURE: 124 MMHG | HEIGHT: 63 IN | WEIGHT: 190 LBS

## 2019-10-28 PROCEDURE — 99213 OFFICE O/P EST LOW 20 MIN: CPT

## 2019-10-28 NOTE — COUNSELING
[FreeTextEntry1] : \par If you feel like you have an infection it is important for you to call our office and we will arrange testing of your urine.\par \par Avoid liquid intake 2 hours before bedtime. It is recommended that you take sips of water to take your medications before bedtime. \par \par Elevate your legs throughout the day, particularly 2 hours before bedtime. \par \par We recommend wearing compression stockings during the day to help decrease the night time urination.\par  \par Please begin taking Myrbetriq 50 mg, two hours before bedtime. It takes up to 6 weeks to go into full effect. \par \par Please call my office if you have any issues with the cost or side effects of the medication. \par \par Schedule a 6 weeks follow up med check appointment with NED Mckeon. (nocturia)

## 2019-10-28 NOTE — HISTORY OF PRESENT ILLNESS
[FreeTextEntry1] : \par Patient is here for 6 months med check for nocturia.\par Last seen on 4/25 for med/BP check.\par \par H/o DM\par \par s/p Detrol (no improvement)\par s/p Trospium (no improvement)\par \par Advised behavioral modification for nocturia\par On Hydroxyzine\par Myrbetriq 25 mg daily for nocturia\par \par Today, patient states she continues to have nocturia with Myrbetriq 25 mg daily and is noticing improvement. Denies side effects. Patient does not feel she has an infection. Wearing compression stockings, but only on left leg.\par \par Patient would like to increase Myrbetriq 25 mg to 50 mg.

## 2019-10-28 NOTE — DISCUSSION/SUMMARY
[FreeTextEntry1] : \par Nocturia-\par Prescribed Myrbetriq 50 mg daily.\par Precautions reviewed.\par Will return in 6 weeks for follow up or earlier if she has any issues.

## 2020-01-02 ENCOUNTER — APPOINTMENT (OUTPATIENT)
Dept: UROGYNECOLOGY | Facility: CLINIC | Age: 55
End: 2020-01-02
Payer: MEDICARE

## 2020-01-02 VITALS
HEART RATE: 97 BPM | WEIGHT: 184 LBS | HEIGHT: 63 IN | BODY MASS INDEX: 32.6 KG/M2 | SYSTOLIC BLOOD PRESSURE: 139 MMHG | DIASTOLIC BLOOD PRESSURE: 83 MMHG

## 2020-01-02 DIAGNOSIS — R35.1 NOCTURIA: ICD-10-CM

## 2020-01-02 PROCEDURE — 99213 OFFICE O/P EST LOW 20 MIN: CPT

## 2020-01-02 NOTE — HISTORY OF PRESENT ILLNESS
[FreeTextEntry1] : \par Patient is here for 6 weeks med check for urge incontinence.\par Last seen on 10/28/19 for med check.\par \par H/o DM\par \par s/p Detrol (no improvement)\par s/p Trospium (no improvement)\par \par Advised behavioral modification for nocturia\par On Hydroxyzine\par s/p Myrbetriq 25 mg (nocturia)\par Myrbetriq 50 mg daily for nocturia\par \par Today, patient states she is happy with Myrbetriq 50 mg daily and is noticing improvement. Denies side effects. Patient does not feel she has an infection.\par \par Patient would like to continue Myrbetriq 50 mg daily.

## 2020-01-02 NOTE — DISCUSSION/SUMMARY
[FreeTextEntry1] : \par Nocturia-\par Patient happy with Myrbetriq 50 mg daily.\par Refills provided. 90 days supply with 1 refill.\par Precautions reviewed.\par Will return in 6 months for follow up or earlier if she has any issues.

## 2020-01-02 NOTE — COUNSELING
[FreeTextEntry1] : \par If you feel like you have an infection it is important for you to call our office and we will arrange testing of your urine.\par \par Please continue taking Myrbetriq 50 mg daily for frequency. Refills sent to your pharmacy.\par \par Please call my office if you have any issues with the cost or side effects of the medication. \par \par Schedule a 6 months follow up med check appointment with NED Mckeon. (nocturia)

## 2020-07-03 ENCOUNTER — OUTPATIENT (OUTPATIENT)
Dept: OUTPATIENT SERVICES | Facility: HOSPITAL | Age: 55
LOS: 1 days | Discharge: HOME | End: 2020-07-03
Payer: MEDICARE

## 2020-07-03 DIAGNOSIS — M79.604 PAIN IN RIGHT LEG: ICD-10-CM

## 2020-07-03 DIAGNOSIS — E11.610 TYPE 2 DIABETES MELLITUS WITH DIABETIC NEUROPATHIC ARTHROPATHY: Chronic | ICD-10-CM

## 2020-07-03 DIAGNOSIS — Z98.890 OTHER SPECIFIED POSTPROCEDURAL STATES: Chronic | ICD-10-CM

## 2020-07-03 DIAGNOSIS — M79.605 PAIN IN LEFT LEG: ICD-10-CM

## 2020-07-03 PROCEDURE — 93970 EXTREMITY STUDY: CPT | Mod: 26

## 2020-07-06 ENCOUNTER — APPOINTMENT (OUTPATIENT)
Dept: UROGYNECOLOGY | Facility: CLINIC | Age: 55
End: 2020-07-06

## 2020-07-07 RX ORDER — MIRABEGRON 50 MG/1
50 TABLET, FILM COATED, EXTENDED RELEASE ORAL
Qty: 90 | Refills: 1 | Status: ACTIVE | COMMUNITY
Start: 2019-01-03 | End: 1900-01-01

## 2020-09-02 NOTE — CONSULT NOTE ADULT - ASSESSMENT
pre  op   eval      asymptomatic      nuclear stress test  june 2016  normal at moderate   cardiac risk
Patient

## 2020-09-25 ENCOUNTER — INPATIENT (INPATIENT)
Facility: HOSPITAL | Age: 55
LOS: 6 days | Discharge: HOME HEALTH | End: 2020-10-02
Attending: INTERNAL MEDICINE | Admitting: INTERNAL MEDICINE
Payer: MEDICARE

## 2020-09-25 VITALS
HEART RATE: 82 BPM | SYSTOLIC BLOOD PRESSURE: 92 MMHG | HEIGHT: 61 IN | RESPIRATION RATE: 18 BRPM | OXYGEN SATURATION: 95 % | DIASTOLIC BLOOD PRESSURE: 55 MMHG | TEMPERATURE: 98 F

## 2020-09-25 DIAGNOSIS — Z98.890 OTHER SPECIFIED POSTPROCEDURAL STATES: Chronic | ICD-10-CM

## 2020-09-25 DIAGNOSIS — E11.610 TYPE 2 DIABETES MELLITUS WITH DIABETIC NEUROPATHIC ARTHROPATHY: Chronic | ICD-10-CM

## 2020-09-25 LAB
ALBUMIN SERPL ELPH-MCNC: 3.1 G/DL — LOW (ref 3.5–5.2)
ALP SERPL-CCNC: 80 U/L — SIGNIFICANT CHANGE UP (ref 30–115)
ALT FLD-CCNC: 38 U/L — SIGNIFICANT CHANGE UP (ref 0–41)
ANION GAP SERPL CALC-SCNC: 15 MMOL/L — HIGH (ref 7–14)
APAP SERPL-MCNC: 7 UG/ML — LOW (ref 10–30)
APPEARANCE UR: CLEAR — SIGNIFICANT CHANGE UP
APTT BLD: 34.7 SEC — SIGNIFICANT CHANGE UP (ref 27–39.2)
AST SERPL-CCNC: 122 U/L — HIGH (ref 0–41)
BACTERIA # UR AUTO: NEGATIVE — SIGNIFICANT CHANGE UP
BASE EXCESS BLDV CALC-SCNC: -3.2 MMOL/L — SIGNIFICANT CHANGE UP (ref -2–2)
BASOPHILS # BLD AUTO: 0.05 K/UL — SIGNIFICANT CHANGE UP (ref 0–0.2)
BASOPHILS NFR BLD AUTO: 0.4 % — SIGNIFICANT CHANGE UP (ref 0–1)
BILIRUB SERPL-MCNC: 0.9 MG/DL — SIGNIFICANT CHANGE UP (ref 0.2–1.2)
BILIRUB UR-MCNC: NEGATIVE — SIGNIFICANT CHANGE UP
BLD GP AB SCN SERPL QL: SIGNIFICANT CHANGE UP
BUN SERPL-MCNC: 34 MG/DL — HIGH (ref 10–20)
CA-I SERPL-SCNC: 0.98 MMOL/L — LOW (ref 1.12–1.3)
CALCIUM SERPL-MCNC: 8 MG/DL — LOW (ref 8.5–10.1)
CHLORIDE SERPL-SCNC: 86 MMOL/L — LOW (ref 98–110)
CO2 SERPL-SCNC: 17 MMOL/L — SIGNIFICANT CHANGE UP (ref 17–32)
COLOR SPEC: SIGNIFICANT CHANGE UP
CREAT ?TM UR-MCNC: 46 MG/DL — SIGNIFICANT CHANGE UP
CREAT SERPL-MCNC: 1.6 MG/DL — HIGH (ref 0.7–1.5)
DIFF PNL FLD: ABNORMAL
EOSINOPHIL # BLD AUTO: 0.11 K/UL — SIGNIFICANT CHANGE UP (ref 0–0.7)
EOSINOPHIL NFR BLD AUTO: 0.8 % — SIGNIFICANT CHANGE UP (ref 0–8)
EPI CELLS # UR: 1 /HPF — SIGNIFICANT CHANGE UP (ref 0–5)
GAS PNL BLDV: 118 MMOL/L — LOW (ref 136–145)
GAS PNL BLDV: SIGNIFICANT CHANGE UP
GLUCOSE SERPL-MCNC: 69 MG/DL — LOW (ref 70–99)
GLUCOSE UR QL: NEGATIVE — SIGNIFICANT CHANGE UP
HCO3 BLDV-SCNC: 22 MMOL/L — SIGNIFICANT CHANGE UP (ref 22–29)
HCT VFR BLD CALC: 36.3 % — LOW (ref 37–47)
HCT VFR BLDA CALC: 35.1 % — SIGNIFICANT CHANGE UP (ref 34–44)
HGB BLD CALC-MCNC: 11.4 G/DL — SIGNIFICANT CHANGE UP (ref 14–18)
HGB BLD-MCNC: 11.5 G/DL — LOW (ref 12–16)
HYALINE CASTS # UR AUTO: 3 /LPF — SIGNIFICANT CHANGE UP (ref 0–7)
IMM GRANULOCYTES NFR BLD AUTO: 0.4 % — HIGH (ref 0.1–0.3)
INR BLD: 2.13 RATIO — HIGH (ref 0.65–1.3)
KETONES UR-MCNC: NEGATIVE — SIGNIFICANT CHANGE UP
LACTATE BLDV-MCNC: 4 MMOL/L — HIGH (ref 0.5–1.6)
LEUKOCYTE ESTERASE UR-ACNC: NEGATIVE — SIGNIFICANT CHANGE UP
LYMPHOCYTES # BLD AUTO: 1.65 K/UL — SIGNIFICANT CHANGE UP (ref 1.2–3.4)
LYMPHOCYTES # BLD AUTO: 11.6 % — LOW (ref 20.5–51.1)
MAGNESIUM SERPL-MCNC: 1.2 MG/DL — LOW (ref 1.8–2.4)
MCHC RBC-ENTMCNC: 26.6 PG — LOW (ref 27–31)
MCHC RBC-ENTMCNC: 31.7 G/DL — LOW (ref 32–37)
MCV RBC AUTO: 84 FL — SIGNIFICANT CHANGE UP (ref 81–99)
MONOCYTES # BLD AUTO: 1.31 K/UL — HIGH (ref 0.1–0.6)
MONOCYTES NFR BLD AUTO: 9.2 % — SIGNIFICANT CHANGE UP (ref 1.7–9.3)
NEUTROPHILS # BLD AUTO: 11.1 K/UL — HIGH (ref 1.4–6.5)
NEUTROPHILS NFR BLD AUTO: 77.6 % — HIGH (ref 42.2–75.2)
NITRITE UR-MCNC: NEGATIVE — SIGNIFICANT CHANGE UP
NRBC # BLD: 0 /100 WBCS — SIGNIFICANT CHANGE UP (ref 0–0)
NT-PROBNP SERPL-SCNC: HIGH PG/ML (ref 0–300)
OSMOLALITY SERPL: 262 MOS/KG — LOW (ref 275–300)
OSMOLALITY UR: 247 MOS/KG — SIGNIFICANT CHANGE UP (ref 50–1200)
PCO2 BLDV: 40 MMHG — LOW (ref 41–51)
PH BLDV: 7.35 — SIGNIFICANT CHANGE UP (ref 7.26–7.43)
PH UR: 6 — SIGNIFICANT CHANGE UP (ref 5–8)
PLATELET # BLD AUTO: 365 K/UL — SIGNIFICANT CHANGE UP (ref 130–400)
PO2 BLDV: 45 MMHG — HIGH (ref 20–40)
POTASSIUM BLDV-SCNC: 5.5 MMOL/L — SIGNIFICANT CHANGE UP (ref 3.3–5.6)
POTASSIUM SERPL-MCNC: 6.7 MMOL/L — CRITICAL HIGH (ref 3.5–5)
POTASSIUM SERPL-SCNC: 6.7 MMOL/L — CRITICAL HIGH (ref 3.5–5)
PROT SERPL-MCNC: 5.8 G/DL — LOW (ref 6–8)
PROT UR-MCNC: SIGNIFICANT CHANGE UP
PROTHROM AB SERPL-ACNC: 24.5 SEC — HIGH (ref 9.95–12.87)
RBC # BLD: 4.32 M/UL — SIGNIFICANT CHANGE UP (ref 4.2–5.4)
RBC # FLD: 17.9 % — HIGH (ref 11.5–14.5)
RBC CASTS # UR COMP ASSIST: 3 /HPF — SIGNIFICANT CHANGE UP (ref 0–4)
SAO2 % BLDV: 73.5 % — SIGNIFICANT CHANGE UP
SODIUM SERPL-SCNC: 118 MMOL/L — CRITICAL LOW (ref 135–146)
SODIUM UR-SCNC: 21 MMOL/L — SIGNIFICANT CHANGE UP
SP GR SPEC: 1.01 — SIGNIFICANT CHANGE UP (ref 1.01–1.03)
TROPONIN T SERPL-MCNC: 0.2 NG/ML — CRITICAL HIGH
UROBILINOGEN FLD QL: SIGNIFICANT CHANGE UP
WBC # BLD: 14.28 K/UL — HIGH (ref 4.8–10.8)
WBC # FLD AUTO: 14.28 K/UL — HIGH (ref 4.8–10.8)
WBC UR QL: 1 /HPF — SIGNIFICANT CHANGE UP (ref 0–5)

## 2020-09-25 PROCEDURE — 76705 ECHO EXAM OF ABDOMEN: CPT | Mod: 26

## 2020-09-25 PROCEDURE — 71045 X-RAY EXAM CHEST 1 VIEW: CPT | Mod: 26

## 2020-09-25 PROCEDURE — 93010 ELECTROCARDIOGRAM REPORT: CPT

## 2020-09-25 PROCEDURE — 99291 CRITICAL CARE FIRST HOUR: CPT | Mod: CS

## 2020-09-25 RX ORDER — HYDROCORTISONE 20 MG
100 TABLET ORAL ONCE
Refills: 0 | Status: COMPLETED | OUTPATIENT
Start: 2020-09-25 | End: 2020-09-25

## 2020-09-25 RX ORDER — SODIUM CHLORIDE 9 MG/ML
1000 INJECTION, SOLUTION INTRAVENOUS
Refills: 0 | Status: DISCONTINUED | OUTPATIENT
Start: 2020-09-25 | End: 2020-09-25

## 2020-09-25 RX ORDER — FUROSEMIDE 40 MG
40 TABLET ORAL ONCE
Refills: 0 | Status: COMPLETED | OUTPATIENT
Start: 2020-09-25 | End: 2020-09-25

## 2020-09-25 RX ORDER — SODIUM CHLORIDE 9 MG/ML
1000 INJECTION INTRAMUSCULAR; INTRAVENOUS; SUBCUTANEOUS
Refills: 0 | Status: DISCONTINUED | OUTPATIENT
Start: 2020-09-25 | End: 2020-09-25

## 2020-09-25 RX ORDER — MAGNESIUM SULFATE 500 MG/ML
2 VIAL (ML) INJECTION ONCE
Refills: 0 | Status: COMPLETED | OUTPATIENT
Start: 2020-09-25 | End: 2020-09-25

## 2020-09-25 RX ADMIN — Medication 40 MILLIGRAM(S): at 22:20

## 2020-09-25 RX ADMIN — Medication 100 MILLIGRAM(S): at 22:16

## 2020-09-25 RX ADMIN — Medication 50 GRAM(S): at 22:17

## 2020-09-25 NOTE — ED ADULT NURSE NOTE - NSIMPLEMENTINTERV_GEN_ALL_ED
Implemented All Fall with Harm Risk Interventions:  [place] to call system. Call bell, personal items and telephone within reach. Instruct patient to call for assistance. Room bathroom lighting operational. Non-slip footwear when patient is off stretcher. Physically safe environment: no spills, clutter or unnecessary equipment. Stretcher in lowest position, wheels locked, appropriate side rails in place. Provide visual cue, wrist band, yellow gown, etc. Monitor gait and stability. Monitor for mental status changes and reorient to person, place, and time. Review medications for side effects contributing to fall risk. Reinforce activity limits and safety measures with patient and family. Provide visual clues: red socks.

## 2020-09-25 NOTE — H&P ADULT - HISTORY OF PRESENT ILLNESS
Patient is a 55 y/o female with Medical Hx of Type II DM,  CAD, DLD, HTN, Hypothyroidism, COPD, Dextrocardia,  Sciatica/Peripheral Neuralgia, Charcot foot deformity s/p reconstruction on 1/11/19, presenting to ED with multiple Complaints            IN ED: BP: 92/55, HR: 81  Labs significant  for Mag:    Patient is a 53 y/o female with Medical Hx of Type II DM,  CAD, DLD, HTN, Hypothyroidism, COPD, Dextrocardia,  Sciatica/Peripheral Neuralgia, Charcot foot deformity s/p reconstruction on 19, presenting to ED with bilateral lower extremity edema. Symptoms started about 2 weeks ago with generalized swelling in her abdomen, face and lower extremity, she also had some associated Chest and RLQ abdominal pain which started about 2 days ago. Chest pain was intermittent, 4-5/10, in the middle of her chest w/o radiation, she taught the abdominal pain and chest pain was secondary to gas so she bought OTC simethicone w/o relief so she decided to come in for further evaluation. She denied SOB and Orthopnea,     IN ED: BP: 92/55, HR: 81, Afebrile   Labs significant  for Ma.2, Na: 118, Scr: 1.6, BNP: 24, 996K, AST: 122, INR: 2.13, WBC: 14K  -CXR:      Patient is a 55 y/o female with Medical Hx of Type II DM,  CAD, DLD, HTN, Hypothyroidism, COPD, Dextrocardia,  Sciatica/Peripheral Neuralgia, Charcot foot deformity s/p reconstruction on 19, presenting to ED with bilateral lower extremity edema. Symptoms started about 2 weeks ago with generalized swelling in her abdomen, face and lower extremity, she also had some associated Chest and RLQ abdominal pain which started about 2 days ago. Chest pain was intermittent, 4-5/10, in the middle of her chest w/o radiation, she taught the abdominal pain and chest pain was secondary to gas so she bought OTC simethicone w/o relief so she decided to come in for further evaluation. She denied SOB and Orthopnea,     IN ED: BP: 92/55, HR: 81, Afebrile   Labs significant  for Ma.2, Na: 118, Scr: 1.6, BNP: 24, 996K, AST: 122, INR: 2.13, WBC: 14K  -CXR: B/L Interstitial Opacity reflecting Pulm Edema( pending Official Read ),

## 2020-09-25 NOTE — H&P ADULT - NSHPPHYSICALEXAM_GEN_ALL_CORE
General:      Heart:     Lungs:     Abdomen:     Musculoskeletal:     Neuro: General: NAD, resting comfortably in bed     Heart: S1/S2 appreciated, RRR, no murmurs     Lungs: Crackles B/L     Abdomen: Soft, NT, ND, [place] b/l lower quadrants     Musculoskeletal: Atraumatic, B/L LE 2+ Pitting edema with petechiae b/l     Neuro: AO x 3, no focal deficits

## 2020-09-25 NOTE — ED PROVIDER NOTE - SECONDARY DIAGNOSIS.
Hyponatremia Hypomagnesemia Elevated INR Acute heart failure, unspecified heart failure type Elevated troponin

## 2020-09-25 NOTE — ED PROVIDER NOTE - OBJECTIVE STATEMENT
56 yo female 56 yo female hx of HTN/HLD/DM present worsening swelling to lower legs extending to abdomen over the past 2 weeks. also reported worsening SOB on exertion and lying flat. reported off/on chest pressure without chest pain. denies fever/chill/HA/dizziness/nausea/vomiting/diarrhea/black and bloody stool and urinary sxs.   admitted taking 10/325 percocet 2 times daily with addition 2-3 extra strength tylenol off/on (no daily tylenol) use for chronic pain. denies alcohol or drug use. denies recent travel or sick contact. 56 yo female hx of HTN/HLD/DM/CKD present worsening swelling to lower legs extending to abdomen over the past 2 weeks. also reported worsening SOB on exertion and lying flat. reported off/on chest pressure without chest pain. denies fever/chill/HA/dizziness/nausea/vomiting/diarrhea/black and bloody stool and urinary sxs.   admitted taking 10/325 percocet 2 times daily with addition 2-3 extra strength tylenol off/on (no daily tylenol) use for chronic pain. denies alcohol or drug use. denies recent travel or sick contact.

## 2020-09-25 NOTE — ED PROVIDER NOTE - ATTENDING CONTRIBUTION TO CARE
55y female above PMH, denies EtOH use or liver disease, c/o weeks of "fluid accumulation" with increased abdominal girth and leg swelling, no cp, no cough, +mild sob, +n/v no diarrhea, on exam vital signs appreciated, nontoxic but chronically ill appearing, AAO x 3, head nc/at, paz, conj pink, slight icterus, +periorbital edema, cor reg lungs with crackles right base no wheeze abd +bs, soft, distended, nontender, 2+ b/l LE edema with scattered petechia and discoloration to feet R>L, feet warm with diminished but present pulses, no asterixis neuro nonfocal, will cehck labs, imaging

## 2020-09-25 NOTE — ED PROVIDER NOTE - PHYSICAL EXAMINATION
CONSTITUTIONAL: Well-appearing; well-nourished; in no apparent distress.   EYES: PERRL; EOM intact. pale conjunctiva  NECK: Supple; non-tender; no cervical lymphadenopathy. No JVD.   CARDIOVASCULAR: Normal S1, S2; no murmurs, rubs, or gallops.   RESPIRATORY: Diffuse b/l basilar rales. RR - 20. no accessory muscle use. Normal chest excursion with respiration;  GI/: obese round and soft abdomen. ? fluid wave. Normal bowel sounds; non-tender; no palpable organomegaly.   MS: left foot with 2+ DP pulses and right foot with faint DP pulses. 2-3 + pitting edema up to abdominal wall.   SKIN: scatter petechial rash to upper and lower extremities   NEURO/PSYCH: A & O x 4; grossly unremarkable. mood and manner are appropriate.

## 2020-09-25 NOTE — ED PROVIDER NOTE - PROGRESS NOTE DETAILS
endorsed to Dr Ocampo case d/w Card fellow 2/2 elevated trop and bnp. he will evaluate patient. case d/w Dr Dupree and approved for ICU. case d/w Dr Dupree and approved for ICU. does not recommend to treat patient with NS for hyponatremia 2/2 her fluid overload state. her labs abnormalities most likely all related to her heart failure states. recommend diuresis.

## 2020-09-25 NOTE — H&P ADULT - NSHPREVIEWOFSYSTEMS_GEN_ALL_CORE
CONSTITUTIONAL: No weakness, fevers or chills  EYES/ENT: No visual changes;  No vertigo or throat pain   RESPIRATORY: No cough, wheezing, hemoptysis; No shortness of breath  CARDIOVASCULAR:  GASTROINTESTINAL: No abdominal or epigastric pain. No nausea, vomiting, or hematemesis; No diarrhea or constipation. No melena or hematochezia.  MUSCULOSKELETAL:   GENITOURINARY: No dysuria, frequency or hematuria  NEUROLOGICAL: No numbness or weakness  SKIN: No itching, rashes CONSTITUTIONAL: No weakness, fevers or chills  EYES/ENT: No visual changes;  No vertigo or throat pain   RESPIRATORY: No cough, wheezing, hemoptysis; No shortness of breath  CARDIOVASCULAR: + Chest Pain, no palpitation   GASTROINTESTINAL: RLQ abdominal  pain. No nausea, vomiting,  diarrhea or constipation.  MUSCULOSKELETAL: + B/L LE swelling  GENITOURINARY: No dysuria, frequency or hematuria  NEUROLOGICAL: No numbness or weakness  SKIN: No itching, rashes

## 2020-09-25 NOTE — ED PROVIDER NOTE - NS ED ROS FT
Constitutional: no fever, chills, no recent weight loss, change in appetite or malaise  Eyes: no redness/discharge/pain/vision changes  ENT: no rhinorrhea/ear pain/sore throat  Cardiac: see HPI  Respiratory: see HPI  GI: No nausea, vomiting, diarrhea or abdominal pain.  : No dysuria, frequency, urgency or hematuria  MS: no pain to back or extremities, no loss of ROM, no weakness  Neuro: No headache or weakness. No LOC.  Skin: No skin rash.  Endocrine: + diabetes.

## 2020-09-25 NOTE — ED ADULT NURSE NOTE - OBJECTIVE STATEMENT
Pt awake and alert, anxious. Presents to ED with Generalized swelling and intermittent cp tight and worse with inspiration. Pt denies any n/v/d, dizziness or lightheadedness. RR 20 and sp02 is 97% on 3L NC. Skin warm and dry. Cardiac monitor initiated, HR 70's and regular. Stat labs drawn. Will continue to monitor.

## 2020-09-25 NOTE — H&P ADULT - ASSESSMENT
Patient is a 55 y/o female with Medical Hx of Type II DM,  CAD, DLD, HTN, Hypothyroidism, COPD, Dextrocardia,  Sciatica/Peripheral Neuralgia, Charcot foot deformity s/p reconstruction on 1/11/19, presenting to ED with multiple Complaints    Anasarca: ?? Possible Right Heart Failure v.s Acute Liver Failure   -Presenting with B/L LE edema and Elevated Liver Function test   -AST: 122, INR: 2.13, not on coumadin??   -Hepatitis panel in progress   -Abdominal US: Elongated Liver, otherwise wnl       Electrolyte derangement   Hypervolemic Hyponatremia: Na: 118  Hypomagnesemia: 1.2, s/p 2g mag, f/up am levels  Psuedohyperkalemia: Hemolyzed sample: VBG: K: 5.5       CAD + COPD:     Type II DM+  -Takes ....at home   -FS   -    Essential HTN  -Hypotensive on admission 92/55   -      Hypothyroidism  -F/up TSH + T4     Sciatica + Peripheral Neuralgia    DVT PPX:   GI PPX:   Full code  Dispo: from Home  -Pending clinical improvement      Patient is a 53 y/o female with Medical Hx of Type II DM,  CAD, DLD, HTN, Hypothyroidism, COPD, Dextrocardia,  Sciatica/Peripheral Neuralgia, Charcot foot deformity s/p reconstruction on 1/11/19, presenting to ED with multiple Complaints    Fluid Overload: Possible Right Heart Failure   -Presenting  Pulmonary Edema and B/L LE 2+ Pitting edema up to Abdomen   -CXR:                           BNP: >> 24K, saturating well on RA   -S/P Lasix 40 IV  -F/up 2D-ECHO   -      Elevated Liver Function test   -AST: 122, INR: 2.13, not on coumadin??  -Abdominal US: Elongated Liver, otherwise wnl   -Hepatitis panel in progress     JEEVAN on CKD III: Likely Prerenal from Hypotension secondary to 3rd spacing v.s cardiorenal   -Scr: 1.6, (baseline 0.7 from 7/2020)   -        Electrolyte derangement   Hypervolemic Hyponatremia: Na: 118  Hypomagnesemia: 1.2, s/p 2g mag, f/up am levels  Psuedohyperkalemia: Hemolyzed sample: VBG: K: 5.5       CAD + COPD    Type II DM+  -Takes ....at home   -FS   -    Essential HTN  -Hypotensive on admission 92/55   -Pt is Volume overloaded from 3rd spacing   -Hold Antihypertensive for now  -Resume when Appropriate:       Hypothyroidism  -F/up TSH + T4     Sciatica + Peripheral Neuralgia    DVT PPX: Lovenox   GI PPX:   Full code  Dispo: from Home  -Pending clinical improvement      Patient is a 53 y/o female with Medical Hx of Type II DM,  CAD, DLD, HTN, Hypothyroidism, COPD, Dextrocardia,  Sciatica/Peripheral Neuralgia, Charcot foot deformity s/p reconstruction on 1/11/19, presenting to ED with multiple Complaints    Fluid Overload: Possible Right Heart Failure   -Presenting  Pulmonary Edema and B/L LE 2+ Pitting edema up to Abdomen   -Crackles on exam,  however Denies SOB and Orthopnea, saturating well on RA   -CXR: B/L Interstitial Opacity reflecting Pulm Edema( pending Official Read ), BNP: >> 24K  -S/P Lasix 40 IV with good urine output, + pt noticed improvement in swelling   -will continue with Lasix 60mg daily for now   -F/up 2D-ECHO, Repeat CXR in AM     Elevated Liver Function test   -AST: 122, INR: 2.13 (not on coumadin) with petechiae on exam   -Abdominal US: Elongated Liver, otherwise wnl   -Hepatitis panel in progress     JEEVAN on CKD III: Likely Prerenal from Hypotension secondary to 3rd spacing v.s cardiorenal   -Scr: 1.6, (baseline 0.7 from 7/2020)   -Bautista placed on ED, S/P Lasix IV with good urine output  -Re-assess in AM      Electrolyte derangement   Hypervolemic Hyponatremia: Na: 118, re-evaluate after Diuresis   Hypomagnesemia: 1.2,Supplemeted,  f/up am levels  Psuedohyperkalemia: Hemolyzed sample: VBG: K: 5.5     CAD + COPD: stable  -c/w Inhalers, ASA.  Statin and -DASH diet    -will hold Valsartan 40mg 1x/day and Toprol-xl 25mg 1x/day  -**Re-start when appropriate**      Type II DM  Takes Janumet at home  FS 2x/day is sufficient for now  -Start insulin regimen if FS is persistently >180    Essential HTN  -Hypotensive on admission 92/55   -Pt is Volume overloaded from 3rd spacing   -Hold Antihypertensive for now (valsartan)   -Resume when Appropriate    Hypothyroidism  -c/w Levothyroxine  -F/up TSH + T4     Sciatica + Peripheral Neuralgia  -c/w Percocet, Lyrica and tizanidine     DVT PPX: Heparin Q12 for now   GI PPX: PPI   Full code  Dispo: from Home  -Pending clinical improvement      Patient is a 55 y/o female with Medical Hx of Type II DM,  CAD, DLD, HTN, Hypothyroidism, COPD, Dextrocardia,  Sciatica/Peripheral Neuralgia, Charcot foot deformity s/p reconstruction on 1/11/19, presenting to ED with multiple Complaints    Fluid Overload: Possible Right Heart Failure   -Presenting  Pulmonary Edema and B/L LE 2+ Pitting edema up to Abdomen   -Crackles on exam,  however Denies SOB and Orthopnea, saturating well on RA   -CXR: B/L Interstitial Opacity reflecting Pulm Edema( pending Official Read ), BNP: >> 24K  -S/P Lasix 40 IV with good urine output, + pt noticed improvement in swelling   -will continue with Lasix 60mg daily for now   -F/up 2D-ECHO, Repeat CXR in AM     Elevated Liver Function test   -AST: 122, INR: 2.13 (not on coumadin) with petechiae on exam   -Abdominal US: Elongated Liver, otherwise wnl   -Hepatitis panel in progress     JEEVAN on CKD III: Likely Prerenal from Hypotension secondary to 3rd spacing v.s cardiorenal   -Scr: 1.6, (baseline 0.7 from 7/2020)   -Bautista placed on ED, S/P Lasix IV with good urine output  -Re-assess in AM      Electrolyte derangement   Hypervolemic Hyponatremia: Na: 118, re-evaluate after Diuresis   Hypomagnesemia: 1.2,Supplemeted, f/up am levels  Psuedohyperkalemia: Hemolyzed sample: VBG: K: 5.5     CAD + COPD: stable  -c/w Inhalers, ASA.  Statin and -DASH diet    -will hold Valsartan 40mg 1x/day and Toprol-xl 25mg 1x/day  -**Re-start when appropriate**    Type II DM  Takes Janumet at home  FS 2x/day is sufficient for now  -Start insulin regimen if FS is persistently >180    Essential HTN  -Hypotensive on admission 92/55   -Pt is Volume overloaded from 3rd spacing   -Hold Antihypertensive for now (valsartan)   -Resume when Appropriate    Hypothyroidism  -c/w Levothyroxine  -F/up TSH + T4     Sciatica + Peripheral Neuralgia  -c/w Percocet, Lyrica, duloxetine, and tizanidine     Urinary Incontinence   -Takes Mybertric 50mg at bedtime    ***Pls fill out Nonformulary form**     DVT PPX: Heparin Q12 for now   GI PPX: PPI   Full code  Dispo: from Home  -Pending clinical improvement

## 2020-09-25 NOTE — H&P ADULT - NSHPLABSRESULTS_GEN_ALL_CORE
LABS:                          11.5   14.28 )-----------( 365      ( 25 Sep 2020 20:08 )             36.3         118<LL>  |  86<L>  |  34<H>  ----------------------------<  69<L>  6.7<HH>   |  17  |  1.6<H>    Ca    8.0<L>      25 Sep 2020 20:08  Mg     1.2         TPro  5.8<L>  /  Alb  3.1<L>  /  TBili  0.9  /  DBili  x   /  AST  122<H>  /  ALT  38  /  AlkPhos  80            Urinalysis Basic - ( 25 Sep 2020 20:57 )    Color: Light Yellow / Appearance: Clear / S.012 / pH: x  Gluc: x / Ketone: Negative  / Bili: Negative / Urobili: <2 mg/dL   Blood: x / Protein: Trace / Nitrite: Negative   Leuk Esterase: Negative / RBC: 3 /HPF / WBC 1 /HPF   Sq Epi: x / Non Sq Epi: 1 /HPF / Bacteria: Negative      PT/INR - ( 25 Sep 2020 20:08 )   PT: 24.50 sec;   INR: 2.13 ratio         PTT - ( 25 Sep 2020 20:08 )  PTT:34.7 sec  Lactate Trend    CARDIAC MARKERS ( 25 Sep 2020 20:08 )  x     / 0.20 ng/mL / x     / x     / x          CAPILLARY BLOOD GLUCOSE    RADIOLOGY:    EKGS: LABS:                          11.5   14.28 )-----------( 365      ( 25 Sep 2020 20:08 )             36.3         118<LL>  |  86<L>  |  34<H>  ----------------------------<  69<L>  6.7<HH>   |  17  |  1.6<H>    Ca    8.0<L>      25 Sep 2020 20:08  Mg     1.2         TPro  5.8<L>  /  Alb  3.1<L>  /  TBili  0.9  /  DBili  x   /  AST  122<H>  /  ALT  38  /  AlkPhos  80            Urinalysis Basic - ( 25 Sep 2020 20:57 )    Color: Light Yellow / Appearance: Clear / S.012 / pH: x  Gluc: x / Ketone: Negative  / Bili: Negative / Urobili: <2 mg/dL   Blood: x / Protein: Trace / Nitrite: Negative   Leuk Esterase: Negative / RBC: 3 /HPF / WBC 1 /HPF   Sq Epi: x / Non Sq Epi: 1 /HPF / Bacteria: Negative      PT/INR - ( 25 Sep 2020 20:08 )   PT: 24.50 sec;   INR: 2.13 ratio         PTT - ( 25 Sep 2020 20:08 )  PTT:34.7 sec  Lactate Trend    CARDIAC MARKERS ( 25 Sep 2020 20:08 )  x     / 0.20 ng/mL / x     / x     / x          CAPILLARY BLOOD GLUCOSE    RADIOLOGY:    < from: US Abdomen Limited (20 @ 22:55) >    Elongated liver with normal-appearing echogenicity. This may be secondary to a Riedel's lobe rather than hepatomegaly.    Gallbladder nonvisualization. These correlate with prior surgery or lack of n.p.o. status..      EKGS:

## 2020-09-25 NOTE — ED ADULT NURSE NOTE - MUSCULOSKELETAL ASSESSMENT
Group Therapy Note    Date: 7/14/2020    Group Start Time: 5113  Group End Time: 1600    Number of Participants: 5/5    Type: Exercise/Recreation Group    Group Topic/Objective: Chair Exercises     Notes:  Pt attended the first ~15 minutes of group. Pt struggled to participate d/t confusion. Pt did not respond to prompting/redirection. Status After Intervention:  Unchanged    Participation Level: Minimal    Participation Quality: Resistant    Speech:  normal    Thought Process/Content: Confused    Affective Functioning: Congruent    Mood: irritable    Level of consciousness:  Preoccupied and Inattentive    Response to Learning: Pt unable to demonstrate response to learning d/t confusion.      Endings: None Reported    Modes of Intervention: Activity and Movement    Discipline Responsible: Certified Therapeutic Recreation Specialist     Electronically signed by Susannah Erickson [address] [place] [address] [place] on 7/14/2020 at 4:13 PM - - -

## 2020-09-25 NOTE — ED PROVIDER NOTE - CARE PLAN
Principal Discharge DX:	Fluid overload, unspecified  Secondary Diagnosis:	Hyponatremia  Secondary Diagnosis:	Hypomagnesemia  Secondary Diagnosis:	Elevated INR  Secondary Diagnosis:	Elevated troponin  Secondary Diagnosis:	Acute heart failure, unspecified heart failure type

## 2020-09-26 LAB
ALBUMIN SERPL ELPH-MCNC: 2.7 G/DL — LOW (ref 3.5–5.2)
ALBUMIN SERPL ELPH-MCNC: 3 G/DL — LOW (ref 3.5–5.2)
ALBUMIN SERPL ELPH-MCNC: 3.2 G/DL — LOW (ref 3.5–5.2)
ALP SERPL-CCNC: 65 U/L — SIGNIFICANT CHANGE UP (ref 30–115)
ALP SERPL-CCNC: 65 U/L — SIGNIFICANT CHANGE UP (ref 30–115)
ALP SERPL-CCNC: 74 U/L — SIGNIFICANT CHANGE UP (ref 30–115)
ALT FLD-CCNC: 33 U/L — SIGNIFICANT CHANGE UP (ref 0–41)
ALT FLD-CCNC: 35 U/L — SIGNIFICANT CHANGE UP (ref 0–41)
ALT FLD-CCNC: 39 U/L — SIGNIFICANT CHANGE UP (ref 0–41)
AMMONIA BLD-MCNC: 45 UMOL/L — SIGNIFICANT CHANGE UP (ref 11–55)
ANION GAP SERPL CALC-SCNC: 7 MMOL/L — SIGNIFICANT CHANGE UP (ref 7–14)
ANION GAP SERPL CALC-SCNC: 9 MMOL/L — SIGNIFICANT CHANGE UP (ref 7–14)
ANION GAP SERPL CALC-SCNC: 9 MMOL/L — SIGNIFICANT CHANGE UP (ref 7–14)
APTT BLD: 24.7 SEC — LOW (ref 27–39.2)
AST SERPL-CCNC: 87 U/L — HIGH (ref 0–41)
AST SERPL-CCNC: 93 U/L — HIGH (ref 0–41)
AST SERPL-CCNC: 96 U/L — HIGH (ref 0–41)
BASOPHILS # BLD AUTO: 0.01 K/UL — SIGNIFICANT CHANGE UP (ref 0–0.2)
BASOPHILS # BLD AUTO: 0.02 K/UL — SIGNIFICANT CHANGE UP (ref 0–0.2)
BASOPHILS # BLD AUTO: 0.02 K/UL — SIGNIFICANT CHANGE UP (ref 0–0.2)
BASOPHILS NFR BLD AUTO: 0.1 % — SIGNIFICANT CHANGE UP (ref 0–1)
BASOPHILS NFR BLD AUTO: 0.1 % — SIGNIFICANT CHANGE UP (ref 0–1)
BASOPHILS NFR BLD AUTO: 0.2 % — SIGNIFICANT CHANGE UP (ref 0–1)
BILIRUB SERPL-MCNC: 0.9 MG/DL — SIGNIFICANT CHANGE UP (ref 0.2–1.2)
BILIRUB SERPL-MCNC: 1.1 MG/DL — SIGNIFICANT CHANGE UP (ref 0.2–1.2)
BILIRUB SERPL-MCNC: 1.4 MG/DL — HIGH (ref 0.2–1.2)
BLD GP AB SCN SERPL QL: SIGNIFICANT CHANGE UP
BUN SERPL-MCNC: 30 MG/DL — HIGH (ref 10–20)
BUN SERPL-MCNC: 37 MG/DL — HIGH (ref 10–20)
BUN SERPL-MCNC: 38 MG/DL — HIGH (ref 10–20)
CALCIUM SERPL-MCNC: 7.3 MG/DL — LOW (ref 8.5–10.1)
CALCIUM SERPL-MCNC: 7.7 MG/DL — LOW (ref 8.5–10.1)
CALCIUM SERPL-MCNC: 7.7 MG/DL — LOW (ref 8.5–10.1)
CHLORIDE SERPL-SCNC: 84 MMOL/L — LOW (ref 98–110)
CHLORIDE SERPL-SCNC: 91 MMOL/L — LOW (ref 98–110)
CHLORIDE SERPL-SCNC: 96 MMOL/L — LOW (ref 98–110)
CO2 SERPL-SCNC: 28 MMOL/L — SIGNIFICANT CHANGE UP (ref 17–32)
CO2 SERPL-SCNC: 29 MMOL/L — SIGNIFICANT CHANGE UP (ref 17–32)
CO2 SERPL-SCNC: 32 MMOL/L — SIGNIFICANT CHANGE UP (ref 17–32)
CREAT SERPL-MCNC: 0.8 MG/DL — SIGNIFICANT CHANGE UP (ref 0.7–1.5)
CREAT SERPL-MCNC: 1 MG/DL — SIGNIFICANT CHANGE UP (ref 0.7–1.5)
CREAT SERPL-MCNC: 1.3 MG/DL — SIGNIFICANT CHANGE UP (ref 0.7–1.5)
EOSINOPHIL # BLD AUTO: 0 K/UL — SIGNIFICANT CHANGE UP (ref 0–0.7)
EOSINOPHIL # BLD AUTO: 0 K/UL — SIGNIFICANT CHANGE UP (ref 0–0.7)
EOSINOPHIL # BLD AUTO: 0.01 K/UL — SIGNIFICANT CHANGE UP (ref 0–0.7)
EOSINOPHIL NFR BLD AUTO: 0 % — SIGNIFICANT CHANGE UP (ref 0–8)
EOSINOPHIL NFR BLD AUTO: 0 % — SIGNIFICANT CHANGE UP (ref 0–8)
EOSINOPHIL NFR BLD AUTO: 0.1 % — SIGNIFICANT CHANGE UP (ref 0–8)
GLUCOSE BLDC GLUCOMTR-MCNC: 124 MG/DL — HIGH (ref 70–99)
GLUCOSE BLDC GLUCOMTR-MCNC: 126 MG/DL — HIGH (ref 70–99)
GLUCOSE BLDC GLUCOMTR-MCNC: 137 MG/DL — HIGH (ref 70–99)
GLUCOSE BLDC GLUCOMTR-MCNC: 139 MG/DL — HIGH (ref 70–99)
GLUCOSE SERPL-MCNC: 109 MG/DL — HIGH (ref 70–99)
GLUCOSE SERPL-MCNC: 140 MG/DL — HIGH (ref 70–99)
GLUCOSE SERPL-MCNC: 86 MG/DL — SIGNIFICANT CHANGE UP (ref 70–99)
HAV IGM SER-ACNC: SIGNIFICANT CHANGE UP
HBV CORE IGM SER-ACNC: SIGNIFICANT CHANGE UP
HBV SURFACE AG SER-ACNC: SIGNIFICANT CHANGE UP
HCT VFR BLD CALC: 19.4 % — LOW (ref 37–47)
HCT VFR BLD CALC: 29.4 % — LOW (ref 37–47)
HCT VFR BLD CALC: 30.9 % — LOW (ref 37–47)
HCV AB S/CO SERPL IA: 0.09 S/CO — SIGNIFICANT CHANGE UP (ref 0–0.99)
HCV AB SERPL-IMP: SIGNIFICANT CHANGE UP
HGB BLD-MCNC: 10 G/DL — LOW (ref 12–16)
HGB BLD-MCNC: 6.2 G/DL — CRITICAL LOW (ref 12–16)
HGB BLD-MCNC: 9.8 G/DL — LOW (ref 12–16)
IMM GRANULOCYTES NFR BLD AUTO: 0.5 % — HIGH (ref 0.1–0.3)
IMM GRANULOCYTES NFR BLD AUTO: 0.5 % — HIGH (ref 0.1–0.3)
IMM GRANULOCYTES NFR BLD AUTO: 0.7 % — HIGH (ref 0.1–0.3)
INR BLD: 1.98 RATIO — HIGH (ref 0.65–1.3)
INR BLD: 2.17 RATIO — HIGH (ref 0.65–1.3)
LACTATE SERPL-SCNC: 0.9 MMOL/L — SIGNIFICANT CHANGE UP (ref 0.7–2)
LACTATE SERPL-SCNC: 4.6 MMOL/L — CRITICAL HIGH (ref 0.7–2)
LYMPHOCYTES # BLD AUTO: 0.43 K/UL — LOW (ref 1.2–3.4)
LYMPHOCYTES # BLD AUTO: 1.15 K/UL — LOW (ref 1.2–3.4)
LYMPHOCYTES # BLD AUTO: 1.78 K/UL — SIGNIFICANT CHANGE UP (ref 1.2–3.4)
LYMPHOCYTES # BLD AUTO: 13.4 % — LOW (ref 20.5–51.1)
LYMPHOCYTES # BLD AUTO: 4.3 % — LOW (ref 20.5–51.1)
LYMPHOCYTES # BLD AUTO: 8 % — LOW (ref 20.5–51.1)
MAGNESIUM SERPL-MCNC: 1.8 MG/DL — SIGNIFICANT CHANGE UP (ref 1.8–2.4)
MAGNESIUM SERPL-MCNC: 2 MG/DL — SIGNIFICANT CHANGE UP (ref 1.8–2.4)
MAGNESIUM SERPL-MCNC: 2.1 MG/DL — SIGNIFICANT CHANGE UP (ref 1.8–2.4)
MCHC RBC-ENTMCNC: 26.8 PG — LOW (ref 27–31)
MCHC RBC-ENTMCNC: 27 PG — SIGNIFICANT CHANGE UP (ref 27–31)
MCHC RBC-ENTMCNC: 28.5 PG — SIGNIFICANT CHANGE UP (ref 27–31)
MCHC RBC-ENTMCNC: 32 G/DL — SIGNIFICANT CHANGE UP (ref 32–37)
MCHC RBC-ENTMCNC: 32.4 G/DL — SIGNIFICANT CHANGE UP (ref 32–37)
MCHC RBC-ENTMCNC: 33.3 G/DL — SIGNIFICANT CHANGE UP (ref 32–37)
MCV RBC AUTO: 83.5 FL — SIGNIFICANT CHANGE UP (ref 81–99)
MCV RBC AUTO: 84 FL — SIGNIFICANT CHANGE UP (ref 81–99)
MCV RBC AUTO: 85.5 FL — SIGNIFICANT CHANGE UP (ref 81–99)
MONOCYTES # BLD AUTO: 0.22 K/UL — SIGNIFICANT CHANGE UP (ref 0.1–0.6)
MONOCYTES # BLD AUTO: 1.67 K/UL — HIGH (ref 0.1–0.6)
MONOCYTES # BLD AUTO: 1.99 K/UL — HIGH (ref 0.1–0.6)
MONOCYTES NFR BLD AUTO: 11.6 % — HIGH (ref 1.7–9.3)
MONOCYTES NFR BLD AUTO: 15 % — HIGH (ref 1.7–9.3)
MONOCYTES NFR BLD AUTO: 2.2 % — SIGNIFICANT CHANGE UP (ref 1.7–9.3)
NEUTROPHILS # BLD AUTO: 11.5 K/UL — HIGH (ref 1.4–6.5)
NEUTROPHILS # BLD AUTO: 9.23 K/UL — HIGH (ref 1.4–6.5)
NEUTROPHILS # BLD AUTO: 9.42 K/UL — HIGH (ref 1.4–6.5)
NEUTROPHILS NFR BLD AUTO: 70.8 % — SIGNIFICANT CHANGE UP (ref 42.2–75.2)
NEUTROPHILS NFR BLD AUTO: 79.6 % — HIGH (ref 42.2–75.2)
NEUTROPHILS NFR BLD AUTO: 92.9 % — HIGH (ref 42.2–75.2)
NRBC # BLD: 0 /100 WBCS — SIGNIFICANT CHANGE UP (ref 0–0)
PHOSPHATE SERPL-MCNC: 3.2 MG/DL — SIGNIFICANT CHANGE UP (ref 2.1–4.9)
PLATELET # BLD AUTO: 204 K/UL — SIGNIFICANT CHANGE UP (ref 130–400)
PLATELET # BLD AUTO: 287 K/UL — SIGNIFICANT CHANGE UP (ref 130–400)
PLATELET # BLD AUTO: 317 K/UL — SIGNIFICANT CHANGE UP (ref 130–400)
POTASSIUM SERPL-MCNC: 4.1 MMOL/L — SIGNIFICANT CHANGE UP (ref 3.5–5)
POTASSIUM SERPL-MCNC: 5.2 MMOL/L — HIGH (ref 3.5–5)
POTASSIUM SERPL-MCNC: 5.3 MMOL/L — HIGH (ref 3.5–5)
POTASSIUM SERPL-SCNC: 4.1 MMOL/L — SIGNIFICANT CHANGE UP (ref 3.5–5)
POTASSIUM SERPL-SCNC: 5.2 MMOL/L — HIGH (ref 3.5–5)
POTASSIUM SERPL-SCNC: 5.3 MMOL/L — HIGH (ref 3.5–5)
PROT SERPL-MCNC: 4.5 G/DL — LOW (ref 6–8)
PROT SERPL-MCNC: 5.3 G/DL — LOW (ref 6–8)
PROT SERPL-MCNC: 5.4 G/DL — LOW (ref 6–8)
PROTHROM AB SERPL-ACNC: 22.8 SEC — HIGH (ref 9.95–12.87)
PROTHROM AB SERPL-ACNC: 25 SEC — HIGH (ref 9.95–12.87)
RBC # BLD: 2.31 M/UL — LOW (ref 4.2–5.4)
RBC # BLD: 3.44 M/UL — LOW (ref 4.2–5.4)
RBC # BLD: 3.7 M/UL — LOW (ref 4.2–5.4)
RBC # FLD: 16.6 % — HIGH (ref 11.5–14.5)
RBC # FLD: 17.5 % — HIGH (ref 11.5–14.5)
RBC # FLD: 17.6 % — HIGH (ref 11.5–14.5)
SARS-COV-2 RNA SPEC QL NAA+PROBE: SIGNIFICANT CHANGE UP
SARS-COV-2 RNA SPEC QL NAA+PROBE: SIGNIFICANT CHANGE UP
SODIUM SERPL-SCNC: 122 MMOL/L — LOW (ref 135–146)
SODIUM SERPL-SCNC: 128 MMOL/L — LOW (ref 135–146)
SODIUM SERPL-SCNC: 135 MMOL/L — SIGNIFICANT CHANGE UP (ref 135–146)
T4 FREE SERPL-MCNC: 0.8 NG/DL — LOW (ref 0.9–1.8)
TROPONIN T SERPL-MCNC: 0.15 NG/ML — CRITICAL HIGH
TSH SERPL-MCNC: 11.4 UIU/ML — HIGH (ref 0.27–4.2)
WBC # BLD: 13.29 K/UL — HIGH (ref 4.8–10.8)
WBC # BLD: 14.44 K/UL — HIGH (ref 4.8–10.8)
WBC # BLD: 9.94 K/UL — SIGNIFICANT CHANGE UP (ref 4.8–10.8)
WBC # FLD AUTO: 13.29 K/UL — HIGH (ref 4.8–10.8)
WBC # FLD AUTO: 14.44 K/UL — HIGH (ref 4.8–10.8)
WBC # FLD AUTO: 9.94 K/UL — SIGNIFICANT CHANGE UP (ref 4.8–10.8)

## 2020-09-26 PROCEDURE — 99233 SBSQ HOSP IP/OBS HIGH 50: CPT

## 2020-09-26 PROCEDURE — 71045 X-RAY EXAM CHEST 1 VIEW: CPT | Mod: 26

## 2020-09-26 RX ORDER — NOREPINEPHRINE BITARTRATE/D5W 8 MG/250ML
0.05 PLASTIC BAG, INJECTION (ML) INTRAVENOUS
Qty: 8 | Refills: 0 | Status: DISCONTINUED | OUTPATIENT
Start: 2020-09-26 | End: 2020-09-28

## 2020-09-26 RX ORDER — HEPARIN SODIUM 5000 [USP'U]/ML
5000 INJECTION INTRAVENOUS; SUBCUTANEOUS EVERY 12 HOURS
Refills: 0 | Status: DISCONTINUED | OUTPATIENT
Start: 2020-09-26 | End: 2020-09-26

## 2020-09-26 RX ORDER — PANTOPRAZOLE SODIUM 20 MG/1
8 TABLET, DELAYED RELEASE ORAL
Qty: 80 | Refills: 0 | Status: DISCONTINUED | OUTPATIENT
Start: 2020-09-26 | End: 2020-09-28

## 2020-09-26 RX ORDER — TIZANIDINE 4 MG/1
10 TABLET ORAL
Qty: 0 | Refills: 0 | DISCHARGE

## 2020-09-26 RX ORDER — OXYCODONE HYDROCHLORIDE 5 MG/1
1 TABLET ORAL
Qty: 0 | Refills: 0 | DISCHARGE
End: 2019-02-24

## 2020-09-26 RX ORDER — ALBUTEROL 90 UG/1
1 AEROSOL, METERED ORAL
Refills: 0 | Status: DISCONTINUED | OUTPATIENT
Start: 2020-09-26 | End: 2020-10-02

## 2020-09-26 RX ORDER — MAGNESIUM SULFATE 500 MG/ML
2 VIAL (ML) INJECTION EVERY 4 HOURS
Refills: 0 | Status: COMPLETED | OUTPATIENT
Start: 2020-09-26 | End: 2020-09-26

## 2020-09-26 RX ORDER — SODIUM CHLORIDE 9 MG/ML
250 INJECTION INTRAMUSCULAR; INTRAVENOUS; SUBCUTANEOUS ONCE
Refills: 0 | Status: COMPLETED | OUTPATIENT
Start: 2020-09-26 | End: 2020-09-26

## 2020-09-26 RX ORDER — BUDESONIDE AND FORMOTEROL FUMARATE DIHYDRATE 160; 4.5 UG/1; UG/1
2 AEROSOL RESPIRATORY (INHALATION)
Refills: 0 | Status: DISCONTINUED | OUTPATIENT
Start: 2020-09-26 | End: 2020-10-02

## 2020-09-26 RX ORDER — PANTOPRAZOLE SODIUM 20 MG/1
40 TABLET, DELAYED RELEASE ORAL
Refills: 0 | Status: DISCONTINUED | OUTPATIENT
Start: 2020-09-26 | End: 2020-09-26

## 2020-09-26 RX ORDER — DEXTROSE 50 % IN WATER 50 %
50 SYRINGE (ML) INTRAVENOUS ONCE
Refills: 0 | Status: COMPLETED | OUTPATIENT
Start: 2020-09-26 | End: 2020-09-26

## 2020-09-26 RX ORDER — TIZANIDINE 4 MG/1
2 TABLET ORAL
Qty: 0 | Refills: 0 | DISCHARGE

## 2020-09-26 RX ORDER — CHLORHEXIDINE GLUCONATE 213 G/1000ML
1 SOLUTION TOPICAL DAILY
Refills: 0 | Status: DISCONTINUED | OUTPATIENT
Start: 2020-09-26 | End: 2020-10-02

## 2020-09-26 RX ORDER — SENNA PLUS 8.6 MG/1
2 TABLET ORAL AT BEDTIME
Refills: 0 | Status: DISCONTINUED | OUTPATIENT
Start: 2020-09-26 | End: 2020-10-02

## 2020-09-26 RX ORDER — ENOXAPARIN SODIUM 100 MG/ML
40 INJECTION SUBCUTANEOUS AT BEDTIME
Refills: 0 | Status: DISCONTINUED | OUTPATIENT
Start: 2020-09-26 | End: 2020-09-26

## 2020-09-26 RX ORDER — FUROSEMIDE 40 MG
60 TABLET ORAL DAILY
Refills: 0 | Status: DISCONTINUED | OUTPATIENT
Start: 2020-09-26 | End: 2020-09-26

## 2020-09-26 RX ORDER — SODIUM CHLORIDE 9 MG/ML
1000 INJECTION INTRAMUSCULAR; INTRAVENOUS; SUBCUTANEOUS ONCE
Refills: 0 | Status: COMPLETED | OUTPATIENT
Start: 2020-09-26 | End: 2020-09-26

## 2020-09-26 RX ORDER — LEVOTHYROXINE SODIUM 125 MCG
100 TABLET ORAL DAILY
Refills: 0 | Status: DISCONTINUED | OUTPATIENT
Start: 2020-09-26 | End: 2020-10-02

## 2020-09-26 RX ORDER — ASPIRIN/CALCIUM CARB/MAGNESIUM 324 MG
81 TABLET ORAL DAILY
Refills: 0 | Status: DISCONTINUED | OUTPATIENT
Start: 2020-09-26 | End: 2020-09-27

## 2020-09-26 RX ORDER — DULOXETINE HYDROCHLORIDE 30 MG/1
60 CAPSULE, DELAYED RELEASE ORAL DAILY
Refills: 0 | Status: DISCONTINUED | OUTPATIENT
Start: 2020-09-26 | End: 2020-10-02

## 2020-09-26 RX ORDER — ATORVASTATIN CALCIUM 80 MG/1
40 TABLET, FILM COATED ORAL AT BEDTIME
Refills: 0 | Status: DISCONTINUED | OUTPATIENT
Start: 2020-09-26 | End: 2020-10-02

## 2020-09-26 RX ORDER — INSULIN HUMAN 100 [IU]/ML
10 INJECTION, SOLUTION SUBCUTANEOUS ONCE
Refills: 0 | Status: COMPLETED | OUTPATIENT
Start: 2020-09-26 | End: 2020-09-26

## 2020-09-26 RX ORDER — INFLUENZA VIRUS VACCINE 15; 15; 15; 15 UG/.5ML; UG/.5ML; UG/.5ML; UG/.5ML
0.5 SUSPENSION INTRAMUSCULAR ONCE
Refills: 0 | Status: DISCONTINUED | OUTPATIENT
Start: 2020-09-26 | End: 2020-10-02

## 2020-09-26 RX ORDER — HYDROXYZINE HCL 10 MG
10 TABLET ORAL THREE TIMES A DAY
Refills: 0 | Status: DISCONTINUED | OUTPATIENT
Start: 2020-09-26 | End: 2020-09-26

## 2020-09-26 RX ORDER — BECLOMETHASONE DIPROPIONATE 40 UG/1
1 AEROSOL, METERED RESPIRATORY (INHALATION)
Qty: 0 | Refills: 0 | DISCHARGE

## 2020-09-26 RX ADMIN — Medication 50 GRAM(S): at 02:13

## 2020-09-26 RX ADMIN — Medication 150 MILLIGRAM(S): at 05:50

## 2020-09-26 RX ADMIN — Medication 50 MILLILITER(S): at 15:46

## 2020-09-26 RX ADMIN — PANTOPRAZOLE SODIUM 10 MG/HR: 20 TABLET, DELAYED RELEASE ORAL at 10:30

## 2020-09-26 RX ADMIN — Medication 50 GRAM(S): at 05:51

## 2020-09-26 RX ADMIN — Medication 2 MILLIGRAM(S): at 11:40

## 2020-09-26 RX ADMIN — Medication 100 MICROGRAM(S): at 05:50

## 2020-09-26 RX ADMIN — CHLORHEXIDINE GLUCONATE 1 APPLICATION(S): 213 SOLUTION TOPICAL at 23:29

## 2020-09-26 RX ADMIN — Medication 4 MILLIGRAM(S): at 12:00

## 2020-09-26 RX ADMIN — SODIUM CHLORIDE 1000 MILLILITER(S): 9 INJECTION INTRAMUSCULAR; INTRAVENOUS; SUBCUTANEOUS at 11:15

## 2020-09-26 RX ADMIN — SODIUM CHLORIDE 1000 MILLILITER(S): 9 INJECTION INTRAMUSCULAR; INTRAVENOUS; SUBCUTANEOUS at 08:15

## 2020-09-26 RX ADMIN — INSULIN HUMAN 10 UNIT(S): 100 INJECTION, SOLUTION SUBCUTANEOUS at 15:47

## 2020-09-26 NOTE — PROGRESS NOTE ADULT - SUBJECTIVE AND OBJECTIVE BOX
ICU covering Attending Progress Daily Note     26 Sep 2020 15:49  admitted for abdominal pain   this am had melena Gi folowing will transfuse stablize EGD ? colonoscopy as per GI    He has history of Dextrocardia    Chronic midline low back pain with bilateral sciatica    Peripheral neuralgia    Essential hypertension    Diabetes 1.5, managed as type 2    Charcot&#x27;s arthropathy      Interval event for past 24 hr:  CALLY HARTMAN  55y had no event.   Current Complains:  CALLY HARTMAN has no new complains  HPI:  Patient is a 53 y/o female with Medical Hx of Type II DM,  CAD, DLD, HTN, Hypothyroidism, COPD, Dextrocardia,  Sciatica/Peripheral Neuralgia, Charcot foot deformity s/p reconstruction on 19, presenting to ED with bilateral lower extremity edema. Symptoms started about 2 weeks ago with generalized swelling in her abdomen, face and lower extremity, she also had some associated Chest and RLQ abdominal pain which started about 2 days ago. Chest pain was intermittent, 4-5/10, in the middle of her chest w/o radiation, she taught the abdominal pain and chest pain was secondary to gas so she bought OTC simethicone w/o relief so she decided to come in for further evaluation. She denied SOB and Orthopnea,     IN ED: BP: 92/55, HR: 81, Afebrile   Labs significant  for Ma.2, Na: 118, Scr: 1.6, BNP: 24, 996K, AST: 122, INR: 2.13, WBC: 14K  -CXR: B/L Interstitial Opacity reflecting Pulm Edema( pending Official Read ),     (25 Sep 2020 23:39)    OBJECTIVE:  ICU Vital Signs Last 24 Hrs  T(C): 36.4 (26 Sep 2020 12:00), Max: 37.1 (25 Sep 2020 23:35)  T(F): 97.5 (26 Sep 2020 12:00), Max: 98.7 (25 Sep 2020 23:35)  HR: 108 (26 Sep 2020 15:30) (70 - 110)  BP: 110/65 (26 Sep 2020 15:30) (71/25 - 132/59)  BP(mean): 80 (26 Sep 2020 15:30) (33 - 83)  ABP: --  ABP(mean): --  RR: 18 (26 Sep 2020 15:30) (15 - 28)  SpO2: 94% (26 Sep 2020 15:30) (80% - 99%)    I&O's Summary    25 Sep 2020 07:  -  26 Sep 2020 07:00  --------------------------------------------------------  IN: 0 mL / OUT: 4200 mL / NET: -4200 mL    26 Sep 2020 07:  -  26 Sep 2020 15:49  --------------------------------------------------------  IN: 1250 mL / OUT: 415 mL / NET: 835 mL      I&O's Detail    25 Sep 2020 07:  -  26 Sep 2020 07:00  --------------------------------------------------------  IN:  Total IN: 0 mL    OUT:    Indwelling Catheter - Urethral (mL): 1850 mL    Voided (mL): 2350 mL  Total OUT: 4200 mL    Total NET: -4200 mL      26 Sep 2020 07:  -  26 Sep 2020 15:49  --------------------------------------------------------  IN:    IV PiggyBack: 1250 mL  Total IN: 1250 mL    OUT:    Indwelling Catheter - Urethral (mL): 415 mL  Total OUT: 415 mL    Total NET: 835 mL          CAPILLARY BLOOD GLUCOSE      POCT Blood Glucose.: 137 mg/dL (26 Sep 2020 15:45)  POCT Blood Glucose.: 139 mg/dL (26 Sep 2020 09:04)  POCT Blood Glucose.: 124 mg/dL (26 Sep 2020 05:04)  POCT Blood Glucose.: 126 mg/dL (26 Sep 2020 00:49)    LABS:                          6.2    14.44 )-----------( 317      ( 26 Sep 2020 12:03 )             19.4         128<L>  |  91<L>  |  38<H>  ----------------------------<  140<H>  5.3<H>   |  28  |  1.0    Ca    7.3<L>      26 Sep 2020 12:03  Phos  3.2       Mg     2.1         TPro  4.5<L>  /  Alb  2.7<L>  /  TBili  0.9  /  DBili  x   /  AST  87<H>  /  ALT  33  /  AlkPhos  65      PT/INR - ( 26 Sep 2020 12:03 )   PT: 25.00 sec;   INR: 2.17 ratio         PTT - ( 26 Sep 2020 12:03 )  PTT:24.7 sec  Urinalysis Basic - ( 25 Sep 2020 20:57 )    Color: Light Yellow / Appearance: Clear / S.012 / pH: x  Gluc: x / Ketone: Negative  / Bili: Negative / Urobili: <2 mg/dL   Blood: x / Protein: Trace / Nitrite: Negative   Leuk Esterase: Negative / RBC: 3 /HPF / WBC 1 /HPF   Sq Epi: x / Non Sq Epi: 1 /HPF / Bacteria: Negative        Home Medications:  budesonide-formoterol 80 mcg-4.5 mcg/inh inhalation aerosol: 2 puff(s) inhaled 2 times a day (2019 09:18)  DULoxetine 60 mg oral delayed release capsule: 1 cap(s) orally once a day (10 Ty 2019 14:23)  hydrOXYzine hydrochloride 10 mg oral tablet: 1 tab(s) orally 3 times a day, As Needed (2019 09:18)  Lyrica 150 mg oral capsule: 1 tab(s) orally 3 times a day, stop after  (2019 13:07)  pantoprazole 40 mg oral delayed release tablet: 1 tab(s) orally once a day (10 Ty 2019 14:23)  tiZANidine 4 mg oral tablet: 2 tab(s) orally every 8 hours, As Needed (26 Sep 2020 02:35)  Ventolin HFA 90 mcg/inh inhalation aerosol: 2 puff(s) inhaled 4 times a day, As Needed (10 Ty 2019 14:23)    HOSPITAL MEDICATIONS:  MEDICATIONS  (STANDING):  aspirin enteric coated 81 milliGRAM(s) Oral daily  atorvastatin 40 milliGRAM(s) Oral at bedtime  budesonide  80 MICROgram(s)/formoterol 4.5 MICROgram(s) Inhaler 2 Puff(s) Inhalation two times a day  chlorhexidine 4% Liquid 1 Application(s) Topical daily  DULoxetine 60 milliGRAM(s) Oral daily  influenza   Vaccine 0.5 milliLiter(s) IntraMuscular once  levothyroxine 100 MICROGram(s) Oral daily  norepinephrine Infusion 0.05 MICROgram(s)/kG/Min (8.76 mL/Hr) IV Continuous <Continuous>  pantoprazole Infusion 8 mG/Hr (10 mL/Hr) IV Continuous <Continuous>  pregabalin 150 milliGRAM(s) Oral three times a day  senna 2 Tablet(s) Oral at bedtime    MEDICATIONS  (PRN):  ALBUTerol    90 MICROgram(s) HFA Inhaler 1 Puff(s) Inhalation four times a day PRN Shortness of Breath and/or Wheezing      REVIEW OF SYSTEMS:  CONSTITUTIONAL: [X] all negative; [ ] weakness, [ ] fevers, [ ] chills  EYES/ENT: [X] all negative; [ ] visual changes, [ ] vertigo, [ ] throat pain   NECK: [X] all negative; [ ] pain, [ ] stiffness  RESPIRATORY: [] all negative, [ ] cough, [ ] wheezing, [ ] hemoptysis, [ ] shortness of breath  CARDIOVASCULAR: [] all negative; [ ] chest pain, [ ] palpitations, [ ] orthopnea  GASTROINTESTINAL: [X] all negative; [ ]abdominal pain, [ ] nausea, [ ] vomiting, [ ] hematemesis, [ ] diarrhea, [ ] constipation, [ ] melena, [ ] hematochezia.  GENITOURINARY: [X] all negative; [ ] dysuria, [ ] frequency, [ ] hematuria  NEUROLOGICAL: [X] all negative; [ ] numbness, [ ] weakness  SKIN: [X] all negative; [ ] itching, [ ] burning, [ ] rashes, [ ] lesions   All other review of systems is negative unless indicated above.    [  ] Unable to assess ROS because     PHYSICAL EXAM:          CONSTITUTIONAL: Well-developed; well-nourished; in no acute distress.   	SKIN: warm, dry  	HEAD: Normocephalic; atraumatic.  	EYES: PERRL, EOM, no conj injection, sclera clear  	ENT: No nasal discharge; airway clear.  	NECK: Supple; non tender.  No midline ttp ctls  	CARD: S1, S2 normal; no murmurs, gallops, or rubs. Regular rate and rhythm. 2+ RPs and DPs bilat, no carotid bruits, no pedal   edema, no calf pain b/l  	RESP: CTA  bilat good air movement No wheezes, rales or rhonchi.  	ABD: Soft, not tender, not distended, no CVA ttp no rebound or guarding, bowel sounds present  	EXT: Normal ROM.  No clubbing, cyanosis or edema.   	  	NEURO: Alert, awake, motor 5/5 R, 5/5 L        RADIOLOGY:  xray  < from: Xray Chest 1 View-PORTABLE IMMEDIATE (Xray Chest 1 View-PORTABLE IMMEDIATE .) (. @ 12:50) >  Impression:    Bilateral opacities, decreased.      < end of copied text >    I spent 45 minutes of critical care time examining patient, reviewing vitals, labs, medications, imaging and discussing with the team goals of care to prevent life-threatening in this patient who is at high risk for deterioration or death due to:

## 2020-09-26 NOTE — CONSULT NOTE ADULT - SUBJECTIVE AND OBJECTIVE BOX
HPI:  55 y.o female patient with PMH of Type II DM, DLD, HTN, Hypothyroidism, COPD, Dextrocardia,  Sciatica/Peripheral Neuralgia, Charcot foot deformity s/p reconstruction on 19, presenting to ED with bilateral lower extremity edema. Symptoms started about 2 weeks ago with generalized swelling in her abdomen, face and lower extremity, she also had some associated Chest and RLQ abdominal pain which started about 2 days ago. Chest pain was intermittent, 4-5/10, in the middle of her chest w/o radiation, she thought the abdominal pain and chest pain was secondary to gas.    Cardiology  55 y.o female patient with PMH as stated above presented to the ED for a 1-week history of worsening LE edema and bloating.  History goes back to about 1 week prior to presentation when the patient started complaining of worsening LE edema. She then noticed that her abdomen was getting distended. As per the patient, she had some LE swelling in the past after her surgery, but it got worse during the last few days. She mentions some shortness of breath as well. She also reports occasional diffuse abdominal pain that she attributes to gas pain and which she had in the past. The day prior to presentation, she started complaining of epigastric pain that she also thought was due to gas pain.       PAST MEDICAL & SURGICAL HISTORY  Dextrocardia    Chronic midline low back pain with bilateral sciatica    Peripheral neuralgia    Essential hypertension    Diabetes 1.5, managed as type 2    Charcot&#x27;s arthropathy  R foot    Charcot foot due to diabetes mellitus    H/O shoulder surgery  Right shoulder surgery     delivery delivered        FAMILY HISTORY:  FAMILY HISTORY:      SOCIAL HISTORY:  []smoker current smoker  []Alcohol: none  []Drug: none    ALLERGIES:  No Known Allergies      MEDICATIONS:  MEDICATIONS  (STANDING):  aspirin enteric coated 81 milliGRAM(s) Oral daily  atorvastatin 40 milliGRAM(s) Oral at bedtime  budesonide  80 MICROgram(s)/formoterol 4.5 MICROgram(s) Inhaler 2 Puff(s) Inhalation two times a day  chlorhexidine 4% Liquid 1 Application(s) Topical daily  DULoxetine 60 milliGRAM(s) Oral daily  furosemide   Injectable 60 milliGRAM(s) IV Push daily  heparin   Injectable 5000 Unit(s) SubCutaneous every 12 hours  levothyroxine 100 MICROGram(s) Oral daily  magnesium sulfate  IVPB 2 Gram(s) IV Intermittent every 4 hours  pantoprazole    Tablet 40 milliGRAM(s) Oral before breakfast  pregabalin 150 milliGRAM(s) Oral three times a day  senna 2 Tablet(s) Oral at bedtime    MEDICATIONS  (PRN):  ALBUTerol    90 MICROgram(s) HFA Inhaler 1 Puff(s) Inhalation four times a day PRN Shortness of Breath and/or Wheezing  hydrOXYzine  Oral Tab/Cap - Peds 10 milliGRAM(s) Oral three times a day PRN Anxiety      HOME MEDICATIONS:  Home Medications:  budesonide-formoterol 80 mcg-4.5 mcg/inh inhalation aerosol: 2 puff(s) inhaled 2 times a day (2019 09:18)  DULoxetine 60 mg oral delayed release capsule: 1 cap(s) orally once a day (10 Ty 2019 14:23)  hydrOXYzine hydrochloride 10 mg oral tablet: 1 tab(s) orally 3 times a day, As Needed (2019 09:18)  Lyrica 150 mg oral capsule: 1 tab(s) orally 3 times a day, stop after  (2019 13:07)  pantoprazole 40 mg oral delayed release tablet: 1 tab(s) orally once a day (10 Ty 2019 14:23)  tiZANidine 4 mg oral tablet: 2 tab(s) orally every 8 hours, As Needed (26 Sep 2020 02:35)  Ventolin HFA 90 mcg/inh inhalation aerosol: 2 puff(s) inhaled 4 times a day, As Needed (10 Ty 2019 14:23)      VITALS:   T(F): 97.9 ( @ 00:12), Max: 98.7 ( @ 23:35)  HR: 76 ( @ 01:14) (70 - 82)  BP: 111/58 ( @ 01:14) (86/50 - 111/58)  BP(mean): --  RR: 18 ( @ 01:14) (18 - 19)  SpO2: 97% ( @ 01:14) (94% - 97%)    I&O's Summary    25 Sep 2020 07:01  -  26 Sep 2020 02:45  --------------------------------------------------------  IN: 0 mL / OUT: 2350 mL / NET: -2350 mL        REVIEW OF SYSTEMS:  CONSTITUTIONAL: Weakness  EYES: No visual changes  ENT: No vertigo or throat pain   NECK: No pain or stiffness  RESPIRATORY: No cough, wheezing, hemoptysis; No shortness of breath  CARDIOVASCULAR: Chest pain as described in HPI  GASTROINTESTINAL: Bloated abdomen. No abdominal or epigastric pain. No nausea, vomiting, or hematemesis; No diarrhea or constipation. No melena or hematochezia.  GENITOURINARY: No dysuria, frequency or hematuria  NEUROLOGICAL: No numbness or weakness  SKIN: No itching, no rashes  MSK: No pain    PHYSICAL EXAM:  NEURO: patient is awake , alert and oriented  GEN: Not in acute distress  NECK: no thyroid enlargement, no JVD  LUNGS: Clear to auscultation bilaterally   CARDIOVASCULAR: S1/S2 present, RRR   ABD: Soft, non-tender, distended  EXT: 3+ bilateral LE edema  SKIN: Intact    LABS:                        11.5   14.28 )-----------( 365      ( 25 Sep 2020 20:08 )             36.3         118<LL>  |  86<L>  |  34<H>  ----------------------------<  69<L>  6.7<HH>   |  17  |  1.6<H>    Ca    8.0<L>      25 Sep 2020 20:08  Mg     1.2         TPro  5.8<L>  /  Alb  3.1<L>  /  TBili  0.9  /  DBili  x   /  AST  122<H>  /  ALT  38  /  AlkPhos  80      PT/INR - ( 25 Sep 2020 20:08 )   PT: 24.50 sec;   INR: 2.13 ratio         PTT - ( 25 Sep 2020 20:08 )  PTT:34.7 sec  Troponin T, Serum: 0.20 ng/mL <HH> (20 @ 20:08)    CARDIAC MARKERS ( 25 Sep 2020 20:08 )  x     / 0.20 ng/mL / x     / x     / x            Troponin trend:    Serum Pro-Brain Natriuretic Peptide: 71065 pg/mL (20 @ 20:08)          RADIOLOGY:  -CXR:  -TTE:  -CCTA:  -STRESS TEST:  -CATHETERIZATION:    ECG:    TELEMETRY EVENTS:   HPI:  55 y.o female patient with PMH of Type II DM, DLD, HTN, Hypothyroidism, COPD, Dextrocardia,  Sciatica/Peripheral Neuralgia, Charcot foot deformity s/p reconstruction on 19, presenting to ED with bilateral lower extremity edema. Symptoms started about 2 weeks ago with generalized swelling in her abdomen, face and lower extremity, she also had some associated Chest and RLQ abdominal pain which started about 2 days ago. Chest pain was intermittent, 4-5/10, in the middle of her chest w/o radiation, she thought the abdominal pain and chest pain was secondary to gas.    Cardiology  55 y.o female patient with PMH as stated above presented to the ED for a 1-week history of worsening LE edema and bloating.  History goes back to about 1 week prior to presentation when the patient started complaining of worsening LE edema. She then noticed that her abdomen was getting distended. As per the patient, she had some LE swelling in the past after her surgery, but it got worse during the last few days. She mentions some shortness of breath as well. She also reports occasional diffuse abdominal pain that she attributes to gas pain and which she had in the past. The day prior to presentation, she started complaining of epigastric pain that she also thought was due to gas pain.       PAST MEDICAL & SURGICAL HISTORY  Dextrocardia    Chronic midline low back pain with bilateral sciatica    Peripheral neuralgia    Essential hypertension    Diabetes 1.5, managed as type 2    Charcot&#x27;s arthropathy  R foot    Charcot foot due to diabetes mellitus    H/O shoulder surgery  Right shoulder surgery     delivery delivered        FAMILY HISTORY:  FAMILY HISTORY:      SOCIAL HISTORY:  []smoker current smoker  []Alcohol: none  []Drug: none    ALLERGIES:  No Known Allergies      MEDICATIONS:  MEDICATIONS  (STANDING):  aspirin enteric coated 81 milliGRAM(s) Oral daily  atorvastatin 40 milliGRAM(s) Oral at bedtime  budesonide  80 MICROgram(s)/formoterol 4.5 MICROgram(s) Inhaler 2 Puff(s) Inhalation two times a day  chlorhexidine 4% Liquid 1 Application(s) Topical daily  DULoxetine 60 milliGRAM(s) Oral daily  furosemide   Injectable 60 milliGRAM(s) IV Push daily  heparin   Injectable 5000 Unit(s) SubCutaneous every 12 hours  levothyroxine 100 MICROGram(s) Oral daily  magnesium sulfate  IVPB 2 Gram(s) IV Intermittent every 4 hours  pantoprazole    Tablet 40 milliGRAM(s) Oral before breakfast  pregabalin 150 milliGRAM(s) Oral three times a day  senna 2 Tablet(s) Oral at bedtime    MEDICATIONS  (PRN):  ALBUTerol    90 MICROgram(s) HFA Inhaler 1 Puff(s) Inhalation four times a day PRN Shortness of Breath and/or Wheezing  hydrOXYzine  Oral Tab/Cap - Peds 10 milliGRAM(s) Oral three times a day PRN Anxiety      HOME MEDICATIONS:  Home Medications:  budesonide-formoterol 80 mcg-4.5 mcg/inh inhalation aerosol: 2 puff(s) inhaled 2 times a day (2019 09:18)  DULoxetine 60 mg oral delayed release capsule: 1 cap(s) orally once a day (10 Ty 2019 14:23)  hydrOXYzine hydrochloride 10 mg oral tablet: 1 tab(s) orally 3 times a day, As Needed (2019 09:18)  Lyrica 150 mg oral capsule: 1 tab(s) orally 3 times a day, stop after  (2019 13:07)  pantoprazole 40 mg oral delayed release tablet: 1 tab(s) orally once a day (10 Ty 2019 14:23)  tiZANidine 4 mg oral tablet: 2 tab(s) orally every 8 hours, As Needed (26 Sep 2020 02:35)  Ventolin HFA 90 mcg/inh inhalation aerosol: 2 puff(s) inhaled 4 times a day, As Needed (10 Ty 2019 14:23)      VITALS:   T(F): 97.9 ( @ 00:12), Max: 98.7 ( @ 23:35)  HR: 76 ( @ 01:14) (70 - 82)  BP: 111/58 ( @ 01:14) (86/50 - 111/58)  BP(mean): --  RR: 18 ( @ 01:14) (18 - 19)  SpO2: 97% ( @ 01:14) (94% - 97%)    I&O's Summary    25 Sep 2020 07:01  -  26 Sep 2020 02:45  --------------------------------------------------------  IN: 0 mL / OUT: 2350 mL / NET: -2350 mL        REVIEW OF SYSTEMS:  CONSTITUTIONAL: Weakness  EYES: No visual changes  ENT: No vertigo or throat pain   NECK: No pain or stiffness  RESPIRATORY: No cough, wheezing, hemoptysis; No shortness of breath  CARDIOVASCULAR: Chest pain as described in HPI; LE edema  GASTROINTESTINAL: Bloated abdomen. Epigastric pain as described in HPI. No nausea, vomiting, or hematemesis; No diarrhea or constipation. No melena or hematochezia.  GENITOURINARY: No dysuria, frequency or hematuria  NEUROLOGICAL: No numbness or weakness  SKIN: No itching, no rashes  MSK: No pain    PHYSICAL EXAM:  NEURO: patient is awake , alert and oriented  GEN: Not in acute distress  NECK: no thyroid enlargement, no JVD  LUNGS: Bibasilar crackles  CARDIOVASCULAR: S1/S2 present, RRR   ABD: Soft, non-tender, distended  EXT: 3+ bilateral LE edema  SKIN: Intact    LABS:                        11.5   14.28 )-----------( 365      ( 25 Sep 2020 20:08 )             36.3         118<LL>  |  86<L>  |  34<H>  ----------------------------<  69<L>  6.7<HH>   |  17  |  1.6<H>    Ca    8.0<L>      25 Sep 2020 20:08  Mg     1.2         TPro  5.8<L>  /  Alb  3.1<L>  /  TBili  0.9  /  DBili  x   /  AST  122<H>  /  ALT  38  /  AlkPhos  80      PT/INR - ( 25 Sep 2020 20:08 )   PT: 24.50 sec;   INR: 2.13 ratio         PTT - ( 25 Sep 2020 20:08 )  PTT:34.7 sec  Troponin T, Serum: 0.20 ng/mL <HH> (20 @ 20:08)    CARDIAC MARKERS ( 25 Sep 2020 20:08 )  x     / 0.20 ng/mL / x     / x     / x            Troponin trend:    Serum Pro-Brain Natriuretic Peptide: 63213 pg/mL (20 @ 20:08)          RADIOLOGY:  -CXR:  -TTE:  -CCTA:  -STRESS TEST:  -CATHETERIZATION:    ECG:    TELEMETRY EVENTS:   HPI:  55 y.o female patient with PMH of Type II DM, DLD, HTN, Hypothyroidism, COPD, Dextrocardia,  Sciatica/Peripheral Neuralgia, Charcot foot deformity s/p reconstruction on 19, presenting to ED with bilateral lower extremity edema. Symptoms started about 2 weeks ago with generalized swelling in her abdomen, face and lower extremity, she also had some associated Chest and RLQ abdominal pain which started about 2 days ago. Chest pain was intermittent, 4-5/10, in the middle of her chest w/o radiation, she thought the abdominal pain and chest pain was secondary to gas.    Cardiology  55 y.o female patient with PMH as stated above presented to the ED for a 1-week history of worsening LE edema and abdominal bloating.  History goes back to about 1 week prior to presentation when the patient started complaining of worsening LE edema. She then noticed that her abdomen was getting distended. As per the patient, she had some LE swelling in the past after her surgery, but it got worse during the last few days. She mentions some shortness of breath as well. She also reports occasional diffuse abdominal pain that she attributes to gas pain and which she had in the past. The day prior to presentation, she started complaining of epigastric pain that she also thought was due to gas pain.       PAST MEDICAL & SURGICAL HISTORY  Dextrocardia    Chronic midline low back pain with bilateral sciatica    Peripheral neuralgia    Essential hypertension    Diabetes 1.5, managed as type 2    Charcot&#x27;s arthropathy  R foot    Charcot foot due to diabetes mellitus    H/O shoulder surgery  Right shoulder surgery     delivery delivered        FAMILY HISTORY:  FAMILY HISTORY:      SOCIAL HISTORY:  []smoker current smoker  []Alcohol: none  []Drug: none    ALLERGIES:  No Known Allergies      MEDICATIONS:  MEDICATIONS  (STANDING):  aspirin enteric coated 81 milliGRAM(s) Oral daily  atorvastatin 40 milliGRAM(s) Oral at bedtime  budesonide  80 MICROgram(s)/formoterol 4.5 MICROgram(s) Inhaler 2 Puff(s) Inhalation two times a day  chlorhexidine 4% Liquid 1 Application(s) Topical daily  DULoxetine 60 milliGRAM(s) Oral daily  furosemide   Injectable 60 milliGRAM(s) IV Push daily  heparin   Injectable 5000 Unit(s) SubCutaneous every 12 hours  levothyroxine 100 MICROGram(s) Oral daily  magnesium sulfate  IVPB 2 Gram(s) IV Intermittent every 4 hours  pantoprazole    Tablet 40 milliGRAM(s) Oral before breakfast  pregabalin 150 milliGRAM(s) Oral three times a day  senna 2 Tablet(s) Oral at bedtime    MEDICATIONS  (PRN):  ALBUTerol    90 MICROgram(s) HFA Inhaler 1 Puff(s) Inhalation four times a day PRN Shortness of Breath and/or Wheezing  hydrOXYzine  Oral Tab/Cap - Peds 10 milliGRAM(s) Oral three times a day PRN Anxiety      HOME MEDICATIONS:  Home Medications:  budesonide-formoterol 80 mcg-4.5 mcg/inh inhalation aerosol: 2 puff(s) inhaled 2 times a day (2019 09:18)  DULoxetine 60 mg oral delayed release capsule: 1 cap(s) orally once a day (10 Ty 2019 14:23)  hydrOXYzine hydrochloride 10 mg oral tablet: 1 tab(s) orally 3 times a day, As Needed (2019 09:18)  Lyrica 150 mg oral capsule: 1 tab(s) orally 3 times a day, stop after  (2019 13:07)  pantoprazole 40 mg oral delayed release tablet: 1 tab(s) orally once a day (10 Ty 2019 14:23)  tiZANidine 4 mg oral tablet: 2 tab(s) orally every 8 hours, As Needed (26 Sep 2020 02:35)  Ventolin HFA 90 mcg/inh inhalation aerosol: 2 puff(s) inhaled 4 times a day, As Needed (10 Ty 2019 14:23)      VITALS:   T(F): 97.9 ( @ 00:12), Max: 98.7 ( @ 23:35)  HR: 76 ( @ 01:14) (70 - 82)  BP: 111/58 ( @ 01:14) (86/50 - 111/58)  BP(mean): --  RR: 18 ( @ 01:14) (18 - 19)  SpO2: 97% ( @ 01:14) (94% - 97%)    I&O's Summary    25 Sep 2020 07:01  -  26 Sep 2020 02:45  --------------------------------------------------------  IN: 0 mL / OUT: 2350 mL / NET: -2350 mL        REVIEW OF SYSTEMS:  CONSTITUTIONAL: Weakness  EYES: No visual changes  ENT: No vertigo or throat pain   NECK: No pain or stiffness  RESPIRATORY: No cough, wheezing, hemoptysis; No shortness of breath  CARDIOVASCULAR: Chest pain as described in HPI; LE edema  GASTROINTESTINAL: Bloated abdomen. Epigastric pain as described in HPI. No nausea, vomiting, or hematemesis; No diarrhea or constipation. No melena or hematochezia.  GENITOURINARY: No dysuria, frequency or hematuria  NEUROLOGICAL: No numbness or weakness  SKIN: No itching, no rashes  MSK: No pain    PHYSICAL EXAM:  NEURO: patient is awake , alert and oriented  GEN: Not in acute distress  NECK: no thyroid enlargement, no JVD  LUNGS: Bibasilar crackles  CARDIOVASCULAR: S1/S2 present, RRR   ABD: Soft, non-tender, distended  EXT: 3+ bilateral LE edema  SKIN: Intact    LABS:                        11.5   14.28 )-----------( 365      ( 25 Sep 2020 20:08 )             36.3         118<LL>  |  86<L>  |  34<H>  ----------------------------<  69<L>  6.7<HH>   |  17  |  1.6<H>    Ca    8.0<L>      25 Sep 2020 20:08  Mg     1.2         TPro  5.8<L>  /  Alb  3.1<L>  /  TBili  0.9  /  DBili  x   /  AST  122<H>  /  ALT  38  /  AlkPhos  80      PT/INR - ( 25 Sep 2020 20:08 )   PT: 24.50 sec;   INR: 2.13 ratio         PTT - ( 25 Sep 2020 20:08 )  PTT:34.7 sec  Troponin T, Serum: 0.20 ng/mL <HH> (20 @ 20:08)    CARDIAC MARKERS ( 25 Sep 2020 20:08 )  x     / 0.20 ng/mL / x     / x     / x            Troponin trend:    Serum Pro-Brain Natriuretic Peptide: 98564 pg/mL (20 @ 20:08)          RADIOLOGY:  -CXR:  -TTE:  -CCTA:  -STRESS TEST:  -CATHETERIZATION:    ECG:    TELEMETRY EVENTS:

## 2020-09-26 NOTE — PROCEDURE NOTE - NSPOSTPRCRAD_GEN_A_CORE
central line located in the/central line located in the superior vena cava/post-procedure radiography performed
0

## 2020-09-26 NOTE — CONSULT NOTE ADULT - ASSESSMENT
55 y.o female patient with PMH as stated above presented to the ED for a 1-week history of worsening LE edema and abdominal bloating.    # Generalized edema  # Elevated troponin  # JEEVAN, Hyponatremia, hyperkalemia, hypoglycemia, elevated INR      - ACS not likely at this time; check serial cardiac enzymes and EKGs  - Continue aspirin and statin for now  - Work-up for adrenal insufficiency?  - Consider abdominal CT scan  - Will need an accurate list of medications      *** Final recommendations by attending to follow *** 55 y.o female patient with PMH as stated above presented to the ED for a 1-week history of worsening LE edema and abdominal bloating.    # LE edema  # Elevated troponin  # JEEVAN, Hyponatremia, hyperkalemia, hypoglycemia, elevated INR      - ACS not likely at this time; Elevated trop secondary to demand ischemia? check serial cardiac enzymes and EKGs; take into consideration the dextrocardia while interpreting EKG  - Continue aspirin and statin for now  - Work-up for adrenal insufficiency?  - Consider abdominal CT scan  - Check 2D echo  - Will need an accurate list of home medications      *** Final recommendations by attending to follow ***

## 2020-09-26 NOTE — CONSULT NOTE ADULT - SUBJECTIVE AND OBJECTIVE BOX
Gastroenterology Consultation:    Patient is a 55y old  Female who presents with a chief complaint of     Admitted on: 20  HPI:  Patient is a 53 y/o female with Medical Hx of Type II DM,  CAD on ASA, Hypothyroidism, COPD, Dextrocardia,  Sciatica/Peripheral Neuralgia, Charcot foot deformity s/p reconstruction on 19, presenting to ED with bilateral lower extremity edema. and currently admitted to ICU for lactic acidosis, borderline BP, severe hyponatremia and electrolytes imbalance    GI called for Blood per rectum 4 episodes from midnight till now, never had similar episodes before. patient complains of abdominal pain, gas like, worse with food and relieved by BM, her normal BM is once a day brown stool. denies nausea, vomiting, dysphagia    she was also noted to have elevated Lfts / denies alcohol takes percocet with acetaminophen 345 daily multiple times, no blood transfusion     Prior records Reviewed (Y/N): y  History obtained from person other than patient (Y/N): n    Prior EGD: reports one by dr Zendejas 2 years ago / none on records   Prior Colonoscopy: reports one by dr Zendejas 2 years ago / none on records       PAST MEDICAL & SURGICAL HISTORY:  Dextrocardia  Chronic midline low back pain with bilateral sciatica  Peripheral neuralgia  Essential hypertension  Diabetes 1.5, managed as type 2  Charcot&#x27;s arthropathy R foot  Charcot foot due to diabetes mellitus  H/O shoulder surgery  Right shoulder surgery   delivery delivered    FAMILY HISTORY: CAD parents       Social History: lives home with 15 yo daughter   Tobacco: active smoker   Alcohol: neg  Drugs: neg     Home Medications:  budesonide-formoterol 80 mcg-4.5 mcg/inh inhalation aerosol: 2 puff(s) inhaled 2 times a day (2019 09:18)  DULoxetine 60 mg oral delayed release capsule: 1 cap(s) orally once a day (10 Ty 2019 14:23)  hydrOXYzine hydrochloride 10 mg oral tablet: 1 tab(s) orally 3 times a day, As Needed (2019 09:18)  Lyrica 150 mg oral capsule: 1 tab(s) orally 3 times a day, stop after  (2019 13:07)  pantoprazole 40 mg oral delayed release tablet: 1 tab(s) orally once a day (10 Ty 2019 14:23)  tiZANidine 4 mg oral tablet: 2 tab(s) orally every 8 hours, As Needed (26 Sep 2020 02:35)  Ventolin HFA 90 mcg/inh inhalation aerosol: 2 puff(s) inhaled 4 times a day, As Needed (10 Ty 2019 14:23)    MEDICATIONS  (STANDING):  aspirin enteric coated 81 milliGRAM(s) Oral daily  atorvastatin 40 milliGRAM(s) Oral at bedtime  budesonide  80 MICROgram(s)/formoterol 4.5 MICROgram(s) Inhaler 2 Puff(s) Inhalation two times a day  chlorhexidine 4% Liquid 1 Application(s) Topical daily  DULoxetine 60 milliGRAM(s) Oral daily  furosemide   Injectable 60 milliGRAM(s) IV Push daily  heparin   Injectable 5000 Unit(s) SubCutaneous every 12 hours  influenza   Vaccine 0.5 milliLiter(s) IntraMuscular once  levothyroxine 100 MICROGram(s) Oral daily  pantoprazole Infusion 8 mG/Hr (10 mL/Hr) IV Continuous <Continuous>  pregabalin 150 milliGRAM(s) Oral three times a day  senna 2 Tablet(s) Oral at bedtime    MEDICATIONS  (PRN):  ALBUTerol    90 MICROgram(s) HFA Inhaler 1 Puff(s) Inhalation four times a day PRN Shortness of Breath and/or Wheezing  hydrOXYzine  Oral Tab/Cap - Peds 10 milliGRAM(s) Oral three times a day PRN Anxiety      Allergies  No Known Allergies      Review of Systems:   Constitutional:  No Fever, No Chills  ENT/Mouth:  No Hearing Changes,  No Difficulty Swallowing  Eyes:  No Eye Pain, No Vision Changes  Cardiovascular:  +chest Pain, No Palpitations  Respiratory:  No Cough, No Dyspnea  Gastrointestinal:  As described in HPI  Musculoskeletal:  No Joint Swelling, + back pain   Skin:  No Skin Lesions, No Jaundice  Neuro:  + weakness       Physical Examination:  T(C): 36.3 (20 @ 08:00), Max: 37.1 (20 @ 23:35)  HR: 90 (20 @ 10:00) (70 - 92)  BP: 93/41 (20 @ 09:00) (82/53 - 111/58)  RR: 15 (20 @ 10:00) (15 - 28)  SpO2: 97% (20 @ 10:00) (91% - 99%)  Height (cm): 157.5 (20 @ 03:00)  Weight (kg): 93.4 (20 @ 03:00)    20 @ 07:01  -  20 @ 07:00  --------------------------------------------------------  IN: 0 mL / OUT: 4200 mL / NET: -4200 mL    20 @ 07:01  -  20 @ 10:35  --------------------------------------------------------  IN: 250 mL / OUT: 225 mL / NET: 25 mL        Constitutional: No acute distress.  Eyes:. Conjunctivae are clear, Sclera is non-icteric.  Ears Nose and Throat: The external ears are normal appearing,  Oral mucosa is pink and moist.  Respiratory:  No signs of respiratory distress. Lung sounds are clear bilaterally.  Cardiovascular:  S1 S2, Regular rate and rhythm.  GI: Abdomen is soft, symmetric, and epigastric tenderness Bowel sounds are present and normoactive in all four quadrants.    Neuro: No Tremor, No involuntary movements  Skin: No rashes, No Jaundice.      Data: (reviewed by attending)                        10.0   9.94  )-----------( 287      ( 26 Sep 2020 04:43 )             30.9     Hgb Trend:  10.0  20 @ 04:43  11.5  20 @ 20:08            122<L>  |  84<L>  |  37<H>  ----------------------------<  109<H>  5.2<H>   |  29  |  1.3    Ca    7.7<L>      26 Sep 2020 04:43  Phos  3.2       Mg     2.0         TPro  5.3<L>  /  Alb  3.0<L>  /  TBili  1.4<H>  /  DBili  x   /  AST  93<H>  /  ALT  35  /  AlkPhos  74      Liver panel trend:  TBili 1.4   /   AST 93   /   ALT 35   /   AlkP 74   /   Tptn 5.3   /   Alb 3.0    /   DBili --        TBili 0.9   /      /   ALT 38   /   AlkP 80   /   Tptn 5.8   /   Alb 3.1    /   DBili --            PT/INR - ( 26 Sep 2020 04:43 )   PT: 22.80 sec;   INR: 1.98 ratio         PTT - ( 25 Sep 2020 20:08 )  PTT:34.7 sec        Radiology:(reviewed by attending)    US Abdomen Limited:   EXAM:  US ABDOMEN LIMITED            PROCEDURE DATE:  2020            INTERPRETATION:  CLINICAL INFORMATION: Liver failure    COMPARISON: Correlation is made with CT chest dated 2019    TECHNIQUE: Sonography of the right upper quadrant.    FINDINGS:    Liver: Within normal limits in echogenicity. No discrete mass is seen. The liver measures up to 24 cm in length which may be secondary to a Riedel's lobe  Bile ducts: Normal caliber. Common bile duct measures 4 mm.  Gallbladder: Not well delineated. Please correlate with history of cholecystectomy.  Pancreas: Visualized portions are within normal limits.  Right kidney: 10.9 cm. No hydronephrosis.  Ascites: None.  IVC: Visualized portions are within normal limits.    IMPRESSION:    Elongated liver with normal-appearing echogenicity. This may be secondary to a Riedel's lobe rather than hepatomegaly.    Gallbladder nonvisualization. These correlate with prior surgery or lack of n.p.o. status..    BEBE LUNA M.D., ATTENDING RADIOLOGIST  This document has been electronically signed. Sep 25 2020 11:06PM (20 @ 22:55)     Gastroenterology Consultation:    Patient is a 55y old  Female who presents with a chief complaint of abdominal pain     Admitted on: 20  HPI:  Patient is a 53 y/o female with Medical Hx of Type II DM,  CAD on ASA, Hypothyroidism, COPD, Dextrocardia,  Sciatica/Peripheral Neuralgia, Charcot foot deformity s/p reconstruction on 19, presenting to ED with bilateral lower extremity edema. and currently admitted to ICU for lactic acidosis, borderline BP, severe hyponatremia and electrolytes imbalance    GI called for Blood per rectum 4 episodes from midnight till now, never had similar episodes before. patient complains of abdominal pain, gas like, worse with food and relieved by BM, her normal BM is once a day brown stool. denies nausea, vomiting, dysphagia    she was also noted to have elevated Lfts / denies alcohol takes percocet with acetaminophen 345 daily multiple times, no blood transfusion     Prior records Reviewed (Y/N): y  History obtained from person other than patient (Y/N): n    Prior EGD: reports one by dr Zendejas 2 years ago / none on records   Prior Colonoscopy: reports one by dr Zendejas 2 years ago / none on records       PAST MEDICAL & SURGICAL HISTORY:  Dextrocardia  Chronic midline low back pain with bilateral sciatica  Peripheral neuralgia  Essential hypertension  Diabetes 1.5, managed as type 2  Charcot&#x27;s arthropathy R foot  Charcot foot due to diabetes mellitus  H/O shoulder surgery  Right shoulder surgery   delivery delivered    FAMILY HISTORY: CAD parents       Social History: lives home with 15 yo daughter   Tobacco: active smoker   Alcohol: neg  Drugs: neg     Home Medications:  budesonide-formoterol 80 mcg-4.5 mcg/inh inhalation aerosol: 2 puff(s) inhaled 2 times a day (2019 09:18)  DULoxetine 60 mg oral delayed release capsule: 1 cap(s) orally once a day (10 Ty 2019 14:23)  hydrOXYzine hydrochloride 10 mg oral tablet: 1 tab(s) orally 3 times a day, As Needed (2019 09:18)  Lyrica 150 mg oral capsule: 1 tab(s) orally 3 times a day, stop after  (2019 13:07)  pantoprazole 40 mg oral delayed release tablet: 1 tab(s) orally once a day (10 Ty 2019 14:23)  tiZANidine 4 mg oral tablet: 2 tab(s) orally every 8 hours, As Needed (26 Sep 2020 02:35)  Ventolin HFA 90 mcg/inh inhalation aerosol: 2 puff(s) inhaled 4 times a day, As Needed (10 Ty 2019 14:23)    MEDICATIONS  (STANDING):  aspirin enteric coated 81 milliGRAM(s) Oral daily  atorvastatin 40 milliGRAM(s) Oral at bedtime  budesonide  80 MICROgram(s)/formoterol 4.5 MICROgram(s) Inhaler 2 Puff(s) Inhalation two times a day  chlorhexidine 4% Liquid 1 Application(s) Topical daily  DULoxetine 60 milliGRAM(s) Oral daily  furosemide   Injectable 60 milliGRAM(s) IV Push daily  heparin   Injectable 5000 Unit(s) SubCutaneous every 12 hours  influenza   Vaccine 0.5 milliLiter(s) IntraMuscular once  levothyroxine 100 MICROGram(s) Oral daily  pantoprazole Infusion 8 mG/Hr (10 mL/Hr) IV Continuous <Continuous>  pregabalin 150 milliGRAM(s) Oral three times a day  senna 2 Tablet(s) Oral at bedtime    MEDICATIONS  (PRN):  ALBUTerol    90 MICROgram(s) HFA Inhaler 1 Puff(s) Inhalation four times a day PRN Shortness of Breath and/or Wheezing  hydrOXYzine  Oral Tab/Cap - Peds 10 milliGRAM(s) Oral three times a day PRN Anxiety      Allergies  No Known Allergies      Review of Systems:   Constitutional:  No Fever, No Chills  ENT/Mouth:  No Hearing Changes,  No Difficulty Swallowing  Eyes:  No Eye Pain, No Vision Changes  Cardiovascular:  +chest Pain, No Palpitations  Respiratory:  No Cough, No Dyspnea  Gastrointestinal:  As described in HPI  Musculoskeletal:  No Joint Swelling, + back pain   Skin:  No Skin Lesions, No Jaundice  Neuro:  + weakness       Physical Examination:  T(C): 36.3 (20 @ 08:00), Max: 37.1 (20 @ 23:35)  HR: 90 (20 @ 10:00) (70 - 92)  BP: 93/41 (20 @ 09:00) (82/53 - 111/58)  RR: 15 (20 @ 10:00) (15 - 28)  SpO2: 97% (20 @ 10:00) (91% - 99%)  Height (cm): 157.5 (20 @ 03:00)  Weight (kg): 93.4 (20 @ 03:00)    20 @ 07:01  -  20 @ 07:00  --------------------------------------------------------  IN: 0 mL / OUT: 4200 mL / NET: -4200 mL    20 @ 07:01  -  20 @ 10:35  --------------------------------------------------------  IN: 250 mL / OUT: 225 mL / NET: 25 mL        Constitutional: No acute distress.  Eyes:. Conjunctivae are clear, Sclera is non-icteric.  Ears Nose and Throat: The external ears are normal appearing,  Oral mucosa is pink and moist.  Respiratory:  No signs of respiratory distress. Lung sounds are clear bilaterally.  Cardiovascular:  S1 S2, Regular rate and rhythm.  GI: Abdomen is soft, symmetric, and epigastric tenderness Bowel sounds are present and normoactive in all four quadrants.    Neuro: No Tremor, No involuntary movements  Skin: No rashes, No Jaundice.      Data: (reviewed by attending)                        10.0   9.94  )-----------( 287      ( 26 Sep 2020 04:43 )             30.9     Hgb Trend:  10.0  20 @ 04:43  11.5  20 @ 20:08            122<L>  |  84<L>  |  37<H>  ----------------------------<  109<H>  5.2<H>   |  29  |  1.3    Ca    7.7<L>      26 Sep 2020 04:43  Phos  3.2       Mg     2.0         TPro  5.3<L>  /  Alb  3.0<L>  /  TBili  1.4<H>  /  DBili  x   /  AST  93<H>  /  ALT  35  /  AlkPhos  74      Liver panel trend:  TBili 1.4   /   AST 93   /   ALT 35   /   AlkP 74   /   Tptn 5.3   /   Alb 3.0    /   DBili --        TBili 0.9   /      /   ALT 38   /   AlkP 80   /   Tptn 5.8   /   Alb 3.1    /   DBili --            PT/INR - ( 26 Sep 2020 04:43 )   PT: 22.80 sec;   INR: 1.98 ratio         PTT - ( 25 Sep 2020 20:08 )  PTT:34.7 sec        Radiology:(reviewed by attending)    US Abdomen Limited:   EXAM:  US ABDOMEN LIMITED            PROCEDURE DATE:  2020            INTERPRETATION:  CLINICAL INFORMATION: Liver failure    COMPARISON: Correlation is made with CT chest dated 2019    TECHNIQUE: Sonography of the right upper quadrant.    FINDINGS:    Liver: Within normal limits in echogenicity. No discrete mass is seen. The liver measures up to 24 cm in length which may be secondary to a Riedel's lobe  Bile ducts: Normal caliber. Common bile duct measures 4 mm.  Gallbladder: Not well delineated. Please correlate with history of cholecystectomy.  Pancreas: Visualized portions are within normal limits.  Right kidney: 10.9 cm. No hydronephrosis.  Ascites: None.  IVC: Visualized portions are within normal limits.    IMPRESSION:    Elongated liver with normal-appearing echogenicity. This may be secondary to a Riedel's lobe rather than hepatomegaly.    Gallbladder nonvisualization. These correlate with prior surgery or lack of n.p.o. status..    BEBE LUNA M.D., ATTENDING RADIOLOGIST  This document has been electronically signed. Sep 25 2020 11:06PM (20 @ 22:55)     Gastroenterology Consultation:    Patient is a 55y old  Female who presents with a chief complaint of abdominal pain     Admitted on: 20  HPI:  Patient is a 55 y/o female with Medical Hx of Type II DM,  CAD on ASA, Hypothyroidism, COPD, Dextrocardia,  Sciatica/Peripheral Neuralgia, Charcot foot deformity s/p reconstruction on 19, presenting to ED with bilateral lower extremity edema. and currently admitted to ICU for lactic acidosis, borderline BP, severe hyponatremia and electrolytes imbalance    GI called for Blood per rectum 4 episodes from midnight till now, never had similar episodes before. patient complains of abdominal pain, gas like, worse with food and relieved by BM, her normal BM is once a day brown stool. denies nausea, vomiting, dysphagia. the daughter Lorena valenzuela confirmed that the patient has been acting weird for few days, has not been eating fine for 3 weeks and had intermittent nausea and vomiting. Lorena also noted that Ms Goodwin had a brain tumor 10 years ago and never followed that      she was also noted to have elevated Lfts / denies alcohol takes percocet with acetaminophen 345 daily multiple times, no blood transfusion     Prior records Reviewed (Y/N): y  History obtained from person other than patient (Y/N): n    Prior EGD: reports one by dr Zendejas 2 years ago / none on records   Prior Colonoscopy: reports one by dr Zendejas 2 years ago / none on records       PAST MEDICAL & SURGICAL HISTORY:  Dextrocardia  Chronic midline low back pain with bilateral sciatica  Peripheral neuralgia  Essential hypertension  Diabetes 1.5, managed as type 2  Charcot&#x27;s arthropathy R foot  Charcot foot due to diabetes mellitus  H/O shoulder surgery  Right shoulder surgery   delivery delivered    FAMILY HISTORY: CAD parents       Social History: lives home with 15 yo daughter   Tobacco: active smoker   Alcohol: neg  Drugs: neg     Home Medications:  budesonide-formoterol 80 mcg-4.5 mcg/inh inhalation aerosol: 2 puff(s) inhaled 2 times a day (2019 09:18)  DULoxetine 60 mg oral delayed release capsule: 1 cap(s) orally once a day (10 Ty 2019 14:23)  hydrOXYzine hydrochloride 10 mg oral tablet: 1 tab(s) orally 3 times a day, As Needed (2019 09:18)  Lyrica 150 mg oral capsule: 1 tab(s) orally 3 times a day, stop after  (2019 13:07)  pantoprazole 40 mg oral delayed release tablet: 1 tab(s) orally once a day (10 Ty 2019 14:23)  tiZANidine 4 mg oral tablet: 2 tab(s) orally every 8 hours, As Needed (26 Sep 2020 02:35)  Ventolin HFA 90 mcg/inh inhalation aerosol: 2 puff(s) inhaled 4 times a day, As Needed (10 Ty 2019 14:23)    MEDICATIONS  (STANDING):  aspirin enteric coated 81 milliGRAM(s) Oral daily  atorvastatin 40 milliGRAM(s) Oral at bedtime  budesonide  80 MICROgram(s)/formoterol 4.5 MICROgram(s) Inhaler 2 Puff(s) Inhalation two times a day  chlorhexidine 4% Liquid 1 Application(s) Topical daily  DULoxetine 60 milliGRAM(s) Oral daily  furosemide   Injectable 60 milliGRAM(s) IV Push daily  heparin   Injectable 5000 Unit(s) SubCutaneous every 12 hours  influenza   Vaccine 0.5 milliLiter(s) IntraMuscular once  levothyroxine 100 MICROGram(s) Oral daily  pantoprazole Infusion 8 mG/Hr (10 mL/Hr) IV Continuous <Continuous>  pregabalin 150 milliGRAM(s) Oral three times a day  senna 2 Tablet(s) Oral at bedtime    MEDICATIONS  (PRN):  ALBUTerol    90 MICROgram(s) HFA Inhaler 1 Puff(s) Inhalation four times a day PRN Shortness of Breath and/or Wheezing  hydrOXYzine  Oral Tab/Cap - Peds 10 milliGRAM(s) Oral three times a day PRN Anxiety      Allergies  No Known Allergies      Review of Systems:   Constitutional:  No Fever, No Chills  ENT/Mouth:  No Hearing Changes,  No Difficulty Swallowing  Eyes:  No Eye Pain, No Vision Changes  Cardiovascular:  +chest Pain, No Palpitations  Respiratory:  No Cough, No Dyspnea  Gastrointestinal:  As described in HPI  Musculoskeletal:  No Joint Swelling, + back pain   Skin:  No Skin Lesions, No Jaundice  Neuro:  + weakness       Physical Examination:  T(C): 36.3 (20 @ 08:00), Max: 37.1 (20 @ 23:35)  HR: 90 (20 @ 10:00) (70 - 92)  BP: 93/41 (20 @ 09:00) (82/53 - 111/58)  RR: 15 (20 @ 10:00) (15 - 28)  SpO2: 97% (20 @ 10:00) (91% - 99%)  Height (cm): 157.5 (20 @ 03:00)  Weight (kg): 93.4 (20 @ 03:00)    20 @ 07:01  -  20 @ 07:00  --------------------------------------------------------  IN: 0 mL / OUT: 4200 mL / NET: -4200 mL    20 @ 07:01  -  20 @ 10:35  --------------------------------------------------------  IN: 250 mL / OUT: 225 mL / NET: 25 mL        Constitutional: No acute distress.  Eyes:. Conjunctivae are clear, Sclera is non-icteric.  Ears Nose and Throat: The external ears are normal appearing,  Oral mucosa is pink and moist.  Respiratory:  No signs of respiratory distress. Lung sounds are clear bilaterally.  Cardiovascular:  S1 S2, Regular rate and rhythm.  GI: Abdomen is soft, symmetric, and epigastric tenderness Bowel sounds are present and normoactive in all four quadrants.    Neuro: No Tremor, No involuntary movements  Skin: No rashes, No Jaundice.      Data: (reviewed by attending)                        10.0   9.94  )-----------( 287      ( 26 Sep 2020 04:43 )             30.9     Hgb Trend:  10.0  20 @ 04:43  11.5  20 @ 20:08            122<L>  |  84<L>  |  37<H>  ----------------------------<  109<H>  5.2<H>   |  29  |  1.3    Ca    7.7<L>      26 Sep 2020 04:43  Phos  3.2       Mg     2.0         TPro  5.3<L>  /  Alb  3.0<L>  /  TBili  1.4<H>  /  DBili  x   /  AST  93<H>  /  ALT  35  /  AlkPhos  74      Liver panel trend:  TBili 1.4   /   AST 93   /   ALT 35   /   AlkP 74   /   Tptn 5.3   /   Alb 3.0    /   DBili --        TBili 0.9   /      /   ALT 38   /   AlkP 80   /   Tptn 5.8   /   Alb 3.1    /   DBili --            PT/INR - ( 26 Sep 2020 04:43 )   PT: 22.80 sec;   INR: 1.98 ratio         PTT - ( 25 Sep 2020 20:08 )  PTT:34.7 sec        Radiology:(reviewed by attending)    US Abdomen Limited:   EXAM:  US ABDOMEN LIMITED            PROCEDURE DATE:  2020            INTERPRETATION:  CLINICAL INFORMATION: Liver failure    COMPARISON: Correlation is made with CT chest dated 2019    TECHNIQUE: Sonography of the right upper quadrant.    FINDINGS:    Liver: Within normal limits in echogenicity. No discrete mass is seen. The liver measures up to 24 cm in length which may be secondary to a Riedel's lobe  Bile ducts: Normal caliber. Common bile duct measures 4 mm.  Gallbladder: Not well delineated. Please correlate with history of cholecystectomy.  Pancreas: Visualized portions are within normal limits.  Right kidney: 10.9 cm. No hydronephrosis.  Ascites: None.  IVC: Visualized portions are within normal limits.    IMPRESSION:    Elongated liver with normal-appearing echogenicity. This may be secondary to a Riedel's lobe rather than hepatomegaly.    Gallbladder nonvisualization. These correlate with prior surgery or lack of n.p.o. status..    BEBE LUNA M.D., ATTENDING RADIOLOGIST  This document has been electronically signed. Sep 25 2020 11:06PM (20 @ 22:55)

## 2020-09-26 NOTE — PROGRESS NOTE ADULT - ASSESSMENT
Assessment and Plan:   Assessment:  · Assessment	  Patient is a 53 y/o female with Medical Hx of Type II DM,  CAD, DLD, HTN, Hypothyroidism, COPD, Dextrocardia,  Sciatica/Peripheral Neuralgia, Charcot foot deformity s/p reconstruction on 1/11/19, presenting to ED with multiple Complaints    Pt ahd Melena Gi Bleed ..CVp placed  will get blood transfusions   Gi saw pt ..   Fluid Overload  -Presenting  Pulmonary Edema and B/L LE 2+ Pitting edema up to Abdomen   -Crackles on exam,  however Denies SOB and Orthopnea, saturating well on RA   -CXR: B/L Interstitial Opacity reflecting Pulm Edema( pending Official Read ), BNP: >> 24K  -S/P Lasix 40 IV with good urine output, + pt noticed improvement in swelling   -will continue with Lasix   -F/up 2D-ECHO, Repeat CXR in AM     Elevated Liver Function test   -AST: 122, INR: 2.13 (not on coumadin) with petechiae on exam   -Abdominal US: Elongated Liver, otherwise wnl   -Hepatitis panel in progress     JEEVAN on CKD III: Likely Prerenal from Hypotension secondary to 3rd spacing v.s cardiorenal   -Scr: 1.6, (baseline 0.7 from 7/2020)   -Bautista placed on ED, S/P Lasix IV with good urine output  -Re-assess in AM      Electrolyte derangement   Hypervolemic Hyponatremia: Na: 118, re-evaluate after Diuresis   Hypomagnesemia: 1.2,Supplemeted, f/up am levels  Psuedohyperkalemia: Hemolyzed sample: VBG: K: 5.5     CAD + COPD: stable  -c/w Inhalers, ASA.  Statin and -DASH diet    -will hold Valsartan 40mg 1x/day and Toprol-xl 25mg 1x/day  -**Re-start when appropriate**    Type II DM  Takes Janumet at home  FS 2x/day is sufficient for now  -Start insulin regimen if FS is persistently >180    Essential HTN  -Hypotensive on admission 92/55   -Pt is Volume overloaded from 3rd spacing   -Hold Antihypertensive for now (valsartan)   -Resume when Appropriate    Hypothyroidism  -c/w Levothyroxine  -F/up TSH + T4     Sciatica + Peripheral Neuralgia  -c/w Percocet, Lyrica, duloxetine, and tizanidine     Urinary Incontinence   -Takes Mybertric 50mg at bedtime    ***Pls fill out Nonformulary form**

## 2020-09-27 LAB
ALBUMIN SERPL ELPH-MCNC: 2.7 G/DL — LOW (ref 3.5–5.2)
ALP SERPL-CCNC: 72 U/L — SIGNIFICANT CHANGE UP (ref 30–115)
ALT FLD-CCNC: 39 U/L — SIGNIFICANT CHANGE UP (ref 0–41)
ANION GAP SERPL CALC-SCNC: 7 MMOL/L — SIGNIFICANT CHANGE UP (ref 7–14)
APTT BLD: 21.2 SEC — CRITICAL LOW (ref 27–39.2)
APTT BLD: 28.7 SEC — SIGNIFICANT CHANGE UP (ref 27–39.2)
APTT BLD: 30.7 SEC — SIGNIFICANT CHANGE UP (ref 27–39.2)
AST SERPL-CCNC: 87 U/L — HIGH (ref 0–41)
BASOPHILS # BLD AUTO: 0.02 K/UL — SIGNIFICANT CHANGE UP (ref 0–0.2)
BASOPHILS # BLD AUTO: 0.02 K/UL — SIGNIFICANT CHANGE UP (ref 0–0.2)
BASOPHILS # BLD AUTO: 0.03 K/UL — SIGNIFICANT CHANGE UP (ref 0–0.2)
BASOPHILS NFR BLD AUTO: 0.2 % — SIGNIFICANT CHANGE UP (ref 0–1)
BASOPHILS NFR BLD AUTO: 0.2 % — SIGNIFICANT CHANGE UP (ref 0–1)
BASOPHILS NFR BLD AUTO: 0.3 % — SIGNIFICANT CHANGE UP (ref 0–1)
BILIRUB SERPL-MCNC: 1 MG/DL — SIGNIFICANT CHANGE UP (ref 0.2–1.2)
BUN SERPL-MCNC: 22 MG/DL — HIGH (ref 10–20)
CALCIUM SERPL-MCNC: 7.5 MG/DL — LOW (ref 8.5–10.1)
CHLORIDE SERPL-SCNC: 97 MMOL/L — LOW (ref 98–110)
CO2 SERPL-SCNC: 32 MMOL/L — SIGNIFICANT CHANGE UP (ref 17–32)
CREAT SERPL-MCNC: 0.7 MG/DL — SIGNIFICANT CHANGE UP (ref 0.7–1.5)
CULTURE RESULTS: SIGNIFICANT CHANGE UP
D DIMER BLD IA.RAPID-MCNC: 677 NG/ML DDU — HIGH (ref 0–230)
EOSINOPHIL # BLD AUTO: 0.02 K/UL — SIGNIFICANT CHANGE UP (ref 0–0.7)
EOSINOPHIL # BLD AUTO: 0.03 K/UL — SIGNIFICANT CHANGE UP (ref 0–0.7)
EOSINOPHIL # BLD AUTO: 0.04 K/UL — SIGNIFICANT CHANGE UP (ref 0–0.7)
EOSINOPHIL NFR BLD AUTO: 0.2 % — SIGNIFICANT CHANGE UP (ref 0–8)
EOSINOPHIL NFR BLD AUTO: 0.3 % — SIGNIFICANT CHANGE UP (ref 0–8)
EOSINOPHIL NFR BLD AUTO: 0.5 % — SIGNIFICANT CHANGE UP (ref 0–8)
FIBRINOGEN PPP-MCNC: 200 MG/DL — LOW (ref 204.4–570.6)
GLUCOSE BLDC GLUCOMTR-MCNC: 96 MG/DL — SIGNIFICANT CHANGE UP (ref 70–99)
GLUCOSE SERPL-MCNC: 84 MG/DL — SIGNIFICANT CHANGE UP (ref 70–99)
HCT VFR BLD CALC: 28.8 % — LOW (ref 37–47)
HCT VFR BLD CALC: 30 % — LOW (ref 37–47)
HCT VFR BLD CALC: 30.8 % — LOW (ref 37–47)
HCT VFR BLD CALC: 31.8 % — LOW (ref 37–47)
HGB BLD-MCNC: 10.2 G/DL — LOW (ref 12–16)
HGB BLD-MCNC: 10.6 G/DL — LOW (ref 12–16)
HGB BLD-MCNC: 9.6 G/DL — LOW (ref 12–16)
HGB BLD-MCNC: 9.8 G/DL — LOW (ref 12–16)
IMM GRANULOCYTES NFR BLD AUTO: 0.6 % — HIGH (ref 0.1–0.3)
IMM GRANULOCYTES NFR BLD AUTO: 0.6 % — HIGH (ref 0.1–0.3)
IMM GRANULOCYTES NFR BLD AUTO: 0.7 % — HIGH (ref 0.1–0.3)
INR BLD: 1.46 RATIO — HIGH (ref 0.65–1.3)
INR BLD: 1.46 RATIO — HIGH (ref 0.65–1.3)
INR BLD: 1.67 RATIO — HIGH (ref 0.65–1.3)
LYMPHOCYTES # BLD AUTO: 0.72 K/UL — LOW (ref 1.2–3.4)
LYMPHOCYTES # BLD AUTO: 0.93 K/UL — LOW (ref 1.2–3.4)
LYMPHOCYTES # BLD AUTO: 1 K/UL — LOW (ref 1.2–3.4)
LYMPHOCYTES # BLD AUTO: 10.5 % — LOW (ref 20.5–51.1)
LYMPHOCYTES # BLD AUTO: 10.7 % — LOW (ref 20.5–51.1)
LYMPHOCYTES # BLD AUTO: 7.2 % — LOW (ref 20.5–51.1)
MCHC RBC-ENTMCNC: 28.2 PG — SIGNIFICANT CHANGE UP (ref 27–31)
MCHC RBC-ENTMCNC: 28.2 PG — SIGNIFICANT CHANGE UP (ref 27–31)
MCHC RBC-ENTMCNC: 28.5 PG — SIGNIFICANT CHANGE UP (ref 27–31)
MCHC RBC-ENTMCNC: 28.6 PG — SIGNIFICANT CHANGE UP (ref 27–31)
MCHC RBC-ENTMCNC: 32.7 G/DL — SIGNIFICANT CHANGE UP (ref 32–37)
MCHC RBC-ENTMCNC: 33.1 G/DL — SIGNIFICANT CHANGE UP (ref 32–37)
MCHC RBC-ENTMCNC: 33.3 G/DL — SIGNIFICANT CHANGE UP (ref 32–37)
MCHC RBC-ENTMCNC: 33.3 G/DL — SIGNIFICANT CHANGE UP (ref 32–37)
MCV RBC AUTO: 84.6 FL — SIGNIFICANT CHANGE UP (ref 81–99)
MCV RBC AUTO: 85.5 FL — SIGNIFICANT CHANGE UP (ref 81–99)
MCV RBC AUTO: 86.3 FL — SIGNIFICANT CHANGE UP (ref 81–99)
MCV RBC AUTO: 86.5 FL — SIGNIFICANT CHANGE UP (ref 81–99)
MONOCYTES # BLD AUTO: 0.95 K/UL — HIGH (ref 0.1–0.6)
MONOCYTES # BLD AUTO: 0.98 K/UL — HIGH (ref 0.1–0.6)
MONOCYTES # BLD AUTO: 1.03 K/UL — HIGH (ref 0.1–0.6)
MONOCYTES NFR BLD AUTO: 10.5 % — HIGH (ref 1.7–9.3)
MONOCYTES NFR BLD AUTO: 11.6 % — HIGH (ref 1.7–9.3)
MONOCYTES NFR BLD AUTO: 9.5 % — HIGH (ref 1.7–9.3)
NEUTROPHILS # BLD AUTO: 6.81 K/UL — HIGH (ref 1.4–6.5)
NEUTROPHILS # BLD AUTO: 7.26 K/UL — HIGH (ref 1.4–6.5)
NEUTROPHILS # BLD AUTO: 8.22 K/UL — HIGH (ref 1.4–6.5)
NEUTROPHILS NFR BLD AUTO: 76.6 % — HIGH (ref 42.2–75.2)
NEUTROPHILS NFR BLD AUTO: 77.6 % — HIGH (ref 42.2–75.2)
NEUTROPHILS NFR BLD AUTO: 82.2 % — HIGH (ref 42.2–75.2)
NRBC # BLD: 0 /100 WBCS — SIGNIFICANT CHANGE UP (ref 0–0)
PLATELET # BLD AUTO: 106 K/UL — LOW (ref 130–400)
PLATELET # BLD AUTO: 137 K/UL — SIGNIFICANT CHANGE UP (ref 130–400)
PLATELET # BLD AUTO: 143 K/UL — SIGNIFICANT CHANGE UP (ref 130–400)
PLATELET # BLD AUTO: 168 K/UL — SIGNIFICANT CHANGE UP (ref 130–400)
POTASSIUM SERPL-MCNC: 4.1 MMOL/L — SIGNIFICANT CHANGE UP (ref 3.5–5)
POTASSIUM SERPL-SCNC: 4.1 MMOL/L — SIGNIFICANT CHANGE UP (ref 3.5–5)
PROT SERPL-MCNC: 5.1 G/DL — LOW (ref 6–8)
PROTHROM AB SERPL-ACNC: 16.8 SEC — HIGH (ref 9.95–12.87)
PROTHROM AB SERPL-ACNC: 16.8 SEC — HIGH (ref 9.95–12.87)
PROTHROM AB SERPL-ACNC: 19.2 SEC — HIGH (ref 9.95–12.87)
RBC # BLD: 3.37 M/UL — LOW (ref 4.2–5.4)
RBC # BLD: 3.47 M/UL — LOW (ref 4.2–5.4)
RBC # BLD: 3.57 M/UL — LOW (ref 4.2–5.4)
RBC # BLD: 3.76 M/UL — LOW (ref 4.2–5.4)
RBC # FLD: 16.8 % — HIGH (ref 11.5–14.5)
RBC # FLD: 17.1 % — HIGH (ref 11.5–14.5)
RBC # FLD: 17.4 % — HIGH (ref 11.5–14.5)
RBC # FLD: 17.6 % — HIGH (ref 11.5–14.5)
SODIUM SERPL-SCNC: 136 MMOL/L — SIGNIFICANT CHANGE UP (ref 135–146)
SPECIMEN SOURCE: SIGNIFICANT CHANGE UP
WBC # BLD: 10 K/UL — SIGNIFICANT CHANGE UP (ref 4.8–10.8)
WBC # BLD: 8.88 K/UL — SIGNIFICANT CHANGE UP (ref 4.8–10.8)
WBC # BLD: 9.36 K/UL — SIGNIFICANT CHANGE UP (ref 4.8–10.8)
WBC # BLD: 9.43 K/UL — SIGNIFICANT CHANGE UP (ref 4.8–10.8)
WBC # FLD AUTO: 10 K/UL — SIGNIFICANT CHANGE UP (ref 4.8–10.8)
WBC # FLD AUTO: 8.88 K/UL — SIGNIFICANT CHANGE UP (ref 4.8–10.8)
WBC # FLD AUTO: 9.36 K/UL — SIGNIFICANT CHANGE UP (ref 4.8–10.8)
WBC # FLD AUTO: 9.43 K/UL — SIGNIFICANT CHANGE UP (ref 4.8–10.8)

## 2020-09-27 PROCEDURE — 74174 CTA ABD&PLVS W/CONTRAST: CPT | Mod: 26

## 2020-09-27 PROCEDURE — 71045 X-RAY EXAM CHEST 1 VIEW: CPT | Mod: 26

## 2020-09-27 PROCEDURE — 99233 SBSQ HOSP IP/OBS HIGH 50: CPT

## 2020-09-27 RX ORDER — FUROSEMIDE 40 MG
40 TABLET ORAL ONCE
Refills: 0 | Status: COMPLETED | OUTPATIENT
Start: 2020-09-27 | End: 2020-09-27

## 2020-09-27 RX ORDER — SODIUM CHLORIDE 9 MG/ML
1000 INJECTION INTRAMUSCULAR; INTRAVENOUS; SUBCUTANEOUS
Refills: 0 | Status: DISCONTINUED | OUTPATIENT
Start: 2020-09-27 | End: 2020-09-27

## 2020-09-27 RX ORDER — FUROSEMIDE 40 MG
20 TABLET ORAL ONCE
Refills: 0 | Status: COMPLETED | OUTPATIENT
Start: 2020-09-27 | End: 2020-09-27

## 2020-09-27 RX ORDER — POTASSIUM CHLORIDE 20 MEQ
20 PACKET (EA) ORAL ONCE
Refills: 0 | Status: COMPLETED | OUTPATIENT
Start: 2020-09-27 | End: 2020-09-27

## 2020-09-27 RX ADMIN — Medication 150 MILLIGRAM(S): at 21:14

## 2020-09-27 RX ADMIN — DULOXETINE HYDROCHLORIDE 60 MILLIGRAM(S): 30 CAPSULE, DELAYED RELEASE ORAL at 13:00

## 2020-09-27 RX ADMIN — Medication 100 MICROGRAM(S): at 09:36

## 2020-09-27 RX ADMIN — ATORVASTATIN CALCIUM 40 MILLIGRAM(S): 80 TABLET, FILM COATED ORAL at 21:14

## 2020-09-27 RX ADMIN — SENNA PLUS 2 TABLET(S): 8.6 TABLET ORAL at 21:14

## 2020-09-27 RX ADMIN — CHLORHEXIDINE GLUCONATE 1 APPLICATION(S): 213 SOLUTION TOPICAL at 15:32

## 2020-09-27 RX ADMIN — PANTOPRAZOLE SODIUM 10 MG/HR: 20 TABLET, DELAYED RELEASE ORAL at 07:04

## 2020-09-27 RX ADMIN — Medication 50 MILLIEQUIVALENT(S): at 09:35

## 2020-09-27 RX ADMIN — Medication 20 MILLIGRAM(S): at 15:47

## 2020-09-27 RX ADMIN — Medication 40 MILLIGRAM(S): at 09:35

## 2020-09-27 RX ADMIN — BUDESONIDE AND FORMOTEROL FUMARATE DIHYDRATE 2 PUFF(S): 160; 4.5 AEROSOL RESPIRATORY (INHALATION) at 20:18

## 2020-09-27 RX ADMIN — Medication 81 MILLIGRAM(S): at 13:00

## 2020-09-27 RX ADMIN — Medication 150 MILLIGRAM(S): at 13:16

## 2020-09-27 NOTE — DIETITIAN INITIAL EVALUATION ADULT. - ADD RECOMMEND
Recommendation: RD to monitor plan of care & feasibility for diet advance. Check HgbA1C. Order MVI q24hrs if not medically contraindicated. If able to advance diet to solids - add DASH/TLC diet modifier; will monitor blood glucose & HgbA1C to assess need for Consistent Carbohydrate diet modifier as well. If unable to advance diet, consult Nutrition Support Team.

## 2020-09-27 NOTE — PROGRESS NOTE ADULT - SUBJECTIVE AND OBJECTIVE BOX
Gastroenterology progress note:     Patient is a 55y old  Female who presents with a chief complaint of      Admitted on: 20    We are following the patient for melena and hematochezia      Interval History: no BM since yesterday 11 am. patient is restraints, she tried to pull her Central line. she is NPO    Patient's medical problems are stable    Prior records reviewed (Y/N): Y  History obtained from someone other than patient (Y/N): nurse       PAST MEDICAL & SURGICAL HISTORY:  Dextrocardia    Chronic midline low back pain with bilateral sciatica    Peripheral neuralgia    Essential hypertension    Diabetes 1.5, managed as type 2    Charcot&#x27;s arthropathy  R foot    Charcot foot due to diabetes mellitus    H/O shoulder surgery  Right shoulder surgery     delivery delivered        MEDICATIONS  (STANDING):  aspirin enteric coated 81 milliGRAM(s) Oral daily  atorvastatin 40 milliGRAM(s) Oral at bedtime  budesonide  80 MICROgram(s)/formoterol 4.5 MICROgram(s) Inhaler 2 Puff(s) Inhalation two times a day  chlorhexidine 4% Liquid 1 Application(s) Topical daily  DULoxetine 60 milliGRAM(s) Oral daily  influenza   Vaccine 0.5 milliLiter(s) IntraMuscular once  levothyroxine 100 MICROGram(s) Oral daily  pantoprazole Infusion 8 mG/Hr (10 mL/Hr) IV Continuous <Continuous>  pregabalin 150 milliGRAM(s) Oral three times a day  senna 2 Tablet(s) Oral at bedtime    MEDICATIONS  (PRN):  ALBUTerol    90 MICROgram(s) HFA Inhaler 1 Puff(s) Inhalation four times a day PRN Shortness of Breath and/or Wheezing      Allergies  No Known Allergies      Review of Systems:   General: no fever  HEENT: no hemoptysis  Cardiovascular:  No Chest Pain, No Palpitations  Respiratory:  No Cough, No Dyspnea  Gastrointestinal:  As described in HPI  Hematology: +bruising   Neurology: no new motor deficit  Skin: no new rash    Physical Examination:  T(C): 37.1 (20 @ 03:00), Max: 37.1 (20 @ 23:00)  HR: 104 (20 @ 06:00) (86 - 116)  BP: 124/68 (20 @ 06:00) (71/25 - 152/66)  RR: 20 (20 @ 06:00) (15 - 39)  SpO2: 99% (20 @ 06:00) (80% - 99%)  Weight (kg): 94.1 (20 @ 05:00)    Constitutional: agitated   Head: normocephalic  Neck: no palpable thyroid  Eyes: EOMI  Respiratory:  No signs of respiratory distress. bilateral crackles   Cardiovascular:  S1 S2, Regular rate and rhythm.  Abdominal: Abdomen is soft, symmetric, and non-tender without distention.    Extremities: +pitting edema  neuro: alert oriented to place and self   Skin: No rashes, No Jaundice.        Data: (reviewed by attending)                        10.6   9.43  )-----------( 106      ( 27 Sep 2020 04:30 )             31.8     Hgb trend:  10.6  20 @ 04:30  9.8  20 @ 20:30  6.2  20 @ 12:03  10.0  20 @ 04:43  11.5  20 @ 20:08      20 @ 07:01  -  20 @ 06:55  --------------------------------------------------------  IN: 1391 mL          136  |  97<L>  |  22<H>  ----------------------------<  84  4.1   |  32  |  0.7    Ca    7.5<L>      27 Sep 2020 04:30  Phos  3.2       Mg     1.8         TPro  5.1<L>  /  Alb  2.7<L>  /  TBili  1.0  /  DBili  x   /  AST  87<H>  /  ALT  39  /  AlkPhos  72      Liver panel trend:  TBili 1.0   /   AST 87   /   ALT 39   /   AlkP 72   /   Tptn 5.1   /   Alb 2.7    /   DBili --        TBili 1.1   /   AST 96   /   ALT 39   /   AlkP 65   /   Tptn 5.4   /   Alb 3.2    /   DBili --        TBili 0.9   /   AST 87   /   ALT 33   /   AlkP 65   /   Tptn 4.5   /   Alb 2.7    /   DBili --        TBili 1.4   /   AST 93   /   ALT 35   /   AlkP 74   /   Tptn 5.3   /   Alb 3.0    /   DBili -        TBili 0.9   /      /   ALT 38   /   AlkP 80   /   Tptn 5.8   /   Alb 3.1    /   DBili --            PT/INR - ( 27 Sep 2020 04:30 )   PT: 19.20 sec;   INR: 1.67 ratio         PTT - ( 27 Sep 2020 04:30 )  PTT:21.2 sec       Radiology: (reviewed by attending)  CT Angio Abdomen and Pelvis w/ IV Cont:   EXAM:  CT ANGIO ABD PELV (W)AW IC            PROCEDURE DATE:  2020            INTERPRETATION:  CLINICAL STATEMENT: Drop in hemoglobin. Evaluate for active bleed.    TECHNIQUE: CTA abdomen and pelvis with and without IV contrast (gastrointestinal bleeding protocol). Contiguous axial CTA images of the abdomen and pelvis were obtained before and after the administration of 100 cc Optiray 350 intravenous contrast. Arterial and venous phase images obtained. 0 cc contrast discarded. Oral contrast was not administered. Reformatted images in the coronal and sagittal planes were acquired. 3-D/MIP images obtained.    Comparison made with CTA chest 2019.    FINDINGS:    LOWER CHEST: Interstitial edema with lung groundglass attenuation.    HEPATOBILIARY: Gallbladder not visualized. Liver unremarkable. Patent portal vein.    SPLEEN: Unremarkable.    PANCREAS: Unremarkable.    ADRENAL GLANDS: Stable 1.9 cm right adrenal gland adenoma.    KIDNEYS: Symmetric renal enhancement. No hydronephrosis. Subcentimeter bilateral renal hypodensities, too small to further characterize.    ABDOMINOPELVIC NODES: No lymphadenopathy.    PELVIC ORGANS: Bautista catheter within the urinary bladder, with adjacent locules of air likely secondary to instrumentation.    PERITONEUM/MESENTERY/BOWEL: No bowel obstruction or pneumoperitoneum. No evidence of gastrointestinal arterial extravasation or venous pooling to suggest active intestinal bleed.  Normal appendix. Mild abdominopelvic ascites.    BONES/SOFT TISSUES: No acute osseous abnormality. Degenerative changes of the spine. There is grade 2 anterolisthesis of L5 on S1 with bilateral L5 pars defects. Subcutaneous edema noted primarily along the right lateral abdominal wall.    OTHER: Aortic atherosclerosis.      IMPRESSION:    1. No evidence of gastrointestinal arterial extravasation or venous pooling to suggest active intestinal bleed.    2. Interstitial edema with lung groundglass attenuation, which may represent pulmonary edema in the appropriate clinical setting.            KALIE LEE M.D., RESIDENT RADIOLOGIST  This document has been electronically signed.  SUDHAKAR DOUGLAS M.D., ATTENDING RADIOLOGIST  This document has been electronically signed. Sep 27 2020  6:11AM (20 @ 05:21)    US Abdomen Limited:   EXAM:  US ABDOMEN LIMITED            PROCEDURE DATE:  2020            INTERPRETATION:  CLINICAL INFORMATION: Liver failure    COMPARISON: Correlation is made with CT chest dated 2019    TECHNIQUE: Sonography of the right upper quadrant.    FINDINGS:    Liver: Within normal limits in echogenicity. No discrete mass is seen. The liver measures up to 24 cm in length which may be secondary to a Riedel's lobe  Bile ducts: Normal caliber. Common bile duct measures 4 mm.  Gallbladder: Not well delineated. Please correlate with history of cholecystectomy.  Pancreas: Visualized portions are within normal limits.  Right kidney: 10.9 cm. No hydronephrosis.  Ascites: None.  IVC: Visualized portions are within normal limits.    IMPRESSION:    Elongated liver with normal-appearing echogenicity. This may be secondary to a Riedel's lobe rather than hepatomegaly.    Gallbladder nonvisualization. These correlate with prior surgery or lack of n.p.o. status..                BEBE LUNA M.D., ATTENDING RADIOLOGIST  This document has been electronically signed. Sep 25 2020 11:06PM (20 @ 22:55)

## 2020-09-27 NOTE — PROGRESS NOTE ADULT - SUBJECTIVE AND OBJECTIVE BOX
CTU Attending Progress Daily Note     27 Sep 2020 14:48  POD# -   He has history of Dextrocardia    Chronic midline low back pain with bilateral sciatica    Peripheral neuralgia    Essential hypertension    Diabetes 1.5, managed as type 2    Charcot&#x27;s arthropathy      Interval event for past 24 hr:  CALLY HARTMAN  55y had no event.   Current Complains:  CALLY HARTMAN has no new complains  HPI:  Patient is a 53 y/o female with Medical Hx of Type II DM,  CAD, DLD, HTN, Hypothyroidism, COPD, Dextrocardia,  Sciatica/Peripheral Neuralgia, Charcot foot deformity s/p reconstruction on 19, presenting to ED with bilateral lower extremity edema. Symptoms started about 2 weeks ago with generalized swelling in her abdomen, face and lower extremity, she also had some associated Chest and RLQ abdominal pain which started about 2 days ago. Chest pain was intermittent, 4-5/10, in the middle of her chest w/o radiation, she taught the abdominal pain and chest pain was secondary to gas so she bought OTC simethicone w/o relief so she decided to come in for further evaluation. She denied SOB and Orthopnea,     IN ED: BP: 92/55, HR: 81, Afebrile   Labs significant  for Ma.2, Na: 118, Scr: 1.6, BNP: 24, 996K, AST: 122, INR: 2.13, WBC: 14K  -CXR: B/L Interstitial Opacity reflecting Pulm Edema( pending Official Read ),     (25 Sep 2020 23:39)    OBJECTIVE:  ICU Vital Signs Last 24 Hrs  T(C): 35.9 (27 Sep 2020 12:00), Max: 37.1 (26 Sep 2020 23:00)  T(F): 96.6 (27 Sep 2020 12:00), Max: 98.8 (26 Sep 2020 23:00)  HR: 108 (27 Sep 2020 14:00) (102 - 116)  BP: 108/67 (27 Sep 2020 14:00) (101/63 - 152/66)  BP(mean): 86 (27 Sep 2020 14:00) (74 - 109)  ABP: --  ABP(mean): --  RR: 31 (27 Sep 2020 14:00) (18 - 45)  SpO2: 96% (27 Sep 2020 14:00) (87% - 99%)    I&O's Summary    26 Sep 2020 07:  -  27 Sep 2020 07:00  --------------------------------------------------------  IN: 2886.6 mL / OUT: 2595 mL / NET: 291.6 mL    27 Sep 2020 07:  -  27 Sep 2020 14:48  --------------------------------------------------------  IN: 757 mL / OUT: 2525 mL / NET: -1768 mL      I&O's Detail    26 Sep 2020 07:  -  27 Sep 2020 07:00  --------------------------------------------------------  IN:    IV PiggyBack: 1250 mL    Norepinephrine: 25.6 mL    Pantoprazole: 220 mL    Plasma: 405 mL    PRBCs (Packed Red Blood Cells): 986 mL  Total IN: 2886.6 mL    OUT:    Indwelling Catheter - Urethral (mL): 2595 mL  Total OUT: 2595 mL    Total NET: 291.6 mL      27 Sep 2020 07:  -  27 Sep 2020 14:48  --------------------------------------------------------  IN:    IV PiggyBack: 100 mL    Pantoprazole: 80 mL    Plasma: 577 mL  Total IN: 757 mL    OUT:    Indwelling Catheter - Urethral (mL): 2525 mL  Total OUT: 2525 mL    Total NET: -1768 mL        Adult Advanced Hemodynamics Last 24 Hrs  CVP(mm Hg): --  CVP(cm H2O): --  CO: --  CI: --  PA: --  PA(mean): --  PCWP: --  SVR: --  SVRI: --  PVR: --  PVRI: --    CAPILLARY BLOOD GLUCOSE      POCT Blood Glucose.: 96 mg/dL (27 Sep 2020 09:50)  POCT Blood Glucose.: 137 mg/dL (26 Sep 2020 15:45)    LABS:                          10.6   9.43  )-----------( 106      ( 27 Sep 2020 04:30 )             31.8         136  |  97<L>  |  22<H>  ----------------------------<  84  4.1   |  32  |  0.7    Ca    7.5<L>      27 Sep 2020 04:30  Phos  3.2       Mg     1.8         TPro  5.1<L>  /  Alb  2.7<L>  /  TBili  1.0  /  DBili  x   /  AST  87<H>  /  ALT  39  /  AlkPhos  72  27    PT/INR - ( 27 Sep 2020 04:30 )   PT: 19.20 sec;   INR: 1.67 ratio         PTT - ( 27 Sep 2020 04:30 )  PTT:21.2 sec  Urinalysis Basic - ( 25 Sep 2020 20:57 )    Color: Light Yellow / Appearance: Clear / S.012 / pH: x  Gluc: x / Ketone: Negative  / Bili: Negative / Urobili: <2 mg/dL   Blood: x / Protein: Trace / Nitrite: Negative   Leuk Esterase: Negative / RBC: 3 /HPF / WBC 1 /HPF   Sq Epi: x / Non Sq Epi: 1 /HPF / Bacteria: Negative        Culture - Urine (collected 25 Sep 2020 20:57)  Source: .Urine Catheterized  Final Report (27 Sep 2020 13:20):    Normal Urogenital troy present      Home Medications:  budesonide-formoterol 80 mcg-4.5 mcg/inh inhalation aerosol: 2 puff(s) inhaled 2 times a day (2019 09:18)  DULoxetine 60 mg oral delayed release capsule: 1 cap(s) orally once a day (10 Ty 2019 14:23)  hydrOXYzine hydrochloride 10 mg oral tablet: 1 tab(s) orally 3 times a day, As Needed (2019 09:18)  Lyrica 150 mg oral capsule: 1 tab(s) orally 3 times a day, stop after  (2019 13:07)  pantoprazole 40 mg oral delayed release tablet: 1 tab(s) orally once a day (10 Ty 2019 14:23)  tiZANidine 4 mg oral tablet: 2 tab(s) orally every 8 hours, As Needed (26 Sep 2020 02:35)  Ventolin HFA 90 mcg/inh inhalation aerosol: 2 puff(s) inhaled 4 times a day, As Needed (10 Yt 2019 14:23)    HOSPITAL MEDICATIONS:  MEDICATIONS  (STANDING):  atorvastatin 40 milliGRAM(s) Oral at bedtime  budesonide  80 MICROgram(s)/formoterol 4.5 MICROgram(s) Inhaler 2 Puff(s) Inhalation two times a day  chlorhexidine 4% Liquid 1 Application(s) Topical daily  DULoxetine 60 milliGRAM(s) Oral daily  influenza   Vaccine 0.5 milliLiter(s) IntraMuscular once  levothyroxine 100 MICROGram(s) Oral daily  norepinephrine Infusion 0.05 MICROgram(s)/kG/Min (8.76 mL/Hr) IV Continuous <Continuous>  pantoprazole Infusion 8 mG/Hr (10 mL/Hr) IV Continuous <Continuous>  pregabalin 150 milliGRAM(s) Oral three times a day  senna 2 Tablet(s) Oral at bedtime  sodium chloride 0.9%. 1000 milliLiter(s) (100 mL/Hr) IV Continuous <Continuous>    MEDICATIONS  (PRN):  ALBUTerol    90 MICROgram(s) HFA Inhaler 1 Puff(s) Inhalation four times a day PRN Shortness of Breath and/or Wheezing      REVIEW OF SYSTEMS:  CONSTITUTIONAL: [X] all negative; [ ] weakness, [ ] fevers, [ ] chills  EYES/ENT: [X] all negative; [ ] visual changes, [ ] vertigo, [ ] throat pain   NECK: [X] all negative; [ ] pain, [ ] stiffness  RESPIRATORY: [] all negative, [ ] cough, [ ] wheezing, [ ] hemoptysis, [ ] shortness of breath  CARDIOVASCULAR: [] all negative; [ ] chest pain, [ ] palpitations, [ ] orthopnea  GASTROINTESTINAL: [X] all negative; [ ]abdominal pain, [ ] nausea, [ ] vomiting, [ ] hematemesis, [ ] diarrhea, [ ] constipation, [ ] melena, [ ] hematochezia.  GENITOURINARY: [X] all negative; [ ] dysuria, [ ] frequency, [ ] hematuria  NEUROLOGICAL: [X] all negative; [ ] numbness, [ ] weakness  SKIN: [X] all negative; [ ] itching, [ ] burning, [ ] rashes, [ ] lesions   All other review of systems is negative unless indicated above.    [  ] Unable to assess ROS because     PHYSICAL EXAM:          CONSTITUTIONAL: Well-developed; well-nourished; in no acute distress.   	SKIN: warm, dry  	HEAD: Normocephalic; atraumatic.  	EYES: PERRL, EOM, no conj injection, sclera clear  	ENT: No nasal discharge; airway clear.  	NECK: Supple; non tender.  No midline ttp ctls  	CARD: S1, S2 normal; no murmurs, gallops, or rubs. Regular rate and rhythm. 2+ RPs and DPs bilat, no carotid bruits, no pedal   edema, no calf pain b/l  	RESP: CTA  bilat good air movement No wheezes, rales or rhonchi.  	ABD: Soft, not tender, not distended, no CVA ttp no rebound or guarding, bowel sounds present  	EXT: Normal ROM.  No clubbing, cyanosis or edema.   	  	NEURO: Alert, awake, motor 5/5 R, 5/5 L        RADIOLOGY:  xray  < from: Xray Chest 1 View- PORTABLE-Routine (Xray Chest 1 View- PORTABLE-Routine in AM.) (20 @ 06:34) >  Impression:    Significantly increased central consolidative and hazy opacities. No pneumothorax.    < end of copied text >    I spent 45 minutes of critical care time examining patient, reviewing vitals, labs, medications, imaging and discussing with the team goals of care to prevent life-threatening in this patient who is at high risk for deterioration or death due to:

## 2020-09-27 NOTE — DIETITIAN INITIAL EVALUATION ADULT. - RD TO REMAIN AVAILABLE
yes/Nutrition Intervention: Meals & Snacks, Coordination of Care, Medical Food Supplements, Vitamin & Mineral Supplements. Monitor: Energy intake, diet order, glucose profile, renal profile, nutrition focused physical findings, body composition, micronutrient intake.

## 2020-09-27 NOTE — DIETITIAN INITIAL EVALUATION ADULT. - ENERGY NEEDS
Energy: 1739-2710 kcal/day (30-35 kcal/kg IBW)    Protein: 63-75 g/day (1.25-1.5 g/kg IBW)    Fluids: per ICU team

## 2020-09-27 NOTE — DIETITIAN INITIAL EVALUATION ADULT. - OTHER INFO
Pertinent Medical Information: Melena GI Bleed noted. Per GI, needs EGD when optimized. Fluid overload noted. LE edema noted. JEEVAN on CKD III noted likely prerenal from hypotension 2/2 3rd spacing vs cardiorenal pre progress notes. Electrolyte derangement noted; plan to re-evaluate after diuresis per progress notes. Essential HTN - noted hypotensive on admission.    Pertinent Subjective Information: Pt confused at time of RD visit & unable to provide nutrition hx at this time. No response from contact numbers. No family at bedside. Reviewed previous admit records - per 3/21/19 RD assessment: "Oral intake PTA good  Typically adequate appetite and PO intake PTA. Consumes at least 3 meals/day + multiple snacks. NKFA. Poor compliance noted. Diabetic diet non compliant. reports nausea today." UBW at the time noted to be 84.1 kg. Wt at the time was 84 kg (March 2019) vs. current wt 94.1 kg.

## 2020-09-27 NOTE — DIETITIAN INITIAL EVALUATION ADULT. - PHYSICAL APPEARANCE
BMI: 37.9. Confused, disoriented. 1+ edema (generalized). Last BM 9/27 - loose. No c/o chewing/swallowing difficulty reported at this time. Skin: Pressure ulcers (stage 2 to L ischium).

## 2020-09-27 NOTE — PROGRESS NOTE ADULT - ASSESSMENT
Assessment and Plan:   Assessment:  · Assessment	  Patient is a 55 y/o female with Medical Hx of Type II DM,  CAD, DLD, HTN, Hypothyroidism, COPD, Dextrocardia,  Sciatica/Peripheral Neuralgia, Charcot foot deformity s/p reconstruction on 1/11/19, presenting to ED with multiple Complaints    Pt ahd Melena Gi Bleed ..CVp placed  will get blood transfusions   Gi saw pt ..   no active bleeding INR corrected EGD when stable  Fluid Overload  -Presenting  Pulmonary Edema and B/L LE 2+ Pitting edema up to Abdomen   -Crackles on exam,  however Denies SOB and Orthopnea, saturating well on RA   -CXR: B/L Interstitial Opacity reflecting Pulm Edema( pending Official Read ), BNP: >> 24K  -S/P Lasix 40 IV with good urine output, + pt noticed improvement in swelling   -will continue with Lasix   -F/up 2D-ECHO, Repeat CXR in AM     Elevated Liver Function test   -AST: 122, INR: 2.13 (not on coumadin) with petechiae on exam   -Abdominal US: Elongated Liver, otherwise wnl   -Hepatitis panel in progress     JEEVAN on CKD III: Likely Prerenal from Hypotension secondary to 3rd spacing v.s cardiorenal   -Scr: 1.6, (baseline 0.7 from 7/2020)   -Bautista placed on ED, S/P Lasix IV with good urine output  -Re-assess in AM      Electrolyte derangement   Hypervolemic Hyponatremia: Na: 118, re-evaluate after Diuresis   Hypomagnesemia: 1.2,Supplemeted, f/up am levels  Psuedohyperkalemia: Hemolyzed sample: VBG: K: 5.5     CAD + COPD: stable  -c/w Inhalers, ASA.  Statin and -DASH diet    -will hold Valsartan 40mg 1x/day and Toprol-xl 25mg 1x/day  -**Re-start when appropriate**    Type II DM  Takes Janumet at home  FS 2x/day is sufficient for now  -Start insulin regimen if FS is persistently >180    Essential HTN  -Hypotensive on admission 92/55   -Pt is Volume overloaded from 3rd spacing   -Hold Antihypertensive for now (valsartan)   -Resume when Appropriate    Hypothyroidism  -c/w Levothyroxine  -F/up TSH + T4     Sciatica + Peripheral Neuralgia  -c/w Percocet, Lyrica, duloxetine, and tizanidine     Urinary Incontinence   -Takes Mybertric 50mg at bedtime    ***Pls fill out Nonformulary form**

## 2020-09-27 NOTE — PROGRESS NOTE ADULT - ASSESSMENT
Patient is a 55 y/o female with Medical Hx of Type II DM,  CAD on ASA, Hypothyroidism, COPD, Dextrocardia,  Sciatica/Peripheral Neuralgia, Charcot foot deformity s/p reconstruction on 1/11/19, presenting to ED with bilateral lower extremity edema. and currently admitted to ICU for lactic acidosis, borderline BP, severe hyponatremia and electrolytes imbalance    *Blood per rectum and Melena  -hb trend: 11.5 > 10 > 3 hematochezia and 1 large melena > 6.2 > 4 units > 10.6  -INR corrected s/p 2 units FFp INR=1.67   -c/w IV PPI drip  -no more bleeding episodes  -CTA noted no active extravasation   -patient needs EGD when optimized: CXR bilateral vascular congestion, almost white lungs     *Elevated Lfts / elevated INR since march   -US noted hepatomegaly?, CT noted liver unremarkable, spleen unremarkable  -Hep C Ab neg, hep B sAg neg, hep A igM neg  -please check hep AigG, hep B sAb, core Ab  -AST trending down  -if trends up again -> full CLD workup   -no asterixis, no signs of liver failure  -AMS yesterday can be 2/2 to ativan vs opioids withdrawal (dilated pupils).prolonged INR can be related to vitamin K def  -no evidence of liver failure or cirrhosis at this moment   -monitor Platelets: acute drop

## 2020-09-28 LAB
ALBUMIN SERPL ELPH-MCNC: 3.1 G/DL — LOW (ref 3.5–5.2)
ALP SERPL-CCNC: 70 U/L — SIGNIFICANT CHANGE UP (ref 30–115)
ALT FLD-CCNC: 31 U/L — SIGNIFICANT CHANGE UP (ref 0–41)
ANION GAP SERPL CALC-SCNC: 10 MMOL/L — SIGNIFICANT CHANGE UP (ref 7–14)
APTT BLD: 29 SEC — SIGNIFICANT CHANGE UP (ref 27–39.2)
AST SERPL-CCNC: 54 U/L — HIGH (ref 0–41)
BASOPHILS # BLD AUTO: 0.02 K/UL — SIGNIFICANT CHANGE UP (ref 0–0.2)
BASOPHILS # BLD AUTO: 0.02 K/UL — SIGNIFICANT CHANGE UP (ref 0–0.2)
BASOPHILS NFR BLD AUTO: 0.2 % — SIGNIFICANT CHANGE UP (ref 0–1)
BASOPHILS NFR BLD AUTO: 0.2 % — SIGNIFICANT CHANGE UP (ref 0–1)
BILIRUB SERPL-MCNC: 1.2 MG/DL — SIGNIFICANT CHANGE UP (ref 0.2–1.2)
BUN SERPL-MCNC: 13 MG/DL — SIGNIFICANT CHANGE UP (ref 10–20)
CALCIUM SERPL-MCNC: 7.5 MG/DL — LOW (ref 8.5–10.1)
CHLORIDE SERPL-SCNC: 92 MMOL/L — LOW (ref 98–110)
CO2 SERPL-SCNC: 36 MMOL/L — HIGH (ref 17–32)
CREAT SERPL-MCNC: 0.7 MG/DL — SIGNIFICANT CHANGE UP (ref 0.7–1.5)
EOSINOPHIL # BLD AUTO: 0.06 K/UL — SIGNIFICANT CHANGE UP (ref 0–0.7)
EOSINOPHIL # BLD AUTO: 0.08 K/UL — SIGNIFICANT CHANGE UP (ref 0–0.7)
EOSINOPHIL NFR BLD AUTO: 0.7 % — SIGNIFICANT CHANGE UP (ref 0–8)
EOSINOPHIL NFR BLD AUTO: 0.9 % — SIGNIFICANT CHANGE UP (ref 0–8)
GLUCOSE BLDC GLUCOMTR-MCNC: 115 MG/DL — HIGH (ref 70–99)
GLUCOSE BLDC GLUCOMTR-MCNC: 159 MG/DL — HIGH (ref 70–99)
GLUCOSE BLDC GLUCOMTR-MCNC: 159 MG/DL — HIGH (ref 70–99)
GLUCOSE BLDC GLUCOMTR-MCNC: 81 MG/DL — SIGNIFICANT CHANGE UP (ref 70–99)
GLUCOSE SERPL-MCNC: 69 MG/DL — LOW (ref 70–99)
HCT VFR BLD CALC: 28.8 % — LOW (ref 37–47)
HCT VFR BLD CALC: 34.2 % — LOW (ref 37–47)
HGB BLD-MCNC: 11 G/DL — LOW (ref 12–16)
HGB BLD-MCNC: 9.5 G/DL — LOW (ref 12–16)
IMM GRANULOCYTES NFR BLD AUTO: 0.5 % — HIGH (ref 0.1–0.3)
IMM GRANULOCYTES NFR BLD AUTO: 0.5 % — HIGH (ref 0.1–0.3)
INR BLD: 1.36 RATIO — HIGH (ref 0.65–1.3)
LYMPHOCYTES # BLD AUTO: 0.89 K/UL — LOW (ref 1.2–3.4)
LYMPHOCYTES # BLD AUTO: 0.9 K/UL — LOW (ref 1.2–3.4)
LYMPHOCYTES # BLD AUTO: 10.5 % — LOW (ref 20.5–51.1)
LYMPHOCYTES # BLD AUTO: 10.6 % — LOW (ref 20.5–51.1)
MCHC RBC-ENTMCNC: 28.2 PG — SIGNIFICANT CHANGE UP (ref 27–31)
MCHC RBC-ENTMCNC: 28.5 PG — SIGNIFICANT CHANGE UP (ref 27–31)
MCHC RBC-ENTMCNC: 32.2 G/DL — SIGNIFICANT CHANGE UP (ref 32–37)
MCHC RBC-ENTMCNC: 33 G/DL — SIGNIFICANT CHANGE UP (ref 32–37)
MCV RBC AUTO: 86.5 FL — SIGNIFICANT CHANGE UP (ref 81–99)
MCV RBC AUTO: 87.7 FL — SIGNIFICANT CHANGE UP (ref 81–99)
MONOCYTES # BLD AUTO: 0.66 K/UL — HIGH (ref 0.1–0.6)
MONOCYTES # BLD AUTO: 0.93 K/UL — HIGH (ref 0.1–0.6)
MONOCYTES NFR BLD AUTO: 10.9 % — HIGH (ref 1.7–9.3)
MONOCYTES NFR BLD AUTO: 7.8 % — SIGNIFICANT CHANGE UP (ref 1.7–9.3)
NEUTROPHILS # BLD AUTO: 6.55 K/UL — HIGH (ref 1.4–6.5)
NEUTROPHILS # BLD AUTO: 6.83 K/UL — HIGH (ref 1.4–6.5)
NEUTROPHILS NFR BLD AUTO: 76.9 % — HIGH (ref 42.2–75.2)
NEUTROPHILS NFR BLD AUTO: 80.3 % — HIGH (ref 42.2–75.2)
NRBC # BLD: 0 /100 WBCS — SIGNIFICANT CHANGE UP (ref 0–0)
NRBC # BLD: 0 /100 WBCS — SIGNIFICANT CHANGE UP (ref 0–0)
NT-PROBNP SERPL-SCNC: 6518 PG/ML — HIGH (ref 0–300)
PLATELET # BLD AUTO: 139 K/UL — SIGNIFICANT CHANGE UP (ref 130–400)
PLATELET # BLD AUTO: 152 K/UL — SIGNIFICANT CHANGE UP (ref 130–400)
POTASSIUM SERPL-MCNC: 3.4 MMOL/L — LOW (ref 3.5–5)
POTASSIUM SERPL-SCNC: 3.4 MMOL/L — LOW (ref 3.5–5)
PROT SERPL-MCNC: 5.2 G/DL — LOW (ref 6–8)
PROTHROM AB SERPL-ACNC: 15.6 SEC — HIGH (ref 9.95–12.87)
RBC # BLD: 3.33 M/UL — LOW (ref 4.2–5.4)
RBC # BLD: 3.9 M/UL — LOW (ref 4.2–5.4)
RBC # FLD: 17.5 % — HIGH (ref 11.5–14.5)
RBC # FLD: 18.3 % — HIGH (ref 11.5–14.5)
SODIUM SERPL-SCNC: 138 MMOL/L — SIGNIFICANT CHANGE UP (ref 135–146)
WBC # BLD: 8.5 K/UL — SIGNIFICANT CHANGE UP (ref 4.8–10.8)
WBC # BLD: 8.52 K/UL — SIGNIFICANT CHANGE UP (ref 4.8–10.8)
WBC # FLD AUTO: 8.5 K/UL — SIGNIFICANT CHANGE UP (ref 4.8–10.8)
WBC # FLD AUTO: 8.52 K/UL — SIGNIFICANT CHANGE UP (ref 4.8–10.8)

## 2020-09-28 PROCEDURE — 93970 EXTREMITY STUDY: CPT | Mod: 26

## 2020-09-28 PROCEDURE — 71045 X-RAY EXAM CHEST 1 VIEW: CPT | Mod: 26

## 2020-09-28 PROCEDURE — 93010 ELECTROCARDIOGRAM REPORT: CPT

## 2020-09-28 PROCEDURE — 99232 SBSQ HOSP IP/OBS MODERATE 35: CPT

## 2020-09-28 RX ORDER — POTASSIUM CHLORIDE 20 MEQ
40 PACKET (EA) ORAL ONCE
Refills: 0 | Status: COMPLETED | OUTPATIENT
Start: 2020-09-28 | End: 2020-09-28

## 2020-09-28 RX ORDER — PANTOPRAZOLE SODIUM 20 MG/1
8 TABLET, DELAYED RELEASE ORAL
Qty: 80 | Refills: 0 | Status: DISCONTINUED | OUTPATIENT
Start: 2020-09-28 | End: 2020-09-29

## 2020-09-28 RX ORDER — PANTOPRAZOLE SODIUM 20 MG/1
40 TABLET, DELAYED RELEASE ORAL EVERY 12 HOURS
Refills: 0 | Status: DISCONTINUED | OUTPATIENT
Start: 2020-09-28 | End: 2020-09-28

## 2020-09-28 RX ORDER — HYDROXYZINE HCL 10 MG
10 TABLET ORAL EVERY 12 HOURS
Refills: 0 | Status: DISCONTINUED | OUTPATIENT
Start: 2020-09-28 | End: 2020-10-02

## 2020-09-28 RX ORDER — FUROSEMIDE 40 MG
40 TABLET ORAL ONCE
Refills: 0 | Status: COMPLETED | OUTPATIENT
Start: 2020-09-28 | End: 2020-09-28

## 2020-09-28 RX ORDER — POTASSIUM CHLORIDE 20 MEQ
20 PACKET (EA) ORAL ONCE
Refills: 0 | Status: COMPLETED | OUTPATIENT
Start: 2020-09-28 | End: 2020-09-28

## 2020-09-28 RX ORDER — OXYCODONE AND ACETAMINOPHEN 5; 325 MG/1; MG/1
1 TABLET ORAL ONCE
Refills: 0 | Status: DISCONTINUED | OUTPATIENT
Start: 2020-09-28 | End: 2020-09-28

## 2020-09-28 RX ADMIN — Medication 40 MILLIEQUIVALENT(S): at 10:38

## 2020-09-28 RX ADMIN — CHLORHEXIDINE GLUCONATE 1 APPLICATION(S): 213 SOLUTION TOPICAL at 05:23

## 2020-09-28 RX ADMIN — OXYCODONE AND ACETAMINOPHEN 1 TABLET(S): 5; 325 TABLET ORAL at 21:33

## 2020-09-28 RX ADMIN — DULOXETINE HYDROCHLORIDE 60 MILLIGRAM(S): 30 CAPSULE, DELAYED RELEASE ORAL at 12:32

## 2020-09-28 RX ADMIN — Medication 150 MILLIGRAM(S): at 21:03

## 2020-09-28 RX ADMIN — PANTOPRAZOLE SODIUM 10 MG/HR: 20 TABLET, DELAYED RELEASE ORAL at 05:21

## 2020-09-28 RX ADMIN — ATORVASTATIN CALCIUM 40 MILLIGRAM(S): 80 TABLET, FILM COATED ORAL at 21:03

## 2020-09-28 RX ADMIN — BUDESONIDE AND FORMOTEROL FUMARATE DIHYDRATE 2 PUFF(S): 160; 4.5 AEROSOL RESPIRATORY (INHALATION) at 20:30

## 2020-09-28 RX ADMIN — OXYCODONE AND ACETAMINOPHEN 1 TABLET(S): 5; 325 TABLET ORAL at 21:03

## 2020-09-28 RX ADMIN — Medication 50 MILLIEQUIVALENT(S): at 05:52

## 2020-09-28 RX ADMIN — Medication 10 MILLIGRAM(S): at 10:33

## 2020-09-28 RX ADMIN — Medication 150 MILLIGRAM(S): at 14:45

## 2020-09-28 RX ADMIN — Medication 100 MICROGRAM(S): at 05:21

## 2020-09-28 RX ADMIN — Medication 40 MILLIGRAM(S): at 10:33

## 2020-09-28 RX ADMIN — Medication 150 MILLIGRAM(S): at 05:20

## 2020-09-28 NOTE — PROGRESS NOTE ADULT - SUBJECTIVE AND OBJECTIVE BOX
Patient is a 55y old  Female who presents with a chief complaint of Lower extremities edema (27 Sep 2020 06:55)        Over Night Events: Melena episodes overnight  iv DRIPS: Protonix drip        ROS:     All ROS are negative except HPI         PHYSICAL EXAM    ICU Vital Signs Last 24 Hrs  T(C): 36.2 (28 Sep 2020 08:00), Max: 36.8 (27 Sep 2020 10:00)  T(F): 97.2 (28 Sep 2020 08:00), Max: 98.2 (27 Sep 2020 10:00)  HR: 114 (28 Sep 2020 08:00) (102 - 114)  BP: 115/68 (28 Sep 2020 08:00) (104/59 - 150/82)  BP(mean): 86 (28 Sep 2020 08:00) (69 - 117)  ABP: --  ABP(mean): --  RR: 30 (28 Sep 2020 08:00) (19 - 45)  SpO2: 94% (28 Sep 2020 08:00) (73% - 99%)      CONSTITUTIONAL:  Well nourished.  NAD    ENT:   Airway patent,   Mouth with normal mucosa.   No thrush    EYES:   Pupils equal,   Round and reactive to light.    CARDIAC:   tachycardia   Regular rhythm.    No edema      Vascular:  Normal systolic impulse  No Carotid bruits    RESPIRATORY:   No wheezing  Bilateral BS  Normal chest expansion  Not tachypneic,  No use of accessory muscles    GASTROINTESTINAL:  Abdomen soft,   Non-tender,   No guarding,   + BS    MUSCULOSKELETAL:   Range of motion is not limited,  No clubbing, cyanosis    NEUROLOGICAL:   awake and alert  No motor  deficits.    SKIN:   Skin normal color for race,   Warm and dry and intact.   No evidence of rash.    PSYCHIATRIC:   anxious   No apparent risk to self or others.    HEMATOLOGICAL:  No cervical  lymphadenopathy.  no inguinal lymphadenopathy      09-27-20 @ 07:01  -  09-28-20 @ 07:00  --------------------------------------------------------  IN:    IV PiggyBack: 100 mL    Pantoprazole: 240 mL    Plasma: 577 mL  Total IN: 917 mL    OUT:    Indwelling Catheter - Urethral (mL): 4225 mL    Norepinephrine: 0 mL  Total OUT: 4225 mL    Total NET: -3308 mL      09-28-20 @ 07:01  -  09-28-20 @ 09:40  --------------------------------------------------------  IN:    Pantoprazole: 10 mL  Total IN: 10 mL    OUT:    Indwelling Catheter - Urethral (mL): 35 mL  Total OUT: 35 mL    Total NET: -25 mL          LABS:                            9.5    8.52  )-----------( 139      ( 28 Sep 2020 04:25 )             28.8                                               09-28    138  |  92<L>  |  13  ----------------------------<  69<L>  3.4<L>   |  36<H>  |  0.7    Ca    7.5<L>      28 Sep 2020 04:25  Mg     1.8     09-26    TPro  5.2<L>  /  Alb  3.1<L>  /  TBili  1.2  /  DBili  x   /  AST  54<H>  /  ALT  31  /  AlkPhos  70  09-28      PT/INR - ( 28 Sep 2020 04:25 )   PT: 15.60 sec;   INR: 1.36 ratio         PTT - ( 28 Sep 2020 04:25 )  PTT:29.0 sec                                                                                     LIVER FUNCTIONS - ( 28 Sep 2020 04:25 )  Alb: 3.1 g/dL / Pro: 5.2 g/dL / ALK PHOS: 70 U/L / ALT: 31 U/L / AST: 54 U/L / GGT: x                                                  Culture - Urine (collected 25 Sep 2020 20:57)  Source: .Urine Catheterized  Final Report (27 Sep 2020 13:20):    Normal Urogenital troy present                                                                                           MEDICATIONS  (STANDING):  atorvastatin 40 milliGRAM(s) Oral at bedtime  budesonide  80 MICROgram(s)/formoterol 4.5 MICROgram(s) Inhaler 2 Puff(s) Inhalation two times a day  chlorhexidine 4% Liquid 1 Application(s) Topical daily  DULoxetine 60 milliGRAM(s) Oral daily  influenza   Vaccine 0.5 milliLiter(s) IntraMuscular once  levothyroxine 100 MICROGram(s) Oral daily  norepinephrine Infusion 0.05 MICROgram(s)/kG/Min (8.76 mL/Hr) IV Continuous <Continuous>  pantoprazole Infusion 8 mG/Hr (10 mL/Hr) IV Continuous <Continuous>  pregabalin 150 milliGRAM(s) Oral three times a day  senna 2 Tablet(s) Oral at bedtime    MEDICATIONS  (PRN):  ALBUTerol    90 MICROgram(s) HFA Inhaler 1 Puff(s) Inhalation four times a day PRN Shortness of Breath and/or Wheezing      New X-rays reviewed: Dextrocardia. B/L Lower lobe infiltrates-worsening.                                                                                ECHO not done       Patient is a 55y old  Female who presents with a chief complaint of Lower extremities edema (27 Sep 2020 06:55)        Over Night Events: Melena episodes overnight  iv DRIPS: Protonix drip, s/p GI eval no intervention        ROS:     All ROS are negative except HPI         PHYSICAL EXAM    ICU Vital Signs Last 24 Hrs  T(C): 36.2 (28 Sep 2020 08:00), Max: 36.8 (27 Sep 2020 10:00)  T(F): 97.2 (28 Sep 2020 08:00), Max: 98.2 (27 Sep 2020 10:00)  HR: 114 (28 Sep 2020 08:00) (102 - 114)  BP: 115/68 (28 Sep 2020 08:00) (104/59 - 150/82)  BP(mean): 86 (28 Sep 2020 08:00) (69 - 117)  RR: 30 (28 Sep 2020 08:00) (19 - 45)  SpO2: 94% (28 Sep 2020 08:00) (73% - 99%)      CONSTITUTIONAL:  comfortable, speaking full sentences    ENT:   Airway patent,   Mouth with normal mucosa.   No thrush    EYES:   Pupils equal,   Round and reactive to light.    CARDIAC:   tachycardia   MARY ANNE 3.6      RESPIRATORY:   Bibasilar crackles    GASTROINTESTINAL:  Abdomen soft,   Non-tender,   No guarding,   + BS    MUSCULOSKELETAL:   Range of motion is not limited,  No clubbing, cyanosis    NEUROLOGICAL:   awake and alert  No motor  deficits.    PSYCHIATRIC:   anxious   No apparent risk to self or others.      09-27-20 @ 07:01  -  09-28-20 @ 07:00  --------------------------------------------------------  IN:    IV PiggyBack: 100 mL    Pantoprazole: 240 mL    Plasma: 577 mL  Total IN: 917 mL    OUT:    Indwelling Catheter - Urethral (mL): 4225 mL    Norepinephrine: 0 mL  Total OUT: 4225 mL    Total NET: -3308 mL      09-28-20 @ 07:01  -  09-28-20 @ 09:40  --------------------------------------------------------  IN:    Pantoprazole: 10 mL  Total IN: 10 mL    OUT:    Indwelling Catheter - Urethral (mL): 35 mL  Total OUT: 35 mL    Total NET: -25 mL          LABS:                            9.5    8.52  )-----------( 139      ( 28 Sep 2020 04:25 )             28.8                                               09-28    138  |  92<L>  |  13  ----------------------------<  69<L>  3.4<L>   |  36<H>  |  0.7    Ca    7.5<L>      28 Sep 2020 04:25  Mg     1.8     09-26    TPro  5.2<L>  /  Alb  3.1<L>  /  TBili  1.2  /  DBili  x   /  AST  54<H>  /  ALT  31  /  AlkPhos  70  09-28      PT/INR - ( 28 Sep 2020 04:25 )   PT: 15.60 sec;   INR: 1.36 ratio         PTT - ( 28 Sep 2020 04:25 )  PTT:29.0 sec                                                                                     LIVER FUNCTIONS - ( 28 Sep 2020 04:25 )  Alb: 3.1 g/dL / Pro: 5.2 g/dL / ALK PHOS: 70 U/L / ALT: 31 U/L / AST: 54 U/L / GGT: x                                                  Culture - Urine (collected 25 Sep 2020 20:57)  Source: .Urine Catheterized  Final Report (27 Sep 2020 13:20):    Normal Urogenital troy present                                                                                           MEDICATIONS  (STANDING):  atorvastatin 40 milliGRAM(s) Oral at bedtime  budesonide  80 MICROgram(s)/formoterol 4.5 MICROgram(s) Inhaler 2 Puff(s) Inhalation two times a day  chlorhexidine 4% Liquid 1 Application(s) Topical daily  DULoxetine 60 milliGRAM(s) Oral daily  influenza   Vaccine 0.5 milliLiter(s) IntraMuscular once  levothyroxine 100 MICROGram(s) Oral daily  norepinephrine Infusion 0.05 MICROgram(s)/kG/Min (8.76 mL/Hr) IV Continuous <Continuous>  pantoprazole Infusion 8 mG/Hr (10 mL/Hr) IV Continuous <Continuous>  pregabalin 150 milliGRAM(s) Oral three times a day  senna 2 Tablet(s) Oral at bedtime    MEDICATIONS  (PRN):  ALBUTerol    90 MICROgram(s) HFA Inhaler 1 Puff(s) Inhalation four times a day PRN Shortness of Breath and/or Wheezing      New X-rays reviewed: Dextrocardia. B/L Lower lobe infiltrates-worsening.                                                                                ECHO not done

## 2020-09-28 NOTE — PROGRESS NOTE ADULT - ASSESSMENT
IMPRESSION:     Anemia likely 2/2 acute blood loss   GIB s/p 4 units PRBC in total;S/P CTA negative;  Hb stable   Dextrocardia  H/O Anxiety   JEEVAN- Resolved        PLAN:    CNS: Resume home psych medications    HEENT: Oral care    PULMONARY:  HOB @ 45 degrees. Aspiration Precautions . Supplemental Oxygen to keep sat >94% . CXR in am     CARDIOVASCULAR: Lasix 40 IV x 1 . Avoid volume overload . Keep I=O. Repeat BNP.     GI: PPI IV BID. d/c Protonix drip.  Feeding can start clears if okay with GI . F/U GI     RENAL:  Follow up lytes.  Correct as needed     INFECTIOUS DISEASE: Follow up cultures. No ABX for now. Procalcitonin     HEMATOLOGICAL:  DVT prophylaxis- SCDs    ENDOCRINE:  Follow up FS.  Insulin protocol if needed.    MUSCULOSKELETAL: OOB to chair    Bautista: D/C Bautista   Lines: D/C TLC   Code status: Full code  Disposition: Downgrade to CEU            IMPRESSION:     Anemia likely 2/2 acute blood loss   GIB s/p 4 units PRBC in total;S/P CTA negative;  Hb stable   Low Fibrinogen - Likely 2/2 liver dysfunction vs less likely DIC   Dextrocardia  H/O Anxiety   Transaminitis  JEEVAN- Resolved        PLAN:    CNS: Resume home psych medications    HEENT: Oral care    PULMONARY:  HOB @ 45 degrees. Aspiration Precautions . Supplemental Oxygen to keep sat >94% . CXR in am     CARDIOVASCULAR: Lasix 40 IV x 1 . Avoid volume overload . Keep I=O. Repeat BNP.     GI: PPI IV BID. d/c Protonix drip.  Feeding can start clears if okay with GI . F/U GI . Monitor LFTs    RENAL:  Follow up lytes.  Correct as needed     INFECTIOUS DISEASE: Follow up cultures. No ABX for now. Procalcitonin     HEMATOLOGICAL:  DVT prophylaxis- SCDs. Monitor CBC and Coags     ENDOCRINE:  Follow up FS.  Insulin protocol if needed.    MUSCULOSKELETAL: OOB to chair    Bautista: D/C Bautista   Lines: D/C TLC   Code status: Full code  Disposition: Downgrade to CEU            IMPRESSION:     Anemia likely 2/2 acute blood loss   GIB s/p 4 units PRBC in total;S/P CTA negative;  Hb stable   Low Fibrinogen - Likely 2/2 liver dysfunction vs less likely DIC   Dextrocardia  H/O Anxiety   Transaminitis  JEEVAN- Resolved        PLAN:    CNS: Resume home psych medications    HEENT: Oral care    PULMONARY:  HOB @ 45 degrees. Aspiration Precautions . Supplemental Oxygen to keep sat >94% . CXR in am     CARDIOVASCULAR: Lasix 40 IV x 1 . Avoid volume overload . Keep I=O. Repeat BNP.     GI: PPI IV BID. d/c Protonix drip.  Feeding can start clears if okay with GI . F/U GI . Monitor LFTs. Hep panel    RENAL:  Follow up lytes.  Correct as needed     INFECTIOUS DISEASE: Follow up cultures. No ABX for now. Procalcitonin     HEMATOLOGICAL:  DVT prophylaxis- SCDs. Monitor CBC and Coags     ENDOCRINE:  Follow up FS.  Insulin protocol if needed.    MUSCULOSKELETAL: OOB to chair    Bautista: D/C Bautista   Lines: D/C TLC   Code status: Full code  Disposition: Downgrade to CEU            IMPRESSION:     Anemia likely 2/2 acute blood loss   GIB s/p 4 units PRBC in total;S/P CTA negative;  Hb stable   Low Fibrinogen - Likely 2/2 liver dysfunction vs less likely DIC   Dextrocardia  H/O Anxiety   Transaminitis  JEEVAN- Resolved        PLAN:    CNS: Resume home psych medications    HEENT: Oral care    PULMONARY:  HOB @ 45 degrees. Aspiration Precautions . Supplemental Oxygen to keep sat >94% . CXR in am     CARDIOVASCULAR: Lasix 40 IV x 1 . Avoid volume overload . Keep I=O. Repeat BNP. echo    GI: PPI IV BID. d/c Protonix drip.  Feeding can start clears if okay with GI . F/U GI . Monitor LFTs. Hep panel    RENAL:  Follow up lytes.  Correct as needed     INFECTIOUS DISEASE: Follow up cultures. Procalcitonin     HEMATOLOGICAL:  DVT prophylaxis- SCDs. Monitor CBC and Coags     ENDOCRINE:  Follow up FS.  Insulin protocol if needed.    MUSCULOSKELETAL: OOB to chair    Bautista: D/C Bautista   Lines: D/C TLC   Code status: Full code  Disposition:  CEU

## 2020-09-28 NOTE — PROGRESS NOTE ADULT - SUBJECTIVE AND OBJECTIVE BOX
We are following the patient for GI bleed      GI HPI Today:  Pt had one episode of Melena   VS stable   Hg stable   No vomiting   Pt still SOB with sta of 92% on 3 L oxygen   PAST MEDICAL & SURGICAL HISTORY  Dextrocardia    Chronic midline low back pain with bilateral sciatica    Peripheral neuralgia    Essential hypertension    Diabetes 1.5, managed as type 2    Charcot&#x27;s arthropathy  R foot    Charcot foot due to diabetes mellitus    H/O shoulder surgery  Right shoulder surgery     delivery delivered        ALLERGIES:  No Known Allergies      MEDICATIONS:  MEDICATIONS  (STANDING):  atorvastatin 40 milliGRAM(s) Oral at bedtime  budesonide  80 MICROgram(s)/formoterol 4.5 MICROgram(s) Inhaler 2 Puff(s) Inhalation two times a day  chlorhexidine 4% Liquid 1 Application(s) Topical daily  DULoxetine 60 milliGRAM(s) Oral daily  influenza   Vaccine 0.5 milliLiter(s) IntraMuscular once  levothyroxine 100 MICROGram(s) Oral daily  norepinephrine Infusion 0.05 MICROgram(s)/kG/Min (8.76 mL/Hr) IV Continuous <Continuous>  pantoprazole Infusion 8 mG/Hr (10 mL/Hr) IV Continuous <Continuous>  pregabalin 150 milliGRAM(s) Oral three times a day  senna 2 Tablet(s) Oral at bedtime    MEDICATIONS  (PRN):  ALBUTerol    90 MICROgram(s) HFA Inhaler 1 Puff(s) Inhalation four times a day PRN Shortness of Breath and/or Wheezing      REVIEW OF SYSTEMS  General:  No fevers  Eyes:  No reported pain   ENT:  No sore throat   NECK: No stiffness   CV:  No chest pain   Resp:  No shortness of breath  GI:  See HPI  :  No dysuria  Muscle:  ++ weakness  Neuro:  No tingling  Endocrine:  No polyuria  Heme:  No ecchymosis        VITALS:   T(F): 97.4 ( @ 00:00), Max: 98.8 ( @ 23:00)  HR: 108 ( @ 07:00) (70 - 116)  BP: 150/82 ( @ 07:00) (71/25 - 152/66)  BP(mean): 117 ( @ 07:00) (33 - 117)  RR: 27 ( @ 07:00) (15 - 45)  SpO2: 94% (09-28 @ 07:00) (73% - 99%)        PHYSICAL EXAM:  GENERAL:  Appears in no distress  HEENT:  Conjunctivae Anicteric   CHEST:  Full & symmetric excursion  HEART:  NS1, S2,   ABDOMEN:  Soft, non-tender, non-distended  EXTEREMITIES:  +++ edema  SKIN:  No rash  NEURO:  Alert         Blood Work :                        9.5    8.52  )-----------( 139      ( 28 Sep 2020 04:25 )             28.8     PT/INR - ( 28 Sep 2020 04: )  INR: 1.36          PTT - ( 28 Sep 2020 04: )  PTT:29.0       138  |  92<L>  |  13  ----------------------------<  69<L>  3.4<L>   |  36<H>  |  0.7    Ca    7.5<L>      28 Sep 2020 04:  Phos  3.2     09-26  Mg     1.8     -      CBC -  ( 28 Sep 2020 04:25 )  Hemoglobin : 9.5    CBC -  ( 27 Sep 2020 23:30 )  Hemoglobin : 9.6    CBC -  ( 27 Sep 2020 20:12 )  Hemoglobin : 9.8    CBC -  ( 27 Sep 2020 15:04 )  Hemoglobin : 10.2    CBC -  ( 27 Sep 2020 04:30 )  Hemoglobin : 10.6    CBC -  ( 26 Sep 2020 20:30 )  Hemoglobin : 9.8    CBC -  ( 26 Sep 2020 12:03 )  Hemoglobin : 6.2    CBC -  ( 26 Sep 2020 04:43 )  Hemoglobin : 10.0    CBC -  ( 25 Sep 2020 20:08 )  Hemoglobin : 11.5      LIVER FUNCTIONS - ( 28 Sep 2020 04:25 )  Alb: 3.1 [3.5 - 5.2] / Pro: 5.2 [6.0 - 8.0] / ALK PHOS: 70 [30 - 115] / ALT: 31 [0 - 41] / AST: 54 [0 - 41] / GGT: x     LIVER FUNCTIONS - ( 27 Sep 2020 04:30 )  Alb: 2.7 [3.5 - 5.2] / Pro: 5.1 [6.0 - 8.0] / ALK PHOS: 72 [30 - 115] / ALT: 39 [0 - 41] / AST: 87 [0 - 41] / GGT: x     LIVER FUNCTIONS - ( 26 Sep 2020 20:30 )  Alb: 3.2 [3.5 - 5.2] / Pro: 5.4 [6.0 - 8.0] / ALK PHOS: 65 [30 - 115] / ALT: 39 [0 - 41] / AST: 96 [0 - 41] / GGT: x     LIVER FUNCTIONS - ( 26 Sep 2020 12:03 )  Alb: 2.7 [3.5 - 5.2] / Pro: 4.5 [6.0 - 8.0] / ALK PHOS: 65 [30 - 115] / ALT: 33 [0 - 41] / AST: 87 [0 - 41] / GGT: x     LIVER FUNCTIONS - ( 26 Sep 2020 04:43 )  Alb: 3.0 [3.5 - 5.2] / Pro: 5.3 [6.0 - 8.0] / ALK PHOS: 74 [30 - 115] / ALT: 35 [0 - 41] / AST: 93 [0 - 41] / GGT: x     LIVER FUNCTIONS - ( 25 Sep 2020 20:08 )  Alb: 3.1 [3.5 - 5.2] / Pro: 5.8 [6.0 - 8.0] / ALK PHOS: 80 [30 - 115] / ALT: 38 [0 - 41] / AST: 122 [0 - 41] / GGT: x

## 2020-09-28 NOTE — CHART NOTE - NSCHARTNOTEFT_GEN_A_CORE
DAILY PROGRESS NOTE  ===========================================================    Patient Information:  CALLY HARTMAN  /  55y  /  Female  /  MRN#: [id number]    Hospital Day: 3d     HPI:  Patient is a 53 y/o female with Medical Hx of Type II DM,  CAD, DLD, HTN, Hypothyroidism, COPD, Dextrocardia,  Sciatica/Peripheral Neuralgia, Charcot foot deformity s/p reconstruction on 19, presenting to ED with bilateral lower extremity edema. Symptoms started about 2 weeks ago with generalized swelling in her abdomen, face and lower extremity, she also had some associated Chest and RLQ abdominal pain which started about 2 days ago. Chest pain was intermittent, 4-5/10, in the middle of her chest w/o radiation, she taught the abdominal pain and chest pain was secondary to gas so she bought OTC simethicone w/o relief so she decided to come in for further evaluation. She denied SOB and Orthopnea,     IN ED: BP: 92/55, HR: 81, Afebrile   Labs significant  for Ma.2, Na: 118, Scr: 1.6, BNP: 24, 996K, AST: 122, INR: 2.13, WBC: 14K  -CXR: B/L Interstitial Opacity reflecting Pulm Edema     (25 Sep 2020 23:39)      |:::::::::::::::::::::::::::| SUBJECTIVE |:::::::::::::::::::::::::::|    OVERNIGHT EVENTS:  Melena episodes overnight    TODAY: Patient was seen today at bedside. Review of systems is otherwise negative.    ICU COURSE: Pt in MICU for Melena s/p 2 units of FFP and 4pRBCs overall, Pt also had a BNP of over 24K and had pulmonary edema up to the abdomen but denied SOB and orthopnea. Pt's CXR now  improved, saturating over 94% on 3L.  Pt was given IV lasix. Gastro consulted pt and recommended IV PPI Drip, transfusions and EGD when stable. Downgraded to CEU. `    |:::::::::::::::::::::::::::| OBJECTIVE |:::::::::::::::::::::::::::|    VITAL SIGNS: Last 24 Hours  T(C): 36.2 (28 Sep 2020 08:00), Max: 36.6 (27 Sep 2020 17:00)  T(F): 97.2 (28 Sep 2020 08:00), Max: 97.9 (27 Sep 2020 17:00)  HR: 104 (28 Sep 2020 11:30) (102 - 114)  BP: 119/74 (28 Sep 2020 11:30) (104/59 - 150/82)  BP(mean): 102 (28 Sep 2020 11:30) (69 - 117)  RR: 32 (28 Sep 2020 11:30) (19 - 40)  SpO2: 99% (28 Sep 2020 11:30) (73% - 99%)    20 @ 07:01  -  20 @ 07:00  --------------------------------------------------------  IN: 917 mL / OUT: 4225 mL / NET: -3308 mL    20 @ 07:01  -  20 @ 11:57  --------------------------------------------------------  IN: 40 mL / OUT: 525 mL / NET: -485 mL      PHYSICAL EXAM:  GENERAL:   Awake, alert; NAD.  HEENT:  Head NC/AT; Conjunctivae pink, Sclera anicteric; Oral mucosa moist.  CARDIO:  Tachycardia, Regular rhythm; S1 & S2.  RESP:   No rales, wheezing, or rhonchi appreciated.  GI:   Soft; NT/ND; BS; No guarding; No rebound tenderness.  EXT:   Strength UE 5/5; Strength LE 5/5; No edema.   Psych: Anxious   SKIN:   Intact.    LAB RESULTS:                        9.5    8.52  )-----------( 139      ( 28 Sep 2020 04:25 )             28.8         138  |  92<L>  |  13  ----------------------------<  69<L>  3.4<L>   |  36<H>  |  0.7    Ca    7.5<L>      28 Sep 2020 04:25  Mg     1.8         TPro  5.2<L>  /  Alb  3.1<L>  /  TBili  1.2  /  DBili  x   /  AST  54<H>  /  ALT  31  /  AlkPhos  70      PT/INR - ( 28 Sep 2020 04:25 )   PT: 15.60 sec;   INR: 1.36 ratio         PTT - ( 28 Sep 2020 04:25 )  PTT:29.0 sec            MICROBIOLOGY:    Culture - Urine (collected 25 Sep 2020 20:57)  Source: .Urine Catheterized  Final Report (27 Sep 2020 13:20):    Normal Urogenital tryo present      RADIOLOGY:  New X-rays reviewed: Dextrocardia. B/L Lower lobe infiltrates-worsening.                                                ECHO not done      ALLERGIES:  No Known Allergies      ===========================================================    Patient is a 53 y/o female with Medical Hx of Type II DM,  CAD, DLD, HTN, Hypothyroidism, COPD, Dextrocardia,  Sciatica/Peripheral Neuralgia, Charcot foot deformity s/p reconstruction on 19, presenting to ED with multiple Complaints      #Blood per rectum and Melena  -hb now stable  -INR corrected s/p 2 units FFp INR=1.36  -CTA noted no active extravasation   -c/w IV PPI drip  -patient needs EGD when optimized    *WATCH FIBRINOGEN, D-DIMER, INR     #Fluid Overload  -Presenting  Pulmonary Edema and B/L LE 2+ Pitting edema up to Abdomen   -Crackles on exam,  however Denies SOB and Orthopnea, saturating well on RA   -BP >24K, Rpt BNP, rpt CXR in AM  -S/P Lasix 40 IV with good urine output,   -F/up 2D-ECHO    #Elevated AST / elevated INR since march   -Monitor LFTs  -US noted hepatomegaly?, CT noted liver unremarkable, spleen unremarkable  -Hep C Ab neg, hep B sAg neg, hep A igM neg  - f/u Smooth Muscle Antibody, Transferrin Saturation, SPEP, AMA, RADHA, Ceruloplasmin, Ferritin.   -no asterixis, no signs of liver failure  - Vitamin K Supplement       #JEEVAN on CKD III: Resolved  -Scr: 0.7 (baseline 0.7 from 2020)   -Bautista D/C      #CAD + COPD: stable  -c/w Inhalers, ASA. Statin   -will hold Valsartan 40mg 1x/day and Toprol-xl 25mg 1x/day  -**Re-start when appropriate**    #Type II DM  Takes Janumet at home  FS 2x/day is sufficient for now  -Start insulin regimen if FS is persistently >180    #Essential HTN  -Hypotensive on admission 92/55   -Pt is Volume overloaded from 3rd spacing   -Hold Antihypertensive for now (valsartan)   -Lasix 40 IV x 1   -Avoid volume overload   - Keep I=O  -Repeat BNP    #Hypokalemia   Replete K  Monitor CMP     #Hypothyroidism  -c/w Levothyroxine      #Sciatica + Peripheral Neuralgia  -c/w Percocet, Lyrica, duloxetine, and tizanidine       #Anxiety   Resume Home Psych meds     #Urinary Incontinence   -Takes Mybertric 50mg at bedtime        Diet- CLD  DVT ppx : SCDs  OOB to chair  Bautista: D/C Bautista   Lines: D/C TLC   Code status: Full code  Disposition: Downgrade to U DAILY PROGRESS NOTE  ===========================================================    Patient Information:  CALLY HARTMAN  /  55y  /  Female  /  MRN#: [id number]    Hospital Day: 3d     HPI:  Patient is a 55 y/o female with Medical Hx of Type II DM,  CAD, DLD, HTN, Hypothyroidism, COPD, Dextrocardia,  Sciatica/Peripheral Neuralgia, Charcot foot deformity s/p reconstruction on 19, presenting to ED with bilateral lower extremity edema. Symptoms started about 2 weeks ago with generalized swelling in her abdomen, face and lower extremity, she also had some associated Chest and RLQ abdominal pain which started about 2 days ago. Chest pain was intermittent, 4-5/10, in the middle of her chest w/o radiation, she taught the abdominal pain and chest pain was secondary to gas so she bought OTC simethicone w/o relief so she decided to come in for further evaluation. She denied SOB and Orthopnea,     IN ED: BP: 92/55, HR: 81, Afebrile   Labs significant  for Ma.2, Na: 118, Scr: 1.6, BNP: 24, 996K, AST: 122, INR: 2.13, WBC: 14K  -CXR: B/L Interstitial Opacity reflecting Pulm Edema     (25 Sep 2020 23:39)      |:::::::::::::::::::::::::::| SUBJECTIVE |:::::::::::::::::::::::::::|    OVERNIGHT EVENTS:  Melena episodes overnight    TODAY: Patient was seen today at bedside. Review of systems is otherwise negative.    ICU COURSE: Pt in MICU for Melena s/p 2 units of FFP and 4pRBCs overall, Pt also had a BNP of over 24K and had pulmonary edema up to the abdomen but denied SOB and orthopnea. Pt's CXR now  improved, saturating over 94% on 3L.  Pt was given IV lasix. Gastro consulted pt and recommended IV PPI Drip, transfusions and EGD when stable. Downgraded to CEU. `    |:::::::::::::::::::::::::::| OBJECTIVE |:::::::::::::::::::::::::::|    VITAL SIGNS: Last 24 Hours  T(C): 36.2 (28 Sep 2020 08:00), Max: 36.6 (27 Sep 2020 17:00)  T(F): 97.2 (28 Sep 2020 08:00), Max: 97.9 (27 Sep 2020 17:00)  HR: 104 (28 Sep 2020 11:30) (102 - 114)  BP: 119/74 (28 Sep 2020 11:30) (104/59 - 150/82)  BP(mean): 102 (28 Sep 2020 11:30) (69 - 117)  RR: 32 (28 Sep 2020 11:30) (19 - 40)  SpO2: 99% (28 Sep 2020 11:30) (73% - 99%)    20 @ 07:01  -  20 @ 07:00  --------------------------------------------------------  IN: 917 mL / OUT: 4225 mL / NET: -3308 mL    20 @ 07:01  -  20 @ 11:57  --------------------------------------------------------  IN: 40 mL / OUT: 525 mL / NET: -485 mL      PHYSICAL EXAM:  GENERAL:   Awake, alert; NAD.  HEENT:  Head NC/AT; Conjunctivae pink, Sclera anicteric; Oral mucosa moist.  CARDIO:  Tachycardia, Regular rhythm; S1 & S2.  RESP:   No rales, wheezing, or rhonchi appreciated.  GI:   Soft; NT/ND; BS; No guarding; No rebound tenderness.  EXT:   Strength UE 5/5; Strength LE 5/5; No edema.   Psych: Anxious   SKIN:   Intact.    LAB RESULTS:                        9.5    8.52  )-----------( 139      ( 28 Sep 2020 04:25 )             28.8         138  |  92<L>  |  13  ----------------------------<  69<L>  3.4<L>   |  36<H>  |  0.7    Ca    7.5<L>      28 Sep 2020 04:25  Mg     1.8         TPro  5.2<L>  /  Alb  3.1<L>  /  TBili  1.2  /  DBili  x   /  AST  54<H>  /  ALT  31  /  AlkPhos  70      PT/INR - ( 28 Sep 2020 04:25 )   PT: 15.60 sec;   INR: 1.36 ratio         PTT - ( 28 Sep 2020 04:25 )  PTT:29.0 sec            MICROBIOLOGY:    Culture - Urine (collected 25 Sep 2020 20:57)  Source: .Urine Catheterized  Final Report (27 Sep 2020 13:20):    Normal Urogenital troy present      RADIOLOGY:  New X-rays reviewed: Dextrocardia. B/L Lower lobe infiltrates-worsening.                                                ECHO not done      ALLERGIES:  No Known Allergies      ===========================================================    Patient is a 55 y/o female with Medical Hx of Type II DM,  CAD, DLD, HTN, Hypothyroidism, COPD, Dextrocardia,  Sciatica/Peripheral Neuralgia, Charcot foot deformity s/p reconstruction on 19, presenting to ED with multiple Complaints      #Blood per rectum and Melena  -hb now stable  -INR corrected s/p 2 units FFp INR=1.36  -CTA noted no active extravasation   -c/w IV PPI drip  -patient needs EGD when optimized      #Fluid Overload  -Presenting  Pulmonary Edema and B/L LE 2+ Pitting edema up to Abdomen   -Crackles on exam,  however Denies SOB and Orthopnea, saturating well on RA   -BP >24K, Rpt BNP, rpt CXR in AM  -S/P Lasix 40 IV with good urine output,   -F/up 2D-ECHO    #Elevated AST / elevated INR since march   -Monitor LFTs  -US noted hepatomegaly?, CT noted liver unremarkable, spleen unremarkable  -Hep C Ab neg, hep B sAg neg, hep A igM neg  - f/u Smooth Muscle Antibody, Transferrin Saturation, SPEP, AMA, RADHA, Ceruloplasmin, Ferritin.   -no asterixis, no signs of liver failure  - Vitamin K Supplement       #JEEVAN on CKD III: Resolved  -Scr: 0.7 (baseline 0.7 from 2020)   -Bautista D/C      #CAD + COPD: stable  -c/w Inhalers, ASA. Statin   -will hold Valsartan 40mg 1x/day and Toprol-xl 25mg 1x/day  -**Re-start when appropriate**    #Type II DM  Takes Janumet at home  FS 2x/day is sufficient for now  -Start insulin regimen if FS is persistently >180    #Essential HTN  -Hypotensive on admission 92/55   -Pt is Volume overloaded from 3rd spacing   -Hold Antihypertensive for now (valsartan)   -Lasix 40 IV x 1   -Avoid volume overload   - Keep I=O  -Repeat BNP    #Hypokalemia   Replete K  Monitor CMP     #Hypothyroidism  -c/w Levothyroxine      #Sciatica + Peripheral Neuralgia  -c/w Percocet, Lyrica, duloxetine, and tizanidine       #Anxiety   Resume Home Psych meds     #Urinary Incontinence   -Takes Mybertric 50mg at bedtime        Diet- CLD  DVT ppx : SCDs  OOB to chair  Bautista: D/C Bautista   Lines: D/C TLC   Code status: Full code  Disposition: Downgrade to CEU        *f/u  FIBRINOGEN, D-DIMER, INR, procal, cultures

## 2020-09-28 NOTE — PROVIDER CONTACT NOTE (OTHER) - ACTION/TREATMENT ORDERED:
PMD lizzie w/ senior and no intervention @ this time. pending echo.EKG done and had sinus tachy PMD lizzie beltrán/ senior MD Alcides daily  and no intervention @ this time. pending echo.EKG done and had sinus tachy

## 2020-09-28 NOTE — PROGRESS NOTE ADULT - ASSESSMENT
Patient is a 53 y/o female with Medical Hx of Type II DM,  CAD on ASA, Hypothyroidism, COPD, Dextrocardia,  Sciatica/Peripheral Neuralgia, Charcot foot deformity s/p reconstruction on 1/11/19, presenting to ED with bilateral lower extremity edema. and currently admitted to ICU for lactic acidosis, borderline BP, severe hyponatremia and electrolytes imbalance    *Blood per rectum and Melena   Pt still having Melena   -hb trend: 11.5 > 10 > 3 hematochezia and 1 large melena > 6.2 > 4 units > 10.6 and now Hg stable 9.5   -INR corrected s/p 2 units FFp INR=1.36  -CTA noted no active extravasation   rec:   Continue PPI drip   Follow Hg q8h  Keep type and screen active   Will do  EGD when medically optimized: CXR bilateral vascular congestion, almost white lungs and pt still sat 92% on 3 L     *Elevated AST / elevated INR since march   -US noted hepatomegaly?, CT noted liver unremarkable, spleen unremarkable  -Hep C Ab neg, hep B sAg neg, hep A igM neg  - Check Smooth Muscle Antibody, Transferrin Saturation, SPEP, AMA, RADHA, Ceruloplasmin, Ferritin.   -no asterixis, no signs of liver failure  - Vitamin K Supplement   -no evidence of liver failure or cirrhosis at this moment

## 2020-09-29 LAB
ALBUMIN SERPL ELPH-MCNC: 3.2 G/DL — LOW (ref 3.5–5.2)
ALP SERPL-CCNC: 82 U/L — SIGNIFICANT CHANGE UP (ref 30–115)
ALT FLD-CCNC: 26 U/L — SIGNIFICANT CHANGE UP (ref 0–41)
ANION GAP SERPL CALC-SCNC: 8 MMOL/L — SIGNIFICANT CHANGE UP (ref 7–14)
AST SERPL-CCNC: 40 U/L — SIGNIFICANT CHANGE UP (ref 0–41)
BASOPHILS # BLD AUTO: 0.03 K/UL — SIGNIFICANT CHANGE UP (ref 0–0.2)
BASOPHILS NFR BLD AUTO: 0.4 % — SIGNIFICANT CHANGE UP (ref 0–1)
BILIRUB SERPL-MCNC: 1.5 MG/DL — HIGH (ref 0.2–1.2)
BLD GP AB SCN SERPL QL: SIGNIFICANT CHANGE UP
BUN SERPL-MCNC: 7 MG/DL — LOW (ref 10–20)
CALCIUM SERPL-MCNC: 7.2 MG/DL — LOW (ref 8.5–10.1)
CERULOPLASMIN SERPL-MCNC: 26 MG/DL — SIGNIFICANT CHANGE UP (ref 16–45)
CHLORIDE SERPL-SCNC: 90 MMOL/L — LOW (ref 98–110)
CO2 SERPL-SCNC: 37 MMOL/L — HIGH (ref 17–32)
CREAT SERPL-MCNC: 0.8 MG/DL — SIGNIFICANT CHANGE UP (ref 0.7–1.5)
EOSINOPHIL # BLD AUTO: 0.37 K/UL — SIGNIFICANT CHANGE UP (ref 0–0.7)
EOSINOPHIL NFR BLD AUTO: 5.4 % — SIGNIFICANT CHANGE UP (ref 0–8)
FERRITIN SERPL-MCNC: 80 NG/ML — SIGNIFICANT CHANGE UP (ref 15–150)
FIBRINOGEN PPP-MCNC: 171 MG/DL — LOW (ref 204.4–570.6)
GLUCOSE BLDC GLUCOMTR-MCNC: 213 MG/DL — HIGH (ref 70–99)
GLUCOSE BLDC GLUCOMTR-MCNC: 226 MG/DL — HIGH (ref 70–99)
GLUCOSE SERPL-MCNC: 89 MG/DL — SIGNIFICANT CHANGE UP (ref 70–99)
HCT VFR BLD CALC: 32.5 % — LOW (ref 37–47)
HGB BLD-MCNC: 10.4 G/DL — LOW (ref 12–16)
IMM GRANULOCYTES NFR BLD AUTO: 0.4 % — HIGH (ref 0.1–0.3)
IRON SATN MFR SERPL: 15 % — SIGNIFICANT CHANGE UP (ref 15–50)
IRON SATN MFR SERPL: 50 UG/DL — SIGNIFICANT CHANGE UP (ref 35–150)
LYMPHOCYTES # BLD AUTO: 0.9 K/UL — LOW (ref 1.2–3.4)
LYMPHOCYTES # BLD AUTO: 13.2 % — LOW (ref 20.5–51.1)
MAGNESIUM SERPL-MCNC: 0.8 MG/DL — LOW (ref 1.8–2.4)
MCHC RBC-ENTMCNC: 28.5 PG — SIGNIFICANT CHANGE UP (ref 27–31)
MCHC RBC-ENTMCNC: 32 G/DL — SIGNIFICANT CHANGE UP (ref 32–37)
MCV RBC AUTO: 89 FL — SIGNIFICANT CHANGE UP (ref 81–99)
MONOCYTES # BLD AUTO: 0.61 K/UL — HIGH (ref 0.1–0.6)
MONOCYTES NFR BLD AUTO: 8.9 % — SIGNIFICANT CHANGE UP (ref 1.7–9.3)
NEUTROPHILS # BLD AUTO: 4.88 K/UL — SIGNIFICANT CHANGE UP (ref 1.4–6.5)
NEUTROPHILS NFR BLD AUTO: 71.7 % — SIGNIFICANT CHANGE UP (ref 42.2–75.2)
NRBC # BLD: 0 /100 WBCS — SIGNIFICANT CHANGE UP (ref 0–0)
PLATELET # BLD AUTO: 145 K/UL — SIGNIFICANT CHANGE UP (ref 130–400)
POTASSIUM SERPL-MCNC: 3.8 MMOL/L — SIGNIFICANT CHANGE UP (ref 3.5–5)
POTASSIUM SERPL-SCNC: 3.8 MMOL/L — SIGNIFICANT CHANGE UP (ref 3.5–5)
PROCALCITONIN SERPL-MCNC: 0.07 NG/ML — SIGNIFICANT CHANGE UP (ref 0.02–0.1)
PROT SERPL-MCNC: 5.7 G/DL — LOW (ref 6–8.3)
PROT SERPL-MCNC: 5.7 G/DL — LOW (ref 6–8.3)
PROT SERPL-MCNC: 5.8 G/DL — LOW (ref 6–8)
RBC # BLD: 3.65 M/UL — LOW (ref 4.2–5.4)
RBC # FLD: 18 % — HIGH (ref 11.5–14.5)
SODIUM SERPL-SCNC: 135 MMOL/L — SIGNIFICANT CHANGE UP (ref 135–146)
TIBC SERPL-MCNC: 330 UG/DL — SIGNIFICANT CHANGE UP (ref 220–430)
TRANSFERRIN SERPL-MCNC: 284 MG/DL — SIGNIFICANT CHANGE UP (ref 200–360)
UIBC SERPL-MCNC: 280 UG/DL — SIGNIFICANT CHANGE UP (ref 110–370)
WBC # BLD: 6.82 K/UL — SIGNIFICANT CHANGE UP (ref 4.8–10.8)
WBC # FLD AUTO: 6.82 K/UL — SIGNIFICANT CHANGE UP (ref 4.8–10.8)

## 2020-09-29 PROCEDURE — 71045 X-RAY EXAM CHEST 1 VIEW: CPT | Mod: 26

## 2020-09-29 PROCEDURE — 99232 SBSQ HOSP IP/OBS MODERATE 35: CPT

## 2020-09-29 PROCEDURE — 93306 TTE W/DOPPLER COMPLETE: CPT | Mod: 26

## 2020-09-29 RX ORDER — PANTOPRAZOLE SODIUM 20 MG/1
40 TABLET, DELAYED RELEASE ORAL
Refills: 0 | Status: DISCONTINUED | OUTPATIENT
Start: 2020-09-29 | End: 2020-10-01

## 2020-09-29 RX ORDER — FUROSEMIDE 40 MG
40 TABLET ORAL ONCE
Refills: 0 | Status: COMPLETED | OUTPATIENT
Start: 2020-09-29 | End: 2020-09-29

## 2020-09-29 RX ORDER — OXYCODONE AND ACETAMINOPHEN 5; 325 MG/1; MG/1
1 TABLET ORAL ONCE
Refills: 0 | Status: DISCONTINUED | OUTPATIENT
Start: 2020-09-29 | End: 2020-09-29

## 2020-09-29 RX ORDER — FUROSEMIDE 40 MG
40 TABLET ORAL ONCE
Refills: 0 | Status: DISCONTINUED | OUTPATIENT
Start: 2020-09-29 | End: 2020-09-29

## 2020-09-29 RX ADMIN — OXYCODONE AND ACETAMINOPHEN 1 TABLET(S): 5; 325 TABLET ORAL at 22:17

## 2020-09-29 RX ADMIN — Medication 100 MICROGRAM(S): at 05:32

## 2020-09-29 RX ADMIN — BUDESONIDE AND FORMOTEROL FUMARATE DIHYDRATE 2 PUFF(S): 160; 4.5 AEROSOL RESPIRATORY (INHALATION) at 21:14

## 2020-09-29 RX ADMIN — Medication 40 MILLIGRAM(S): at 10:30

## 2020-09-29 RX ADMIN — DULOXETINE HYDROCHLORIDE 60 MILLIGRAM(S): 30 CAPSULE, DELAYED RELEASE ORAL at 11:52

## 2020-09-29 RX ADMIN — PANTOPRAZOLE SODIUM 40 MILLIGRAM(S): 20 TABLET, DELAYED RELEASE ORAL at 17:13

## 2020-09-29 RX ADMIN — OXYCODONE AND ACETAMINOPHEN 1 TABLET(S): 5; 325 TABLET ORAL at 21:42

## 2020-09-29 RX ADMIN — BUDESONIDE AND FORMOTEROL FUMARATE DIHYDRATE 2 PUFF(S): 160; 4.5 AEROSOL RESPIRATORY (INHALATION) at 08:10

## 2020-09-29 RX ADMIN — Medication 150 MILLIGRAM(S): at 21:18

## 2020-09-29 RX ADMIN — Medication 150 MILLIGRAM(S): at 05:32

## 2020-09-29 RX ADMIN — Medication 150 MILLIGRAM(S): at 13:19

## 2020-09-29 RX ADMIN — ATORVASTATIN CALCIUM 40 MILLIGRAM(S): 80 TABLET, FILM COATED ORAL at 21:18

## 2020-09-29 NOTE — PROGRESS NOTE ADULT - ASSESSMENT
Patient is a 55 y/o female with Medical Hx of Type II DM,  CAD on ASA, Hypothyroidism, COPD, Dextrocardia,  Sciatica/Peripheral Neuralgia, Charcot foot deformity s/p reconstruction on 1/11/19, presenting to ED with bilateral lower extremity edema. and was admitted to ICU for lactic acidosis, borderline BP, severe hyponatremia and electrolytes imbalance. Pt got downgraded and pulmonary status improved     *Blood per rectum and Melena   No BM since yesterday   -hb now trending up   -CTA noted no active extravasation   rec:   Continue PPI can switch to BID   Follow Hg q8h  Keep type and screen active   Please optimize pulmonary status and get cardiology follow up   Clear liquid diet and NPO after midnight for possible EGD tomorrow   Repeat CXR     *Elevated AST / elevated INR since march  Today's labs pending    -US noted hepatomegaly?, CT noted liver unremarkable, spleen unremarkable  -Hep C Ab neg, hep B sAg neg, hep A igM neg  - Check Smooth Muscle Antibody, Transferrin Saturation, SPEP, AMA, RADHA, Ceruloplasmin, Ferritin.   -no asterixis, no signs of liver failure  - Vitamin K Supplement   - daily Lfts and INR

## 2020-09-29 NOTE — PROGRESS NOTE ADULT - SUBJECTIVE AND OBJECTIVE BOX
Patient is a 55y old  Female who presents with a chief complaint of GIB (28 Sep 2020 09:39)        Over Night Events:  Resting comfortable in bed.  No SOB at rest.          ROS:     All ROS are negative except HPI         PHYSICAL EXAM    ICU Vital Signs Last 24 Hrs  T(C): 36.6 (29 Sep 2020 08:00), Max: 36.9 (28 Sep 2020 20:15)  T(F): 97.9 (29 Sep 2020 08:00), Max: 98.5 (28 Sep 2020 20:15)  HR: 98 (29 Sep 2020 08:00) (98 - 114)  BP: 117/60 (29 Sep 2020 08:00) (106/55 - 131/64)  BP(mean): 92 (28 Sep 2020 12:18) (81 - 102)  ABP: --  ABP(mean): --  RR: 18 (29 Sep 2020 08:00) (18 - 32)  SpO2: 99% (29 Sep 2020 08:00) (92% - 99%)      CONSTITUTIONAL:  Well nourished.  NAD    ENT:   Airway patent,   Mouth with normal mucosa.   No thrush    EYES:   Pupils equal,   Round and reactive to light.    CARDIAC:   Normal rate,   Regular rhythm.    No edema      Vascular:  Normal systolic impulse  No Carotid bruits    RESPIRATORY:   No wheezing  Bilateral BS  Normal chest expansion  Not tachypneic,  No use of accessory muscles    GASTROINTESTINAL:  Abdomen soft,   Non-tender,   No guarding,   + BS    MUSCULOSKELETAL:   Range of motion is not limited,  No clubbing, cyanosis    NEUROLOGICAL:   Alert and oriented   No motor  deficits.    SKIN:   Skin normal color for race,   Warm and dry and intact.   No evidence of rash.    PSYCHIATRIC:   Normal mood and affect.   No apparent risk to self or others.    HEMATOLOGICAL:  No cervical  lymphadenopathy.  no inguinal lymphadenopathy      09-28-20 @ 07:01  -  09-29-20 @ 07:00  --------------------------------------------------------  IN:    Oral Fluid: 580 mL    Pantoprazole: 220 mL    Pantoprazole: 20 mL  Total IN: 820 mL    OUT:    Indwelling Catheter - Urethral (mL): 75 mL    Voided (mL): 1350 mL  Total OUT: 1425 mL    Total NET: -605 mL          LABS:                            11.0   8.50  )-----------( 152      ( 28 Sep 2020 17:47 )             34.2                                               09-28    138  |  92<L>  |  13  ----------------------------<  69<L>  3.4<L>   |  36<H>  |  0.7    Ca    7.5<L>      28 Sep 2020 04:25    TPro  5.2<L>  /  Alb  3.1<L>  /  TBili  1.2  /  DBili  x   /  AST  54<H>  /  ALT  31  /  AlkPhos  70  09-28      PT/INR - ( 28 Sep 2020 04:25 )   PT: 15.60 sec;   INR: 1.36 ratio         PTT - ( 28 Sep 2020 04:25 )  PTT:29.0 sec                                                                                     LIVER FUNCTIONS - ( 28 Sep 2020 04:25 )  Alb: 3.1 g/dL / Pro: 5.2 g/dL / ALK PHOS: 70 U/L / ALT: 31 U/L / AST: 54 U/L / GGT: x                                                                                                                                       MEDICATIONS  (STANDING):  atorvastatin 40 milliGRAM(s) Oral at bedtime  budesonide  80 MICROgram(s)/formoterol 4.5 MICROgram(s) Inhaler 2 Puff(s) Inhalation two times a day  chlorhexidine 4% Liquid 1 Application(s) Topical daily  DULoxetine 60 milliGRAM(s) Oral daily  influenza   Vaccine 0.5 milliLiter(s) IntraMuscular once  levothyroxine 100 MICROGram(s) Oral daily  pantoprazole  Injectable 40 milliGRAM(s) IV Push two times a day  pregabalin 150 milliGRAM(s) Oral three times a day  senna 2 Tablet(s) Oral at bedtime    MEDICATIONS  (PRN):  ALBUTerol    90 MICROgram(s) HFA Inhaler 1 Puff(s) Inhalation four times a day PRN Shortness of Breath and/or Wheezing  hydrOXYzine hydrochloride 10 milliGRAM(s) Oral every 12 hours PRN Anxiety      New X-rays reviewed:                                                                                  ECHO    CXR interpreted by me:

## 2020-09-29 NOTE — PROGRESS NOTE ADULT - ASSESSMENT
Patient is a 55 y/o female with Medical Hx of Type II DM,  CAD, DLD, HTN, Hypothyroidism, COPD, Dextrocardia,  Sciatica/Peripheral Neuralgia, Charcot foot deformity s/p reconstruction on 1/11/19, presenting to ED with multiple Complaints      #Blood per rectum and Melena  -hb now stable  -INR corrected s/p 2 units FFP, INR=1.36  -CTA noted no active extravasation   -PPI switched to 40 mg IV BID  -plan for EGD when optimized, likely tomorrow.      #Fluid Overload  -Presenting  Pulmonary Edema and B/L LE 2+ Pitting edema up to Abdomen   -Crackles on exam,  however Denies SOB and Orthopnea, saturating well on RA   -BP >24K, Rpt BNP, rpt CXR in AM  -Lasix 40 IV with good urine output,   -ECHO: EF 72%, Grade 1 diastolic dysfunction.    #Elevated AST / elevated INR since march   -Monitor LFTs  -US noted hepatomegaly?, CT noted liver unremarkable, spleen unremarkable  -Hep C Ab neg, hep B sAg neg, hep A igM neg  - f/u Smooth Muscle Antibody, Transferrin Saturation, SPEP, AMA, RADHA, Ceruloplasmin, Ferritin.   -no asterixis, no signs of liver failure  - Vitamin K Supplement       #JEEVAN on CKD III: Resolved    #CAD + COPD: stable  -c/w Inhalers, ASA. Statin   -Valsartan 40mg 1x/day and Toprol-xl 25mg 1x/day on hold. BP stable.      #Type II DM  - Takes Janumet at home  - FS 2x/day is sufficient for now  - Start insulin regimen if FS is persistently >180    #Essential HTN  - Valsartan and Tropol-xl on hold for now.   - Lasix 40 IV x 1 ordered.  - Keep I=O    #Hypothyroidism  -c/w Levothyroxine    #Sciatica + Peripheral Neuralgia  -c/w Percocet, Lyrica, duloxetine, and tizanidine     #Anxiety   Resume Home Psych meds     #Urinary Incontinence   -Takes Mybertric 50mg at bedtime        Diet- CLD  DVT ppx : SCDs, b/l Duplex performed, pending read.  Activity: OOB to chair  Code status: Full code  Disposition: Acute

## 2020-09-29 NOTE — PROGRESS NOTE ADULT - SUBJECTIVE AND OBJECTIVE BOX
SUBJECTIVE:    Patient is a 55y old Female who presents with a chief complaint of GIB (28 Sep 2020 09:39)    Currently admitted to medicine with the primary diagnosis of Fluid overload, unspecified       Today is hospital day 4d. This morning she is resting comfortably in bed and reports no new issues or overnight events.     Admit Diagnosis:  FLUID OVERLOAD,UNSPECIFIED;HYPONATREMIA        PAST MEDICAL & SURGICAL HISTORY  Dextrocardia    Chronic midline low back pain with bilateral sciatica    Peripheral neuralgia    Essential hypertension    Diabetes 1.5, managed as type 2    Charcot&#x27;s arthropathy  R foot    Charcot foot due to diabetes mellitus    H/O shoulder surgery  Right shoulder surgery     delivery delivered    Dextrocardia    Chronic midline low back pain with bilateral sciatica    Peripheral neuralgia    Essential hypertension    Diabetes 1.5, managed as type 2    Charcot&#x27;s arthropathy    Charcot foot due to diabetes mellitus    H/O shoulder surgery     delivery delivered        SOCIAL HISTORY:  Negative for smoking/alcohol/drug use.     ALLERGIES:  No Known Allergies    MEDICATIONS:  STANDING MEDICATIONS  atorvastatin 40 milliGRAM(s) Oral at bedtime  budesonide  80 MICROgram(s)/formoterol 4.5 MICROgram(s) Inhaler 2 Puff(s) Inhalation two times a day  chlorhexidine 4% Liquid 1 Application(s) Topical daily  DULoxetine 60 milliGRAM(s) Oral daily  influenza   Vaccine 0.5 milliLiter(s) IntraMuscular once  levothyroxine 100 MICROGram(s) Oral daily  pantoprazole  Injectable 40 milliGRAM(s) IV Push two times a day  pregabalin 150 milliGRAM(s) Oral three times a day  senna 2 Tablet(s) Oral at bedtime    PRN MEDICATIONS  ALBUTerol    90 MICROgram(s) HFA Inhaler 1 Puff(s) Inhalation four times a day PRN  hydrOXYzine hydrochloride 10 milliGRAM(s) Oral every 12 hours PRN    VITALS:   T(F): 97.9  HR: 98  BP: 117/60  RR: 18  SpO2: 99%    I&Os:  20 @ 07:01  -  20 @ 07:00  --------------------------------------------------------  IN: 820 mL / OUT: 1425 mL / NET: -605 mL    20 @ 07:01  -  20 @ 11:03  --------------------------------------------------------  IN: 0 mL / OUT: 600 mL / NET: -600 mL        PHYSICAL EXAM:  GEN: No acute distress  LUNGS: Clear to auscultation bilaterally   HEART: S1/S2 present. RRR.   ABD: Soft, NT/ND. BS +  EXT: minimal edema.  NEURO: AAOX3    LABS:                        10.4   6.82  )-----------( 145      ( 29 Sep 2020 07:34 )             32.5         135  |  90<L>  |  7<L>  ----------------------------<  89  3.8   |  37<H>  |  0.8    Ca    7.2<L>      29 Sep 2020 07:34  Mg     0.8         TPro  5.8<L>  /  Alb  3.2<L>  /  TBili  1.5<H>  /  DBili  x   /  AST  40  /  ALT  26  /  AlkPhos  82      PT/INR - ( 28 Sep 2020 04:25 )   PT: 15.60 sec;   INR: 1.36 ratio    PTT - ( 28 Sep 2020 04:25 )  PTT:29.0 sec  Fibrinogen Assay: 171.0 mg/dL (20 @ 07:34)  D-Dimer Assay, Quantitative: 677 ng/mL DDU (20 @ 15:04)    Serum Pro-Brain Natriuretic Peptide: 6518 pg/mL (20 @ 17:47)    RADIOLOGY:  < from: TTE Echo Complete w/o Contrast w/ Doppler (20 @ 09:09) >  Summary:   1. Normal global left ventricular systolic function.   2. LV Ejection Fraction by Jade's Method with a biplane EF of 72 %.   3. Normal left ventricular internal cavity size.   4. Spectral Doppler shows impaired relaxation pattern of left ventricular myocardial filling (Grade I diastolic dysfunction).   5. Normal left atrial size.   6. Normal right atrial size.   7. There is no evidence of pericardial effusion.   8. Mild mitral annular calcification.   9. Mild thickening of the posterior mitral valve leaflet.  10. Trace mitral valve regurgitation.    < end of copied text >

## 2020-09-29 NOTE — CDI QUERY NOTE - NSCDIOTHERTXTBX_GEN_ALL_CORE_HH
Documentation:  55 YOF presenting to ED with bilateral lower extremity edema. Symptoms started about 2 weeks ago with generalized swelling in her abdomen, face and lower extremity.  ** DR. Dye PN 9/27: Fluid Overload. -Presenting  Pulmonary Edema and B/L LE 2+ Pitting edema up to Abdomen. Crackles on exam,  however Denies SOB and Orthopnea, saturating well on RA. S/P Lasix 40 IV with good urine output, + pt noticed improvement in swelling. Will continue with Lasix   ** Dr. Buster Roberts PN 9/29: Pt who presented with CHF exacerbation.  ** Dr Chacko PN 9/28: Patient seen and examined, agree with above, gi bleed, CHF, hb stable, echo, IV lasix, trend CBC, protonix     Lab:  Serum Pro-BNP 9/25: 28891    Echo 9/29/2020:   1. Normal global left ventricular systolic function.   2. LV Ejection Fraction by Jade's Method with a biplane EF of 72 %.   3. Normal left ventricular internal cavity size.   4. Spectral Doppler shows impaired relaxation pattern of left ventricular myocardial filling (Grade I diastolic dysfunction).    CXR 9/25: Lung parenchyma/Pleura: Bilateral opacities.    Treatment:   lasix IV 9/25 - 29                                                       Based on your clinical evaluation of the patient, can you please specify the type of CHF:  • Acute on chronic diastolic CHF.  • Other (please specify).  • Unable to determine.

## 2020-09-29 NOTE — PROGRESS NOTE ADULT - SUBJECTIVE AND OBJECTIVE BOX
We are following the patient for GI bleed     GI HPI Today:  No BM overnight   No melena or RBPR  VS stable   Sat 97% on 3 L NC  Improvement on Cxr     PAST MEDICAL & SURGICAL HISTORY  Dextrocardia    Chronic midline low back pain with bilateral sciatica    Peripheral neuralgia    Essential hypertension    Diabetes 1.5, managed as type 2    Charcot&#x27;s arthropathy  R foot    Charcot foot due to diabetes mellitus    H/O shoulder surgery  Right shoulder surgery     delivery delivered        ALLERGIES:  No Known Allergies      MEDICATIONS:  MEDICATIONS  (STANDING):  atorvastatin 40 milliGRAM(s) Oral at bedtime  budesonide  80 MICROgram(s)/formoterol 4.5 MICROgram(s) Inhaler 2 Puff(s) Inhalation two times a day  chlorhexidine 4% Liquid 1 Application(s) Topical daily  DULoxetine 60 milliGRAM(s) Oral daily  influenza   Vaccine 0.5 milliLiter(s) IntraMuscular once  levothyroxine 100 MICROGram(s) Oral daily  pregabalin 150 milliGRAM(s) Oral three times a day  senna 2 Tablet(s) Oral at bedtime    MEDICATIONS  (PRN):  ALBUTerol    90 MICROgram(s) HFA Inhaler 1 Puff(s) Inhalation four times a day PRN Shortness of Breath and/or Wheezing  hydrOXYzine hydrochloride 10 milliGRAM(s) Oral every 12 hours PRN Anxiety      REVIEW OF SYSTEMS  General:  No fevers  Eyes:  No reported pain   ENT:  No sore throat   NECK: No stiffness   CV:  No chest pain   Resp:  ++ shortness of breath  GI:  See HPI  :  No dysuria  Muscle:  ++ weakness  Neuro:  No tingling  Endocrine:  No polyuria  Heme:  No ecchymosis        VITALS:   T(F): 97.7 ( @ 05:11), Max: 98.8 ( @ 23:00)  HR: 99 ( @ 05:11) (74 - 116)  BP: 113/67 ( @ 05:11) (71/25 - 152/66)  BP(mean): 92 ( @ 12:18) (33 - 117)  RR: 18 ( @ 05:11) (15 - 45)  SpO2: 97% ( @ 05:11) (73% - 99%)        PHYSICAL EXAM:  GENERAL:  Appears in no distress  HEENT:  Conjunctivae Anicteric   CHEST:  Full & symmetric excursion  HEART:  NS1, S2,   ABDOMEN:  Soft, non-tender, non-distended  EXTEREMITIES:  no edema  SKIN:  No rash  NEURO:  Alert         Blood Work :                        11.0   8.50  )-----------( 152      ( 28 Sep 2020 17:47 )             34.2     PT/INR - ( 28 Sep 2020 04:25 )  INR: 1.36          PTT - ( 28 Sep 2020 04:25 )  PTT:29.0       138  |  92<L>  |  13  ----------------------------<  69<L>  3.4<L>   |  36<H>  |  0.7    Ca    7.5<L>      28 Sep 2020 04:25  Phos  3.2     09-  Mg     1.8     09-      CBC -  ( 28 Sep 2020 17:47 )  Hemoglobin : 11.0    CBC -  ( 28 Sep 2020 04:25 )  Hemoglobin : 9.5    CBC -  ( 27 Sep 2020 23:30 )  Hemoglobin : 9.6    CBC -  ( 27 Sep 2020 20:12 )  Hemoglobin : 9.8    CBC -  ( 27 Sep 2020 15:04 )  Hemoglobin : 10.2    CBC -  ( 27 Sep 2020 04:30 )  Hemoglobin : 10.6    CBC -  ( 26 Sep 2020 20:30 )  Hemoglobin : 9.8    CBC -  ( 26 Sep 2020 12:03 )  Hemoglobin : 6.2    CBC -  ( 26 Sep 2020 04:43 )  Hemoglobin : 10.0    CBC -  ( 25 Sep 2020 20:08 )  Hemoglobin : 11.5      LIVER FUNCTIONS - ( 28 Sep 2020 04:25 )  Alb: 3.1 [3.5 - 5.2] / Pro: 5.2 [6.0 - 8.0] / ALK PHOS: 70 [30 - 115] / ALT: 31 [0 - 41] / AST: 54 [0 - 41] / GGT: x     LIVER FUNCTIONS - ( 27 Sep 2020 04:30 )  Alb: 2.7 [3.5 - 5.2] / Pro: 5.1 [6.0 - 8.0] / ALK PHOS: 72 [30 - 115] / ALT: 39 [0 - 41] / AST: 87 [0 - 41] / GGT: x     LIVER FUNCTIONS - ( 26 Sep 2020 20:30 )  Alb: 3.2 [3.5 - 5.2] / Pro: 5.4 [6.0 - 8.0] / ALK PHOS: 65 [30 - 115] / ALT: 39 [0 - 41] / AST: 96 [0 - 41] / GGT: x     LIVER FUNCTIONS - ( 26 Sep 2020 12:03 )  Alb: 2.7 [3.5 - 5.2] / Pro: 4.5 [6.0 - 8.0] / ALK PHOS: 65 [30 - 115] / ALT: 33 [0 - 41] / AST: 87 [0 - 41] / GGT: x     LIVER FUNCTIONS - ( 26 Sep 2020 04:43 )  Alb: 3.0 [3.5 - 5.2] / Pro: 5.3 [6.0 - 8.0] / ALK PHOS: 74 [30 - 115] / ALT: 35 [0 - 41] / AST: 93 [0 - 41] / GGT: x     LIVER FUNCTIONS - ( 25 Sep 2020 20:08 )  Alb: 3.1 [3.5 - 5.2] / Pro: 5.8 [6.0 - 8.0] / ALK PHOS: 80 [30 - 115] / ALT: 38 [0 - 41] / AST: 122 [0 - 41] / GGT: x

## 2020-09-29 NOTE — PROGRESS NOTE ADULT - ASSESSMENT
IMPRESSION:     Possible GIB s/p 4 units PRBC in total.  S/P CTA negative;  Hb stable   HO Dextrocardia  H/O Anxiety   JEEVAN- Resolved    Probable HILARY     PLAN:    CNS: Resume home psych medications    HEENT: Oral care    PULMONARY:  HOB @ 45 degrees. Aspiration Precautions.  Wean O2      CARDIOVASCULAR: Avoid volume overload.  FU Echo.      GI: PPI IV BID. Feeding per GI.      RENAL:  Follow up lytes.  Correct as needed     INFECTIOUS DISEASE: Follow up cultures.     HEMATOLOGICAL:  DVT prophylaxis- SCDs. Monitor CBC and Coags     ENDOCRINE:  Follow up FS.  Insulin protocol if needed.    MUSCULOSKELETAL: OOB to chair    Disposition:  CEU     Stable for endoscopy

## 2020-09-29 NOTE — PROGRESS NOTE ADULT - SUBJECTIVE AND OBJECTIVE BOX
INTERVAL HISTORY: No overnight events.  	  MEDICATIONS:  ALBUTerol    90 MICROgram(s) HFA Inhaler 1 Puff(s) Inhalation four times a day PRN  atorvastatin 40 milliGRAM(s) Oral at bedtime  budesonide  80 MICROgram(s)/formoterol 4.5 MICROgram(s) Inhaler 2 Puff(s) Inhalation two times a day  chlorhexidine 4% Liquid 1 Application(s) Topical daily  DULoxetine 60 milliGRAM(s) Oral daily  hydrOXYzine hydrochloride 10 milliGRAM(s) Oral every 12 hours PRN  influenza   Vaccine 0.5 milliLiter(s) IntraMuscular once  levothyroxine 100 MICROGram(s) Oral daily  pantoprazole  Injectable 40 milliGRAM(s) IV Push two times a day  pregabalin 150 milliGRAM(s) Oral three times a day  senna 2 Tablet(s) Oral at bedtime      REVIEW OF SYSTEMS:  CONSTITUTIONAL: No fever, weight loss, or fatigue  NECK: No pain or stiffness  RESPIRATORY: No cough, wheezing, chills or hemoptysis; No shortness of breath  CARDIOVASCULAR: No chest pain, palpitations, dizziness, or leg swelling  GASTROINTESTINAL: No abdominal or epigastric pain. No nausea, vomiting, or hematemesis; No diarrhea or constipation. No melena or hematochezia.  GENITOURINARY: No dysuria, frequency, hematuria, or incontinence  NEUROLOGICAL: No headaches, memory loss, loss of strength, numbness, or tremors  SKIN: No itching, burning, rashes, or lesions   ENDOCRINE: No heat or cold intolerance; No hair loss  MUSCULOSKELETAL: No joint pain or swelling; No muscle, back, or extremity pain  HEME/LYMPH: No easy bruising, or bleeding gums    PHYSICAL EXAM:  T(C): 36.4 (09-29-20 @ 18:00), Max: 36.9 (09-28-20 @ 20:15)  HR: 110 (09-29-20 @ 17:00) (98 - 110)  BP: 108/59 (09-29-20 @ 17:00) (106/55 - 117/60)  RR: 18 (09-29-20 @ 17:00) (18 - 18)  SpO2: 95% (09-29-20 @ 17:00) (94% - 99%)  Wt(kg): --  I&O's Summary    28 Sep 2020 07:01  -  29 Sep 2020 07:00  --------------------------------------------------------  IN: 820 mL / OUT: 1425 mL / NET: -605 mL    29 Sep 2020 07:01  -  29 Sep 2020 19:19  --------------------------------------------------------  IN: 0 mL / OUT: 3700 mL / NET: -3700 mL          GENERAL: NAD, well-groomed, well-developed  HEAD:  Atraumatic, Normocephalic  NECK: Supple, No JVD, Normal thyroid  NERVOUS SYSTEM:  Alert & Oriented X3, Good concentration  CHEST/LUNG: Clear to percussion bilaterally; No rales, rhonchi, wheezing, or rubs  HEART: Regular rate and rhythm; No murmurs, rubs, or gallops  ABDOMEN: Soft, Nontender, Nondistended; Bowel sounds present  EXTREMITIES:  2+ Peripheral Pulses, No clubbing, cyanosis, or edema  SKIN: No rashes or lesions    LABS:	 	                              10.4   6.82  )-----------( 145      ( 29 Sep 2020 07:34 )             32.5     09-29    135  |  90<L>  |  7<L>  ----------------------------<  89  3.8   |  37<H>  |  0.8    Ca    7.2<L>      29 Sep 2020 07:34  Mg     0.8     09-29    TPro  5.8<L>  /  Alb  3.2<L>  /  TBili  1.5<H>  /  DBili  x   /  AST  40  /  ALT  26  /  AlkPhos  82  09-29    proBNP:   Lipid Profile:

## 2020-09-30 ENCOUNTER — RESULT REVIEW (OUTPATIENT)
Age: 55
End: 2020-09-30

## 2020-09-30 ENCOUNTER — TRANSCRIPTION ENCOUNTER (OUTPATIENT)
Age: 55
End: 2020-09-30

## 2020-09-30 LAB
% ALBUMIN: 50.9 % — SIGNIFICANT CHANGE UP
% ALPHA 1: 6.8 % — SIGNIFICANT CHANGE UP
% ALPHA 2: 11.2 % — SIGNIFICANT CHANGE UP
% BETA: 15.4 % — SIGNIFICANT CHANGE UP
% GAMMA: 15.7 % — SIGNIFICANT CHANGE UP
ALBUMIN SERPL ELPH-MCNC: 2.9 G/DL — LOW (ref 3.6–5.5)
ALBUMIN/GLOB SERPL ELPH: 1 RATIO — SIGNIFICANT CHANGE UP
ALPHA1 GLOB SERPL ELPH-MCNC: 0.4 G/DL — SIGNIFICANT CHANGE UP (ref 0.1–0.4)
ALPHA2 GLOB SERPL ELPH-MCNC: 0.6 G/DL — SIGNIFICANT CHANGE UP (ref 0.5–1)
B-GLOBULIN SERPL ELPH-MCNC: 0.9 G/DL — SIGNIFICANT CHANGE UP (ref 0.5–1)
GAMMA GLOBULIN: 0.9 G/DL — SIGNIFICANT CHANGE UP (ref 0.6–1.6)
GLUCOSE BLDC GLUCOMTR-MCNC: 134 MG/DL — HIGH (ref 70–99)
GLUCOSE BLDC GLUCOMTR-MCNC: 84 MG/DL — SIGNIFICANT CHANGE UP (ref 70–99)
GLUCOSE BLDC GLUCOMTR-MCNC: 94 MG/DL — SIGNIFICANT CHANGE UP (ref 70–99)
INTERPRETATION SERPL IFE-IMP: SIGNIFICANT CHANGE UP
MITOCHONDRIA AB SER-ACNC: SIGNIFICANT CHANGE UP
MITOCHONDRIA AB SER-ACNC: SIGNIFICANT CHANGE UP
PROT PATTERN SERPL ELPH-IMP: SIGNIFICANT CHANGE UP
SMOOTH MUSCLE AB SER-ACNC: ABNORMAL

## 2020-09-30 PROCEDURE — 43239 EGD BIOPSY SINGLE/MULTIPLE: CPT

## 2020-09-30 PROCEDURE — 71045 X-RAY EXAM CHEST 1 VIEW: CPT | Mod: 26

## 2020-09-30 RX ORDER — FUROSEMIDE 40 MG
40 TABLET ORAL ONCE
Refills: 0 | Status: COMPLETED | OUTPATIENT
Start: 2020-09-30 | End: 2020-09-30

## 2020-09-30 RX ORDER — SUCRALFATE 1 G
1 TABLET ORAL
Refills: 0 | Status: DISCONTINUED | OUTPATIENT
Start: 2020-09-30 | End: 2020-10-01

## 2020-09-30 RX ORDER — OXYCODONE AND ACETAMINOPHEN 5; 325 MG/1; MG/1
1 TABLET ORAL EVERY 8 HOURS
Refills: 0 | Status: DISCONTINUED | OUTPATIENT
Start: 2020-09-30 | End: 2020-10-02

## 2020-09-30 RX ADMIN — Medication 150 MILLIGRAM(S): at 05:37

## 2020-09-30 RX ADMIN — BUDESONIDE AND FORMOTEROL FUMARATE DIHYDRATE 2 PUFF(S): 160; 4.5 AEROSOL RESPIRATORY (INHALATION) at 20:58

## 2020-09-30 RX ADMIN — OXYCODONE AND ACETAMINOPHEN 1 TABLET(S): 5; 325 TABLET ORAL at 09:32

## 2020-09-30 RX ADMIN — SENNA PLUS 2 TABLET(S): 8.6 TABLET ORAL at 21:45

## 2020-09-30 RX ADMIN — DULOXETINE HYDROCHLORIDE 60 MILLIGRAM(S): 30 CAPSULE, DELAYED RELEASE ORAL at 11:03

## 2020-09-30 RX ADMIN — ATORVASTATIN CALCIUM 40 MILLIGRAM(S): 80 TABLET, FILM COATED ORAL at 21:45

## 2020-09-30 RX ADMIN — PANTOPRAZOLE SODIUM 40 MILLIGRAM(S): 20 TABLET, DELAYED RELEASE ORAL at 18:31

## 2020-09-30 RX ADMIN — Medication 100 MICROGRAM(S): at 05:37

## 2020-09-30 RX ADMIN — PANTOPRAZOLE SODIUM 40 MILLIGRAM(S): 20 TABLET, DELAYED RELEASE ORAL at 05:37

## 2020-09-30 RX ADMIN — OXYCODONE AND ACETAMINOPHEN 1 TABLET(S): 5; 325 TABLET ORAL at 22:07

## 2020-09-30 RX ADMIN — BUDESONIDE AND FORMOTEROL FUMARATE DIHYDRATE 2 PUFF(S): 160; 4.5 AEROSOL RESPIRATORY (INHALATION) at 08:15

## 2020-09-30 RX ADMIN — Medication 1 GRAM(S): at 18:30

## 2020-09-30 RX ADMIN — Medication 40 MILLIGRAM(S): at 09:32

## 2020-09-30 RX ADMIN — OXYCODONE AND ACETAMINOPHEN 1 TABLET(S): 5; 325 TABLET ORAL at 10:00

## 2020-09-30 RX ADMIN — Medication 150 MILLIGRAM(S): at 21:45

## 2020-09-30 RX ADMIN — OXYCODONE AND ACETAMINOPHEN 1 TABLET(S): 5; 325 TABLET ORAL at 21:49

## 2020-09-30 NOTE — PROGRESS NOTE ADULT - ASSESSMENT
Patient is a 55 y/o female with Medical Hx of Type II DM,  CAD, DLD, HTN, Hypothyroidism, COPD, Dextrocardia,  Sciatica/Peripheral Neuralgia, Charcot foot deformity s/p reconstruction on 1/11/19, presenting to ED with multiple Complaints      #Blood per rectum and Melena  -hb now stable  -INR corrected s/p 2 units FFP, INR=1.36  -CTA noted no active extravasation   -PPI switched to 40 mg IV BID  -NPO since midnight. scheduled for EGD today.      #Fluid Overload  -Presenting  Pulmonary Edema and B/L LE 2+ Pitting edema up to Abdomen   -Crackles on exam,  however Denies SOB and Orthopnea, saturating well on RA   -BP >24K, Rpt BNP, rpt CXR in AM  -Lasix 40 IV with good urine output,   -ECHO: EF 72%, Grade 1 diastolic dysfunction.    #Elevated AST / elevated INR since march   -Monitor LFTs  -US noted hepatomegaly?, CT noted liver unremarkable, spleen unremarkable  -Hep C Ab neg, hep B sAg neg, hep A igM neg  - f/u Smooth Muscle Antibody, Transferrin Saturation, SPEP, AMA, RADHA, Ceruloplasmin, Ferritin.   -no asterixis, no signs of liver failure  - Vitamin K Supplement       #JEEVAN on CKD III: Resolved    #CAD + COPD: stable  -c/w Inhalers, ASA. Statin   -Valsartan 40mg 1x/day and Toprol-xl 25mg 1x/day on hold. BP stable.      #Type II DM  - Takes Janumet at home  - FS 2x/day is sufficient for now  - Start insulin regimen if FS is persistently >180    #Essential HTN  - Valsartan and Tropol-xl on hold for now.   - Lasix 40 IV x 1 ordered.  - Keep I=O    #Hypothyroidism  -c/w Levothyroxine    #Sciatica + Peripheral Neuralgia:  -c/w Lyrica, duloxetine, and tizanidine   -Percocet 325/5 q 8hr PRN ordered.    #Anxiety   Resume Home Psych meds     #Urinary Incontinence   -Takes Mybertric 50mg at bedtime        Diet- CLD  DVT ppx : SCDs, b/l Duplex negative for DVT.  Activity: OOB to chair  Code status: Full code  Disposition: possible downgrade to medicine floor today.

## 2020-09-30 NOTE — CHART NOTE - NSCHARTNOTEFT_GEN_A_CORE
H&P:  Patient is a 53 y/o female with Medical Hx of Type II DM,  CAD, DLD, HTN, Hypothyroidism, COPD, Dextrocardia,  Sciatica/Peripheral Neuralgia, Charcot foot deformity s/p reconstruction on 1/11/19, presenting to ED with bilateral lower extremity edema. Symptoms started about 2 weeks ago with generalized swelling in her abdomen, face and lower extremity, she also had some associated Chest and RLQ abdominal pain which started about 2 days ago. Chest pain was intermittent, 4-5/10, in the middle of her chest w/o radiation, she taught the abdominal pain and chest pain was secondary to gas so she bought OTC simethicone w/o relief so she decided to come in for further evaluation. She denied SOB and Orthopnea,    Hospital course:   Pt had 3 episodes of hematochezia and 1 episodes of Melena with hb dropped to 6.2. received 4 units of PRBC and upgraded to ICU on 9/26.  Pt also had a BNP of over 24K and had pulmonary edema up to the abdomen but denied SOB and orthopnea. Treated with lasix. Pt downgraded to CEU on 9/28.    Pt had EGD on 9/30. spoke with GI fellow on phone, no active site of bleeding, but large ulcer noted. official EGD note pending. GI recommendations for PPI BID, follow GI soft diet and follow EGD after ~ 2 months.    VITALS:   T(F): 98.2  HR: 108  BP: 90/58  RR: 18  SpO2: 100%    I&Os:  09-29-20 @ 07:01  -  09-30-20 @ 07:00  --------------------------------------------------------  IN: 0 mL / OUT: 4800 mL / NET: -4800 mL    09-30-20 @ 07:01 - 09-30-20 @ 13:19  --------------------------------------------------------  IN: 0 mL / OUT: 600 mL / NET: -600 mL        PHYSICAL EXAM:  GEN: No acute distress  LUNGS: Clear to auscultation bilaterally   HEART: S1/S2 present. RRR.   ABD: Soft, NT/ND. BS +  EXT: NC/NC/NE/2+PP/LING  NEURO: AAOX3    LABS:                        10.4   6.82  )-----------( 145      ( 29 Sep 2020 07:34 )             32.5     09-29    135  |  90<L>  |  7<L>  ----------------------------<  89  3.8   |  37<H>  |  0.8    Ca    7.2<L>      29 Sep 2020 07:34  Mg     0.8     09-29    TPro  5.8<L>  /  Alb  3.2<L>  /  TBili  1.5<H>  /  DBili  x   /  AST  40  /  ALT  26  /  AlkPhos  82  09-29        RADIOLOGY:  < from: Xray Chest 1 View- PORTABLE-Routine (Xray Chest 1 View- PORTABLE-Routine in AM.) (09.30.20 @ 06:16) >  Impression:    No focal consolidation, pneumothorax or pleural effusion. Increased left basal hazy opacification may be projectional due to overlying breast tissue.    < end of copied text >    < from: TTE Echo Complete w/o Contrast w/ Doppler (09.29.20 @ 09:09) >  Summary:   1. Normal global left ventricular systolic function.   2. LV Ejection Fraction by Jade's Method with a biplane EF of 72 %.   3. Normal left ventricular internal cavity size.   4. Spectral Doppler shows impaired relaxation pattern of left ventricular myocardial filling (Grade I diastolic dysfunction).   5. Normal left atrial size.   6. Normal right atrial size.   7. There is no evidence of pericardial effusion.   8. Mild mitral annular calcification.   9. Mild thickening of the posterior mitral valve leaflet.  10. Trace mitral valve regurgitation.    < end of copied text >    < from: VA Duplex Lower Ext Vein Scan, Bilat (09.28.20 @ 19:48) >  Impression:    No evidence of deep venous thrombosis or superficial thrombophlebitis in the bilateral lower extremities.    < end of copied text >      Assessment and Plan:   · Assessment	  Patient is a 53 y/o female with Medical Hx of Type II DM,  CAD, DLD, HTN, Hypothyroidism, COPD, Dextrocardia,  Sciatica/Peripheral Neuralgia, Charcot foot deformity s/p reconstruction on 1/11/19, presenting to ED with multiple Complaints      #Blood per rectum and Melena  -hb now stable  -INR corrected s/p 2 units FFP, INR=1.36  -CTA noted no active extravasation   -PPI switched to 40 mg IV BID  -NPO since midnight. scheduled for EGD today.      #Fluid Overload  -Presenting  Pulmonary Edema and B/L LE 2+ Pitting edema up to Abdomen   -Crackles on exam,  however Denies SOB and Orthopnea, saturating well on RA   -BP >24K, Rpt BNP, rpt CXR in AM  -Lasix 40 IV with good urine output,   -ECHO: EF 72%, Grade 1 diastolic dysfunction.    #Elevated AST / elevated INR since march   -Monitor LFTs  -US noted hepatomegaly?, CT noted liver unremarkable, spleen unremarkable  -Hep C Ab neg, hep B sAg neg, hep A igM neg  - f/u Smooth Muscle Antibody, Transferrin Saturation, SPEP, AMA, RADHA, Ceruloplasmin, Ferritin.   -no asterixis, no signs of liver failure  - Vitamin K Supplement       #JEEVAN on CKD III: Resolved    #CAD + COPD: stable  -c/w Inhalers, ASA. Statin   -Valsartan 40mg 1x/day and Toprol-xl 25mg 1x/day on hold. BP stable.      #Type II DM  - Takes Janumet at home  - FS 2x/day is sufficient for now  - Start insulin regimen if FS is persistently >180    #Essential HTN  - Valsartan and Tropol-xl on hold for now.   - Lasix 40 IV x 1 ordered.  - Keep I=O    #Hypothyroidism  -c/w Levothyroxine    #Sciatica + Peripheral Neuralgia:  -c/w Lyrica, duloxetine, and tizanidine   -Percocet 325/5 q 8hr PRN ordered.    #Anxiety   Resume Home Psych meds     #Urinary Incontinence   -Takes Mybertric 50mg at bedtime        Diet- CLD  DVT ppx : SCDs, b/l Duplex negative for DVT.  Activity: OOB to chair  Code status: Full code  Disposition: possible downgrade to medicine floor today.        Follow up:  monitor h/h  GI follow up and EGD report.

## 2020-09-30 NOTE — ADVANCED PRACTICE NURSE CONSULT - ASSESSMENT
55 y/o female with Medical Hx of Type II DM,  CAD, DLD, HTN, Hypothyroidism, COPD, Dextrocardia,  Sciatica/Peripheral Neuralgia, Charcot foot deformity s/p reconstruction on 1/11/19, presenting to ED (9/25) with bilateral lower extremity edema. Symptoms started about 2 weeks ago with generalized swelling in her abdomen, face and lower extremity, she also had some associated Chest and RLQ abdominal pain which started about 2 days ago. Currently admitted to medicine with the primary diagnosis of Fluid overload, unspecified.    Received patient  in CEU, sitting up in bed, awake, A&Ox3, made aware of purpose of WOCN visit, agreeable to consult. Patient able to turn independently to right side  for skin assessment. Patient reports left ischium wound present at home. Foam Allevyn dressing to left ischium in place, dressing removed.    Type of wound: Stage 3 pressure injury   Location: left ischium   Wound measurements: 1cm x 0.5cm x 0.2cm  Tunneling/Undermining: none  Wound bed: yellow subcutaneous tissue with islets of pink tissue throughout  Wound edges: attached   Periwound: intact, healed light pink tissue & light brown hyperpigmentation circumferentially; brown hyperpigmentation also present superior to open wound    Wound exudate: scant amount of serous drainage present on removed dressing  Wound odor: none   Induration, erythema, warmth:  none   Wound pain: denies     Patient OOB w/ assistance, able to turn/position independently in bed, reports continent of urine and stool. Currently NPO.

## 2020-09-30 NOTE — CHART NOTE - NSCHARTNOTEFT_GEN_A_CORE
Registered Dietitian Follow-Up     Patient Profile Reviewed                           Yes [x]   No []     Nutrition History Previously Obtained        Yes []  No [x]       Pertinent Subjective Information:     Pertinent Medical Interventions:  Blood per rectum and Melena  EGD planned for today   fluid overload, on lasix    Diet order: clear liquid      Anthropometrics:  - Ht. 62"  - Wt.   207.4lbs (9/30)-- wt changes likely d/t fluid shifts  190.4lbs (9/29)  181.8lbs (9/28)  - %wt change  - BMI 37.9 -- based on 207.4lbs   - IBW 110lbs     Pertinent Lab Data: (9/29) H/H 10.4/32.5, BUN 7, Mg 0.8   CAPILLARY BLOOD GLUCOSE  POCT Blood Glucose.: 94 mg/dL (30 Sep 2020 12:35)  POCT Blood Glucose.: 84 mg/dL (30 Sep 2020 00:01)  POCT Blood Glucose.: 213 mg/dL (29 Sep 2020 21:01)       Pertinent Meds: protonix, lipitor, synthroid, senna      Physical Findings:  - Appearance: Alert and oriented. +1 generalized edema noted per RN flow sheets  - GI function: LBM (9/28)  - Tubes: N/A   - Oral/Mouth cavity: No chewing/swallowing difficulties reported by staff   - Skin: stage 2 pressure injury on L ischium noted per RN flow sheets     Nutrition Requirements  Weight Used: Ht: 157.5 cm. Wt: 94.1 kg. IBW: 50 kg. -- continued from initial RD assessment (9/27)      Estimated Energy Needs    Continue [x]  Adjust []  Energy: 5201-8289 kcal/day (30-35 kcal/kg IBW)        Estimated Protein Needs    Continue [x]  Adjust []  Protein: 63-75 g/day (1.25-1.5 g/kg IBW)        Estimated Fluid Needs        Continue [x]  Adjust []  Fluids: per ICU team     Nutrient Intake: po intake trend unknown at this time as pt is on clear liquids         [x] Previous Nutrition Diagnosis:  Inadeqate Energy Intake            [x] Ongoing          [] Resolved       Nutrition Intervention: meals and snacks, vitamin/mineral supplementation       Goal/Expected Outcome: Diet to advance as medically feasible and pt to meet greater than 75% of estimated needs within 3 days      Indicator/Monitoring: diet order, energy intake, body comp, NFPF, renal/glucose/electrolyte profile     Recommendations:  Check HgbA1C. Order MVI q24hrs if not medically contraindicated. If able to advance diet to solids - add DASH/TLC diet modifier; will monitor blood glucose & HgbA1C to assess need for Consistent Carbohydrate diet modifier as well. Will monitor need for oral nutrition supplement pending diet advancement Registered Dietitian Follow-Up     Patient Profile Reviewed                           Yes [x]   No []     Nutrition History Previously Obtained        Yes []  No [x]       Pertinent Subjective Information: Pt off unit in endoscopy suite. Per RN pt is tolerating clear liquids, reports she's hungry and wants to eat.     Nutrition hx obtained from pt's daughter via phone number listed in EMR. Per daughter pt had a fair appetite x 1 year d/t chronic cough. At baseline eats ~1 main meal per day. Denies use of oral nutrition supplements, vitamins, or minerals. Confirms NKFA. No Advent or cultural food preferences. Denies difficulties chewing or swallowing. Report UBW ~180lbs, has had gradual wt loss x 1 year. Family amenable for pt to receive oral nutrition supplements pending diet advancement. Pt likes strawberry or chocolate as well as gelatin.      Pertinent Medical Interventions:  Blood per rectum and Melena  EGD planned for today   fluid overload, on lasix    Diet order: clear liquid      Anthropometrics:  - Ht. 62"  - Wt.   207.4lbs (9/30)-- wt changes likely d/t fluid shifts  190.4lbs (9/29)  181.8lbs (9/28)  - %wt change  - BMI 37.9 -- based on 207.4lbs   - IBW 110lbs     Pertinent Lab Data: (9/29) H/H 10.4/32.5, BUN 7, Mg 0.8   CAPILLARY BLOOD GLUCOSE  POCT Blood Glucose.: 94 mg/dL (30 Sep 2020 12:35)  POCT Blood Glucose.: 84 mg/dL (30 Sep 2020 00:01)  POCT Blood Glucose.: 213 mg/dL (29 Sep 2020 21:01)       Pertinent Meds: protonix, lipitor, synthroid, senna      Physical Findings:  - Appearance: Alert and oriented. +1 generalized edema noted per RN flow sheets  - GI function: LBM (9/28)  - Tubes: N/A   - Oral/Mouth cavity: No chewing/swallowing difficulties reported by staff   - Skin: stage 2 pressure injury on L ischium noted per RN flow sheets     Nutrition Requirements  Weight Used: Ht: 157.5 cm. Wt: 94.1 kg. IBW: 50 kg. -- continued from initial RD assessment (9/27)      Estimated Energy Needs    Continue [x]  Adjust []  Energy: 4750-0275 kcal/day (30-35 kcal/kg IBW)        Estimated Protein Needs    Continue [x]  Adjust []  Protein: 63-75 g/day (1.25-1.5 g/kg IBW)        Estimated Fluid Needs        Continue [x]  Adjust []  Fluids: per ICU team     Nutrient Intake: po intake trend unknown at this time as pt is on clear liquids         [x] Previous Nutrition Diagnosis:  Inadeqate Energy Intake            [x] Ongoing          [] Resolved       Nutrition Intervention: meals and snacks, vitamin/mineral supplementation       Goal/Expected Outcome: Diet to advance as medically feasible and pt to meet greater than 75% of estimated needs within 3 days      Indicator/Monitoring: diet order, energy intake, body comp, NFPF, renal/glucose/electrolyte profile     Recommendations:  1. If able to advance diet to solids recommend low fiber with DASH/TLC diet modifier   2. Will monitor need for oral nutrition supplements pending diet advancement   3. Order A1c lab value   4. If not contraindicated, order multivitamin   5. Replete Mg Registered Dietitian Follow-Up     Patient Profile Reviewed                           Yes [x]   No []     Nutrition History Previously Obtained        Yes []  No [x]       Pertinent Subjective Information: Pt off unit in endoscopy suite. Per RN pt is tolerating clear liquids, reports she's hungry and wants to eat.     Nutrition hx obtained from pt's daughter via phone number listed in EMR. Per daughter pt had a fair appetite x 1 year d/t chronic cough. At baseline eats ~1 main meal per day. Denies use of oral nutrition supplements, vitamins, or minerals. Confirms NKFA. No Mormonism or cultural food preferences. Denies difficulties chewing or swallowing. Report UBW ~180lbs, has had gradual wt loss x 1 year. Family amenable for pt to receive oral nutrition supplements pending diet advancement. Pt likes strawberry or chocolate as well as gelatin.      Pertinent Medical Interventions:  Blood per rectum and Melena  EGD planned for today   fluid overload, on lasix    Diet order: clear liquid      Anthropometrics:  - Ht. 62"  - Wt.   207.4lbs (9/30)-- wt changes likely d/t fluid shifts  190.4lbs (9/29)  181.8lbs (9/28)  - %wt change  - BMI 37.9 -- based on 207.4lbs   - IBW 110lbs     Pertinent Lab Data: (9/29) H/H 10.4/32.5, BUN 7, Mg 0.8   CAPILLARY BLOOD GLUCOSE  POCT Blood Glucose.: 94 mg/dL (30 Sep 2020 12:35)  POCT Blood Glucose.: 84 mg/dL (30 Sep 2020 00:01)  POCT Blood Glucose.: 213 mg/dL (29 Sep 2020 21:01)       Pertinent Meds: protonix, lipitor, synthroid, senna      Physical Findings:  - Appearance: Alert and oriented. +1 generalized edema noted per RN flow sheets  - GI function: LBM (9/28)  - Tubes: N/A   - Oral/Mouth cavity: No chewing/swallowing difficulties reported by staff   - Skin: stage 2 pressure injury on L ischium noted per RN flow sheets     Nutrition Requirements  Weight Used: Ht: 157.5 cm. Wt: 94.1 kg. IBW: 50 kg. -- continued from initial RD assessment (9/27)      Estimated Energy Needs    Continue [x]  Adjust []  Energy: 7157-8579 kcal/day (30-35 kcal/kg IBW)        Estimated Protein Needs    Continue [x]  Adjust []  Protein: 63-75 g/day (1.25-1.5 g/kg IBW)        Estimated Fluid Needs        Continue [x]  Adjust []  Fluids: per ICU team     Nutrient Intake: po intake trend unknown at this time as pt is on clear liquids         [x] Previous Nutrition Diagnosis:  Inadeqate Energy Intake            [x] Ongoing          [] Resolved       Nutrition Intervention: meals and snacks, vitamin/mineral supplementation       Goal/Expected Outcome: Diet to advance as medically feasible and pt to meet greater than 75% of estimated needs within 3 days      Indicator/Monitoring: diet order, energy intake, body comp, NFPF, renal/glucose/electrolyte profile     Recommendations:  1. If able to advance diet to solids recommend low fiber with DASH/TLC diet modifier   2. Will monitor need for oral nutrition supplements pending diet advancement   3. Order A1c lab value   4. If not contraindicated, order multivitamin   5. Replete Mg  Recommendations discussed with MD Johnson x [phone]

## 2020-09-30 NOTE — PROGRESS NOTE ADULT - SUBJECTIVE AND OBJECTIVE BOX
SUBJECTIVE:    Patient is a 55y old Female who presents with a chief complaint of sob (29 Sep 2020 19:18)    Currently admitted to medicine with the primary diagnosis of Fluid overload, unspecified    Today is hospital day 5d. This morning she is resting comfortably in bed and reports no new issues or overnight events.     Admit Diagnosis:  FLUID OVERLOAD,UNSPECIFIED;HYPONATREMIA        PAST MEDICAL & SURGICAL HISTORY  Dextrocardia    Chronic midline low back pain with bilateral sciatica    Peripheral neuralgia    Essential hypertension    Diabetes 1.5, managed as type 2    Charcot&#x27;s arthropathy  R foot    Charcot foot due to diabetes mellitus    H/O shoulder surgery  Right shoulder surgery     delivery delivered    Dextrocardia    Chronic midline low back pain with bilateral sciatica    Peripheral neuralgia    Essential hypertension    Diabetes 1.5, managed as type 2    Charcot&#x27;s arthropathy    Charcot foot due to diabetes mellitus    H/O shoulder surgery     delivery delivered        SOCIAL HISTORY:  Negative for smoking/alcohol/drug use.     ALLERGIES:  No Known Allergies    MEDICATIONS:  STANDING MEDICATIONS  atorvastatin 40 milliGRAM(s) Oral at bedtime  budesonide  80 MICROgram(s)/formoterol 4.5 MICROgram(s) Inhaler 2 Puff(s) Inhalation two times a day  chlorhexidine 4% Liquid 1 Application(s) Topical daily  DULoxetine 60 milliGRAM(s) Oral daily  influenza   Vaccine 0.5 milliLiter(s) IntraMuscular once  levothyroxine 100 MICROGram(s) Oral daily  pantoprazole  Injectable 40 milliGRAM(s) IV Push two times a day  pregabalin 150 milliGRAM(s) Oral three times a day  senna 2 Tablet(s) Oral at bedtime    PRN MEDICATIONS  ALBUTerol    90 MICROgram(s) HFA Inhaler 1 Puff(s) Inhalation four times a day PRN  hydrOXYzine hydrochloride 10 milliGRAM(s) Oral every 12 hours PRN  oxycodone    5 mG/acetaminophen 325 mG 1 Tablet(s) Oral every 8 hours PRN    VITALS:   T(F): 98.2  HR: 108  BP: 90/58  RR: 18  SpO2: 100%    I&Os:  20 @ 07:01  -  20 @ 07:00  --------------------------------------------------------  IN: 0 mL / OUT: 4800 mL / NET: -4800 mL    20 @ 07:01  -  20 @ 13:19  --------------------------------------------------------  IN: 0 mL / OUT: 600 mL / NET: -600 mL        PHYSICAL EXAM:  GEN: No acute distress  LUNGS: Clear to auscultation bilaterally   HEART: S1/S2 present. RRR.   ABD: Soft, NT/ND. BS +  EXT: NC/NC/NE/2+PP/LING  NEURO: AAOX3    LABS:                        10.4   6.82  )-----------( 145      ( 29 Sep 2020 07:34 )             32.5         135  |  90<L>  |  7<L>  ----------------------------<  89  3.8   |  37<H>  |  0.8    Ca    7.2<L>      29 Sep 2020 07:34  Mg     0.8         TPro  5.8<L>  /  Alb  3.2<L>  /  TBili  1.5<H>  /  DBili  x   /  AST  40  /  ALT  26  /  AlkPhos  82          RADIOLOGY:  < from: Xray Chest 1 View- PORTABLE-Routine (Xray Chest 1 View- PORTABLE-Routine in AM.) (20 @ 06:16) >  Impression:    No focal consolidation, pneumothorax or pleural effusion. Increased left basal hazy opacification may be projectional due to overlying breast tissue.    < end of copied text >    < from: TTE Echo Complete w/o Contrast w/ Doppler (20 @ 09:09) >  Summary:   1. Normal global left ventricular systolic function.   2. LV Ejection Fraction by Jade's Method with a biplane EF of 72 %.   3. Normal left ventricular internal cavity size.   4. Spectral Doppler shows impaired relaxation pattern of left ventricular myocardial filling (Grade I diastolic dysfunction).   5. Normal left atrial size.   6. Normal right atrial size.   7. There is no evidence of pericardial effusion.   8. Mild mitral annular calcification.   9. Mild thickening of the posterior mitral valve leaflet.  10. Trace mitral valve regurgitation.    < end of copied text >    < from: [facility] Duplex Lower Ext Vein Scan, Bilat (20 @ 19:48) >  Impression:    No evidence of deep venous thrombosis or superficial thrombophlebitis in the bilateral lower extremities.    < end of copied text >

## 2020-09-30 NOTE — CHART NOTE - NSCHARTNOTEFT_GEN_A_CORE
PACU ANESTHESIA ADMISSION NOTE      Procedure:   Post op diagnosis:      ____  Intubated  TV:______       Rate: ______      FiO2: ______    __x__  Patent Airway    __x__  Full return of protective reflexes    _x___  Full recovery from anesthesia / back to baseline     Vitals:   T:           R: 12                 BP:  89/51                Sat:  92                 P: 109      Mental Status:  __x__ Awake   __x___ Alert   _____ Drowsy   _____ Sedated    Nausea/Vomiting:  __x__ NO  ______Yes,   See Post - Op Orders          Pain Scale (0-10):  __x__    Treatment: ____ None    ____ See Post - Op/PCA Orders    Post - Operative Fluids:   __x__ Oral   ____ See Post - Op Orders    Plan: Discharge:   ____Home       _x____Floor     _____Critical Care    _____  Other:_________________    Comments: tolerated procedure well

## 2020-10-01 ENCOUNTER — TRANSCRIPTION ENCOUNTER (OUTPATIENT)
Age: 55
End: 2020-10-01

## 2020-10-01 LAB
ALBUMIN SERPL ELPH-MCNC: 3.3 G/DL — LOW (ref 3.5–5.2)
ALP SERPL-CCNC: 94 U/L — SIGNIFICANT CHANGE UP (ref 30–115)
ALT FLD-CCNC: 23 U/L — SIGNIFICANT CHANGE UP (ref 0–41)
ANA TITR SER: NEGATIVE — SIGNIFICANT CHANGE UP
ANION GAP SERPL CALC-SCNC: 15 MMOL/L — HIGH (ref 7–14)
AST SERPL-CCNC: 32 U/L — SIGNIFICANT CHANGE UP (ref 0–41)
BASOPHILS # BLD AUTO: 0.08 K/UL — SIGNIFICANT CHANGE UP (ref 0–0.2)
BASOPHILS NFR BLD AUTO: 0.5 % — SIGNIFICANT CHANGE UP (ref 0–1)
BILIRUB SERPL-MCNC: 1.3 MG/DL — HIGH (ref 0.2–1.2)
BUN SERPL-MCNC: 15 MG/DL — SIGNIFICANT CHANGE UP (ref 10–20)
CALCIUM SERPL-MCNC: 6.8 MG/DL — LOW (ref 8.5–10.1)
CHLORIDE SERPL-SCNC: 84 MMOL/L — LOW (ref 98–110)
CO2 SERPL-SCNC: 31 MMOL/L — SIGNIFICANT CHANGE UP (ref 17–32)
CREAT SERPL-MCNC: 1.1 MG/DL — SIGNIFICANT CHANGE UP (ref 0.7–1.5)
EOSINOPHIL # BLD AUTO: 0.32 K/UL — SIGNIFICANT CHANGE UP (ref 0–0.7)
EOSINOPHIL NFR BLD AUTO: 2.1 % — SIGNIFICANT CHANGE UP (ref 0–8)
GLUCOSE BLDC GLUCOMTR-MCNC: 116 MG/DL — HIGH (ref 70–99)
GLUCOSE BLDC GLUCOMTR-MCNC: 119 MG/DL — HIGH (ref 70–99)
GLUCOSE BLDC GLUCOMTR-MCNC: 136 MG/DL — HIGH (ref 70–99)
GLUCOSE BLDC GLUCOMTR-MCNC: 143 MG/DL — HIGH (ref 70–99)
GLUCOSE SERPL-MCNC: 88 MG/DL — SIGNIFICANT CHANGE UP (ref 70–99)
HCT VFR BLD CALC: 36.5 % — LOW (ref 37–47)
HGB BLD-MCNC: 11.5 G/DL — LOW (ref 12–16)
IMM GRANULOCYTES NFR BLD AUTO: 0.5 % — HIGH (ref 0.1–0.3)
LYMPHOCYTES # BLD AUTO: 0.84 K/UL — LOW (ref 1.2–3.4)
LYMPHOCYTES # BLD AUTO: 5.6 % — LOW (ref 20.5–51.1)
MAGNESIUM SERPL-MCNC: 0.8 MG/DL — LOW (ref 1.8–2.4)
MCHC RBC-ENTMCNC: 27.9 PG — SIGNIFICANT CHANGE UP (ref 27–31)
MCHC RBC-ENTMCNC: 31.5 G/DL — LOW (ref 32–37)
MCV RBC AUTO: 88.6 FL — SIGNIFICANT CHANGE UP (ref 81–99)
MONOCYTES # BLD AUTO: 1.33 K/UL — HIGH (ref 0.1–0.6)
MONOCYTES NFR BLD AUTO: 8.9 % — SIGNIFICANT CHANGE UP (ref 1.7–9.3)
NEUTROPHILS # BLD AUTO: 12.36 K/UL — HIGH (ref 1.4–6.5)
NEUTROPHILS NFR BLD AUTO: 82.4 % — HIGH (ref 42.2–75.2)
NRBC # BLD: 0 /100 WBCS — SIGNIFICANT CHANGE UP (ref 0–0)
PLATELET # BLD AUTO: 173 K/UL — SIGNIFICANT CHANGE UP (ref 130–400)
POTASSIUM SERPL-MCNC: 4 MMOL/L — SIGNIFICANT CHANGE UP (ref 3.5–5)
POTASSIUM SERPL-SCNC: 4 MMOL/L — SIGNIFICANT CHANGE UP (ref 3.5–5)
PROT SERPL-MCNC: 6 G/DL — SIGNIFICANT CHANGE UP (ref 6–8)
RBC # BLD: 4.12 M/UL — LOW (ref 4.2–5.4)
RBC # FLD: 18.1 % — HIGH (ref 11.5–14.5)
SODIUM SERPL-SCNC: 130 MMOL/L — LOW (ref 135–146)
WBC # BLD: 15.01 K/UL — HIGH (ref 4.8–10.8)
WBC # FLD AUTO: 15.01 K/UL — HIGH (ref 4.8–10.8)

## 2020-10-01 PROCEDURE — 99232 SBSQ HOSP IP/OBS MODERATE 35: CPT

## 2020-10-01 PROCEDURE — 71045 X-RAY EXAM CHEST 1 VIEW: CPT | Mod: 26

## 2020-10-01 PROCEDURE — 99233 SBSQ HOSP IP/OBS HIGH 50: CPT

## 2020-10-01 RX ORDER — FLUCONAZOLE 150 MG/1
200 TABLET ORAL EVERY 24 HOURS
Refills: 0 | Status: DISCONTINUED | OUTPATIENT
Start: 2020-10-02 | End: 2020-10-02

## 2020-10-01 RX ORDER — CALCIUM CARBONATE 500(1250)
1 TABLET ORAL THREE TIMES A DAY
Refills: 0 | Status: DISCONTINUED | OUTPATIENT
Start: 2020-10-01 | End: 2020-10-02

## 2020-10-01 RX ORDER — FUROSEMIDE 40 MG
40 TABLET ORAL ONCE
Refills: 0 | Status: COMPLETED | OUTPATIENT
Start: 2020-10-01 | End: 2020-10-01

## 2020-10-01 RX ORDER — FLUCONAZOLE 150 MG/1
200 TABLET ORAL ONCE
Refills: 0 | Status: DISCONTINUED | OUTPATIENT
Start: 2020-10-01 | End: 2020-10-02

## 2020-10-01 RX ORDER — SUCRALFATE 1 G
1 TABLET ORAL
Refills: 0 | Status: DISCONTINUED | OUTPATIENT
Start: 2020-10-01 | End: 2020-10-02

## 2020-10-01 RX ORDER — PANTOPRAZOLE SODIUM 20 MG/1
40 TABLET, DELAYED RELEASE ORAL
Refills: 0 | Status: DISCONTINUED | OUTPATIENT
Start: 2020-10-01 | End: 2020-10-02

## 2020-10-01 RX ORDER — MAGNESIUM SULFATE 500 MG/ML
2 VIAL (ML) INJECTION EVERY 4 HOURS
Refills: 0 | Status: COMPLETED | OUTPATIENT
Start: 2020-10-01 | End: 2020-10-01

## 2020-10-01 RX ADMIN — Medication 40 MILLIGRAM(S): at 16:40

## 2020-10-01 RX ADMIN — Medication 16.67 GRAM(S): at 17:02

## 2020-10-01 RX ADMIN — PANTOPRAZOLE SODIUM 40 MILLIGRAM(S): 20 TABLET, DELAYED RELEASE ORAL at 17:16

## 2020-10-01 RX ADMIN — Medication 1 GRAM(S): at 12:11

## 2020-10-01 RX ADMIN — Medication 150 MILLIGRAM(S): at 22:03

## 2020-10-01 RX ADMIN — Medication 1 GRAM(S): at 23:33

## 2020-10-01 RX ADMIN — Medication 1 GRAM(S): at 17:15

## 2020-10-01 RX ADMIN — Medication 100 MICROGRAM(S): at 06:28

## 2020-10-01 RX ADMIN — OXYCODONE AND ACETAMINOPHEN 1 TABLET(S): 5; 325 TABLET ORAL at 16:49

## 2020-10-01 RX ADMIN — Medication 1 GRAM(S): at 06:28

## 2020-10-01 RX ADMIN — Medication 1 GRAM(S): at 00:23

## 2020-10-01 RX ADMIN — DULOXETINE HYDROCHLORIDE 60 MILLIGRAM(S): 30 CAPSULE, DELAYED RELEASE ORAL at 12:11

## 2020-10-01 RX ADMIN — ATORVASTATIN CALCIUM 40 MILLIGRAM(S): 80 TABLET, FILM COATED ORAL at 22:03

## 2020-10-01 RX ADMIN — PANTOPRAZOLE SODIUM 40 MILLIGRAM(S): 20 TABLET, DELAYED RELEASE ORAL at 06:28

## 2020-10-01 RX ADMIN — Medication 150 MILLIGRAM(S): at 14:08

## 2020-10-01 RX ADMIN — Medication 1 TABLET(S): at 22:03

## 2020-10-01 RX ADMIN — Medication 16.67 GRAM(S): at 22:05

## 2020-10-01 RX ADMIN — OXYCODONE AND ACETAMINOPHEN 1 TABLET(S): 5; 325 TABLET ORAL at 14:08

## 2020-10-01 RX ADMIN — SENNA PLUS 2 TABLET(S): 8.6 TABLET ORAL at 22:03

## 2020-10-01 RX ADMIN — BUDESONIDE AND FORMOTEROL FUMARATE DIHYDRATE 2 PUFF(S): 160; 4.5 AEROSOL RESPIRATORY (INHALATION) at 22:08

## 2020-10-01 RX ADMIN — Medication 150 MILLIGRAM(S): at 06:33

## 2020-10-01 RX ADMIN — OXYCODONE AND ACETAMINOPHEN 1 TABLET(S): 5; 325 TABLET ORAL at 23:33

## 2020-10-01 RX ADMIN — BUDESONIDE AND FORMOTEROL FUMARATE DIHYDRATE 2 PUFF(S): 160; 4.5 AEROSOL RESPIRATORY (INHALATION) at 11:14

## 2020-10-01 NOTE — PROGRESS NOTE ADULT - ATTENDING COMMENTS
Seen and examined with the CE[facility] resident at the bed side.  Impression and plan as outlined above.  GIB secondary to PUD.  Possible HILARY   Needs OP pulmonary follow up for sleep testing
Pt who presented with CHF exacerbation, course c/b melena and some red blood per rectum. Likely UGIB given elevated BUN at the time of bleed. Currently stable. Can perform EGD once cardiopulmonary status improves.
Pt who presented with melena, currently acutely SOB. Will plan for EGD once medically optimized. LFTs elevated, w/u as above.
Pt with hx of PUD, had anemia; EGD showed large duodenal ulcer extending into pyloric channel. PPI and carafate. Will need repeat EGD in 1-2 months to assess healing.
Patient seen and examined, agree with above, gi bleed, CHF, hb stable, echo, IV lasix, trend CBC, protonix

## 2020-10-01 NOTE — PROGRESS NOTE ADULT - ASSESSMENT
IMPRESSION:     UGIB secondary to PUD    HO Dextrocardia  H/O Anxiety   JEEVAN- Resolved    Probable HILARY     PLAN:    CNS: Continue present management     HEENT: Oral care    PULMONARY:  HOB @ 45 degrees. Aspiration Precautions.  Wean O2      CARDIOVASCULAR: Avoid volume overload.        GI: Feeding per GI.      RENAL:  Follow up lytes.  Correct as needed     INFECTIOUS DISEASE: Follow up cultures.     HEMATOLOGICAL:  DVT prophylaxis- SCDs. Monitor CBC and Coags     ENDOCRINE:  Follow up FS.    MUSCULOSKELETAL: OOB to chair    OP pulm FU for sleep study

## 2020-10-01 NOTE — PROGRESS NOTE ADULT - ASSESSMENT
Patient is a 55 y/o female with Medical Hx of Type II DM,  CAD, DLD, HTN, Hypothyroidism, COPD, Dextrocardia,  Sciatica/Peripheral Neuralgia, Charcot foot deformity s/p reconstruction on 1/11/19, presenting to ED with bilateral lower extremity edema. Symptoms started about 2 weeks ago with generalized swelling in her abdomen, face and lower extremity, she also had some associated Chest and RLQ abdominal pain which started about 2 days ago.  Pt was admitted to CEU and transferred to medicine floor on 9/30/20 night    # GI bleeding sec to duodenal ulcer  -  CT Angio Abdomen and Pelvis w/ IV Cont (09.27.20 @ 05:21) >No evidence of gastrointestinal arterial extravasation or venous pooling to suggest active intestinal bleed.  Interstitial edema with lung groundglass attenuation, which may represent pulmonary edema in the appropriate clinical setting.  - S/p EGD on 9/30/20 --> Big duodenal Ulcer extending to the antrum   - HB/HCT stable  - PPI BID and carafate   -Repeat EGD in 2 months   - Follow with Dr Zendejas in 2weeks     # Dysphagia sec to esophageal candidiasis  - started on fluconazole    # Acute Hypoxic respiratory failure se to Acute on chronic diastolic CHF  - TTE Echo Complete w/o Contrast w/ Doppler (09.29.20 @ 09:09) >EF of 72 %.(Grade I diastolic dysfunction).  - Xray Chest 1 View-PORTABLE IMMEDIATE (Xray Chest 1 View-PORTABLE IMMEDIATE .) (10.01.20 @ 09:04) >Increased bilateral mid to lower lung predominant interstitial and parenchymal opacities. No pneumothorax.  - monitor I/o, daily weight, restrict fluids  - c/w lasix    # Transaminitis sec to CHF resolving  -  CT Angio Abdomen and Pelvis w/ IV Cont (09.27.20 @ 05:21) >Liver unremarkable. Patent portal vein.  - US Abdomen Limited (09.25.20 @ 22:55) >Elongated liver with normal-appearing echogenicity. This may be secondary to a Riedel's lobe rather than hepatomegaly.   - hepatitis panel -neg  - f/u Smooth Muscle Antibody, Transferrin Saturation, SPEP, AMA, RADHA, Ceruloplasmin, Ferritin.     # Hyponatremia  - monitor NA    # Hypomagnesemia  - replete mg    # Acute kidney injury on CKD stage 3-resolved    # DM type 2  - Hemoglobin A1C, Whole Blood: 6.0 % (01.11.19 @ 06:11)  - monitor FS    # H/o CAD, hypertension  - c/w statin, lasix  - continue to hold metoprolol, valsartan    # # H/o COPD- stable  - c/w home inhalers    # H/o Hypothyroidism  -c/w synthroid    # H/o Sciatica   - c/w home meds    # H/o Anxiety   - c/w home meds    # left ischium stage 3 ulcer POA  - local wound care    # DVT prophylaxis  - Full code    #  Full code    #Pending: monitor cbc, clinical improvement  # Discussed plan of care with patient  # Disposition: Home when stable       Patient is a 53 y/o female with Medical Hx of Type II DM,  CAD, DLD, HTN, Hypothyroidism, COPD, Dextrocardia,  Sciatica/Peripheral Neuralgia, Charcot foot deformity s/p reconstruction on 1/11/19, presenting to ED with bilateral lower extremity edema. Symptoms started about 2 weeks ago with generalized swelling in her abdomen, face and lower extremity, she also had some associated Chest and RLQ abdominal pain which started about 2 days ago.  Pt was admitted to CEU and transferred to medicine floor on 9/30/20 night    # GI bleeding sec to duodenal ulcer  -  CT Angio Abdomen and Pelvis w/ IV Cont (09.27.20 @ 05:21) >No evidence of gastrointestinal arterial extravasation or venous pooling to suggest active intestinal bleed.  Interstitial edema with lung groundglass attenuation, which may represent pulmonary edema in the appropriate clinical setting.  - S/p EGD on 9/30/20 --> Big duodenal Ulcer extending to the antrum   - HB/HCT stable  - PPI BID and carafate   -Repeat EGD in 2 months   - Follow with Dr Zendejas in 2weeks     # Dysphagia sec to esophageal candidiasis  - started on fluconazole    # Acute Hypoxic respiratory failure se to Acute on chronic diastolic CHF  - TTE Echo Complete w/o Contrast w/ Doppler (09.29.20 @ 09:09) >EF of 72 %.(Grade I diastolic dysfunction).  - Xray Chest 1 View-PORTABLE IMMEDIATE (Xray Chest 1 View-PORTABLE IMMEDIATE .) (10.01.20 @ 09:04) >Increased bilateral mid to lower lung predominant interstitial and parenchymal opacities. No pneumothorax.  - monitor I/o, daily weight, restrict fluids  - c/w lasix    # Transaminitis sec to CHF resolving  -  CT Angio Abdomen and Pelvis w/ IV Cont (09.27.20 @ 05:21) >Liver unremarkable. Patent portal vein.  - US Abdomen Limited (09.25.20 @ 22:55) >Elongated liver with normal-appearing echogenicity. This may be secondary to a Riedel's lobe rather than hepatomegaly.   - hepatitis panel -neg  - f/u Smooth Muscle Antibody, Transferrin Saturation, SPEP, AMA, RADHA, Ceruloplasmin, Ferritin.     # Hyponatremia  - monitor NA    # Hypomagnesemia  - replete mg    # Acute kidney injury on CKD stage 2-resolving    # DM type 2  - Hemoglobin A1C, Whole Blood: 6.0 % (01.11.19 @ 06:11)  - monitor FS    # H/o CAD, hypertension  - c/w statin, lasix  - continue to hold metoprolol, valsartan    # # H/o COPD- stable  - c/w home inhalers    # H/o Hypothyroidism  -c/w synthroid    # H/o Sciatica   - c/w home meds    # H/o Anxiety   - c/w home meds    # left ischium stage 3 ulcer POA  - local wound care    # DVT prophylaxis  - Full code    #  Full code    #Pending: monitor cbc, clinical improvement  # Discussed plan of care with patient  # Disposition: Home when stable

## 2020-10-01 NOTE — PROGRESS NOTE ADULT - SUBJECTIVE AND OBJECTIVE BOX
We are following the patient for GI bleed     GI HPI Today:  Pt feels good  No more bleeding or BM   No vomiting   Pt reports problem swallowing liquid and solids started few days ago     PAST MEDICAL & SURGICAL HISTORY  Dextrocardia    Chronic midline low back pain with bilateral sciatica    Peripheral neuralgia    Essential hypertension    Diabetes 1.5, managed as type 2    Charcot&#x27;s arthropathy  R foot    Charcot foot due to diabetes mellitus    H/O shoulder surgery  Right shoulder surgery     delivery delivered        ALLERGIES:  No Known Allergies      MEDICATIONS:  MEDICATIONS  (STANDING):  atorvastatin 40 milliGRAM(s) Oral at bedtime  budesonide  80 MICROgram(s)/formoterol 4.5 MICROgram(s) Inhaler 2 Puff(s) Inhalation two times a day  chlorhexidine 4% Liquid 1 Application(s) Topical daily  DULoxetine 60 milliGRAM(s) Oral daily  influenza   Vaccine 0.5 milliLiter(s) IntraMuscular once  levothyroxine 100 MICROGram(s) Oral daily  pantoprazole  Injectable 40 milliGRAM(s) IV Push two times a day  pregabalin 150 milliGRAM(s) Oral three times a day  senna 2 Tablet(s) Oral at bedtime  sucralfate 1 Gram(s) Oral four times a day    MEDICATIONS  (PRN):  ALBUTerol    90 MICROgram(s) HFA Inhaler 1 Puff(s) Inhalation four times a day PRN Shortness of Breath and/or Wheezing  hydrOXYzine hydrochloride 10 milliGRAM(s) Oral every 12 hours PRN Anxiety  oxycodone    5 mG/acetaminophen 325 mG 1 Tablet(s) Oral every 8 hours PRN Moderate Pain (4 - 6)      REVIEW OF SYSTEMS  General:  No fevers  Eyes:  No reported pain   ENT:  No sore throat   NECK: No stiffness   CV:  No chest pain   Resp:  No shortness of breath  GI:  See HPI  :  No dysuria  Muscle:  + weakness  Neuro:  No tingling  Endocrine:  No polyuria  Heme:  No ecchymosis        VITALS:   T(F): 99.1 (10-01 @ 05:32), Max: 99.1 (10-01 @ 05:32)  HR: 122 (10-01 @ 05:32) (98 - 122)  BP: 102/58 (10-01 @ 05:32) (90/58 - 150/82)  BP(mean): 92 ( @ 12:18) (69 - 117)  RR: 18 (10-01 @ 05:32) (18 - 32)  SpO2: 97% ( @ 21:44) (86% - 100%)        PHYSICAL EXAM:  GENERAL:  Appears in no distress  HEENT:  Conjunctivae Anicteric   CHEST:  Full & symmetric excursion  HEART:  NS1, S2,   ABDOMEN:  Soft, non-tender, non-distended  EXTEREMITIES:  no edema  SKIN:  No rash  NEURO:  Alert         Blood Work :                        10.4   6.82  )-----------( 145      ( 29 Sep 2020 07:34 )             32.5     PT/INR - ( 28 Sep 2020 04:25 )  INR: 1.36          PTT - ( 28 Sep 2020 04:25 )  PTT:29.0       135  |  90<L>  |  7<L>  ----------------------------<  89  3.8   |  37<H>  |  0.8    Ca    7.2<L>      29 Sep 2020 07:34  Mg     0.8           CBC -  ( 29 Sep 2020 07:34 )  Hemoglobin : 10.4    CBC -  ( 28 Sep 2020 17:47 )  Hemoglobin : 11.0    CBC -  ( 28 Sep 2020 04:25 )  Hemoglobin : 9.5    CBC -  ( 27 Sep 2020 23:30 )  Hemoglobin : 9.6    CBC -  ( 27 Sep 2020 20:12 )  Hemoglobin : 9.8    CBC -  ( 27 Sep 2020 15:04 )  Hemoglobin : 10.2      LIVER FUNCTIONS - ( 29 Sep 2020 07:34 )  Alb: 3.2 [3.5 - 5.2] / Pro: 5.8 [6.0 - 8.0] / ALK PHOS: 82 [30 - 115] / ALT: 26 [0 - 41] / AST: 40 [0 - 41] / GGT: x     LIVER FUNCTIONS - ( 28 Sep 2020 04:25 )  Alb: 3.1 [3.5 - 5.2] / Pro: 5.2 [6.0 - 8.0] / ALK PHOS: 70 [30 - 115] / ALT: 31 [0 - 41] / AST: 54 [0 - 41] / GGT: x     LIVER FUNCTIONS - ( 27 Sep 2020 04:30 )  Alb: 2.7 [3.5 - 5.2] / Pro: 5.1 [6.0 - 8.0] / ALK PHOS: 72 [30 - 115] / ALT: 39 [0 - 41] / AST: 87 [0 - 41] / GGT: x     LIVER FUNCTIONS - ( 26 Sep 2020 20:30 )  Alb: 3.2 [3.5 - 5.2] / Pro: 5.4 [6.0 - 8.0] / ALK PHOS: 65 [30 - 115] / ALT: 39 [0 - 41] / AST: 96 [0 - 41] / GGT: x     LIVER FUNCTIONS - ( 26 Sep 2020 12:03 )  Alb: 2.7 [3.5 - 5.2] / Pro: 4.5 [6.0 - 8.0] / ALK PHOS: 65 [30 - 115] / ALT: 33 [0 - 41] / AST: 87 [0 - 41] / GGT: x     LIVER FUNCTIONS - ( 26 Sep 2020 04:43 )  Alb: 3.0 [3.5 - 5.2] / Pro: 5.3 [6.0 - 8.0] / ALK PHOS: 74 [30 - 115] / ALT: 35 [0 - 41] / AST: 93 [0 - 41] / GGT: x     LIVER FUNCTIONS - ( 25 Sep 2020 20:08 )  Alb: 3.1 [3.5 - 5.2] / Pro: 5.8 [6.0 - 8.0] / ALK PHOS: 80 [30 - 115] / ALT: 38 [0 - 41] / AST: 122 [0 - 41] / GGT: x

## 2020-10-01 NOTE — PROGRESS NOTE ADULT - ASSESSMENT
Assessment and Plan  Case of a 54 year old female patient with a past medical history significant for Type II DM,  CAD (Dextrocardia), DL, HTN, Hypothyroidism, COPD, Sciatica/Peripheral Neuralgia, and Charcot foot deformity s/p reconstruction on 1/11/19. She presented to the ED on 09/25 with bilateral lower extremity edema associated with some SOB and orthopnea, admitted for CHF exacerbation, stay complicated by 3 episodes of hematochezia and 1 episode of melena on 09/26 for which she was transferred to ICU s/p 4u pRBCs, negative CT angio, and EGD on 09/30 significant for a large duodenal ulcer extending to antrum. Hemodynamically stable.       Melena Secondary to Large Duodenal Ulcer extending to Antrum  * 3 episodes of hematochezia and 1 episode of melena on 09/26   * Drop in Hb on 09/26 down to 6.2   -> S/P 4 units of packed RBCs --> Repeat Hb (09/26) 9.8 -> Hb (09/27) 10.6, 10.2, 9.8, 9.6 -> Hb (09/28) 9.5, 11 -> (09/29) 10.4  -> S/P CT angio abdomen/pelvis with IV contrast (09/27): no evidence of gastrointestinal arterial extravasation or venous pooling to suggest active intestinal bleed  -> S/P EGD on 9/30/20 -> Big duodenal ulcer extending to the antrum. Possible Esophageal Candidiasis s/p biopsies    - Monitor Hb on a daily basis: Hb (09/29) 10.4 -> Hb (10/01): 11.5  - Keep active type and screen: Last one active on 09/29 O + so order for 10/02  - Switched from IV Protonix 40mg BID to PO Protonix 40mg BID on 10/01  - Keep Aspirin 81mg QD held since 09/26  - Advanced diet from soft on 10/01  - Currently off IV hydration: was on 0.9 NS at a rate of 100mL/hour until 09/27  - GI team is on board: will repeat EGD in 2 months: Follow up with Dr Zendejas in 2weeks       Dysphagia secondary to esophageal candidiasis  * EGD on 9/30/20 -> Big duodenal ulcer extending to the antrum. Possible Esophageal Candidiasis s/p biopsies  - Administered fluconazole 200mg once on 10/01 and will continue 200mg PO daily starting 10/02 per GI recommendations      Acute Hypoxic respiratory Failure Secondary to Acute on Chronic CHF  * TTE Echo w/o Contrast w/ Doppler (09/29): EF of 72 % (Grade I diastolic dysfunction).  - Xray Chest 1 View-PORTABLE IMMEDIATE (Xray Chest 1 View-PORTABLE IMMEDIATE .) (10.01.20 @ 09:04) >Increased bilateral mid to lower lung predominant interstitial and parenchymal opacities. No pneumothorax.  - monitor I/o, daily weight, restrict fluids  - c/w lasix    # Transaminitis sec to CHF resolving  -  CT Angio Abdomen and Pelvis w/ IV Cont (09.27.20 @ 05:21) >Liver unremarkable. Patent portal vein.  - US Abdomen Limited (09.25.20 @ 22:55) >Elongated liver with normal-appearing echogenicity. This may be secondary to a Riedel's lobe rather than hepatomegaly.   - hepatitis panel -neg  - f/u Smooth Muscle Antibody, Transferrin Saturation, SPEP, AMA, RADHA, Ceruloplasmin, Ferritin.     # Hyponatremia  - monitor NA    # Hypomagnesemia  - replete mg    # Acute kidney injury on CKD stage 2-resolving    # DM type 2  - Hemoglobin A1C, Whole Blood: 6.0 % (01.11.19 @ 06:11)  - monitor FS    # H/o CAD, hypertension  - c/w statin, lasix  - continue to hold metoprolol, valsartan    # # H/o COPD- stable  - c/w home inhalers    # H/o Hypothyroidism  -c/w synthroid    # H/o Sciatica   - c/w home meds    # H/o Anxiety   - c/w home meds    # left ischium stage 3 ulcer POA  - local wound care    # DVT prophylaxis  - Full code    #  Full code    #Pending: monitor cbc, clinical improvement  # Discussed plan of care with patient  # Disposition: Home when stable    Others  - DVT Prophylaxis: Lovenox 40mg Subcutaneously daily  - GI Prophylaxis: Pantoprazole 40mg PO QD  - IV Hydration with D5 0.45 NSS at a rate of 20cc/hour   - Diet: Regular  - Code Status: Full    Barriers to learning: YES/NO  Discharge Planning: Patient will be discharged once stable and  Plan was communicated with   Education given to:   Educated about:       Danae Arboleda MD  PGY - 1 Internal Medicine   [facility] Assessment and Plan  Case of a 54 year old female patient with a past medical history significant for Type II DM,  CAD (Dextrocardia), DL, HTN, Hypothyroidism, COPD, Sciatica/Peripheral Neuralgia, and Charcot foot deformity s/p reconstruction on 1/11/19. She presented to the ED on 09/25 with bilateral lower extremity edema associated with some SOB and orthopnea, admitted for CHF exacerbation, stay complicated by 3 episodes of hematochezia and 1 episode of melena on 09/26 for which she was transferred to ICU s/p 4u pRBCs, negative CT angio, and EGD on 09/30 significant for a large duodenal ulcer extending to antrum. Hemodynamically stable.       Melena Secondary to Large Duodenal Ulcer extending to Antrum  * 3 episodes of hematochezia and 1 episode of melena on 09/26   * Drop in Hb on 09/26 down to 6.2   -> S/P 4 units of packed RBCs --> Repeat Hb (09/26) 9.8 -> Hb (09/27) 10.6, 10.2, 9.8, 9.6 -> Hb (09/28) 9.5, 11 -> (09/29) 10.4  -> S/P CT angio abdomen/pelvis with IV contrast (09/27): no evidence of gastrointestinal arterial extravasation or venous pooling to suggest active intestinal bleed  -> S/P EGD on 9/30/20 -> Big duodenal ulcer extending to the antrum. Possible Esophageal Candidiasis s/p biopsies    - Monitor Hb on a daily basis: Hb (09/29) 10.4 -> Hb (10/01): 11.5  - Keep active type and screen: Last one active on 09/29 O + so order for 10/02  - Switched from IV Protonix 40mg BID to PO Protonix 40mg BID on 10/01  - Keep Aspirin 81mg QD held since 09/26  - Advanced diet from soft on 10/01  - Currently off IV hydration: was on 0.9 NS at a rate of 100mL/hour until 09/27  - GI team is on board: will repeat EGD in 2 months: Follow up with Dr Zendejas in 2weeks       Dysphagia secondary to esophageal candidiasis  * EGD on 9/30/20 -> Big duodenal ulcer extending to the antrum. Possible Esophageal Candidiasis s/p biopsies  - Administered fluconazole 200mg once on 10/01 and will continue 200mg PO daily starting 10/02 per GI recommendations      Acute Hypoxic respiratory Failure Secondary to Acute on Chronic CHF  * TTE Echo w/o Contrast w/ Doppler (09/29): EF of 72 % (Grade I diastolic dysfunction)  * Pro BNP (09/25): 84321 -> (09/28): 6518  * CXR (09/25): congested  - Continue diuresis based on daily assessment: administered IV Lasix 40mg once on 10/01. S/P Lasix IV 40mg once on 09/25, 09/28->09/30. S/P IV Lasix 60mg once on 09/27  - Monitor accurate intake/ output: 10/01 I 4.62L, O 1.87, net in 24 hours: -1.4L  - Monitor daily weight: Wt 94.1kg on 09/30  - Follow up CXR on a daily basis: Last CXR on 10/01: Increased bilateral mid to lower lung predominant interstitial and parenchymal opacities. No pneumothorax.      Transaminitis Possibly Secondary to CHF   * AST trending down from 122 on 09/25 to 40 on 09/29  * CT Angio Abdomen/Pelvis w/ IV Cont (09/27): Liver unremarkable. Patent portal vein.  * US Abdomen (09/25): Elongated liver with normal-appearing echogenicity. This may be secondary to a Riedel's lobe rather than hepatomegaly.   * Hepatitis panel (A, B, C): negative  * Smooth Muscle Antibody (09/29) 1:20  * SPEP normal  * AMA < 1:20  * Ceruloplasmin: 26   * Ferritin: 80   - Follow up AMA (09/29, sample received)      Electrolyte Derangements  * Hypocalcemia  - Monitor Ca on a daily basis: 10/01 Ca 6.8 with albumin 3.3  - Start CaCO3 1.25g TID (10/01)    * Hyponatremia  - Monitor Na on a daily basis: 10/01 Na 130    * Hypomagnesemia  - Monitor Mg on a daily basis: 10/01 Mg 0.8 -> S/P 2g IV MgSO4 every 4 hours over 3 hours for 2 doses  - Follow up Mg level on 10/02      Acute kidney injury on CKD stage 2  * Resolved  * On presentation: BUN 34, Cr 1.6 on 09/25  - Monitor BUN and Cr on a daily basis: 10/01 BUN 15, Cr 1.1  - Currently off IV hydration (was on NS 100mL/hour until 09/27)      Diabetes Mellitus type 2  * Last HbA1C:  6.0 (01/11/19)  - Monitor POCT premeals and at bedtime: 09/30 84, 94, 134  - No need for Apidra scale for now      Coronary Artery Disease  * Troponin (09/25): <0.2 -> 0.15 (09/26)  - Continue Atorvastatin 20mg QD  - Keep aspirin 81mg QD held for now (held since 09/26)      Hypertension  * Home meds: Metoprolol Tartrate 12.5mg BID  - Monitor BP closely: 09/30 108/59, 96/62 mmHg  - Keep Metoprolol Tartrate held for now. Will continue diuresing      COPD  * Stable  - Monitor SaO2 and keep between 88-92%. Currently no oxygen requirements (on room air)  - Continue Albuterol 4 times daily PRN and Symbicort 80mcg BID      Hypothyroidism  * Home med: Levothyroxine 100mcg QD PO  * Last TSH (09/28): 11.4, fT4 0.8  - Continue Levothyroxine 100mcg PO QD      History of Sciatica   - Monitor pain and keep score at 01/10  - Continue Percocet Q8h PRN, Pregabalin 150mg TID, and Duloxetine 60mg QD      Left ischium stage 3 ulcer POA  - Continue with local wound care      Others  - DVT Prophylaxis: Held for now. Duplex (09/28): no DVT  - GI Prophylaxis: Pantoprazole 40mg IV BID  - Diet: Soft  - Code Status: Full      Barriers to learning: NO  Discharge Planning: Patient will be discharged once stable       Danae Arboleda MD  PGY - 1 Internal Medicine   [facility]

## 2020-10-01 NOTE — PROGRESS NOTE ADULT - ASSESSMENT
Patient is a 55 y/o female with Medical Hx of Type II DM,  CAD on ASA, Hypothyroidism, COPD, Dextrocardia,  Sciatica/Peripheral Neuralgia, Charcot foot deformity s/p reconstruction on 1/11/19, presenting to ED with bilateral lower extremity edema. and was admitted to ICU for lactic acidosis, borderline BP, severe hyponatremia and electrolytes imbalance. Pt got downgraded and pulmonary status improved and underwent EGD yesterday     *Blood per rectum and Melena SP EGD yesterday showing Big duodenal Ulcer extending to the antrum   No intervention done   No BM since yesterday   -hb Stable     rec:   Continue PPI BID and carafate 1g QID  Avoid NSAIDS and alcohol   Repeat EGD in 2 months   Follow with Dr Zendejas in 2weeks     # New onset dysphagia since 2 days rule out candida esophagitis (was noted while doing EGD)  Awaiting Pathology results   Consider starting fluconazole  Swich and swallow until pathology is back         *Elevated AST / elevated INR since march  -US noted hepatomegaly?, CT noted liver unremarkable, spleen unremarkable  -Hep C Ab neg, hep B sAg neg, hep A igM neg, ASMA 1/20 , normal transferrin sat and AMA   Pending SPEP,, RADHA, Ceruloplasmin  no asterixis, no signs of liver failure  -Vitamin K Supplement   - Follow up in Liver clinic on discharge [id number]      Patient is a 53 y/o female with Medical Hx of Type II DM,  CAD on ASA, Hypothyroidism, COPD, Dextrocardia,  Sciatica/Peripheral Neuralgia, Charcot foot deformity s/p reconstruction on 1/11/19, presenting to ED with bilateral lower extremity edema. and was admitted to ICU for lactic acidosis, borderline BP, severe hyponatremia and electrolytes imbalance. Pt got downgraded and pulmonary status improved and underwent EGD yesterday     *Blood per rectum and Melena SP EGD yesterday showing Big duodenal Ulcer extending to the antrum   No intervention done   No BM since yesterday   -hb Stable     rec:   Continue PPI BID and carafate 1g QID  Avoid NSAIDS and alcohol   Repeat EGD in 2 months   Follow with Dr Zendejas in 2weeks     # New onset dysphagia since 2 days rule out candida esophagitis (was noted while doing EGD)  Awaiting Pathology results   Consider starting fluconazole oral for candida esophagitis         *Elevated AST / elevated INR since march  -US noted hepatomegaly?, CT noted liver unremarkable, spleen unremarkable  -Hep C Ab neg, hep B sAg neg, hep A igM neg, ASMA 1/20 , normal transferrin sat and AMA   Pending SPEP,, RADHA, Ceruloplasmin  no asterixis, no signs of liver failure  -Vitamin K Supplement   - Follow up in Liver clinic on discharge [phone]      Patient is a 55 y/o female with Medical Hx of Type II DM,  CAD on ASA, Hypothyroidism, COPD, Dextrocardia,  Sciatica/Peripheral Neuralgia, Charcot foot deformity s/p reconstruction on 1/11/19, presenting to ED with bilateral lower extremity edema. and was admitted to ICU for lactic acidosis, borderline BP, severe hyponatremia and electrolytes imbalance. Pt got downgraded and pulmonary status improved and underwent EGD yesterday     *Blood per rectum and Melena SP EGD yesterday showing Big duodenal Ulcer extending to the pyloric channel  No intervention done as ulcer was clean based  No BM since yesterday   -hb Stable     rec:   Continue PPI BID and carafate 1g QID  Avoid NSAIDS and alcohol   Repeat EGD in 1-2 months   Follow with Dr Zendejas in 2weeks     # New onset dysphagia since 2 days rule out candida esophagitis (was noted while doing EGD)  Awaiting Pathology results   Consider starting fluconazole for candida esophagitis         *Elevated AST / elevated INR since march   -US noted hepatomegaly?, CT noted liver unremarkable, spleen unremarkable  -Hep C Ab neg, hep B sAg neg, hep A igM neg, ASMA 1/20 , normal transferrin sat and AMA   Pending SPEP, RADHA, Ceruloplasmin  no asterixis, no signs of liver failure  -Vitamin K Supplement   - Follow up in Liver clinic on discharge [id number]

## 2020-10-01 NOTE — PROGRESS NOTE ADULT - SUBJECTIVE AND OBJECTIVE BOX
Patient is a 55y old  Female who presents with a chief complaint of gib (01 Oct 2020 08:21)    Patient was seen and examined.  Denies chest pain ,sob  Jos abd pain , blood in stools  Denies any other complaints.  All systems reviewed.    PAST MEDICAL & SURGICAL HISTORY:  Dextrocardia  Chronic midline low back pain with bilateral sciatica  Peripheral neuralgia  Essential hypertension  Diabetes 1.5, managed as type 2  Charcot&#x27;s arthropathyR foot, Charcot foot due to diabetes mellitus  Right shoulder surgery   delivery delivered    Allergies  No Known Allergies    Home Medications:  budesonide-formoterol 80 mcg-4.5 mcg/inh inhalation aerosol: 2 puff(s) inhaled 2 times a day (2019 09:18)  DULoxetine 60 mg oral delayed release capsule: 1 cap(s) orally once a day (10 Ty 2019 14:23)  hydrOXYzine hydrochloride 10 mg oral tablet: 1 tab(s) orally 3 times a day, As Needed (2019 09:18)  Lyrica 150 mg oral capsule: 1 tab(s) orally 3 times a day, stop after  (2019 13:07)  pantoprazole 40 mg oral delayed release tablet: 1 tab(s) orally once a day (10 Ty 2019 14:23)  Percocet 10/325 oral tablet: 1 tab(s) orally every 8 hours (30 Sep 2020 09:18)  tiZANidine 4 mg oral tablet: 2 tab(s) orally every 8 hours, As Needed (26 Sep 2020 02:35)  Ventolin HFA 90 mcg/inh inhalation aerosol: 2 puff(s) inhaled 4 times a day, As Needed (10 Ty 2019 14:23)      MEDICATIONS  (STANDING):  atorvastatin 40 milliGRAM(s) Oral at bedtime  budesonide  80 MICROgram(s)/formoterol 4.5 MICROgram(s) Inhaler 2 Puff(s) Inhalation two times a day  chlorhexidine 4% Liquid 1 Application(s) Topical daily  DULoxetine 60 milliGRAM(s) Oral daily  fluconAZOLE  Oral Liquid - Peds 200 milliGRAM(s) Oral once  influenza   Vaccine 0.5 milliLiter(s) IntraMuscular once  levothyroxine 100 MICROGram(s) Oral daily  pantoprazole    Tablet 40 milliGRAM(s) Oral two times a day  pregabalin 150 milliGRAM(s) Oral three times a day  senna 2 Tablet(s) Oral at bedtime  sucralfate 1 Gram(s) Oral four times a day    MEDICATIONS  (PRN):  ALBUTerol    90 MICROgram(s) HFA Inhaler 1 Puff(s) Inhalation four times a day PRN Shortness of Breath and/or Wheezing  hydrOXYzine hydrochloride 10 milliGRAM(s) Oral every 12 hours PRN Anxiety  oxycodone    5 mG/acetaminophen 325 mG 1 Tablet(s) Oral every 8 hours PRN Moderate Pain (4 - 6)    Vital Signs Last 24 Hrs  T(C): 37.3  T(F): 99.1  HR: 111  BP: 102/58  BP(mean): --  RR: 19  SpO2: 93%    O/E:  Awake, alert, not in distress.  HEENT: atraumatic, EOMI.  Chest: basal crepts+  CVS: SIS2 +, no murmur.  P/A: Soft, BS+  CNS: non focal.  Ext: mild edema feet.  Skin: stage 3 pressure ulcer left ischium  All systems reviewed positive findings as above.    POCT Blood Glucose.: 116 mg/dL (01 Oct 2020 11:35)  POCT Blood Glucose.: 136 mg/dL (01 Oct 2020 08:06)  POCT Blood Glucose.: 134 mg/dL (30 Sep 2020 16:33)                          11.5<L>  15.01<H> )-----------( 173      ( 01 Oct 2020 05:47 )             36.5<L>    10    130<L>  |  84<L>  |  15  ----------------------------<  88  4.0   |  31  |  1.1    Ca    6.8<L>      01 Oct 2020 05:47  Mg     0.8     10-01    TPro  6.0  /  Alb  3.3<L>  /  TBili  1.3<H>  /  DBili  x   /  AST  32  /  ALT  23  /  AlkPhos  94  10-01

## 2020-10-01 NOTE — PROGRESS NOTE ADULT - PROVIDER SPECIALTY LIST ADULT
Critical Care
Gastroenterology
Hospitalist
Internal Medicine
Cardiology
Gastroenterology
Critical Care

## 2020-10-01 NOTE — PROGRESS NOTE ADULT - SUBJECTIVE AND OBJECTIVE BOX
Patient is a 55y old  Female who presents with a chief complaint of sob (29 Sep 2020 19:18)        Over Night Events:  SOP EGD yesterday.  Clean duodenal ulcer.  no SOB at rest            ROS:     All ROS are negative except HPI         PHYSICAL EXAM    ICU Vital Signs Last 24 Hrs  T(C): 37.3 (01 Oct 2020 05:32), Max: 37.3 (01 Oct 2020 05:32)  T(F): 99.1 (01 Oct 2020 05:32), Max: 99.1 (01 Oct 2020 05:32)  HR: 122 (01 Oct 2020 05:32) (98 - 122)  BP: 102/58 (01 Oct 2020 05:32) (90/58 - 108/59)  BP(mean): --  ABP: --  ABP(mean): --  RR: 18 (01 Oct 2020 05:32) (18 - 20)  SpO2: 97% (30 Sep 2020 21:44) (90% - 100%)      CONSTITUTIONAL:  Well nourished.  NAD    ENT:   Airway patent,   Mouth with normal mucosa.   No thrush    EYES:   Pupils equal,   Round and reactive to light.    CARDIAC:   Normal rate,   Regular rhythm.    No edema      Vascular:  Normal systolic impulse  No Carotid bruits    RESPIRATORY:   No wheezing  Bilateral BS  Normal chest expansion  Not tachypneic,  No use of accessory muscles    GASTROINTESTINAL:  Abdomen soft,   Non-tender,   No guarding,   + BS    MUSCULOSKELETAL:   Range of motion is not limited,  No clubbing, cyanosis    NEUROLOGICAL:   Alert and oriented   No motor  deficits.    SKIN:   Skin normal color for race,   Warm and dry and intact.   No evidence of rash.    PSYCHIATRIC:   Normal mood and affect.   No apparent risk to self or others.    HEMATOLOGICAL:  No cervical  lymphadenopathy.  no inguinal lymphadenopathy      09-30-20 @ 07:01  -  10-01-20 @ 07:00  --------------------------------------------------------  IN:  Total IN: 0 mL    OUT:    Voided (mL): 1450 mL  Total OUT: 1450 mL    Total NET: -1450 mL          LABS:                            11.5   15.01 )-----------( 173      ( 01 Oct 2020 05:47 )             36.5                                                                                                                                                                                                                                                                          MEDICATIONS  (STANDING):  atorvastatin 40 milliGRAM(s) Oral at bedtime  budesonide  80 MICROgram(s)/formoterol 4.5 MICROgram(s) Inhaler 2 Puff(s) Inhalation two times a day  chlorhexidine 4% Liquid 1 Application(s) Topical daily  DULoxetine 60 milliGRAM(s) Oral daily  influenza   Vaccine 0.5 milliLiter(s) IntraMuscular once  levothyroxine 100 MICROGram(s) Oral daily  pantoprazole  Injectable 40 milliGRAM(s) IV Push two times a day  pregabalin 150 milliGRAM(s) Oral three times a day  senna 2 Tablet(s) Oral at bedtime  sucralfate 1 Gram(s) Oral four times a day    MEDICATIONS  (PRN):  ALBUTerol    90 MICROgram(s) HFA Inhaler 1 Puff(s) Inhalation four times a day PRN Shortness of Breath and/or Wheezing  hydrOXYzine hydrochloride 10 milliGRAM(s) Oral every 12 hours PRN Anxiety  oxycodone    5 mG/acetaminophen 325 mG 1 Tablet(s) Oral every 8 hours PRN Moderate Pain (4 - 6)      New X-rays reviewed:                                                                                  ECHO    CXR interpreted by me:

## 2020-10-01 NOTE — PROGRESS NOTE ADULT - SUBJECTIVE AND OBJECTIVE BOX
Progress Note  This morning patient was seen and examined at bedside.    Today is hospital day 6d.  Ms. Murillo is doing fine.   She reports she had a good night sleep. Her shortness of breath is improved. She is ambulating during the day with minimal discomfort and denies chest pain, palpitations, or light headednes. Her appetite is adequate, and she denies nausea or vomiting. She denies any abdominal pain, diarrhea, or constipation. She is voiding freely and denies urinary symptoms (dysuria, urgency, frequency, intermittence).      Vital Signs in the last 24 hours   Vitals Summary T(C): 35.6 (10-01-20 @ 13:48), Max: 37.3 (10-01-20 @ 05:32)  HR: 116 (10-01-20 @ 13:48) (98 - 122)  BP: 123/59 (10-01-20 @ 13:48) (96/62 - 123/59)  RR: 18 (10-01-20 @ 13:48) (18 - 20)  SpO2: 93% (10-01-20 @ 10:57) (90% - 98%)  Vent Data   Intake/ Output   09-30-20 @ 07:01  -  10-01-20 @ 07:00  --------------------------------------------------------  IN: 0 mL / OUT: 1450 mL / NET: -1450 mL      Physical Exam  * General Appearance: Alert, cooperative, interactive, well-groomed, oriented to time, place, and person, in no acute distress  * Head: Normocephalic, without obvious abnormality, atraumatic  * Thyroid:  No enlargement/tenderness/nodules; no carotid bruit or JVD  * Lungs: Respirations unlabored, Good bilateral air entry, normal breath sounds (Clear to auscultation bilaterally, no audible wheezes, crackles, or rhonchi)  * Heart: Regular Rate and Rhythm, normal S1 and S2, no audible murmur, rub, or gallop  * Abdomen: Symmetric, non-distended, no scar, soft, non-tender, bowel sounds active all four quadrants, no masses, no organomegaly (no hepatosplenomegaly)  * Extremities: Extremities normal, atraumatic, no cyanosis, no lower extremity pitting edema bilaterally, adequate dorsalis pedis pulses  * Neurologic: CNII-XII intact, normal strength, sensation and reflexes throughout      Investigations   Laboratory Workup  - CBC:                        11.5   15.01 )-----------( 173      ( 01 Oct 2020 05:47 )             36.5     - Chemistry:  10-01    130<L>  |  84<L>  |  15  ----------------------------<  88  4.0   |  31  |  1.1    Ca    6.8<L>      01 Oct 2020 05:47  Mg     0.8     10-01    TPro  6.0  /  Alb  3.3<L>  /  TBili  1.3<H>  /  DBili  x   /  AST  32  /  ALT  23  /  AlkPhos  94  10-01      Current Medications  Standing Medications  atorvastatin 40 milliGRAM(s) Oral at bedtime  budesonide  80 MICROgram(s)/formoterol 4.5 MICROgram(s) Inhaler 2 Puff(s) Inhalation two times a day  calcium carbonate   1250 mG (OsCal) 1 Tablet(s) Oral three times a day  chlorhexidine 4% Liquid 1 Application(s) Topical daily  DULoxetine 60 milliGRAM(s) Oral daily  fluconAZOLE  Oral Liquid - Peds 200 milliGRAM(s) Oral once  furosemide   Injectable 40 milliGRAM(s) IV Push once  influenza   Vaccine 0.5 milliLiter(s) IntraMuscular once  levothyroxine 100 MICROGram(s) Oral daily  magnesium sulfate  IVPB 2 Gram(s) IV Intermittent every 4 hours  pantoprazole    Tablet 40 milliGRAM(s) Oral two times a day  pregabalin 150 milliGRAM(s) Oral three times a day  senna 2 Tablet(s) Oral at bedtime  sucralfate 1 Gram(s) Oral four times a day    PRN Medications  ALBUTerol    90 MICROgram(s) HFA Inhaler 1 Puff(s) Inhalation four times a day PRN Shortness of Breath and/or Wheezing  hydrOXYzine hydrochloride 10 milliGRAM(s) Oral every 12 hours PRN Anxiety  oxycodone    5 mG/acetaminophen 325 mG 1 Tablet(s) Oral every 8 hours PRN Moderate Pain (4 - 6)    Singles Doses Administered  (ADM OVERRIDE) 1 each &lt;see task&gt; GiveOnce  (Floorstock) 1 each &lt;see task&gt; GiveOnce  (Floorstock) 1 each &lt;see task&gt; GiveOnce  (Floorstock) 1 each &lt;see task&gt; GiveOnce  (Floorstock) 1 each &lt;see task&gt; GiveOnce  dextrose 50% Injectable 50 milliLiter(s) IV Push once  furosemide   Injectable 40 milliGRAM(s) IV Push once  furosemide   Injectable 40 milliGRAM(s) IV Push once  furosemide   Injectable 40 milliGRAM(s) IV Push once  furosemide   Injectable 40 milliGRAM(s) IV Push Once  furosemide   Injectable 40 milliGRAM(s) IV Push once  furosemide   Injectable 20 milliGRAM(s) IV Push once  hydrocortisone sodium succinate Injectable 100 milliGRAM(s) IV Push Once  insulin regular  human recombinant. 10 Unit(s) IV Push once  LORazepam   Injectable 2 milliGRAM(s) IV Push once  LORazepam   Injectable 4 milliGRAM(s) IV Push once  magnesium sulfate  IVPB 2 Gram(s) IV Intermittent Once  magnesium sulfate  IVPB 2 Gram(s) IV Intermittent every 4 hours  oxycodone    5 mG/acetaminophen 325 mG 1 Tablet(s) Oral once  oxycodone    5 mG/acetaminophen 325 mG 1 Tablet(s) Oral once  potassium chloride   Powder 40 milliEquivalent(s) Oral once  potassium chloride  20 mEq/100 mL IVPB 20 milliEquivalent(s) IV Intermittent once  potassium chloride  20 mEq/100 mL IVPB 20 milliEquivalent(s) IV Intermittent once  sodium chloride 0.9% Bolus 1000 milliLiter(s) IV Bolus once  sodium chloride 0.9% Bolus 250 milliLiter(s) IV Bolus once       Progress Note  This morning patient was seen and examined at bedside.    Today is hospital day 6d.  Ms. Murillo is doing fine.   She reports she had a good night sleep. Her shortness of breath is improved. She is ambulating during the day with minimal discomfort and denies chest pain, palpitations, or light headednes. Her appetite is adequate, and she denies nausea or vomiting. She denies any abdominal pain, diarrhea, or constipation. She is voiding freely and denies urinary symptoms (dysuria, urgency, frequency, intermittence).      Vital Signs in the last 24 hours   Vitals Summary T(C): 35.6 (10-01-20 @ 13:48), Max: 37.3 (10-01-20 @ 05:32)  HR: 116 (10-01-20 @ 13:48) (98 - 122)  BP: 123/59 (10-01-20 @ 13:48) (96/62 - 123/59)  RR: 18 (10-01-20 @ 13:48) (18 - 20)  SpO2: 93% (10-01-20 @ 10:57) (90% - 98%)  Vent Data   Intake/ Output   09-30-20 @ 07:01  -  10-01-20 @ 07:00  --------------------------------------------------------  IN: 0 mL / OUT: 1450 mL / NET: -1450 mL      Physical Exam  * General Appearance: Alert, cooperative, interactive, well-groomed, oriented to time, place, and person, in no acute distress  * Head: Normocephalic, without obvious abnormality, atraumatic  * Thyroid:  No enlargement/tenderness/nodules; no carotid bruit or JVD  * Lungs: Respirations unlabored, Good bilateral air entry, normal breath sounds (Clear to auscultation bilaterally, no audible wheezes, crackles, or rhonchi)  * Heart: Regular Rate and Rhythm, normal S1 and S2, no audible murmur, rub, or gallop  * Abdomen: Symmetric, non-distended, no scar, soft, non-tender, bowel sounds active all four quadrants, no masses, no organomegaly (no hepatosplenomegaly)  * Extremities: Extremities normal, atraumatic, no cyanosis, minimal lower extremity pitting edema bilaterally (improving per patient), adequate dorsalis pedis pulses  * Neurologic: CNII-XII intact, normal strength, sensation and reflexes throughout      Investigations   Laboratory Workup  - CBC:                        11.5   15.01 )-----------( 173      ( 01 Oct 2020 05:47 )             36.5     - Chemistry:  10-01    130<L>  |  84<L>  |  15  ----------------------------<  88  4.0   |  31  |  1.1    Ca    6.8<L>      01 Oct 2020 05:47  Mg     0.8     10-01    TPro  6.0  /  Alb  3.3<L>  /  TBili  1.3<H>  /  DBili  x   /  AST  32  /  ALT  23  /  AlkPhos  94  10-01      Current Medications  Standing Medications  atorvastatin 40 milliGRAM(s) Oral at bedtime  budesonide  80 MICROgram(s)/formoterol 4.5 MICROgram(s) Inhaler 2 Puff(s) Inhalation two times a day  calcium carbonate   1250 mG (OsCal) 1 Tablet(s) Oral three times a day  chlorhexidine 4% Liquid 1 Application(s) Topical daily  DULoxetine 60 milliGRAM(s) Oral daily  fluconAZOLE  Oral Liquid - Peds 200 milliGRAM(s) Oral once  furosemide   Injectable 40 milliGRAM(s) IV Push once  influenza   Vaccine 0.5 milliLiter(s) IntraMuscular once  levothyroxine 100 MICROGram(s) Oral daily  magnesium sulfate  IVPB 2 Gram(s) IV Intermittent every 4 hours  pantoprazole    Tablet 40 milliGRAM(s) Oral two times a day  pregabalin 150 milliGRAM(s) Oral three times a day  senna 2 Tablet(s) Oral at bedtime  sucralfate 1 Gram(s) Oral four times a day    PRN Medications  ALBUTerol    90 MICROgram(s) HFA Inhaler 1 Puff(s) Inhalation four times a day PRN Shortness of Breath and/or Wheezing  hydrOXYzine hydrochloride 10 milliGRAM(s) Oral every 12 hours PRN Anxiety  oxycodone    5 mG/acetaminophen 325 mG 1 Tablet(s) Oral every 8 hours PRN Moderate Pain (4 - 6)    Singles Doses Administered  (ADM OVERRIDE) 1 each &lt;see task&gt; GiveOnce  (Floorstock) 1 each &lt;see task&gt; GiveOnce  (Floorstock) 1 each &lt;see task&gt; GiveOnce  (Floorstock) 1 each &lt;see task&gt; GiveOnce  (Floorstock) 1 each &lt;see task&gt; GiveOnce  dextrose 50% Injectable 50 milliLiter(s) IV Push once  furosemide   Injectable 40 milliGRAM(s) IV Push once  furosemide   Injectable 40 milliGRAM(s) IV Push once  furosemide   Injectable 40 milliGRAM(s) IV Push once  furosemide   Injectable 40 milliGRAM(s) IV Push Once  furosemide   Injectable 40 milliGRAM(s) IV Push once  furosemide   Injectable 20 milliGRAM(s) IV Push once  hydrocortisone sodium succinate Injectable 100 milliGRAM(s) IV Push Once  insulin regular  human recombinant. 10 Unit(s) IV Push once  LORazepam   Injectable 2 milliGRAM(s) IV Push once  LORazepam   Injectable 4 milliGRAM(s) IV Push once  magnesium sulfate  IVPB 2 Gram(s) IV Intermittent Once  magnesium sulfate  IVPB 2 Gram(s) IV Intermittent every 4 hours  oxycodone    5 mG/acetaminophen 325 mG 1 Tablet(s) Oral once  oxycodone    5 mG/acetaminophen 325 mG 1 Tablet(s) Oral once  potassium chloride   Powder 40 milliEquivalent(s) Oral once  potassium chloride  20 mEq/100 mL IVPB 20 milliEquivalent(s) IV Intermittent once  potassium chloride  20 mEq/100 mL IVPB 20 milliEquivalent(s) IV Intermittent once  sodium chloride 0.9% Bolus 1000 milliLiter(s) IV Bolus once  sodium chloride 0.9% Bolus 250 milliLiter(s) IV Bolus once

## 2020-10-01 NOTE — DISCHARGE NOTE NURSING/CASE MANAGEMENT/SOCIAL WORK - NSDCPEEMAIL_GEN_ALL_CORE
[facility] for Tobacco Control email [email or web address]

## 2020-10-01 NOTE — DISCHARGE NOTE NURSING/CASE MANAGEMENT/SOCIAL WORK - NSDCPEWEB_GEN_ALL_CORE
[facility] for Tobacco Control website --- [email or web address] --- [email or web address]

## 2020-10-01 NOTE — DISCHARGE NOTE NURSING/CASE MANAGEMENT/SOCIAL WORK - PATIENT PORTAL LINK FT
You can access the FollowMyHealth Patient Portal offered by [facility] by registering at the following website: [email or web address]. By joining [facility]’s FollowMyHealth portal, you will also be able to view your health information using other applications (apps) compatible with our system.

## 2020-10-02 ENCOUNTER — TRANSCRIPTION ENCOUNTER (OUTPATIENT)
Age: 55
End: 2020-10-02

## 2020-10-02 VITALS
TEMPERATURE: 97 F | HEART RATE: 119 BPM | DIASTOLIC BLOOD PRESSURE: 93 MMHG | SYSTOLIC BLOOD PRESSURE: 123 MMHG | RESPIRATION RATE: 19 BRPM

## 2020-10-02 LAB
APTT BLD: 33.1 SEC — SIGNIFICANT CHANGE UP (ref 27–39.2)
GLUCOSE BLDC GLUCOMTR-MCNC: 157 MG/DL — HIGH (ref 70–99)
HCT VFR BLD CALC: 37.4 % — SIGNIFICANT CHANGE UP (ref 37–47)
HGB BLD-MCNC: 12 G/DL — SIGNIFICANT CHANGE UP (ref 12–16)
INR BLD: 1.13 RATIO — SIGNIFICANT CHANGE UP (ref 0.65–1.3)
MCHC RBC-ENTMCNC: 28.1 PG — SIGNIFICANT CHANGE UP (ref 27–31)
MCHC RBC-ENTMCNC: 32.1 G/DL — SIGNIFICANT CHANGE UP (ref 32–37)
MCV RBC AUTO: 87.6 FL — SIGNIFICANT CHANGE UP (ref 81–99)
NRBC # BLD: 0 /100 WBCS — SIGNIFICANT CHANGE UP (ref 0–0)
PLATELET # BLD AUTO: 187 K/UL — SIGNIFICANT CHANGE UP (ref 130–400)
PROTHROM AB SERPL-ACNC: 13 SEC — HIGH (ref 9.95–12.87)
RBC # BLD: 4.27 M/UL — SIGNIFICANT CHANGE UP (ref 4.2–5.4)
RBC # FLD: 18.2 % — HIGH (ref 11.5–14.5)
WBC # BLD: 10.83 K/UL — HIGH (ref 4.8–10.8)
WBC # FLD AUTO: 10.83 K/UL — HIGH (ref 4.8–10.8)

## 2020-10-02 PROCEDURE — 71045 X-RAY EXAM CHEST 1 VIEW: CPT | Mod: 26

## 2020-10-02 PROCEDURE — 99239 HOSP IP/OBS DSCHRG MGMT >30: CPT

## 2020-10-02 RX ORDER — PANTOPRAZOLE SODIUM 20 MG/1
1 TABLET, DELAYED RELEASE ORAL
Qty: 0 | Refills: 0 | DISCHARGE

## 2020-10-02 RX ORDER — PANTOPRAZOLE SODIUM 20 MG/1
1 TABLET, DELAYED RELEASE ORAL
Qty: 60 | Refills: 0
Start: 2020-10-02 | End: 2020-10-31

## 2020-10-02 RX ORDER — METOPROLOL TARTRATE 50 MG
12.5 TABLET ORAL ONCE
Refills: 0 | Status: COMPLETED | OUTPATIENT
Start: 2020-10-02 | End: 2020-10-02

## 2020-10-02 RX ORDER — FLUCONAZOLE 150 MG/1
5 TABLET ORAL
Qty: 60 | Refills: 0
Start: 2020-10-02 | End: 2020-10-13

## 2020-10-02 RX ORDER — CALCIUM CARBONATE 500(1250)
1 TABLET ORAL
Qty: 90 | Refills: 0
Start: 2020-10-02 | End: 2020-10-31

## 2020-10-02 RX ORDER — FUROSEMIDE 40 MG
40 TABLET ORAL ONCE
Refills: 0 | Status: COMPLETED | OUTPATIENT
Start: 2020-10-02 | End: 2020-10-02

## 2020-10-02 RX ORDER — SUCRALFATE 1 G
10 TABLET ORAL
Qty: 1200 | Refills: 0
Start: 2020-10-02 | End: 2020-10-31

## 2020-10-02 RX ORDER — POTASSIUM CHLORIDE 20 MEQ
20 PACKET (EA) ORAL ONCE
Refills: 0 | Status: COMPLETED | OUTPATIENT
Start: 2020-10-02 | End: 2020-10-02

## 2020-10-02 RX ORDER — SUCRALFATE 1 G
1 TABLET ORAL
Qty: 120 | Refills: 0
Start: 2020-10-02 | End: 2020-10-31

## 2020-10-02 RX ORDER — FUROSEMIDE 40 MG
1 TABLET ORAL
Qty: 30 | Refills: 0
Start: 2020-10-02 | End: 2020-10-31

## 2020-10-02 RX ORDER — SUCRALFATE 1 G
10 TABLET ORAL
Qty: 0 | Refills: 0 | DISCHARGE
Start: 2020-10-02

## 2020-10-02 RX ADMIN — Medication 40 MILLIGRAM(S): at 11:19

## 2020-10-02 RX ADMIN — OXYCODONE AND ACETAMINOPHEN 1 TABLET(S): 5; 325 TABLET ORAL at 11:28

## 2020-10-02 RX ADMIN — Medication 150 MILLIGRAM(S): at 06:37

## 2020-10-02 RX ADMIN — CHLORHEXIDINE GLUCONATE 1 APPLICATION(S): 213 SOLUTION TOPICAL at 06:42

## 2020-10-02 RX ADMIN — Medication 1 GRAM(S): at 11:18

## 2020-10-02 RX ADMIN — Medication 100 MICROGRAM(S): at 06:35

## 2020-10-02 RX ADMIN — PANTOPRAZOLE SODIUM 40 MILLIGRAM(S): 20 TABLET, DELAYED RELEASE ORAL at 06:35

## 2020-10-02 RX ADMIN — Medication 1 TABLET(S): at 06:35

## 2020-10-02 RX ADMIN — DULOXETINE HYDROCHLORIDE 60 MILLIGRAM(S): 30 CAPSULE, DELAYED RELEASE ORAL at 11:18

## 2020-10-02 RX ADMIN — OXYCODONE AND ACETAMINOPHEN 1 TABLET(S): 5; 325 TABLET ORAL at 00:23

## 2020-10-02 RX ADMIN — Medication 12.5 MILLIGRAM(S): at 12:29

## 2020-10-02 RX ADMIN — Medication 20 MILLIEQUIVALENT(S): at 12:28

## 2020-10-02 RX ADMIN — Medication 1 GRAM(S): at 06:35

## 2020-10-02 NOTE — DISCHARGE NOTE PROVIDER - HOSPITAL COURSE
Ms. Murillo is a 54 year old female patient with a past medical history significant for Type II DM,  CAD (Dextrocardia), DL, HTN, Hypothyroidism, COPD, Sciatica/Peripheral Neuralgia, and Charcot foot deformity s/p reconstruction on 1/11/19. She presented to the ED on 09/25 with bilateral lower extremity edema associated with some SOB and orthopnea, admitted for CHF exacerbation, stay complicated by 3 episodes of hematochezia and 1 episode of melena on 09/26 for which she was transferred to ICU s/p 4u pRBCs, negative CT angio, and EGD on 09/30 significant for a large duodenal ulcer extending to antrum. Please refer to the below for a detailed hospital course:      For the Melena Secondary to Large Duodenal Ulcer extending to Antrum  * You had 3 episodes of hematochezia and 1 episode of melena on 09/26. You then had a drop in Hb on 09/26 down to 6.2. You received 4 units of packed RBCs and hemoglobin has trended up since then (was 10.4 this morning). We performed a CT angio abdomen/pelvis with IV contrast (09/27) that ruled out arterial extravasation and we also performed an EGD on 9/30/20 that revealed a Big duodenal ulcer extending to the antrum with possible Esophageal Candidiasis s/p biopsies. We started you on PO Protonix 40mg BID on 10/01 and kept your aspirin held.  - On Discharge:  --> Please follow up with Dr. Zendejas in 2 weeks. Will consider repeating EGD in 2 months  --> Continue PO Protonix 40mg BID   --> Will resume your aspirin 81mg QD      For the Dysphagia secondary to esophageal candidiasis  * The EGD performed on 9/30/20 revealed possible Esophageal Candidiasis. We started you on fluconazole 200mg on 10/01  - On Discharge:  --> Please continue 200mg Fluconazole PO daily as prescribed      For the Acute Hypoxic respiratory Failure Secondary to Acute on Chronic CHF  * We performed a TTE on 09/29 that revealed an EF of 72 % (Grade I diastolic dysfunction). Your Chest X Ray on 09/25 was congested so we diuresed during your stay and monitored your intake and output and daily chest X rays. You received IV Lasix 40mg once on 09/25, IV Lasix 60mg once on 09/27, and then IV Lasix 40mg once on 09/28->10/02  - On Discharge:  --> Please continue PO Lasix ... mg QD  --> Please follow up with cardiology (Dr. aLcey) in 2 weeks      Transaminitis Possibly Secondary to CHF   * We monitored your AST and it trended down from 122 on 09/25 to 40 on 09/29 to 28 on 10/02. We performed a CT Angio Abdomen/Pelvis w/ IV Cont (09/27) that revealed unremarkable Liver. We also performed an US Abdomen (09/25) that revealed an Elongated liver with normal-appearing echogenicity. A comprehensive workup including Hepatitis panel (A, B, C), Smooth Muscle Antibody (09/29) 1:20, SPEP normal, AMA < 1:20, Ceruloplasmin: 26, and Ferritin: 80 was negative.  - On Discharge:  --> Please follow up in Liver Clinic on discharge ([phone])       For the Acute kidney injury on CKD stage 2  * Resolved  * On presentation, your BUN was 34 and Cr was1.6 on 09/25. We initially started you on IV NS 100mL/hour until 09/27. We monitored your BUN and Cr on a daily basis and they trended down to 16 and 1.0 on 10/02 respectively.      For Diabetes Mellitus type 2  * Your Last HbA1C:  6.0 (01/11/19). We monitored your POCT premeals and at bedtime and they were within normal range.  - On Discharge:  --> Please resume your home meds (Janumet) as prescribed      For Coronary Artery Disease  * Troponin (09/25): <0.2 -> 0.15 (09/26)  - On Discharge:  --> Please Continue Atorvastatin 20mg QD  --> Please resume aspirin 81mg QD       For Hypertension  * We monitored your blood pressure closely. We held your metoprolol tartrate in setting of being diuresed.  - On Discharge:  --> Please monitor your blood pressure closely  --> Please resume your Metoprolol Tartrate 12.5mg BID along with Lasix PO ...mg daily      For COPD  * Stable  * We monitored your SaO2 closely and kept it between 88-92% on room air. We continued Albuterol 4 times daily PRN and Symbicort 80mcg BID  - On discharge:  - Continue your home nebulizers      For Hypothyroidism  * Last TSH (09/28): 11.4, fT4 0.8  - On Discharge:  --> Please continue Levothyroxine 100mcg PO QD      History of Sciatica   * We monitored your pain and continued your percocet Q8h PRN, Pregabalin 150mg TID, and Duloxetine 60mg QD  - On Discharge:  - Continue pain meds as prescribed        Ms. Murillo is a 54 year old female patient with a past medical history significant for Type II DM,  CAD (Dextrocardia), DL, HTN, Hypothyroidism, COPD, Sciatica/Peripheral Neuralgia, and Charcot foot deformity s/p reconstruction on 1/11/19. She presented to the ED on 09/25 with bilateral lower extremity edema associated with some SOB and orthopnea, admitted for CHF exacerbation, stay complicated by 3 episodes of hematochezia and 1 episode of melena on 09/26 for which she was transferred to ICU s/p 4u pRBCs, negative CT angio, and EGD on 09/30 significant for a large duodenal ulcer extending to antrum. Please refer to the below for a detailed hospital course:      For the Melena Secondary to Large Duodenal Ulcer extending to Antrum  * You had 3 episodes of hematochezia and 1 episode of melena on 09/26. You then had a drop in Hb on 09/26 down to 6.2. You received 4 units of packed RBCs and hemoglobin has trended up since then (was 10.4 this morning). We performed a CT angio abdomen/pelvis with IV contrast (09/27) that ruled out arterial extravasation and we also performed an EGD on 9/30/20 that revealed a Big duodenal ulcer extending to the antrum with possible Esophageal Candidiasis s/p biopsies. We started you on PO Protonix 40mg BID on 10/01 and kept your aspirin held.  - On Discharge:  --> Please follow up with Dr. Zendejas in 2 weeks. Will consider repeating EGD in 2 months  --> Continue PO Protonix 40mg BID   --> Will resume your aspirin 81mg QD      For the Dysphagia secondary to esophageal candidiasis  * The EGD performed on 9/30/20 revealed possible Esophageal Candidiasis. We started you on fluconazole 200mg on 10/01  - On Discharge:  --> Please continue 200mg Fluconazole PO daily as prescribed      For the Acute Hypoxic respiratory Failure Secondary to Acute on Chronic CHF  * We performed a TTE on 09/29 that revealed an EF of 72 % (Grade I diastolic dysfunction). Your Chest X Ray on 09/25 was congested so we diuresed during your stay and monitored your intake and output and daily chest X rays. You received IV Lasix 40mg once on 09/25, IV Lasix 60mg once on 09/27, and then IV Lasix 40mg once on 09/28->10/02  - On Discharge:  --> Please continue PO Lasix ... mg QD  --> Please follow up with cardiology (Dr. Lacey) in 1 week      Transaminitis Possibly Secondary to CHF   * We monitored your AST and it trended down from 122 on 09/25 to 40 on 09/29 to 28 on 10/02. We performed a CT Angio Abdomen/Pelvis w/ IV Cont (09/27) that revealed unremarkable Liver. We also performed an US Abdomen (09/25) that revealed an Elongated liver with normal-appearing echogenicity. A comprehensive workup including Hepatitis panel (A, B, C), Smooth Muscle Antibody (09/29) 1:20, SPEP normal, AMA < 1:20, Ceruloplasmin: 26, and Ferritin: 80 was negative.  - On Discharge:  --> Please follow up in Liver Clinic on discharge ([phone])       For the Acute kidney injury on CKD stage 2  * Resolved  * On presentation, your BUN was 34 and Cr was1.6 on 09/25. We initially started you on IV NS 100mL/hour until 09/27. We monitored your BUN and Cr on a daily basis and they trended down to 16 and 1.0 on 10/02 respectively.      For Diabetes Mellitus type 2  * Your Last HbA1C:  6.0 (01/11/19). We monitored your POCT premeals and at bedtime and they were within normal range.  - On Discharge:  --> Please resume your home meds (Janumet) as prescribed      For Coronary Artery Disease  * Troponin (09/25): <0.2 -> 0.15 (09/26)  - On Discharge:  --> Please Continue Atorvastatin 20mg QD  --> Please resume aspirin 81mg QD       For Hypertension  * We monitored your blood pressure closely. We held your metoprolol tartrate in setting of being diuresed.  - On Discharge:  --> Please monitor your blood pressure closely  --> Please resume your Metoprolol Tartrate 12.5mg BID along with Lasix PO ...mg daily      For COPD  * Stable  * We monitored your SaO2 closely and kept it between 88-92% on room air. We continued Albuterol 4 times daily PRN and Symbicort 80mcg BID  - On discharge:  - Continue your home nebulizers      For Hypothyroidism  * Last TSH (09/28): 11.4, fT4 0.8  - On Discharge:  --> Please continue Levothyroxine 100mcg PO QD      History of Sciatica   * We monitored your pain and continued your percocet Q8h PRN, Pregabalin 150mg TID, and Duloxetine 60mg QD  - On Discharge:  - Continue pain meds as prescribed        Ms. Murillo is a 54 year old female patient with a past medical history significant for Type II DM,  CAD (Dextrocardia), DL, HTN, Hypothyroidism, COPD, Sciatica/Peripheral Neuralgia, and Charcot foot deformity s/p reconstruction on 1/11/19. She presented to the ED on 09/25 with bilateral lower extremity edema associated with some SOB and orthopnea, admitted for CHF exacerbation, stay complicated by 3 episodes of hematochezia and 1 episode of melena on 09/26 for which she was transferred to ICU s/p 4u pRBCs, negative CT angio, and EGD on 09/30 significant for a large duodenal ulcer extending to antrum. Please refer to the below for a detailed hospital course:      For the Melena Secondary to Large Duodenal Ulcer extending to Antrum  * You had 3 episodes of hematochezia and 1 episode of melena on 09/26. You then had a drop in Hb on 09/26 down to 6.2. You received 4 units of packed RBCs and hemoglobin has trended up since then (was 10.4 this morning). We performed a CT angio abdomen/pelvis with IV contrast (09/27) that ruled out arterial extravasation and we also performed an EGD on 9/30/20 that revealed a Big duodenal ulcer extending to the antrum with possible Esophageal Candidiasis s/p biopsies. We started you on PO Protonix 40mg BID on 10/01 and kept your aspirin held.  - On Discharge:  --> Please follow up with Dr. Zendejas in 2 weeks. Will consider repeating EGD in 2 months  --> Continue PO Protonix 40mg BID and sucralfate  --> resume your aspirin 81mg QD      For the Dysphagia secondary to esophageal candidiasis  * The EGD performed on 9/30/20 revealed possible Esophageal Candidiasis. We started you on fluconazole 200mg on 10/01  - On Discharge:  --> Please continue 200mg Fluconazole PO daily as prescribed      For the Acute Hypoxic respiratory Failure Secondary to Acute on Chronic diastolic  CHF  * We performed a TTE on 09/29 that revealed an EF of 72 % (Grade I diastolic dysfunction). Your Chest X Ray on 09/25 was congested so we diuresed during your stay and monitored your intake and output and daily chest X rays. You received IV Lasix 40mg once on 09/25, IV Lasix 60mg once on 09/27, and then IV Lasix 40mg once on 09/28->10/02  - On Discharge:  --> Please continue PO Lasix  40 mg QD  --> Please follow up with cardiology (Dr. Lacey) in 1 week      Transaminitis Possibly Secondary to CHF   * We monitored your AST and it trended down from 122 on 09/25 to 40 on 09/29 to 28 on 10/02. We performed a CT Angio Abdomen/Pelvis w/ IV Cont (09/27) that revealed unremarkable Liver. We also performed an US Abdomen (09/25) that revealed an Elongated liver with normal-appearing echogenicity. A comprehensive workup including Hepatitis panel (A, B, C), Smooth Muscle Antibody (09/29) 1:20, SPEP normal, AMA < 1:20, Ceruloplasmin: 26, and Ferritin: 80 was negative.  - On Discharge:  --> Please follow up with GI  on discharge ([phone])       For the Acute kidney injury on CKD stage 2  * Resolved  * On presentation, your BUN was 34 and Cr was1.6 on 09/25. We initially started you on IV NS 100mL/hour until 09/27. We monitored your BUN and Cr on a daily basis and they trended down to 16 and 1.0 on 10/02 respectively.      For Diabetes Mellitus type 2  * Your Last HbA1C:  6.0 (01/11/19). We monitored your POCT premeals and at bedtime and they were within normal range.  - On Discharge:  --> Please resume your home meds (Janumet) as prescribed      For Coronary Artery Disease  * Troponin (09/25): <0.2 -> 0.15 (09/26)  - On Discharge:  --> Please Continue Atorvastatin 20mg QD  --> Please resume aspirin 81mg QD , metoprolol      For Hypertension  * We monitored your blood pressure closely. We held your metoprolol tartrate in setting of being diuresed.  - On Discharge:  --> Please monitor your blood pressure closely  --> Please resume your Metoprolol Tartrate 12.5mg BID along with Lasix PO 40 mg daily      For COPD  * Stable  * We monitored your SaO2 closely and kept it between 88-92% on room air. We continued Albuterol 4 times daily PRN and Symbicort 80mcg BID  - On discharge:  - Continue your home nebulizers      For Hypothyroidism  * Last TSH (09/28): 11.4, fT4 0.8  - On Discharge:  --> Please continue Levothyroxine 100mcg PO QD      History of Sciatica   * We monitored your pain and continued your percocet Q8h PRN, Pregabalin 150mg TID, and Duloxetine 60mg QD  - On Discharge:  - Continue pain meds as prescribed        Ms. Murillo is a 54 year old female patient with a past medical history significant for Type II DM,  CAD (Dextrocardia), DL, HTN, Hypothyroidism, COPD, Sciatica/Peripheral Neuralgia, and Charcot foot deformity s/p reconstruction on 1/11/19. She presented to the ED on 09/25 with bilateral lower extremity edema associated with some SOB and orthopnea, admitted for CHF exacerbation, stay complicated by 3 episodes of hematochezia and 1 episode of melena on 09/26 for which she was transferred to ICU s/p 4u pRBCs, negative CT angio, and EGD on 09/30 significant for a large duodenal ulcer extending to antrum. Please refer to the below for a detailed hospital course:      For the Melena Secondary to Large Duodenal Ulcer extending to Antrum  * You had 3 episodes of hematochezia and 1 episode of melena on 09/26. You then had a drop in Hb on 09/26 down to 6.2. You received 4 units of packed RBCs and hemoglobin has trended up since then (was 10.4 this morning). We performed a CT angio abdomen/pelvis with IV contrast (09/27) that ruled out arterial extravasation and we also performed an EGD on 9/30/20 that revealed a Big duodenal ulcer extending to the antrum with possible Esophageal Candidiasis s/p biopsies. We started you on PO Protonix 40mg BID on 10/01 and kept your aspirin held.  - On Discharge:  --> Please follow up with Dr. Zendejas in 2 weeks. Will consider repeating EGD in 2 months  --> Continue PO Protonix 40mg BID and sucralfate  --> resume your aspirin 81mg QD      For the Dysphagia secondary to esophageal candidiasis  * The EGD performed on 9/30/20 revealed possible Esophageal Candidiasis. We started you on fluconazole 200mg on 10/01  - On Discharge:  --> Please continue 200mg Fluconazole PO daily as prescribed      For the Acute Hypoxic respiratory Failure Secondary to Acute on Chronic diastolic  CHF  * We performed a TTE on 09/29 that revealed an EF of 72 % (Grade I diastolic dysfunction). Your Chest X Ray on 09/25 was congested so we diuresed during your stay and monitored your intake and output and daily chest X rays. You received IV Lasix 40mg once on 09/25, IV Lasix 60mg once on 09/27, and then IV Lasix 40mg once on 09/28->10/02  - On Discharge:  --> Please continue PO Lasix  40 mg QD  --> Please follow up with cardiology (Dr. Lacey) in 1 week      Transaminitis Possibly Secondary to CHF   * We monitored your AST and it trended down from 122 on 09/25 to 40 on 09/29 to 28 on 10/02. We performed a CT Angio Abdomen/Pelvis w/ IV Cont (09/27) that revealed unremarkable Liver. We also performed an US Abdomen (09/25) that revealed an Elongated liver with normal-appearing echogenicity. A comprehensive workup including Hepatitis panel (A, B, C), Smooth Muscle Antibody (09/29) 1:20, SPEP normal, AMA < 1:20, Ceruloplasmin: 26, and Ferritin: 80 was negative.  - On Discharge:  --> Please follow up with GI  on discharge ([phone])       For the Acute kidney injury on CKD stage 2  * Resolved  * On presentation, your BUN was 34 and Cr was1.6 on 09/25. We initially started you on IV NS 100mL/hour until 09/27. We monitored your BUN and Cr on a daily basis and they trended down to 16 and 1.0 on 10/02 respectively.      For Diabetes Mellitus type 2  * Your Last HbA1C:  6.0 (01/11/19). We monitored your POCT premeals and at bedtime and they were within normal range.  - On Discharge:  --> Please resume your home meds (Janumet) as prescribed      For Coronary Artery Disease  * Troponin (09/25): <0.2 -> 0.15 (09/26)  - On Discharge:  --> Please Continue Atorvastatin 20mg QD  --> Please resume aspirin 81mg QD , metoprolol      For Hypertension  * We monitored your blood pressure closely. We held your metoprolol tartrate in setting of being diuresed.  - On Discharge:  --> Please monitor your blood pressure closely  --> Please resume your Metoprolol Tartrate 12.5mg BID along with Lasix PO 40 mg daily      For COPD  * Stable  * We monitored your SaO2 closely and kept it between 88-92% on room air. We continued Albuterol 4 times daily PRN and Symbicort 80mcg BID  - On discharge:  - Continue your home nebulizers      For Hypothyroidism  * Last TSH (09/28): 11.4, fT4 0.8  - On Discharge:  --> Please continue Levothyroxine 100mcg PO QD      History of Sciatica   * We monitored your pain and continued your percocet Q8h PRN, Pregabalin 150mg TID, and Duloxetine 60mg QD  - On Discharge:  - Continue pain meds as prescribed       Stage 3 pressure injury left ischium  * We cleansed wound bed with normal saline. We changed dressing twice daily and as needed for strike-through drainage or soiling. If top layer of Triad soiled, gently remove w/ perineal cleanser and re-apply ointment.   - On discharge:  --> When out of bed to chair, utilize pilows chair cushion to offload pressure.   --> While in bed, offload heels from bed surface with soft pillow under calfs.  --> Continue prophylactically applying Coloplast Raul Protect moisture barrier cream to buttock and perineal area daily and prn after any soiling.   --> Continue utilizing one underpad underneath patient to contain incontinence episodes; change pad when saturated/soiled. If pt w/ any consistent urinary incontinence, consider utilizing female incontinence device/PrimaFit (if patient candidate) to contain urinary incontinence.

## 2020-10-02 NOTE — DISCHARGE NOTE PROVIDER - CARE PROVIDERS DIRECT ADDRESSES
,DirectAddress_Unknown,DirectAddress_Unknown,DirectAddress_Unknown ,DirectAddress_Unknown,DirectAddress_Unknown,DirectAddress_Unknown,DirectAddress_Unknown ,[email or web address],DirectAddress_Unknown,DirectAddress_Unknown,DirectAddress_Unknown

## 2020-10-02 NOTE — DISCHARGE NOTE PROVIDER - NSDCFUADDAPPT_GEN_ALL_CORE_FT
--> Please follow up with Dr. Zendejas in 2 weeks. Will consider repeating EGD in 2 months  --> Please follow up in [facility] Clinic on discharge ([phone]) for transaminitis  --> Please follow up with cardiology (Dr. Lacey) in 2 weeks --> Please follow up with Dr. Zendejas in 2 weeks. Will consider repeating EGD in 2 months  --> Please follow up in Liver Clinic on discharge ([phone]) for transaminitis  --> Please follow up with cardiology (Dr. Lacey) in 1 weeks  --> Follow up with PCP Dr. Najera in 1 week --> Please follow up with Dr. Zendejas in 2 weeks. Will consider repeating EGD in 2 months  --> Please follow up in Liver Clinic on discharge ([phone]) for transaminitis  --> Please follow up with cardiology (Dr. Lacey) in 1 weeks  --> Follow up with PCP Dr. Greg Garcia in 1 week

## 2020-10-02 NOTE — DISCHARGE NOTE PROVIDER - PROVIDER TOKENS
FREE:[LAST:[Dr. Zendejas (GI)],PHONE:[(   )    -],FAX:[(   )    -],FOLLOWUP:[2 weeks]],FREE:[LAST:[[facility]],PHONE:[phone]],FAX:[(   )    -],FOLLOWUP:[2 weeks]],FREE:[LAST:[Dr. Lacey (Cardiology)],PHONE:[(   )    -],FAX:[(   )    -],FOLLOWUP:[2 weeks]] FREE:[LAST:[Dr. Zendejas (GI)],PHONE:[(   )    -],FAX:[(   )    -],FOLLOWUP:[2 weeks]],FREE:[LAST:[[facility]],PHONE:[phone]],FAX:[(   )    -],FOLLOWUP:[2 weeks]],FREE:[LAST:[Dr. Lacey (Cardiology)],PHONE:[(   )    -],FAX:[(   )    -],FOLLOWUP:[1 week]],FREE:[LAST:[PCP Dr. Najera],PHONE:[(   )    -],FAX:[(   )    -],FOLLOWUP:[1 week]] PROVIDER:[TOKEN:[[id number]],FOLLOWUP:[1 week]],FREE:[LAST:[Dr. Zendejas (GI)],PHONE:[(   )    -],FAX:[(   )    -],FOLLOWUP:[2 weeks]],FREE:[LAST:[[facility]],PHONE:[phone]],FAX:[(   )    -],FOLLOWUP:[2 weeks]],FREE:[LAST:[Dr. Lacey (Cardiology)],PHONE:[(   )    -],FAX:[(   )    -],FOLLOWUP:[1 week]]

## 2020-10-02 NOTE — DISCHARGE NOTE PROVIDER - CARE PROVIDER_API CALL
Dr. Zendejas (GI),   Phone: (   )    -  Fax: (   )    -  Follow Up Time: 2 weeks    [facility],   Phone: [phone]  Fax: (   )    -  Follow Up Time: 2 weeks    Dr. Lacey (Cardiology),   Phone: (   )    -  Fax: (   )    -  Follow Up Time: 2 weeks   Dr. Zendejas (GI),   Phone: (   )    -  Fax: (   )    -  Follow Up Time: 2 weeks    [facility],   Phone: [phone]  Fax: (   )    -  Follow Up Time: 2 weeks    Dr. Lacey (Cardiology),   Phone: (   )    -  Fax: (   )    -  Follow Up Time: 1 week    PCP Dr. Najera,   Phone: (   )    -  Fax: (   )    -  Follow Up Time: 1 week   Jose Garza  [address] [facility]  [address], [address]  [place] [address]  Phone: [phone]  Fax: [phone]  Follow Up Time: 1 week    Dr. Zendejas (GI),   Phone: (   )    -  Fax: (   )    -  Follow Up Time: 2 weeks    [facility],   Phone: [phone]  Fax: (   )    -  Follow Up Time: 2 weeks    Dr. Lacey (Cardiology),   Phone: (   )    -  Fax: (   )    -  Follow Up Time: 1 week

## 2020-10-02 NOTE — DISCHARGE NOTE PROVIDER - NSDCFUADDINST_GEN_ALL_CORE_FT
--> Please follow up with Dr. Zendejas in 2 weeks. Will consider repeating EGD in 2 months  --> Please follow up in Liver Clinic on discharge ([phone]) for transaminitis  --> Continue PO Protonix 40mg BID, PO Fluconazole as prescribed  --> Please follow up with cardiology (Dr. Lacey) in 2 weeks  --> Please continue PO Lasix ... mg QD     --> Please follow up with Dr. Zendejas in 2 weeks. Will consider repeating EGD in 2 months  --> Please follow up in Liver Clinic on discharge ([phone]) for transaminitis  --> Continue PO Protonix 40mg BID, PO Fluconazole as prescribed  --> Please follow up with cardiology (Dr. Lacey) in 1 week  --> Please continue PO Lasix 40 mg QD  --> Follow up with PCP Dr. Najera in 1 week --> Please follow up with Dr. Zendejas in 2 weeks. Will consider repeating EGD in 2 months  --> Please follow up in Liver Clinic on discharge ([phone]) for transaminitis  --> Continue PO Protonix 40mg BID, PO Fluconazole as prescribed  --> Please follow up with cardiology (Dr. Lacey) in 1 week  --> Please continue PO Lasix 40 mg QD  --> Follow up with PCP Dr. Najera in 1 week      For Stage 3 pressure injury left ischium  --> When out of bed to chair, utilize pillows chair cushion to offload pressure.   --> While in bed, offload heels from bed surface with soft pillow under calfs.  --> Continue prophylactically applying Coloplast Raul Protect moisture barrier cream to buttock and perineal area daily and prn after any soiling.   --> Continue utilizing one underpad underneath patient to contain incontinence episodes; change pad when saturated/soiled. If pt w/ any consistent urinary incontinence, consider utilizing female incontinence device/PrimaFit (if patient candidate) to contain urinary incontinence.

## 2020-10-02 NOTE — DISCHARGE NOTE PROVIDER - NSDCMRMEDTOKEN_GEN_ALL_CORE_FT
aspirin 81 mg oral delayed release tablet: 1 tab(s) orally once a day  atorvastatin 40 mg oral tablet: 1 tab(s) orally once a day (at bedtime)  budesonide-formoterol 80 mcg-4.5 mcg/inh inhalation aerosol: 2 puff(s) inhaled 2 times a day  docusate sodium 100 mg oral capsule: 1 cap(s) orally 2 times a day  DULoxetine 60 mg oral delayed release capsule: 1 cap(s) orally once a day  ezetimibe 10 mg oral tablet: 1 tab(s) orally once a day  hydrOXYzine hydrochloride 10 mg oral tablet: 1 tab(s) orally 3 times a day, As Needed  Janumet 50 mg-1000 mg oral tablet: 1 tab(s) orally 2 times a day  levothyroxine 100 mcg (0.1 mg) oral capsule: 1 cap(s) orally once a day  Lyrica 150 mg oral capsule: 1 tab(s) orally 3 times a day, stop after 2/25  Metoprolol Tartrate 25 mg oral tablet: 0.5 tab(s) orally 2 times a day   pantoprazole 40 mg oral delayed release tablet: 1 tab(s) orally once a day  Percocet 10/325 oral tablet: 1 tab(s) orally every 8 hours  senna oral tablet: 2 tab(s) orally once a day (at bedtime)  sucralfate 1 g/10 mL oral suspension: 10 milliliter(s) orally 4 times a day  tiZANidine 4 mg oral tablet: 2 tab(s) orally every 8 hours, As Needed  Ventolin HFA 90 mcg/inh inhalation aerosol: 2 puff(s) inhaled 4 times a day, As Needed   aspirin 81 mg oral delayed release tablet: 1 tab(s) orally once a day  atorvastatin 40 mg oral tablet: 1 tab(s) orally once a day (at bedtime)  budesonide-formoterol 80 mcg-4.5 mcg/inh inhalation aerosol: 2 puff(s) inhaled 2 times a day  calcium carbonate 1250 mg (500 mg elemental calcium) oral tablet: 1 tab(s) orally 3 times a day  docusate sodium 100 mg oral capsule: 1 cap(s) orally 2 times a day  DULoxetine 60 mg oral delayed release capsule: 1 cap(s) orally once a day  ezetimibe 10 mg oral tablet: 1 tab(s) orally once a day  fluconazole 40 mg/mL oral liquid: 5 milliliter(s) orally every 24 hours for a total of 14 days counting from m10/01  hydrOXYzine hydrochloride 10 mg oral tablet: 1 tab(s) orally 3 times a day, As Needed  Janumet 50 mg-1000 mg oral tablet: 1 tab(s) orally 2 times a day  Lasix 40 mg oral tablet: 1 tab(s) orally once a day   levothyroxine 100 mcg (0.1 mg) oral capsule: 1 cap(s) orally once a day  Lyrica 150 mg oral capsule: 1 tab(s) orally 3 times a day, stop after 2/25  Metoprolol Tartrate 25 mg oral tablet: 0.5 tab(s) orally 2 times a day   pantoprazole 40 mg oral delayed release tablet: 1 tab(s) orally 2 times a day  Percocet 10/325 oral tablet: 1 tab(s) orally every 8 hours  senna oral tablet: 2 tab(s) orally once a day (at bedtime)  sucralfate 1 g/10 mL oral suspension: 10 milliliter(s) orally 4 times a day premeals and at bedtime  tiZANidine 4 mg oral tablet: 2 tab(s) orally every 8 hours, As Needed  Ventolin HFA 90 mcg/inh inhalation aerosol: 2 puff(s) inhaled 4 times a day, As Needed   aspirin 81 mg oral delayed release tablet: 1 tab(s) orally once a day  atorvastatin 40 mg oral tablet: 1 tab(s) orally once a day (at bedtime)  budesonide-formoterol 80 mcg-4.5 mcg/inh inhalation aerosol: 2 puff(s) inhaled 2 times a day  calcium carbonate 1250 mg (500 mg elemental calcium) oral tablet: 1 tab(s) orally 3 times a day  docusate sodium 100 mg oral capsule: 1 cap(s) orally 2 times a day  DULoxetine 60 mg oral delayed release capsule: 1 cap(s) orally once a day  ezetimibe 10 mg oral tablet: 1 tab(s) orally once a day  fluconazole 40 mg/mL oral liquid: 5 milliliter(s) orally every 24 hours for a total of 14 days counting from m10/01  hydrOXYzine hydrochloride 10 mg oral tablet: 1 tab(s) orally 3 times a day, As Needed  Janumet 50 mg-1000 mg oral tablet: 1 tab(s) orally 2 times a day  Lasix 40 mg oral tablet: 1 tab(s) orally once a day   levothyroxine 100 mcg (0.1 mg) oral capsule: 1 cap(s) orally once a day  Lyrica 150 mg oral capsule: 1 tab(s) orally 3 times a day, stop after 2/25  Metoprolol Tartrate 25 mg oral tablet: 0.5 tab(s) orally 2 times a day   pantoprazole 40 mg oral delayed release tablet: 1 tab(s) orally 2 times a day  Percocet 10/325 oral tablet: 1 tab(s) orally every 8 hours  senna oral tablet: 2 tab(s) orally once a day (at bedtime)  sucralfate 1 g oral tablet: 1 tab(s) orally 4 times a day (before meals and at bedtime)   tiZANidine 4 mg oral tablet: 2 tab(s) orally every 8 hours, As Needed  Ventolin HFA 90 mcg/inh inhalation aerosol: 2 puff(s) inhaled 4 times a day, As Needed

## 2020-10-02 NOTE — DISCHARGE NOTE PROVIDER - NSDCCPCAREPLAN_GEN_ALL_CORE_FT
PRINCIPAL DISCHARGE DIAGNOSIS  Diagnosis: Acute exacerbation of CHF (congestive heart failure)  Assessment and Plan of Treatment: For the Acute Hypoxic respiratory Failure Secondary to Acute on Chronic CHF  * We performed a TTE on 09/29 that revealed an EF of 72 % (Grade I diastolic dysfunction). Your Chest X Ray on 09/25 was congested so we diuresed during your stay and monitored your intake and output and daily chest X rays. You received IV Lasix 40mg once on 09/25, IV Lasix 60mg once on 09/27, and then IV Lasix 40mg once on 09/28->10/02  - On Discharge:  --> Please continue PO Lasix ... mg QD  --> Please follow up with cardiology (Dr. Lacey) in 2 weeks      SECONDARY DISCHARGE DIAGNOSES  Diagnosis: Mild CAD  Assessment and Plan of Treatment: For Coronary Artery Disease  * Troponin (09/25): <0.2 -> 0.15 (09/26)  - On Discharge:  --> Please Continue Atorvastatin 20mg QD  --> Please resume aspirin 81mg QD       Diagnosis: COPD, mild  Assessment and Plan of Treatment: For COPD  * Stable  * We monitored your SaO2 closely and kept it between 88-92% on room air. We continued Albuterol 4 times daily PRN and Symbicort 80mcg BID  - On discharge:  - Continue your home nebulizers    Diagnosis: Chronic sciatica  Assessment and Plan of Treatment: History of Sciatica   * We monitored your pain and continued your percocet Q8h PRN, Pregabalin 150mg TID, and Duloxetine 60mg QD  - On Discharge:  - Continue pain meds as prescribed       Diagnosis: Hypothyroidism  Assessment and Plan of Treatment: For Hypothyroidism  * Last TSH (09/28): 11.4, fT4 0.8  - On Discharge:  --> Please continue Levothyroxine 100mcg PO QD    Diagnosis: Hypertension  Assessment and Plan of Treatment: For Hypertension  * We monitored your blood pressure closely. We held your metoprolol tartrate in setting of being diuresed.  - On Discharge:  --> Please monitor your blood pressure closely  --> Please resume your Metoprolol Tartrate 12.5mg BID along with Lasix PO ...mg daily    Diagnosis: Diabetes mellitus  Assessment and Plan of Treatment: For Diabetes Mellitus type 2  * Your Last HbA1C:  6.0 (01/11/19). We monitored your POCT premeals and at bedtime and they were within normal range.  - On Discharge:  --> Please resume your home meds (Janumet) as prescribed    Diagnosis: Transaminitis  Assessment and Plan of Treatment: Transaminitis Possibly Secondary to CHF   * We monitored your AST and it trended down from 122 on 09/25 to 40 on 09/29 to 28 on 10/02. We performed a CT Angio Abdomen/Pelvis w/ IV Cont (09/27) that revealed unremarkable Liver. We also performed an US Abdomen (09/25) that revealed an Elongated liver with normal-appearing echogenicity. A comprehensive workup including Hepatitis panel (A, B, C), Smooth Muscle Antibody (09/29) 1:20, SPEP normal, AMA < 1:20, Ceruloplasmin: 26, and Ferritin: 80 was negative.  - On Discharge:  --> Please follow up in Liver Clinic on discharge ([phone])       Diagnosis: Esophageal candidiasis  Assessment and Plan of Treatment: For the Dysphagia secondary to esophageal candidiasis  * The EGD performed on 9/30/20 revealed possible Esophageal Candidiasis. We started you on fluconazole 200mg on 10/01  - On Discharge:  --> Please continue 200mg PO daily as prescribed    Diagnosis: Duodenal ulcer  Assessment and Plan of Treatment: For the Melena Secondary to Large Duodenal Ulcer extending to Antrum  * You had 3 episodes of hematochezia and 1 episode of melena on 09/26. You then had a drop in Hb on 09/26 down to 6.2. You received 4 units of packed RBCs and hemoglobin has trended up since then (was 10.4 this morning). We performed a CT angio abdomen/pelvis with IV contrast (09/27) that ruled out arterial extravasation and we also performed an EGD on 9/30/20 that revealed a Big duodenal ulcer extending to the antrum with possible Esophageal Candidiasis s/p biopsies. We started you on PO Protonix 40mg BID on 10/01 and kept your aspirin held.  - On Discharge:  --> Please follow up with Dr. Zendejas in 2 weeks. Will consider repeating EGD in 2 months  --> Continue PO Protonix 40mg BID   --> Will resume your aspirin 81mg QD     PRINCIPAL DISCHARGE DIAGNOSIS  Diagnosis: Acute exacerbation of CHF (congestive heart failure)  Assessment and Plan of Treatment: For the Acute Hypoxic respiratory Failure Secondary to Acute on Chronic CHF  * We performed a TTE on 09/29 that revealed an EF of 72 % (Grade I diastolic dysfunction). Your Chest X Ray on 09/25 was congested so we diuresed during your stay and monitored your intake and output and daily chest X rays. You received IV Lasix 40mg once on 09/25, IV Lasix 60mg once on 09/27, and then IV Lasix 40mg once on 09/28->10/02  - On Discharge:  --> Please continue PO Lasix ... mg QD  --> Please follow up with cardiology (Dr. Lacey) in 1 week      SECONDARY DISCHARGE DIAGNOSES  Diagnosis: Mild CAD  Assessment and Plan of Treatment: For Coronary Artery Disease  * Troponin (09/25): <0.2 -> 0.15 (09/26)  - On Discharge:  --> Please Continue Atorvastatin 20mg QD  --> Please resume aspirin 81mg QD       Diagnosis: COPD, mild  Assessment and Plan of Treatment: For COPD  * Stable  * We monitored your SaO2 closely and kept it between 88-92% on room air. We continued Albuterol 4 times daily PRN and Symbicort 80mcg BID  - On discharge:  - Continue your home nebulizers    Diagnosis: Chronic sciatica  Assessment and Plan of Treatment: History of Sciatica   * We monitored your pain and continued your percocet Q8h PRN, Pregabalin 150mg TID, and Duloxetine 60mg QD  - On Discharge:  - Continue pain meds as prescribed       Diagnosis: Hypothyroidism  Assessment and Plan of Treatment: For Hypothyroidism  * Last TSH (09/28): 11.4, fT4 0.8  - On Discharge:  --> Please continue Levothyroxine 100mcg PO QD    Diagnosis: Hypertension  Assessment and Plan of Treatment: For Hypertension  * We monitored your blood pressure closely. We held your metoprolol tartrate in setting of being diuresed.  - On Discharge:  --> Please monitor your blood pressure closely  --> Please resume your Metoprolol Tartrate 12.5mg BID along with Lasix PO ...mg daily    Diagnosis: Diabetes mellitus  Assessment and Plan of Treatment: For Diabetes Mellitus type 2  * Your Last HbA1C:  6.0 (01/11/19). We monitored your POCT premeals and at bedtime and they were within normal range.  - On Discharge:  --> Please resume your home meds (Janumet) as prescribed    Diagnosis: Transaminitis  Assessment and Plan of Treatment: Transaminitis Possibly Secondary to CHF   * We monitored your AST and it trended down from 122 on 09/25 to 40 on 09/29 to 28 on 10/02. We performed a CT Angio Abdomen/Pelvis w/ IV Cont (09/27) that revealed unremarkable Liver. We also performed an US Abdomen (09/25) that revealed an Elongated liver with normal-appearing echogenicity. A comprehensive workup including Hepatitis panel (A, B, C), Smooth Muscle Antibody (09/29) 1:20, SPEP normal, AMA < 1:20, Ceruloplasmin: 26, and Ferritin: 80 was negative.  - On Discharge:  --> Please follow up in Liver Clinic on discharge ([phone])       Diagnosis: Esophageal candidiasis  Assessment and Plan of Treatment: For the Dysphagia secondary to esophageal candidiasis  * The EGD performed on 9/30/20 revealed possible Esophageal Candidiasis. We started you on fluconazole 200mg on 10/01  - On Discharge:  --> Please continue 200mg PO daily as prescribed    Diagnosis: Duodenal ulcer  Assessment and Plan of Treatment: For the Melena Secondary to Large Duodenal Ulcer extending to Antrum  * You had 3 episodes of hematochezia and 1 episode of melena on 09/26. You then had a drop in Hb on 09/26 down to 6.2. You received 4 units of packed RBCs and hemoglobin has trended up since then (was 10.4 this morning). We performed a CT angio abdomen/pelvis with IV contrast (09/27) that ruled out arterial extravasation and we also performed an EGD on 9/30/20 that revealed a Big duodenal ulcer extending to the antrum with possible Esophageal Candidiasis s/p biopsies. We started you on PO Protonix 40mg BID on 10/01 and kept your aspirin held.  - On Discharge:  --> Please follow up with Dr. Zendejas in 2 weeks. Will consider repeating EGD in 2 months  --> Continue PO Protonix 40mg BID   --> Will resume your aspirin 81mg QD     PRINCIPAL DISCHARGE DIAGNOSIS  Diagnosis: Acute exacerbation of CHF (congestive heart failure)  Assessment and Plan of Treatment: For the Acute Hypoxic respiratory Failure Secondary to Acute on Chronic CHF  * We performed a TTE on 09/29 that revealed an EF of 72 % (Grade I diastolic dysfunction). Your Chest X Ray on 09/25 was congested so we diuresed during your stay and monitored your intake and output and daily chest X rays. You received IV Lasix   - On Discharge:  --> Please continue PO Lasix 40 mg QD  --> Please follow up with cardiology (Dr. Lacey) in 1 week      SECONDARY DISCHARGE DIAGNOSES  Diagnosis: Mild CAD  Assessment and Plan of Treatment: For Coronary Artery Disease  * Troponin (09/25): <0.2 -> 0.15 (09/26)  - On Discharge:  --> Please Continue Atorvastatin 20mg QDand metoprolol  --> Please resume aspirin 81mg QD       Diagnosis: COPD, mild  Assessment and Plan of Treatment: For COPD  * Stable  * We monitored your SaO2 closely and kept it between 88-92% on room air. We continued Albuterol 4 times daily PRN and Symbicort 80mcg BID  - On discharge:  - Continue your home nebulizers    Diagnosis: Chronic sciatica  Assessment and Plan of Treatment: History of Sciatica   * We monitored your pain and continued your percocet Q8h PRN, Pregabalin 150mg TID, and Duloxetine 60mg QD  - On Discharge:  - Continue pain meds as prescribed       Diagnosis: Hypothyroidism  Assessment and Plan of Treatment: For Hypothyroidism  * Last TSH (09/28): 11.4, fT4 0.8  - On Discharge:  --> Please continue Levothyroxine 100mcg PO QD    Diagnosis: Hypertension  Assessment and Plan of Treatment: For Hypertension  * We monitored your blood pressure closely. We held your metoprolol tartrate in setting of being diuresed.  - On Discharge:  --> Please monitor your blood pressure closely  --> Please resume your Metoprolol Tartrate 12.5mg BID along with Lasix PO ...mg daily    Diagnosis: Diabetes mellitus  Assessment and Plan of Treatment: For Diabetes Mellitus type 2  * Your Last HbA1C:  6.0 (01/11/19). We monitored your POCT premeals and at bedtime and they were within normal range.  - On Discharge:  --> Please resume your home meds (Janumet) as prescribed    Diagnosis: Transaminitis  Assessment and Plan of Treatment: Transaminitis Possibly Secondary to CHF   * We monitored your AST and it trended down from 122 on 09/25 to 40 on 09/29 to 28 on 10/02. We performed a CT Angio Abdomen/Pelvis w/ IV Cont (09/27) that revealed unremarkable Liver. We also performed an US Abdomen (09/25) that revealed an Elongated liver with normal-appearing echogenicity. A comprehensive workup including Hepatitis panel (A, B, C), Smooth Muscle Antibody (09/29) 1:20, SPEP normal, AMA < 1:20, Ceruloplasmin: 26, and Ferritin: 80 was negative.  - On Discharge:  --> Please follow up in Liver Clinic on discharge ([phone])       Diagnosis: Esophageal candidiasis  Assessment and Plan of Treatment: For the Dysphagia secondary to esophageal candidiasis  * The EGD performed on 9/30/20 revealed possible Esophageal Candidiasis. We started you on fluconazole 200mg on 10/01  - On Discharge:  --> Please continue 200mg PO daily as prescribed    Diagnosis: Duodenal ulcer  Assessment and Plan of Treatment: For the Melena Secondary to Large Duodenal Ulcer extending to Antrum  * You had 3 episodes of hematochezia and 1 episode of melena on 09/26. You then had a drop in Hb on 09/26 down to 6.2. You received 4 units of packed RBCs and hemoglobin has trended up since then (was 10.4 this morning). We performed a CT angio abdomen/pelvis with IV contrast (09/27) that ruled out arterial extravasation and we also performed an EGD on 9/30/20 that revealed a Big duodenal ulcer extending to the antrum with possible Esophageal Candidiasis s/p biopsies. We started you on PO Protonix 40mg BID on 10/01 and kept your aspirin held.  - On Discharge:  --> Please follow up with Dr. Zendejas in 2 weeks. Will consider repeating EGD in 2 months  --> Continue PO Protonix 40mg BID   --> Will resume your aspirin 81mg QD     PRINCIPAL DISCHARGE DIAGNOSIS  Diagnosis: Acute exacerbation of CHF (congestive heart failure)  Assessment and Plan of Treatment: For the Acute Hypoxic respiratory Failure Secondary to Acute on Chronic CHF  * We performed a TTE on 09/29 that revealed an EF of 72 % (Grade I diastolic dysfunction). Your Chest X Ray on 09/25 was congested so we diuresed during your stay and monitored your intake and output and daily chest X rays. You received IV Lasix   - On Discharge:  --> Please continue PO Lasix 40 mg QD  --> Please follow up with cardiology (Dr. Lacey) in 1 week      SECONDARY DISCHARGE DIAGNOSES  Diagnosis: Pressure injury of ischium, stage 3  Assessment and Plan of Treatment:   For Stage 3 pressure injury left ischium  --> When out of bed to chair, utilize pillows chair cushion to offload pressure.   --> While in bed, offload heels from bed surface with soft pillow under calfs.  --> Continue prophylactically applying Coloplast Raul Protect moisture barrier cream to buttock and perineal area daily and prn after any soiling.   --> Continue utilizing one underpad underneath patient to contain incontinence episodes; change pad when saturated/soiled. If pt w/ any consistent urinary incontinence, consider utilizing female incontinence device/PrimaFit (if patient candidate) to contain urinary incontinence.    Diagnosis: Mild CAD  Assessment and Plan of Treatment: For Coronary Artery Disease  * Troponin (09/25): <0.2 -> 0.15 (09/26)  - On Discharge:  --> Please Continue Atorvastatin 20mg QDand metoprolol  --> Please resume aspirin 81mg QD       Diagnosis: COPD, mild  Assessment and Plan of Treatment: For COPD  * Stable  * We monitored your SaO2 closely and kept it between 88-92% on room air. We continued Albuterol 4 times daily PRN and Symbicort 80mcg BID  - On discharge:  - Continue your home nebulizers    Diagnosis: Chronic sciatica  Assessment and Plan of Treatment: History of Sciatica   * We monitored your pain and continued your percocet Q8h PRN, Pregabalin 150mg TID, and Duloxetine 60mg QD  - On Discharge:  - Continue pain meds as prescribed       Diagnosis: Hypothyroidism  Assessment and Plan of Treatment: For Hypothyroidism  * Last TSH (09/28): 11.4, fT4 0.8  - On Discharge:  --> Please continue Levothyroxine 100mcg PO QD    Diagnosis: Hypertension  Assessment and Plan of Treatment: For Hypertension  * We monitored your blood pressure closely. We held your metoprolol tartrate in setting of being diuresed.  - On Discharge:  --> Please monitor your blood pressure closely  --> Please resume your Metoprolol Tartrate 12.5mg BID along with Lasix PO ...mg daily    Diagnosis: Diabetes mellitus  Assessment and Plan of Treatment: For Diabetes Mellitus type 2  * Your Last HbA1C:  6.0 (01/11/19). We monitored your POCT premeals and at bedtime and they were within normal range.  - On Discharge:  --> Please resume your home meds (Janumet) as prescribed    Diagnosis: Transaminitis  Assessment and Plan of Treatment: Transaminitis Possibly Secondary to CHF   * We monitored your AST and it trended down from 122 on 09/25 to 40 on 09/29 to 28 on 10/02. We performed a CT Angio Abdomen/Pelvis w/ IV Cont (09/27) that revealed unremarkable Liver. We also performed an US Abdomen (09/25) that revealed an Elongated liver with normal-appearing echogenicity. A comprehensive workup including Hepatitis panel (A, B, C), Smooth Muscle Antibody (09/29) 1:20, SPEP normal, AMA < 1:20, Ceruloplasmin: 26, and Ferritin: 80 was negative.  - On Discharge:  --> Please follow up in Liver Clinic on discharge ([phone])       Diagnosis: Esophageal candidiasis  Assessment and Plan of Treatment: For the Dysphagia secondary to esophageal candidiasis  * The EGD performed on 9/30/20 revealed possible Esophageal Candidiasis. We started you on fluconazole 200mg on 10/01  - On Discharge:  --> Please continue 200mg PO daily as prescribed    Diagnosis: Duodenal ulcer  Assessment and Plan of Treatment: For the Melena Secondary to Large Duodenal Ulcer extending to Antrum  * You had 3 episodes of hematochezia and 1 episode of melena on 09/26. You then had a drop in Hb on 09/26 down to 6.2. You received 4 units of packed RBCs and hemoglobin has trended up since then (was 10.4 this morning). We performed a CT angio abdomen/pelvis with IV contrast (09/27) that ruled out arterial extravasation and we also performed an EGD on 9/30/20 that revealed a Big duodenal ulcer extending to the antrum with possible Esophageal Candidiasis s/p biopsies. We started you on PO Protonix 40mg BID on 10/01 and kept your aspirin held.  - On Discharge:  --> Please follow up with Dr. Zendejas in 2 weeks. Will consider repeating EGD in 2 months  --> Continue PO Protonix 40mg BID   --> Will resume your aspirin 81mg QD

## 2020-10-02 NOTE — DISCHARGE NOTE PROVIDER - NSDCCAREPROVSEEN_GEN_ALL_CORE_FT
[facility] Internal Medicine Team  Gastroenterology Team   [facility] , Internal Medicine , cardiology ,Gastroenterology Team

## 2020-10-03 NOTE — ED ADULT NURSE NOTE - DISTAL EXTREMITY CAPILLARY REFILL
Pt arrives via EMS with mask c/o GLF. Patient reports feeling dizzy and falling onto floor. Reports hitting head. Patient denies LOC, per EMS family states patient was unconsciously for approx 1 minute. Denies blood thinners.   A&Ox4 on arrival.
2 seconds or less

## 2020-10-05 LAB
CORTICOSTEROID BINDING GLOBULIN RESULT: 2.2 MG/DL — SIGNIFICANT CHANGE UP
CORTIS F/TOTAL MFR SERPL: 32 % — SIGNIFICANT CHANGE UP
CORTIS SERPL-MCNC: 21 UG/DL — HIGH
CORTISOL, FREE RESULT: 6.6 UG/DL — HIGH
SURGICAL PATHOLOGY STUDY: SIGNIFICANT CHANGE UP

## 2020-10-08 DIAGNOSIS — E11.610 TYPE 2 DIABETES MELLITUS WITH DIABETIC NEUROPATHIC ARTHROPATHY: ICD-10-CM

## 2020-10-08 DIAGNOSIS — E78.5 HYPERLIPIDEMIA, UNSPECIFIED: ICD-10-CM

## 2020-10-08 DIAGNOSIS — E87.6 HYPOKALEMIA: ICD-10-CM

## 2020-10-08 DIAGNOSIS — R13.10 DYSPHAGIA, UNSPECIFIED: ICD-10-CM

## 2020-10-08 DIAGNOSIS — M54.30 SCIATICA, UNSPECIFIED SIDE: ICD-10-CM

## 2020-10-08 DIAGNOSIS — N17.9 ACUTE KIDNEY FAILURE, UNSPECIFIED: ICD-10-CM

## 2020-10-08 DIAGNOSIS — K26.4 CHRONIC OR UNSPECIFIED DUODENAL ULCER WITH HEMORRHAGE: ICD-10-CM

## 2020-10-08 DIAGNOSIS — F41.9 ANXIETY DISORDER, UNSPECIFIED: ICD-10-CM

## 2020-10-08 DIAGNOSIS — R32 UNSPECIFIED URINARY INCONTINENCE: ICD-10-CM

## 2020-10-08 DIAGNOSIS — I25.10 ATHEROSCLEROTIC HEART DISEASE OF NATIVE CORONARY ARTERY WITHOUT ANGINA PECTORIS: ICD-10-CM

## 2020-10-08 DIAGNOSIS — J96.01 ACUTE RESPIRATORY FAILURE WITH HYPOXIA: ICD-10-CM

## 2020-10-08 DIAGNOSIS — J44.9 CHRONIC OBSTRUCTIVE PULMONARY DISEASE, UNSPECIFIED: ICD-10-CM

## 2020-10-08 DIAGNOSIS — Z79.82 LONG TERM (CURRENT) USE OF ASPIRIN: ICD-10-CM

## 2020-10-08 DIAGNOSIS — B37.81 CANDIDAL ESOPHAGITIS: ICD-10-CM

## 2020-10-08 DIAGNOSIS — N18.2 CHRONIC KIDNEY DISEASE, STAGE 2 (MILD): ICD-10-CM

## 2020-10-08 DIAGNOSIS — I13.0 HYPERTENSIVE HEART AND CHRONIC KIDNEY DISEASE WITH HEART FAILURE AND STAGE 1 THROUGH STAGE 4 CHRONIC KIDNEY DISEASE, OR UNSPECIFIED CHRONIC KIDNEY DISEASE: ICD-10-CM

## 2020-10-08 DIAGNOSIS — R77.8 OTHER SPECIFIED ABNORMALITIES OF PLASMA PROTEINS: ICD-10-CM

## 2020-10-08 DIAGNOSIS — F17.200 NICOTINE DEPENDENCE, UNSPECIFIED, UNCOMPLICATED: ICD-10-CM

## 2020-10-08 DIAGNOSIS — E11.22 TYPE 2 DIABETES MELLITUS WITH DIABETIC CHRONIC KIDNEY DISEASE: ICD-10-CM

## 2020-10-08 DIAGNOSIS — I50.33 ACUTE ON CHRONIC DIASTOLIC (CONGESTIVE) HEART FAILURE: ICD-10-CM

## 2020-10-08 DIAGNOSIS — Q24.0 DEXTROCARDIA: ICD-10-CM

## 2020-10-08 DIAGNOSIS — E03.9 HYPOTHYROIDISM, UNSPECIFIED: ICD-10-CM

## 2020-10-08 DIAGNOSIS — E83.42 HYPOMAGNESEMIA: ICD-10-CM

## 2020-10-08 DIAGNOSIS — E87.1 HYPO-OSMOLALITY AND HYPONATREMIA: ICD-10-CM

## 2020-10-08 DIAGNOSIS — I50.9 HEART FAILURE, UNSPECIFIED: ICD-10-CM

## 2020-10-08 DIAGNOSIS — E87.2 ACIDOSIS: ICD-10-CM

## 2020-10-08 DIAGNOSIS — L89.893 PRESSURE ULCER OF OTHER SITE, STAGE 3: ICD-10-CM

## 2020-10-08 DIAGNOSIS — D62 ACUTE POSTHEMORRHAGIC ANEMIA: ICD-10-CM

## 2020-10-20 ENCOUNTER — INPATIENT (INPATIENT)
Facility: HOSPITAL | Age: 55
LOS: 2 days | Discharge: HOME HEALTH | End: 2020-10-23
Attending: INTERNAL MEDICINE | Admitting: INTERNAL MEDICINE
Payer: MEDICARE

## 2020-10-20 VITALS
HEIGHT: 60 IN | RESPIRATION RATE: 12 BRPM | DIASTOLIC BLOOD PRESSURE: 48 MMHG | SYSTOLIC BLOOD PRESSURE: 78 MMHG | HEART RATE: 74 BPM | OXYGEN SATURATION: 75 %

## 2020-10-20 DIAGNOSIS — E11.610 TYPE 2 DIABETES MELLITUS WITH DIABETIC NEUROPATHIC ARTHROPATHY: Chronic | ICD-10-CM

## 2020-10-20 DIAGNOSIS — Z98.890 OTHER SPECIFIED POSTPROCEDURAL STATES: Chronic | ICD-10-CM

## 2020-10-20 LAB
ALBUMIN SERPL ELPH-MCNC: 2.8 G/DL — LOW (ref 3.5–5.2)
ALP SERPL-CCNC: 110 U/L — SIGNIFICANT CHANGE UP (ref 30–115)
ALT FLD-CCNC: 22 U/L — SIGNIFICANT CHANGE UP (ref 0–41)
ANION GAP SERPL CALC-SCNC: 15 MMOL/L — HIGH (ref 7–14)
APTT BLD: 32 SEC — SIGNIFICANT CHANGE UP (ref 27–39.2)
AST SERPL-CCNC: 56 U/L — HIGH (ref 0–41)
BASE EXCESS BLDV CALC-SCNC: -1.9 MMOL/L — SIGNIFICANT CHANGE UP (ref -2–2)
BASOPHILS # BLD AUTO: 0.04 K/UL — SIGNIFICANT CHANGE UP (ref 0–0.2)
BASOPHILS NFR BLD AUTO: 0.2 % — SIGNIFICANT CHANGE UP (ref 0–1)
BILIRUB DIRECT SERPL-MCNC: 0.4 MG/DL — HIGH (ref 0–0.2)
BILIRUB INDIRECT FLD-MCNC: 0.4 MG/DL — SIGNIFICANT CHANGE UP (ref 0.2–1.2)
BILIRUB SERPL-MCNC: 0.8 MG/DL — SIGNIFICANT CHANGE UP (ref 0.2–1.2)
BLD GP AB SCN SERPL QL: SIGNIFICANT CHANGE UP
BUN SERPL-MCNC: 25 MG/DL — HIGH (ref 10–20)
CA-I SERPL-SCNC: 0.96 MMOL/L — LOW (ref 1.12–1.3)
CALCIUM SERPL-MCNC: 7.4 MG/DL — LOW (ref 8.5–10.1)
CHLORIDE SERPL-SCNC: 80 MMOL/L — LOW (ref 98–110)
CO2 SERPL-SCNC: 22 MMOL/L — SIGNIFICANT CHANGE UP (ref 17–32)
CREAT SERPL-MCNC: 2.3 MG/DL — HIGH (ref 0.7–1.5)
EOSINOPHIL # BLD AUTO: 0.16 K/UL — SIGNIFICANT CHANGE UP (ref 0–0.7)
EOSINOPHIL NFR BLD AUTO: 0.8 % — SIGNIFICANT CHANGE UP (ref 0–8)
GAS PNL BLDV: 115 MMOL/L — LOW (ref 136–145)
GAS PNL BLDV: SIGNIFICANT CHANGE UP
GLUCOSE SERPL-MCNC: 135 MG/DL — HIGH (ref 70–99)
HCG SERPL QL: NEGATIVE — SIGNIFICANT CHANGE UP
HCO3 BLDV-SCNC: 25 MMOL/L — SIGNIFICANT CHANGE UP (ref 22–29)
HCT VFR BLD CALC: 30.4 % — LOW (ref 37–47)
HCT VFR BLDA CALC: 41.7 % — SIGNIFICANT CHANGE UP (ref 34–44)
HGB BLD CALC-MCNC: 13.6 G/DL — LOW (ref 14–18)
HGB BLD-MCNC: 9.7 G/DL — LOW (ref 12–16)
IMM GRANULOCYTES NFR BLD AUTO: 0.9 % — HIGH (ref 0.1–0.3)
INR BLD: 1.4 RATIO — HIGH (ref 0.65–1.3)
LACTATE BLDV-MCNC: 3.3 MMOL/L — HIGH (ref 0.5–1.6)
LACTATE SERPL-SCNC: 3.9 MMOL/L — HIGH (ref 0.7–2)
LYMPHOCYTES # BLD AUTO: 0.84 K/UL — LOW (ref 1.2–3.4)
LYMPHOCYTES # BLD AUTO: 4.1 % — LOW (ref 20.5–51.1)
MCHC RBC-ENTMCNC: 27.6 PG — SIGNIFICANT CHANGE UP (ref 27–31)
MCHC RBC-ENTMCNC: 31.9 G/DL — LOW (ref 32–37)
MCV RBC AUTO: 86.4 FL — SIGNIFICANT CHANGE UP (ref 81–99)
MONOCYTES # BLD AUTO: 1.23 K/UL — HIGH (ref 0.1–0.6)
MONOCYTES NFR BLD AUTO: 6 % — SIGNIFICANT CHANGE UP (ref 1.7–9.3)
NEUTROPHILS # BLD AUTO: 18.2 K/UL — HIGH (ref 1.4–6.5)
NEUTROPHILS NFR BLD AUTO: 88 % — HIGH (ref 42.2–75.2)
NRBC # BLD: 0 /100 WBCS — SIGNIFICANT CHANGE UP (ref 0–0)
NT-PROBNP SERPL-SCNC: HIGH PG/ML (ref 0–300)
PCO2 BLDV: 52 MMHG — HIGH (ref 41–51)
PH BLDV: 7.3 — SIGNIFICANT CHANGE UP (ref 7.26–7.43)
PLATELET # BLD AUTO: 291 K/UL — SIGNIFICANT CHANGE UP (ref 130–400)
PO2 BLDV: 14 MMHG — LOW (ref 20–40)
POTASSIUM BLDV-SCNC: 4 MMOL/L — SIGNIFICANT CHANGE UP (ref 3.3–5.6)
POTASSIUM SERPL-MCNC: 4.2 MMOL/L — SIGNIFICANT CHANGE UP (ref 3.5–5)
POTASSIUM SERPL-SCNC: 4.2 MMOL/L — SIGNIFICANT CHANGE UP (ref 3.5–5)
PROT SERPL-MCNC: 6.2 G/DL — SIGNIFICANT CHANGE UP (ref 6–8)
PROTHROM AB SERPL-ACNC: 16.1 SEC — HIGH (ref 9.95–12.87)
RBC # BLD: 3.52 M/UL — LOW (ref 4.2–5.4)
RBC # FLD: 18.3 % — HIGH (ref 11.5–14.5)
SAO2 % BLDV: 15 % — SIGNIFICANT CHANGE UP
SODIUM SERPL-SCNC: 117 MMOL/L — CRITICAL LOW (ref 135–146)
TROPONIN T SERPL-MCNC: 0.13 NG/ML — CRITICAL HIGH
WBC # BLD: 20.65 K/UL — HIGH (ref 4.8–10.8)
WBC # FLD AUTO: 20.65 K/UL — HIGH (ref 4.8–10.8)

## 2020-10-20 PROCEDURE — 93308 TTE F-UP OR LMTD: CPT | Mod: 26,GC

## 2020-10-20 PROCEDURE — 99291 CRITICAL CARE FIRST HOUR: CPT | Mod: CS,25,GC

## 2020-10-20 PROCEDURE — 71045 X-RAY EXAM CHEST 1 VIEW: CPT | Mod: 26

## 2020-10-20 PROCEDURE — 93010 ELECTROCARDIOGRAM REPORT: CPT

## 2020-10-20 RX ORDER — PANTOPRAZOLE SODIUM 20 MG/1
40 TABLET, DELAYED RELEASE ORAL ONCE
Refills: 0 | Status: COMPLETED | OUTPATIENT
Start: 2020-10-20 | End: 2020-10-20

## 2020-10-20 RX ORDER — SUCRALFATE 1 G
1 TABLET ORAL
Refills: 0 | Status: DISCONTINUED | OUTPATIENT
Start: 2020-10-20 | End: 2020-10-23

## 2020-10-20 RX ORDER — ATORVASTATIN CALCIUM 80 MG/1
40 TABLET, FILM COATED ORAL AT BEDTIME
Refills: 0 | Status: DISCONTINUED | OUTPATIENT
Start: 2020-10-20 | End: 2020-10-23

## 2020-10-20 RX ORDER — HYDROCORTISONE 20 MG
100 TABLET ORAL ONCE
Refills: 0 | Status: COMPLETED | OUTPATIENT
Start: 2020-10-20 | End: 2020-10-20

## 2020-10-20 RX ORDER — CEFEPIME 1 G/1
1000 INJECTION, POWDER, FOR SOLUTION INTRAMUSCULAR; INTRAVENOUS EVERY 12 HOURS
Refills: 0 | Status: DISCONTINUED | OUTPATIENT
Start: 2020-10-20 | End: 2020-10-22

## 2020-10-20 RX ORDER — SODIUM CHLORIDE 9 MG/ML
1000 INJECTION, SOLUTION INTRAVENOUS ONCE
Refills: 0 | Status: COMPLETED | OUTPATIENT
Start: 2020-10-20 | End: 2020-10-20

## 2020-10-20 RX ORDER — SODIUM CHLORIDE 9 MG/ML
500 INJECTION, SOLUTION INTRAVENOUS ONCE
Refills: 0 | Status: COMPLETED | OUTPATIENT
Start: 2020-10-20 | End: 2020-10-20

## 2020-10-20 RX ORDER — LEVOTHYROXINE SODIUM 125 MCG
200 TABLET ORAL ONCE
Refills: 0 | Status: COMPLETED | OUTPATIENT
Start: 2020-10-20 | End: 2020-10-20

## 2020-10-20 RX ORDER — HYDROXYZINE HCL 10 MG
1 TABLET ORAL
Qty: 0 | Refills: 0 | DISCHARGE

## 2020-10-20 RX ORDER — PANTOPRAZOLE SODIUM 20 MG/1
40 TABLET, DELAYED RELEASE ORAL
Refills: 0 | Status: DISCONTINUED | OUTPATIENT
Start: 2020-10-20 | End: 2020-10-21

## 2020-10-20 RX ORDER — ALBUTEROL 90 UG/1
2.5 AEROSOL, METERED ORAL EVERY 6 HOURS
Refills: 0 | Status: DISCONTINUED | OUTPATIENT
Start: 2020-10-20 | End: 2020-10-21

## 2020-10-20 RX ORDER — VANCOMYCIN HCL 1 G
1000 VIAL (EA) INTRAVENOUS ONCE
Refills: 0 | Status: COMPLETED | OUTPATIENT
Start: 2020-10-20 | End: 2020-10-20

## 2020-10-20 RX ORDER — CHLORHEXIDINE GLUCONATE 213 G/1000ML
1 SOLUTION TOPICAL
Refills: 0 | Status: DISCONTINUED | OUTPATIENT
Start: 2020-10-20 | End: 2020-10-23

## 2020-10-20 RX ORDER — ASPIRIN/CALCIUM CARB/MAGNESIUM 324 MG
81 TABLET ORAL DAILY
Refills: 0 | Status: DISCONTINUED | OUTPATIENT
Start: 2020-10-20 | End: 2020-10-23

## 2020-10-20 RX ORDER — NOREPINEPHRINE BITARTRATE/D5W 8 MG/250ML
0.05 PLASTIC BAG, INJECTION (ML) INTRAVENOUS
Qty: 8 | Refills: 0 | Status: DISCONTINUED | OUTPATIENT
Start: 2020-10-20 | End: 2020-10-21

## 2020-10-20 RX ORDER — LEVOTHYROXINE SODIUM 125 MCG
100 TABLET ORAL DAILY
Refills: 0 | Status: DISCONTINUED | OUTPATIENT
Start: 2020-10-20 | End: 2020-10-23

## 2020-10-20 RX ORDER — CEFEPIME 1 G/1
2000 INJECTION, POWDER, FOR SOLUTION INTRAMUSCULAR; INTRAVENOUS ONCE
Refills: 0 | Status: COMPLETED | OUTPATIENT
Start: 2020-10-20 | End: 2020-10-20

## 2020-10-20 RX ORDER — TIZANIDINE 4 MG/1
2 TABLET ORAL
Qty: 0 | Refills: 0 | DISCHARGE

## 2020-10-20 RX ORDER — ALBUTEROL 90 UG/1
2 AEROSOL, METERED ORAL
Qty: 0 | Refills: 0 | DISCHARGE

## 2020-10-20 RX ORDER — IPRATROPIUM/ALBUTEROL SULFATE 18-103MCG
3 AEROSOL WITH ADAPTER (GRAM) INHALATION EVERY 6 HOURS
Refills: 0 | Status: DISCONTINUED | OUTPATIENT
Start: 2020-10-20 | End: 2020-10-22

## 2020-10-20 RX ORDER — METRONIDAZOLE 500 MG
500 TABLET ORAL EVERY 8 HOURS
Refills: 0 | Status: DISCONTINUED | OUTPATIENT
Start: 2020-10-20 | End: 2020-10-22

## 2020-10-20 RX ADMIN — CEFEPIME 100 MILLIGRAM(S): 1 INJECTION, POWDER, FOR SOLUTION INTRAMUSCULAR; INTRAVENOUS at 17:20

## 2020-10-20 RX ADMIN — SODIUM CHLORIDE 500 MILLILITER(S): 9 INJECTION, SOLUTION INTRAVENOUS at 16:45

## 2020-10-20 RX ADMIN — Medication 250 MILLIGRAM(S): at 17:20

## 2020-10-20 RX ADMIN — PANTOPRAZOLE SODIUM 40 MILLIGRAM(S): 20 TABLET, DELAYED RELEASE ORAL at 17:39

## 2020-10-20 RX ADMIN — Medication 6.56 MICROGRAM(S)/KG/MIN: at 17:50

## 2020-10-20 RX ADMIN — SODIUM CHLORIDE 1000 MILLILITER(S): 9 INJECTION, SOLUTION INTRAVENOUS at 17:32

## 2020-10-20 RX ADMIN — Medication 100 MILLIGRAM(S): at 18:39

## 2020-10-20 RX ADMIN — Medication 200 MICROGRAM(S): at 18:56

## 2020-10-20 NOTE — ED PROVIDER NOTE - PROGRESS NOTE DETAILS
pt endorsed to Dr. Giles- pt sepsis w/ shock, source unclear. pending ua, s/p tx for adrenal crisis/hypothryroid crisis, ICU c/s/dispo

## 2020-10-20 NOTE — ED PROVIDER NOTE - PHYSICAL EXAMINATION
General: appears well, but lethargic. AAOx3. slightly dishevelled   HEENT: Atraumatic, normocephalic. Moist mucus membranes. PERRLA.  Cardio: Regular rate and rhythm. S1, S2 appreciated. no murmurs.  Pulm: Bilateral breath sounds. No wheezing, or rhonchi  Abdomen: Soft, non-tender, non-distended. Normoactive bowel sounds.  Extremities: pink-purple discoloration of toes bilaterally.   Neuro: No focal deficits. Motor 5/5 throughout. Sensation intact

## 2020-10-20 NOTE — H&P ADULT - NSHPPHYSICALEXAM_GEN_ALL_CORE
Vital Signs Last 24 Hrs  T(C): 37.1 (20 Oct 2020 16:15), Max: 37.1 (20 Oct 2020 16:15)  T(F): 98.8 (20 Oct 2020 16:15), Max: 98.8 (20 Oct 2020 16:15)  HR: 80 (20 Oct 2020 20:16) (64 - 80)  BP: 129/60 (20 Oct 2020 20:16) (66/43 - 129/60)  BP(mean): 60 (20 Oct 2020 19:10) (51 - 78)  RR: 18 (20 Oct 2020 20:16) (12 - 18)  SpO2: 96% (20 Oct 2020 20:16) (75% - 96%)    PHYSICAL EXAM:    General: appears well, but lethargic. AAOx3. slightly dishevelled   HEENT: Atraumatic, normocephalic. Moist mucus membranes. PERRLA.  Cardio: Regular rate and rhythm. S1, S2 appreciated. no murmurs.  Pulm: Bilateral breath sounds. No wheezing, or rhonchi  Abdomen: Soft, non-tender, non-distended. Normoactive bowel sounds.  Extremities: pink-purple discoloration of toes bilaterally.   Neuro: No focal deficits. Motor 5/5 throughout. Sensation intact

## 2020-10-20 NOTE — H&P ADULT - NSHPLABSRESULTS_GEN_ALL_CORE
9.7    20.65 )-----------( 291      ( 20 Oct 2020 16:38 )             30.4       10-20    117<LL>  |  80<L>  |  25<H>  ----------------------------<  135<H>  4.2   |  22  |  2.3<H>    Ca    7.4<L>      20 Oct 2020 16:38    TPro  6.2  /  Alb  2.8<L>  /  TBili  0.8  /  DBili  0.4<H>  /  AST  56<H>  /  ALT  22  /  AlkPhos  110  10-20                  PT/INR - ( 20 Oct 2020 18:12 )   PT: 16.10 sec;   INR: 1.40 ratio         PTT - ( 20 Oct 2020 18:12 )  PTT:32.0 sec    Lactate Trend  10-20 @ 16:38 Lactate:3.9       CARDIAC MARKERS ( 20 Oct 2020 16:38 )  x     / 0.13 ng/mL / x     / x     / x          Cardiology:      < from: 12 Lead ECG (09.28.20 @ 01:02) >      Diagnosis Line Sinus tachycardia  T wave abnormality, consider lateral ischemia    < end of copied text >      < from: TTE Echo Complete w/o Contrast w/ Doppler (09.29.20 @ 09:09) >    Summary:   1. Normal global left ventricular systolic function.   2. LV Ejection Fraction by Jade's Method with a biplane EF of 72 %.   3. Normal left ventricular internal cavity size.   4. Spectral Doppler shows impaired relaxation pattern of left ventricular myocardial filling (Grade I diastolic dysfunction).   5. Normal left atrial size.   6. Normal right atrial size.   7. There is no evidence of pericardial effusion.   8. Mild mitral annular calcification.   9. Mild thickening of the posterior mitral valve leaflet.  10. Trace mitral valve regurgitation.

## 2020-10-20 NOTE — PATIENT PROFILE ADULT - NSPRESCRALCFREQ_GEN_A_NUR
[facility], [place]    Procedure: Fluoroscopy guided lumbar puncture    Date/Time: 6/9/2020 3:02 PM  Performed by: Ameya Scott MD  Authorized by: Ameya Scott MD   IR Fellow Physician: Hattie Knight  Radiology Resident Physician: Ameya Scott      UNIVERSAL PROTOCOL   Site Marked: Yes  Prior Images Obtained and Reviewed:  Yes  Required items: Required blood products, implants, devices and special equipment available    Patient identity confirmed:  Verbally with patient  Patient was reevaluated immediately before administering moderate or deep sedation or anesthesia  Confirmation Checklist:  Patient's identity using two indicators, relevant allergies, procedure was appropriate and matched the consent or emergent situation and correct equipment/implants were available  Time out: Immediately prior to the procedure a time out was called    Universal Protocol: the Joint Commission Universal Protocol was followed    Preparation: Patient was prepped and draped in usual sterile fashion           ANESTHESIA    Anesthesia: Local infiltration  Local Anesthetic:  Lidocaine 1% without epinephrine  Anesthetic Total (mL):  5      SEDATION    Patient Sedated: Yes    Sedation Type:  Moderate (conscious) sedation  Sedation:  Midazolam and fentanyl  Vital signs: Vital signs monitored during sedation    See dictated procedure note for full details.  PROCEDURE   Patient Tolerance:  Patient tolerated the procedure well with no immediate complications    Length of time physician/provider present for 1:1 monitoring during sedation: 25       Never

## 2020-10-20 NOTE — H&P ADULT - ATTENDING COMMENTS
patient seen and examined, agree with above, sepsis present on admission, diarrhea, ua +, JEEVAN, hyponatremia, IVF 75 cc/h ns, abx, feeds, pancx, procal, OOB TO CHAIR, trend CBC/ CMP, TSH reviewed

## 2020-10-20 NOTE — ED PROVIDER NOTE - NS ED ROS FT
Constitutional:  No Fever, No Chills  Head: Nomocephalic, atraumatic  ENT/Mouth:  No Hearing Changes,  No Difficulty Swallowing  Eyes:  No Eye Pain, No Vision Changes  Cardiovascular:  No Chest Pain, No Palpitations  Respiratory:  No Cough, No Dyspnea  Gastrointestinal:  As described in HPI  Musculoskeletal:  No Joint Swelling, No Back Pain  Skin:  No Skin Lesions, No Jaundice  Neuro:  dizziness

## 2020-10-20 NOTE — ED ADULT NURSE NOTE - OBJECTIVE STATEMENT
Pt c/o generalized weakness x4 days, pt also c/o diarrhea and sob x2 days. Denies fever, chills. yncope, cp,, nausea, vomiting, black/bloody stools. Pt daughter endorses pt states she seems off and weaker since last recent admission for GIB, daughter endorses take percocet at home for pain.

## 2020-10-20 NOTE — ED PROVIDER NOTE - OBJECTIVE STATEMENT
55 y/o female with Medical Hx of Type II DM,  CAD, DLD, HTN, Hypothyroidism, COPD, Dextrocardia,  Sciatica/Peripheral Neuralgia, Charcot foot deformity s/p reconstruction on 1/11/19 presenting for hypotension and lethargy.    Pt had a recent admission in september 2020 for UGIB 2/2 duodenal ulcer, and JEEVAN w/ hyponatremia.  She reports that since discharge, she has been feeling generally weak, lethargic, and not like herself. She reports diarrhea since sunday (>6 episodes/ day) that she described as "watery". She denies any associated nausea/vomiting/abdominal pain or fevers. She has been having episodes of delirium/altered mental status at home, but is AAOx3 during interview. She takes percocet for pain, but only had 2 pills today. She has been taking lasix 40 QD without checking her blood pressure.   She denies angina, palpitations, dyspnea, melena, or bloody stools.

## 2020-10-20 NOTE — H&P ADULT - HISTORY OF PRESENT ILLNESS
55 y/o female with Medical Hx of Type II DM,  CAD, DLD, HTN, Hypothyroidism, COPD, Dextrocardia,  Sciatica/Peripheral Neuralgia, Charcot foot deformity s/p reconstruction on 1/11/19 presenting for hypotension and lethargy.    Pt had a recent admission in september 2020 for UGIB 2/2 duodenal ulcer, and JEEVAN w/ hyponatremia.  She reports that since discharge, she has been feeling generally weak, lethargic, and not like herself. She reports diarrhea since sunday (>6 episodes/ day) that she described as "watery". She denies any associated nausea/vomiting/abdominal pain or fevers. She has been having episodes of delirium/altered mental status at home, but is AAOx3 during interview. She takes percocett for pain, but only had 2 pills today. She has been taking lasix 40 QD without checking her blood pressure.   She denies angina, palpitations, dyspnea, melena, or bloody stools.     In the ED, Pt hypotensive to 66/43, HR66 T98.8 RR16 satting 91 % on RA. Pt received 1.5L LR in ED with improvement in her BP to 90's.  Labwork significant for Cr 2.3 (Baseline Cr 1 ), Na 117, Hb 9.7 (Baseline 13.5), La 3.9, T 0.13, BNP 21K.    Pt received Synthroid 200mcg IVP and solucortef 100mg, cefepime and vancomycin.      55 y/o female with Medical Hx of Type II DM,  CAD, DLD, HTN, Hypothyroidism, COPD, Dextrocardia,  Sciatica/Peripheral Neuralgia, Charcot foot deformity s/p reconstruction on 1/11/19 presenting for hypotension and lethargy.    Pt had a recent admission in september 2020 for UGIB 2/2 duodenal ulcer, and JEEVAN w/ hyponatremia.  She reports that since discharge, she has been feeling generally weak, lethargic, and not like herself. She reports diarrhea since sunday (>6 episodes/ day) that she described as "watery". She denies any associated nausea/vomiting/abdominal pain or fevers. She has been having episodes of delirium/altered mental status at home, but is AAOx3 during interview. She takes percocet for pain, but only had 2 pills today. She has been taking lasix 40 QD without checking her blood pressure.   She denies angina, palpitations, dyspnea, melena, or bloody stools.     In the ED, Pt hypotensive to 66/43, HR66 T98.8 RR16 satting 91 % on RA. Pt received 1.5L LR in ED with improvement in her BP to 90's.  Labwork significant for Cr 2.3 (Baseline Cr 1 ), Na 117, Hb 9.7 (Baseline 13.5), La 3.9, T 0.13, BNP 21K.    Pt received Synthroid 200mcg IVP and solucortef 100mg, cefepime and vancomycin. admitted to MICU, feels better, want to eat, off pressors

## 2020-10-20 NOTE — ED PROVIDER NOTE - CARE PLAN
Principal Discharge DX:	Lethargy  Secondary Diagnosis:	Sepsis  Secondary Diagnosis:	Hyponatremia  Secondary Diagnosis:	Anemia   Principal Discharge DX:	Lethargy  Secondary Diagnosis:	Sepsis  Secondary Diagnosis:	Hyponatremia  Secondary Diagnosis:	Anemia  Secondary Diagnosis:	JEEVAN (acute kidney injury)

## 2020-10-20 NOTE — ED PROVIDER NOTE - ATTENDING CONTRIBUTION TO CARE
56 yo f hx Type II DM,  CAD (Dextrocardia), DL, HTN, Hypothyroidism, COPD, Sciatica/Peripheral Neuralgia, and Charcot foot deformity, recent admission for GIB (CT angio abdomen/pelvis with IV contrast (09/27) that ruled out arterial extravasation and we also performed an EGD on 9/30/20 that revealed a Big duodenal ulcer extending to the antrum with possible Esophageal Candidiasis) presents w/ weakness x 4 days, diarrhea 2 days ago and SOB. no fever, chills. no syncope, cp or trauma. no nausea/vomiting. pt takes percocet for pain but has only taken 2 in the past day. no hi/si. no black/bloody stools.  no acute numbness or weakness. daughter states pt has seemed a bit "off" and weaker since last admission.     vs hypoxemic, hypotensive  gen- NAD, aaox3  HENT- pupils 2mm, responding and symmertric  card-rrr  lungs-normal RR, mild RLL crackles, no wheezing or stridor, symmetric air entry b/l  abd-sntnd, no guarding or rebound  neuro- full strength to upper/lower extremies, chronic decreased in sensation from R shin to foot 2/2 neuropathy per pt, cn ii-xii grossly intact, normal coordination  LE- RLE w/ discolored 2nd toe, faint dopplerable pulse, LLE- dopplerable PT and DP    c/f sepsis possible PNA, less likely copd given normal lung sounds, r/o uti  rectal per resident normal- less likely acute GIB  O2 prn  cxr/ua for source  abd benign, unlikely colitis/diverticulitis  RLE c/f severe PAD- will get art duplex  CTH for change in mental status  VBG to assess for hypercapnea

## 2020-10-20 NOTE — ED PROVIDER NOTE - CLINICAL SUMMARY MEDICAL DECISION MAKING FREE TEXT BOX
Pt presented to eD with lethargy. Hypotensive, afebrile. Labs, imaging obtained. Pt with leukocytosis - afebrile, but cultures obtained, abx given. Pt also with anemia, prior hx of GIB, brown stool. Protonix given, GI consult placed. Pt also with hyponatremia, JEEVAN. Mildly hydrated due to clinical dehydration. Thyroid labs sent and thyroid meds and stress dose steroids given due to clinical presentation and pt being noncompliant with meds at home. Troponin baseline, EKG without acute ischemic changes. Pt on low dose peripheral pressors. Discussed with ICU, will admit.

## 2020-10-20 NOTE — ED PROVIDER NOTE - BIRTH SEX
Patient Instructions by Bernardo Rodriguez at 01/19/17 05:25 PM     Author:  Bernardo Rodriguez Service:  (none) Author Type:  Medical Assistant     Filed:  01/19/17 05:26 PM Encounter Date:  1/19/2017 Status:  Signed     :  Arabella Hurtado (Medical Assistant)             Treatment Orders        Follow-Up  -- Make an appointment with Leslie Mcgill MD in 3 months    Time left: 1/19/2017 5:26 PM     Next appointment:   Location:   Provider:     Please note: 24 hour notice for cancellation of appointment is required. Additional Educational Resources: For additional resources regarding your symptoms, diagnosis, or further health information, please visit the Health Resources section on [email or web address] or the Online Health Resources section in [employer].             Revision History        User Key Date/Time User Provider Type Action    > [N/A] 01/19/17 05:26 PM Arabella Hurtado Medical Assistant Sign
Female

## 2020-10-20 NOTE — ED ADULT NURSE NOTE - NSIMPLEMENTINTERV_GEN_ALL_ED
Implemented All Fall with Harm Risk Interventions:  [place] to call system. Call bell, personal items and telephone within reach. Instruct patient to call for assistance. Room bathroom lighting operational. Non-slip footwear when patient is off stretcher. Physically safe environment: no spills, clutter or unnecessary equipment. Stretcher in lowest position, wheels locked, appropriate side rails in place. Provide visual cue, wrist band, yellow gown, etc. Monitor gait and stability. Monitor for mental status changes and reorient to person, place, and time. Review medications for side effects contributing to fall risk. Reinforce activity limits and safety measures with patient and family. Provide visual clues: red socks.

## 2020-10-20 NOTE — ED PROVIDER NOTE - DISPOSITION TYPE
Continue Regimen: . \\nKetoconazole 2% shampoo. To be used twice or 3 times a week. Apply to dry scalp and leave for 10 to 15 minutes . Then wash it off. \\nKetoconazole 2% Cream twice daily. \\n\\nClobetasol 0.05% scalp solution. Apply to scalp at night, leave it for 6 to 8 hours. Then, wash it off the next day. Detail Level: Zone ADMIT

## 2020-10-21 LAB
ALBUMIN SERPL ELPH-MCNC: 2.6 G/DL — LOW (ref 3.5–5.2)
ALBUMIN SERPL ELPH-MCNC: 2.8 G/DL — LOW (ref 3.5–5.2)
ALBUMIN SERPL ELPH-MCNC: 2.9 G/DL — LOW (ref 3.5–5.2)
ALP SERPL-CCNC: 101 U/L — SIGNIFICANT CHANGE UP (ref 30–115)
ALP SERPL-CCNC: 110 U/L — SIGNIFICANT CHANGE UP (ref 30–115)
ALP SERPL-CCNC: 115 U/L — SIGNIFICANT CHANGE UP (ref 30–115)
ALT FLD-CCNC: 23 U/L — SIGNIFICANT CHANGE UP (ref 0–41)
ALT FLD-CCNC: 24 U/L — SIGNIFICANT CHANGE UP (ref 0–41)
ALT FLD-CCNC: 24 U/L — SIGNIFICANT CHANGE UP (ref 0–41)
AMPHET UR-MCNC: NEGATIVE — SIGNIFICANT CHANGE UP
ANION GAP SERPL CALC-SCNC: 12 MMOL/L — SIGNIFICANT CHANGE UP (ref 7–14)
ANION GAP SERPL CALC-SCNC: 13 MMOL/L — SIGNIFICANT CHANGE UP (ref 7–14)
ANION GAP SERPL CALC-SCNC: 13 MMOL/L — SIGNIFICANT CHANGE UP (ref 7–14)
ANION GAP SERPL CALC-SCNC: 16 MMOL/L — HIGH (ref 7–14)
APPEARANCE UR: ABNORMAL
APTT BLD: 33 SEC — SIGNIFICANT CHANGE UP (ref 27–39.2)
AST SERPL-CCNC: 46 U/L — HIGH (ref 0–41)
AST SERPL-CCNC: 50 U/L — HIGH (ref 0–41)
AST SERPL-CCNC: 50 U/L — HIGH (ref 0–41)
BACTERIA # UR AUTO: NEGATIVE — SIGNIFICANT CHANGE UP
BARBITURATES UR SCN-MCNC: NEGATIVE — SIGNIFICANT CHANGE UP
BASOPHILS # BLD AUTO: 0.01 K/UL — SIGNIFICANT CHANGE UP (ref 0–0.2)
BASOPHILS NFR BLD AUTO: 0.1 % — SIGNIFICANT CHANGE UP (ref 0–1)
BENZODIAZ UR-MCNC: NEGATIVE — SIGNIFICANT CHANGE UP
BILIRUB SERPL-MCNC: 0.6 MG/DL — SIGNIFICANT CHANGE UP (ref 0.2–1.2)
BILIRUB SERPL-MCNC: 0.7 MG/DL — SIGNIFICANT CHANGE UP (ref 0.2–1.2)
BILIRUB SERPL-MCNC: 0.7 MG/DL — SIGNIFICANT CHANGE UP (ref 0.2–1.2)
BILIRUB UR-MCNC: NEGATIVE — SIGNIFICANT CHANGE UP
BLD GP AB SCN SERPL QL: SIGNIFICANT CHANGE UP
BLD GP AB SCN SERPL QL: SIGNIFICANT CHANGE UP
BUN SERPL-MCNC: 20 MG/DL — SIGNIFICANT CHANGE UP (ref 10–20)
BUN SERPL-MCNC: 22 MG/DL — HIGH (ref 10–20)
BUN SERPL-MCNC: 23 MG/DL — HIGH (ref 10–20)
BUN SERPL-MCNC: 24 MG/DL — HIGH (ref 10–20)
CALCIUM SERPL-MCNC: 6.7 MG/DL — LOW (ref 8.5–10.1)
CALCIUM SERPL-MCNC: 6.8 MG/DL — LOW (ref 8.5–10.1)
CALCIUM SERPL-MCNC: 6.9 MG/DL — LOW (ref 8.5–10.1)
CALCIUM SERPL-MCNC: 7.1 MG/DL — LOW (ref 8.5–10.1)
CHLORIDE SERPL-SCNC: 80 MMOL/L — LOW (ref 98–110)
CHLORIDE SERPL-SCNC: 81 MMOL/L — LOW (ref 98–110)
CHLORIDE SERPL-SCNC: 85 MMOL/L — LOW (ref 98–110)
CHLORIDE SERPL-SCNC: 87 MMOL/L — LOW (ref 98–110)
CK MB CFR SERPL CALC: 8.8 NG/ML — HIGH (ref 0.6–6.3)
CK SERPL-CCNC: 330 U/L — HIGH (ref 0–225)
CO2 SERPL-SCNC: 21 MMOL/L — SIGNIFICANT CHANGE UP (ref 17–32)
CO2 SERPL-SCNC: 22 MMOL/L — SIGNIFICANT CHANGE UP (ref 17–32)
CO2 SERPL-SCNC: 23 MMOL/L — SIGNIFICANT CHANGE UP (ref 17–32)
CO2 SERPL-SCNC: 23 MMOL/L — SIGNIFICANT CHANGE UP (ref 17–32)
COCAINE METAB.OTHER UR-MCNC: NEGATIVE — SIGNIFICANT CHANGE UP
COLOR SPEC: YELLOW — SIGNIFICANT CHANGE UP
CREAT ?TM UR-MCNC: 58 MG/DL — SIGNIFICANT CHANGE UP
CREAT SERPL-MCNC: 1.1 MG/DL — SIGNIFICANT CHANGE UP (ref 0.7–1.5)
CREAT SERPL-MCNC: 1.3 MG/DL — SIGNIFICANT CHANGE UP (ref 0.7–1.5)
CREAT SERPL-MCNC: 1.7 MG/DL — HIGH (ref 0.7–1.5)
CREAT SERPL-MCNC: 2 MG/DL — HIGH (ref 0.7–1.5)
DIFF PNL FLD: ABNORMAL
DRUG SCREEN 1, URINE RESULT: SIGNIFICANT CHANGE UP
EOSINOPHIL # BLD AUTO: 0 K/UL — SIGNIFICANT CHANGE UP (ref 0–0.7)
EOSINOPHIL NFR BLD AUTO: 0 % — SIGNIFICANT CHANGE UP (ref 0–8)
EPI CELLS # UR: 1 /HPF — SIGNIFICANT CHANGE UP (ref 0–5)
GLUCOSE BLDC GLUCOMTR-MCNC: 151 MG/DL — HIGH (ref 70–99)
GLUCOSE BLDC GLUCOMTR-MCNC: 244 MG/DL — HIGH (ref 70–99)
GLUCOSE SERPL-MCNC: 131 MG/DL — HIGH (ref 70–99)
GLUCOSE SERPL-MCNC: 137 MG/DL — HIGH (ref 70–99)
GLUCOSE SERPL-MCNC: 140 MG/DL — HIGH (ref 70–99)
GLUCOSE SERPL-MCNC: 145 MG/DL — HIGH (ref 70–99)
GLUCOSE UR QL: NEGATIVE — SIGNIFICANT CHANGE UP
HCT VFR BLD CALC: 28.6 % — LOW (ref 37–47)
HCT VFR BLD CALC: 29.6 % — LOW (ref 37–47)
HGB BLD-MCNC: 9.4 G/DL — LOW (ref 12–16)
HGB BLD-MCNC: 9.7 G/DL — LOW (ref 12–16)
HYALINE CASTS # UR AUTO: 4 /LPF — SIGNIFICANT CHANGE UP (ref 0–7)
IMM GRANULOCYTES NFR BLD AUTO: 1 % — HIGH (ref 0.1–0.3)
INR BLD: 1.25 RATIO — SIGNIFICANT CHANGE UP (ref 0.65–1.3)
KETONES UR-MCNC: NEGATIVE — SIGNIFICANT CHANGE UP
LACTATE SERPL-SCNC: 2.1 MMOL/L — HIGH (ref 0.7–2)
LDH SERPL L TO P-CCNC: 457 — HIGH (ref 50–242)
LEUKOCYTE ESTERASE UR-ACNC: ABNORMAL
LYMPHOCYTES # BLD AUTO: 0.4 K/UL — LOW (ref 1.2–3.4)
LYMPHOCYTES # BLD AUTO: 3.6 % — LOW (ref 20.5–51.1)
MAGNESIUM SERPL-MCNC: 0.9 MG/DL — LOW (ref 1.8–2.4)
MCHC RBC-ENTMCNC: 27.5 PG — SIGNIFICANT CHANGE UP (ref 27–31)
MCHC RBC-ENTMCNC: 27.7 PG — SIGNIFICANT CHANGE UP (ref 27–31)
MCHC RBC-ENTMCNC: 32.8 G/DL — SIGNIFICANT CHANGE UP (ref 32–37)
MCHC RBC-ENTMCNC: 32.9 G/DL — SIGNIFICANT CHANGE UP (ref 32–37)
MCV RBC AUTO: 83.6 FL — SIGNIFICANT CHANGE UP (ref 81–99)
MCV RBC AUTO: 84.6 FL — SIGNIFICANT CHANGE UP (ref 81–99)
METHADONE UR-MCNC: NEGATIVE — SIGNIFICANT CHANGE UP
MONOCYTES # BLD AUTO: 0.32 K/UL — SIGNIFICANT CHANGE UP (ref 0.1–0.6)
MONOCYTES NFR BLD AUTO: 2.9 % — SIGNIFICANT CHANGE UP (ref 1.7–9.3)
NEUTROPHILS # BLD AUTO: 10.36 K/UL — HIGH (ref 1.4–6.5)
NEUTROPHILS NFR BLD AUTO: 92.4 % — HIGH (ref 42.2–75.2)
NITRITE UR-MCNC: NEGATIVE — SIGNIFICANT CHANGE UP
NRBC # BLD: 0 /100 WBCS — SIGNIFICANT CHANGE UP (ref 0–0)
NRBC # BLD: 0 /100 WBCS — SIGNIFICANT CHANGE UP (ref 0–0)
OPIATES UR-MCNC: NEGATIVE — SIGNIFICANT CHANGE UP
OSMOLALITY SERPL: 249 MOS/KG — LOW (ref 275–300)
OSMOLALITY UR: 170 MOS/KG — SIGNIFICANT CHANGE UP (ref 50–1200)
PCP UR-MCNC: NEGATIVE — SIGNIFICANT CHANGE UP
PH UR: 6 — SIGNIFICANT CHANGE UP (ref 5–8)
PHOSPHATE SERPL-MCNC: 4 MG/DL — SIGNIFICANT CHANGE UP (ref 2.1–4.9)
PLATELET # BLD AUTO: 233 K/UL — SIGNIFICANT CHANGE UP (ref 130–400)
PLATELET # BLD AUTO: 244 K/UL — SIGNIFICANT CHANGE UP (ref 130–400)
POTASSIUM SERPL-MCNC: 3.7 MMOL/L — SIGNIFICANT CHANGE UP (ref 3.5–5)
POTASSIUM SERPL-MCNC: 4 MMOL/L — SIGNIFICANT CHANGE UP (ref 3.5–5)
POTASSIUM SERPL-MCNC: 4.2 MMOL/L — SIGNIFICANT CHANGE UP (ref 3.5–5)
POTASSIUM SERPL-MCNC: 4.4 MMOL/L — SIGNIFICANT CHANGE UP (ref 3.5–5)
POTASSIUM SERPL-SCNC: 3.7 MMOL/L — SIGNIFICANT CHANGE UP (ref 3.5–5)
POTASSIUM SERPL-SCNC: 4 MMOL/L — SIGNIFICANT CHANGE UP (ref 3.5–5)
POTASSIUM SERPL-SCNC: 4.2 MMOL/L — SIGNIFICANT CHANGE UP (ref 3.5–5)
POTASSIUM SERPL-SCNC: 4.4 MMOL/L — SIGNIFICANT CHANGE UP (ref 3.5–5)
PROPOXYPHENE QUALITATIVE URINE RESULT: NEGATIVE — SIGNIFICANT CHANGE UP
PROT SERPL-MCNC: 5.7 G/DL — LOW (ref 6–8)
PROT SERPL-MCNC: 5.8 G/DL — LOW (ref 6–8)
PROT SERPL-MCNC: 5.8 G/DL — LOW (ref 6–8)
PROT UR-MCNC: ABNORMAL
PROTHROM AB SERPL-ACNC: 14.4 SEC — HIGH (ref 9.95–12.87)
RBC # BLD: 3.42 M/UL — LOW (ref 4.2–5.4)
RBC # BLD: 3.5 M/UL — LOW (ref 4.2–5.4)
RBC # FLD: 17.8 % — HIGH (ref 11.5–14.5)
RBC # FLD: 18.1 % — HIGH (ref 11.5–14.5)
RBC CASTS # UR COMP ASSIST: 34 /HPF — HIGH (ref 0–4)
SARS-COV-2 RNA SPEC QL NAA+PROBE: SIGNIFICANT CHANGE UP
SODIUM SERPL-SCNC: 116 MMOL/L — CRITICAL LOW (ref 135–146)
SODIUM SERPL-SCNC: 117 MMOL/L — CRITICAL LOW (ref 135–146)
SODIUM SERPL-SCNC: 120 MMOL/L — LOW (ref 135–146)
SODIUM SERPL-SCNC: 123 MMOL/L — LOW (ref 135–146)
SODIUM UR-SCNC: <20 MMOL/L — SIGNIFICANT CHANGE UP
SP GR SPEC: 1.01 — SIGNIFICANT CHANGE UP (ref 1.01–1.03)
T3 SERPL-MCNC: 50 NG/DL — LOW (ref 80–200)
T4 AB SER-ACNC: 6.3 UG/DL — SIGNIFICANT CHANGE UP (ref 4.6–12)
T4 FREE SERPL-MCNC: 2.1 NG/DL — HIGH (ref 0.9–1.8)
THC UR QL: NEGATIVE — SIGNIFICANT CHANGE UP
TROPONIN T SERPL-MCNC: 0.03 NG/ML — CRITICAL HIGH
TSH SERPL-MCNC: 0.76 UIU/ML — SIGNIFICANT CHANGE UP (ref 0.27–4.2)
TSH SERPL-MCNC: 2.51 UIU/ML — SIGNIFICANT CHANGE UP (ref 0.27–4.2)
UROBILINOGEN FLD QL: SIGNIFICANT CHANGE UP
UUN UR-MCNC: 187 MG/DL — SIGNIFICANT CHANGE UP
WBC # BLD: 11.2 K/UL — HIGH (ref 4.8–10.8)
WBC # BLD: 11.94 K/UL — HIGH (ref 4.8–10.8)
WBC # FLD AUTO: 11.2 K/UL — HIGH (ref 4.8–10.8)
WBC # FLD AUTO: 11.94 K/UL — HIGH (ref 4.8–10.8)
WBC UR QL: 431 /HPF — HIGH (ref 0–5)

## 2020-10-21 PROCEDURE — 73630 X-RAY EXAM OF FOOT: CPT | Mod: 26,RT

## 2020-10-21 PROCEDURE — 76770 US EXAM ABDO BACK WALL COMP: CPT | Mod: 26

## 2020-10-21 PROCEDURE — 99222 1ST HOSP IP/OBS MODERATE 55: CPT

## 2020-10-21 PROCEDURE — 93925 LOWER EXTREMITY STUDY: CPT | Mod: 26

## 2020-10-21 RX ORDER — ENOXAPARIN SODIUM 100 MG/ML
40 INJECTION SUBCUTANEOUS DAILY
Refills: 0 | Status: DISCONTINUED | OUTPATIENT
Start: 2020-10-21 | End: 2020-10-23

## 2020-10-21 RX ORDER — BUDESONIDE AND FORMOTEROL FUMARATE DIHYDRATE 160; 4.5 UG/1; UG/1
2 AEROSOL RESPIRATORY (INHALATION)
Refills: 0 | Status: DISCONTINUED | OUTPATIENT
Start: 2020-10-21 | End: 2020-10-23

## 2020-10-21 RX ORDER — ALBUTEROL 90 UG/1
2 AEROSOL, METERED ORAL EVERY 6 HOURS
Refills: 0 | Status: DISCONTINUED | OUTPATIENT
Start: 2020-10-21 | End: 2020-10-21

## 2020-10-21 RX ORDER — PANTOPRAZOLE SODIUM 20 MG/1
40 TABLET, DELAYED RELEASE ORAL EVERY 12 HOURS
Refills: 0 | Status: DISCONTINUED | OUTPATIENT
Start: 2020-10-21 | End: 2020-10-23

## 2020-10-21 RX ORDER — SODIUM CHLORIDE 9 MG/ML
1000 INJECTION INTRAMUSCULAR; INTRAVENOUS; SUBCUTANEOUS
Refills: 0 | Status: DISCONTINUED | OUTPATIENT
Start: 2020-10-21 | End: 2020-10-23

## 2020-10-21 RX ORDER — TIZANIDINE 4 MG/1
8 TABLET ORAL THREE TIMES A DAY
Refills: 0 | Status: DISCONTINUED | OUTPATIENT
Start: 2020-10-21 | End: 2020-10-23

## 2020-10-21 RX ADMIN — Medication 100 MILLIGRAM(S): at 21:34

## 2020-10-21 RX ADMIN — Medication 100 MICROGRAM(S): at 05:04

## 2020-10-21 RX ADMIN — CHLORHEXIDINE GLUCONATE 1 APPLICATION(S): 213 SOLUTION TOPICAL at 05:04

## 2020-10-21 RX ADMIN — Medication 81 MILLIGRAM(S): at 11:39

## 2020-10-21 RX ADMIN — Medication 1 GRAM(S): at 17:54

## 2020-10-21 RX ADMIN — TIZANIDINE 8 MILLIGRAM(S): 4 TABLET ORAL at 22:02

## 2020-10-21 RX ADMIN — CEFEPIME 100 MILLIGRAM(S): 1 INJECTION, POWDER, FOR SOLUTION INTRAMUSCULAR; INTRAVENOUS at 17:55

## 2020-10-21 RX ADMIN — Medication 1 GRAM(S): at 00:22

## 2020-10-21 RX ADMIN — BUDESONIDE AND FORMOTEROL FUMARATE DIHYDRATE 2 PUFF(S): 160; 4.5 AEROSOL RESPIRATORY (INHALATION) at 22:07

## 2020-10-21 RX ADMIN — ATORVASTATIN CALCIUM 40 MILLIGRAM(S): 80 TABLET, FILM COATED ORAL at 22:02

## 2020-10-21 RX ADMIN — Medication 100 MILLIGRAM(S): at 00:07

## 2020-10-21 RX ADMIN — PANTOPRAZOLE SODIUM 40 MILLIGRAM(S): 20 TABLET, DELAYED RELEASE ORAL at 05:04

## 2020-10-21 RX ADMIN — ATORVASTATIN CALCIUM 40 MILLIGRAM(S): 80 TABLET, FILM COATED ORAL at 00:22

## 2020-10-21 RX ADMIN — Medication 100 MILLIGRAM(S): at 14:01

## 2020-10-21 RX ADMIN — TIZANIDINE 8 MILLIGRAM(S): 4 TABLET ORAL at 14:02

## 2020-10-21 RX ADMIN — Medication 1 GRAM(S): at 11:39

## 2020-10-21 RX ADMIN — Medication 100 MILLIGRAM(S): at 05:04

## 2020-10-21 RX ADMIN — Medication 1 GRAM(S): at 05:04

## 2020-10-21 RX ADMIN — PANTOPRAZOLE SODIUM 40 MILLIGRAM(S): 20 TABLET, DELAYED RELEASE ORAL at 17:54

## 2020-10-21 RX ADMIN — CEFEPIME 100 MILLIGRAM(S): 1 INJECTION, POWDER, FOR SOLUTION INTRAMUSCULAR; INTRAVENOUS at 05:04

## 2020-10-21 RX ADMIN — TIZANIDINE 8 MILLIGRAM(S): 4 TABLET ORAL at 06:42

## 2020-10-21 NOTE — CONSULT NOTE ADULT - SUBJECTIVE AND OBJECTIVE BOX
CALLY HARTMAN [id number]  55y Female  1d    HPI:   55 y/o female with Medical Hx of Type II DM,  CAD, DLD, HTN, Hypothyroidism, COPD, Dextrocardia,  Sciatica/Peripheral Neuralgia, Charcot foot deformity s/p reconstruction on 19 presenting for hypotension and lethargy.    Pt had a recent admission in 2020 for UGIB 2/2 duodenal ulcer, and JEEVAN w/ hyponatremia.  She reports that since discharge, she has been feeling generally weak, lethargic, and not like herself. She reports diarrhea since  (>6 episodes/ day) that she described as "watery". She denies any associated nausea/vomiting/abdominal pain or fevers. She has been having episodes of delirium/altered mental status at home, but is AAOx3 during interview. She takes percocet for pain, but only had 2 pills today. She has been taking lasix 40 QD without checking her blood pressure.   She denies angina, palpitations, dyspnea, melena, or bloody stools.     In the ED, Pt hypotensive to 66/43, HR66 T98.8 RR16 satting 91 % on RA. Pt received 1.5L LR in ED with improvement in her BP to 90's.  Labwork significant for Cr 2.3 (Baseline Cr 1 ), Na 117, Hb 9.7 (Baseline 13.5), La 3.9, T 0.13, BNP 21K.    Pt received Synthroid 200mcg IVP and solucortef 100mg, cefepime and vancomycin. admitted to MICU, feels better, want to eat, off pressors     (20 Oct 2020 20:23)    Vascular surgery was called due to right 2-4th toes discoloration. Patient reports no claudication or rest pain. Reported fall / tripped over right foot last week.    PAST MEDICAL & SURGICAL HISTORY:  Dextrocardia    Chronic midline low back pain with bilateral sciatica    Peripheral neuralgia    Essential hypertension    Diabetes 1.5, managed as type 2    Charcot&#x27;s arthropathy  R foot    Charcot foot due to diabetes mellitus    H/O shoulder surgery  Right shoulder surgery     delivery delivered          MEDICATIONS  (STANDING):  albuterol/ipratropium for Nebulization 3 milliLiter(s) Nebulizer every 6 hours  aspirin enteric coated 81 milliGRAM(s) Oral daily  atorvastatin 40 milliGRAM(s) Oral at bedtime  budesonide 160 MICROgram(s)/formoterol 4.5 MICROgram(s) Inhaler 2 Puff(s) Inhalation two times a day  cefepime   IVPB 1000 milliGRAM(s) IV Intermittent every 12 hours  chlorhexidine 4% Liquid 1 Application(s) Topical <User Schedule>  enoxaparin Injectable 40 milliGRAM(s) SubCutaneous daily  levothyroxine 100 MICROGram(s) Oral daily  metroNIDAZOLE  IVPB 500 milliGRAM(s) IV Intermittent every 8 hours  pantoprazole    Tablet 40 milliGRAM(s) Oral every 12 hours  sodium chloride 0.9%. 1000 milliLiter(s) (75 mL/Hr) IV Continuous <Continuous>  sucralfate 1 Gram(s) Oral four times a day  tiZANidine 8 milliGRAM(s) Oral three times a day    Allergies    No Known Allergies    Intolerances        Vital Signs Last 24 Hrs  T(C): 36.2 (21 Oct 2020 16:00), Max: 36.4 (21 Oct 2020 12:00)  T(F): 97.2 (21 Oct 2020 16:00), Max: 97.6 (21 Oct 2020 12:00)  HR: 68 (21 Oct 2020 17:00) (58 - 82)  BP: 96/57 (21 Oct 2020 17:00) (70/39 - 135/73)  BP(mean): 72 (21 Oct 2020 17:00) (52 - 103)  RR: 12 (21 Oct 2020 17:00) (9 - 32)  SpO2: 93% (21 Oct 2020 17:00) (93% - 100%)    PHYSICAL EXAM:  GENERAL: NAD, well-appearing  ABDOMEN: Soft, Nontender, Nondistended;   EXTREMITIES:  right foot dorsum bruise, strong bounding pulses DP and PT b/l      LABS:  Labs:  CAPILLARY BLOOD GLUCOSE      POCT Blood Glucose.: 244 mg/dL (21 Oct 2020 17:29)  POCT Blood Glucose.: 151 mg/dL (21 Oct 2020 11:50)                          9.4    11.94 )-----------( 233      ( 21 Oct 2020 12:00 )             28.6       Auto Immature Granulocyte %: 1.0 % (10-21-20 @ 04:47)  Auto Neutrophil %: 92.4 % (10-21-20 @ 04:47)    10-21    123<L>  |  87<L>  |  20  ----------------------------<  140<H>  3.7   |  23  |  1.1      Calcium, Total Serum: 6.7 mg/dL (10-21-20 @ 16:32)      LFTs:             5.7  | 0.6  | 46       ------------------[101     ( 21 Oct 2020 12:00 )  2.6  | x    | 23          Lipase:x      Amylase:x         Lactate, Blood: 2.1 mmol/L (10-21-20 @ 00:31)  Blood Gas Venous - Lactate: 3.3 mmoL/L (10-20-20 @ 18:25)  Lactate, Blood: 3.9 mmol/L (10-20-20 @ 16:38)      Coags:     14.40  ----< 1.25    ( 21 Oct 2020 04:47 )     33.0        CARDIAC MARKERS ( 21 Oct 2020 12:00 )  x     / 0.03 ng/mL / 330 U/L / x     / 8.8 ng/mL  CARDIAC MARKERS ( 20 Oct 2020 16:38 )  x     / 0.13 ng/mL / x     / x     / x          Serum Pro-Brain Natriuretic Peptide: 27998 pg/mL (10-20-20 @ 16:38)    Drug Screen 1, Urine Result: Done (10-21-20 @ 04:15)    Urinalysis Basic - ( 21 Oct 2020 04:15 )    Color: Yellow / Appearance: Slightly Turbid / S.011 / pH: x  Gluc: x / Ketone: Negative  / Bili: Negative / Urobili: <2 mg/dL   Blood: x / Protein: 30 mg/dL / Nitrite: Negative   Leuk Esterase: Large / RBC: 34 /HPF /  /HPF   Sq Epi: x / Non Sq Epi: 1 /HPF / Bacteria: Negative    RADIOLOGY & ADDITIONAL STUDIES:  < from: VA Duplex Lower Extrem Arterial, Bilat (10.21.20 @ 11:12) >  Impression:    Normal arterial flow in the bilateral lower extremities.    < end of copied text >

## 2020-10-21 NOTE — CHART NOTE - NSCHARTNOTEFT_GEN_A_CORE
ICU Transfer Note    Transfer from: MICU  Transfer to: Medical floors  Accepting physician:      MICU COURSE:    53 y/o female with PMHx of Type II DM, CAD, hyperlipidemia, HTN, Hypothyroidism, COPD, Dextrocardia, Sciatica/Peripheral Neuralgia, and Charcot foot deformity s/p reconstruction on 1/11/19 presenting for hypotension and lethargy. Pt had a recent admission in september 2020 for UGIB 2/2 duodenal ulcer, and JEEVAN w/ hyponatremia.  She reports that since discharge, she has been feeling generally weak, lethargic, and not like herself. She reports diarrhea since Sunday (>6 episodes/day) that she described as "watery". She denies any associated nausea/vomiting/abdominal pain or fevers. She had been having episodes of delirium/altered mental status at home. She takes percocet for pain, but only had 2 pills on the day of presentation. She has also been taking Lasix 40 QD without checking her blood pressure. She denied angina, palpitations, dyspnea, melena, or bloody stools at the time of presentation. In the ED, Pt was hypotensive to 66/43, HR 66, T 98.8, RR 16, and satting 91% on RA. Pt received 1.5L LR in ED with improvement in her BP to 90's. Lab work significant for Cr 2.3 (Baseline Cr 1), Na 117, Hb 9.7 (Baseline 13.5), La 3.9, Troponin 0.13, and BNP 21K. In the ED, pt received Synthroid 200mcg IVP, solucortef 100mg, cefepime and vancomycin. Admitted to MICU with septic shock, JEEVAN, and hyponatremia. Currently on cefepime and metronidazole for treatment of sepsis. NS at 75 cc/hr for JEEVAN and hyponatremia likely secondary to hypovolemia secondary to diarrhea. GI consulted for acute drop in Hb, as well as loose, watery stools. Per GI, no endoscopic intervention at this point. Hb currently stable. Patient is stable for downgrade to medical floors.      ASSESSMENT & PLAN:     #sepsis present on admission  # JEEVAN w/out hyperkalemia or acidosis likely pre-renal secondary to diarrhea + overdiuresis   # Hyponatremia secondary to Hypovolemia vs hypothyroidism vs less likely SIADH (pt on SSRI/Lyrica)  #hx of GIB secondary to duodenal ulcer s/p EGD (clean based ulcer without intervention)  # Acute Anemia   #Hypothyroidism  #High lactate    PLAN:    CNS: Avoid sedative. Hold Lyrica/SSRI    HEENT: HOB at 45degrees. Oral care    PULMONARY: C/w inhalers. CXR in AM. Repeat ABG. Bipap prn    CARDIOVASCULAR:  Hold lasix and metoprolol. Accurate I&O's.     GI: Protonix + sucralfate.  feeding    RENAL: s/p 1.5L LR. Repeat BMP at 11.30. IF hyponatremia improves, start NS @60cc/hr. Serum osm, Uosm, u Na, UCL-, Urine protein:creatinine. renal US.     INFECTIOUS DISEASE: Send U/A, ucx, and blood culture. Send C.Diff/stool culture. Cefepime [id number]. Flagyl 500mg q8hr.     HEMATOLOGICAL:  SCD's     ENDOCRINE:  s/p solucortef and IV synthroid. Check TSH/freeT4. FS q4hr. Insulin ICU protocol.  c/w PO synthroid.    MUSCULOSKELETAL: Arterial Duplex to r/o PAD.     Diet: clear liquid    Attending statement: sepsis present on admission, diarrhea, ua +, JEEVAN, hyponatremia, IVF 75 cc/h ns, abx, feeds, pancx, procal, OOB TO CHAIR, trend CBC/ CMP, TSH reviewed    GI plan:  Rec:  Start lactose free diet  Please correct electrolytes including   c/w oral pantoprazole 40mg BID  c/w sucralfate qid  Smoking cessation  No endoscopic intervention at this point. But patient needs to have repeat EGD as outpatient within 1 month of discharge ([phone])    #Candida Esophagitis: Symptoms mostly improved after treatement  Will assess on repeat EGd as outpatient.    REcall GI if overt bleeding or drop in hgb       For Follow-Up:          Vital Signs Last 24 Hrs  T(C): 36.3 (21 Oct 2020 08:00), Max: 37.1 (20 Oct 2020 16:15)  T(F): 97.4 (21 Oct 2020 08:00), Max: 98.8 (20 Oct 2020 16:15)  HR: 74 (21 Oct 2020 10:00) (58 - 82)  BP: 82/51 (21 Oct 2020 10:00) (66/43 - 135/73)  BP(mean): 66 (21 Oct 2020 10:00) (51 - 98)  RR: 32 (21 Oct 2020 10:00) (9 - 32)  SpO2: 97% (21 Oct 2020 10:00) (75% - 100%)  I&O's Summary    20 Oct 2020 07:01  -  21 Oct 2020 07:00  --------------------------------------------------------  IN: 410 mL / OUT: 1050 mL / NET: -640 mL    21 Oct 2020 07:01  -  21 Oct 2020 13:53  --------------------------------------------------------  IN: 0 mL / OUT: 600 mL / NET: -600 mL          MEDICATIONS  (STANDING):  ALBUTerol    0.083% 2.5 milliGRAM(s) Nebulizer every 6 hours  albuterol/ipratropium for Nebulization 3 milliLiter(s) Nebulizer every 6 hours  aspirin enteric coated 81 milliGRAM(s) Oral daily  atorvastatin 40 milliGRAM(s) Oral at bedtime  cefepime   IVPB 1000 milliGRAM(s) IV Intermittent every 12 hours  chlorhexidine 4% Liquid 1 Application(s) Topical <User Schedule>  levothyroxine 100 MICROGram(s) Oral daily  metroNIDAZOLE  IVPB 500 milliGRAM(s) IV Intermittent every 8 hours  pantoprazole    Tablet 40 milliGRAM(s) Oral every 12 hours  sodium chloride 0.9%. 1000 milliLiter(s) (75 mL/Hr) IV Continuous <Continuous>  sucralfate 1 Gram(s) Oral four times a day  tiZANidine 8 milliGRAM(s) Oral three times a day    MEDICATIONS  (PRN):        LABS                                            9.4                   Neurophils% (auto):   x      (10-21 @ 12:00):    11.94)-----------(233          Lymphocytes% (auto):  x                                             28.6                   Eosinphils% (auto):   x        Manual%: Neutrophils x    ; Lymphocytes x    ; Eosinophils x    ; Bands%: x    ; Blasts x                                    120    |  85     |  22                  Calcium: 7.1   / iCa: x      (10-21 @ 12:00)    ----------------------------<  137       Magnesium: x                                4.0     |  23     |  1.3              Phosphorous: x        TPro  5.7    /  Alb  2.6    /  TBili  0.6    /  DBili  x      /  AST  46     /  ALT  23     /  AlkPhos  101    21 Oct 2020 12:00    ( 10-21 @ 04:47 )   PT: 14.40 sec;   INR: 1.25 ratio  aPTT: 33.0 sec ICU Transfer Note    Transfer from: MICU  Transfer to: Medical floors  Accepting physician:      MICU COURSE:    53 y/o female with PMHx of Type II DM, CAD, hyperlipidemia, HTN, Hypothyroidism, COPD, Dextrocardia, Sciatica/Peripheral Neuralgia, and Charcot foot deformity s/p reconstruction on 1/11/19 presented for hypotension and lethargy. Pt had a recent admission in September 2020 for UGIB 2/2 duodenal ulcer, as well as JEEVAN w/ hyponatremia. She reports that, since discharge, she has been feeling generally weak, lethargic, and not like herself. She reports diarrhea since this past Sunday (>6 episodes/day) that she described as "watery". Denies any associated nausea/vomiting/abdominal pain or fevers. She had been having episodes of delirium/altered mental status at home. She takes percocet for pain, but only had 2 pills on the day of presentation. She has also been taking Lasix 40 QD without checking her blood pressure. She denied angina, palpitations, dyspnea, melena, or bloody stools at the time of presentation. In the ED, Pt was hypotensive to 66/43, HR 66, T 98.8, RR 16, and satting 91% on RA. Pt received 1.5L LR in ED with improvement in her BP to 90's. Lab work significant for Cr 2.3 (Baseline Cr 1), Na 117, Hb 9.7 (Baseline 13.5), lactate 3.9, Troponin 0.13, and BNP 21K. In the ED, pt received Synthroid 200mcg IVP, solucortef 100mg, cefepime and vancomycin. Admitted to MICU with sepsis, JEEVAN, and hyponatremia. Currently on cefepime and metronidazole for treatment of sepsis. NS at 75 cc/hr for JEEVAN and hyponatremia likely secondary to hypovolemia secondary to diarrhea. GI consulted for acute drop in Hb, as well as loose, watery stools. Per GI, no endoscopic intervention at this point. Hb currently stable. Patient is stable for downgrade to medical floors.      ASSESSMENT & PLAN:    53 y/o female with PMHx of Type II DM, CAD, hyperlipidemia, HTN, Hypothyroidism, COPD, Dextrocardia, Sciatica/Peripheral Neuralgia, and Charcot foot deformity s/p reconstruction on 1/11/19 presented for hypotension and lethargy. Admitted to MICU with sepsis, JEEVAN, and hyponatremia. Currently on cefepime and metronidazole for treatment of sepsis. NS at 75 cc/hr for JEEVAN and hyponatremia likely secondary to hypovolemia secondary to diarrhea. GI consulted for acute drop in Hb, as well as loose, watery stools. Per GI, no endoscopic intervention at this point. Hb currently stable. Patient is stable for downgrade to medical floors.     #Sepsis present on admission  - No longer hypotensive. Not on pressors.  - C/w cefepime and metronidazole    #JEEVAN w/out hyperkalemia or acidosis   - Likely pre-renal secondary to diarrhea + overdiuresis  - C/w NS at 75 cc/hr     #Hyponatremia  - Likely secondary to Hypovolemia vs hypothyroidism vs less likely SIADH (pt on SSRI/Lyrica)    #Hx of GIB secondary to duodenal ulcer s/p EGD (clean based ulcer without intervention)  - Per GI, no endoscopic intervention at this time  - C/w sucralfate and Protonix BID    #Acute Anemia    #COPD  - C/w duonebs     #Hypothyroidism  - C/w levothyroxine    #High lactate    #Hyperlipidemia  - C/w atorvastatin        CNS: Avoid sedative. Hold Lyrica/SSRI    HEENT: HOB at 45degrees. Oral care    PULMONARY: C/w inhalers. CXR in AM. Repeat ABG. Bipap prn    CARDIOVASCULAR:  Hold lasix and metoprolol. Accurate I&O's.     GI: Protonix + sucralfate.  feeding    RENAL: s/p 1.5L LR. Repeat BMP at 11.30. IF hyponatremia improves, start NS @60cc/hr. Serum osm, Uosm, u Na, UCL-, Urine protein:creatinine. renal US.     INFECTIOUS DISEASE: Send U/A, ucx, and blood culture. Send C.Diff/stool culture. Cefepime [id number]. Flagyl 500mg q8hr.     HEMATOLOGICAL:  SCD's     ENDOCRINE:  s/p solucortef and IV synthroid. Check TSH/freeT4. FS q4hr. Insulin ICU protocol.  c/w PO synthroid.    MUSCULOSKELETAL: Arterial Duplex to r/o PAD.     Diet: clear liquid    Attending statement: sepsis present on admission, diarrhea, ua +, JEEVAN, hyponatremia, IVF 75 cc/h ns, abx, feeds, pancx, procal, OOB TO CHAIR, trend CBC/ CMP, TSH reviewed    GI plan:  Rec:  Start lactose free diet  Please correct electrolytes including   c/w oral pantoprazole 40mg BID  c/w sucralfate qid  Smoking cessation  No endoscopic intervention at this point. But patient needs to have repeat EGD as outpatient within 1 month of discharge ([phone])    #Candida Esophagitis: Symptoms mostly improved after treatement  Will assess on repeat EGd as outpatient.    REcall GI if overt bleeding or drop in hgb       For Follow-Up:          Vital Signs Last 24 Hrs  T(C): 36.3 (21 Oct 2020 08:00), Max: 37.1 (20 Oct 2020 16:15)  T(F): 97.4 (21 Oct 2020 08:00), Max: 98.8 (20 Oct 2020 16:15)  HR: 74 (21 Oct 2020 10:00) (58 - 82)  BP: 82/51 (21 Oct 2020 10:00) (66/43 - 135/73)  BP(mean): 66 (21 Oct 2020 10:00) (51 - 98)  RR: 32 (21 Oct 2020 10:00) (9 - 32)  SpO2: 97% (21 Oct 2020 10:00) (75% - 100%)  I&O's Summary    20 Oct 2020 07:01  -  21 Oct 2020 07:00  --------------------------------------------------------  IN: 410 mL / OUT: 1050 mL / NET: -640 mL    21 Oct 2020 07:01  -  21 Oct 2020 13:53  --------------------------------------------------------  IN: 0 mL / OUT: 600 mL / NET: -600 mL          MEDICATIONS  (STANDING):  ALBUTerol    0.083% 2.5 milliGRAM(s) Nebulizer every 6 hours  albuterol/ipratropium for Nebulization 3 milliLiter(s) Nebulizer every 6 hours  aspirin enteric coated 81 milliGRAM(s) Oral daily  atorvastatin 40 milliGRAM(s) Oral at bedtime  cefepime   IVPB 1000 milliGRAM(s) IV Intermittent every 12 hours  chlorhexidine 4% Liquid 1 Application(s) Topical <User Schedule>  levothyroxine 100 MICROGram(s) Oral daily  metroNIDAZOLE  IVPB 500 milliGRAM(s) IV Intermittent every 8 hours  pantoprazole    Tablet 40 milliGRAM(s) Oral every 12 hours  sodium chloride 0.9%. 1000 milliLiter(s) (75 mL/Hr) IV Continuous <Continuous>  sucralfate 1 Gram(s) Oral four times a day  tiZANidine 8 milliGRAM(s) Oral three times a day    MEDICATIONS  (PRN):        LABS                                            9.4                   Neurophils% (auto):   x      (10-21 @ 12:00):    11.94)-----------(233          Lymphocytes% (auto):  x                                             28.6                   Eosinphils% (auto):   x        Manual%: Neutrophils x    ; Lymphocytes x    ; Eosinophils x    ; Bands%: x    ; Blasts x                                    120    |  85     |  22                  Calcium: 7.1   / iCa: x      (10-21 @ 12:00)    ----------------------------<  137       Magnesium: x                                4.0     |  23     |  1.3              Phosphorous: x        TPro  5.7    /  Alb  2.6    /  TBili  0.6    /  DBili  x      /  AST  46     /  ALT  23     /  AlkPhos  101    21 Oct 2020 12:00    ( 10-21 @ 04:47 )   PT: 14.40 sec;   INR: 1.25 ratio  aPTT: 33.0 sec ICU Transfer Note    Transfer from: MICU  Transfer to: Medical floors  Accepting physician:      MICU COURSE:    55 y/o female with PMHx of Type II DM, CAD, hyperlipidemia, HTN, Hypothyroidism, COPD, Dextrocardia, Sciatica/Peripheral Neuralgia, and Charcot foot deformity s/p reconstruction on 1/11/19 presented for hypotension and lethargy. Pt had a recent admission in September 2020 for UGIB 2/2 duodenal ulcer, as well as JEEVAN w/ hyponatremia. She reports that, since discharge, she has been feeling generally weak, lethargic, and not like herself. She reports diarrhea since this past Sunday (>6 episodes/day) that she described as "watery". Denies any associated nausea/vomiting/abdominal pain or fevers. She had been having episodes of delirium/altered mental status at home. She takes percocet for pain, but only had 2 pills on the day of presentation. She has also been taking Lasix 40 QD without checking her blood pressure. She denied angina, palpitations, dyspnea, melena, or bloody stools at the time of presentation. In the ED, Pt was hypotensive to 66/43, HR 66, T 98.8, RR 16, and satting 91% on RA. Pt received 1.5L LR in ED with improvement in her BP to 90's. Lab work significant for Cr 2.3 (Baseline Cr 1), Na 117, Hb 9.7 (Baseline 13.5), lactate 3.9, Troponin 0.13, and BNP 21K. In the ED, pt received Synthroid 200mcg IVP, solucortef 100mg, cefepime and vancomycin. Admitted to MICU with sepsis, JEEVAN, and hyponatremia. Currently on cefepime and metronidazole for treatment of sepsis. NS at 75 cc/hr for JEEVAN and hyponatremia likely secondary to hypovolemia secondary to diarrhea. GI consulted for acute drop in Hb, as well as loose, watery stools. Per GI, no endoscopic intervention at this point. Hb currently stable. Patient is stable for downgrade to medical floors.      ASSESSMENT & PLAN:    55 y/o female with PMHx of Type II DM, CAD, hyperlipidemia, HTN, Hypothyroidism, COPD, Dextrocardia, Sciatica/Peripheral Neuralgia, and Charcot foot deformity s/p reconstruction on 1/11/19 presented for hypotension and lethargy. Admitted to MICU with sepsis, JEEVAN, and hyponatremia. Currently on cefepime and metronidazole for treatment of sepsis. NS at 75 cc/hr for JEEVAN and hyponatremia likely secondary to hypovolemia secondary to diarrhea. GI consulted for acute drop in Hb, as well as loose, watery stools. Per GI, no endoscopic intervention at this point. Hb currently stable. Patient is stable for downgrade to medical floors.     #Sepsis present on admission  #Hypotension  - No longer hypotensive. Not on pressors.  - C/w cefepime and metronidazole  - F/u blood cultures  - F/u urine cultures  - F/u procal  - F/u COVID-19 antibodies  - F/u serum free cortisol  - Hold metoprolol  - F/u ID    #JEEVAN w/out hyperkalemia or acidosis   - Likely pre-renal secondary to diarrhea + overdiuresis  - C/w NS at 75 cc/hr  - Hold Lasix  - Baseline Cr 1     #Hyponatremia  - Likely secondary to Hypovolemia vs hypothyroidism vs less likely SIADH (pt on SSRI/Lyrica)  - C/w NS at 75 cc/hr  - Monitor BMP  - Hold Lyrica/SSRI    #Hx of GIB secondary to duodenal ulcer s/p EGD (clean based ulcer without intervention)  - Per GI, no endoscopic intervention at this time  - C/w sucralfate qid and Protonix BID  - Repeat EGD as outpatient within 1 month of discharge, per GI ([phone])    #Acute Anemia  - Hb stable since admission  - GI recs seen and appreciated    #COPD  - C/w Symbicort and duonebs  - Albuterol PRN  - BiPAP PRN     #Hypothyroidism  - TSH, free T4 reviewed  - C/w levothyroxine    #High lactate  - Improving    #Candida esophagitis s/p treatment  - GI will assess on repeat EGD    #Hyperlipidemia  - C/w atorvastatin    #Misc  Diet: mechanical soft, consistent carbs, DASH/TLC, lactose-free  GI PPx: Protonix, sucralfate  DVT PPx:  Activity: as tolerated  Dispo: downgrade to medical floors      For Follow-Up:    - F/u blood cultures  - F/u urine cultures  - F/u procal  - F/u COVID-19 antibodies  - F/u serum free cortisol  - F/u ID  - Urine chloride  - Urine protein:creatinine ratio  - Renal U/S  - Arterial duplex to r/o PAD      Vital Signs Last 24 Hrs  T(C): 36.3 (21 Oct 2020 08:00), Max: 37.1 (20 Oct 2020 16:15)  T(F): 97.4 (21 Oct 2020 08:00), Max: 98.8 (20 Oct 2020 16:15)  HR: 74 (21 Oct 2020 10:00) (58 - 82)  BP: 82/51 (21 Oct 2020 10:00) (66/43 - 135/73)  BP(mean): 66 (21 Oct 2020 10:00) (51 - 98)  RR: 32 (21 Oct 2020 10:00) (9 - 32)  SpO2: 97% (21 Oct 2020 10:00) (75% - 100%)  I&O's Summary    20 Oct 2020 07:01  -  21 Oct 2020 07:00  --------------------------------------------------------  IN: 410 mL / OUT: 1050 mL / NET: -640 mL    21 Oct 2020 07:01  -  21 Oct 2020 13:53  --------------------------------------------------------  IN: 0 mL / OUT: 600 mL / NET: -600 mL          MEDICATIONS  (STANDING):  ALBUTerol    0.083% 2.5 milliGRAM(s) Nebulizer every 6 hours  albuterol/ipratropium for Nebulization 3 milliLiter(s) Nebulizer every 6 hours  aspirin enteric coated 81 milliGRAM(s) Oral daily  atorvastatin 40 milliGRAM(s) Oral at bedtime  cefepime   IVPB 1000 milliGRAM(s) IV Intermittent every 12 hours  chlorhexidine 4% Liquid 1 Application(s) Topical <User Schedule>  levothyroxine 100 MICROGram(s) Oral daily  metroNIDAZOLE  IVPB 500 milliGRAM(s) IV Intermittent every 8 hours  pantoprazole    Tablet 40 milliGRAM(s) Oral every 12 hours  sodium chloride 0.9%. 1000 milliLiter(s) (75 mL/Hr) IV Continuous <Continuous>  sucralfate 1 Gram(s) Oral four times a day  tiZANidine 8 milliGRAM(s) Oral three times a day    MEDICATIONS  (PRN):        LABS                                            9.4                   Neurophils% (auto):   x      (10-21 @ 12:00):    11.94)-----------(233          Lymphocytes% (auto):  x                                             28.6                   Eosinphils% (auto):   x        Manual%: Neutrophils x    ; Lymphocytes x    ; Eosinophils x    ; Bands%: x    ; Blasts x                                    120    |  85     |  22                  Calcium: 7.1   / iCa: x      (10-21 @ 12:00)    ----------------------------<  137       Magnesium: x                                4.0     |  23     |  1.3              Phosphorous: x        TPro  5.7    /  Alb  2.6    /  TBili  0.6    /  DBili  x      /  AST  46     /  ALT  23     /  AlkPhos  101    21 Oct 2020 12:00    ( 10-21 @ 04:47 )   PT: 14.40 sec;   INR: 1.25 ratio  aPTT: 33.0 sec ICU Transfer Note    Transfer from: MICU  Transfer to: Medical floors  Accepting physician:      MICU COURSE:    53 y/o female with PMHx of Type II DM, CAD, hyperlipidemia, HTN, Hypothyroidism, COPD, Dextrocardia, Sciatica/Peripheral Neuralgia, and Charcot foot deformity s/p reconstruction on 1/11/19 presented for hypotension and lethargy. Pt had a recent admission in September 2020 for UGIB 2/2 duodenal ulcer, as well as JEEVAN w/ hyponatremia. She reports that, since discharge, she has been feeling generally weak, lethargic, and not like herself. She reports diarrhea since this past Sunday (>6 episodes/day) that she described as "watery". Denies any associated nausea/vomiting/abdominal pain or fevers. She had been having episodes of delirium/altered mental status at home. She takes percocet for pain, but only had 2 pills on the day of presentation. She has also been taking Lasix 40 QD without checking her blood pressure. She denied angina, palpitations, dyspnea, melena, or bloody stools at the time of presentation. In the ED, Pt was hypotensive to 66/43, HR 66, T 98.8, RR 16, and satting 91% on RA. Pt received 1.5L LR in ED with improvement in her BP to 90's. Lab work significant for Cr 2.3 (Baseline Cr 1), Na 117, Hb 9.7 (Baseline 13.5), lactate 3.9, Troponin 0.13, and BNP 21K. In the ED, pt received Synthroid 200mcg IVP, solucortef 100mg, cefepime and vancomycin. Admitted to MICU with sepsis, JEEVAN, and hyponatremia. Currently on cefepime and metronidazole for treatment of sepsis. NS at 75 cc/hr for JEEVAN and hyponatremia likely secondary to hypovolemia secondary to diarrhea. GI consulted for acute drop in Hb, as well as loose, watery stools. Per GI, no endoscopic intervention at this point. Hb currently stable. Patient is stable for downgrade to medical floors.      ASSESSMENT & PLAN:    53 y/o female with PMHx of Type II DM, CAD, hyperlipidemia, HTN, Hypothyroidism, COPD, Dextrocardia, Sciatica/Peripheral Neuralgia, and Charcot foot deformity s/p reconstruction on 1/11/19 presented for hypotension and lethargy. Admitted to MICU with sepsis, JEEVAN, and hyponatremia. Currently on cefepime and metronidazole for treatment of sepsis. NS at 75 cc/hr for JEEVAN and hyponatremia likely secondary to hypovolemia secondary to diarrhea. GI consulted for acute drop in Hb, as well as loose, watery stools. Per GI, no endoscopic intervention at this point. Hb currently stable. Patient is stable for downgrade to medical floors.     #Sepsis present on admission  #Hypotension  - No longer hypotensive. Not on pressors.  - C/w cefepime and metronidazole  - F/u blood cultures  - F/u urine cultures  - F/u procal  - F/u COVID-19 antibodies  - F/u serum free cortisol  - Hold metoprolol  - F/u ID    #JEEVAN w/out hyperkalemia or acidosis   - Likely pre-renal secondary to diarrhea + overdiuresis  - C/w NS at 75 cc/hr  - Hold Lasix  - Baseline Cr 1     #Hyponatremia  - Likely secondary to Hypovolemia vs hypothyroidism vs less likely SIADH (pt on SSRI/Lyrica)  - C/w NS at 75 cc/hr  - Monitor BMP  - Hold Lyrica/SSRI    #Hx of GIB secondary to duodenal ulcer s/p EGD (clean based ulcer without intervention)  - Per GI, no endoscopic intervention at this time  - C/w sucralfate qid and Protonix BID  - Repeat EGD as outpatient within 1 month of discharge, per GI ([phone])    #Acute Anemia  - Hb stable since admission  - GI recs seen and appreciated    #COPD  - C/w Symbicort and duonebs  - BiPAP PRN     #Hypothyroidism  - TSH, free T4 reviewed  - C/w levothyroxine    #High lactate  - Improving    #Candida esophagitis s/p treatment  - GI will assess on repeat EGD    #Hyperlipidemia  - C/w atorvastatin    #Misc  Diet: mechanical soft, consistent carbs, DASH/TLC, lactose-free  GI PPx: Protonix, sucralfate  DVT PPx: Lovenox  Activity: as tolerated  Dispo: downgrade to medical floors      For Follow-Up:    - F/u blood cultures  - F/u urine cultures  - F/u procal  - F/u COVID-19 antibodies  - F/u serum free cortisol  - F/u ID  - Urine chloride  - Urine protein:creatinine ratio  - Renal U/S  - Arterial duplex to r/o PAD      Vital Signs Last 24 Hrs  T(C): 36.3 (21 Oct 2020 08:00), Max: 37.1 (20 Oct 2020 16:15)  T(F): 97.4 (21 Oct 2020 08:00), Max: 98.8 (20 Oct 2020 16:15)  HR: 74 (21 Oct 2020 10:00) (58 - 82)  BP: 82/51 (21 Oct 2020 10:00) (66/43 - 135/73)  BP(mean): 66 (21 Oct 2020 10:00) (51 - 98)  RR: 32 (21 Oct 2020 10:00) (9 - 32)  SpO2: 97% (21 Oct 2020 10:00) (75% - 100%)  I&O's Summary    20 Oct 2020 07:01  -  21 Oct 2020 07:00  --------------------------------------------------------  IN: 410 mL / OUT: 1050 mL / NET: -640 mL    21 Oct 2020 07:01  -  21 Oct 2020 13:53  --------------------------------------------------------  IN: 0 mL / OUT: 600 mL / NET: -600 mL          MEDICATIONS  (STANDING):  ALBUTerol    0.083% 2.5 milliGRAM(s) Nebulizer every 6 hours  albuterol/ipratropium for Nebulization 3 milliLiter(s) Nebulizer every 6 hours  aspirin enteric coated 81 milliGRAM(s) Oral daily  atorvastatin 40 milliGRAM(s) Oral at bedtime  cefepime   IVPB 1000 milliGRAM(s) IV Intermittent every 12 hours  chlorhexidine 4% Liquid 1 Application(s) Topical <User Schedule>  levothyroxine 100 MICROGram(s) Oral daily  metroNIDAZOLE  IVPB 500 milliGRAM(s) IV Intermittent every 8 hours  pantoprazole    Tablet 40 milliGRAM(s) Oral every 12 hours  sodium chloride 0.9%. 1000 milliLiter(s) (75 mL/Hr) IV Continuous <Continuous>  sucralfate 1 Gram(s) Oral four times a day  tiZANidine 8 milliGRAM(s) Oral three times a day    MEDICATIONS  (PRN):        LABS                                            9.4                   Neurophils% (auto):   x      (10-21 @ 12:00):    11.94)-----------(233          Lymphocytes% (auto):  x                                             28.6                   Eosinphils% (auto):   x        Manual%: Neutrophils x    ; Lymphocytes x    ; Eosinophils x    ; Bands%: x    ; Blasts x                                    120    |  85     |  22                  Calcium: 7.1   / iCa: x      (10-21 @ 12:00)    ----------------------------<  137       Magnesium: x                                4.0     |  23     |  1.3              Phosphorous: x        TPro  5.7    /  Alb  2.6    /  TBili  0.6    /  DBili  x      /  AST  46     /  ALT  23     /  AlkPhos  101    21 Oct 2020 12:00    ( 10-21 @ 04:47 )   PT: 14.40 sec;   INR: 1.25 ratio  aPTT: 33.0 sec ICU Transfer Note    Transfer from: MICU  Transfer to: Medical floors  Accepting physician:      MICU COURSE:    55 y/o female with PMHx of Type II DM, CAD, hyperlipidemia, HTN, Hypothyroidism, COPD, Dextrocardia, Sciatica/Peripheral Neuralgia, and Charcot foot deformity s/p reconstruction on 1/11/19 presented for hypotension and lethargy. Pt had a recent admission in September 2020 for UGIB 2/2 duodenal ulcer, as well as JEEVAN w/ hyponatremia. She reports that, since discharge, she has been feeling generally weak, lethargic, and not like herself. She reports diarrhea since this past Sunday (>6 episodes/day) that she described as "watery". Denies any associated nausea/vomiting/abdominal pain or fevers. She had been having episodes of delirium/altered mental status at home. She takes percocet for pain, but only had 2 pills on the day of presentation. She has also been taking Lasix 40 QD without checking her blood pressure. She denied angina, palpitations, dyspnea, melena, or bloody stools at the time of presentation. In the ED, Pt was hypotensive to 66/43, HR 66, T 98.8, RR 16, and satting 91% on RA. Pt received 1.5L LR in ED with improvement in her BP to 90's. Lab work significant for Cr 2.3 (Baseline Cr 1), Na 117, Hb 9.7 (Baseline 13.5), lactate 3.9, Troponin 0.13, and BNP 21K. In the ED, pt received Synthroid 200mcg IVP, solucortef 100mg, cefepime and vancomycin. Admitted to MICU with sepsis, JEEVAN, and hyponatremia. Currently on cefepime and metronidazole for treatment of sepsis. NS at 75 cc/hr for JEEVAN and hyponatremia likely secondary to hypovolemia secondary to diarrhea. GI consulted for acute drop in Hb, as well as loose, watery stools. Per GI, no endoscopic intervention at this point. Hb currently stable. Patient is stable for downgrade to medical floors.      ASSESSMENT & PLAN:    55 y/o female with PMHx of Type II DM, CAD, hyperlipidemia, HTN, Hypothyroidism, COPD, Dextrocardia, Sciatica/Peripheral Neuralgia, and Charcot foot deformity s/p reconstruction on 1/11/19 presented for hypotension and lethargy. Admitted to MICU with sepsis, JEEVAN, and hyponatremia. Currently on cefepime and metronidazole for treatment of sepsis. NS at 75 cc/hr for JEEVAN and hyponatremia likely secondary to hypovolemia secondary to diarrhea. GI consulted for acute drop in Hb, as well as loose, watery stools. Per GI, no endoscopic intervention at this point. Hb currently stable. Patient is stable for downgrade to medical floors.     #Sepsis present on admission  #Hypotension  - No longer hypotensive. Not on pressors.  - C/w cefepime and metronidazole  - F/u blood cultures  - F/u urine cultures  - F/u procal  - F/u COVID-19 antibodies  - F/u serum free cortisol  - Hold metoprolol  - F/u ID    #JEEVAN w/out hyperkalemia or acidosis   - Likely pre-renal secondary to diarrhea + overdiuresis  - C/w NS at 75 cc/hr  - Hold Lasix  - Baseline Cr 1  - Renal U/S negative  - Consider urine Pr:Cr ratio, urine chloride     #Hyponatremia  - Likely secondary to Hypovolemia vs hypothyroidism vs less likely SIADH (pt on SSRI/Lyrica)  - C/w NS at 75 cc/hr  - Monitor BMP  - Hold Lyrica/SSRI    #Hx of GIB secondary to duodenal ulcer s/p EGD (clean based ulcer without intervention)  - Per GI, no endoscopic intervention at this time  - C/w sucralfate qid and Protonix BID  - Repeat EGD as outpatient within 1 month of discharge, per GI ([phone])    #Acute Anemia  - Hb stable since admission  - GI recs seen and appreciated    #PAD  - Arterial duplex negative in b/l LE    #COPD  - C/w Symbicort and duonebs  - BiPAP PRN     #Hypothyroidism  - TSH, free T4 reviewed  - C/w levothyroxine    #High lactate  - Improving    #Candida esophagitis s/p treatment  - GI will assess on repeat EGD    #Hyperlipidemia  - C/w atorvastatin    #Misc  Diet: mechanical soft, consistent carbs, DASH/TLC, lactose-free  GI PPx: Protonix, sucralfate  DVT PPx: Lovenox  Activity: as tolerated  Dispo: downgrade to medical floors      For Follow-Up:    - F/u blood cultures  - F/u urine cultures  - F/u procal  - F/u COVID-19 antibodies  - F/u serum free cortisol  - F/u ID  - Switch to lactose-free diet    Subjective: Patient seen and examined at bedside today.     Vital Signs Last 24 Hrs  T(C): 36.3 (21 Oct 2020 08:00), Max: 37.1 (20 Oct 2020 16:15)  T(F): 97.4 (21 Oct 2020 08:00), Max: 98.8 (20 Oct 2020 16:15)  HR: 74 (21 Oct 2020 10:00) (58 - 82)  BP: 82/51 (21 Oct 2020 10:00) (66/43 - 135/73)  BP(mean): 66 (21 Oct 2020 10:00) (51 - 98)  RR: 32 (21 Oct 2020 10:00) (9 - 32)  SpO2: 97% (21 Oct 2020 10:00) (75% - 100%)    Physical exam:  GEN: awake  CV: not tachycardic  RESP: satting well on RA  NEURO: alert    I&O's Summary    20 Oct 2020 07:01  -  21 Oct 2020 07:00  --------------------------------------------------------  IN: 410 mL / OUT: 1050 mL / NET: -640 mL    21 Oct 2020 07:01  -  21 Oct 2020 13:53  --------------------------------------------------------  IN: 0 mL / OUT: 600 mL / NET: -600 mL          MEDICATIONS  (STANDING):  ALBUTerol    0.083% 2.5 milliGRAM(s) Nebulizer every 6 hours  albuterol/ipratropium for Nebulization 3 milliLiter(s) Nebulizer every 6 hours  aspirin enteric coated 81 milliGRAM(s) Oral daily  atorvastatin 40 milliGRAM(s) Oral at bedtime  cefepime   IVPB 1000 milliGRAM(s) IV Intermittent every 12 hours  chlorhexidine 4% Liquid 1 Application(s) Topical <User Schedule>  levothyroxine 100 MICROGram(s) Oral daily  metroNIDAZOLE  IVPB 500 milliGRAM(s) IV Intermittent every 8 hours  pantoprazole    Tablet 40 milliGRAM(s) Oral every 12 hours  sodium chloride 0.9%. 1000 milliLiter(s) (75 mL/Hr) IV Continuous <Continuous>  sucralfate 1 Gram(s) Oral four times a day  tiZANidine 8 milliGRAM(s) Oral three times a day    MEDICATIONS  (PRN):        LABS                                            9.4                   Neurophils% (auto):   x      (10-21 @ 12:00):    11.94)-----------(233          Lymphocytes% (auto):  x                                             28.6                   Eosinphils% (auto):   x        Manual%: Neutrophils x    ; Lymphocytes x    ; Eosinophils x    ; Bands%: x    ; Blasts x                                    120    |  85     |  22                  Calcium: 7.1   / iCa: x      (10-21 @ 12:00)    ----------------------------<  137       Magnesium: x                                4.0     |  23     |  1.3              Phosphorous: x        TPro  5.7    /  Alb  2.6    /  TBili  0.6    /  DBili  x      /  AST  46     /  ALT  23     /  AlkPhos  101    21 Oct 2020 12:00    ( 10-21 @ 04:47 )   PT: 14.40 sec;   INR: 1.25 ratio  aPTT: 33.0 sec ICU Transfer Note    Transfer from: MICU  Transfer to: Medical floors  Accepting physician:      MICU COURSE:    55 y/o female with PMHx of Type II DM, CAD, hyperlipidemia, HTN, Hypothyroidism, COPD, Dextrocardia, Sciatica/Peripheral Neuralgia, and Charcot foot deformity s/p reconstruction on 1/11/19 presented for hypotension and lethargy. Pt had a recent admission in September 2020 for UGIB 2/2 duodenal ulcer, as well as JEEVAN w/ hyponatremia. She reports that, since discharge, she has been feeling generally weak, lethargic, and not like herself. She reports diarrhea since this past Sunday (>6 episodes/day) that she described as "watery". Denies any associated nausea/vomiting/abdominal pain or fevers. She had been having episodes of delirium/altered mental status at home. She takes percocet for pain, but only had 2 pills on the day of presentation. She has also been taking Lasix 40 QD without checking her blood pressure. She denied angina, palpitations, dyspnea, melena, or bloody stools at the time of presentation. In the ED, Pt was hypotensive to 66/43, HR 66, T 98.8, RR 16, and satting 91% on RA. Pt received 1.5L LR in ED with improvement in her BP to 90's. Lab work significant for Cr 2.3 (Baseline Cr 1), Na 117, Hb 9.7 (Baseline 13.5), lactate 3.9, Troponin 0.13, and BNP 21K. In the ED, pt received Synthroid 200mcg IVP, solucortef 100mg, cefepime and vancomycin. Admitted to MICU with sepsis, JEEVAN, and hyponatremia. Currently on cefepime and metronidazole for treatment of sepsis. NS at 75 cc/hr for JEEVAN and hyponatremia likely secondary to hypovolemia secondary to diarrhea. GI consulted for acute drop in Hb, as well as loose, watery stools. Per GI, no endoscopic intervention at this point. Hb currently stable. Patient is stable for downgrade to medical floors.      ASSESSMENT & PLAN:    55 y/o female with PMHx of Type II DM, CAD, hyperlipidemia, HTN, Hypothyroidism, COPD, Dextrocardia, Sciatica/Peripheral Neuralgia, and Charcot foot deformity s/p reconstruction on 1/11/19 presented for hypotension and lethargy. Admitted to MICU with sepsis, JEEVAN, and hyponatremia. Currently on cefepime and metronidazole for treatment of sepsis. NS at 75 cc/hr for JEEVAN and hyponatremia likely secondary to hypovolemia secondary to diarrhea. GI consulted for acute drop in Hb, as well as loose, watery stools. Per GI, no endoscopic intervention at this point. Hb currently stable. Patient is stable for downgrade to medical floors.     #Sepsis present on admission  #Hypotension  - No longer hypotensive. Not on pressors.  - C/w cefepime and metronidazole  - F/u blood cultures  - F/u urine cultures  - F/u procal  - F/u COVID-19 antibodies  - F/u serum free cortisol  - Hold metoprolol  - F/u ID    #JEEVAN w/out hyperkalemia or acidosis   - Likely pre-renal secondary to diarrhea + overdiuresis  - C/w NS at 75 cc/hr  - Hold Lasix  - Baseline Cr 1  - Renal U/S negative  - Consider urine Pr:Cr ratio, urine chloride     #Hyponatremia  - Likely secondary to Hypovolemia vs hypothyroidism vs less likely SIADH (pt on SSRI/Lyrica)  - C/w NS at 75 cc/hr  - Monitor BMP  - Hold Lyrica/SSRI    #Hx of GIB secondary to duodenal ulcer s/p EGD (clean based ulcer without intervention)  - Per GI, no endoscopic intervention at this time  - C/w sucralfate qid and Protonix BID  - Repeat EGD as outpatient within 1 month of discharge, per GI ([phone])    #Acute Anemia  - Hb stable since admission  - GI recs seen and appreciated    #PAD  - Arterial duplex negative in b/l LE    #COPD  - C/w Symbicort and duonebs  - BiPAP PRN     #Hypothyroidism  - TSH, free T4 reviewed  - C/w levothyroxine    #High lactate  - Improving    #Candida esophagitis s/p treatment  - GI will assess on repeat EGD    #Hyperlipidemia  - C/w atorvastatin    #Misc  Diet: mechanical soft, consistent carbs, DASH/TLC, lactose-free  GI PPx: Protonix, sucralfate  DVT PPx: Lovenox  Activity: as tolerated  Dispo: downgrade to medical floors      For Follow-Up:    - F/u blood cultures  - F/u urine cultures  - F/u procal  - F/u COVID-19 antibodies  - F/u serum free cortisol  - F/u ID  - Switch to lactose-free diet    Subjective: Patient seen and examined at bedside today.     Vital Signs Last 24 Hrs  T(C): 36.3 (21 Oct 2020 08:00), Max: 37.1 (20 Oct 2020 16:15)  T(F): 97.4 (21 Oct 2020 08:00), Max: 98.8 (20 Oct 2020 16:15)  HR: 74 (21 Oct 2020 10:00) (58 - 82)  BP: 82/51 (21 Oct 2020 10:00) (66/43 - 135/73)  BP(mean): 66 (21 Oct 2020 10:00) (51 - 98)  RR: 32 (21 Oct 2020 10:00) (9 - 32)  SpO2: 97% (21 Oct 2020 10:00) (75% - 100%)    Physical exam:  GEN: awake  CV: not tachycardic  RESP: satting well on RA  NEURO: alert    I&O's Summary    20 Oct 2020 07:01  -  21 Oct 2020 07:00  --------------------------------------------------------  IN: 410 mL / OUT: 1050 mL / NET: -640 mL    21 Oct 2020 07:01  -  21 Oct 2020 13:53  --------------------------------------------------------  IN: 0 mL / OUT: 600 mL / NET: -600 mL          MEDICATIONS  (STANDING):  ALBUTerol    0.083% 2.5 milliGRAM(s) Nebulizer every 6 hours  albuterol/ipratropium for Nebulization 3 milliLiter(s) Nebulizer every 6 hours  aspirin enteric coated 81 milliGRAM(s) Oral daily  atorvastatin 40 milliGRAM(s) Oral at bedtime  cefepime   IVPB 1000 milliGRAM(s) IV Intermittent every 12 hours  chlorhexidine 4% Liquid 1 Application(s) Topical <User Schedule>  levothyroxine 100 MICROGram(s) Oral daily  metroNIDAZOLE  IVPB 500 milliGRAM(s) IV Intermittent every 8 hours  pantoprazole    Tablet 40 milliGRAM(s) Oral every 12 hours  sodium chloride 0.9%. 1000 milliLiter(s) (75 mL/Hr) IV Continuous <Continuous>  sucralfate 1 Gram(s) Oral four times a day  tiZANidine 8 milliGRAM(s) Oral three times a day    MEDICATIONS  (PRN):        LABS                                            9.4                   Neurophils% (auto):   x      (10-21 @ 12:00):    11.94)-----------(233          Lymphocytes% (auto):  x                                             28.6                   Eosinphils% (auto):   x        Manual%: Neutrophils x    ; Lymphocytes x    ; Eosinophils x    ; Bands%: x    ; Blasts x                                    120    |  85     |  22                  Calcium: 7.1   / iCa: x      (10-21 @ 12:00)    ----------------------------<  137       Magnesium: x                                4.0     |  23     |  1.3              Phosphorous: x        TPro  5.7    /  Alb  2.6    /  TBili  0.6    /  DBili  x      /  AST  46     /  ALT  23     /  AlkPhos  101    21 Oct 2020 12:00    ( 10-21 @ 04:47 )   PT: 14.40 sec;   INR: 1.25 ratio  aPTT: 33.0 sec    Sign out given to Dr. Muro (Spectra [id number]) at 2111 ICU Transfer Note    Transfer from: MICU  Transfer to: Medical floors  Accepting physician:      MICU COURSE:    55 y/o female with PMHx of Type II DM, CAD, hyperlipidemia, HTN, Hypothyroidism, COPD, Dextrocardia, Sciatica/Peripheral Neuralgia, and Charcot foot deformity s/p reconstruction on 1/11/19 presented for hypotension and lethargy. Pt had a recent admission in September 2020 for UGIB 2/2 duodenal ulcer, as well as JEEVAN w/ hyponatremia. She reports that, since discharge, she has been feeling generally weak, lethargic, and not like herself. She reports diarrhea since this past Sunday (>6 episodes/day) that she described as "watery". Denies any associated nausea/vomiting/abdominal pain or fevers. She had been having episodes of delirium/altered mental status at home. She takes percocet for pain, but only had 2 pills on the day of presentation. She has also been taking Lasix 40 QD without checking her blood pressure. She denied angina, palpitations, dyspnea, melena, or bloody stools at the time of presentation. In the ED, Pt was hypotensive to 66/43, HR 66, T 98.8, RR 16, and satting 91% on RA. Pt received 1.5L LR in ED with improvement in her BP to 90's. Lab work significant for Cr 2.3 (Baseline Cr 1), Na 117, Hb 9.7 (Baseline 13.5), lactate 3.9, Troponin 0.13, and BNP 21K. In the ED, pt received Synthroid 200mcg IVP, solucortef 100mg, cefepime and vancomycin. Admitted to MICU with sepsis, JEEVAN, and hyponatremia. Currently on cefepime and metronidazole for treatment of sepsis. NS at 75 cc/hr for JEEVAN and hyponatremia likely secondary to hypovolemia secondary to diarrhea. GI consulted for acute drop in Hb, as well as loose, watery stools. Per GI, no endoscopic intervention at this point. Hb currently stable. Patient is stable for downgrade to medical floors.      ASSESSMENT & PLAN:    55 y/o female with PMHx of Type II DM, CAD, hyperlipidemia, HTN, Hypothyroidism, COPD, Dextrocardia, Sciatica/Peripheral Neuralgia, and Charcot foot deformity s/p reconstruction on 1/11/19 presented for hypotension and lethargy. Admitted to MICU with sepsis, JEEVAN, and hyponatremia. Currently on cefepime and metronidazole for treatment of sepsis. NS at 75 cc/hr for JEEVAN and hyponatremia likely secondary to hypovolemia secondary to diarrhea. GI consulted for acute drop in Hb, as well as loose, watery stools. Per GI, no endoscopic intervention at this point. Hb currently stable. Patient is stable for downgrade to medical floors.     #Sepsis present on admission  #Hypotension  - No longer hypotensive. Not on pressors.  - C/w cefepime and metronidazole  - F/u blood cultures  - F/u urine cultures  - F/u procal  - F/u COVID-19 antibodies  - F/u serum free cortisol  - Hold metoprolol  - F/u ID    #JEEVAN w/out hyperkalemia or acidosis   - Likely pre-renal secondary to diarrhea + overdiuresis  - C/w NS at 75 cc/hr  - Hold Lasix  - Baseline Cr 1  - Renal U/S negative  - Consider urine Pr:Cr ratio, urine chloride     #Hyponatremia  - Likely secondary to Hypovolemia vs hypothyroidism vs less likely SIADH (pt on SSRI/Lyrica)  - C/w NS at 75 cc/hr  - Monitor BMP  - Hold Lyrica/SSRI    #Hx of GIB secondary to duodenal ulcer s/p EGD (clean based ulcer without intervention)  - Per GI, no endoscopic intervention at this time  - C/w sucralfate qid and Protonix BID  - Repeat EGD as outpatient within 1 month of discharge, per GI ([phone])    #Acute Anemia  - Hb stable since admission  - GI recs seen and appreciated    #PAD  - Arterial duplex negative in b/l LE    #COPD  - C/w Symbicort and duonebs  - BiPAP PRN     #Hypothyroidism  - TSH, free T4 reviewed  - C/w levothyroxine    #High lactate  - Improving    #Candida esophagitis s/p treatment  - GI will assess on repeat EGD    #Hyperlipidemia  - C/w atorvastatin    #Misc  Diet: mechanical soft, consistent carbs, DASH/TLC, lactose-free  GI PPx: Protonix, sucralfate  DVT PPx: Lovenox  Activity: as tolerated  Dispo: downgrade to medical floors      For Follow-Up:    - F/u troponins at 1130 PM and 430 AM  - F/u blood cultures  - F/u urine cultures  - F/u procal  - F/u COVID-19 antibodies  - F/u serum free cortisol  - F/u ID  - Switch to lactose-free diet    Subjective: Patient seen and examined at bedside today.     Vital Signs Last 24 Hrs  T(C): 36.3 (21 Oct 2020 08:00), Max: 37.1 (20 Oct 2020 16:15)  T(F): 97.4 (21 Oct 2020 08:00), Max: 98.8 (20 Oct 2020 16:15)  HR: 74 (21 Oct 2020 10:00) (58 - 82)  BP: 82/51 (21 Oct 2020 10:00) (66/43 - 135/73)  BP(mean): 66 (21 Oct 2020 10:00) (51 - 98)  RR: 32 (21 Oct 2020 10:00) (9 - 32)  SpO2: 97% (21 Oct 2020 10:00) (75% - 100%)    Physical exam:  GEN: awake  CV: not tachycardic  RESP: satting well on RA  NEURO: alert    I&O's Summary    20 Oct 2020 07:01  -  21 Oct 2020 07:00  --------------------------------------------------------  IN: 410 mL / OUT: 1050 mL / NET: -640 mL    21 Oct 2020 07:01  -  21 Oct 2020 13:53  --------------------------------------------------------  IN: 0 mL / OUT: 600 mL / NET: -600 mL          MEDICATIONS  (STANDING):  ALBUTerol    0.083% 2.5 milliGRAM(s) Nebulizer every 6 hours  albuterol/ipratropium for Nebulization 3 milliLiter(s) Nebulizer every 6 hours  aspirin enteric coated 81 milliGRAM(s) Oral daily  atorvastatin 40 milliGRAM(s) Oral at bedtime  cefepime   IVPB 1000 milliGRAM(s) IV Intermittent every 12 hours  chlorhexidine 4% Liquid 1 Application(s) Topical <User Schedule>  levothyroxine 100 MICROGram(s) Oral daily  metroNIDAZOLE  IVPB 500 milliGRAM(s) IV Intermittent every 8 hours  pantoprazole    Tablet 40 milliGRAM(s) Oral every 12 hours  sodium chloride 0.9%. 1000 milliLiter(s) (75 mL/Hr) IV Continuous <Continuous>  sucralfate 1 Gram(s) Oral four times a day  tiZANidine 8 milliGRAM(s) Oral three times a day    MEDICATIONS  (PRN):        LABS                                            9.4                   Neurophils% (auto):   x      (10-21 @ 12:00):    11.94)-----------(233          Lymphocytes% (auto):  x                                             28.6                   Eosinphils% (auto):   x        Manual%: Neutrophils x    ; Lymphocytes x    ; Eosinophils x    ; Bands%: x    ; Blasts x                                    120    |  85     |  22                  Calcium: 7.1   / iCa: x      (10-21 @ 12:00)    ----------------------------<  137       Magnesium: x                                4.0     |  23     |  1.3              Phosphorous: x        TPro  5.7    /  Alb  2.6    /  TBili  0.6    /  DBili  x      /  AST  46     /  ALT  23     /  AlkPhos  101    21 Oct 2020 12:00    ( 10-21 @ 04:47 )   PT: 14.40 sec;   INR: 1.25 ratio  aPTT: 33.0 sec    Sign out given to Dr. Muro (Spectra [id number]) at [id number]

## 2020-10-21 NOTE — CONSULT NOTE ADULT - ASSESSMENT
Assessment:  55y Female presented to ED for weakness, recent admission with GI bleed, Hgb 9 (baseline 13). Vascular consulted due to bluish discoloration of right foot dorsum. Strong pulses DP and PT bilaterally. Patient reports hx of recent right foot trauma. Arterial duplex with normal flow bilaterally.    Plan:  - no vascular surgery intervention  - please reconsult PRN  - primary care per primary team

## 2020-10-21 NOTE — CONSULT NOTE ADULT - ASSESSMENT
55 y/o female with Medical Hx of Type II DM,  CAD, DLD, HTN, Hypothyroidism, COPD, Dextrocardia,  Sciatica/Peripheral Neuralgia, Charcot foot deformity s/p reconstruction on 1/11/19, recent history of giant clean based duodenal ulcer presenting for hypotension and lethargy, admitted with Septic shock, Hyponatremia and GI is being called for drop in HG, loose watery stools 55 y/o female with Medical Hx of Type II DM,  CAD, DLD, HTN, Hypothyroidism, COPD, Dextrocardia,  Sciatica/Peripheral Neuralgia, Charcot foot deformity s/p reconstruction on 1/11/19, recent history of giant clean based duodenal ulcer presenting for hypotension and lethargy, admitted with Septic shock, Hyponatremia and GI is being called for drop in HG, loose watery stools.    # Acute drop in Hg : R/O GIB  Patient had brown loose stool which resolved now.  hemodynamically unstable not related to GIB, now stable.  EGD 9/20:  A large semi circumferential ulcerative mucosa was noted in the anterior wall  of the duodenal bulb extending proximally to the pyloric channel. (non specific inflammation)   No blood was seen in the esophagus, stomach or small bowel.       Rec:  Start lactose free diet  Please correct electrolytes including   c/w oral pantoprazole 40mg BID  Smoking cessation  No endoscopic intervention at this point. But patient needs to have repeat EGD as outpatient within 1 month of discharge ([phone])    #Candida Esophagitis: Symptoms mostly improved after treatement  Will assess on repeat EGd as outpatient.   53 y/o female with Medical Hx of Type II DM,  CAD, DLD, HTN, Hypothyroidism, COPD, Dextrocardia,  Sciatica/Peripheral Neuralgia, Charcot foot deformity s/p reconstruction on 1/11/19, recent history of giant clean based duodenal ulcer presenting for hypotension and lethargy, admitted with Septic shock, Hyponatremia and GI is being called for drop in HG, loose watery stools.    # Acute drop in Hg : R/O GIB  Patient had brown loose stool which resolved now.  hemodynamically unstable not related to GIB, now stable.  EGD 9/20:  A large semi circumferential ulcerative mucosa was noted in the anterior wall  of the duodenal bulb extending proximally to the pyloric channel. (non specific inflammation)   No blood was seen in the esophagus, stomach or small bowel.       Rec:  Start lactose free diet  Please correct electrolytes including   c/w oral pantoprazole 40mg BID  c/w sucralfate qid  Smoking cessation  No endoscopic intervention at this point. But patient needs to have repeat EGD as outpatient within 1 month of discharge ([phone])    #Candida Esophagitis: Symptoms mostly improved after treatement  Will assess on repeat EGd as outpatient.    REcall GI if overt bleeding or drop in hgb

## 2020-10-21 NOTE — CONSULT NOTE ADULT - SUBJECTIVE AND OBJECTIVE BOX
Gastroenterology Consultation:    Patient is a 55y old  Female who presents with a chief complaint of Hypotension (20 Oct 2020 20:23)      Admitted on: 10-20-20    HPI:   53 y/o female with Medical Hx of Type II DM,  CAD, DLD, HTN, Hypothyroidism, COPD, Dextrocardia,  Sciatica/Peripheral Neuralgia, Charcot foot deformity s/p reconstruction on 19, recent history of clean based duodenal ulcer presenting for hypotension and lethargy. She reports that since discharge, she has been feeling generally weak, lethargic, and not like herself with loose stools , brown (6 episodes)     In the ED, Pt hypotensive to 66/43, HR66 T98.8 RR16 satting 91 % on RA. Pt received 1.5L LR in ED with improvement in her BP to 90's.  Labwork significant for Cr 2.3 (Baseline Cr 1 ), Na 117, Hb 9.7 (Baseline 13.5), La 3.9, T 0.13, BNP 21K.    Pt received Synthroid 200mcg IVP and solucortef 100mg, cefepime and vancomycin.      (20 Oct 2020 20:23)      COVID 19: pending    Prior records Reviewed (Y/N): Y  History obtained from person other than patient (Y/N): N    Prior EGD: :  -Small white plaques noted in the mid esophagus and biopsies were taken to R/O  candida. (Candida+).   Diffuse erythema and nodularity was noted in the stomach s/p biopsies for  histology. HP neg   A irregular large white patch was noted in the antrum and one cold forceps  biopsy was taken.    A large semi circumferential ulcerative mucosa was noted in the anterior wall  of the duodenal bulb extending proximally to the pyloric channel. (non specific inflammation)   No blood was seen in the esophagus, stomach or small bowel.     Prior Colonoscopy: None      PAST MEDICAL & SURGICAL HISTORY:  Dextrocardia    Chronic midline low back pain with bilateral sciatica    Peripheral neuralgia    Essential hypertension    Diabetes 1.5, managed as type 2    Charcot&#x27;s arthropathy  R foot    Charcot foot due to diabetes mellitus    H/O shoulder surgery  Right shoulder surgery     delivery delivered        FAMILY HISTORY:      Social History:  Tobacco:  Alcohol:  Drugs:    Home Medications:  budesonide-formoterol 80 mcg-4.5 mcg/inh inhalation aerosol: 2 puff(s) inhaled 2 times a day (20 Oct 2020 20:41)  DULoxetine 60 mg oral delayed release capsule: 1 cap(s) orally once a day (20 Oct 2020 20:41)  Lyrica 150 mg oral capsule: 1 tab(s) orally 3 times a day, stop after  (20 Oct 2020 20:41)  tiZANidine 4 mg oral tablet: 2 tab(s) orally every 8 hours, As Needed (20 Oct 2020 20:41)  Ventolin HFA 90 mcg/inh inhalation aerosol: 2 puff(s) inhaled 4 times a day, As Needed (20 Oct 2020 20:41)    MEDICATIONS  (STANDING):  ALBUTerol    0.083% 2.5 milliGRAM(s) Nebulizer every 6 hours  albuterol/ipratropium for Nebulization 3 milliLiter(s) Nebulizer every 6 hours  aspirin enteric coated 81 milliGRAM(s) Oral daily  atorvastatin 40 milliGRAM(s) Oral at bedtime  cefepime   IVPB 1000 milliGRAM(s) IV Intermittent every 12 hours  chlorhexidine 4% Liquid 1 Application(s) Topical <User Schedule>  levothyroxine 100 MICROGram(s) Oral daily  metroNIDAZOLE  IVPB 500 milliGRAM(s) IV Intermittent every 8 hours  norepinephrine Infusion 0.05 MICROgram(s)/kG/Min (6.56 mL/Hr) IV Continuous <Continuous>  pantoprazole    Tablet 40 milliGRAM(s) Oral before breakfast  sucralfate 1 Gram(s) Oral four times a day  tiZANidine 8 milliGRAM(s) Oral three times a day    MEDICATIONS  (PRN):      Allergies  No Known Allergies      Review of Systems:   Constitutional:  No Fever, No Chills  ENT/Mouth:  No Hearing Changes,  No Difficulty Swallowing  Eyes:  No Eye Pain, No Vision Changes  Cardiovascular:  No Chest Pain, No Palpitations  Respiratory:  No Cough, No Dyspnea  Gastrointestinal:  As described in HPI  Musculoskeletal:  No Joint Swelling, No Back Pain  Skin:  No Skin Lesions, No Jaundice  Neuro:  No Syncope, No Dizziness  Heme/Lymph:  No Bruising, No Bleeding.          Physical Examination:  T(C): 36.2 (10-21-20 @ 04:20), Max: 37.1 (10-20-20 @ 16:15)  HR: 78 (10-21-20 @ 06:00) (58 - 82)  BP: 106/69 (10-21-20 @ 06:00) (66/43 - 133/74)  RR: 20 (10-21-20 @ 06:00) (9 - 28)  SpO2: 99% (10-21-20 @ 06:00) (75% - 100%)  Height (cm): 152.4 (10-20-20 @ 15:23)  Weight (kg): 83.9 (10-21-20 @ 04:20)    10-20-20 @ 07:01  -  10-21-20 @ 07:00  --------------------------------------------------------  IN: 410 mL / OUT: 1050 mL / NET: -640 mL        Constitutional: No acute distress.  Eyes:. Conjunctivae are clear, Sclera is non-icteric.  Ears Nose and Throat: The external ears are normal appearing,  Oral mucosa is pink and moist.  Respiratory:  No signs of respiratory distress. Lung sounds are clear bilaterally.  Cardiovascular:  S1 S2, Regular rate and rhythm.  GI: Abdomen is soft, symmetric, and non-tender without distention. There are no visible lesions. Bowel sounds are present and normoactive in all four quadrants. No masses, hepatomegaly, or splenomegaly are noted.   Neuro: No Tremor, No involuntary movements  Skin: No rashes, No Jaundice.          Data: (reviewed by attending)                        9.7    11.20 )-----------( 244      ( 21 Oct 2020 04:47 )             29.6     Hgb Trend:  9.7  10-21-20 @ 04:47  9.7  10-20-20 @ 16:38        10    117<LL>  |  80<L>  |  23<H>  ----------------------------<  131<H>  4.2   |  21  |  1.7<H>    Ca    6.9<L>      21 Oct 2020 04:47  Phos  4.0     10  Mg     0.9     10-21    TPro  5.8<L>  /  Alb  2.9<L>  /  TBili  0.7  /  DBili  x   /  AST  50<H>  /  ALT  24  /  AlkPhos  115  10-21    Liver panel trend:  TBili 0.7   /   AST 50   /   ALT 24   /   AlkP 115   /   Tptn 5.8   /   Alb 2.9    /   DBili --      10-21  TBili 0.7   /   AST 50   /   ALT 24   /   AlkP 110   /   Tptn 5.8   /   Alb 2.8    /   DBili --      10-21  TBili 0.8   /   AST 56   /   ALT 22   /   AlkP 110   /   Tptn 6.2   /   Alb 2.8    /   DBili 0.4      10-20      PT/INR - ( 21 Oct 2020 04:47 )   PT: 14.40 sec;   INR: 1.25 ratio         PTT - ( 21 Oct 2020 04:47 )  PTT:33.0 sec        Radiology:(reviewed by attending)       Gastroenterology Consultation:    Patient is a 55y old  Female who presents with a chief complaint of Hypotension (20 Oct 2020 20:23)      Admitted on: 10-20-20    HPI:    53 y/o female with Medical Hx of Type II DM,  CAD on ASA 81, DLD, HTN, Hypothyroidism, COPD, Dextrocardia,  Sciatica/Peripheral Neuralgia, Charcot foot deformity s/p reconstruction on 19, recent history of clean based giant duodenal ulcer presenting for hypotension and lethargy. She reports that since discharge, she has been feeling generally weak, lethargic, and not like herself with loose stools , brown (6 episodes) although no melena or fresh blood per rectum. GI was called for drop in Hg and hypotension. Patient states after discharge she was compliant with PPI and took Abx for Candida. She is eating full meal although sometimes she has difficulty chewing.        COVID 19: pending    Prior records Reviewed (Y/N): Y  History obtained from person other than patient (Y/N): N    Prior EGD: :  -Small white plaques noted in the mid esophagus and biopsies were taken to R/O  candida. (Candida+).   Diffuse erythema and nodularity was noted in the stomach s/p biopsies for  histology. HP neg   A irregular large white patch was noted in the antrum and one cold forceps  biopsy was taken.    A large semi circumferential ulcerative mucosa was noted in the anterior wall  of the duodenal bulb extending proximally to the pyloric channel. (non specific inflammation)   No blood was seen in the esophagus, stomach or small bowel.     Prior Colonoscopy: 2 yrs ago but no reports in system      PAST MEDICAL & SURGICAL HISTORY:  Dextrocardia    Chronic midline low back pain with bilateral sciatica    Peripheral neuralgia    Essential hypertension    Diabetes 1.5, managed as type 2    Charcot&#x27;s arthropathy  R foot    Charcot foot due to diabetes mellitus    H/O shoulder surgery  Right shoulder surgery     delivery delivered        FAMILY HISTORY:No significant FH      Social History:  Active smoker  No alcoholic    Home Medications:  budesonide-formoterol 80 mcg-4.5 mcg/inh inhalation aerosol: 2 puff(s) inhaled 2 times a day (20 Oct 2020 20:41)  DULoxetine 60 mg oral delayed release capsule: 1 cap(s) orally once a day (20 Oct 2020 20:41)  Lyrica 150 mg oral capsule: 1 tab(s) orally 3 times a day, stop after  (20 Oct 2020 20:41)  tiZANidine 4 mg oral tablet: 2 tab(s) orally every 8 hours, As Needed (20 Oct 2020 20:41)  Ventolin HFA 90 mcg/inh inhalation aerosol: 2 puff(s) inhaled 4 times a day, As Needed (20 Oct 2020 20:41)    MEDICATIONS  (STANDING):  ALBUTerol    0.083% 2.5 milliGRAM(s) Nebulizer every 6 hours  albuterol/ipratropium for Nebulization 3 milliLiter(s) Nebulizer every 6 hours  aspirin enteric coated 81 milliGRAM(s) Oral daily  atorvastatin 40 milliGRAM(s) Oral at bedtime  cefepime   IVPB 1000 milliGRAM(s) IV Intermittent every 12 hours  chlorhexidine 4% Liquid 1 Application(s) Topical <User Schedule>  levothyroxine 100 MICROGram(s) Oral daily  metroNIDAZOLE  IVPB 500 milliGRAM(s) IV Intermittent every 8 hours  norepinephrine Infusion 0.05 MICROgram(s)/kG/Min (6.56 mL/Hr) IV Continuous <Continuous>  pantoprazole    Tablet 40 milliGRAM(s) Oral before breakfast  sucralfate 1 Gram(s) Oral four times a day  tiZANidine 8 milliGRAM(s) Oral three times a day    MEDICATIONS  (PRN):      Allergies  No Known Allergies      Review of Systems:   Constitutional:  No Fever, No Chills  ENT/Mouth:  No Hearing Changes,  No Difficulty Swallowing  Eyes:  No Eye Pain, No Vision Changes  Cardiovascular:  No Chest Pain, No Palpitations  Respiratory:  No Cough, No Dyspnea  Gastrointestinal:  As described in HPI  Musculoskeletal:  No Joint Swelling, No Back Pain  Skin:  No Skin Lesions, No Jaundice  Neuro:  dizziness            Physical Examination:  T(C): 36.2 (10-21-20 @ 04:20), Max: 37.1 (10-20-20 @ 16:15)  HR: 78 (10-21-20 @ 06:00) (58 - 82)  BP: 106/69 (10-21-20 @ 06:00) (66/43 - 133/74)  RR: 20 (10-21-20 @ 06:00) (9 - 28)  SpO2: 99% (10-21-20 @ 06:00) (75% - 100%)  Height (cm): 152.4 (10-20-20 @ 15:23)  Weight (kg): 83.9 (10-21-20 @ 04:20)    10-20-20 @ 07:01  -  10-21-20 @ 07:00  --------------------------------------------------------  IN: 410 mL / OUT: 1050 mL / NET: -640 mL        Constitutional: No acute distress.  Eyes:. Conjunctivae are clear, Sclera is non-icteric.  Ears Nose and Throat: The external ears are normal appearing,  Oral mucosa is pink and moist.  Respiratory:  No signs of respiratory distress.   Cardiovascular:  S1 S2, Regular rate and rhythm.  GI: Abdomen is soft, symmetric, and non-tender without distention.   Neuro: No Tremor, No involuntary movements  Skin: No rashes, No Jaundice.          Data: (reviewed by attending)                        9.7    11 )-----------( 244      ( 21 Oct 2020 04:47 )             29.6     Hgb Trend:  9.7  10-21-20 @ 04:47  9.7  10-20-20 @ 16:38        10-21    117<LL>  |  80<L>  |  23<H>  ----------------------------<  131<H>  4.2   |  21  |  1.7<H>    Ca    6.9<L>      21 Oct 2020 04:47  Phos  4.0     10-21  Mg     0.9     10-21    TPro  5.8<L>  /  Alb  2.9<L>  /  TBili  0.7  /  DBili  x   /  AST  50<H>  /  ALT  24  /  AlkPhos  115  10-21    Liver panel trend:  TBili 0.7   /   AST 50   /   ALT 24   /   AlkP 115   /   Tptn 5.8   /   Alb 2.9    /   DBili --      10-21  TBili 0.7   /   AST 50   /   ALT 24   /   AlkP 110   /   Tptn 5.8   /   Alb 2.8    /   DBili --      10-21  TBili 0.8   /   AST 56   /   ALT 22   /   AlkP 110   /   Tptn 6.2   /   Alb 2.8    /   DBili 0.4      10-20      PT/INR - ( 21 Oct 2020 04:47 )   PT: 14.40 sec;   INR: 1.25 ratio         PTT - ( 21 Oct 2020 04:47 )  PTT:33.0 sec        Radiology:(reviewed by attending)       Gastroenterology Consultation:    Patient is a 55y old  Female who presents with a chief complaint of Hypotension (20 Oct 2020 20:23)      Admitted on: 10-20-20    HPI:    55 y/o female with Medical Hx of Type II DM,  CAD on ASA 81, DLD, HTN, Hypothyroidism, COPD, Dextrocardia,  Sciatica/Peripheral Neuralgia, Charcot foot deformity s/p reconstruction on 19, recent history of clean based giant duodenal ulcer presenting for hypotension and lethargy. She reports that since discharge, she has been feeling generally weak, lethargic, and not like herself with loose stools , brown (6 episodes) although no melena or fresh blood per rectum. GI was called for drop in Hg and hypotension. Patient states after discharge she was compliant with PPI and took Abx for Candida. She is eating full meal although sometimes she has difficulty chewing.        COVID 19: pending    Prior records Reviewed (Y/N): Y  History obtained from person other than patient (Y/N): N    Prior EGD: :  -Small white plaques noted in the mid esophagus and biopsies were taken to R/O  candida. (Candida+).   Diffuse erythema and nodularity was noted in the stomach s/p biopsies for  histology. HP neg   A irregular large white patch was noted in the antrum and one cold forceps  biopsy was taken.    A large semi circumferential ulcerative mucosa was noted in the anterior wall  of the duodenal bulb extending proximally to the pyloric channel. (non specific inflammation)   No blood was seen in the esophagus, stomach or small bowel.     Prior Colonoscopy: 2 yrs ago but no reports in system      PAST MEDICAL & SURGICAL HISTORY:  Dextrocardia    Chronic midline low back pain with bilateral sciatica    Peripheral neuralgia    Essential hypertension    Diabetes 1.5, managed as type 2    Charcot&#x27;s arthropathy  R foot    Charcot foot due to diabetes mellitus    H/O shoulder surgery  Right shoulder surgery     delivery delivered        FAMILY HISTORY:No significant FH      Social History:  Active smoker  No alcoholic    Home Medications:  budesonide-formoterol 80 mcg-4.5 mcg/inh inhalation aerosol: 2 puff(s) inhaled 2 times a day (20 Oct 2020 20:41)  DULoxetine 60 mg oral delayed release capsule: 1 cap(s) orally once a day (20 Oct 2020 20:41)  Lyrica 150 mg oral capsule: 1 tab(s) orally 3 times a day, stop after  (20 Oct 2020 20:41)  tiZANidine 4 mg oral tablet: 2 tab(s) orally every 8 hours, As Needed (20 Oct 2020 20:41)  Ventolin HFA 90 mcg/inh inhalation aerosol: 2 puff(s) inhaled 4 times a day, As Needed (20 Oct 2020 20:41)    MEDICATIONS  (STANDING):  ALBUTerol    0.083% 2.5 milliGRAM(s) Nebulizer every 6 hours  albuterol/ipratropium for Nebulization 3 milliLiter(s) Nebulizer every 6 hours  aspirin enteric coated 81 milliGRAM(s) Oral daily  atorvastatin 40 milliGRAM(s) Oral at bedtime  cefepime   IVPB 1000 milliGRAM(s) IV Intermittent every 12 hours  chlorhexidine 4% Liquid 1 Application(s) Topical <User Schedule>  levothyroxine 100 MICROGram(s) Oral daily  metroNIDAZOLE  IVPB 500 milliGRAM(s) IV Intermittent every 8 hours  norepinephrine Infusion 0.05 MICROgram(s)/kG/Min (6.56 mL/Hr) IV Continuous <Continuous>  pantoprazole    Tablet 40 milliGRAM(s) Oral before breakfast  sucralfate 1 Gram(s) Oral four times a day  tiZANidine 8 milliGRAM(s) Oral three times a day    MEDICATIONS  (PRN):      Allergies  No Known Allergies      Review of Systems:   Constitutional:  No Fever, No Chills  ENT/Mouth:  No Hearing Changes,  No Difficulty Swallowing  Eyes:  No Eye Pain, No Vision Changes  Cardiovascular:  No Chest Pain, No Palpitations  Respiratory:  No Cough, No Dyspnea  Gastrointestinal:  As described in HPI  Musculoskeletal:  No Joint Swelling, No Back Pain  Skin:  No Skin Lesions, No Jaundice  Neuro:  dizziness            Physical Examination:  T(C): 36.2 (10-21-20 @ 04:20), Max: 37.1 (10-20-20 @ 16:15)  HR: 78 (10-21-20 @ 06:00) (58 - 82)  BP: 106/69 (10-21-20 @ 06:00) (66/43 - 133/74)  RR: 20 (10-21-20 @ 06:00) (9 - 28)  SpO2: 99% (10-21-20 @ 06:00) (75% - 100%)  Height (cm): 152.4 (10-20-20 @ 15:23)  Weight (kg): 83.9 (10-21-20 @ 04:20)    10-20-20 @ 07:01  -  10-21-20 @ 07:00  --------------------------------------------------------  IN: 410 mL / OUT: 1050 mL / NET: -640 mL        Constitutional: No acute distress.  Eyes:. Conjunctivae are clear, Sclera is non-icteric.  Ears Nose and Throat: The external ears are normal appearing,  Oral mucosa is pink and moist.  Respiratory:  No signs of respiratory distress.   Cardiovascular:  S1 S2, Regular rate and rhythm.  GI: Abdomen is soft, symmetric, and non-tender without distention.   Neuro: No Tremor, No involuntary movements  Skin: No rashes, No Jaundice.          Data: (reviewed by attending)                        9.7    11 )-----------( 244      ( 21 Oct 2020 04:47 )             29.6     Hgb Trend:  9.7  10-21-20 @ 04:47  9.7  10-20-20 @ 16:38        10-21    117<LL>  |  80<L>  |  23<H>  ----------------------------<  131<H>  4.2   |  21  |  1.7<H>    Ca    6.9<L>      21 Oct 2020 04:47  Phos  4.0     10-21  Mg     0.9     10-21    TPro  5.8<L>  /  Alb  2.9<L>  /  TBili  0.7  /  DBili  x   /  AST  50<H>  /  ALT  24  /  AlkPhos  115  10-21    Liver panel trend:  TBili 0.7   /   AST 50   /   ALT 24   /   AlkP 115   /   Tptn 5.8   /   Alb 2.9    /   DBili --      10-21  TBili 0.7   /   AST 50   /   ALT 24   /   AlkP 110   /   Tptn 5.8   /   Alb 2.8    /   DBili --      10-21  TBili 0.8   /   AST 56   /   ALT 22   /   AlkP 110   /   Tptn 6.2   /   Alb 2.8    /   DBili 0.4      10-20      PT/INR - ( 21 Oct 2020 04:47 )   PT: 14.40 sec;   INR: 1.25 ratio         PTT - ( 21 Oct 2020 04:47 )  PTT:33.0 sec

## 2020-10-21 NOTE — CONSULT NOTE ADULT - SUBJECTIVE AND OBJECTIVE BOX
PODIATRY CONSULT   CALLY HARTMAN is a 55y Female Patient is a 55y old  Female who presents with a chief complaint of Hypotension (21 Oct 2020 08:02)    HPI:   55 y/o female with Medical Hx of Type II DM,  CAD, DLD, HTN, Hypothyroidism, COPD, Dextrocardia,  Sciatica/Peripheral Neuralgia, Charcot foot deformity s/p reconstruction on 19 presenting for hypotension and lethargy.    Pt had a recent admission in 2020 for UGIB 2/2 duodenal ulcer, and JEEVAN w/ hyponatremia.  She reports that since discharge, she has been feeling generally weak, lethargic, and not like herself. She reports diarrhea since  (>6 episodes/ day) that she described as "watery". She denies any associated nausea/vomiting/abdominal pain or fevers. She has been having episodes of delirium/altered mental status at home, but is AAOx3 during interview. She takes percocet for pain, but only had 2 pills today. She has been taking lasix 40 QD without checking her blood pressure.   She denies angina, palpitations, dyspnea, melena, or bloody stools.     In the ED, Pt hypotensive to 66/43, HR66 T98.8 RR16 satting 91 % on RA. Pt received 1.5L LR in ED with improvement in her BP to 90's.  Labwork significant for Cr 2.3 (Baseline Cr 1 ), Na 117, Hb 9.7 (Baseline 13.5), La 3.9, T 0.13, BNP 21K.    Pt received Synthroid 200mcg IVP and solucortef 100mg, cefepime and vancomycin. admitted to MICU, feels better, want to eat, off pressors     (20 Oct 2020 20:23)      LEHARYGY SEPSIS HYPONATREMIA ANEMIA    Dextrocardia    Chronic midline low back pain with bilateral sciatica    Peripheral neuralgia    Essential hypertension    Diabetes 1.5, managed as type 2    Charcot&#x27;s arthropathy        PMH: LEHARYGY SEPSIS HYPONATREMIA ANEMIA    Dextrocardia    Chronic midline low back pain with bilateral sciatica    Peripheral neuralgia    Essential hypertension    Diabetes 1.5, managed as type 2    Charcot&#x27;s arthropathy      PSH: Charcot foot due to diabetes mellitus    H/O shoulder surgery     delivery delivered      Medication cefepime   IVPB 1000 milliGRAM(s) IV Intermittent every 12 hours  metroNIDAZOLE  IVPB 500 milliGRAM(s) IV Intermittent every 8 hours    Allergy: No Known Allergies        Labs:                        9.4    11.94 )-----------( 233      ( 21 Oct 2020 12:00 )             28.6     PT/INR - ( 21 Oct 2020 04:47 )   PT: 14.40 sec;   INR: 1.25 ratio         PTT - ( 21 Oct 2020 04:47 )  PTT:33.0 sec  10-21    120<L>  |  85<L>  |  22<H>  ----------------------------<  137<H>  4.0   |  23  |  1.3    Ca    7.1<L>      21 Oct 2020 12:00  Phos  4.0     10-21  Mg     0.9     10-21    TPro  5.7<L>  /  Alb  2.6<L>  /  TBili  0.6  /  DBili  x   /  AST  46<H>  /  ALT  23  /  AlkPhos  101  10-21    CARDIAC MARKERS ( 21 Oct 2020 12:00 )  x     / 0.03 ng/mL / 330 U/L / x     / 8.8 ng/mL  CARDIAC MARKERS ( 20 Oct 2020 16:38 )  x     / 0.13 ng/mL / x     / x     / x              ROS:  [ x]A ten point review of system was otherwise negative except as noted    Physical Exam - Lower Extremity Focused:   Derm:   Cyanotic changes noted on digits 2/3/4 at the base of the metatarsals. No open lesions. Erthema noted diffusely throughout digits 2/3/4.   Vascular:   DP and PT pulses are palpable w/ a 2/4. Skin temperature is moderately warm to the touch. Moderate edema noted on digits 2/3/4.  Neuro:  - Protective sensation mildly diminished.   MSK:   Mild pain on palpation on digits 2/3/4.       Assessment:   s/p Fall, R. foot    Plan:  Chart reviewed and Patient evaluated  Discussed diagnosis and treatment with patient  No dressings  X-rays ordered: pending report  Recommend ID consult; WBC 11.94  NWB to RLE  Keep foot elevated when at bedrest   Will discuss plan  with  Attending, Dr. Diaz    Spectra: [phone]     PODIATRY CONSULT   CALLY HARTMAN is a 55y Female Patient is a 55y old  Female who presents with a chief complaint of Hypotension (21 Oct 2020 08:02)    HPI:   55 y/o female with Medical Hx of Type II DM,  CAD, DLD, HTN, Hypothyroidism, COPD, Dextrocardia,  Sciatica/Peripheral Neuralgia, Charcot foot deformity s/p reconstruction on 19 presenting for hypotension and lethargy.    Pt had a recent admission in 2020 for UGIB 2/2 duodenal ulcer, and JEEVAN w/ hyponatremia.  She reports that since discharge, she has been feeling generally weak, lethargic, and not like herself. She reports diarrhea since  (>6 episodes/ day) that she described as "watery". She denies any associated nausea/vomiting/abdominal pain or fevers. She has been having episodes of delirium/altered mental status at home, but is AAOx3 during interview. She takes percocet for pain, but only had 2 pills today. She has been taking lasix 40 QD without checking her blood pressure.   She denies angina, palpitations, dyspnea, melena, or bloody stools.     In the ED, Pt hypotensive to 66/43, HR66 T98.8 RR16 satting 91 % on RA. Pt received 1.5L LR in ED with improvement in her BP to 90's.  Labwork significant for Cr 2.3 (Baseline Cr 1 ), Na 117, Hb 9.7 (Baseline 13.5), La 3.9, T 0.13, BNP 21K.    Pt received Synthroid 200mcg IVP and solucortef 100mg, cefepime and vancomycin. admitted to MICU, feels better, want to eat, off pressors     (20 Oct 2020 20:23)      LEHARYGY SEPSIS HYPONATREMIA ANEMIA    Dextrocardia    Chronic midline low back pain with bilateral sciatica    Peripheral neuralgia    Essential hypertension    Diabetes 1.5, managed as type 2    Charcot&#x27;s arthropathy        PMH: LEHARYGY SEPSIS HYPONATREMIA ANEMIA    Dextrocardia    Chronic midline low back pain with bilateral sciatica    Peripheral neuralgia    Essential hypertension    Diabetes 1.5, managed as type 2    Charcot&#x27;s arthropathy      PSH: Charcot foot due to diabetes mellitus    H/O shoulder surgery     delivery delivered      Medication cefepime   IVPB 1000 milliGRAM(s) IV Intermittent every 12 hours  metroNIDAZOLE  IVPB 500 milliGRAM(s) IV Intermittent every 8 hours    Allergy: No Known Allergies        Labs:                        9.4    11.94 )-----------( 233      ( 21 Oct 2020 12:00 )             28.6     PT/INR - ( 21 Oct 2020 04:47 )   PT: 14.40 sec;   INR: 1.25 ratio         PTT - ( 21 Oct 2020 04:47 )  PTT:33.0 sec  10-21    120<L>  |  85<L>  |  22<H>  ----------------------------<  137<H>  4.0   |  23  |  1.3    Ca    7.1<L>      21 Oct 2020 12:00  Phos  4.0     10-21  Mg     0.9     10-21    TPro  5.7<L>  /  Alb  2.6<L>  /  TBili  0.6  /  DBili  x   /  AST  46<H>  /  ALT  23  /  AlkPhos  101  10-21    CARDIAC MARKERS ( 21 Oct 2020 12:00 )  x     / 0.03 ng/mL / 330 U/L / x     / 8.8 ng/mL  CARDIAC MARKERS ( 20 Oct 2020 16:38 )  x     / 0.13 ng/mL / x     / x     / x              ROS:  [ x]A ten point review of system was otherwise negative except as noted    Physical Exam - Lower Extremity Focused:   Derm:   Cyanotic changes noted on digits 2/3/4 at the base of the metatarsals. No open lesions. Erthema noted diffusely throughout digits 2/3/4.   Vascular:   DP and PT pulses are mildly diminished. Skin temperature is moderately warm to the touch. Moderate edema noted on digits 2/3/4.  Neuro:  - Protective sensation mildly diminished.   MSK:   Mild pain on palpation on digits 2/3/4.       Assessment:   s/p Fall, R. foot    Plan:  Chart reviewed and Patient evaluated  Discussed diagnosis and treatment with patient  No dressings  X-rays ordered: pending report  Recommend ID consult; WBC 11.94  Recommend Vascular consult  Recommend arterial doppler  NWB to RLE  Keep foot elevated when at bedrest   Will discuss plan  with  Attending, Dr. Diaz    Spectra: [phone]

## 2020-10-22 LAB
ALBUMIN SERPL ELPH-MCNC: 2.6 G/DL — LOW (ref 3.5–5.2)
ALP SERPL-CCNC: 93 U/L — SIGNIFICANT CHANGE UP (ref 30–115)
ALT FLD-CCNC: 22 U/L — SIGNIFICANT CHANGE UP (ref 0–41)
ANION GAP SERPL CALC-SCNC: 13 MMOL/L — SIGNIFICANT CHANGE UP (ref 7–14)
ANION GAP SERPL CALC-SCNC: 16 MMOL/L — HIGH (ref 7–14)
AST SERPL-CCNC: 34 U/L — SIGNIFICANT CHANGE UP (ref 0–41)
BILIRUB SERPL-MCNC: 0.6 MG/DL — SIGNIFICANT CHANGE UP (ref 0.2–1.2)
BUN SERPL-MCNC: 16 MG/DL — SIGNIFICANT CHANGE UP (ref 10–20)
BUN SERPL-MCNC: 9 MG/DL — LOW (ref 10–20)
CALCIUM SERPL-MCNC: 7 MG/DL — LOW (ref 8.5–10.1)
CALCIUM SERPL-MCNC: 7 MG/DL — LOW (ref 8.5–10.1)
CHLORIDE SERPL-SCNC: 90 MMOL/L — LOW (ref 98–110)
CHLORIDE SERPL-SCNC: 93 MMOL/L — LOW (ref 98–110)
CO2 SERPL-SCNC: 21 MMOL/L — SIGNIFICANT CHANGE UP (ref 17–32)
CO2 SERPL-SCNC: 25 MMOL/L — SIGNIFICANT CHANGE UP (ref 17–32)
CREAT SERPL-MCNC: 0.7 MG/DL — SIGNIFICANT CHANGE UP (ref 0.7–1.5)
CREAT SERPL-MCNC: 0.9 MG/DL — SIGNIFICANT CHANGE UP (ref 0.7–1.5)
GLUCOSE BLDC GLUCOMTR-MCNC: 105 MG/DL — HIGH (ref 70–99)
GLUCOSE BLDC GLUCOMTR-MCNC: 117 MG/DL — HIGH (ref 70–99)
GLUCOSE BLDC GLUCOMTR-MCNC: 134 MG/DL — HIGH (ref 70–99)
GLUCOSE BLDC GLUCOMTR-MCNC: 205 MG/DL — HIGH (ref 70–99)
GLUCOSE SERPL-MCNC: 110 MG/DL — HIGH (ref 70–99)
GLUCOSE SERPL-MCNC: 94 MG/DL — SIGNIFICANT CHANGE UP (ref 70–99)
HCT VFR BLD CALC: 27.5 % — LOW (ref 37–47)
HGB BLD-MCNC: 8.9 G/DL — LOW (ref 12–16)
MAGNESIUM SERPL-MCNC: 0.9 MG/DL — LOW (ref 1.8–2.4)
MCHC RBC-ENTMCNC: 27.4 PG — SIGNIFICANT CHANGE UP (ref 27–31)
MCHC RBC-ENTMCNC: 32.4 G/DL — SIGNIFICANT CHANGE UP (ref 32–37)
MCV RBC AUTO: 84.6 FL — SIGNIFICANT CHANGE UP (ref 81–99)
NRBC # BLD: 0 /100 WBCS — SIGNIFICANT CHANGE UP (ref 0–0)
PLATELET # BLD AUTO: 225 K/UL — SIGNIFICANT CHANGE UP (ref 130–400)
POTASSIUM SERPL-MCNC: 3.5 MMOL/L — SIGNIFICANT CHANGE UP (ref 3.5–5)
POTASSIUM SERPL-MCNC: 3.8 MMOL/L — SIGNIFICANT CHANGE UP (ref 3.5–5)
POTASSIUM SERPL-SCNC: 3.5 MMOL/L — SIGNIFICANT CHANGE UP (ref 3.5–5)
POTASSIUM SERPL-SCNC: 3.8 MMOL/L — SIGNIFICANT CHANGE UP (ref 3.5–5)
PROCALCITONIN SERPL-MCNC: 1.43 NG/ML — HIGH (ref 0.02–0.1)
PROT SERPL-MCNC: 5.4 G/DL — LOW (ref 6–8)
RBC # BLD: 3.25 M/UL — LOW (ref 4.2–5.4)
RBC # FLD: 18 % — HIGH (ref 11.5–14.5)
SODIUM SERPL-SCNC: 128 MMOL/L — LOW (ref 135–146)
SODIUM SERPL-SCNC: 130 MMOL/L — LOW (ref 135–146)
TROPONIN T SERPL-MCNC: 0.05 NG/ML — CRITICAL HIGH
TROPONIN T SERPL-MCNC: 0.05 NG/ML — CRITICAL HIGH
TROPONIN T SERPL-MCNC: 0.06 NG/ML — CRITICAL HIGH
WBC # BLD: 9.73 K/UL — SIGNIFICANT CHANGE UP (ref 4.8–10.8)
WBC # FLD AUTO: 9.73 K/UL — SIGNIFICANT CHANGE UP (ref 4.8–10.8)

## 2020-10-22 PROCEDURE — 93970 EXTREMITY STUDY: CPT | Mod: 26

## 2020-10-22 PROCEDURE — 99233 SBSQ HOSP IP/OBS HIGH 50: CPT

## 2020-10-22 PROCEDURE — 99222 1ST HOSP IP/OBS MODERATE 55: CPT

## 2020-10-22 RX ORDER — ACETAMINOPHEN 500 MG
650 TABLET ORAL EVERY 6 HOURS
Refills: 0 | Status: DISCONTINUED | OUTPATIENT
Start: 2020-10-22 | End: 2020-10-23

## 2020-10-22 RX ORDER — CEFEPIME 1 G/1
2000 INJECTION, POWDER, FOR SOLUTION INTRAMUSCULAR; INTRAVENOUS EVERY 12 HOURS
Refills: 0 | Status: DISCONTINUED | OUTPATIENT
Start: 2020-10-22 | End: 2020-10-23

## 2020-10-22 RX ORDER — ALBUTEROL 90 UG/1
2 AEROSOL, METERED ORAL EVERY 6 HOURS
Refills: 0 | Status: DISCONTINUED | OUTPATIENT
Start: 2020-10-22 | End: 2020-10-23

## 2020-10-22 RX ORDER — MAGNESIUM SULFATE 500 MG/ML
2 VIAL (ML) INJECTION ONCE
Refills: 0 | Status: DISCONTINUED | OUTPATIENT
Start: 2020-10-22 | End: 2020-10-22

## 2020-10-22 RX ORDER — MAGNESIUM SULFATE 500 MG/ML
2 VIAL (ML) INJECTION
Refills: 0 | Status: COMPLETED | OUTPATIENT
Start: 2020-10-22 | End: 2020-10-22

## 2020-10-22 RX ADMIN — Medication 3 MILLILITER(S): at 08:13

## 2020-10-22 RX ADMIN — Medication 100 MILLIGRAM(S): at 13:22

## 2020-10-22 RX ADMIN — Medication 1 GRAM(S): at 01:02

## 2020-10-22 RX ADMIN — TIZANIDINE 8 MILLIGRAM(S): 4 TABLET ORAL at 21:05

## 2020-10-22 RX ADMIN — Medication 25 GRAM(S): at 15:09

## 2020-10-22 RX ADMIN — Medication 81 MILLIGRAM(S): at 13:11

## 2020-10-22 RX ADMIN — Medication 1 GRAM(S): at 13:11

## 2020-10-22 RX ADMIN — PANTOPRAZOLE SODIUM 40 MILLIGRAM(S): 20 TABLET, DELAYED RELEASE ORAL at 05:15

## 2020-10-22 RX ADMIN — CEFEPIME 100 MILLIGRAM(S): 1 INJECTION, POWDER, FOR SOLUTION INTRAMUSCULAR; INTRAVENOUS at 17:15

## 2020-10-22 RX ADMIN — Medication 650 MILLIGRAM(S): at 01:41

## 2020-10-22 RX ADMIN — ATORVASTATIN CALCIUM 40 MILLIGRAM(S): 80 TABLET, FILM COATED ORAL at 21:05

## 2020-10-22 RX ADMIN — Medication 1 GRAM(S): at 05:15

## 2020-10-22 RX ADMIN — Medication 3 MILLILITER(S): at 19:17

## 2020-10-22 RX ADMIN — BUDESONIDE AND FORMOTEROL FUMARATE DIHYDRATE 2 PUFF(S): 160; 4.5 AEROSOL RESPIRATORY (INHALATION) at 09:53

## 2020-10-22 RX ADMIN — Medication 100 MICROGRAM(S): at 05:15

## 2020-10-22 RX ADMIN — TIZANIDINE 8 MILLIGRAM(S): 4 TABLET ORAL at 05:16

## 2020-10-22 RX ADMIN — TIZANIDINE 8 MILLIGRAM(S): 4 TABLET ORAL at 14:01

## 2020-10-22 RX ADMIN — PANTOPRAZOLE SODIUM 40 MILLIGRAM(S): 20 TABLET, DELAYED RELEASE ORAL at 17:15

## 2020-10-22 RX ADMIN — BUDESONIDE AND FORMOTEROL FUMARATE DIHYDRATE 2 PUFF(S): 160; 4.5 AEROSOL RESPIRATORY (INHALATION) at 18:35

## 2020-10-22 RX ADMIN — CEFEPIME 100 MILLIGRAM(S): 1 INJECTION, POWDER, FOR SOLUTION INTRAMUSCULAR; INTRAVENOUS at 05:14

## 2020-10-22 RX ADMIN — Medication 25 GRAM(S): at 18:16

## 2020-10-22 RX ADMIN — Medication 1 GRAM(S): at 17:15

## 2020-10-22 RX ADMIN — Medication 100 MILLIGRAM(S): at 05:16

## 2020-10-22 RX ADMIN — SODIUM CHLORIDE 75 MILLILITER(S): 9 INJECTION INTRAMUSCULAR; INTRAVENOUS; SUBCUTANEOUS at 18:17

## 2020-10-22 NOTE — PROGRESS NOTE ADULT - SUBJECTIVE AND OBJECTIVE BOX
SUBJECTIVE:    Patient is a 55y old Female who presents with a chief complaint of Hypotension (21 Oct 2020 18:26)    Currently admitted to medicine with the primary diagnosis of Lethargy       Today is hospital day 2d. This morning she is resting comfortably in bed and reports no new issues or overnight events.     PAST MEDICAL & SURGICAL HISTORY  Dextrocardia    Chronic midline low back pain with bilateral sciatica    Peripheral neuralgia    Essential hypertension    Diabetes 1.5, managed as type 2    Charcot&#x27;s arthropathy  R foot    Charcot foot due to diabetes mellitus    H/O shoulder surgery  Right shoulder surgery     delivery delivered      SOCIAL HISTORY:  Negative for smoking/alcohol/drug use.     ALLERGIES:  No Known Allergies    MEDICATIONS:  STANDING MEDICATIONS  albuterol/ipratropium for Nebulization 3 milliLiter(s) Nebulizer every 6 hours  aspirin enteric coated 81 milliGRAM(s) Oral daily  atorvastatin 40 milliGRAM(s) Oral at bedtime  budesonide 160 MICROgram(s)/formoterol 4.5 MICROgram(s) Inhaler 2 Puff(s) Inhalation two times a day  cefepime   IVPB 1000 milliGRAM(s) IV Intermittent every 12 hours  chlorhexidine 4% Liquid 1 Application(s) Topical <User Schedule>  enoxaparin Injectable 40 milliGRAM(s) SubCutaneous daily  levothyroxine 100 MICROGram(s) Oral daily  metroNIDAZOLE  IVPB 500 milliGRAM(s) IV Intermittent every 8 hours  pantoprazole    Tablet 40 milliGRAM(s) Oral every 12 hours  sodium chloride 0.9%. 1000 milliLiter(s) IV Continuous <Continuous>  sucralfate 1 Gram(s) Oral four times a day  tiZANidine 8 milliGRAM(s) Oral three times a day    PRN MEDICATIONS  acetaminophen   Tablet .. 650 milliGRAM(s) Oral every 6 hours PRN    VITALS:   T(F): 99.8  HR: 90  BP: 101/52  RR: 18  SpO2: 98%    LABS:                        9.4    11.94 )-----------( 233      ( 21 Oct 2020 12:00 )             28.6     10-21    123<L>  |  87<L>  |  20  ----------------------------<  140<H>  3.7   |  23  |  1.1    Ca    6.7<L>      21 Oct 2020 16:32  Phos  4.0     10  Mg     0.9     10    TPro  5.7<L>  /  Alb  2.6<L>  /  TBili  0.6  /  DBili  x   /  AST  46<H>  /  ALT  23  /  AlkPhos  101  10-21    PT/INR - ( 21 Oct 2020 04:47 )   PT: 14.40 sec;   INR: 1.25 ratio         PTT - ( 21 Oct 2020 04:47 )  PTT:33.0 sec  Urinalysis Basic - ( 21 Oct 2020 04:15 )    Color: Yellow / Appearance: Slightly Turbid / S.011 / pH: x  Gluc: x / Ketone: Negative  / Bili: Negative / Urobili: <2 mg/dL   Blood: x / Protein: 30 mg/dL / Nitrite: Negative   Leuk Esterase: Large / RBC: 34 /HPF /  /HPF   Sq Epi: x / Non Sq Epi: 1 /HPF / Bacteria: Negative        Troponin T, Serum: 0.06 ng/mL <HH> (10-22-20 @ 01:08)  Creatine Kinase, Serum: 330 U/L <H> (10-21-20 @ 12:00)  Troponin T, Serum: 0.03 ng/mL <HH> (10-21-20 @ 12:00)      Culture - Blood (collected 20 Oct 2020 17:42)  Source: .Blood Blood-Peripheral  Preliminary Report (21 Oct 2020 22:07):    No growth to date.    Culture - Blood (collected 20 Oct 2020 17:42)  Source: .Blood Blood-Peripheral  Preliminary Report (21 Oct 2020 22:07):    No growth to date.      CARDIAC MARKERS ( 22 Oct 2020 01:08 )  x     / 0.06 ng/mL / x     / x     / x      CARDIAC MARKERS ( 21 Oct 2020 12:00 )  x     / 0.03 ng/mL / 330 U/L / x     / 8.8 ng/mL  CARDIAC MARKERS ( 20 Oct 2020 16:38 )  x     / 0.13 ng/mL / x     / x     / x          RADIOLOGY:    PHYSICAL EXAM:  GEN: No acute distress  LUNGS: Clear to auscultation bilaterally   HEART: Regular  ABD: Soft, non-tender, non-distended.  EXT: NC/NC/NE/2+PP/LING/Skin Intact.   NEURO: AAOX3    Intravenous access:   NG tube:   Bautista Catheter:        SUBJECTIVE:    Patient is a 55y old Female who presents with a chief complaint of Hypotension (21 Oct 2020 18:26)    Currently admitted to medicine with the primary diagnosis of Lethargy     Today is hospital day 2d. This morning she is resting comfortably in bed and reports no new issues or overnight events. Patient is feeling better, no longer has diarrhea.    PAST MEDICAL & SURGICAL HISTORY  Dextrocardia    Chronic midline low back pain with bilateral sciatica    Peripheral neuralgia    Essential hypertension    Diabetes 1.5, managed as type 2    Charcot&#x27;s arthropathy  R foot    Charcot foot due to diabetes mellitus    H/O shoulder surgery  Right shoulder surgery     delivery delivered      SOCIAL HISTORY:  Negative for smoking/alcohol/drug use.     ALLERGIES:  No Known Allergies    MEDICATIONS:  STANDING MEDICATIONS  albuterol/ipratropium for Nebulization 3 milliLiter(s) Nebulizer every 6 hours  aspirin enteric coated 81 milliGRAM(s) Oral daily  atorvastatin 40 milliGRAM(s) Oral at bedtime  budesonide 160 MICROgram(s)/formoterol 4.5 MICROgram(s) Inhaler 2 Puff(s) Inhalation two times a day  cefepime   IVPB 1000 milliGRAM(s) IV Intermittent every 12 hours  chlorhexidine 4% Liquid 1 Application(s) Topical <User Schedule>  enoxaparin Injectable 40 milliGRAM(s) SubCutaneous daily  levothyroxine 100 MICROGram(s) Oral daily  metroNIDAZOLE  IVPB 500 milliGRAM(s) IV Intermittent every 8 hours  pantoprazole    Tablet 40 milliGRAM(s) Oral every 12 hours  sodium chloride 0.9%. 1000 milliLiter(s) IV Continuous <Continuous>  sucralfate 1 Gram(s) Oral four times a day  tiZANidine 8 milliGRAM(s) Oral three times a day    PRN MEDICATIONS  acetaminophen   Tablet .. 650 milliGRAM(s) Oral every 6 hours PRN    VITALS:   T(F): 99.8  HR: 90  BP: 101/52  RR: 18  SpO2: 98%    LABS:                        9.4    11.94 )-----------( 233      ( 21 Oct 2020 12:00 )             28.6     10-21    123<L>  |  87<L>  |  20  ----------------------------<  140<H>  3.7   |  23  |  1.1    Ca    6.7<L>      21 Oct 2020 16:32  Phos  4.0     10-21  Mg     0.9     10-21    TPro  5.7<L>  /  Alb  2.6<L>  /  TBili  0.6  /  DBili  x   /  AST  46<H>  /  ALT  23  /  AlkPhos  101  10    PT/INR - ( 21 Oct 2020 04:47 )   PT: 14.40 sec;   INR: 1.25 ratio         PTT - ( 21 Oct 2020 04:47 )  PTT:33.0 sec  Urinalysis Basic - ( 21 Oct 2020 04:15 )    Color: Yellow / Appearance: Slightly Turbid / S.011 / pH: x  Gluc: x / Ketone: Negative  / Bili: Negative / Urobili: <2 mg/dL   Blood: x / Protein: 30 mg/dL / Nitrite: Negative   Leuk Esterase: Large / RBC: 34 /HPF /  /HPF   Sq Epi: x / Non Sq Epi: 1 /HPF / Bacteria: Negative        Troponin T, Serum: 0.06 ng/mL <HH> (10-22-20 @ 01:08)  Creatine Kinase, Serum: 330 U/L <H> (10-21-20 @ 12:00)  Troponin T, Serum: 0.03 ng/mL <HH> (10-21-20 @ 12:00)      Culture - Blood (collected 20 Oct 2020 17:42)  Source: .Blood Blood-Peripheral  Preliminary Report (21 Oct 2020 22:07):    No growth to date.    Culture - Blood (collected 20 Oct 2020 17:42)  Source: .Blood Blood-Peripheral  Preliminary Report (21 Oct 2020 22:07):    No growth to date.      CARDIAC MARKERS ( 22 Oct 2020 01:08 )  x     / 0.06 ng/mL / x     / x     / x      CARDIAC MARKERS ( 21 Oct 2020 12:00 )  x     / 0.03 ng/mL / 330 U/L / x     / 8.8 ng/mL  CARDIAC MARKERS ( 20 Oct 2020 16:38 )  x     / 0.13 ng/mL / x     / x     / x          RADIOLOGY:  CXR 10/21  Mild pulmonary vascular congestion, less prominent since the most recent radiograph.    Xray Rt. foot AP/LAT/OBLIQUIE 10/21  1. Status post Charcot reconstruction with lucency beginning along the cuboid calcaneal screw  2. Third metatarsal neck fracture appears subacute but new since prior  3. Hallux proximal phalangeal intra-articular head fracture, subacute but new since prior  4. Stable fifth metatarsal neck fracture deformity      PHYSICAL EXAM:  GEN: No acute distress  LUNGS: Clear to auscultation bilaterally   HEART: Regular S1&S2, no murmurs  ABD: Soft, non-tender, non-distended.  EXT: Rt toe discoloration  NEURO: AAOX3

## 2020-10-22 NOTE — CONSULT NOTE ADULT - SUBJECTIVE AND OBJECTIVE BOX
CALLY HARTMAN  55y, Female  Allergy: No Known Allergies      All historical available data reviewed.    HPI:   53 y/o female with Medical Hx of Type II DM,  CAD, DLD, HTN, Hypothyroidism, COPD, Dextrocardia,  Sciatica/Peripheral Neuralgia, Charcot foot deformity s/p reconstruction on 19 presenting for hypotension and lethargy.    Pt had a recent admission in 2020 for UGIB 2/2 duodenal ulcer, and JEEVAN w/ hyponatremia.  She reports that since discharge, she has been feeling generally weak, lethargic, and not like herself. She reports diarrhea since  (>6 episodes/ day) that she described as "watery". She denies any associated nausea/vomiting/abdominal pain or fevers. She has been having episodes of delirium/altered mental status at home, but is AAOx3 during interview. She takes percocet for pain, but only had 2 pills today. She has been taking lasix 40 QD without checking her blood pressure.   She denies angina, palpitations, dyspnea, melena, or bloody stools.     In the ED, Pt hypotensive to 66/43, HR66 T98.8 RR16 satting 91 % on RA. Pt received 1.5L LR in ED with improvement in her BP to 90's.  Labwork significant for Cr 2.3 (Baseline Cr 1 ), Na 117, Hb 9.7 (Baseline 13.5), La 3.9, T 0.13, BNP 21K.    Pt received Synthroid 200mcg IVP and solucortef 100mg, cefepime and vancomycin. admitted to MICU, feels better, want to eat, off pressors    ID called for UTI    FAMILY HISTORY:    PAST MEDICAL & SURGICAL HISTORY:  Dextrocardia    Chronic midline low back pain with bilateral sciatica    Peripheral neuralgia    Essential hypertension    Diabetes 1.5, managed as type 2    Charcot&#x27;s arthropathy  R foot    Charcot foot due to diabetes mellitus    H/O shoulder surgery  Right shoulder surgery     delivery delivered          VITALS:  T(F): 97.8, Max: 101 (10-22-20 @ 01:00)  HR: 86  BP: 116/61  RR: 18Vital Signs Last 24 Hrs  T(C): 36.6 (22 Oct 2020 07:14), Max: 38.3 (22 Oct 2020 01:00)  T(F): 97.8 (22 Oct 2020 07:14), Max: 101 (22 Oct 2020 01:00)  HR: 86 (22 Oct 2020 07:14) (68 - 104)  BP: 116/61 (22 Oct 2020 07:14) (90/59 - 120/60)  BP(mean): 88 (21 Oct 2020 19:00) (69 - 105)  RR: 18 (22 Oct 2020 07:14) (12 - 29)  SpO2: 98% (21 Oct 2020 19:00) (92% - 100%)    TESTS & MEASUREMENTS:                        8.9    9.73  )-----------( 225      ( 22 Oct 2020 06:52 )             27.5     10-    128<L>  |  90<L>  |  16  ----------------------------<  94  3.8   |  25  |  0.9    Ca    7.0<L>      22 Oct 2020 06:52  Phos  4.0     10-21  Mg     0.9     10-22    TPro  5.4<L>  /  Alb  2.6<L>  /  TBili  0.6  /  DBili  x   /  AST  34  /  ALT  22  /  AlkPhos  93  10    LIVER FUNCTIONS - ( 22 Oct 2020 06:52 )  Alb: 2.6 g/dL / Pro: 5.4 g/dL / ALK PHOS: 93 U/L / ALT: 22 U/L / AST: 34 U/L / GGT: x             Culture - Blood (collected 10-21-20 @ 00:31)  Source: .Blood Blood  Preliminary Report (10-22-20 @ 07:36):    No growth to date.    Culture - Blood (collected 10-20-20 @ 17:42)  Source: .Blood Blood-Peripheral  Preliminary Report (10-21-20 @ 22:07):    No growth to date.    Culture - Blood (collected 10-20-20 @ 17:42)  Source: .Blood Blood-Peripheral  Preliminary Report (10-21-20 @ 22:07):    No growth to date.      Urinalysis Basic - ( 21 Oct 2020 04:15 )    Color: Yellow / Appearance: Slightly Turbid / S.011 / pH: x  Gluc: x / Ketone: Negative  / Bili: Negative / Urobili: <2 mg/dL   Blood: x / Protein: 30 mg/dL / Nitrite: Negative   Leuk Esterase: Large / RBC: 34 /HPF /  /HPF   Sq Epi: x / Non Sq Epi: 1 /HPF / Bacteria: Negative          RADIOLOGY & ADDITIONAL TESTS:  Personal review of radiological diagnostics performed  Echo and EKG results noted when applicable.     MEDICATIONS:  acetaminophen   Tablet .. 650 milliGRAM(s) Oral every 6 hours PRN  albuterol/ipratropium for Nebulization 3 milliLiter(s) Nebulizer every 6 hours  aspirin enteric coated 81 milliGRAM(s) Oral daily  atorvastatin 40 milliGRAM(s) Oral at bedtime  budesonide 160 MICROgram(s)/formoterol 4.5 MICROgram(s) Inhaler 2 Puff(s) Inhalation two times a day  cefepime   IVPB 1000 milliGRAM(s) IV Intermittent every 12 hours  chlorhexidine 4% Liquid 1 Application(s) Topical <User Schedule>  enoxaparin Injectable 40 milliGRAM(s) SubCutaneous daily  levothyroxine 100 MICROGram(s) Oral daily  metroNIDAZOLE  IVPB 500 milliGRAM(s) IV Intermittent every 8 hours  pantoprazole    Tablet 40 milliGRAM(s) Oral every 12 hours  sodium chloride 0.9%. 1000 milliLiter(s) IV Continuous <Continuous>  sucralfate 1 Gram(s) Oral four times a day  tiZANidine 8 milliGRAM(s) Oral three times a day      ANTIBIOTICS:  cefepime   IVPB 1000 milliGRAM(s) IV Intermittent every 12 hours  metroNIDAZOLE  IVPB 500 milliGRAM(s) IV Intermittent every 8 hours

## 2020-10-22 NOTE — CONSULT NOTE ADULT - ASSESSMENT
53 y/o female with PMHx of Type II DM, CAD, hyperlipidemia, HTN, Hypothyroidism, COPD, Dextrocardia, Sciatica/Peripheral Neuralgia, and Charcot foot deformity s/p reconstruction on 1/11/19 presented for hypotension and lethargy. Admitted to MICU with sepsis, JEEVAN, and hyponatremia. Currently on cefepime and metronidazole for treatment of sepsis. NS at 75 cc/hr for JEEVAN and hyponatremia likely secondary to hypovolemia secondary to diarrhea. GI consulted for acute drop in Hb, as well as loose, watery stools. Per GI, no endoscopic intervention at this point. Hb currently stable. Patient is stable for downgrade to medical floors.     IMPRESSION;  Resolved sepsis secondary to acute pyelonephritis  Presently has only polyuria  No ongoing GI complaints  No PNA  BCx 10/20,21 NGTD  NO UCx    RECOMMENDATIONS;  UCx  D/c flagyl   cefepime 2 gm iv q12h

## 2020-10-22 NOTE — PROGRESS NOTE ADULT - SUBJECTIVE AND OBJECTIVE BOX
Patient is a 55y old  Female who presents with a chief complaint of Hypotension (22 Oct 2020 11:32)    HPI:   53 y/o female with Medical Hx of Type II DM,  CAD, DLD, HTN, Hypothyroidism, COPD, Dextrocardia,  Sciatica/Peripheral Neuralgia, Charcot foot deformity s/p reconstruction on 19 presenting for hypotension and lethargy.  Pt had a recent admission in 2020 for UGIB 2/2 duodenal ulcer, and JEEVAN w/ hyponatremia.  She reports that since discharge, she has been feeling generally weak, lethargic, and not like herself. She reports diarrhea since  (>6 episodes/ day) that she described as "watery". She denies any associated nausea/vomiting/abdominal pain or fevers. She has been having episodes of delirium/altered mental status at home, but is AAOx3 during interview. She takes percocet for pain, but only had 2 pills today. She has been taking lasix 40 QD without checking her blood pressure.   She denies angina, palpitations, dyspnea, melena, or bloody stools.   In the ED, Pt hypotensive to 66/43, HR66 T98.8 RR16 satting 91 % on RA. Pt received 1.5L LR in ED with improvement in her BP to 90's.  Labwork significant for Cr 2.3 (Baseline Cr 1 ), Na 117, Hb 9.7 (Baseline 13.5), La 3.9, T 0.13, BNP 21K.  Pt received Synthroid 200mcg IVP and solucortef 100mg, cefepime and vancomycin. admitted to MICU, feels better, want to eat, off pressors   (20 Oct 2020 20:23)    PAST MEDICAL & SURGICAL HISTORY:  Dextrocardia  Chronic midline low back pain with bilateral sciatica  Peripheral neuralgia  Essential hypertension  Diabetes 1.5, managed as type 2  Charcot&#x27;s arthropathy  R foot  Charcot foot due to diabetes mellitus  H/O shoulder surgery  Right shoulder surgery   delivery delivered    patient seen and examined independently on morning rounds for the first time today, chart reviewed and discussed with the medicine resident.    overnight patient transferred out of the ICU to the regular floor- febrile 101 overnight- denies any chest pain, sob or diarrhea.      PE:  GEN-NAD, AAOx3  PULM- , fair air entry  CVS- +s1/s2 RRR no murmurs  GI- soft NT ND +bs, no rebound, no guarding  EXT- no edema      Vital Signs Last 24 Hrs  T(C): 36.6 (22 Oct 2020 07:14), Max: 38.3 (22 Oct 2020 01:00)  T(F): 97.8 (22 Oct 2020 07:14), Max: 101 (22 Oct 2020 01:00)  HR: 86 (22 Oct 2020 07:14) (68 - 104)  BP: 116/61 (22 Oct 2020 07:14) (96/57 - 120/60)  BP(mean): 88 (21 Oct 2020 19:00) (72 - 105)  RR: 18 (22 Oct 2020 07:14) (12 - 18)  SpO2: 98% (21 Oct 2020 19:00) (92% - 100%)                          8.9    9.73  )-----------( 225      ( 22 Oct 2020 06:52 )             27.5     10-22    128<L>  |  90<L>  |  16  ----------------------------<  94  3.8   |  25  |  0.9    Ca    7.0<L>      22 Oct 2020 06:52  Phos  4.0     10-21  Mg     0.9     10-22    TPro  5.4<L>  /  Alb  2.6<L>  /  TBili  0.6  /  DBili  x   /  AST  34  /  ALT  22  /  AlkPhos  93  10-22    CARDIAC MARKERS ( 22 Oct 2020 06:52 )  x     / 0.05 ng/mL / x     / x     / x      CARDIAC MARKERS ( 22 Oct 2020 01:08 )  x     / 0.06 ng/mL / x     / x     / x      CARDIAC MARKERS ( 21 Oct 2020 12:00 )  x     / 0.03 ng/mL / 330 U/L / x     / 8.8 ng/mL  CARDIAC MARKERS ( 20 Oct 2020 16:38 )  x     / 0.13 ng/mL / x     / x     / x          Urinalysis Basic - ( 21 Oct 2020 04:15 )    Color: Yellow / Appearance: Slightly Turbid / S.011 / pH: x  Gluc: x / Ketone: Negative  / Bili: Negative / Urobili: <2 mg/dL   Blood: x / Protein: 30 mg/dL / Nitrite: Negative   Leuk Esterase: Large / RBC: 34 /HPF /  /HPF   Sq Epi: x / Non Sq Epi: 1 /HPF / Bacteria: Negative        Culture - Blood (collected 21 Oct 2020 00:31)  Source: .Blood Blood  Preliminary Report (22 Oct 2020 07:36):    No growth to date.    Culture - Blood (collected 20 Oct 2020 17:42)  Source: .Blood Blood-Peripheral  Preliminary Report (21 Oct 2020 22:07):    No growth to date.    Culture - Blood (collected 20 Oct 2020 17:42)  Source: .Blood Blood-Peripheral  Preliminary Report (21 Oct 2020 22:07):    No growth to date.        MEDICATIONS  (STANDING):  albuterol/ipratropium for Nebulization 3 milliLiter(s) Nebulizer every 6 hours  aspirin enteric coated 81 milliGRAM(s) Oral daily  atorvastatin 40 milliGRAM(s) Oral at bedtime  budesonide 160 MICROgram(s)/formoterol 4.5 MICROgram(s) Inhaler 2 Puff(s) Inhalation two times a day  cefepime   IVPB 2000 milliGRAM(s) IV Intermittent every 12 hours  chlorhexidine 4% Liquid 1 Application(s) Topical <User Schedule>  enoxaparin Injectable 40 milliGRAM(s) SubCutaneous daily  levothyroxine 100 MICROGram(s) Oral daily  magnesium sulfate  IVPB 2 Gram(s) IV Intermittent every 2 hours  pantoprazole    Tablet 40 milliGRAM(s) Oral every 12 hours  sodium chloride 0.9%. 1000 milliLiter(s) (75 mL/Hr) IV Continuous <Continuous>  sucralfate 1 Gram(s) Oral four times a day  tiZANidine 8 milliGRAM(s) Oral three times a day

## 2020-10-22 NOTE — PROGRESS NOTE ADULT - ASSESSMENT
a/p:     a/p:    #Sepsis present on admission- 2/2 UTI  -downgraded from ICU to reg med floor  -bcx negative to date  -UA+---f/u Ucx (send today)  -monitor wbc and fever curve  -cont iv abx (cefepime/flagyl)  -ID following  -cont ivf  -check esr/crp    #JEEVAN with hyponatremia-hypomagnesemia---2/2 hypovolemia in setting of septic shock on admission  -monitor electrolytes closely  -Na 128 now (was 117 on admission)  -cr slowly improving  -suppl Mg and repeat bmp    #trop bump 2/2 demand ischemia  -will repeat another ekg and trop today to monitor trending down  -no chest pain or sob    #DVT/GI ppx  guarded prognosis  FULL CODE

## 2020-10-22 NOTE — PROGRESS NOTE ADULT - ASSESSMENT
53 y/o female with PMHx of Type II DM, CAD, hyperlipidemia, HTN, Hypothyroidism, COPD, Dextrocardia, Sciatica/Peripheral Neuralgia, and Charcot foot deformity s/p reconstruction on 1/11/19 presented for hypotension and lethargy. Admitted to MICU with sepsis, JEEVAN, and hyponatremia. Currently on cefepime and metronidazole for treatment of sepsis. NS at 75 cc/hr for JEEVAN and hyponatremia likely secondary to hypovolemia secondary to diarrhea. GI consulted for acute drop in Hb, as well as loose, watery stools. Per GI, no endoscopic intervention at this point. Hb currently stable. Patient is stable for downgrade to medical floors.     #Sepsis present on admission  #Hypotension  - No longer hypotensive. Not on pressors.  - C/w cefepime and metronidazole  - F/u blood cultures  - F/u urine cultures  - F/u procal  - F/u COVID-19 antibodies  - F/u serum free cortisol  - Hold metoprolol  - F/u ID    #JEEVAN w/out hyperkalemia or acidosis   - Likely pre-renal secondary to diarrhea + overdiuresis  - C/w NS at 75 cc/hr  - Hold Lasix  - Baseline Cr 1  - Renal U/S negative  - Consider urine Pr:Cr ratio, urine chloride     #Hyponatremia  - Likely secondary to Hypovolemia vs hypothyroidism vs less likely SIADH (pt on SSRI/Lyrica)  - C/w NS at 75 cc/hr  - Monitor BMP  - Hold Lyrica/SSRI    #Hx of GIB secondary to duodenal ulcer s/p EGD (clean based ulcer without intervention)  - Per GI, no endoscopic intervention at this time  - C/w sucralfate qid and Protonix BID  - Repeat EGD as outpatient within 1 month of discharge, per GI ([phone])    #Acute Anemia  - Hb stable since admission  - GI recs seen and appreciated    #PAD  - Arterial duplex negative in b/l LE    #COPD  - C/w Symbicort and duonebs  - BiPAP PRN     #Hypothyroidism  - TSH, free T4 reviewed  - C/w levothyroxine    #High lactate  - Improving    #Candida esophagitis s/p treatment  - GI will assess on repeat EGD    #Hyperlipidemia  - C/w atorvastatin    #Misc  Diet: mechanical soft, consistent carbs, DASH/TLC, lactose-free  GI PPx: Protonix, sucralfate  DVT PPx: Lovenox  Activity: as tolerated  Dispo: downgrade to medical floors     53 y/o female with PMHx of Type II DM, CAD, hyperlipidemia, HTN, Hypothyroidism, COPD, Dextrocardia, Sciatica/Peripheral Neuralgia, and Charcot foot deformity s/p reconstruction on 1/11/19, recent admission in september 2020 for UGIB, presented to ED after LOC witnessed by her daughter. Pt has been having diarrhea prior to presentation. In ED, BP was 66/46, Na 117, Cr. 2.3. She received 200 mg synthroid IVP, solucortef 100 mg IVP, cefepime and vanco and she was admitted to MICU for sepsis, JEEVAN, and hyponatremia.     # Sepsis 2/2 likely acute pyelonephritis  - Resolved. Pt never required pressors  - Febrile 101 overnight 10/22. Asymptomatic. (diarrhea resolved)  - UA 10/20: LE, WBCs 431 and RBCs 34  - Leucocytosis on admission WBCs 20.65. Resolved WBCs 10/22 9.73  - CXR 10/20 negative. COVID negative  - Bcx 10/20 & 10/21 NGTD  - procal 10/20 1.43. Lactate 3.1 on admission, trended down to 2.1  - Initially started on cefepime 1g q24 and flagyl 500 mg q8 then ID recommended d/c flagyl and increase cefepime dose  - c/w cefepime 2g q12  - F/u Ucx    # JEEVAN likely pre-renal (2/2 hypovolemia from diarrhea and diuresis)  - Resolved  - Baseline Cr. 1.0, Cr. on admission 2.3, today 10/22 trending down 0.9  - c/w NS at 75 cc/hr, will d/c fluid 10/23  - Holding Lasix  - Renal U/S 10/21 negative    # Hyponatremia  - Improving. Na on admission 117, today 10/22 128  - Likely secondary to Hypovolemia vs hypothyroidism vs less likely SIADH (pt on SSRI/Lyrica)  - C/w NS at 75 cc/hr  - Holding Lyrica/SSRI  - Monitor BMP, repeat at 8:00 pm     # Troponinemia  - Troponin on admission 0.13  - levels fluctuating, last level 10/22 in am 0.05  - f/u 8:00 pm troponin    # Acute anemia  - H&H stable, pt is hemodynamically stable  - Hb on admission 9.4 (a drop from 11 on 10/2)  - Hx of GIB secondary to duodenal ulcer s/p EGD in September (clean based ulcer without intervention)  - GI consulted this admission, recs: no need for EGD at this time, repeat EGD as outpatient within 1 month of discharge ([phone])  - c/w sucralfate qid and Protonix BID    # PAD  - Rt 1st toe discoloration on admission  - Arterial duplex negative in b/l LE  - Vascular recs: no intervention    # COPD  - C/w Symbicort and duonebs  - BiPAP PRN     # Hypothyroidism  - TSH 2.51 on 10/20, repeat 0.76 on 10/21  - s/p 200 mg synthroid IVP in ED  - C/w levothyroxine 100 mg daily    # Candida esophagitis s/p treatment  - GI will assess on repeat EGD    # Hyperlipidemia  - C/w atorvastatin    # Misc  DVT ppx: lovenox  GI ppx: protonix  Diet: mechanical soft, consistent carbs, DASH/TLC, lactose-free  Activity: IAT  Dispo: Acute

## 2020-10-23 ENCOUNTER — TRANSCRIPTION ENCOUNTER (OUTPATIENT)
Age: 55
End: 2020-10-23

## 2020-10-23 VITALS
DIASTOLIC BLOOD PRESSURE: 60 MMHG | RESPIRATION RATE: 18 BRPM | SYSTOLIC BLOOD PRESSURE: 115 MMHG | HEART RATE: 95 BPM | OXYGEN SATURATION: 94 % | TEMPERATURE: 98 F

## 2020-10-23 DIAGNOSIS — E78.5 HYPERLIPIDEMIA, UNSPECIFIED: ICD-10-CM

## 2020-10-23 DIAGNOSIS — J44.9 CHRONIC OBSTRUCTIVE PULMONARY DISEASE, UNSPECIFIED: ICD-10-CM

## 2020-10-23 DIAGNOSIS — I10 ESSENTIAL (PRIMARY) HYPERTENSION: ICD-10-CM

## 2020-10-23 LAB
ANION GAP SERPL CALC-SCNC: 14 MMOL/L — SIGNIFICANT CHANGE UP (ref 7–14)
BASOPHILS # BLD AUTO: 0.02 K/UL — SIGNIFICANT CHANGE UP (ref 0–0.2)
BASOPHILS NFR BLD AUTO: 0.2 % — SIGNIFICANT CHANGE UP (ref 0–1)
BUN SERPL-MCNC: 7 MG/DL — LOW (ref 10–20)
CALCIUM SERPL-MCNC: 7.3 MG/DL — LOW (ref 8.5–10.1)
CHLORIDE SERPL-SCNC: 92 MMOL/L — LOW (ref 98–110)
CO2 SERPL-SCNC: 24 MMOL/L — SIGNIFICANT CHANGE UP (ref 17–32)
CREAT SERPL-MCNC: 0.6 MG/DL — LOW (ref 0.7–1.5)
EOSINOPHIL # BLD AUTO: 0.14 K/UL — SIGNIFICANT CHANGE UP (ref 0–0.7)
EOSINOPHIL NFR BLD AUTO: 1.7 % — SIGNIFICANT CHANGE UP (ref 0–8)
GLUCOSE SERPL-MCNC: 143 MG/DL — HIGH (ref 70–99)
HCT VFR BLD CALC: 28.3 % — LOW (ref 37–47)
HGB BLD-MCNC: 9 G/DL — LOW (ref 12–16)
IMM GRANULOCYTES NFR BLD AUTO: 0.7 % — HIGH (ref 0.1–0.3)
LYMPHOCYTES # BLD AUTO: 0.44 K/UL — LOW (ref 1.2–3.4)
LYMPHOCYTES # BLD AUTO: 5.3 % — LOW (ref 20.5–51.1)
MAGNESIUM SERPL-MCNC: 1.6 MG/DL — LOW (ref 1.8–2.4)
MCHC RBC-ENTMCNC: 27.1 PG — SIGNIFICANT CHANGE UP (ref 27–31)
MCHC RBC-ENTMCNC: 31.8 G/DL — LOW (ref 32–37)
MCV RBC AUTO: 85.2 FL — SIGNIFICANT CHANGE UP (ref 81–99)
MONOCYTES # BLD AUTO: 0.69 K/UL — HIGH (ref 0.1–0.6)
MONOCYTES NFR BLD AUTO: 8.3 % — SIGNIFICANT CHANGE UP (ref 1.7–9.3)
NEUTROPHILS # BLD AUTO: 6.93 K/UL — HIGH (ref 1.4–6.5)
NEUTROPHILS NFR BLD AUTO: 83.8 % — HIGH (ref 42.2–75.2)
NRBC # BLD: 0 /100 WBCS — SIGNIFICANT CHANGE UP (ref 0–0)
PLATELET # BLD AUTO: 211 K/UL — SIGNIFICANT CHANGE UP (ref 130–400)
POTASSIUM SERPL-MCNC: 3.4 MMOL/L — LOW (ref 3.5–5)
POTASSIUM SERPL-SCNC: 3.4 MMOL/L — LOW (ref 3.5–5)
RBC # BLD: 3.32 M/UL — LOW (ref 4.2–5.4)
RBC # FLD: 18.3 % — HIGH (ref 11.5–14.5)
SODIUM SERPL-SCNC: 130 MMOL/L — LOW (ref 135–146)
TROPONIN T SERPL-MCNC: 0.04 NG/ML — CRITICAL HIGH
WBC # BLD: 8.28 K/UL — SIGNIFICANT CHANGE UP (ref 4.8–10.8)
WBC # FLD AUTO: 8.28 K/UL — SIGNIFICANT CHANGE UP (ref 4.8–10.8)

## 2020-10-23 PROCEDURE — 93010 ELECTROCARDIOGRAM REPORT: CPT

## 2020-10-23 PROCEDURE — 99239 HOSP IP/OBS DSCHRG MGMT >30: CPT

## 2020-10-23 PROCEDURE — 71045 X-RAY EXAM CHEST 1 VIEW: CPT | Mod: 26

## 2020-10-23 RX ORDER — MAGNESIUM SULFATE 500 MG/ML
2 VIAL (ML) INJECTION ONCE
Refills: 0 | Status: COMPLETED | OUTPATIENT
Start: 2020-10-23 | End: 2020-10-23

## 2020-10-23 RX ORDER — POTASSIUM CHLORIDE 20 MEQ
40 PACKET (EA) ORAL ONCE
Refills: 0 | Status: COMPLETED | OUTPATIENT
Start: 2020-10-23 | End: 2020-10-23

## 2020-10-23 RX ORDER — POTASSIUM CHLORIDE 20 MEQ
20 PACKET (EA) ORAL ONCE
Refills: 0 | Status: COMPLETED | OUTPATIENT
Start: 2020-10-23 | End: 2020-10-23

## 2020-10-23 RX ORDER — CEFPODOXIME PROXETIL 100 MG
1 TABLET ORAL
Qty: 16 | Refills: 0
Start: 2020-10-23 | End: 2020-10-30

## 2020-10-23 RX ORDER — DULOXETINE HYDROCHLORIDE 30 MG/1
1 CAPSULE, DELAYED RELEASE ORAL
Qty: 0 | Refills: 0 | DISCHARGE

## 2020-10-23 RX ADMIN — TIZANIDINE 8 MILLIGRAM(S): 4 TABLET ORAL at 05:10

## 2020-10-23 RX ADMIN — Medication 1 GRAM(S): at 00:27

## 2020-10-23 RX ADMIN — PANTOPRAZOLE SODIUM 40 MILLIGRAM(S): 20 TABLET, DELAYED RELEASE ORAL at 05:10

## 2020-10-23 RX ADMIN — Medication 100 MICROGRAM(S): at 05:10

## 2020-10-23 RX ADMIN — Medication 1 GRAM(S): at 05:10

## 2020-10-23 RX ADMIN — ENOXAPARIN SODIUM 40 MILLIGRAM(S): 100 INJECTION SUBCUTANEOUS at 12:05

## 2020-10-23 RX ADMIN — Medication 81 MILLIGRAM(S): at 12:05

## 2020-10-23 RX ADMIN — BUDESONIDE AND FORMOTEROL FUMARATE DIHYDRATE 2 PUFF(S): 160; 4.5 AEROSOL RESPIRATORY (INHALATION) at 08:00

## 2020-10-23 RX ADMIN — CHLORHEXIDINE GLUCONATE 1 APPLICATION(S): 213 SOLUTION TOPICAL at 05:10

## 2020-10-23 RX ADMIN — Medication 40 MILLIEQUIVALENT(S): at 12:05

## 2020-10-23 RX ADMIN — CEFEPIME 100 MILLIGRAM(S): 1 INJECTION, POWDER, FOR SOLUTION INTRAMUSCULAR; INTRAVENOUS at 05:10

## 2020-10-23 NOTE — PROVIDER CONTACT NOTE (OTHER) - BACKGROUND
assessed IV which is patent and pt denies any burning when RN flushed IV. RN offered to slow rate of magnesium down and pt still refused. RN explained importance of both IV Potassium and Mag

## 2020-10-23 NOTE — DISCHARGE NOTE NURSING/CASE MANAGEMENT/SOCIAL WORK - PATIENT PORTAL LINK FT
You can access the FollowMyHealth Patient Portal offered by [facility] by registering at the following website: [email or web address]. By joining [facility]’s FollowMyHealth portal, you will also be able to view your health information using other applications (apps) compatible with our system.

## 2020-10-23 NOTE — DISCHARGE NOTE PROVIDER - NSDCCPCAREPLAN_GEN_ALL_CORE_FT
PRINCIPAL DISCHARGE DIAGNOSIS  Diagnosis: Lethargy  Assessment and Plan of Treatment:       SECONDARY DISCHARGE DIAGNOSES  Diagnosis: JEEVAN (acute kidney injury)  Assessment and Plan of Treatment:     Diagnosis: Anemia  Assessment and Plan of Treatment:     Diagnosis: Hyponatremia  Assessment and Plan of Treatment:     Diagnosis: Sepsis  Assessment and Plan of Treatment:      PRINCIPAL DISCHARGE DIAGNOSIS  Diagnosis: Severe sepsis  Assessment and Plan of Treatment: You came in after you lost consiousness at your home. In the emergency department your blood pressure was very low, you had a fever. You were initially admitted to ICU. This was likely due to an infection in your urinary system. You were given fluid and you were treated with intravenous antibiotics. You will be discharged on oral antibiotics for 8 days. Please take them as prescribed and follow up with primary care doctor.      SECONDARY DISCHARGE DIAGNOSES  Diagnosis: Chronic hyponatremia  Assessment and Plan of Treatment: Your sodium levels were low when you came in. It seems to be that they are usually low. This is likely because of your depression medications and the lyrica. You were taken off these medications during your stay and you were treated with fluids that improvd your levels. Please follow up with your primary care provider    Diagnosis: JEEVAN (acute kidney injury)  Assessment and Plan of Treatment: Your kidney was affected likely secondary to dehydration and it improved back to normal after receiving fluids    Diagnosis: Anemia  Assessment and Plan of Treatment: When you came in your hemoglobin was lower than the previous level that we had. Given that you have a history of bleeding from the ulcer in your stomach, you were evaluated by gastroenterology and they did not recommend any scopes during this time. Please follow up with gastroenterologist as outpatient.

## 2020-10-23 NOTE — PHYSICAL THERAPY INITIAL EVALUATION ADULT - GENERAL OBSERVATIONS, REHAB EVAL
Chart reviewed. PT eval 10:35-10:50am. pt seen sitting at EOB. In NAD. pt willing to participate in PT session.

## 2020-10-23 NOTE — PROGRESS NOTE ADULT - SUBJECTIVE AND OBJECTIVE BOX
SUBJECTIVE:    Patient is a 55y old Female who presents with a chief complaint of Hypotension (22 Oct 2020 15:29)    Currently admitted to medicine with the primary diagnosis of Lethargy       Today is hospital day 3d. This morning she is resting comfortably in bed and reports no new issues or overnight events.     PAST MEDICAL & SURGICAL HISTORY  Dextrocardia    Chronic midline low back pain with bilateral sciatica    Peripheral neuralgia    Essential hypertension    Diabetes 1.5, managed as type 2    Charcot&#x27;s arthropathy  R foot    Charcot foot due to diabetes mellitus    H/O shoulder surgery  Right shoulder surgery     delivery delivered      SOCIAL HISTORY:  Negative for smoking/alcohol/drug use.     ALLERGIES:  No Known Allergies    MEDICATIONS:  STANDING MEDICATIONS  aspirin enteric coated 81 milliGRAM(s) Oral daily  atorvastatin 40 milliGRAM(s) Oral at bedtime  budesonide 160 MICROgram(s)/formoterol 4.5 MICROgram(s) Inhaler 2 Puff(s) Inhalation two times a day  cefepime   IVPB 2000 milliGRAM(s) IV Intermittent every 12 hours  chlorhexidine 4% Liquid 1 Application(s) Topical <User Schedule>  enoxaparin Injectable 40 milliGRAM(s) SubCutaneous daily  levothyroxine 100 MICROGram(s) Oral daily  pantoprazole    Tablet 40 milliGRAM(s) Oral every 12 hours  sucralfate 1 Gram(s) Oral four times a day  tiZANidine 8 milliGRAM(s) Oral three times a day    PRN MEDICATIONS  acetaminophen   Tablet .. 650 milliGRAM(s) Oral every 6 hours PRN  ALBUTerol    90 MICROgram(s) HFA Inhaler 2 Puff(s) Inhalation every 6 hours PRN    VITALS:   T(F): 100.1  HR: 97  BP: 109/59  RR: 18  SpO2: 92%    LABS:                        8.9    9.73  )-----------( 225      ( 22 Oct 2020 06:52 )             27.5     10-22    130<L>  |  93<L>  |  9<L>  ----------------------------<  110<H>  3.5   |  21  |  0.7    Ca    7.0<L>      22 Oct 2020 21:24  Mg     0.9     10-22    TPro  5.4<L>  /  Alb  2.6<L>  /  TBili  0.6  /  DBili  x   /  AST  34  /  ALT  22  /  AlkPhos  93  10-22          Troponin T, Serum: 0.05 ng/mL <HH> (10-22-20 @ 21:24)      Culture - Blood (collected 21 Oct 2020 00:31)  Source: .Blood Blood  Preliminary Report (22 Oct 2020 07:36):    No growth to date.    Culture - Blood (collected 20 Oct 2020 17:42)  Source: .Blood Blood-Peripheral  Preliminary Report (21 Oct 2020 22:07):    No growth to date.    Culture - Blood (collected 20 Oct 2020 17:42)  Source: .Blood Blood-Peripheral  Preliminary Report (21 Oct 2020 22:07):    No growth to date.      CARDIAC MARKERS ( 22 Oct 2020 21:24 )  x     / 0.05 ng/mL / x     / x     / x      CARDIAC MARKERS ( 22 Oct 2020 06:52 )  x     / 0.05 ng/mL / x     / x     / x      CARDIAC MARKERS ( 22 Oct 2020 01:08 )  x     / 0.06 ng/mL / x     / x     / x      CARDIAC MARKERS ( 21 Oct 2020 12:00 )  x     / 0.03 ng/mL / 330 U/L / x     / 8.8 ng/mL      RADIOLOGY:    PHYSICAL EXAM:  GEN: No acute distress  LUNGS: Clear to auscultation bilaterally   HEART: Regular  ABD: Soft, non-tender, non-distended.  EXT: NC/NC/NE/2+PP/LING/Skin Intact.   NEURO: AAOX3    Intravenous access:   NG tube:   Bautista Catheter:        SUBJECTIVE:    Patient is a 55y old Female who presents with a chief complaint of Hypotension (22 Oct 2020 15:29)    Currently admitted to medicine with the primary diagnosis of Lethargy     Today is hospital day 3d. This morning she is resting comfortably in bed and reports no new issues. Patient wants to go home. She was febrile overnight 101 and again this am 100.1. Patient does not have active syptoms    PAST MEDICAL & SURGICAL HISTORY  Dextrocardia    Chronic midline low back pain with bilateral sciatica    Peripheral neuralgia    Essential hypertension    Diabetes 1.5, managed as type 2    Charcot&#x27;s arthropathy  R foot    Charcot foot due to diabetes mellitus    H/O shoulder surgery  Right shoulder surgery     delivery delivered      SOCIAL HISTORY:  Negative for smoking/alcohol/drug use.     ALLERGIES:  No Known Allergies    MEDICATIONS:  STANDING MEDICATIONS  aspirin enteric coated 81 milliGRAM(s) Oral daily  atorvastatin 40 milliGRAM(s) Oral at bedtime  budesonide 160 MICROgram(s)/formoterol 4.5 MICROgram(s) Inhaler 2 Puff(s) Inhalation two times a day  cefepime   IVPB 2000 milliGRAM(s) IV Intermittent every 12 hours  chlorhexidine 4% Liquid 1 Application(s) Topical <User Schedule>  enoxaparin Injectable 40 milliGRAM(s) SubCutaneous daily  levothyroxine 100 MICROGram(s) Oral daily  pantoprazole    Tablet 40 milliGRAM(s) Oral every 12 hours  sucralfate 1 Gram(s) Oral four times a day  tiZANidine 8 milliGRAM(s) Oral three times a day    PRN MEDICATIONS  acetaminophen   Tablet .. 650 milliGRAM(s) Oral every 6 hours PRN  ALBUTerol    90 MICROgram(s) HFA Inhaler 2 Puff(s) Inhalation every 6 hours PRN    VITALS:   T(F): 100.1  HR: 97  BP: 109/59  RR: 18  SpO2: 92%    LABS:                        8.9    9.73  )-----------( 225      ( 22 Oct 2020 06:52 )             27.5     10-22    130<L>  |  93<L>  |  9<L>  ----------------------------<  110<H>  3.5   |  21  |  0.7    Ca    7.0<L>      22 Oct 2020 21:24  Mg     0.9     10-22    TPro  5.4<L>  /  Alb  2.6<L>  /  TBili  0.6  /  DBili  x   /  AST  34  /  ALT  22  /  AlkPhos  93  10-22          Troponin T, Serum: 0.05 ng/mL <HH> (10-22-20 @ 21:24)      Culture - Blood (collected 21 Oct 2020 00:31)  Source: .Blood Blood  Preliminary Report (22 Oct 2020 07:36):    No growth to date.    Culture - Blood (collected 20 Oct 2020 17:42)  Source: .Blood Blood-Peripheral  Preliminary Report (21 Oct 2020 22:07):    No growth to date.    Culture - Blood (collected 20 Oct 2020 17:42)  Source: .Blood Blood-Peripheral  Preliminary Report (21 Oct 2020 22:):    No growth to date.      CARDIAC MARKERS ( 22 Oct 2020 21:24 )  x     / 0.05 ng/mL / x     / x     / x      CARDIAC MARKERS ( 22 Oct 2020 06:52 )  x     / 0.05 ng/mL / x     / x     / x      CARDIAC MARKERS ( 22 Oct 2020 01:08 )  x     / 0.06 ng/mL / x     / x     / x      CARDIAC MARKERS ( 21 Oct 2020 12:00 )  x     / 0.03 ng/mL / 330 U/L / x     / 8.8 ng/mL      RADIOLOGY:  Renal & bladder US 10/2  Normal renal ultrasound.    PHYSICAL EXAM:  GEN: No acute distress  LUNGS: Clear to auscultation bilaterally   HEART: Regular  ABD: Soft, non-tender, non-distended.  EXT: NC/NC/NE/2+PP/LING/Skin Intact.   NEURO: AAOX3

## 2020-10-23 NOTE — DISCHARGE NOTE PROVIDER - HOSPITAL COURSE
53 y/o female with PMHx of Type II DM, CAD, hyperlipidemia, HTN, Hypothyroidism, COPD, Dextrocardia, Sciatica/Peripheral Neuralgia, and Charcot foot deformity s/p reconstruction on 1/11/19, recent admission in September 2020 for UGIB, presented to ED after LOC witnessed by her daughter. Pt has been having diarrhea prior to presentation (6 episodes).   In ED, BP was 66/46, Na 117, Cr. 2.3, lactate 2.9, WBCs 20.65, trops 0.13, BNP 21K. UA positive for LE and WBCs 431. Negative CXR and EKG. She received 1.5 L of LR with improvement of her BP to 90s, 200 mg synthroid IVP, solucortef 100 mg IVP, cefepime and vanco and she was admitted to MICU for sepsis 2/2 acute pyelonephritis, JEEVAN, and hyponatremia. Patient never required pressors and was not intubated.  She was initially started on cefepime 1g q24 and flagyl 500 mg q8 then ID recommended d/c flagyl and increase cefepime dose to 2g q12. 2 negative Bcx on 10/20 and 10/21. UCx sent on 10/21 will likely be negative since patient was already started on Abx.  She will be discharged on PO vantin 200 mg q12 for 8 days.    Patient's JEEVAN was likely pre-renal (2/2 hypovolemia from diarrhea and diuresis) and resolved, her home lasix was held during her stay but will resume on discharge. Renal U/S 10/21 negative    Patient's hyponatremia continued to improve on NS, her home lyrica and SSRI were held (fear of SIADH). 117 on admission.  It was likely secondary to Hypovolemia vs hypothyroidism vs less likely SIADH (pt on SSRI/Lyrica)     Patient had troponin on admission 0.13 trended down to 0.04, no chest pain and negative EKG, was likely demand ischemia    Patient had acute anemia Hb on admission 9.4 (a drop from 11 on 10/2) GI consulted this admission, recs: no need for EGD at this time, repeat EGD as outpatient within 1 month of discharge ([phone])  - Hx of GIB secondary to duodenal ulcer s/p EGD in September (clean based ulcer without intervention)    Patient had Rt 1st toe discoloration on admission Arterial duplex negative in b/l LE. Vascular consulted and recs: no intervention.  Bilateral LE Venous duplex was also negative (was done for swelling) Echo from 9/2020 EF 72%       55 y/o female with PMHx of Type II DM, CAD, hyperlipidemia, HTN, Hypothyroidism, COPD, Dextrocardia, Sciatica/Peripheral Neuralgia, and Charcot foot deformity s/p reconstruction on 1/11/19, recent admission in September 2020 for UGIB, presented to ED after LOC witnessed by her daughter. Pt has been having diarrhea prior to presentation (6 episodes).   In ED, BP was 66/46, Na 117, Cr. 2.3, lactate 2.9, WBCs 20.65, trops 0.13, BNP 21K. UA positive for LE and WBCs 431. Negative CXR and EKG. She received 1.5 L of LR with improvement of her BP to 90s, 200 mg synthroid IVP, solucortef 100 mg IVP, cefepime and vanco and she was admitted to MICU for sepsis 2/2 acute pyelonephritis, JEEVAN, and hyponatremia. Patient never required pressors and was not intubated.  She was initially started on cefepime 1g q24 and flagyl 500 mg q8 then ID recommended d/c flagyl and increase cefepime dose to 2g q12. 2 negative Bcx on 10/20 and 10/21. UCx sent on 10/21 will likely be negative since patient was already started on Abx.  She will be discharged on PO vantin 200 mg q12 for 8 days.    Patient's JEEVAN was likely pre-renal (2/2 hypovolemia from diarrhea and diuresis) and resolved, her home lasix was held during her stay but will resume on discharge. Renal U/S 10/21 negative    Patient's hyponatremia continued to improve on NS, her home lyrica and SSRI were held Na 117 on admission and 130 on day of discharge. It is believed to be chronic hyponatremia from SSRI    Patient had troponin on admission 0.13 trended down to 0.04, no chest pain and negative EKG, was likely demand ischemia    Patient had acute anemia Hb on admission 9.4 (a drop from 11 on 10/2) GI consulted this admission, recs: no need for EGD at this time, repeat EGD as outpatient within 1 month of discharge ([phone])  - Hx of GIB secondary to duodenal ulcer s/p EGD in September (clean based ulcer without intervention)    Patient had Rt 1st toe discoloration on admission Arterial duplex negative in b/l LE. Vascular consulted and recs: no intervention.  Bilateral LE Venous duplex was also negative (was done for swelling) Echo from 9/2020 EF 72%       53 y/o female with PMHx of Type II DM, CAD, hyperlipidemia, HTN, Hypothyroidism, COPD, Dextrocardia, Sciatica/Peripheral Neuralgia, and Charcot foot deformity s/p reconstruction on 1/11/19, recent admission in September 2020 for UGIB, presented to ED after LOC witnessed by her daughter. Pt has been having diarrhea prior to presentation (6 episodes).   In ED, BP was 66/46, Na 117, Cr. 2.3, lactate 2.9, WBCs 20.65, trops 0.13, BNP 21K. UA positive for LE and WBCs 431. Negative CXR and EKG. She received 1.5 L of LR with improvement of her BP to 90s, 200 mg synthroid IVP, solucortef 100 mg IVP, cefepime and vanco and she was admitted to MICU for sepsis 2/2 acute pyelonephritis, JEEVAN, and hyponatremia. Patient never required pressors and was not intubated.  She was initially started on cefepime 1g q24 and flagyl 500 mg q8 then ID recommended d/c flagyl and increase cefepime dose to 2g q12. 2 negative Bcx on 10/20 and 10/21. UCx sent on 10/21 will likely be negative since patient was already started on Abx.  She will be discharged on PO vantin 200 mg q12 for 8 days.    Patient's JEEVAN was likely pre-renal (2/2 hypovolemia from diarrhea and diuresis) and resolved, her home lasix was held during her stay but will resume on discharge. Renal U/S 10/21 negative    Patient's hyponatremia continued to improve on NS, her home lyrica and SSRI were held Na 117 on admission and 130 on day of discharge. It is believed to be chronic hyponatremia from SSRI.  Patient with magnesium deficiency - no evidence of CHF now     Patient had troponin on admission 0.13 trended down to 0.04, no chest pain and negative EKG, was likely demand ischemia    Patient had acute anemia Hb on admission 9.4 (a drop from 11 on 10/2) GI consulted this admission, recs: no need for EGD at this time, repeat EGD as outpatient within 1 month of discharge ([phone])  - Hx of GIB secondary to duodenal ulcer s/p EGD in September (clean based ulcer without intervention)    Patient had Rt 1st toe discoloration on admission Arterial duplex negative in b/l LE. Vascular consulted and recs: no intervention.  Bilateral LE Venous duplex was also negative (was done for swelling) Echo from 9/2020 EF 72%

## 2020-10-23 NOTE — OCCUPATIONAL THERAPY INITIAL EVALUATION ADULT - PLANNED THERAPY INTERVENTIONS, OT EVAL
balance training/transfer training/ADL retraining/bed mobility training/parent/caregiver training...

## 2020-10-23 NOTE — PROVIDER CONTACT NOTE (OTHER) - SITUATION
Pt temp noted to be 101 with a pulse of 104
pt refused morning   FS despite teaching and education stating she is going home today and doesn't need it.
pt refused to have FS taken despite teaching and education
ptis refusing IV potassium. pt is demanding potassium to be given PO. pt is also refusing IV Magnesium. pt first agreed to it but rang immediately after RN started infusing stating that it burn. RN

## 2020-10-23 NOTE — DISCHARGE NOTE PROVIDER - NSDCFUSCHEDAPPT_GEN_ALL_CORE_FT
CALLY HARTMAN ; 12/02/2020 ; NPP [facility] [id number] ; 01/11/2021 ; NPP [facility]

## 2020-10-23 NOTE — CHART NOTE - NSCHARTNOTEFT_GEN_A_CORE
<<<RESIDENT DISCHARGE NOTE>>>     CALLY HARTMAN  MRN-[id number]    VITAL SIGNS:  T(F): 98 (10-23-20 @ 12:14), Max: 100.1 (10-22-20 @ 23:55)  HR: 95 (10-23-20 @ 12:14)  BP: 115/60 (10-23-20 @ 12:14)  SpO2: 94% (10-23-20 @ 12:14)      PHYSICAL EXAMINATION:  General: No acute distress  Pulmonary: Clear to auscultation bilaterally  Cardiovascular: S1& S2, no murmurs  Gastrointestinal/Abdomen & Pelvis: soft, non tender, non distended  Neurologic/Motor: AAOx3    TEST RESULTS:                        9.0    8.28  )-----------( 211      ( 23 Oct 2020 04:30 )             28.3       10-23    130<L>  |  92<L>  |  7<L>  ----------------------------<  143<H>  3.4<L>   |  24  |  0.6<L>    Ca    7.3<L>      23 Oct 2020 04:30  Mg     1.6     10-23    TPro  5.4<L>  /  Alb  2.6<L>  /  TBili  0.6  /  DBili  x   /  AST  34  /  ALT  22  /  AlkPhos  93  10-22      FINAL DISCHARGE INTERVIEW:  Resident(s) Present: (Name:____Wolf Freeman_________),     DISCHARGE MEDICATION RECONCILIATION  reviewed with Attending (Name:___Dr. Christianson________)    DISPOSITION:   [ x ] Home,    [  ] Home with Visiting Nursing Services,   [    ]  SNF/ NH,    [   ] Acute Rehab (4A),   [   ] Other (Specify:_________)

## 2020-10-23 NOTE — PROVIDER CONTACT NOTE (OTHER) - ASSESSMENT
Pt remains stable.
pt stated she understood RN's explanation but still refused magnesium and potassium IV she only wants them PO

## 2020-10-23 NOTE — DISCHARGE NOTE PROVIDER - CARE PROVIDER_API CALL
Jose Garza  [address] [facility]  [address] [facility], [address]  [place] [address]  Phone: [phone]  Fax: [phone]  Follow Up Time: 1 week

## 2020-10-23 NOTE — PROGRESS NOTE ADULT - SUBJECTIVE AND OBJECTIVE BOX
CALLY HARTMAN  55y, Female    All available historical data reviewed    OVERNIGHT EVENTS:  low grade fevers  feels well and has no complaints    ROS:  General: Denies rigors, nightsweats  HEENT: Denies headache, rhinorrhea, sore throat, eye pain  CV: Denies CP, palpitations  PULM: Denies wheezing, hemoptysis  GI: Denies hematemesis, hematochezia, melena  : Denies discharge, hematuria  MSK: Denies arthralgias, myalgias  SKIN: Denies rash, lesions  NEURO: Denies paresthesias, weakness  PSYCH: Denies depression, anxiety    VITALS:  T(F): 100.1, Max: 100.1 (10-22-20 @ 23:55)  HR: 97  BP: 109/59  RR: 18Vital Signs Last 24 Hrs  T(C): 37.8 (22 Oct 2020 23:55), Max: 37.8 (22 Oct 2020 23:55)  T(F): 100.1 (22 Oct 2020 23:55), Max: 100.1 (22 Oct 2020 23:55)  HR: 97 (22 Oct 2020 23:55) (85 - 97)  BP: 109/59 (22 Oct 2020 23:55) (109/59 - 120/62)  BP(mean): --  RR: 18 (22 Oct 2020 23:55) (18 - 18)  SpO2: 92% (22 Oct 2020 23:55) (92% - 92%)    TESTS & MEASUREMENTS:                        9.0    8.28  )-----------( 211      ( 23 Oct 2020 04:30 )             28.3     10-23    130<L>  |  92<L>  |  7<L>  ----------------------------<  143<H>  3.4<L>   |  24  |  0.6<L>    Ca    7.3<L>      23 Oct 2020 04:30  Mg     1.6     10-23    TPro  5.4<L>  /  Alb  2.6<L>  /  TBili  0.6  /  DBili  x   /  AST  34  /  ALT  22  /  AlkPhos  93  10-22    LIVER FUNCTIONS - ( 22 Oct 2020 06:52 )  Alb: 2.6 g/dL / Pro: 5.4 g/dL / ALK PHOS: 93 U/L / ALT: 22 U/L / AST: 34 U/L / GGT: x             Culture - Blood (collected 10-21-20 @ 00:31)  Source: .Blood Blood  Preliminary Report (10-22-20 @ 07:36):    No growth to date.    Culture - Blood (collected 10-20-20 @ 17:42)  Source: .Blood Blood-Peripheral  Preliminary Report (10-21-20 @ 22:07):    No growth to date.    Culture - Blood (collected 10-20-20 @ 17:42)  Source: .Blood Blood-Peripheral  Preliminary Report (10-21-20 @ 22:07):    No growth to date.            RADIOLOGY & ADDITIONAL TESTS:  Personal review of radiological diagnostics performed  Echo and EKG results noted when applicable.     MEDICATIONS:  acetaminophen   Tablet .. 650 milliGRAM(s) Oral every 6 hours PRN  ALBUTerol    90 MICROgram(s) HFA Inhaler 2 Puff(s) Inhalation every 6 hours PRN  aspirin enteric coated 81 milliGRAM(s) Oral daily  atorvastatin 40 milliGRAM(s) Oral at bedtime  budesonide 160 MICROgram(s)/formoterol 4.5 MICROgram(s) Inhaler 2 Puff(s) Inhalation two times a day  cefepime   IVPB 2000 milliGRAM(s) IV Intermittent every 12 hours  chlorhexidine 4% Liquid 1 Application(s) Topical <User Schedule>  enoxaparin Injectable 40 milliGRAM(s) SubCutaneous daily  levothyroxine 100 MICROGram(s) Oral daily  pantoprazole    Tablet 40 milliGRAM(s) Oral every 12 hours  sucralfate 1 Gram(s) Oral four times a day  tiZANidine 8 milliGRAM(s) Oral three times a day      ANTIBIOTICS:  cefepime   IVPB 2000 milliGRAM(s) IV Intermittent every 12 hours

## 2020-10-23 NOTE — PROGRESS NOTE ADULT - ASSESSMENT
53 y/o female with PMHx of Type II DM, CAD, hyperlipidemia, HTN, Hypothyroidism, COPD, Dextrocardia, Sciatica/Peripheral Neuralgia, and Charcot foot deformity s/p reconstruction on 1/11/19 presented for hypotension and lethargy. Admitted to MICU with sepsis, JEEVAN, and hyponatremia. Currently on cefepime and metronidazole for treatment of sepsis. NS at 75 cc/hr for JEEVAN and hyponatremia likely secondary to hypovolemia secondary to diarrhea. GI consulted for acute drop in Hb, as well as loose, watery stools. Per GI, no endoscopic intervention at this point. Hb currently stable. Patient is stable for downgrade to medical floors.     IMPRESSION;  Clinicaly asymptomatic and wants to go home  Resolved sepsis secondary to acute pyelonephritis  Presently has only polyuria  No ongoing GI complaints  No PNA  BCx 10/20,21 NGTD  NO UCx  WBC 8.2    RECOMMENDATIONS;  Po Vantin 200 mg q12h for 8 more days  recall prn please

## 2020-10-23 NOTE — PROVIDER CONTACT NOTE (OTHER) - ACTION/TREATMENT ORDERED:
MD Bourgeois made aware; will order tylenol. Will continue to monitor.
provider aware, no further information.
provider aware, no further intervention
provider aware of magnesium and potassium refusal. provider to order PO potassium

## 2020-10-23 NOTE — PHYSICAL THERAPY INITIAL EVALUATION ADULT - PERTINENT HX OF CURRENT PROBLEM, REHAB EVAL
53 y/o female with Medical Hx of Type II DM,  CAD, DLD, HTN, Hypothyroidism, COPD, Dextrocardia,  Sciatica/Peripheral Neuralgia, Charcot foot deformity s/p reconstruction on 1/11/19 presenting for hypotension and lethargy.

## 2020-10-23 NOTE — PROGRESS NOTE ADULT - ASSESSMENT
55 y/o female with PMHx of Type II DM, CAD, hyperlipidemia, HTN, Hypothyroidism, COPD, Dextrocardia, Sciatica/Peripheral Neuralgia, and Charcot foot deformity s/p reconstruction on 1/11/19, recent admission in september 2020 for UGIB, presented to ED after LOC witnessed by her daughter. Pt has been having diarrhea prior to presentation. In ED, BP was 66/46, Na 117, Cr. 2.3. She received 200 mg synthroid IVP, solucortef 100 mg IVP, cefepime and vanco and she was admitted to MICU for sepsis, JEEVAN, and hyponatremia.     # Sepsis 2/2 likely acute pyelonephritis  - Resolved. Pt never required pressors  - Febrile 101 overnight 10/22. Asymptomatic. (diarrhea resolved)  - UA 10/20: LE, WBCs 431 and RBCs 34  - Leucocytosis on admission WBCs 20.65. Resolved WBCs 10/22 9.73  - CXR 10/20 negative. COVID negative  - Bcx 10/20 & 10/21 NGTD  - procal 10/20 1.43. Lactate 3.1 on admission, trended down to 2.1  - Initially started on cefepime 1g q24 and flagyl 500 mg q8 then ID recommended d/c flagyl and increase cefepime dose  - c/w cefepime 2g q12  - F/u Ucx    # JEEVAN likely pre-renal (2/2 hypovolemia from diarrhea and diuresis)  - Resolved  - Baseline Cr. 1.0, Cr. on admission 2.3, today 10/22 trending down 0.9  - c/w NS at 75 cc/hr, will d/c fluid 10/23  - Holding Lasix  - Renal U/S 10/21 negative    # Hyponatremia  - Improving. Na on admission 117, today 10/22 128  - Likely secondary to Hypovolemia vs hypothyroidism vs less likely SIADH (pt on SSRI/Lyrica)  - C/w NS at 75 cc/hr  - Holding Lyrica/SSRI  - Monitor BMP, repeat at 8:00 pm     # Troponinemia  - Troponin on admission 0.13  - levels fluctuating, last level 10/22 in am 0.05  - f/u 8:00 pm troponin    # Acute anemia  - H&H stable, pt is hemodynamically stable  - Hb on admission 9.4 (a drop from 11 on 10/2)  - Hx of GIB secondary to duodenal ulcer s/p EGD in September (clean based ulcer without intervention)  - GI consulted this admission, recs: no need for EGD at this time, repeat EGD as outpatient within 1 month of discharge ([phone])  - c/w sucralfate qid and Protonix BID    # PAD  - Rt 1st toe discoloration on admission  - Arterial duplex negative in b/l LE  - Vascular recs: no intervention    # COPD  - C/w Symbicort and duonebs  - BiPAP PRN     # Hypothyroidism  - TSH 2.51 on 10/20, repeat 0.76 on 10/21  - s/p 200 mg synthroid IVP in ED  - C/w levothyroxine 100 mg daily    # Candida esophagitis s/p treatment  - GI will assess on repeat EGD    # Hyperlipidemia  - C/w atorvastatin    # Misc  DVT ppx: lovenox  GI ppx: protonix  Diet: mechanical soft, consistent carbs, DASH/TLC, lactose-free  Activity: IAT  Dispo: Acute

## 2020-10-23 NOTE — DISCHARGE NOTE PROVIDER - CARE PROVIDERS DIRECT ADDRESSES
[email or web address]

## 2020-10-23 NOTE — DISCHARGE NOTE PROVIDER - NSDCMRMEDTOKEN_GEN_ALL_CORE_FT
aspirin 81 mg oral delayed release tablet: 1 tab(s) orally once a day  atorvastatin 40 mg oral tablet: 1 tab(s) orally once a day (at bedtime)  budesonide-formoterol 80 mcg-4.5 mcg/inh inhalation aerosol: 2 puff(s) inhaled 2 times a day  calcium carbonate 1250 mg (500 mg elemental calcium) oral tablet: 1 tab(s) orally 3 times a day  docusate sodium 100 mg oral capsule: 1 cap(s) orally 2 times a day  DULoxetine 60 mg oral delayed release capsule: 1 cap(s) orally once a day  ezetimibe 10 mg oral tablet: 1 tab(s) orally once a day  Janumet 50 mg-1000 mg oral tablet: 1 tab(s) orally 2 times a day  Lasix 40 mg oral tablet: 1 tab(s) orally once a day   levothyroxine 100 mcg (0.1 mg) oral capsule: 1 cap(s) orally once a day  Lyrica 150 mg oral capsule: 1 tab(s) orally 3 times a day, stop after 2/25  Metoprolol Tartrate 25 mg oral tablet: 0.5 tab(s) orally 2 times a day   pantoprazole 40 mg oral delayed release tablet: 1 tab(s) orally 2 times a day  senna oral tablet: 2 tab(s) orally once a day (at bedtime)  sucralfate 1 g oral tablet: 1 tab(s) orally 4 times a day (before meals and at bedtime)   tiZANidine 4 mg oral tablet: 2 tab(s) orally every 8 hours, As Needed  Ventolin HFA 90 mcg/inh inhalation aerosol: 2 puff(s) inhaled 4 times a day, As Needed   aspirin 81 mg oral delayed release tablet: 1 tab(s) orally once a day  atorvastatin 40 mg oral tablet: 1 tab(s) orally once a day (at bedtime)  budesonide-formoterol 80 mcg-4.5 mcg/inh inhalation aerosol: 2 puff(s) inhaled 2 times a day  calcium carbonate 1250 mg (500 mg elemental calcium) oral tablet: 1 tab(s) orally 3 times a day  docusate sodium 100 mg oral capsule: 1 cap(s) orally 2 times a day  ezetimibe 10 mg oral tablet: 1 tab(s) orally once a day  Janumet 50 mg-1000 mg oral tablet: 1 tab(s) orally 2 times a day  levothyroxine 100 mcg (0.1 mg) oral capsule: 1 cap(s) orally once a day  Lyrica 150 mg oral capsule: 1 tab(s) orally 3 times a day, stop after 2/25  Metoprolol Tartrate 25 mg oral tablet: 0.5 tab(s) orally 2 times a day   pantoprazole 40 mg oral delayed release tablet: 1 tab(s) orally 2 times a day  senna oral tablet: 2 tab(s) orally once a day (at bedtime)  sucralfate 1 g oral tablet: 1 tab(s) orally 4 times a day (before meals and at bedtime)   tiZANidine 4 mg oral tablet: 2 tab(s) orally every 8 hours, As Needed  Ventolin HFA 90 mcg/inh inhalation aerosol: 2 puff(s) inhaled 4 times a day, As Needed   aspirin 81 mg oral delayed release tablet: 1 tab(s) orally once a day  atorvastatin 40 mg oral tablet: 1 tab(s) orally once a day (at bedtime)  budesonide-formoterol 80 mcg-4.5 mcg/inh inhalation aerosol: 2 puff(s) inhaled 2 times a day  calcium carbonate 1250 mg (500 mg elemental calcium) oral tablet: 1 tab(s) orally 3 times a day  cefpodoxime 200 mg oral tablet: 1 tab(s) orally every 12 hours   docusate sodium 100 mg oral capsule: 1 cap(s) orally 2 times a day  ezetimibe 10 mg oral tablet: 1 tab(s) orally once a day  Janumet 50 mg-1000 mg oral tablet: 1 tab(s) orally 2 times a day  levothyroxine 100 mcg (0.1 mg) oral capsule: 1 cap(s) orally once a day  Lyrica 150 mg oral capsule: 1 tab(s) orally 3 times a day, stop after 2/25  Metoprolol Tartrate 25 mg oral tablet: 0.5 tab(s) orally 2 times a day   pantoprazole 40 mg oral delayed release tablet: 1 tab(s) orally 2 times a day  senna oral tablet: 2 tab(s) orally once a day (at bedtime)  sucralfate 1 g oral tablet: 1 tab(s) orally 4 times a day (before meals and at bedtime)   tiZANidine 4 mg oral tablet: 2 tab(s) orally every 8 hours, As Needed  Ventolin HFA 90 mcg/inh inhalation aerosol: 2 puff(s) inhaled 4 times a day, As Needed

## 2020-10-24 LAB
CULTURE RESULTS: NO GROWTH — SIGNIFICANT CHANGE UP
SPECIMEN SOURCE: SIGNIFICANT CHANGE UP

## 2020-10-25 LAB
CULTURE RESULTS: SIGNIFICANT CHANGE UP
CULTURE RESULTS: SIGNIFICANT CHANGE UP
SPECIMEN SOURCE: SIGNIFICANT CHANGE UP
SPECIMEN SOURCE: SIGNIFICANT CHANGE UP

## 2020-10-26 LAB
CULTURE RESULTS: SIGNIFICANT CHANGE UP
SPECIMEN SOURCE: SIGNIFICANT CHANGE UP

## 2020-10-27 DIAGNOSIS — S92.411A DISPLACED FRACTURE OF PROXIMAL PHALANX OF RIGHT GREAT TOE, INITIAL ENCOUNTER FOR CLOSED FRACTURE: ICD-10-CM

## 2020-10-27 DIAGNOSIS — R65.21 SEVERE SEPSIS WITH SEPTIC SHOCK: ICD-10-CM

## 2020-10-27 DIAGNOSIS — T42.6X5A ADVERSE EFFECT OF OTHER ANTIEPILEPTIC AND SEDATIVE-HYPNOTIC DRUGS, INITIAL ENCOUNTER: ICD-10-CM

## 2020-10-27 DIAGNOSIS — D64.9 ANEMIA, UNSPECIFIED: ICD-10-CM

## 2020-10-27 DIAGNOSIS — E11.51 TYPE 2 DIABETES MELLITUS WITH DIABETIC PERIPHERAL ANGIOPATHY WITHOUT GANGRENE: ICD-10-CM

## 2020-10-27 DIAGNOSIS — J44.9 CHRONIC OBSTRUCTIVE PULMONARY DISEASE, UNSPECIFIED: ICD-10-CM

## 2020-10-27 DIAGNOSIS — S92.351A DISPLACED FRACTURE OF FIFTH METATARSAL BONE, RIGHT FOOT, INITIAL ENCOUNTER FOR CLOSED FRACTURE: ICD-10-CM

## 2020-10-27 DIAGNOSIS — E03.9 HYPOTHYROIDISM, UNSPECIFIED: ICD-10-CM

## 2020-10-27 DIAGNOSIS — R19.7 DIARRHEA, UNSPECIFIED: ICD-10-CM

## 2020-10-27 DIAGNOSIS — Q24.0 DEXTROCARDIA: ICD-10-CM

## 2020-10-27 DIAGNOSIS — R77.8 OTHER SPECIFIED ABNORMALITIES OF PLASMA PROTEINS: ICD-10-CM

## 2020-10-27 DIAGNOSIS — E86.0 DEHYDRATION: ICD-10-CM

## 2020-10-27 DIAGNOSIS — E83.42 HYPOMAGNESEMIA: ICD-10-CM
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
## 2020-10-27 DIAGNOSIS — B37.81 CANDIDAL ESOPHAGITIS: ICD-10-CM

## 2020-10-27 DIAGNOSIS — A41.9 SEPSIS, UNSPECIFIED ORGANISM: ICD-10-CM

## 2020-10-27 DIAGNOSIS — I10 ESSENTIAL (PRIMARY) HYPERTENSION: ICD-10-CM

## 2020-10-27 DIAGNOSIS — Z79.82 LONG TERM (CURRENT) USE OF ASPIRIN: ICD-10-CM

## 2020-10-27 DIAGNOSIS — N10 ACUTE PYELONEPHRITIS: ICD-10-CM

## 2020-10-27 DIAGNOSIS — E78.5 HYPERLIPIDEMIA, UNSPECIFIED: ICD-10-CM

## 2020-10-27 DIAGNOSIS — I25.10 ATHEROSCLEROTIC HEART DISEASE OF NATIVE CORONARY ARTERY WITHOUT ANGINA PECTORIS: ICD-10-CM

## 2020-10-27 DIAGNOSIS — S92.331A DISPLACED FRACTURE OF THIRD METATARSAL BONE, RIGHT FOOT, INITIAL ENCOUNTER FOR CLOSED FRACTURE: ICD-10-CM

## 2020-10-27 DIAGNOSIS — N17.9 ACUTE KIDNEY FAILURE, UNSPECIFIED: ICD-10-CM
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
## 2020-10-27 DIAGNOSIS — E87.1 HYPO-OSMOLALITY AND HYPONATREMIA: ICD-10-CM
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
## 2020-10-27 DIAGNOSIS — Z79.84 LONG TERM (CURRENT) USE OF ORAL HYPOGLYCEMIC DRUGS: ICD-10-CM

## 2020-10-27 LAB
CULTURE RESULTS: SIGNIFICANT CHANGE UP
SPECIMEN SOURCE: SIGNIFICANT CHANGE UP

## 2020-10-28 ENCOUNTER — INPATIENT (INPATIENT)
Facility: HOSPITAL | Age: 55
LOS: 2 days | Discharge: LEFT BEFORE DISCHARGE | End: 2020-10-31
Attending: INTERNAL MEDICINE | Admitting: INTERNAL MEDICINE
Payer: MEDICARE

## 2020-10-28 VITALS
SYSTOLIC BLOOD PRESSURE: 94 MMHG | HEIGHT: 60 IN | DIASTOLIC BLOOD PRESSURE: 56 MMHG | HEART RATE: 80 BPM | TEMPERATURE: 98 F | RESPIRATION RATE: 12 BRPM | OXYGEN SATURATION: 95 %

## 2020-10-28 DIAGNOSIS — Z98.890 OTHER SPECIFIED POSTPROCEDURAL STATES: Chronic | ICD-10-CM

## 2020-10-28 DIAGNOSIS — E11.610 TYPE 2 DIABETES MELLITUS WITH DIABETIC NEUROPATHIC ARTHROPATHY: Chronic | ICD-10-CM

## 2020-10-28 LAB
ALBUMIN SERPL ELPH-MCNC: 2.5 G/DL — LOW (ref 3.5–5.2)
ALP SERPL-CCNC: 102 U/L — SIGNIFICANT CHANGE UP (ref 30–115)
ALT FLD-CCNC: 99 U/L — HIGH (ref 0–41)
ANION GAP SERPL CALC-SCNC: 17 MMOL/L — HIGH (ref 7–14)
APAP SERPL-MCNC: 7 UG/ML — LOW (ref 10–30)
APPEARANCE UR: CLEAR — SIGNIFICANT CHANGE UP
APTT BLD: 28.6 SEC — SIGNIFICANT CHANGE UP (ref 27–39.2)
AST SERPL-CCNC: 434 U/L — HIGH (ref 0–41)
BACTERIA # UR AUTO: NEGATIVE — SIGNIFICANT CHANGE UP
BASE EXCESS BLDV CALC-SCNC: -4.2 MMOL/L — LOW (ref -2–2)
BASOPHILS # BLD AUTO: 0.04 K/UL — SIGNIFICANT CHANGE UP (ref 0–0.2)
BASOPHILS NFR BLD AUTO: 0.2 % — SIGNIFICANT CHANGE UP (ref 0–1)
BILIRUB SERPL-MCNC: 0.9 MG/DL — SIGNIFICANT CHANGE UP (ref 0.2–1.2)
BILIRUB UR-MCNC: NEGATIVE — SIGNIFICANT CHANGE UP
BLD GP AB SCN SERPL QL: SIGNIFICANT CHANGE UP
BUN SERPL-MCNC: 31 MG/DL — HIGH (ref 10–20)
CA-I SERPL-SCNC: 1.11 MMOL/L — LOW (ref 1.12–1.3)
CALCIUM SERPL-MCNC: 7.9 MG/DL — LOW (ref 8.5–10.1)
CHLORIDE SERPL-SCNC: 92 MMOL/L — LOW (ref 98–110)
CO2 SERPL-SCNC: 17 MMOL/L — SIGNIFICANT CHANGE UP (ref 17–32)
COLOR SPEC: YELLOW — SIGNIFICANT CHANGE UP
CREAT ?TM UR-MCNC: 38 MG/DL — SIGNIFICANT CHANGE UP
CREAT SERPL-MCNC: 3.4 MG/DL — HIGH (ref 0.7–1.5)
DIFF PNL FLD: ABNORMAL
EOSINOPHIL # BLD AUTO: 0.04 K/UL — SIGNIFICANT CHANGE UP (ref 0–0.7)
EOSINOPHIL NFR BLD AUTO: 0.2 % — SIGNIFICANT CHANGE UP (ref 0–8)
EPI CELLS # UR: 4 /HPF — SIGNIFICANT CHANGE UP (ref 0–5)
ETHANOL SERPL-MCNC: <10 MG/DL — SIGNIFICANT CHANGE UP
GAS PNL BLDV: 124 MMOL/L — LOW (ref 136–145)
GAS PNL BLDV: SIGNIFICANT CHANGE UP
GLUCOSE BLDC GLUCOMTR-MCNC: 112 MG/DL — HIGH (ref 70–99)
GLUCOSE SERPL-MCNC: 108 MG/DL — HIGH (ref 70–99)
GLUCOSE UR QL: NEGATIVE — SIGNIFICANT CHANGE UP
HCG SERPL QL: NEGATIVE — SIGNIFICANT CHANGE UP
HCO3 BLDV-SCNC: 23 MMOL/L — SIGNIFICANT CHANGE UP (ref 22–29)
HCT VFR BLD CALC: 31.1 % — LOW (ref 37–47)
HCT VFR BLDA CALC: 35.5 % — SIGNIFICANT CHANGE UP (ref 34–44)
HGB BLD CALC-MCNC: 11.6 G/DL — LOW (ref 14–18)
HGB BLD-MCNC: 9.7 G/DL — LOW (ref 12–16)
HYALINE CASTS # UR AUTO: 7 /LPF — SIGNIFICANT CHANGE UP (ref 0–7)
IMM GRANULOCYTES NFR BLD AUTO: 0.7 % — HIGH (ref 0.1–0.3)
INR BLD: 2.01 RATIO — HIGH (ref 0.65–1.3)
KETONES UR-MCNC: SIGNIFICANT CHANGE UP
LACTATE BLDV-MCNC: 1.5 MMOL/L — SIGNIFICANT CHANGE UP (ref 0.5–1.6)
LEUKOCYTE ESTERASE UR-ACNC: ABNORMAL
LIDOCAIN IGE QN: 18 U/L — SIGNIFICANT CHANGE UP (ref 7–60)
LYMPHOCYTES # BLD AUTO: 1.22 K/UL — SIGNIFICANT CHANGE UP (ref 1.2–3.4)
LYMPHOCYTES # BLD AUTO: 6.3 % — LOW (ref 20.5–51.1)
MCHC RBC-ENTMCNC: 27.5 PG — SIGNIFICANT CHANGE UP (ref 27–31)
MCHC RBC-ENTMCNC: 31.2 G/DL — LOW (ref 32–37)
MCV RBC AUTO: 88.1 FL — SIGNIFICANT CHANGE UP (ref 81–99)
MONOCYTES # BLD AUTO: 0.89 K/UL — HIGH (ref 0.1–0.6)
MONOCYTES NFR BLD AUTO: 4.6 % — SIGNIFICANT CHANGE UP (ref 1.7–9.3)
NEUTROPHILS # BLD AUTO: 16.93 K/UL — HIGH (ref 1.4–6.5)
NEUTROPHILS NFR BLD AUTO: 88 % — HIGH (ref 42.2–75.2)
NITRITE UR-MCNC: NEGATIVE — SIGNIFICANT CHANGE UP
NRBC # BLD: 0 /100 WBCS — SIGNIFICANT CHANGE UP (ref 0–0)
PCO2 BLDV: 50 MMHG — SIGNIFICANT CHANGE UP (ref 41–51)
PH BLDV: 7.27 — SIGNIFICANT CHANGE UP (ref 7.26–7.43)
PH UR: 6 — SIGNIFICANT CHANGE UP (ref 5–8)
PLATELET # BLD AUTO: 370 K/UL — SIGNIFICANT CHANGE UP (ref 130–400)
PO2 BLDV: 26 MMHG — SIGNIFICANT CHANGE UP (ref 20–40)
POTASSIUM BLDV-SCNC: 3.8 MMOL/L — SIGNIFICANT CHANGE UP (ref 3.3–5.6)
POTASSIUM SERPL-MCNC: 4.1 MMOL/L — SIGNIFICANT CHANGE UP (ref 3.5–5)
POTASSIUM SERPL-SCNC: 4.1 MMOL/L — SIGNIFICANT CHANGE UP (ref 3.5–5)
PROT ?TM UR-MCNC: 44 MG/DLG/24H — SIGNIFICANT CHANGE UP
PROT SERPL-MCNC: 5.3 G/DL — LOW (ref 6–8)
PROT UR-MCNC: ABNORMAL
PROT/CREAT UR-RTO: 1.2 RATIO — HIGH (ref 0–0.2)
PROTHROM AB SERPL-ACNC: 23.1 SEC — HIGH (ref 9.95–12.87)
RBC # BLD: 3.53 M/UL — LOW (ref 4.2–5.4)
RBC # FLD: 19.1 % — HIGH (ref 11.5–14.5)
RBC CASTS # UR COMP ASSIST: 9 /HPF — HIGH (ref 0–4)
SALICYLATES SERPL-MCNC: <0.3 MG/DL — LOW (ref 4–30)
SAO2 % BLDV: 33 % — SIGNIFICANT CHANGE UP
SARS-COV-2 RNA SPEC QL NAA+PROBE: SIGNIFICANT CHANGE UP
SODIUM SERPL-SCNC: 126 MMOL/L — LOW (ref 135–146)
SODIUM UR-SCNC: 25 MMOL/L — SIGNIFICANT CHANGE UP
SP GR SPEC: 1.01 — SIGNIFICANT CHANGE UP (ref 1.01–1.03)
TROPONIN T SERPL-MCNC: 0.59 NG/ML — CRITICAL HIGH
TROPONIN T SERPL-MCNC: 0.81 NG/ML — CRITICAL HIGH
UROBILINOGEN FLD QL: SIGNIFICANT CHANGE UP
UUN UR-MCNC: 165 MG/DL — SIGNIFICANT CHANGE UP
WBC # BLD: 19.26 K/UL — HIGH (ref 4.8–10.8)
WBC # FLD AUTO: 19.26 K/UL — HIGH (ref 4.8–10.8)
WBC UR QL: 2 /HPF — SIGNIFICANT CHANGE UP (ref 0–5)

## 2020-10-28 PROCEDURE — 76937 US GUIDE VASCULAR ACCESS: CPT | Mod: 26,GC

## 2020-10-28 PROCEDURE — 99291 CRITICAL CARE FIRST HOUR: CPT | Mod: CS,25

## 2020-10-28 PROCEDURE — 93010 ELECTROCARDIOGRAM REPORT: CPT

## 2020-10-28 PROCEDURE — 36556 INSERT NON-TUNNEL CV CATH: CPT | Mod: GC

## 2020-10-28 PROCEDURE — 70450 CT HEAD/BRAIN W/O DYE: CPT | Mod: 26

## 2020-10-28 PROCEDURE — 74176 CT ABD & PELVIS W/O CONTRAST: CPT | Mod: 26

## 2020-10-28 PROCEDURE — 71045 X-RAY EXAM CHEST 1 VIEW: CPT | Mod: 26

## 2020-10-28 RX ORDER — CHLORHEXIDINE GLUCONATE 213 G/1000ML
1 SOLUTION TOPICAL
Refills: 0 | Status: DISCONTINUED | OUTPATIENT
Start: 2020-10-28 | End: 2020-10-31

## 2020-10-28 RX ORDER — SENNA PLUS 8.6 MG/1
2 TABLET ORAL AT BEDTIME
Refills: 0 | Status: DISCONTINUED | OUTPATIENT
Start: 2020-10-28 | End: 2020-10-31

## 2020-10-28 RX ORDER — CEFEPIME 1 G/1
2000 INJECTION, POWDER, FOR SOLUTION INTRAMUSCULAR; INTRAVENOUS
Refills: 0 | Status: DISCONTINUED | OUTPATIENT
Start: 2020-10-28 | End: 2020-10-28

## 2020-10-28 RX ORDER — VANCOMYCIN HCL 1 G
1000 VIAL (EA) INTRAVENOUS ONCE
Refills: 0 | Status: COMPLETED | OUTPATIENT
Start: 2020-10-28 | End: 2020-10-28

## 2020-10-28 RX ORDER — CEFEPIME 1 G/1
2000 INJECTION, POWDER, FOR SOLUTION INTRAMUSCULAR; INTRAVENOUS ONCE
Refills: 0 | Status: COMPLETED | OUTPATIENT
Start: 2020-10-28 | End: 2020-10-28

## 2020-10-28 RX ORDER — ALBUTEROL 90 UG/1
2 AEROSOL, METERED ORAL EVERY 6 HOURS
Refills: 0 | Status: DISCONTINUED | OUTPATIENT
Start: 2020-10-28 | End: 2020-10-31

## 2020-10-28 RX ORDER — SODIUM CHLORIDE 9 MG/ML
1000 INJECTION, SOLUTION INTRAVENOUS ONCE
Refills: 0 | Status: COMPLETED | OUTPATIENT
Start: 2020-10-28 | End: 2020-10-28

## 2020-10-28 RX ORDER — CEFEPIME 1 G/1
INJECTION, POWDER, FOR SOLUTION INTRAMUSCULAR; INTRAVENOUS
Refills: 0 | Status: DISCONTINUED | OUTPATIENT
Start: 2020-10-28 | End: 2020-10-29

## 2020-10-28 RX ORDER — CALCIUM CARBONATE 500(1250)
1 TABLET ORAL THREE TIMES A DAY
Refills: 0 | Status: DISCONTINUED | OUTPATIENT
Start: 2020-10-28 | End: 2020-10-31

## 2020-10-28 RX ORDER — BUDESONIDE AND FORMOTEROL FUMARATE DIHYDRATE 160; 4.5 UG/1; UG/1
2 AEROSOL RESPIRATORY (INHALATION)
Refills: 0 | Status: DISCONTINUED | OUTPATIENT
Start: 2020-10-28 | End: 2020-10-31

## 2020-10-28 RX ORDER — NOREPINEPHRINE BITARTRATE/D5W 8 MG/250ML
0.05 PLASTIC BAG, INJECTION (ML) INTRAVENOUS
Qty: 8 | Refills: 0 | Status: DISCONTINUED | OUTPATIENT
Start: 2020-10-28 | End: 2020-10-29

## 2020-10-28 RX ORDER — PANTOPRAZOLE SODIUM 20 MG/1
40 TABLET, DELAYED RELEASE ORAL
Refills: 0 | Status: DISCONTINUED | OUTPATIENT
Start: 2020-10-28 | End: 2020-10-30

## 2020-10-28 RX ORDER — NALOXONE HYDROCHLORIDE 4 MG/.1ML
0.04 SPRAY NASAL ONCE
Refills: 0 | Status: DISCONTINUED | OUTPATIENT
Start: 2020-10-28 | End: 2020-10-28

## 2020-10-28 RX ORDER — CEFEPIME 1 G/1
1000 INJECTION, POWDER, FOR SOLUTION INTRAMUSCULAR; INTRAVENOUS EVERY 12 HOURS
Refills: 0 | Status: DISCONTINUED | OUTPATIENT
Start: 2020-10-29 | End: 2020-10-29

## 2020-10-28 RX ORDER — NALOXONE HYDROCHLORIDE 4 MG/.1ML
0.2 SPRAY NASAL ONCE
Refills: 0 | Status: COMPLETED | OUTPATIENT
Start: 2020-10-28 | End: 2020-10-28

## 2020-10-28 RX ORDER — NOREPINEPHRINE BITARTRATE/D5W 8 MG/250ML
0.05 PLASTIC BAG, INJECTION (ML) INTRAVENOUS
Qty: 16 | Refills: 0 | Status: DISCONTINUED | OUTPATIENT
Start: 2020-10-28 | End: 2020-10-28

## 2020-10-28 RX ORDER — CEFEPIME 1 G/1
1000 INJECTION, POWDER, FOR SOLUTION INTRAMUSCULAR; INTRAVENOUS ONCE
Refills: 0 | Status: COMPLETED | OUTPATIENT
Start: 2020-10-28 | End: 2020-10-28

## 2020-10-28 RX ORDER — VANCOMYCIN HCL 1 G
500 VIAL (EA) INTRAVENOUS EVERY 12 HOURS
Refills: 0 | Status: DISCONTINUED | OUTPATIENT
Start: 2020-10-29 | End: 2020-10-29

## 2020-10-28 RX ORDER — LEVOTHYROXINE SODIUM 125 MCG
100 TABLET ORAL DAILY
Refills: 0 | Status: DISCONTINUED | OUTPATIENT
Start: 2020-10-28 | End: 2020-10-31

## 2020-10-28 RX ORDER — SUCRALFATE 1 G
1 TABLET ORAL
Refills: 0 | Status: DISCONTINUED | OUTPATIENT
Start: 2020-10-28 | End: 2020-10-31

## 2020-10-28 RX ORDER — VANCOMYCIN HCL 1 G
500 VIAL (EA) INTRAVENOUS ONCE
Refills: 0 | Status: COMPLETED | OUTPATIENT
Start: 2020-10-28 | End: 2020-10-28

## 2020-10-28 RX ORDER — VANCOMYCIN HCL 1 G
VIAL (EA) INTRAVENOUS
Refills: 0 | Status: DISCONTINUED | OUTPATIENT
Start: 2020-10-28 | End: 2020-10-29

## 2020-10-28 RX ADMIN — SODIUM CHLORIDE 1000 MILLILITER(S): 9 INJECTION, SOLUTION INTRAVENOUS at 13:40

## 2020-10-28 RX ADMIN — Medication 100 MILLIGRAM(S): at 22:46

## 2020-10-28 RX ADMIN — SODIUM CHLORIDE 1000 MILLILITER(S): 9 INJECTION, SOLUTION INTRAVENOUS at 12:58

## 2020-10-28 RX ADMIN — PANTOPRAZOLE SODIUM 40 MILLIGRAM(S): 20 TABLET, DELAYED RELEASE ORAL at 22:46

## 2020-10-28 RX ADMIN — NALOXONE HYDROCHLORIDE 0.2 MILLIGRAM(S): 4 SPRAY NASAL at 13:39

## 2020-10-28 RX ADMIN — CEFEPIME 100 MILLIGRAM(S): 1 INJECTION, POWDER, FOR SOLUTION INTRAMUSCULAR; INTRAVENOUS at 13:37

## 2020-10-28 RX ADMIN — Medication 9.38 MICROGRAM(S)/KG/MIN: at 14:43

## 2020-10-28 RX ADMIN — SODIUM CHLORIDE 1000 MILLILITER(S): 9 INJECTION, SOLUTION INTRAVENOUS at 14:44

## 2020-10-28 RX ADMIN — Medication 250 MILLIGRAM(S): at 14:13

## 2020-10-28 NOTE — ED PROVIDER NOTE - PHYSICAL EXAMINATION
Physical Exam    Vital Signs: I have reviewed the initial vital signs.  Constitutional: well-nourished, appears stated age, no acute distress  Eyes: Conjunctiva pink, Sclera clear,   Cardiovascular: S1 and S2, regular rate, regular rhythm, well-perfused extremities, radial pulses equal and 2+, pedal pulses 2+ and equal   Respiratory: unlabored respiratory effort, clear to auscultation bilaterally no wheezing, rales and rhonchi  Gastrointestinal: soft, non-tender abdomen, no pulsatile mass, normal bowl sounds  Musculoskeletal: supple neck, no lower extremity edema, no midline tenderness  Integumentary: warm, dry, no rash  Neurologic: awake, alert, oriented to self and , does not answer questions appropriately, moves extremities spontaneously, verbal stim.

## 2020-10-28 NOTE — ED PROCEDURE NOTE - CPROC ED INFORMED CONSENT1
Benefits, risks, and possible complications of procedure explained to patient/caregiver who verbalized understanding and gave verbal consent.
Benefits, risks, and possible complications of procedure explained to patient/caregiver who verbalized understanding and gave verbal consent.
This was an emergent procedure and consent was implied.

## 2020-10-28 NOTE — H&P ADULT - ASSESSMENT
Impression    Septic Shock, from Pyelo Vs. PNA?  HFpEF  JEEVAN  Transaminitis      Plan     CNS: Avoid depressants.  Holding Lyrica and Baclofen.    CVS: Levo gtt.  f/u CE.  I<O    PULMONARY: HOB @ 45 degrees. Aspiration precautions. c/w Inhalers    INFECTIOUS DISEASE: f/u UCx, BCx. Cefepime and Vanco.    GI: Protonix IV q12. NPO except meds until Sp&Sw eval.  Bowel regimen.  GI f/u    RENAL and acid base: No hydro on CT. F/u Urine electrolytes and Cr for FeNA. Nephro f/u    Endocrine: Follow up FS.  Insulin protocol if needed.  f/u Thyroid function tests.    Hematology: Active T&S.  DVT ppx w/ SCD.    Dispo: MICU    Code Status: Full    Impression    Septic Shock, from Pyelo Vs. PNA?  JEEVAN  Transaminitis - Drug induced vs from shock  HFpEF (EF of 72 %)  UGIB 2/2 duodenal ulcer  CAD  DM  Hypothyroidism  COPD      Plan     CNS: Avoid depressants.  Holding Lyrica and Baclofen.    CVS: Levo gtt.  f/u CE.  I<O.  Holding statin due to transaminitis    PULMONARY: HOB @ 45 degrees. Aspiration precautions. c/w Inhalers    INFECTIOUS DISEASE: f/u UCx, BCx. Cefepime and Vanco.    GI: Protonix IV q12.  Sucralfate.  NPO except meds until Sp&Sw eval.  Bowel regimen.  GI f/u    RENAL and acid base: No hydro on CT. F/u Urine electrolytes and Cr for FeNA. Nephro f/u    Endocrine: Follow up FS.  Insulin protocol if needed.  f/u Thyroid function tests.    Hematology: Active T&S.  DVT ppx w/ SCD.    Dispo: MICU    Code Status: Full    Impression    Septic Shock, from Pyelo Vs. PNA?  Toxic metabolic encephalopathy  JEEVAN  Transaminitis - Drug induced vs from shock  HFpEF (EF of 72 %)  UGIB 2/2 duodenal ulcer  CAD  DM  Hypothyroidism  COPD      Plan     CNS: Avoid depressants.  Holding Lyrica and Baclofen.    CVS: Levo gtt.  f/u CE.  I<O.  Holding statin due to transaminitis    PULMONARY: HOB @ 45 degrees. Aspiration precautions. c/w Inhalers    INFECTIOUS DISEASE: f/u UCx, BCx. Cefepime and Vanco.    GI: Protonix IV q12.  Sucralfate.  NPO except meds until Sp&Sw eval.  Bowel regimen.  GI f/u    RENAL and acid base: No hydro on CT. F/u Urine electrolytes and Cr for FeNA. Nephro f/u    Endocrine: Follow up FS.  Insulin protocol if needed.  f/u Thyroid function tests.    Hematology: Active T&S.  DVT ppx w/ SCD.  Holding ASA.    Dispo: MICU    Code Status: Full    Impression    Septic Shock, from Pyelo Vs. PNA?  Toxic metabolic encephalopathy  JEEVAN  Transaminitis - Drug induced vs from shock  HFpEF (EF of 72 %)  UGIB 2/2 duodenal ulcer  CAD  DM  Hypothyroidism  COPD      Plan     CNS: Avoid depressants.  Holding Lyrica and Baclofen.    CVS: Levo gtt.  f/u CE.  I<O.  Holding statin due to transaminitis.  Holding Lasix and BB due to shock.    PULMONARY: HOB @ 45 degrees. Aspiration precautions. c/w Inhalers    INFECTIOUS DISEASE: f/u UCx, BCx. Cefepime and Vanco.    GI: Protonix IV q12.  Sucralfate.  NPO except meds until Sp&Sw eval.  Bowel regimen.  GI f/u    RENAL and acid base: No hydro on CT. F/u Urine electrolytes and Cr for FeNA. Nephro f/u    Endocrine: Follow up FS.  Insulin protocol if needed.  f/u Thyroid function tests.    Hematology: Active T&S.  DVT ppx w/ SCD.  Holding ASA.    Dispo: MICU    Code Status: Full

## 2020-10-28 NOTE — PROCEDURAL SAFETY CHECKLIST WITH OR WITHOUT SEDATION - NSPOSTCOMMENTFT_GEN_ALL_CORE
Pt tolerated procedure without complaint. Family at bedside at this time. MD speaking with family regarding plan of care.

## 2020-10-28 NOTE — ED ADULT TRIAGE NOTE - MEANS OF ARRIVAL
[FreeTextEntry1] : EKG: Sinus rhythm with no significant ST or T wave changes.\par \par 57-year-old man with a past medical history of hypertension the past now on no medication, objective sleep apnea on CPAP, obesity who presents to me for followup evaluation. Blood pressures have been more elevated recently coupled with him gaining significant weight. He is also not really doing a lot of exercise. At this time I do recommend treatment for hypertension with hopes that if he loses weight we can stop this treatment again. His lipid control is also somewhat borderline but his [place] risk remains low. For now I will not add statin therapy. We have talked extensively about the need for diet, exercise and weight loss.
stretcher

## 2020-10-28 NOTE — H&P ADULT - NSHPREVIEWOFSYSTEMS_GEN_ALL_CORE
CONSTITUTIONAL: No headaches  EYES: No visual changes, eye pain, or discharge  ENT: No vertigo; No ear pain or change in hearing; No sore throat or difficulty swallowing  NECK: No pain or stiffness  RESPIRATORY: No cough, wheezing, or hemoptysis; No shortness of breath  CARDIOVASCULAR: No chest pain or palpitations  GASTROINTESTINAL: Melena x2 today  GENITOURINARY: No dysuria, frequency or hematuria  MUSCULOSKELETAL: Weakness  NEUROLOGICAL: No numbness or weakness  SKIN: No itching or rashes

## 2020-10-28 NOTE — ED PROVIDER NOTE - CLINICAL SUMMARY MEDICAL DECISION MAKING FREE TEXT BOX
Pt presented to ED with AMs, hypotensive. Sepsis labs, fluids, cultures, abx given. PT requiring pressors and TLC. Will admit to ICU for further management.

## 2020-10-28 NOTE — ED ADULT TRIAGE NOTE - CHIEF COMPLAINT QUOTE
BIBA from home c'o lethargy, weakness, low BP . Family states pt. has been confused since [facility]. pt. was admitted to ICU for sepsis . also pt. had 2 bloody stools today pt. on Aspirin

## 2020-10-28 NOTE — ED PROVIDER NOTE - CARE PLAN
Principal Discharge DX:	Septic shock  Secondary Diagnosis:	JEEVAN (acute kidney injury)  Secondary Diagnosis:	Transaminitis   Principal Discharge DX:	Septic shock  Secondary Diagnosis:	JEEVAN (acute kidney injury)  Secondary Diagnosis:	Transaminitis  Secondary Diagnosis:	Elevated troponin

## 2020-10-28 NOTE — ED PROVIDER NOTE - CRITICAL CARE PROVIDED
direct patient care (not related to procedure)/interpretation of diagnostic studies/additional history taking/documentation direct patient care (not related to procedure)/documentation/additional history taking/interpretation of diagnostic studies/telephone consultation with the patient's family

## 2020-10-28 NOTE — ED PROVIDER NOTE - OBJECTIVE STATEMENT
56 yo female, PMH of DM,  CAD, DLD, HTN, Hypothyroidism, COPD, Dextrocardia,  Sciatica/Peripheral Neuralgia, Charcot foot deformity s/p reconstruction on 19 presenting for hypotension and lethargy. Pt was dc from hospital four days ago and admitted for similar lethargy and hypotension. Ad per daughter bp has been low over past few days, noticed today more lethargic. On initial exam pt is lethargic and does not answer all questions appropriately. Pt is able to state name and , but can not provide extensive history.

## 2020-10-28 NOTE — ED PROVIDER NOTE - PROGRESS NOTE DETAILS
Sepsis suspected at this time. narcan given without improvement. after fluid administration continues to be hypotensive will require central line and pressors.

## 2020-10-28 NOTE — H&P ADULT - NSHPLABSRESULTS_GEN_ALL_CORE
Labs:                         9.7    19.26 )-----------( 370      ( 28 Oct 2020 12:55 )             31.1     Neutophil% 88.0, Lymphocyte% 6.3, Monocyte% 4.6, Bands% 0.7 10-28-20 @ 12:55    28 Oct 2020 12:55    126    |  92     |  31     ----------------------------<  108    4.1     |  17     |  3.4      Ca    7.9        28 Oct 2020 12:55    TPro  5.3    /  Alb  2.5    /  TBili  0.9    /  DBili  x      /  AST  434    /  ALT  99     /  AlkPhos  102    28 Oct 2020 12:55       pTT    28.6             ----< 2.01 INR  (10-28-20 @ 12:55)    23.10        PT    Amylase --, Lipase 18, 10-28-20 @ 14:28      Serum Pro-Brain Natriuretic Peptide: 61467 pg/mL (10-20-20 @ 16:38)    Troponin 0.59, CKMB --, CK -- 10-28-20 @ 18:25  Troponin 0.81, CKMB --, CK -- 10-28-20 @ 14:31        Urinalysis Basic - ( 28 Oct 2020 14:10 )    Color: Yellow / Appearance: Clear / S.014 / pH: x  Gluc: x / Ketone: Trace  / Bili: Negative / Urobili: <2 mg/dL   Blood: x / Protein: 30 mg/dL / Nitrite: Negative   Leuk Esterase: Small / RBC: 9 /HPF / WBC 2 /HPF   Sq Epi: x / Non Sq Epi: 4 /HPF / Bacteria: Negative          Culture - Blood (collected 10-22-20 @ 06:52)  Source: .Blood None  Final Report (10-27-20 @ 18:04):    No Growth Final        COVID-19 PCR: NotDetec (10-28)  COVID-19 PCR: NotDetec (10-20)    Procalcitonin, Serum: 1.43 ng/mL (10-21)          Lactate Dehydrogenase, Serum: 457 [50 - 242] (10-21)    Troponin T, Serum: 0.59 ng/mL (10-28)  Troponin T, Serum: 0.81 ng/mL (10-28)  Troponin T, Serum: 0.04 ng/mL (10-)  Troponin T, Serum: 0.05 ng/mL (10-22)      Radiology:    < from: CT Head No Cont (10.28.20 @ 15:47) >    1.  No evidence of acute intracranial pathology. Stable exam since 2017.    2.  Redemonstrated low-lying cerebellar tonsils compatible a Chiari I malformation.    < end of copied text >    < from: CT Abdomen and Pelvis No Cont (10.28.20 @ 15:53) >    No CT evidence of intra-abdominal or pelvic abscess or inflammatory process    < end of copied text >

## 2020-10-28 NOTE — H&P ADULT - HISTORY OF PRESENT ILLNESS
55 y/o female with PMHx of Type II DM, CAD, hyperlipidemia, UGIB 2/2 duodenal ulcer, HTN, Hypothyroidism, COPD, Dextrocardia, Sciatica/Peripheral Neuralgia, and Charcot foot deformity s/p reconstruction on 1/11/19 presented for hypotension, lethargy and black stool. The patient was recently admitted to the hospital for sepsis secondary to acute pyelonephritis.    past 2-3 days worsening waekness, hypotension, with an episode of black stool times 2 today. when daughter arrived patient was lethargic and confused so ems was called. I got history from daughter mainly. her exam shows pupils are 3-4mm reactive, she is sleepy but awakes to voice, moves all ext, lungs are clear. stool is brown, abd soft. IMP: patient is lethargic with hypotension with prior sepsis 2/2 to pyelo. We will work up for sepsis. start abx, ivf, obtain imaging, will try small dose of narcan to see if any improvement as overdose is also possible.     53 y/o female with PMHx of Type II DM, HFpEF (EF of 72 %), CAD, hyperlipidemia, UGIB 2/2 duodenal ulcer, HTN, Hypothyroidism, COPD, Dextrocardia, Sciatica/Peripheral Neuralgia, and Charcot foot deformity s/p reconstruction on 1/11/19 presented for hypotension, lethargy and black stool. The patient was recently admitted to the hospital for sepsis secondary to acute pyelonephritis and was d/c on PO Vantin (compliant per patient and daughter). Since her discharge the patient has been experiencing worsening fatigue and confusion per daughter. Today the patient experienced 2 episodes of melena, appeared altered to the daughter (seeing things that weren't there) and had persistently low BP ems was called.    Patient HoTN on initial encounter 94/56 mmHg. Labs revealed elevated WBC w/ stable Hb, along w/ JEEVAN and Transaminitis. CT Head and A/P negative. CXR showing b/l opacities but patient denies SOB, Cough, Sputum. Patient received Cefepime, Vanco and Narcan in ED, placed on Levo and being admitted for Septic Shock.

## 2020-10-28 NOTE — H&P ADULT - NSHPPHYSICALEXAM_GEN_ALL_CORE
CONSTITUTIONAL: Drowsy, AAOx2-3  HEAD: Atraumatic, normocephalic  EYES: EOM intact, PERRLA, conjunctiva and sclera clear  ENT: Supple, no masses, no thyromegaly, no bruits, no JVD; moist mucous membranes  PULMONARY: Clear to auscultation bilaterally; no wheezes, rales, or rhonchi  CARDIOVASCULAR: Regular rate and rhythm; no murmurs, rubs, or gallops  GASTROINTESTINAL: Soft, non-tender, non-distended; stool is brown,  MUSCULOSKELETAL: 2+ peripheral pulses; no clubbing, no cyanosis, no edema  NEUROLOGY: non-focal  SKIN: No rashes or lesions; warm and dry

## 2020-10-28 NOTE — ED PROVIDER NOTE - ATTENDING CONTRIBUTION TO CARE
hx of sepsis has rx for valium and percocet. takes as needed. past 2-3 days worsening waekness, hypotension, with an episode of black stool times 2 today. when daughter arrived patient was lethargic and confused so ems was called. I got history from daughter mainly. her exam shows pupils are 3-4mm reactive, she is sleepy but awakes to voice, moves all ext, lungs are clear. stool is brown, abd soft. IMP: patient is lethargic with hypotension with prior sepsis 2/2 to pyelo. We will work up for sepsis. start abx, ivf, obtain imaging, will try small dose of narcan to see if any improvement as overdose is also possible.

## 2020-10-28 NOTE — ED PROCEDURE NOTE - NS ED ATTENDING STATEMENT MOD
I have personally performed a face to face diagnostic evaluation on this patient. I have reviewed the ACP note and agree with the history, exam and plan of care, except as noted.
I have personally performed a face to face diagnostic evaluation on this patient. I have reviewed the ACP note and agree with the history, exam and plan of care, except as noted.
I have personally seen and examined this patient.  I have fully participated in the care of this patient. I have reviewed all pertinent clinical information, including history, physical exam, plan and the Resident’s note and agree except as noted.

## 2020-10-29 LAB
A1C WITH ESTIMATED AVERAGE GLUCOSE RESULT: 5.7 % — HIGH (ref 4–5.6)
ALBUMIN SERPL ELPH-MCNC: 2.5 G/DL — LOW (ref 3.5–5.2)
ALBUMIN SERPL ELPH-MCNC: 2.7 G/DL — LOW (ref 3.5–5.2)
ALP SERPL-CCNC: 102 U/L — SIGNIFICANT CHANGE UP (ref 30–115)
ALP SERPL-CCNC: 108 U/L — SIGNIFICANT CHANGE UP (ref 30–115)
ALT FLD-CCNC: 125 U/L — HIGH (ref 0–41)
ALT FLD-CCNC: 137 U/L — HIGH (ref 0–41)
AMPHET UR-MCNC: NEGATIVE — SIGNIFICANT CHANGE UP
ANION GAP SERPL CALC-SCNC: 13 MMOL/L — SIGNIFICANT CHANGE UP (ref 7–14)
ANION GAP SERPL CALC-SCNC: 9 MMOL/L — SIGNIFICANT CHANGE UP (ref 7–14)
AST SERPL-CCNC: 469 U/L — HIGH (ref 0–41)
AST SERPL-CCNC: 550 U/L — HIGH (ref 0–41)
BARBITURATES UR SCN-MCNC: NEGATIVE — SIGNIFICANT CHANGE UP
BASOPHILS # BLD AUTO: 0.04 K/UL — SIGNIFICANT CHANGE UP (ref 0–0.2)
BASOPHILS NFR BLD AUTO: 0.3 % — SIGNIFICANT CHANGE UP (ref 0–1)
BENZODIAZ UR-MCNC: NEGATIVE — SIGNIFICANT CHANGE UP
BILIRUB DIRECT SERPL-MCNC: 0.7 MG/DL — HIGH (ref 0–0.2)
BILIRUB DIRECT SERPL-MCNC: 0.8 MG/DL — HIGH (ref 0–0.2)
BILIRUB INDIRECT FLD-MCNC: 0.2 MG/DL — SIGNIFICANT CHANGE UP (ref 0.2–1.2)
BILIRUB INDIRECT FLD-MCNC: 0.3 MG/DL — SIGNIFICANT CHANGE UP (ref 0.2–1.2)
BILIRUB SERPL-MCNC: 1 MG/DL — SIGNIFICANT CHANGE UP (ref 0.2–1.2)
BILIRUB SERPL-MCNC: 1 MG/DL — SIGNIFICANT CHANGE UP (ref 0.2–1.2)
BUN SERPL-MCNC: 26 MG/DL — HIGH (ref 10–20)
BUN SERPL-MCNC: 26 MG/DL — HIGH (ref 10–20)
CALCIUM SERPL-MCNC: 8.1 MG/DL — LOW (ref 8.5–10.1)
CALCIUM SERPL-MCNC: 8.3 MG/DL — LOW (ref 8.5–10.1)
CHLORIDE SERPL-SCNC: 95 MMOL/L — LOW (ref 98–110)
CHLORIDE SERPL-SCNC: 96 MMOL/L — LOW (ref 98–110)
CK MB CFR SERPL CALC: 11 NG/ML — HIGH (ref 0.6–6.3)
CK MB CFR SERPL CALC: 14.3 NG/ML — HIGH (ref 0.6–6.3)
CK MB CFR SERPL CALC: 17.5 NG/ML — HIGH (ref 0.6–6.3)
CK SERPL-CCNC: 433 U/L — HIGH (ref 0–225)
CK SERPL-CCNC: 563 U/L — HIGH (ref 0–225)
CK SERPL-CCNC: 748 U/L — HIGH (ref 0–225)
CO2 SERPL-SCNC: 21 MMOL/L — SIGNIFICANT CHANGE UP (ref 17–32)
CO2 SERPL-SCNC: 23 MMOL/L — SIGNIFICANT CHANGE UP (ref 17–32)
COCAINE METAB.OTHER UR-MCNC: NEGATIVE — SIGNIFICANT CHANGE UP
CREAT SERPL-MCNC: 1.8 MG/DL — HIGH (ref 0.7–1.5)
CREAT SERPL-MCNC: 2.2 MG/DL — HIGH (ref 0.7–1.5)
CULTURE RESULTS: NO GROWTH — SIGNIFICANT CHANGE UP
D DIMER BLD IA.RAPID-MCNC: 606 NG/ML DDU — HIGH (ref 0–230)
DRUG SCREEN 1, URINE RESULT: SIGNIFICANT CHANGE UP
EOSINOPHIL # BLD AUTO: 0.08 K/UL — SIGNIFICANT CHANGE UP (ref 0–0.7)
EOSINOPHIL NFR BLD AUTO: 0.6 % — SIGNIFICANT CHANGE UP (ref 0–8)
ESTIMATED AVERAGE GLUCOSE: 117 MG/DL — HIGH (ref 68–114)
GLUCOSE BLDC GLUCOMTR-MCNC: 106 MG/DL — HIGH (ref 70–99)
GLUCOSE BLDC GLUCOMTR-MCNC: 139 MG/DL — HIGH (ref 70–99)
GLUCOSE BLDC GLUCOMTR-MCNC: 152 MG/DL — HIGH (ref 70–99)
GLUCOSE SERPL-MCNC: 104 MG/DL — HIGH (ref 70–99)
GLUCOSE SERPL-MCNC: 97 MG/DL — SIGNIFICANT CHANGE UP (ref 70–99)
HCT VFR BLD CALC: 30.1 % — LOW (ref 37–47)
HGB BLD-MCNC: 9.3 G/DL — LOW (ref 12–16)
IMM GRANULOCYTES NFR BLD AUTO: 0.6 % — HIGH (ref 0.1–0.3)
LACTATE SERPL-SCNC: 0.9 MMOL/L — SIGNIFICANT CHANGE UP (ref 0.7–2)
LYMPHOCYTES # BLD AUTO: 0.33 K/UL — LOW (ref 1.2–3.4)
LYMPHOCYTES # BLD AUTO: 2.4 % — LOW (ref 20.5–51.1)
MCHC RBC-ENTMCNC: 27 PG — SIGNIFICANT CHANGE UP (ref 27–31)
MCHC RBC-ENTMCNC: 30.9 G/DL — LOW (ref 32–37)
MCV RBC AUTO: 87.2 FL — SIGNIFICANT CHANGE UP (ref 81–99)
METHADONE UR-MCNC: NEGATIVE — SIGNIFICANT CHANGE UP
MONOCYTES # BLD AUTO: 0.51 K/UL — SIGNIFICANT CHANGE UP (ref 0.1–0.6)
MONOCYTES NFR BLD AUTO: 3.8 % — SIGNIFICANT CHANGE UP (ref 1.7–9.3)
NEUTROPHILS # BLD AUTO: 12.49 K/UL — HIGH (ref 1.4–6.5)
NEUTROPHILS NFR BLD AUTO: 92.3 % — HIGH (ref 42.2–75.2)
NRBC # BLD: 0 /100 WBCS — SIGNIFICANT CHANGE UP (ref 0–0)
OPIATES UR-MCNC: NEGATIVE — SIGNIFICANT CHANGE UP
PCP UR-MCNC: NEGATIVE — SIGNIFICANT CHANGE UP
PLATELET # BLD AUTO: 282 K/UL — SIGNIFICANT CHANGE UP (ref 130–400)
POTASSIUM SERPL-MCNC: 3.4 MMOL/L — LOW (ref 3.5–5)
POTASSIUM SERPL-MCNC: 3.6 MMOL/L — SIGNIFICANT CHANGE UP (ref 3.5–5)
POTASSIUM SERPL-SCNC: 3.4 MMOL/L — LOW (ref 3.5–5)
POTASSIUM SERPL-SCNC: 3.6 MMOL/L — SIGNIFICANT CHANGE UP (ref 3.5–5)
PROPOXYPHENE QUALITATIVE URINE RESULT: NEGATIVE — SIGNIFICANT CHANGE UP
PROT SERPL-MCNC: 5.3 G/DL — LOW (ref 6–8)
PROT SERPL-MCNC: 5.4 G/DL — LOW (ref 6–8)
RBC # BLD: 3.45 M/UL — LOW (ref 4.2–5.4)
RBC # FLD: 19.3 % — HIGH (ref 11.5–14.5)
SODIUM SERPL-SCNC: 128 MMOL/L — LOW (ref 135–146)
SODIUM SERPL-SCNC: 129 MMOL/L — LOW (ref 135–146)
SPECIMEN SOURCE: SIGNIFICANT CHANGE UP
T3 SERPL-MCNC: 44 NG/DL — LOW (ref 80–200)
T3 SERPL-MCNC: 48 NG/DL — LOW (ref 80–200)
T4 AB SER-ACNC: 4.5 UG/DL — LOW (ref 4.6–12)
T4 AB SER-ACNC: 4.7 UG/DL — SIGNIFICANT CHANGE UP (ref 4.6–12)
T4 FREE SERPL-MCNC: 1 NG/DL — SIGNIFICANT CHANGE UP (ref 0.9–1.8)
THC UR QL: NEGATIVE — SIGNIFICANT CHANGE UP
TROPONIN T SERPL-MCNC: 0.54 NG/ML — CRITICAL HIGH
TROPONIN T SERPL-MCNC: 0.59 NG/ML — CRITICAL HIGH
TROPONIN T SERPL-MCNC: 0.6 NG/ML — CRITICAL HIGH
TSH SERPL-MCNC: 1.43 UIU/ML — SIGNIFICANT CHANGE UP (ref 0.27–4.2)
TSH SERPL-MCNC: 1.47 UIU/ML — SIGNIFICANT CHANGE UP (ref 0.27–4.2)
TSH SERPL-MCNC: 1.72 UIU/ML — SIGNIFICANT CHANGE UP (ref 0.27–4.2)
WBC # BLD: 13.53 K/UL — HIGH (ref 4.8–10.8)
WBC # FLD AUTO: 13.53 K/UL — HIGH (ref 4.8–10.8)

## 2020-10-29 PROCEDURE — 93010 ELECTROCARDIOGRAM REPORT: CPT

## 2020-10-29 PROCEDURE — 99222 1ST HOSP IP/OBS MODERATE 55: CPT

## 2020-10-29 PROCEDURE — 71045 X-RAY EXAM CHEST 1 VIEW: CPT | Mod: 26

## 2020-10-29 PROCEDURE — 74018 RADEX ABDOMEN 1 VIEW: CPT | Mod: 26

## 2020-10-29 RX ORDER — ASPIRIN/CALCIUM CARB/MAGNESIUM 324 MG
81 TABLET ORAL DAILY
Refills: 0 | Status: DISCONTINUED | OUTPATIENT
Start: 2020-10-30 | End: 2020-10-31

## 2020-10-29 RX ORDER — METOPROLOL TARTRATE 50 MG
25 TABLET ORAL
Refills: 0 | Status: DISCONTINUED | OUTPATIENT
Start: 2020-10-29 | End: 2020-10-30

## 2020-10-29 RX ORDER — CLOPIDOGREL BISULFATE 75 MG/1
75 TABLET, FILM COATED ORAL DAILY
Refills: 0 | Status: DISCONTINUED | OUTPATIENT
Start: 2020-10-30 | End: 2020-10-31

## 2020-10-29 RX ORDER — INFLUENZA VIRUS VACCINE 15; 15; 15; 15 UG/.5ML; UG/.5ML; UG/.5ML; UG/.5ML
0.5 SUSPENSION INTRAMUSCULAR ONCE
Refills: 0 | Status: DISCONTINUED | OUTPATIENT
Start: 2020-10-29 | End: 2020-10-31

## 2020-10-29 RX ORDER — HEPARIN SODIUM 5000 [USP'U]/ML
1000 INJECTION INTRAVENOUS; SUBCUTANEOUS
Qty: 25000 | Refills: 0 | Status: DISCONTINUED | OUTPATIENT
Start: 2020-10-29 | End: 2020-10-30

## 2020-10-29 RX ORDER — ASPIRIN/CALCIUM CARB/MAGNESIUM 324 MG
325 TABLET ORAL ONCE
Refills: 0 | Status: COMPLETED | OUTPATIENT
Start: 2020-10-29 | End: 2020-10-29

## 2020-10-29 RX ORDER — ATORVASTATIN CALCIUM 80 MG/1
80 TABLET, FILM COATED ORAL AT BEDTIME
Refills: 0 | Status: DISCONTINUED | OUTPATIENT
Start: 2020-10-29 | End: 2020-10-31

## 2020-10-29 RX ORDER — CLOPIDOGREL BISULFATE 75 MG/1
300 TABLET, FILM COATED ORAL ONCE
Refills: 0 | Status: COMPLETED | OUTPATIENT
Start: 2020-10-29 | End: 2020-10-29

## 2020-10-29 RX ORDER — NOREPINEPHRINE BITARTRATE/D5W 8 MG/250ML
0.06 PLASTIC BAG, INJECTION (ML) INTRAVENOUS
Qty: 8 | Refills: 0 | Status: DISCONTINUED | OUTPATIENT
Start: 2020-10-29 | End: 2020-10-31

## 2020-10-29 RX ORDER — ATORVASTATIN CALCIUM 80 MG/1
40 TABLET, FILM COATED ORAL AT BEDTIME
Refills: 0 | Status: DISCONTINUED | OUTPATIENT
Start: 2020-10-29 | End: 2020-10-29

## 2020-10-29 RX ORDER — POTASSIUM CHLORIDE 20 MEQ
20 PACKET (EA) ORAL ONCE
Refills: 0 | Status: COMPLETED | OUTPATIENT
Start: 2020-10-29 | End: 2020-10-29

## 2020-10-29 RX ORDER — HEPARIN SODIUM 5000 [USP'U]/ML
5000 INJECTION INTRAVENOUS; SUBCUTANEOUS EVERY 12 HOURS
Refills: 0 | Status: DISCONTINUED | OUTPATIENT
Start: 2020-10-29 | End: 2020-10-29

## 2020-10-29 RX ORDER — POTASSIUM CHLORIDE 20 MEQ
10 PACKET (EA) ORAL ONCE
Refills: 0 | Status: DISCONTINUED | OUTPATIENT
Start: 2020-10-29 | End: 2020-10-29

## 2020-10-29 RX ORDER — SODIUM CHLORIDE 9 MG/ML
1000 INJECTION, SOLUTION INTRAVENOUS
Refills: 0 | Status: DISCONTINUED | OUTPATIENT
Start: 2020-10-29 | End: 2020-10-29

## 2020-10-29 RX ADMIN — SODIUM CHLORIDE 75 MILLILITER(S): 9 INJECTION, SOLUTION INTRAVENOUS at 05:11

## 2020-10-29 RX ADMIN — CLOPIDOGREL BISULFATE 300 MILLIGRAM(S): 75 TABLET, FILM COATED ORAL at 21:57

## 2020-10-29 RX ADMIN — Medication 1 GRAM(S): at 05:26

## 2020-10-29 RX ADMIN — PANTOPRAZOLE SODIUM 40 MILLIGRAM(S): 20 TABLET, DELAYED RELEASE ORAL at 05:26

## 2020-10-29 RX ADMIN — Medication 1 TABLET(S): at 21:59

## 2020-10-29 RX ADMIN — Medication 100 MICROGRAM(S): at 05:26

## 2020-10-29 RX ADMIN — ATORVASTATIN CALCIUM 80 MILLIGRAM(S): 80 TABLET, FILM COATED ORAL at 21:59

## 2020-10-29 RX ADMIN — Medication 1 GRAM(S): at 23:28

## 2020-10-29 RX ADMIN — BUDESONIDE AND FORMOTEROL FUMARATE DIHYDRATE 2 PUFF(S): 160; 4.5 AEROSOL RESPIRATORY (INHALATION) at 09:28

## 2020-10-29 RX ADMIN — Medication 100 MILLIGRAM(S): at 06:10

## 2020-10-29 RX ADMIN — Medication 25 MILLIGRAM(S): at 17:04

## 2020-10-29 RX ADMIN — PANTOPRAZOLE SODIUM 40 MILLIGRAM(S): 20 TABLET, DELAYED RELEASE ORAL at 17:05

## 2020-10-29 RX ADMIN — CEFEPIME 100 MILLIGRAM(S): 1 INJECTION, POWDER, FOR SOLUTION INTRAMUSCULAR; INTRAVENOUS at 06:51

## 2020-10-29 RX ADMIN — CEFEPIME 100 MILLIGRAM(S): 1 INJECTION, POWDER, FOR SOLUTION INTRAMUSCULAR; INTRAVENOUS at 17:06

## 2020-10-29 RX ADMIN — HEPARIN SODIUM 10 UNIT(S)/HR: 5000 INJECTION INTRAVENOUS; SUBCUTANEOUS at 21:24

## 2020-10-29 RX ADMIN — Medication 1 GRAM(S): at 17:04

## 2020-10-29 RX ADMIN — CHLORHEXIDINE GLUCONATE 1 APPLICATION(S): 213 SOLUTION TOPICAL at 05:24

## 2020-10-29 RX ADMIN — Medication 50 MILLIEQUIVALENT(S): at 09:28

## 2020-10-29 RX ADMIN — Medication 100 MILLIGRAM(S): at 17:06

## 2020-10-29 RX ADMIN — Medication 1 TABLET(S): at 05:26

## 2020-10-29 RX ADMIN — Medication 325 MILLIGRAM(S): at 12:18

## 2020-10-29 RX ADMIN — Medication 1 GRAM(S): at 12:18

## 2020-10-29 RX ADMIN — Medication 1 TABLET(S): at 16:04

## 2020-10-29 NOTE — CONSULT NOTE ADULT - ATTENDING COMMENTS
Pt seen examined  Agree with above  Pt with TRAVIS and septic shock, recently treated for acute PN, readmitted with altered MS and hypotension.  On pressors, LR, Cefepime and Vanco (reduced dose)  Travis - prerenal vs ATN or AIN  - check Urine for Lytes and creat, also for Eo  creat is already improving (doubt AIN)  - kidney sono 1 week ago was neg for hydro, had Prevoid residual 600 cc  - strict ais and Os  Hyponatremia - hypovolemic, low Urine na OSm>300  use isotonic saline, f/u TSH  will follow

## 2020-10-29 NOTE — CONSULT NOTE ADULT - SUBJECTIVE AND OBJECTIVE BOX
Patient is a 55y old  Female who presents with a chief complaint of Septic Shock (28 Oct 2020 22:05)      HPI:  53 y/o female with PMHx of Type II DM, HFpEF (EF of 72 %), CAD, hyperlipidemia, UGIB 2/2 duodenal ulcer, HTN, Hypothyroidism, COPD, Dextrocardia, Sciatica/Peripheral Neuralgia, and Charcot foot deformity s/p reconstruction on 19 presented for hypotension, lethargy and black stool. The patient was recently admitted to the hospital for sepsis secondary to acute pyelonephritis and was d/c on PO Vantin (compliant per patient and daughter). Since her discharge the patient has been experiencing worsening fatigue and confusion per daughter. Today the patient experienced 2 episodes of melena, appeared altered to the daughter (seeing things that weren't there) and had persistently low BP ems was called.    Patient HoTN on initial encounter 94/56 mmHg. Labs revealed elevated WBC w/ stable Hb, along w/ JEEVAN and Transaminitis. CT Head and A/P negative. CXR showing b/l opacities but patient denies SOB, Cough, Sputum. Patient received Cefepime, Vanco and Narcan in ED, placed on Levo and being admitted for Septic Shock.    (28 Oct 2020 22:05)      PAST MEDICAL & SURGICAL HISTORY:  Dextrocardia    Chronic midline low back pain with bilateral sciatica    Peripheral neuralgia    Essential hypertension    Diabetes 1.5, managed as type 2    Charcot&#x27;s arthropathy  R foot    Charcot foot due to diabetes mellitus    H/O shoulder surgery  Right shoulder surgery     delivery delivered        SOCIAL HX:   Smoking       Active smoker                   ETOH                            Other    FAMILY HISTORY:  No pertinent family history in first degree relatives    :  No known cardiovacular family hisotry     Review Of Systems:     All ROS are negative except per HPI       Allergies    No Known Allergies    Intolerances          PHYSICAL EXAM    ICU Vital Signs Last 24 Hrs  T(C): 35.8 (29 Oct 2020 08:00), Max: 37.4 (29 Oct 2020 02:30)  T(F): 96.5 (29 Oct 2020 08:00), Max: 99.3 (29 Oct 2020 02:30)  HR: 98 (29 Oct 2020 08:15) (75 - 100)  BP: 110/62 (29 Oct 2020 08:15) (56/22 - 127/79)  BP(mean): 84 (29 Oct 2020 08:15) (62 - 84)  ABP: --  ABP(mean): --  RR: 27 (29 Oct 2020 08:15) (12 - 31)  SpO2: 92% (29 Oct 2020 08:15) (85% - 99%)      CONSTITUTIONAL:  Ill appearing  NAD    ENT:   Airway patent,   Mouth with normal mucosa.   No thrush    EYES:   pupils equal,   round and reactive to light.    CARDIAC:   Tachycardic   Regular rhythm.    Heart sounds S1, S2.   No edema      Vascular:   normal systolic impulse  no bruits    RESPIRATORY:   No wheezing   Normal chest expansion  No use of accessory muscles    GASTROINTESTINAL:  Abdomen soft   Non-tender,   No guarding,   + BS    GENITOURINARY  normal genitalia for sex  no edema    MUSCULOSKELETAL:   Range of motion is not limited,  Nno clubbing, cyanosis    NEUROLOGICAL:   Alert and oriented   No motor or sensory deficits.      SKIN:   Skin normal color for race,   Warm and dry  No evidence of rash.    PSYCHIATRIC:   Normal mood and affect.   No apparent risk to self or others.    HEME LYMPH:   No cervical  lymphadenopathy.  No inguinal lymphadenopathy            10-28-20 @ 07:01  -  10-29-20 @ 07:00  --------------------------------------------------------  IN:    IV PiggyBack: 150 mL    Lactated Ringers: 150 mL    Norepinephrine: 48.2 mL    Norepinephrine: 1.5 mL  Total IN: 349.7 mL    OUT:    Indwelling Catheter - Urethral (mL): 1000 mL  Total OUT: 1000 mL    Total NET: -650.3 mL          LABS:                          9.3    13.53 )-----------( 282      ( 29 Oct 2020 04:50 )             30.1                  9.3    13.53 )-----------( 282      ( 10-29 @ 04:50 )             30.1                9.7    19.26 )-----------( 370      ( 10-28 @ 12:55 )             31.1                                                   10-29    128<L>  |  96<L>  |  26<H>  ----------------------------<  97  3.4<L>   |  23  |  1.8<H>    Creatinine Trend  BUN 26, Cr 1.8, (10-29-20 @ 04:50)  Creatinine Trend  BUN 26, Cr 2.2, (10-29-20 @ 00:00)  Creatinine Trend  BUN 31, Cr 3.4, (10-28-20 @ 12:55)      Ca    8.1<L>      29 Oct 2020 04:50    TPro  5.3<L>  /  Alb  2.5<L>  /  TBili  1.0  /  DBili  0.7<H>  /  AST  469<H>  /  ALT  125<H>  /  AlkPhos  102  10-29      PT/INR - ( 28 Oct 2020 12:55 )   PT: 23.10 sec;   INR: 2.01 ratio         PTT - ( 28 Oct 2020 12:55 )  PTT:28.6 sec                                       Urinalysis Basic - ( 28 Oct 2020 14:10 )    Color: Yellow / Appearance: Clear / S.014 / pH: x  Gluc: x / Ketone: Trace  / Bili: Negative / Urobili: <2 mg/dL   Blood: x / Protein: 30 mg/dL / Nitrite: Negative   Leuk Esterase: Small / RBC: 9 /HPF / WBC 2 /HPF   Sq Epi: x / Non Sq Epi: 4 /HPF / Bacteria: Negative        CARDIAC MARKERS ( 29 Oct 2020 04:50 )  x     / 0.59 ng/mL / 563 U/L / x     / 14.3 ng/mL  CARDIAC MARKERS ( 29 Oct 2020 00:00 )  x     / 0.60 ng/mL / 748 U/L / x     / 17.5 ng/mL  CARDIAC MARKERS ( 28 Oct 2020 18:25 )  x     / 0.59 ng/mL / x     / x     / x      CARDIAC MARKERS ( 28 Oct 2020 14:31 )  x     / 0.81 ng/mL / x     / x     / x                                                LIVER FUNCTIONS - ( 29 Oct 2020 04:50 )  Alb: 2.5 g/dL / Pro: 5.3 g/dL / ALK PHOS: 102 U/L / ALT: 125 U/L / AST: 469 U/L / GGT: x                                                                                                                                       X-Rays reviewed:                                                                                    ECHO    CXR interpreted by me:  Bilateral infiltates     MEDICATIONS  (STANDING):  budesonide  80 MICROgram(s)/formoterol 4.5 MICROgram(s) Inhaler 2 Puff(s) Inhalation two times a day  calcium carbonate   1250 mG (OsCal) 1 Tablet(s) Oral three times a day  cefepime   IVPB 1000 milliGRAM(s) IV Intermittent every 12 hours  cefepime   IVPB      chlorhexidine 4% Liquid 1 Application(s) Topical <User Schedule>  influenza   Vaccine 0.5 milliLiter(s) IntraMuscular once  lactated ringers. 1000 milliLiter(s) (75 mL/Hr) IV Continuous <Continuous>  levothyroxine 100 MICROGram(s) Oral daily  norepinephrine Infusion 0.06 MICROgram(s)/kG/Min (9.38 mL/Hr) IV Continuous <Continuous>  pantoprazole  Injectable 40 milliGRAM(s) IV Push two times a day  potassium chloride  20 mEq/100 mL IVPB 20 milliEquivalent(s) IV Intermittent once  senna 2 Tablet(s) Oral at bedtime  sucralfate 1 Gram(s) Oral four times a day  vancomycin  IVPB 500 milliGRAM(s) IV Intermittent every 12 hours  vancomycin  IVPB        MEDICATIONS  (PRN):  ALBUTerol    90 MICROgram(s) HFA Inhaler 2 Puff(s) Inhalation every 6 hours PRN Shortness of Breath and/or Wheezing

## 2020-10-29 NOTE — PROGRESS NOTE ADULT - ASSESSMENT
IMPRESSION:    NSTEMI   JEEVAN improving  Pulmonary edema   HO Dextrocardia  Recent therapy for Pyelonephritis  HO COPD   Active smoker     PLAN:    CNS:  No depressants     HEENT: Oral care    PULMONARY: HOB @ 45 degrees. Aspiration precautions. Nebs PRN.      CARDIOVASCULAR: ASA 81mg/daily. Metoprolol 25mg 2x/daily. TTE ordered. F/u cardiology consult. I left a message with Dr. Patel's office, private MD consult also placed. Daily EKG. DC IVF. F/u troponin levels.    GI: GI prophylaxis. Feeding. Bowel regimen.     RENAL:  Follow up lytes. Correct as needed.      INFECTIOUS DISEASE: Follow up cultures. D/c abx. Procalcitonin ordered, f/u.    HEMATOLOGICAL: DVT prophylaxis. AC per cardiology.     ENDOCRINE: Follow up FS. Insulin protocol if needed.    MUSCULOSKELETAL: Bed rest.      ICU monitoring     <<----- Click to add NO significant Past Surgical History

## 2020-10-29 NOTE — CONSULT NOTE ADULT - ASSESSMENT
Assessment    Pt is a 56yo woman on her 2nd day at the hospital w/ cc of fatigue and AMS admitted to the ICU w/ shock 2/2 acute pyelonephritis w/ toxic metabolic encephalopathy  and secondary diagnosis of JEEVAN.     Pt still altered, but able to answer questions - states she "doesn't feel well and would like to go home".     Plan    #Pre-Renal JEEVAN 2/2 hypovolemia    -BP on arrival 94/56   -Cr 1.8 from 3.4 w/ 3L of LR administered.  -collect urine eosinophils to r/o intrinsic JEEVAN  -continue hydration, monitor for fluid overload, BMP daily    #Hypovolemic Hyponatremia   -Na 128  -collect urine and plasma osms  -Switch from LR to NS. Augment with K, continue to monitor electrolytes.

## 2020-10-29 NOTE — SWALLOW BEDSIDE ASSESSMENT ADULT - SWALLOW EVAL: DIAGNOSIS
+toleration of thin liquids w/o overt s/s of penetration/aspiration. No heavier consistencies provided 2' pt only cleared for "clear liquids" per GI at this time

## 2020-10-29 NOTE — PHARMACOTHERAPY INTERVENTION NOTE - COMMENTS
ASA 325mg po x1, then ASA 81mg daily, slotted 10/29/20 administration, recommended changing ASA 81mg to 10/30/20

## 2020-10-29 NOTE — PHARMACOTHERAPY INTERVENTION NOTE - COMMENTS
[id number] IV x1, pt is being administered a KCl 20meq IV yessica, d/w team, will cancel KCl 10meq rider

## 2020-10-29 NOTE — CONSULT NOTE ADULT - SUBJECTIVE AND OBJECTIVE BOX
Patient is a 55y old  Female who presents with a chief complaint of Septic Shock (29 Oct 2020 16:26)      HPI:  55 y/o female with PMHx of Type II DM, HFpEF (EF of 72 %), CAD, hyperlipidemia, UGIB 2/2 duodenal ulcer, HTN, Hypothyroidism, COPD, Dextrocardia, Sciatica/Peripheral Neuralgia, and Charcot foot deformity s/p reconstruction on 19 presented for hypotension, lethargy and black stool. The patient was recently admitted to the hospital for sepsis secondary to acute pyelonephritis and was d/c on PO Vantin (compliant per patient and daughter). Since her discharge the patient has been experiencing worsening fatigue and confusion per daughter. Today the patient experienced 2 episodes of melena, appeared altered to the daughter (seeing things that weren't there) and had persistently low BP ems was called.    Patient HoTN on initial encounter 94/56 mmHg. Labs revealed elevated WBC w/ stable Hb, along w/ JEEVAN and Transaminitis. CT Head and A/P negative. CXR showing b/l opacities but patient denies SOB, Cough, Sputum. Patient received Cefepime, Vanco and Narcan in ED, placed on Levo and being admitted for Septic Shock.    (28 Oct 2020 22:05)      PAST MEDICAL & SURGICAL HISTORY:  Dextrocardia    Chronic midline low back pain with bilateral sciatica    Peripheral neuralgia    Essential hypertension    Diabetes 1.5, managed as type 2    Charcot&#x27;s arthropathy  R foot    Charcot foot due to diabetes mellitus    H/O shoulder surgery  Right shoulder surgery     delivery delivered        PREVIOUS DIAGNOSTIC TESTING:      ECHO  FINDINGS:    STRESS  FINDINGS:    CATHETERIZATION  FINDINGS:    MEDICATIONS  (STANDING):  aspirin  chewable 81 milliGRAM(s) Oral daily  atorvastatin 40 milliGRAM(s) Oral at bedtime  budesonide  80 MICROgram(s)/formoterol 4.5 MICROgram(s) Inhaler 2 Puff(s) Inhalation two times a day  calcium carbonate   1250 mG (OsCal) 1 Tablet(s) Oral three times a day  cefepime   IVPB 1000 milliGRAM(s) IV Intermittent every 12 hours  cefepime   IVPB      chlorhexidine 4% Liquid 1 Application(s) Topical <User Schedule>  heparin   Injectable 5000 Unit(s) SubCutaneous every 12 hours  influenza   Vaccine 0.5 milliLiter(s) IntraMuscular once  levothyroxine 100 MICROGram(s) Oral daily  metoprolol tartrate 25 milliGRAM(s) Oral two times a day  norepinephrine Infusion 0.06 MICROgram(s)/kG/Min (9.38 mL/Hr) IV Continuous <Continuous>  pantoprazole  Injectable 40 milliGRAM(s) IV Push two times a day  senna 2 Tablet(s) Oral at bedtime  sucralfate 1 Gram(s) Oral four times a day  vancomycin  IVPB 500 milliGRAM(s) IV Intermittent every 12 hours  vancomycin  IVPB        MEDICATIONS  (PRN):  ALBUTerol    90 MICROgram(s) HFA Inhaler 2 Puff(s) Inhalation every 6 hours PRN Shortness of Breath and/or Wheezing      FAMILY HISTORY:  No pertinent family history in first degree relatives        SOCIAL HISTORY:  CIGARETTES:    ALCOHOL:    REVIEW OF SYSTEMS:  CONSTITUTIONAL: No fever, weight loss, or fatigue  NECK: No pain or stiffness  RESPIRATORY: No cough, wheezing, chills or hemoptysis; No shortness of breath  CARDIOVASCULAR: No chest pain, palpitations, dizziness, or leg swelling  GASTROINTESTINAL: No abdominal or epigastric pain. No nausea, vomiting, or hematemesis; No diarrhea or constipation. No melena or hematochezia.  GENITOURINARY: No dysuria, frequency, hematuria, or incontinence  NEUROLOGICAL: No headaches, memory loss, loss of strength, numbness, or tremors  SKIN: No itching, burning, rashes, or lesions   ENDOCRINE: No heat or cold intolerance; No hair loss  MUSCULOSKELETAL: No joint pain or swelling; No muscle, back, or extremity pain  HEME/LYMPH: No easy bruising, or bleeding gums          Vital Signs Last 24 Hrs  T(C): 36.5 (29 Oct 2020 12:00), Max: 37.4 (29 Oct 2020 02:30)  T(F): 97.7 (29 Oct 2020 12:00), Max: 99.3 (29 Oct 2020 02:30)  HR: 98 (29 Oct 2020 17:15) (90 - 110)  BP: 106/66 (29 Oct 2020 17:15) (88/49 - 133/72)  BP(mean): 85 (29 Oct 2020 17:15) (58 - 95)  RR: 32 (29 Oct 2020 17:15) (12 - 32)  SpO2: 94% (29 Oct 2020 17:15) (85% - 99%)        PHYSICAL EXAM:  GENERAL: NAD, well-groomed, well-developed  HEAD:  Atraumatic, Normocephalic  NECK: Supple, No JVD, Normal thyroid  NERVOUS SYSTEM:  Alert & Oriented X3, Good concentration  CHEST/LUNG: Clear to percussion bilaterally; No rales, rhonchi, wheezing, or rubs  HEART: Regular rate and rhythm; No murmurs, rubs, or gallops  ABDOMEN: Soft, Nontender, Nondistended; Bowel sounds present  EXTREMITIES:  2+ Peripheral Pulses, No clubbing, cyanosis, or edema  SKIN: No rashes or lesions    INTERPRETATION OF TELEMETRY:    ECG:    I&O's Detail    28 Oct 2020 07:  -  29 Oct 2020 07:00  --------------------------------------------------------  IN:    IV PiggyBack: 150 mL    Lactated Ringers: 150 mL    Norepinephrine: 48.2 mL    Norepinephrine: 1.5 mL  Total IN: 349.7 mL    OUT:    Indwelling Catheter - Urethral (mL): 1000 mL  Total OUT: 1000 mL    Total NET: -650.3 mL      29 Oct 2020 07:  -  29 Oct 2020 19:48  --------------------------------------------------------  IN:    IV PiggyBack: 150 mL    Norepinephrine: 3.2 mL  Total IN: 153.2 mL    OUT:    Indwelling Catheter - Urethral (mL): 1410 mL  Total OUT: 1410 mL    Total NET: -1256.8 mL          LABS:                        9.3    13.53 )-----------( 282      ( 29 Oct 2020 04:50 )             30.1     10-    128<L>  |  96<L>  |  26<H>  ----------------------------<  97  3.4<L>   |  23  |  1.8<H>    Ca    8.1<L>      29 Oct 2020 04:50    TPro  5.3<L>  /  Alb  2.5<L>  /  TBili  1.0  /  DBili  0.7<H>  /  AST  469<H>  /  ALT  125<H>  /  AlkPhos  102  10-29    CARDIAC MARKERS ( 29 Oct 2020 10:06 )  x     / 0.54 ng/mL / 433 U/L / x     / 11.0 ng/mL  CARDIAC MARKERS ( 29 Oct 2020 04:50 )  x     / 0.59 ng/mL / 563 U/L / x     / 14.3 ng/mL  CARDIAC MARKERS ( 29 Oct 2020 00:00 )  x     / 0.60 ng/mL / 748 U/L / x     / 17.5 ng/mL  CARDIAC MARKERS ( 28 Oct 2020 18:25 )  x     / 0.59 ng/mL / x     / x     / x      CARDIAC MARKERS ( 28 Oct 2020 14:31 )  x     / 0.81 ng/mL / x     / x     / x          PT/INR - ( 28 Oct 2020 12:55 )   PT: 23.10 sec;   INR: 2.01 ratio         PTT - ( 28 Oct 2020 12:55 )  PTT:28.6 sec  Urinalysis Basic - ( 28 Oct 2020 14:10 )    Color: Yellow / Appearance: Clear / S.014 / pH: x  Gluc: x / Ketone: Trace  / Bili: Negative / Urobili: <2 mg/dL   Blood: x / Protein: 30 mg/dL / Nitrite: Negative   Leuk Esterase: Small / RBC: 9 /HPF / WBC 2 /HPF   Sq Epi: x / Non Sq Epi: 4 /HPF / Bacteria: Negative      I&O's Summary    28 Oct 2020 07:01  -  29 Oct 2020 07:00  --------------------------------------------------------  IN: 349.7 mL / OUT: 1000 mL / NET: -650.3 mL    29 Oct 2020 07:01  -  29 Oct 2020 19:48  --------------------------------------------------------  IN: 153.2 mL / OUT: 1410 mL / NET: -1256.8 mL        RADIOLOGY & ADDITIONAL STUDIES:

## 2020-10-29 NOTE — CONSULT NOTE ADULT - SUBJECTIVE AND OBJECTIVE BOX
NEPHROLOGY CONSULTATION NOTE    THIS CONSULT IS INCOMPLETE / FULL CONSULT TO FOLLOW    Patient is a 55y Female whom presented to the hospital with lethargy and altered mental status.     Pt was BIBEMS called by daughter who described her mother's worsening fatigue, confusion, black stools, and visual hallucinations. Pt was recently discharged on 10/23 on Vantin for acute pyelonephritis.    Patient HoTN on initial encounter 94/56 mmHg. Labs revealed elevated WBC w/ stable Hb, along w/ JEEVAN and Transaminitis. CT Head and A/P negative. CXR showing b/l opacities but patient denies SOB, Cough, Sputum. Patient received Cefepime, Vanco and Narcan in ED, placed on Levo and being admitted for Septic Shock.     PAST MEDICAL & SURGICAL HISTORY:    Dextrocardia, Bud Charri malformation type I    Essential hypertension, hyperlipidemia, CVD    Diabetes 1.5, managed as type 2,   Charcot's arthropathy of R foot due to diabetes mellitus, Peripheral neuralgia    Chronic midline low back pain with bilateral sciatica    Right shoulder surgery,  delivery       Allergies:  No Known Allergies    Home Medications Reviewed  Hospital Medications:   MEDICATIONS  (STANDING):  aspirin  chewable 81 milliGRAM(s) Oral daily  atorvastatin 40 milliGRAM(s) Oral at bedtime  budesonide  80 MICROgram(s)/formoterol 4.5 MICROgram(s) Inhaler 2 Puff(s) Inhalation two times a day  calcium carbonate   1250 mG (OsCal) 1 Tablet(s) Oral three times a day  cefepime   IVPB 1000 milliGRAM(s) IV Intermittent every 12 hours  cefepime   IVPB      chlorhexidine 4% Liquid 1 Application(s) Topical <User Schedule>  influenza   Vaccine 0.5 milliLiter(s) IntraMuscular once  levothyroxine 100 MICROGram(s) Oral daily  metoprolol tartrate 25 milliGRAM(s) Oral two times a day  norepinephrine Infusion 0.06 MICROgram(s)/kG/Min (9.38 mL/Hr) IV Continuous <Continuous>  pantoprazole  Injectable 40 milliGRAM(s) IV Push two times a day  senna 2 Tablet(s) Oral at bedtime  sucralfate 1 Gram(s) Oral four times a day  vancomycin  IVPB 500 milliGRAM(s) IV Intermittent every 12 hours  vancomycin  IVPB          SOCIAL HISTORY:  Current smoker    FAMILY HISTORY:  No pertinent family history in first degree relatives      REVIEW OF SYSTEMS:  *pt is altered, but indicated the following  CONSTITUTIONAL: endorses weakness, denies fevers and denies chills  EYES/ENT: No visual changes;  No vertigo or throat pain   NECK: No pain or stiffness  RESPIRATORY: No cough, wheezing, hemoptysis; No shortness of breath  CARDIOVASCULAR: No chest pain or palpitations.  GASTROINTESTINAL: No abdominal or epigastric pain. No nausea, vomiting, or hematemesis; No diarrhea or constipation. daughter endorses black stools  GENITOURINARY: No dysuria, frequency, foamy urine, urinary urgency, incontinence or hematuria  NEUROLOGICAL: No numbness or weakness  SKIN: No itching, burning, rashes, or lesions   VASCULAR: No bilateral lower extremity edema.   All other review of systems is negative unless indicated above.    VITALS:  T(F): 97.7 (10-29-20 @ 12:00), Max: 99.3 (10-29-20 @ 02:30)  HR: 100 (10-29-20 @ 12:30)  BP: 95/54 (10-29-20 @ 12:30)  RR: 16 (10-29-20 @ 12:30)  SpO2: 96% (10-29-20 @ 12:30)    10-28 @ 07:  -  10-29 @ 07:00  --------------------------------------------------------  IN: 349.7 mL / OUT: 1000 mL / NET: -650.3 mL    10-29 @ 07:  -  10-29 @ 14:09  --------------------------------------------------------  IN: 0 mL / OUT: 760 mL / NET: -760 mL      Height (cm): 160 (10-29 @ 02:30)  Weight (kg): 82.8 (10-29 @ 02:30)  BMI (kg/m2): 32.3 (10-29 @ 02:30)  BSA (m2): 1.86 (10-29 @ 02:30)    10-28-20 @ 07:  -  10-29-20 @ 07:00  --------------------------------------------------------  IN: 0 mL / OUT: 1000 mL / NET: -1000 mL    10-29-20 @ 07:01  -  10-29-20 @ 14:09  --------------------------------------------------------  IN: 0 mL / OUT: 760 mL / NET: -760 mL      I&O's Detail    28 Oct 2020 07:  -  29 Oct 2020 07:00  --------------------------------------------------------  IN:    IV PiggyBack: 150 mL    Lactated Ringers: 150 mL    Norepinephrine: 48.2 mL    Norepinephrine: 1.5 mL  Total IN: 349.7 mL    OUT:    Indwelling Catheter - Urethral (mL): 1000 mL  Total OUT: 1000 mL    Total NET: -650.3 mL      29 Oct 2020 07:  -  29 Oct 2020 14:09  --------------------------------------------------------  IN:  Total IN: 0 mL    OUT:    Indwelling Catheter - Urethral (mL): 760 mL  Total OUT: 760 mL    Total NET: -760 mL        Creatine Kinase, Serum: 433 U/L (10-29-20 @ 10:06)  Creatine Kinase, Serum: 563 U/L (10-29-20 @ 04:50)  Creatine Kinase, Serum: 748 U/L (10-29-20 @ 00:00)      PHYSICAL EXAM:  Constitutional: NAD, somnolent   HEENT: anicteric sclera  Neck: No JVD  Respiratory: CTAB, no rales or rhonchi or crackles  Cardiovascular: S1, S2, RRR. No rub, no murmur  Gastrointestinal: BS+, soft, NT/ND  Extremities: No cyanosis or clubbing. No peripheral edema  Neurological: A/O x 2, no focal deficits  Psychiatric: Normal mood, normal affect, sluggish responses.   : No CVA tenderness. Has salinas.   Skin: No rashes  Vascular Access: central line    LABS:  10-29    128<L>  |  96<L>  |  26<H>  ----------------------------<  97  3.4<L>   |  23  |  1.8<H>    Ca    8.1<L>      29 Oct 2020 04:50    TPro  5.3<L>  /  Alb  2.5<L>  /  TBili  1.0  /  DBili  0.7<H>  /  AST  469<H>  /  ALT  125<H>  /  AlkPhos  102  10-29    Creatinine Trend: 1.8 <--, 2.2 <--, 3.4 <--, 0.6 <--, 0.7 <--, 0.9 <--, 1.1 <--, 1.3 <--, 1.7 <--, 2.0 <--, 2.3 <--, 1.0 <--, 1.1 <--                        9.3    13.53 )-----------( 282      ( 29 Oct 2020 04:50 )             30.1     Urine Studies:  Urinalysis Basic - ( 28 Oct 2020 14:10 )    Color: Yellow / Appearance: Clear / S.014 / pH:   Gluc:  / Ketone: Trace  / Bili: Negative / Urobili: <2 mg/dL   Blood:  / Protein: 30 mg/dL / Nitrite: Negative   Leuk Esterase: Small / RBC: 9 /HPF / WBC 2 /HPF   Sq Epi:  / Non Sq Epi: 4 /HPF / Bacteria: Negative      Sodium, Random Urine: 25.0 mmoL/L (10-28 @ 21:50)  Creatinine, Random Urine: 38 mg/dL (10-28 @ 21:50)  Protein/Creatinine Ratio Calculation: 1.2 Ratio (10-28 @ 21:50)            RADIOLOGY & ADDITIONAL STUDIES:  < from: Xray Kidney Ureter Bladder (10.29.20 @ 09:44) >    EXAM:  XR KUB 1 VIEW            PROCEDURE DATE:  10/29/2020            INTERPRETATION:  Clinical History / Reason for exam: Septic shock.    Technique: Single frontal view of the abdomen.    Correlation: CT of the abdomen and pelvis from one day earlier.    Findings:  There is a nonobstructive bowel gas pattern.    No acute osseus abnormality.    Impression:  Nonobstructive bowel gas pattern.            REI MCKINLEY M.D., RESIDENT RADIOLOGIST  This document has been electronically signed.  ANGEL JONES MD; Attending Radiologist  This document has been electronically signed. Oct 29 2020 10:36AM    < end of copied text >   NEPHROLOGY CONSULTATION NOTE    Patient is a 55y Female whom presented to the hospital with lethargy and altered mental status.     Pt was BIBEMS called by daughter who described her mother's worsening fatigue, confusion, black stools, and visual hallucinations. Pt was recently discharged on 10/23 on Vantin for acute pyelonephritis.    54 y/o female with PMHx of Type II DM, HFpEF (EF of 72 %), CAD, hyperlipidemia, UGIB 2/2 duodenal ulcer, HTN, Hypothyroidism, COPD, Dextrocardia, Sciatica/Peripheral Neuralgia, and Charcot foot deformity s/p reconstruction on 19 presented for hypotension, lethargy and black stool. The patient was recently admitted to the hospital for sepsis secondary to acute pyelonephritis and was d/c on PO Vantin (compliant per patient and daughter). Since her discharge the patient has been experiencing worsening fatigue and confusion per daughter. Today the patient experienced 2 episodes of melena, appeared altered to the daughter (seeing things that weren't there) and had persistently low BP ems was called.    On admission: BP 94/56 mmHg. Labs revealed elevated WBC w/ stable Hb, along w/ JEEVAN and Transaminitis. CT Head and A/P negative. CXR showing b/l opacities but patient denies SOB, Cough, Sputum. Patient received Cefepime, Vanco and Narcan in ED, placed on Levo and being admitted for Septic Shock.     PAST MEDICAL & SURGICAL HISTORY:    Dextrocardia, Bud Charri malformation type I    Essential hypertension, hyperlipidemia, CVD    Diabetes 1.5, managed as type 2,   Charcot's arthropathy of R foot due to diabetes mellitus, Peripheral neuralgia    Chronic midline low back pain with bilateral sciatica    Right shoulder surgery,  delivery       Allergies:  No Known Allergies    Home Medications Reviewed  Hospital Medications:   MEDICATIONS  (STANDING):  aspirin  chewable 81 milliGRAM(s) Oral daily  atorvastatin 40 milliGRAM(s) Oral at bedtime  budesonide  80 MICROgram(s)/formoterol 4.5 MICROgram(s) Inhaler 2 Puff(s) Inhalation two times a day  calcium carbonate   1250 mG (OsCal) 1 Tablet(s) Oral three times a day  cefepime   IVPB 1000 milliGRAM(s) IV Intermittent every 12 hours  cefepime   IVPB      chlorhexidine 4% Liquid 1 Application(s) Topical <User Schedule>  influenza   Vaccine 0.5 milliLiter(s) IntraMuscular once  levothyroxine 100 MICROGram(s) Oral daily  metoprolol tartrate 25 milliGRAM(s) Oral two times a day  norepinephrine Infusion 0.06 MICROgram(s)/kG/Min (9.38 mL/Hr) IV Continuous <Continuous>  pantoprazole  Injectable 40 milliGRAM(s) IV Push two times a day  senna 2 Tablet(s) Oral at bedtime  sucralfate 1 Gram(s) Oral four times a day  vancomycin  IVPB 500 milliGRAM(s) IV Intermittent every 12 hours  vancomycin  IVPB          SOCIAL HISTORY:  Current smoker    FAMILY HISTORY:  No pertinent family history in first degree relatives      REVIEW OF SYSTEMS:  *pt is altered, but indicated the following  CONSTITUTIONAL: endorses weakness, denies fevers and denies chills  EYES/ENT: No visual changes;  No vertigo or throat pain   RESPIRATORY: No cough, wheezing, hemoptysis; No shortness of breath  CARDIOVASCULAR: No chest pain or palpitations.  GASTROINTESTINAL: No abdominal or epigastric pain. No nausea, vomiting, or hematemesis; No diarrhea or constipation. daughter endorses black stools  GENITOURINARY: No dysuria, frequency, foamy urine, urinary urgency, incontinence or hematuria  NEUROLOGICAL: No numbness or weakness  SKIN: No itching, burning, rashes, or lesions   VASCULAR: No bilateral lower extremity edema.   All other review of systems is negative unless indicated above.    VITALS:  T(F): 97.7 (10-29-20 @ 12:00), Max: 99.3 (10-29-20 @ 02:30)  HR: 100 (10-29-20 @ 12:30)  BP: 95/54 (10-29-20 @ 12:30)  RR: 16 (10-29-20 @ 12:30)  SpO2: 96% (10-29-20 @ 12:30)    10-28 @ 07:01  -  10-29 @ 07:00  --------------------------------------------------------  IN: 349.7 mL / OUT: 1000 mL / NET: -650.3 mL    10-29 @ 07:01  -  10-29 @ 14:09  --------------------------------------------------------  IN: 0 mL / OUT: 760 mL / NET: -760 mL      Height (cm): 160 (10-29 @ 02:30)  Weight (kg): 82.8 (10-29 @ 02:30)  BMI (kg/m2): 32.3 (10-29 @ 02:30)  BSA (m2): 1.86 (10-29 @ 02:30)    10-28-20 @ 07:01  -  10-29-20 @ 07:00  --------------------------------------------------------  IN: 0 mL / OUT: 1000 mL / NET: -1000 mL    10-29-20 @ 07:01  -  10-29-20 @ 14:09  --------------------------------------------------------  IN: 0 mL / OUT: 760 mL / NET: -760 mL      I&O's Detail    28 Oct 2020 07:  -  29 Oct 2020 07:00  --------------------------------------------------------  IN:    IV PiggyBack: 150 mL    Lactated Ringers: 150 mL    Norepinephrine: 48.2 mL    Norepinephrine: 1.5 mL  Total IN: 349.7 mL    OUT:    Indwelling Catheter - Urethral (mL): 1000 mL  Total OUT: 1000 mL    Total NET: -650.3 mL      29 Oct 2020 07:01  -  29 Oct 2020 14:09  --------------------------------------------------------  IN:  Total IN: 0 mL    OUT:    Indwelling Catheter - Urethral (mL): 760 mL  Total OUT: 760 mL    Total NET: -760 mL        Creatine Kinase, Serum: 433 U/L (10-29-20 @ 10:06)  Creatine Kinase, Serum: 563 U/L (10-29-20 @ 04:50)  Creatine Kinase, Serum: 748 U/L (10-29-20 @ 00:00)      PHYSICAL EXAM:  Constitutional: NAD, somnolent   HEENT: anicteric sclera  Neck: No JVD  Respiratory: CTAB, no rales or rhonchi or crackles  Cardiovascular: S1, S2, RRR. No rub, no murmur  Gastrointestinal: BS+, soft, NT/ND  Extremities: No cyanosis or clubbing. No peripheral edema  Neurological: A/O x 2, no focal deficits  Psychiatric: Normal mood, normal affect, sluggish responses.   : No CVA tenderness. Has salinas.   Skin: No rashes  Vascular Access: central line    LABS:  10-29    128<L>  |  96<L>  |  26<H>  ----------------------------<  97  3.4<L>   |  23  |  1.8<H>    Ca    8.1<L>      29 Oct 2020 04:50    TPro  5.3<L>  /  Alb  2.5<L>  /  TBili  1.0  /  DBili  0.7<H>  /  AST  469<H>  /  ALT  125<H>  /  AlkPhos  102  10-29    Creatinine Trend: 1.8 <--, 2.2 <--, 3.4 <--, 0.6 <--, 0.7 <--, 0.9 <--, 1.1 <--, 1.3 <--, 1.7 <--, 2.0 <--, 2.3 <--, 1.0 <--, 1.1 <--                        9.3    13.53 )-----------( 282      ( 29 Oct 2020 04:50 )             30.1     Urine Studies:  Urinalysis Basic - ( 28 Oct 2020 14:10 )    Color: Yellow / Appearance: Clear / S.014 / pH:   Gluc:  / Ketone: Trace  / Bili: Negative / Urobili: <2 mg/dL   Blood:  / Protein: 30 mg/dL / Nitrite: Negative   Leuk Esterase: Small / RBC: 9 /HPF / WBC 2 /HPF   Sq Epi:  / Non Sq Epi: 4 /HPF / Bacteria: Negative      Sodium, Random Urine: 25.0 mmoL/L (10-28 @ 21:50)  Creatinine, Random Urine: 38 mg/dL (10-28 @ 21:50)  Protein/Creatinine Ratio Calculation: 1.2 Ratio (10-28 @ 21:50)            RADIOLOGY & ADDITIONAL STUDIES:  < from: Xray Kidney Ureter Bladder (10.29.20 @ 09:44) >    EXAM:  XR KUB 1 VIEW            PROCEDURE DATE:  10/29/2020            INTERPRETATION:  Clinical History / Reason for exam: Septic shock.    Technique: Single frontal view of the abdomen.    Correlation: CT of the abdomen and pelvis from one day earlier.    Findings:  There is a nonobstructive bowel gas pattern.    No acute osseus abnormality.    Impression:  Nonobstructive bowel gas pattern.            REI MCKINLEY M.D., RESIDENT RADIOLOGIST  This document has been electronically signed.  ANGEL JONES MD; Attending Radiologist  This document has been electronically signed. Oct 29 2020 10:36AM    < end of copied text >  < from: US Kidney and Bladder (10.21.20 @ 11:04) >    Right kidney: 10.6 (cm). No renal mass, hydronephrosis or calculi.    Left kidney:  11.4 (cm). No renal mass, hydronephrosis or calculi.    Urinary bladder: No debris or calculus. Bilateral ureteral jets are visualized. Prevoid volume of [id number]. The patient would not void as she was not awake.    IMPRESSION:    Normal renal ultrasound.    < end of copied text >

## 2020-10-29 NOTE — CONSULT NOTE ADULT - ASSESSMENT
History  COPD  DM HF EF70 presents with sepsis    elevated cardiac enzymes    ECG  IWMI      plan Heparin ASA Plavix  statin  metoprolol  echo

## 2020-10-29 NOTE — CONSULT NOTE ADULT - SUBJECTIVE AND OBJECTIVE BOX
Gastroenterology Consultation:    Patient is a 55y old  Female who presents with a chief complaint of Septic Shock (29 Oct 2020 08:42)      Admitted on: 10-28-20  HPI:  55 y/o female with PMHx of Type II DM, HFpEF (EF of 72 %), CAD, hyperlipidemia, UGIB 2/2 duodenal ulcer, HTN, Hypothyroidism, COPD, Dextrocardia, Sciatica/Peripheral Neuralgia, and Charcot foot deformity s/p reconstruction on 19 presented for hypotension, lethargy and black stool. The patient was recently admitted to the hospital for sepsis secondary to acute pyelonephritis and was d/c on PO Vantin (compliant per patient and daughter). Since her discharge the patient has been experiencing worsening fatigue and confusion per daughter. Today the patient experienced 2 episodes of melena, appeared altered to the daughter (seeing things that weren't there) and had persistently low BP ems was called.    Patient HoTN on initial encounter 94/56 mmHg. Labs revealed elevated WBC w/ stable Hb, along w/ JEEVAN and Transaminitis. CT Head and A/P negative. CXR showing b/l opacities but patient denies SOB, Cough, Sputum. Patient received Cefepime, Vanco and Narcan in ED, placed on Levo and being admitted for Septic Shock.    (28 Oct 2020 22:05)      Prior records Reviewed (Y/N):  History obtained from person other than patient (Y/N):    Prior EGD:    < from: EGD (20 @ 14:30) >   -Small white plaques noted in the mid esophagus and biopsies were taken to R/O  candida.    Diffuse erythema and nodularity was noted in the stomach s/p biopsies for  histology.    A irregular large white patch was noted in the antrum and one cold forceps  biopsy was taken.    A large semi circumferential ulcerative mucosa was noted in the anterior wall  of the duodenal bulb extending proximally to the pyloric channel.    No blood was seen in the esophagus, stomach or small bowel.   Plan: Start Pantoprazole BID. Carafate 1gm suspension 4 times daily. Avoid  NSAIDS, alcohol and smoking. needs repeat EGD in 4-6 weeks months. Can resume GI  soft diet.  Await pathology results      < end of copied text >  Prior Colonoscopy:   2 yrs ago but no reports in system    PAST MEDICAL & SURGICAL HISTORY:  Dextrocardia    Chronic midline low back pain with bilateral sciatica    Peripheral neuralgia    Essential hypertension    Diabetes 1.5, managed as type 2    Charcot&#x27;s arthropathy  R foot    Charcot foot due to diabetes mellitus    H/O shoulder surgery  Right shoulder surgery     delivery delivered        FAMILY HISTORY:  No pertinent family history in first degree relatives        Social History:  Tobacco: +smoker  Alcohol: neg  Drugs: neg    Home Medications:  budesonide-formoterol 80 mcg-4.5 mcg/inh inhalation aerosol: 2 puff(s) inhaled 2 times a day (20 Oct 2020 20:41)  Lyrica 150 mg oral capsule: 1 tab(s) orally 3 times a day, stop after  (20 Oct 2020 20:41)  tiZANidine 4 mg oral tablet: 2 tab(s) orally every 8 hours, As Needed (20 Oct 2020 20:41)  Ventolin HFA 90 mcg/inh inhalation aerosol: 2 puff(s) inhaled 4 times a day, As Needed (20 Oct 2020 20:41)    MEDICATIONS  (STANDING):  aspirin 325 milliGRAM(s) Oral once  aspirin  chewable 81 milliGRAM(s) Oral daily  atorvastatin 40 milliGRAM(s) Oral at bedtime  budesonide  80 MICROgram(s)/formoterol 4.5 MICROgram(s) Inhaler 2 Puff(s) Inhalation two times a day  calcium carbonate   1250 mG (OsCal) 1 Tablet(s) Oral three times a day  cefepime   IVPB 1000 milliGRAM(s) IV Intermittent every 12 hours  cefepime   IVPB      chlorhexidine 4% Liquid 1 Application(s) Topical <User Schedule>  influenza   Vaccine 0.5 milliLiter(s) IntraMuscular once  levothyroxine 100 MICROGram(s) Oral daily  metoprolol tartrate 25 milliGRAM(s) Oral two times a day  norepinephrine Infusion 0.06 MICROgram(s)/kG/Min (9.38 mL/Hr) IV Continuous <Continuous>  pantoprazole  Injectable 40 milliGRAM(s) IV Push two times a day  potassium chloride  10 mEq/100 mL IVPB 10 milliEquivalent(s) IV Intermittent once  potassium chloride  20 mEq/100 mL IVPB 20 milliEquivalent(s) IV Intermittent once  senna 2 Tablet(s) Oral at bedtime  sucralfate 1 Gram(s) Oral four times a day  vancomycin  IVPB 500 milliGRAM(s) IV Intermittent every 12 hours  vancomycin  IVPB        MEDICATIONS  (PRN):  ALBUTerol    90 MICROgram(s) HFA Inhaler 2 Puff(s) Inhalation every 6 hours PRN Shortness of Breath and/or Wheezing      Allergies  No Known Allergies      Review of Systems:   Constitutional:  No Fever, No Chills  ENT/Mouth:  No Hearing Changes  Eyes:  No Eye Pain, No Vision Changes  Cardiovascular:  No Chest Pain, No Palpitations  Respiratory:  No Cough, +Dyspnea  Gastrointestinal:  As described in HPI  Musculoskeletal:  + Back Pain  Skin:  No Skin Lesions, No Jaundice  Neuro:  +weakness    Physical Examination:  T(C): 35.8 (10-29-20 @ 08:00), Max: 37.4 (10-29-20 @ 02:30)  HR: 98 (10-29-20 @ 08:15) (75 - 100)  BP: 110/62 (10-29-20 @ 08:15) (56/22 - 127/79)  RR: 27 (10-29-20 @ 08:15) (12 - 31)  SpO2: 92% (10-29-20 @ 08:15) (85% - 99%)  Height (cm): 160 (10-29-20 @ 02:30)  Weight (kg): 82.8 (10-29-20 @ 02:30)    10-28-20 @ 07:01  -  10-29-20 @ 07:00  --------------------------------------------------------  IN: 349.7 mL / OUT: 1000 mL / NET: -650.3 mL        Constitutional: No acute distress.  Eyes:. Conjunctivae are clear, Sclera is non-icteric.  Ears Nose and Throat: The external ears are normal appearing,  Oral mucosa is pink and moist.  Respiratory:  No signs of respiratory distress. Lung sounds are clear bilaterally.  Cardiovascular:  S1 S2, Regular rate and rhythm.  GI: Abdomen is soft, symmetric, and non-tender without distention. -JOSELYN empty rectal vault with yellowish liquid  Neuro: No Tremor, No involuntary movements  Skin: No rashes, No Jaundice.          Data: (reviewed by attending)                        9.3    13.53 )-----------( 282      ( 29 Oct 2020 04:50 )             30.1     Hgb Trend:  9.3  10-29-20 @ 04:50  9.7  10-28-20 @ 12:55        10-29    128<L>  |  96<L>  |  26<H>  ----------------------------<  97  3.4<L>   |  23  |  1.8<H>    Ca    8.1<L>      29 Oct 2020 04:50    TPro  5.3<L>  /  Alb  2.5<L>  /  TBili  1.0  /  DBili  0.7<H>  /  AST  469<H>  /  ALT  125<H>  /  AlkPhos  102  10-29    Liver panel trend:  TBili 1.0   /      /      /   AlkP 102   /   Tptn 5.3   /   Alb 2.5    /   DBili 0.7      10-29  TBili 1.0   /      /      /   AlkP 108   /   Tptn 5.4   /   Alb 2.7    /   DBili 0.8      10-29  TBili 0.9   /      /   ALT 99   /   AlkP 102   /   Tptn 5.3   /   Alb 2.5    /   DBili --      10-28  TBili 0.6   /   AST 34   /   ALT 22   /   AlkP 93   /   Tptn 5.4   /   Alb 2.6    /   DBili --      10-22  TBili 0.6   /   AST 46   /   ALT 23   /   AlkP 101   /   Tptn 5.7   /   Alb 2.6    /   DBili --      10-21  TBili 0.7   /   AST 50   /   ALT 24   /   AlkP 115   /   Tptn 5.8   /   Alb 2.9    /   DBili --      10-21  TBili 0.7   /   AST 50   /   ALT 24   /   AlkP 110   /   Tptn 5.8   /   Alb 2.8    /   DBili --      10-21  TBili 0.8   /   AST 56   /   ALT 22   /   AlkP 110   /   Tptn 6.2   /   Alb 2.8    /   DBili 0.4      10-20      PT/INR - ( 28 Oct 2020 12:55 )   PT: 23.10 sec;   INR: 2.01 ratio         PTT - ( 28 Oct 2020 12:55 )  PTT:28.6 sec        Radiology:(reviewed by attending)  CT Abdomen and Pelvis No Cont:   EXAM:  CT ABDOMEN AND PELVIS            PROCEDURE DATE:  10/28/2020            INTERPRETATION:  REASON FOR EXAM / CLINICAL STATEMENT: Sepsis; hypotension and altered mental status   WBC 19.26  PMHx of DM, CAD, DLD, HTN, Hypothyroidism, COPD, Dextrocardia, Sciatica/Peripheral Neuralgia, Charcot foot deformity s/p reconstruction    TECHNIQUE: Contiguous axial CT images were obtained from the lower chest to the pubic symphysis without intravenous contrast.   Reformatted images in the coronal and sagittal planes were acquired.    COMPARISON CT: CT scan of the abdomen pelvis dated 2020    OTHER STUDIES USED FOR CORRELATION: None.        FINDINGS    LOWER CHEST: There are atelectatic changes at the right lung base. Dextrocardia. No pleural or pericardial effusion.    HEPATIC: The liver is normal in appearance  with no evidence of mass or bile duct dilatation.    BILIARY: Status post cholecystectomy    SPLEEN: Unremarkable.    PANCREAS: The pancreas is normal in size and configuration. Noevidence of mass or pancreatitis.    ADRENAL GLANDS: Stable 1.9 cm adenoma in the right adrenal gland. The left adrenal gland is unremarkable.    KIDNEYS: No evidence of hydronephrosis, calcified stones, or solid mass.    ABDOMINOPELVIC NODES: Unremarkable.    PELVIC ORGANS: A Bautista catheter is present within the urinary bladder. No evidence of pelvic mass, lymphadenopathy, or fluid collection.    PERITONEUM/MESENTERY/BOWEL: No evidence of bowel obstruction, inflammatory process, or ascites within the abdomen or pelvis. No pneumoperitoneum. The appendix is normal in appearance.    BONES/SOFT TISSUES: Degenerative changes of the spine are noted.    OTHER: No evidence of abdominal aortic aneurysm.      IMPRESSION:    No CT evidence of intra-abdominal or pelvic abscess or inflammatory process                SKY WALTER MD; Attending Interventional Radiologist  This document has been electronically signed. Oct 28 2020  6:50PM (10-28-20 @ 15:53)

## 2020-10-29 NOTE — CONSULT NOTE ADULT - ASSESSMENT
IMPRESSION:    NSTEMI   JEEVAN improving  Pulmonary edema   HO Dextrocardia  Recent therapy for Pyelonephritis  HO COPD   Active smoker     PLAN:    CNS:  No depressants     HEENT: Oral care    PULMONARY:  HOB @ 45 degrees.  Aspiration precautions.  Nebs PRN.      CARDIOVASCULAR:  ASA. Beta blockers.  ECHO.  cardiology.  Daily EKG. DC IVF     GI: GI prophylaxis.  Feeding.  Bowel regimen     RENAL:  Follow up lytes.  Correct as needed.      INFECTIOUS DISEASE: Follow up cultures.  DC ABX.  Procal     HEMATOLOGICAL:  DVT prophylaxis.  AC per cardiology     ENDOCRINE:  Follow up FS.  Insulin protocol if needed.    MUSCULOSKELETAL:  Bed rest.      ICU / CCU monitoring

## 2020-10-29 NOTE — CONSULT NOTE ADULT - ASSESSMENT
55 y/o female with Medical Hx of Type II DM,  CAD, DLD, HTN, Hypothyroidism, COPD, Dextrocardia,  Sciatica/Peripheral Neuralgia, Charcot foot deformity s/p reconstruction on 1/11/19, recent history of giant clean based duodenal ulcer presenting for hypotension and lethargy, admitted with Septic shock, Hyponatremia and GI is being called for drop in HG, loose watery stools.    #Melena  -JOSELYN empty rectal vault with yellowish liquid  -please call if patient has melena  -CBC stable  -patient is on pressors for septic shock  -please correct electrolytes  -c/w IV pantoprazole 40mg BID  -c/w sucralfate qid  -Smoking cessation  -Needs optimization before EGD  -2 lareg bore IV  -CBC q12  -keep active type and screen  -repeat CBC at 11, if stable can allow clear liquid  -will follow     53 y/o female with Medical Hx of Type II DM,  CAD, DLD, HTN, Hypothyroidism, COPD, Dextrocardia,  Sciatica/Peripheral Neuralgia, Charcot foot deformity s/p reconstruction on 1/11/19, recent history of giant clean based duodenal ulcer presenting for hypotension and lethargy, admitted with Septic shock, Hyponatremia and GI is being called for drop in HG, loose watery stools.    #Melena  -JOSELYN empty rectal vault with yellowish liquid  -please call if patient has melena  -CBC stable  -patient is on pressors for septic shock  -please correct electrolytes  -c/w IV pantoprazole 40mg BID  -c/w sucralfate qid  -Smoking cessation  -Needs optimization before EGD  -2 lareg bore IV  -CBC q12  -keep active type and screen  -repeat CBC at 11, if stable can allow clear liquid  -will follow    *Elevated LFts  -trending down  -hep C neg, hep B s Ag neg, ASMA neg, AMA, RADHA neg  -if still increasing check hep B sAb, AigG  -keep MAP>65    will follow  -discussed with patient and daughter at bedside

## 2020-10-29 NOTE — PROGRESS NOTE ADULT - SUBJECTIVE AND OBJECTIVE BOX
Patient is a 55y old  Female who presents with a chief complaint of Septic Shock (28 Oct 2020 22:05).    Overnight: potassium this morning was 3.4, repleted. Currently not on any pressors.      ROS:  See HPI    PHYSICAL EXAM    ICU Vital Signs Last 24 Hrs  T(C): 36.5 (29 Oct 2020 12:00), Max: 37.4 (29 Oct 2020 02:30)  T(F): 97.7 (29 Oct 2020 12:00), Max: 99.3 (29 Oct 2020 02:30)  HR: 102 (29 Oct 2020 16:00) (90 - 110)  BP: 107/52 (29 Oct 2020 16:00) (88/49 - 133/72)  BP(mean): 70 (29 Oct 2020 16:00) (58 - 93)  ABP: --  ABP(mean): --  RR: 31 (29 Oct 2020 16:00) (12 - 31)  SpO2: 94% (29 Oct 2020 16:00) (86% - 99%)      General:  HEENT: CHRISTIAN             Lymph Nodes: No cervical LN   Lungs: Bilateral BS  Cardiovascular: Regular   Abdomen: Soft, Positive BS  Extremities: No clubbing   Skin: Warm  Neurological: Non focal       10-28-20 @ 07:01  -  10-29-20 @ 07:00  --------------------------------------------------------  IN:    IV PiggyBack: 150 mL    Lactated Ringers: 150 mL    Norepinephrine: 48.2 mL    Norepinephrine: 1.5 mL  Total IN: 349.7 mL    OUT:    Indwelling Catheter - Urethral (mL): 1000 mL  Total OUT: 1000 mL    Total NET: -650.3 mL      10-29-20 @ 07:01  -  10-29-20 @ 16:26  --------------------------------------------------------  IN:    Norepinephrine: 3.2 mL  Total IN: 3.2 mL    OUT:    Indwelling Catheter - Urethral (mL): 1060 mL  Total OUT: 1060 mL    Total NET: -1056.8 mL          LABS:                            9.3    13.53 )-----------( 282      ( 29 Oct 2020 04:50 )             30.1                                               10-    128<L>  |  96<L>  |  26<H>  ----------------------------<  97  3.4<L>   |  23  |  1.8<H>    Ca    8.1<L>      29 Oct 2020 04:50    TPro  5.3<L>  /  Alb  2.5<L>  /  TBili  1.0  /  DBili  0.7<H>  /  AST  469<H>  /  ALT  125<H>  /  AlkPhos  102  10      PT/INR - ( 28 Oct 2020 12:55 )   PT: 23.10 sec;   INR: 2.01 ratio         PTT - ( 28 Oct 2020 12:55 )  PTT:28.6 sec                                       Urinalysis Basic - ( 28 Oct 2020 14:10 )    Color: Yellow / Appearance: Clear / S.014 / pH: x  Gluc: x / Ketone: Trace  / Bili: Negative / Urobili: <2 mg/dL   Blood: x / Protein: 30 mg/dL / Nitrite: Negative   Leuk Esterase: Small / RBC: 9 /HPF / WBC 2 /HPF   Sq Epi: x / Non Sq Epi: 4 /HPF / Bacteria: Negative        CARDIAC MARKERS ( 29 Oct 2020 10:06 )  x     / 0.54 ng/mL / 433 U/L / x     / 11.0 ng/mL  CARDIAC MARKERS ( 29 Oct 2020 04:50 )  x     / 0.59 ng/mL / 563 U/L / x     / 14.3 ng/mL  CARDIAC MARKERS ( 29 Oct 2020 00:00 )  x     / 0.60 ng/mL / 748 U/L / x     / 17.5 ng/mL  CARDIAC MARKERS ( 28 Oct 2020 18:25 )  x     / 0.59 ng/mL / x     / x     / x      CARDIAC MARKERS ( 28 Oct 2020 14:31 )  x     / 0.81 ng/mL / x     / x     / x                                                LIVER FUNCTIONS - ( 29 Oct 2020 04:50 )  Alb: 2.5 g/dL / Pro: 5.3 g/dL / ALK PHOS: 102 U/L / ALT: 125 U/L / AST: 469 U/L / GGT: x                                                                                             MEDICATIONS  (STANDING):  aspirin  chewable 81 milliGRAM(s) Oral daily  atorvastatin 40 milliGRAM(s) Oral at bedtime  budesonide  80 MICROgram(s)/formoterol 4.5 MICROgram(s) Inhaler 2 Puff(s) Inhalation two times a day  calcium carbonate   1250 mG (OsCal) 1 Tablet(s) Oral three times a day  cefepime   IVPB 1000 milliGRAM(s) IV Intermittent every 12 hours  cefepime   IVPB      chlorhexidine 4% Liquid 1 Application(s) Topical <User Schedule>  influenza   Vaccine 0.5 milliLiter(s) IntraMuscular once  levothyroxine 100 MICROGram(s) Oral daily  metoprolol tartrate 25 milliGRAM(s) Oral two times a day  norepinephrine Infusion 0.06 MICROgram(s)/kG/Min (9.38 mL/Hr) IV Continuous <Continuous>  pantoprazole  Injectable 40 milliGRAM(s) IV Push two times a day  senna 2 Tablet(s) Oral at bedtime  sucralfate 1 Gram(s) Oral four times a day  vancomycin  IVPB 500 milliGRAM(s) IV Intermittent every 12 hours  vancomycin  IVPB        MEDICATIONS  (PRN):  ALBUTerol    90 MICROgram(s) HFA Inhaler 2 Puff(s) Inhalation every 6 hours PRN Shortness of Breath and/or Wheezing      KUB (10/29):  Impression:  Nonobstructive bowel gas pattern.    CT Abdomen and Pelvis No Cont (10/29):  IMPRESSION:    No CT evidence of intra-abdominal or pelvic abscess or inflammatory process      CT Head No Cont (10/28):  IMPRESSION:    1.  No evidence of acute intracranial pathology. Stable exam since 2017.    2.  Redemonstrated low-lying cerebellar tonsils compatible w/ a Chiari I malformation.

## 2020-10-29 NOTE — CONSULT NOTE ADULT - ATTENDING COMMENTS
I personally interviewed and examined the patient Patient with history of melena, but now with yellow stools. For outpatient EGD once patients medical condition improves.

## 2020-10-30 LAB
ALBUMIN SERPL ELPH-MCNC: 2.6 G/DL — LOW (ref 3.5–5.2)
ALP SERPL-CCNC: 101 U/L — SIGNIFICANT CHANGE UP (ref 30–115)
ALT FLD-CCNC: 75 U/L — HIGH (ref 0–41)
ANION GAP SERPL CALC-SCNC: 8 MMOL/L — SIGNIFICANT CHANGE UP (ref 7–14)
APTT BLD: 35.6 SEC — SIGNIFICANT CHANGE UP (ref 27–39.2)
APTT BLD: 37.3 SEC — SIGNIFICANT CHANGE UP (ref 27–39.2)
APTT BLD: 49.9 SEC — HIGH (ref 27–39.2)
AST SERPL-CCNC: 178 U/L — HIGH (ref 0–41)
BASOPHILS # BLD AUTO: 0.03 K/UL — SIGNIFICANT CHANGE UP (ref 0–0.2)
BASOPHILS NFR BLD AUTO: 0.3 % — SIGNIFICANT CHANGE UP (ref 0–1)
BILIRUB SERPL-MCNC: 1.1 MG/DL — SIGNIFICANT CHANGE UP (ref 0.2–1.2)
BUN SERPL-MCNC: 17 MG/DL — SIGNIFICANT CHANGE UP (ref 10–20)
CALCIUM SERPL-MCNC: 8.5 MG/DL — SIGNIFICANT CHANGE UP (ref 8.5–10.1)
CHLORIDE SERPL-SCNC: 99 MMOL/L — SIGNIFICANT CHANGE UP (ref 98–110)
CO2 SERPL-SCNC: 27 MMOL/L — SIGNIFICANT CHANGE UP (ref 17–32)
CORTICOSTEROID BINDING GLOBULIN RESULT: 2 MG/DL — SIGNIFICANT CHANGE UP
CORTIS F/TOTAL MFR SERPL: 59 % — SIGNIFICANT CHANGE UP
CORTIS SERPL-MCNC: 33 UG/DL — HIGH
CORTISOL, FREE RESULT: 19 UG/DL — HIGH
CREAT SERPL-MCNC: 1 MG/DL — SIGNIFICANT CHANGE UP (ref 0.7–1.5)
EOSINOPHIL # BLD AUTO: 0.24 K/UL — SIGNIFICANT CHANGE UP (ref 0–0.7)
EOSINOPHIL NFR BLD AUTO: 2.6 % — SIGNIFICANT CHANGE UP (ref 0–8)
GLUCOSE BLDC GLUCOMTR-MCNC: 156 MG/DL — HIGH (ref 70–99)
GLUCOSE BLDC GLUCOMTR-MCNC: 97 MG/DL — SIGNIFICANT CHANGE UP (ref 70–99)
GLUCOSE BLDC GLUCOMTR-MCNC: 99 MG/DL — SIGNIFICANT CHANGE UP (ref 70–99)
GLUCOSE SERPL-MCNC: 96 MG/DL — SIGNIFICANT CHANGE UP (ref 70–99)
HCT VFR BLD CALC: 27.2 % — LOW (ref 37–47)
HCT VFR BLD CALC: 27.2 % — LOW (ref 37–47)
HGB BLD-MCNC: 8.4 G/DL — LOW (ref 12–16)
HGB BLD-MCNC: 8.4 G/DL — LOW (ref 12–16)
IMM GRANULOCYTES NFR BLD AUTO: 0.6 % — HIGH (ref 0.1–0.3)
LYMPHOCYTES # BLD AUTO: 0.51 K/UL — LOW (ref 1.2–3.4)
LYMPHOCYTES # BLD AUTO: 5.5 % — LOW (ref 20.5–51.1)
MCHC RBC-ENTMCNC: 27.1 PG — SIGNIFICANT CHANGE UP (ref 27–31)
MCHC RBC-ENTMCNC: 27.2 PG — SIGNIFICANT CHANGE UP (ref 27–31)
MCHC RBC-ENTMCNC: 30.9 G/DL — LOW (ref 32–37)
MCHC RBC-ENTMCNC: 30.9 G/DL — LOW (ref 32–37)
MCV RBC AUTO: 87.7 FL — SIGNIFICANT CHANGE UP (ref 81–99)
MCV RBC AUTO: 88 FL — SIGNIFICANT CHANGE UP (ref 81–99)
MONOCYTES # BLD AUTO: 0.45 K/UL — SIGNIFICANT CHANGE UP (ref 0.1–0.6)
MONOCYTES NFR BLD AUTO: 4.9 % — SIGNIFICANT CHANGE UP (ref 1.7–9.3)
NEUTROPHILS # BLD AUTO: 7.97 K/UL — HIGH (ref 1.4–6.5)
NEUTROPHILS NFR BLD AUTO: 86.1 % — HIGH (ref 42.2–75.2)
NRBC # BLD: 0 /100 WBCS — SIGNIFICANT CHANGE UP (ref 0–0)
NRBC # BLD: 0 /100 WBCS — SIGNIFICANT CHANGE UP (ref 0–0)
PLATELET # BLD AUTO: 219 K/UL — SIGNIFICANT CHANGE UP (ref 130–400)
PLATELET # BLD AUTO: 230 K/UL — SIGNIFICANT CHANGE UP (ref 130–400)
POTASSIUM SERPL-MCNC: 3.7 MMOL/L — SIGNIFICANT CHANGE UP (ref 3.5–5)
POTASSIUM SERPL-SCNC: 3.7 MMOL/L — SIGNIFICANT CHANGE UP (ref 3.5–5)
PROCALCITONIN SERPL-MCNC: 0.44 NG/ML — HIGH (ref 0.02–0.1)
PROT SERPL-MCNC: 5.4 G/DL — LOW (ref 6–8)
RBC # BLD: 3.09 M/UL — LOW (ref 4.2–5.4)
RBC # BLD: 3.1 M/UL — LOW (ref 4.2–5.4)
RBC # FLD: 19.3 % — HIGH (ref 11.5–14.5)
RBC # FLD: 19.9 % — HIGH (ref 11.5–14.5)
SODIUM SERPL-SCNC: 134 MMOL/L — LOW (ref 135–146)
TROPONIN T SERPL-MCNC: 0.5 NG/ML — CRITICAL HIGH
TROPONIN T SERPL-MCNC: 0.57 NG/ML — CRITICAL HIGH
TROPONIN T SERPL-MCNC: 0.57 NG/ML — CRITICAL HIGH
WBC # BLD: 8.18 K/UL — SIGNIFICANT CHANGE UP (ref 4.8–10.8)
WBC # BLD: 9.26 K/UL — SIGNIFICANT CHANGE UP (ref 4.8–10.8)
WBC # FLD AUTO: 8.18 K/UL — SIGNIFICANT CHANGE UP (ref 4.8–10.8)
WBC # FLD AUTO: 9.26 K/UL — SIGNIFICANT CHANGE UP (ref 4.8–10.8)

## 2020-10-30 PROCEDURE — 93306 TTE W/DOPPLER COMPLETE: CPT | Mod: 26

## 2020-10-30 PROCEDURE — 71045 X-RAY EXAM CHEST 1 VIEW: CPT | Mod: 26

## 2020-10-30 PROCEDURE — 99232 SBSQ HOSP IP/OBS MODERATE 35: CPT

## 2020-10-30 RX ORDER — METOPROLOL TARTRATE 50 MG
25 TABLET ORAL THREE TIMES A DAY
Refills: 0 | Status: DISCONTINUED | OUTPATIENT
Start: 2020-10-30 | End: 2020-10-31

## 2020-10-30 RX ORDER — METOPROLOL TARTRATE 50 MG
50 TABLET ORAL
Refills: 0 | Status: DISCONTINUED | OUTPATIENT
Start: 2020-10-30 | End: 2020-10-30

## 2020-10-30 RX ORDER — METOPROLOL TARTRATE 50 MG
25 TABLET ORAL ONCE
Refills: 0 | Status: COMPLETED | OUTPATIENT
Start: 2020-10-30 | End: 2020-10-30

## 2020-10-30 RX ORDER — PANTOPRAZOLE SODIUM 20 MG/1
8 TABLET, DELAYED RELEASE ORAL
Qty: 80 | Refills: 0 | Status: DISCONTINUED | OUTPATIENT
Start: 2020-10-30 | End: 2020-10-31

## 2020-10-30 RX ORDER — NICOTINE POLACRILEX 2 MG
1 GUM BUCCAL DAILY
Refills: 0 | Status: DISCONTINUED | OUTPATIENT
Start: 2020-10-30 | End: 2020-10-31

## 2020-10-30 RX ADMIN — Medication 1 TABLET(S): at 05:32

## 2020-10-30 RX ADMIN — Medication 25 MILLIGRAM(S): at 21:37

## 2020-10-30 RX ADMIN — Medication 25 MILLIGRAM(S): at 08:42

## 2020-10-30 RX ADMIN — Medication 1 GRAM(S): at 23:24

## 2020-10-30 RX ADMIN — Medication 81 MILLIGRAM(S): at 12:41

## 2020-10-30 RX ADMIN — Medication 1 GRAM(S): at 05:32

## 2020-10-30 RX ADMIN — ATORVASTATIN CALCIUM 80 MILLIGRAM(S): 80 TABLET, FILM COATED ORAL at 21:36

## 2020-10-30 RX ADMIN — CLOPIDOGREL BISULFATE 75 MILLIGRAM(S): 75 TABLET, FILM COATED ORAL at 12:41

## 2020-10-30 RX ADMIN — CHLORHEXIDINE GLUCONATE 1 APPLICATION(S): 213 SOLUTION TOPICAL at 06:38

## 2020-10-30 RX ADMIN — Medication 1 GRAM(S): at 12:41

## 2020-10-30 RX ADMIN — Medication 25 MILLIGRAM(S): at 13:31

## 2020-10-30 RX ADMIN — Medication 100 MICROGRAM(S): at 05:32

## 2020-10-30 RX ADMIN — Medication 1 GRAM(S): at 18:35

## 2020-10-30 RX ADMIN — PANTOPRAZOLE SODIUM 10 MG/HR: 20 TABLET, DELAYED RELEASE ORAL at 21:37

## 2020-10-30 RX ADMIN — Medication 1 PATCH: at 23:21

## 2020-10-30 RX ADMIN — Medication 1 TABLET(S): at 21:36

## 2020-10-30 RX ADMIN — Medication 1 TABLET(S): at 13:31

## 2020-10-30 NOTE — PROGRESS NOTE ADULT - ASSESSMENT
adm with sepsis    elevated   cardiac enzymes    no chest pain    echo  LVEF 60   pulmonary   hypertension    cont medical therapy

## 2020-10-30 NOTE — PROGRESS NOTE ADULT - SUBJECTIVE AND OBJECTIVE BOX
Patient is a 56 y/o F who presents with a chief complaint of septic shock (30 Oct 2020 07:50).    Overnight events:         ROS:  See HPI    PHYSICAL EXAM    ICU Vital Signs Last 24 Hrs  T(C): 36.6 (30 Oct 2020 08:00), Max: 36.6 (30 Oct 2020 08:00)  T(F): 97.9 (30 Oct 2020 08:00), Max: 97.9 (30 Oct 2020 08:00)  HR: 80 (30 Oct 2020 10:00) (74 - 110)  BP: 117/59 (30 Oct 2020 10:00) (71/39 - 139/71)  BP(mean): 93 (30 Oct 2020 10:00) (54 - 110)  ABP: --  ABP(mean): --  RR: 35 (30 Oct 2020 10:00) (12 - 40)  SpO2: 96% (30 Oct 2020 10:00) (83% - 99%)      General:  HEENT: CHRISTIAN             Lymph Nodes: No cervical LN   Lungs: Bilateral BS  Cardiovascular: Regular   Abdomen: Soft, Positive BS  Extremities: No clubbing   Skin: Warm  Neurological: Non focal       10-29-20 @ 07:01  -  10-30-20 @ 07:00  --------------------------------------------------------  IN:    Heparin: 111 mL    IV PiggyBack: 150 mL    Norepinephrine: 20.6 mL    Oral Fluid: 150 mL  Total IN: 431.6 mL    OUT:    Indwelling Catheter - Urethral (mL): 3235 mL  Total OUT: 3235 mL    Total NET: -2803.4 mL      10-30-20 @ 07:01  -  10-30-20 @ 12:30  --------------------------------------------------------  IN:    Heparin: 11 mL  Total IN: 11 mL    OUT:    Indwelling Catheter - Urethral (mL): 50 mL    Norepinephrine: 0 mL  Total OUT: 50 mL    Total NET: -39 mL          LABS:                            8.4    9.26  )-----------( 230      ( 30 Oct 2020 04:40 )             27.2                                               10-30    134<L>  |  99  |  17  ----------------------------<  96  3.7   |  27  |  1.0    Ca    8.5      30 Oct 2020 04:40    TPro  5.4<L>  /  Alb  2.6<L>  /  TBili  1.1  /  DBili  x   /  AST  178<H>  /  ALT  75<H>  /  AlkPhos  101  10-30      PT/INR - ( 28 Oct 2020 12:55 )   PT: 23.10 sec;   INR: 2.01 ratio         PTT - ( 30 Oct 2020 04:40 )  PTT:49.9 sec                                       Urinalysis Basic - ( 28 Oct 2020 14:10 )    Color: Yellow / Appearance: Clear / S.014 / pH: x  Gluc: x / Ketone: Trace  / Bili: Negative / Urobili: <2 mg/dL   Blood: x / Protein: 30 mg/dL / Nitrite: Negative   Leuk Esterase: Small / RBC: 9 /HPF / WBC 2 /HPF   Sq Epi: x / Non Sq Epi: 4 /HPF / Bacteria: Negative        CARDIAC MARKERS ( 30 Oct 2020 04:40 )  x     / 0.57 ng/mL / x     / x     / x      CARDIAC MARKERS ( 29 Oct 2020 23:28 )  x     / 0.57 ng/mL / x     / x     / x      CARDIAC MARKERS ( 29 Oct 2020 10:06 )  x     / 0.54 ng/mL / 433 U/L / x     / 11.0 ng/mL  CARDIAC MARKERS ( 29 Oct 2020 04:50 )  x     / 0.59 ng/mL / 563 U/L / x     / 14.3 ng/mL  CARDIAC MARKERS ( 29 Oct 2020 00:00 )  x     / 0.60 ng/mL / 748 U/L / x     / 17.5 ng/mL  CARDIAC MARKERS ( 28 Oct 2020 18:25 )  x     / 0.59 ng/mL / x     / x     / x      CARDIAC MARKERS ( 28 Oct 2020 14:31 )  x     / 0.81 ng/mL / x     / x     / x                                                LIVER FUNCTIONS - ( 30 Oct 2020 04:40 )  Alb: 2.6 g/dL / Pro: 5.4 g/dL / ALK PHOS: 101 U/L / ALT: 75 U/L / AST: 178 U/L / GGT: x                                                  Culture - Blood (collected 28 Oct 2020 23:30)  Source: .Blood Blood-Peripheral  Preliminary Report (30 Oct 2020 07:01):    No growth to date.    Culture - Urine (collected 28 Oct 2020 13:24)  Source: .Urine Clean Catch (Midstream)  Final Report (29 Oct 2020 18:38):    No growth    Culture - Blood (collected 28 Oct 2020 12:55)  Source: .Blood Blood-Peripheral  Preliminary Report (29 Oct 2020 22:02):    No growth to date.    Culture - Blood (collected 28 Oct 2020 12:55)  Source: .Blood Blood-Peripheral  Preliminary Report (29 Oct 2020 22:02):    No growth to date.                                                                                           MEDICATIONS  (STANDING):  aspirin  chewable 81 milliGRAM(s) Oral daily  atorvastatin 80 milliGRAM(s) Oral at bedtime  budesonide  80 MICROgram(s)/formoterol 4.5 MICROgram(s) Inhaler 2 Puff(s) Inhalation two times a day  calcium carbonate   1250 mG (OsCal) 1 Tablet(s) Oral three times a day  chlorhexidine 4% Liquid 1 Application(s) Topical <User Schedule>  clopidogrel Tablet 75 milliGRAM(s) Oral daily  influenza   Vaccine 0.5 milliLiter(s) IntraMuscular once  levothyroxine 100 MICROGram(s) Oral daily  metoprolol tartrate 25 milliGRAM(s) Oral three times a day  norepinephrine Infusion 0.06 MICROgram(s)/kG/Min (9.38 mL/Hr) IV Continuous <Continuous>  pantoprazole Infusion 8 mG/Hr (10 mL/Hr) IV Continuous <Continuous>  senna 2 Tablet(s) Oral at bedtime  sucralfate 1 Gram(s) Oral four times a day    MEDICATIONS  (PRN):  ALBUTerol    90 MICROgram(s) HFA Inhaler 2 Puff(s) Inhalation every 6 hours PRN Shortness of Breath and/or Wheezing      Xrays:                                                                                     ECHO     Patient is a 56 y/o F who presents with a chief complaint of septic shock (30 Oct 2020 07:50).    Overnight events:  Had 1 episode of darkened stool overnight. GI saw pt. this morning; patient's hgb was 8.4 down from 9.37. Per GI recommendations, will repeat CBC at 11am today. If there is a drop in hgb, plan will be to scope the pt. Keep NPO. Heparin held for now.    Patient's most recent troponin is 0.54. Cardiology is following the pt. Continue to f/u on cardiology recs.  Started on protonix drip.         ROS:  See HPI    PHYSICAL EXAM    ICU Vital Signs Last 24 Hrs  T(C): 36.6 (30 Oct 2020 08:00), Max: 36.6 (30 Oct 2020 08:00)  T(F): 97.9 (30 Oct 2020 08:00), Max: 97.9 (30 Oct 2020 08:00)  HR: 80 (30 Oct 2020 10:00) (74 - 110)  BP: 117/59 (30 Oct 2020 10:00) (71/39 - 139/71)  BP(mean): 93 (30 Oct 2020 10:00) (54 - 110)  ABP: --  ABP(mean): --  RR: 35 (30 Oct 2020 10:00) (12 - 40)  SpO2: 96% (30 Oct 2020 10:00) (83% - 99%)      General:  HEENT: CHRISTIAN             Lymph Nodes: No cervical LN   Lungs: Bilateral BS  Cardiovascular: Regular   Abdomen: Soft, Positive BS  Extremities: No clubbing   Skin: Warm  Neurological: Non focal       10-29-20 @ 07:01  -  10-30-20 @ 07:00  --------------------------------------------------------  IN:    Heparin: 111 mL    IV PiggyBack: 150 mL    Norepinephrine: 20.6 mL    Oral Fluid: 150 mL  Total IN: 431.6 mL    OUT:    Indwelling Catheter - Urethral (mL): 3235 mL  Total OUT: 3235 mL    Total NET: -2803.4 mL      10-30-20 @ 07:01  -  10-30-20 @ 12:30  --------------------------------------------------------  IN:    Heparin: 11 mL  Total IN: 11 mL    OUT:    Indwelling Catheter - Urethral (mL): 50 mL    Norepinephrine: 0 mL  Total OUT: 50 mL    Total NET: -39 mL          LABS:                            8.4    9.26  )-----------( 230      ( 30 Oct 2020 04:40 )             27.2                                               10-30    134<L>  |  99  |  17  ----------------------------<  96  3.7   |  27  |  1.0    Ca    8.5      30 Oct 2020 04:40    TPro  5.4<L>  /  Alb  2.6<L>  /  TBili  1.1  /  DBili  x   /  AST  178<H>  /  ALT  75<H>  /  AlkPhos  101  10-30      PT/INR - ( 28 Oct 2020 12:55 )   PT: 23.10 sec;   INR: 2.01 ratio         PTT - ( 30 Oct 2020 04:40 )  PTT:49.9 sec                                       Urinalysis Basic - ( 28 Oct 2020 14:10 )    Color: Yellow / Appearance: Clear / S.014 / pH: x  Gluc: x / Ketone: Trace  / Bili: Negative / Urobili: <2 mg/dL   Blood: x / Protein: 30 mg/dL / Nitrite: Negative   Leuk Esterase: Small / RBC: 9 /HPF / WBC 2 /HPF   Sq Epi: x / Non Sq Epi: 4 /HPF / Bacteria: Negative        CARDIAC MARKERS ( 30 Oct 2020 04:40 )  x     / 0.57 ng/mL / x     / x     / x      CARDIAC MARKERS ( 29 Oct 2020 23:28 )  x     / 0.57 ng/mL / x     / x     / x      CARDIAC MARKERS ( 29 Oct 2020 10:06 )  x     / 0.54 ng/mL / 433 U/L / x     / 11.0 ng/mL  CARDIAC MARKERS ( 29 Oct 2020 04:50 )  x     / 0.59 ng/mL / 563 U/L / x     / 14.3 ng/mL  CARDIAC MARKERS ( 29 Oct 2020 00:00 )  x     / 0.60 ng/mL / 748 U/L / x     / 17.5 ng/mL  CARDIAC MARKERS ( 28 Oct 2020 18:25 )  x     / 0.59 ng/mL / x     / x     / x      CARDIAC MARKERS ( 28 Oct 2020 14:31 )  x     / 0.81 ng/mL / x     / x     / x                                                LIVER FUNCTIONS - ( 30 Oct 2020 04:40 )  Alb: 2.6 g/dL / Pro: 5.4 g/dL / ALK PHOS: 101 U/L / ALT: 75 U/L / AST: 178 U/L / GGT: x                                                  Culture - Blood (collected 28 Oct 2020 23:30)  Source: .Blood Blood-Peripheral  Preliminary Report (30 Oct 2020 07:01):    No growth to date.    Culture - Urine (collected 28 Oct 2020 13:24)  Source: .Urine Clean Catch (Midstream)  Final Report (29 Oct 2020 18:38):    No growth    Culture - Blood (collected 28 Oct 2020 12:55)  Source: .Blood Blood-Peripheral  Preliminary Report (29 Oct 2020 22:02):    No growth to date.    Culture - Blood (collected 28 Oct 2020 12:55)  Source: .Blood Blood-Peripheral  Preliminary Report (29 Oct 2020 22:02):    No growth to date.                                                                                           MEDICATIONS  (STANDING):  aspirin  chewable 81 milliGRAM(s) Oral daily  atorvastatin 80 milliGRAM(s) Oral at bedtime  budesonide  80 MICROgram(s)/formoterol 4.5 MICROgram(s) Inhaler 2 Puff(s) Inhalation two times a day  calcium carbonate   1250 mG (OsCal) 1 Tablet(s) Oral three times a day  chlorhexidine 4% Liquid 1 Application(s) Topical <User Schedule>  clopidogrel Tablet 75 milliGRAM(s) Oral daily  influenza   Vaccine 0.5 milliLiter(s) IntraMuscular once  levothyroxine 100 MICROGram(s) Oral daily  metoprolol tartrate 25 milliGRAM(s) Oral three times a day  norepinephrine Infusion 0.06 MICROgram(s)/kG/Min (9.38 mL/Hr) IV Continuous <Continuous>  pantoprazole Infusion 8 mG/Hr (10 mL/Hr) IV Continuous <Continuous>  senna 2 Tablet(s) Oral at bedtime  sucralfate 1 Gram(s) Oral four times a day    MEDICATIONS  (PRN):  ALBUTerol    90 MICROgram(s) HFA Inhaler 2 Puff(s) Inhalation every 6 hours PRN Shortness of Breath and/or Wheezing      CXR (10/30/2020);    Impression:    Pulmonary vascular congestion. No parenchymal opacity pleural effusion or air leak.

## 2020-10-30 NOTE — PROGRESS NOTE ADULT - SUBJECTIVE AND OBJECTIVE BOX
Gastroenterology progress note:     Patient is a 55y old  Female who presents with a chief complaint of Septic Shock (29 Oct 2020 19:47)       Admitted on: 10-28-20    We are following the patient for melena      Interval History: patient reported one dark BM, the nurse confirmed that it was dark green. no abdominal pain, no nausea no vomiting. patient was started on heparin drip ACS protocol    Patient's medical problems are stable   Prior records reviewed (Y/N): Y  History obtained from someone other than patient (Y/N):N      PAST MEDICAL & SURGICAL HISTORY:  Dextrocardia    Chronic midline low back pain with bilateral sciatica    Peripheral neuralgia    Essential hypertension    Diabetes 1.5, managed as type 2    Charcot&#x27;s arthropathy  R foot    Charcot foot due to diabetes mellitus    H/O shoulder surgery  Right shoulder surgery     delivery delivered        MEDICATIONS  (STANDING):  aspirin  chewable 81 milliGRAM(s) Oral daily  atorvastatin 80 milliGRAM(s) Oral at bedtime  budesonide  80 MICROgram(s)/formoterol 4.5 MICROgram(s) Inhaler 2 Puff(s) Inhalation two times a day  calcium carbonate   1250 mG (OsCal) 1 Tablet(s) Oral three times a day  chlorhexidine 4% Liquid 1 Application(s) Topical <User Schedule>  clopidogrel Tablet 75 milliGRAM(s) Oral daily  heparin  Infusion 1000 Unit(s)/Hr (11 mL/Hr) IV Continuous <Continuous>  influenza   Vaccine 0.5 milliLiter(s) IntraMuscular once  levothyroxine 100 MICROGram(s) Oral daily  metoprolol tartrate 25 milliGRAM(s) Oral two times a day  norepinephrine Infusion 0.06 MICROgram(s)/kG/Min (9.38 mL/Hr) IV Continuous <Continuous>  pantoprazole  Injectable 40 milliGRAM(s) IV Push two times a day  senna 2 Tablet(s) Oral at bedtime  sucralfate 1 Gram(s) Oral four times a day    MEDICATIONS  (PRN):  ALBUTerol    90 MICROgram(s) HFA Inhaler 2 Puff(s) Inhalation every 6 hours PRN Shortness of Breath and/or Wheezing      Allergies  No Known Allergies      Review of Systems:   General: no fever  HEENT: no hemoptysis  Cardiovascular:  No Chest Pain, No Palpitations  Respiratory:  No Cough, No Dyspnea  Gastrointestinal:  As described in HPI  Hematology: no bruising or hematoma   Neurology: +weakness  Skin: no new rash    Physical Examination:  T(C): 36.2 (10-30-20 @ 04:00), Max: 36.5 (10-29-20 @ 12:00)  HR: 82 (10-30-20 @ 07:) (74 - 110)  BP: 110/71 (10-30-20 @ 07:00) (71/39 - 138/72)  RR: 18 (10-30-20 @ 07:00) (12 - 40)  SpO2: 96% (10-30-20 @ 07:00) (83% - 99%)  Weight (kg): 82.8 (10-29-20 @ 19:47)    10-29-20 @ 07:01  -  10-30-20 @ 07:00  --------------------------------------------------------  IN: 431.6 mL / OUT: 3235 mL / NET: -2803.4 mL        Constitutional: No acute distress.  Head: normocephalic  Neck: no palpable thyroid  Eyes: EOMI  Respiratory:  No signs of respiratory distress. Lung sounds are clear bilaterally.  Cardiovascular:  S1 S2, Regular rate and rhythm.  Abdominal: Abdomen is soft, symmetric, and non-tender without distention.   Extremities: no pitting edema  Skin: No rashes, No Jaundice.        Data: (reviewed by attending)                        8.4    9.26  )-----------( 230      ( 30 Oct 2020 04:40 )             27.2     Hgb trend:  8.4  10-30-20 @ 04:40  9.3  10-29-20 @ 04:50  9.7  10-28-20 @ 12:55        10    134<L>  |  99  |  17  ----------------------------<  96  3.7   |  27  |  1.0    Ca    8.5      30 Oct 2020 04:40    TPro  5.4<L>  /  Alb  2.6<L>  /  TBili  1.1  /  DBili  x   /  AST  178<H>  /  ALT  75<H>  /  AlkPhos  101  1030    Liver panel trend:  TBili 1.1   /      /   ALT 75   /   AlkP 101   /   Tptn 5.4   /   Alb 2.6    /   DBili --      10-30  TBili 1.0   /      /      /   AlkP 102   /   Tptn 5.3   /   Alb 2.5    /   DBili 0.7      10-29  TBili 1.0   /      /      /   AlkP 108   /   Tptn 5.4   /   Alb 2.7    /   DBili 0.8      10-29  TBili 0.9   /      /   ALT 99   /   AlkP 102   /   Tptn 5.3   /   Alb 2.5    /   DBili --      10-28  TBili 0.6   /   AST 34   /   ALT 22   /   AlkP 93   /   Tptn 5.4   /   Alb 2.6    /   DBili --      10-22  TBili 0.6   /   AST 46   /   ALT 23   /   AlkP 101   /   Tptn 5.7   /   Alb 2.6    /   DBili --      10-21  TBili 0.7   /   AST 50   /   ALT 24   /   AlkP 115   /   Tptn 5.8   /   Alb 2.9    /   DBili --      10-21  TBili 0.7   /   AST 50   /   ALT 24   /   AlkP 110   /   Tptn 5.8   /   Alb 2.8    /   DBili --      10-21  TBili 0.8   /   AST 56   /   ALT 22   /   AlkP 110   /   Tptn 6.2   /   Alb 2.8    /   DBili 0.4      10-20      PT/INR - ( 28 Oct 2020 12:55 )   PT: 23.10 sec;   INR: 2.01 ratio         PTT - ( 30 Oct 2020 04:40 )  PTT:49.9 sec    Culture - Blood (collected 28 Oct 2020 23:30)  Source: .Blood Blood-Peripheral  Preliminary Report (30 Oct 2020 07:01):    No growth to date.    Culture - Urine (collected 28 Oct 2020 13:24)  Source: .Urine Clean Catch (Midstream)  Final Report (29 Oct 2020 18:38):    No growth    Culture - Blood (collected 28 Oct 2020 12:55)  Source: .Blood Blood-Peripheral  Preliminary Report (29 Oct 2020 22:02):    No growth to date.    Culture - Blood (collected 28 Oct 2020 12:55)  Source: .Blood Blood-Peripheral  Preliminary Report (29 Oct 2020 22:02):    No growth to date.         Radiology: (reviewed by attending)  none

## 2020-10-30 NOTE — PROGRESS NOTE ADULT - ASSESSMENT
IMPRESSION:    NSTEMI   JEEVAN improving  Pulmonary edema   HO Dextrocardia  Recent therapy for Pyelonephritis  HO COPD   Active smoker    #NSTEMI:  - Inferior wall MI on prior EKGs; Q waves formed  - Troponins currently stable: .57 < .54 < .60  - Cardiology following; continue to f/u cardiology recommendations  - ASA 81mg/daily  - C/w metoprolol (increased from 2x/daily to 3x/daily)  - F/u echo.  - Daily EKG  - Heparin held for now  - AC per cardiology  - Hemoglobin is 8.4 down from 9.37  - GI is following patient; recommending possible EGD if Hgb drops  - Protonix drip    #ID  - Follow up blood cultures  - D/c antibiotics  - F/u procalcitonin     ICU monitoring    For follow-up:  -Hgb from 11am CBC today  -4pm troponin today  -Morning labs tmrw  -Procalcitonin  -Blood cultures  -Troponin  -Cardiology recommendations  -GI recommendations

## 2020-10-30 NOTE — PROGRESS NOTE ADULT - ASSESSMENT
IMPRESSION:    NSTEMI   JEEVAN improving  Pulmonary edema   HO Dextrocardia  Recent therapy for Pyelonephritis  HO COPD   Active smoker     PLAN:    CNS:  No depressants     HEENT: Oral care    PULMONARY:  HOB @ 45 degrees.  Aspiration precautions.  Nebs PRN.      CARDIOVASCULAR:  ASA. Increase Beta blockers.  FU ECHO.  FU cardiology.  Daily EKG.  DC AC      GI: GI prophylaxis.  NPO.  Protonix drip.  Bowel regimen     RENAL:  Follow up lytes.  Correct as needed.      INFECTIOUS DISEASE: Follow up cultures.      HEMATOLOGICAL:  DVT prophylaxis seq.      ENDOCRINE:  Follow up FS.  Insulin protocol if needed.    MUSCULOSKELETAL:  Bed rest.      ICU / CCU monitoring

## 2020-10-30 NOTE — PROGRESS NOTE ADULT - SUBJECTIVE AND OBJECTIVE BOX
Patient is a 55y old  Female who presents with a chief complaint of Septic Shock (30 Oct 2020 07:50)        Over Night Events:  Some bloody BMs.  SP GI evaluation.  No CP         ROS:     All ROS are negative except HPI         PHYSICAL EXAM    ICU Vital Signs Last 24 Hrs  T(C): 36.2 (30 Oct 2020 04:00), Max: 36.5 (29 Oct 2020 12:00)  T(F): 97.1 (30 Oct 2020 04:00), Max: 97.7 (29 Oct 2020 12:00)  HR: 82 (30 Oct 2020 07:00) (74 - 110)  BP: 110/71 (30 Oct 2020 07:00) (71/39 - 138/72)  BP(mean): 86 (30 Oct 2020 07:00) (54 - 110)  ABP: --  ABP(mean): --  RR: 18 (30 Oct 2020 07:00) (12 - 40)  SpO2: 96% (30 Oct 2020 07:00) (83% - 99%)      CONSTITUTIONAL:  Well nourished.  NAD    ENT:   Airway patent,   Mouth with normal mucosa.   No thrush    EYES:   Pupils equal,   Round and reactive to light.    CARDIAC:   Normal rate,   Regular rhythm.    No edema      Vascular:  Normal systolic impulse  No Carotid bruits    RESPIRATORY:   No wheezing  Bilateral BS  Normal chest expansion  Not tachypneic,  No use of accessory muscles    GASTROINTESTINAL:  Abdomen soft,   Non-tender,   No guarding,   + BS    MUSCULOSKELETAL:   Range of motion is not limited,  No clubbing, cyanosis    NEUROLOGICAL:   Alert and oriented   No motor  deficits.    SKIN:   Skin normal color for race,   Warm and dry and intact.   No evidence of rash.    PSYCHIATRIC:   Normal mood and affect.   No apparent risk to self or others.    HEMATOLOGICAL:  No cervical  lymphadenopathy.  no inguinal lymphadenopathy      10-29-20 @ 07:01  -  10-30-20 @ 07:00  --------------------------------------------------------  IN:    Heparin: 111 mL    IV PiggyBack: 150 mL    Norepinephrine: 20.6 mL    Oral Fluid: 150 mL  Total IN: 431.6 mL    OUT:    Indwelling Catheter - Urethral (mL): 3235 mL  Total OUT: 3235 mL    Total NET: -2803.4 mL          LABS:                            8.4    9.26  )-----------( 230      ( 30 Oct 2020 04:40 )             27.2                               8.4    9.26  )-----------( 230      ( 10-30 @ 04:40 )             27.2                9.3    13.53 )-----------( 282      ( 10-29 @ 04:50 )             30.1                9.7    19.26 )-----------( 370      ( 10-28 @ 12:55 )             31.1                                    10-30    134<L>  |  99  |  17  ----------------------------<  96  3.7   |  27  |  1.0    30 Oct 2020 04:40    134<L>  |  99     |  17     ----------------------------<  96     3.7     |  27     |  1.0    29 Oct 2020 04:50    128<L>  |  96<L>  |  26<H>  ----------------------------<  97     3.4<L>   |  23     |  1.8<H>    Ca    8.5        30 Oct 2020 04:40  Ca    8.1<L>      29 Oct 2020 04:50    TPro  5.4<L>  /  Alb  2.6<L>  /  TBili  1.1    /  DBili  x      /  AST  178<H>  /  ALT  75<H>  /  AlkPhos  101    30 Oct 2020 04:40  TPro  5.3<L>  /  Alb  2.5<L>  /  TBili  1.0    /  DBili  0.7<H>  /  AST  469<H>  /  ALT  125<H>  /  AlkPhos  102    29 Oct 2020 04:50      Ca    8.5      30 Oct 2020 04:40    TPro  5.4<L>  /  Alb  2.6<L>  /  TBili  1.1  /  DBili  x   /  AST  178<H>  /  ALT  75<H>  /  AlkPhos  101  10-30      PT/INR - ( 28 Oct 2020 12:55 )   PT: 23.10 sec;   INR: 2.01 ratio         PTT - ( 30 Oct 2020 04:40 )  PTT:49.9 sec                                       Urinalysis Basic - ( 28 Oct 2020 14:10 )    Color: Yellow / Appearance: Clear / S.014 / pH: x  Gluc: x / Ketone: Trace  / Bili: Negative / Urobili: <2 mg/dL   Blood: x / Protein: 30 mg/dL / Nitrite: Negative   Leuk Esterase: Small / RBC: 9 /HPF / WBC 2 /HPF   Sq Epi: x / Non Sq Epi: 4 /HPF / Bacteria: Negative        CARDIAC MARKERS ( 30 Oct 2020 04:40 )  x     / 0.57 ng/mL / x     / x     / x      CARDIAC MARKERS ( 29 Oct 2020 23:28 )  x     / 0.57 ng/mL / x     / x     / x      CARDIAC MARKERS ( 29 Oct 2020 10:06 )  x     / 0.54 ng/mL / 433 U/L / x     / 11.0 ng/mL  CARDIAC MARKERS ( 29 Oct 2020 04:50 )  x     / 0.59 ng/mL / 563 U/L / x     / 14.3 ng/mL  CARDIAC MARKERS ( 29 Oct 2020 00:00 )  x     / 0.60 ng/mL / 748 U/L / x     / 17.5 ng/mL  CARDIAC MARKERS ( 28 Oct 2020 18:25 )  x     / 0.59 ng/mL / x     / x     / x      CARDIAC MARKERS ( 28 Oct 2020 14:31 )  x     / 0.81 ng/mL / x     / x     / x                                                LIVER FUNCTIONS - ( 30 Oct 2020 04:40 )  Alb: 2.6 g/dL / Pro: 5.4 g/dL / ALK PHOS: 101 U/L / ALT: 75 U/L / AST: 178 U/L / GGT: x                                                  Culture - Blood (collected 28 Oct 2020 23:30)  Source: .Blood Blood-Peripheral  Preliminary Report (30 Oct 2020 07:01):    No growth to date.    Culture - Urine (collected 28 Oct 2020 13:24)  Source: .Urine Clean Catch (Midstream)  Final Report (29 Oct 2020 18:38):    No growth    Culture - Blood (collected 28 Oct 2020 12:55)  Source: .Blood Blood-Peripheral  Preliminary Report (29 Oct 2020 22:02):    No growth to date.    Culture - Blood (collected 28 Oct 2020 12:55)  Source: .Blood Blood-Peripheral  Preliminary Report (29 Oct 2020 22:02):    No growth to date.                                                                                           MEDICATIONS  (STANDING):  aspirin  chewable 81 milliGRAM(s) Oral daily  atorvastatin 80 milliGRAM(s) Oral at bedtime  budesonide  80 MICROgram(s)/formoterol 4.5 MICROgram(s) Inhaler 2 Puff(s) Inhalation two times a day  calcium carbonate   1250 mG (OsCal) 1 Tablet(s) Oral three times a day  chlorhexidine 4% Liquid 1 Application(s) Topical <User Schedule>  clopidogrel Tablet 75 milliGRAM(s) Oral daily  heparin  Infusion 1000 Unit(s)/Hr (11 mL/Hr) IV Continuous <Continuous>  influenza   Vaccine 0.5 milliLiter(s) IntraMuscular once  levothyroxine 100 MICROGram(s) Oral daily  metoprolol tartrate 25 milliGRAM(s) Oral two times a day  norepinephrine Infusion 0.06 MICROgram(s)/kG/Min (9.38 mL/Hr) IV Continuous <Continuous>  pantoprazole  Injectable 40 milliGRAM(s) IV Push two times a day  senna 2 Tablet(s) Oral at bedtime  sucralfate 1 Gram(s) Oral four times a day    MEDICATIONS  (PRN):  ALBUTerol    90 MICROgram(s) HFA Inhaler 2 Puff(s) Inhalation every 6 hours PRN Shortness of Breath and/or Wheezing      New X-rays reviewed:                                                                                  ECHO    CXR interpreted by me:  Pulmonary edema dextrocardia

## 2020-10-30 NOTE — PROGRESS NOTE ADULT - ASSESSMENT
53 y/o female with Medical Hx of Type II DM,  CAD, DLD, HTN, Hypothyroidism, COPD, Dextrocardia,  Sciatica/Peripheral Neuralgia, Charcot foot deformity s/p reconstruction on 1/11/19, recent history of giant clean based duodenal ulcer presenting for hypotension and lethargy, admitted with Septic shock, Hyponatremia and GI is being called for drop in HG, loose watery stools.    #Melena?  -CBC with 1 unit drop  -patient off pressors  -electrolytes corrected  -repeat CBC at 11 am, if Hb drop will do EGD, keep NPO meanwhile, hold heparin if ok by cardiology  -cardiology risk stratification   -c/w IV pantoprazole 40mg BID  -c/w sucralfate qid, hold it for possible EGD  -2 lareg bore IV  -keep active type and screen    *Elevated LFts  -trending down  -hep C neg, hep B s Ag neg, ASMA neg, AMA, RADHA neg  -keep MAP>65    will follow  discussed with medical team 55 y/o female with Medical Hx of Type II DM,  CAD, DLD, HTN, Hypothyroidism, COPD, Dextrocardia,  Sciatica/Peripheral Neuralgia, Charcot foot deformity s/p reconstruction on 1/11/19, recent history of giant clean based duodenal ulcer presenting for hypotension and lethargy, admitted with Septic shock, Hyponatremia and GI is being called for drop in HG, loose watery stools.    #Melena?  -CBC with 1 unit drop  -patient off pressors  -electrolytes corrected  -repeat CBC at 11 am, stable can start clear liquid diet and advance as tolerated    -c/w IV pantoprazole 40mg BID  -c/w sucralfate qid   -2 lareg bore IV  -keep active type and screen    *Elevated LFts  -trending down  -hep C neg, hep B s Ag neg, ASMA neg, AMA, RADHA neg  -keep MAP>65    will follow  discussed with medical team   recall GI if bleeding

## 2020-10-30 NOTE — PROGRESS NOTE ADULT - SUBJECTIVE AND OBJECTIVE BOX
INTERVAL HISTORY: No overnight events.  	  MEDICATIONS:  ALBUTerol    90 MICROgram(s) HFA Inhaler 2 Puff(s) Inhalation every 6 hours PRN  aspirin  chewable 81 milliGRAM(s) Oral daily  atorvastatin 80 milliGRAM(s) Oral at bedtime  budesonide  80 MICROgram(s)/formoterol 4.5 MICROgram(s) Inhaler 2 Puff(s) Inhalation two times a day  calcium carbonate   1250 mG (OsCal) 1 Tablet(s) Oral three times a day  chlorhexidine 4% Liquid 1 Application(s) Topical <User Schedule>  clopidogrel Tablet 75 milliGRAM(s) Oral daily  influenza   Vaccine 0.5 milliLiter(s) IntraMuscular once  levothyroxine 100 MICROGram(s) Oral daily  metoprolol tartrate 25 milliGRAM(s) Oral three times a day  norepinephrine Infusion 0.06 MICROgram(s)/kG/Min IV Continuous <Continuous>  pantoprazole Infusion 8 mG/Hr IV Continuous <Continuous>  senna 2 Tablet(s) Oral at bedtime  sucralfate 1 Gram(s) Oral four times a day      REVIEW OF SYSTEMS:  CONSTITUTIONAL: No fever, weight loss, or fatigue  NECK: No pain or stiffness  RESPIRATORY: No cough, wheezing, chills or hemoptysis; No shortness of breath  CARDIOVASCULAR: No chest pain, palpitations, dizziness, or leg swelling  GASTROINTESTINAL: No abdominal or epigastric pain. No nausea, vomiting, or hematemesis; No diarrhea or constipation. No melena or hematochezia.  GENITOURINARY: No dysuria, frequency, hematuria, or incontinence  NEUROLOGICAL: No headaches, memory loss, loss of strength, numbness, or tremors  SKIN: No itching, burning, rashes, or lesions   ENDOCRINE: No heat or cold intolerance; No hair loss  MUSCULOSKELETAL: No joint pain or swelling; No muscle, back, or extremity pain  HEME/LYMPH: No easy bruising, or bleeding gums    PHYSICAL EXAM:  T(C): 36.2 (10-30-20 @ 16:00), Max: 36.6 (10-30-20 @ 08:00)  HR: 78 (10-30-20 @ 18:00) (74 - 86)  BP: 141/75 (10-30-20 @ 18:00) (71/39 - 141/75)  RR: 25 (10-30-20 @ 18:00) (12 - 40)  SpO2: 95% (10-30-20 @ 18:00) (94% - 99%)  Wt(kg): --  I&O's Summary    29 Oct 2020 07:01  -  30 Oct 2020 07:00  --------------------------------------------------------  IN: 431.6 mL / OUT: 3235 mL / NET: -2803.4 mL    30 Oct 2020 07:01  -  30 Oct 2020 18:47  --------------------------------------------------------  IN: 121 mL / OUT: 310 mL / NET: -189 mL      Height (cm): 160 (10-29 @ 19:47)  Weight (kg): 82.8 (10-29 @ 19:47)  BMI (kg/m2): 32.3 (10-29 @ 19:47)  BSA (m2): 1.86 (10-29 @ 19:47)    GENERAL: NAD, well-groomed, well-developed  HEAD:  Atraumatic, Normocephalic  NECK: Supple, No JVD, Normal thyroid  NERVOUS SYSTEM:  Alert & Oriented X3, Good concentration  CHEST/LUNG: Clear to percussion bilaterally; No rales, rhonchi, wheezing, or rubs  HEART: Regular rate and rhythm; No murmurs, rubs, or gallops  ABDOMEN: Soft, Nontender, Nondistended; Bowel sounds present  EXTREMITIES:  2+ Peripheral Pulses, No clubbing, cyanosis, or edema  SKIN: No rashes or lesions    LABS:	 	    CARDIAC MARKERS ( 30 Oct 2020 17:10 )  x     / 0.50 ng/mL / x     / x     / x      CARDIAC MARKERS ( 30 Oct 2020 04:40 )  x     / 0.57 ng/mL / x     / x     / x      CARDIAC MARKERS ( 29 Oct 2020 23:28 )  x     / 0.57 ng/mL / x     / x     / x      CARDIAC MARKERS ( 29 Oct 2020 10:06 )  x     / 0.54 ng/mL / 433 U/L / x     / 11.0 ng/mL  CARDIAC MARKERS ( 29 Oct 2020 04:50 )  x     / 0.59 ng/mL / 563 U/L / x     / 14.3 ng/mL  CARDIAC MARKERS ( 29 Oct 2020 00:00 )  x     / 0.60 ng/mL / 748 U/L / x     / 17.5 ng/mL                            8.4    8.18  )-----------( 219      ( 30 Oct 2020 14:55 )             27.2     10-30    134<L>  |  99  |  17  ----------------------------<  96  3.7   |  27  |  1.0    Ca    8.5      30 Oct 2020 04:40    TPro  5.4<L>  /  Alb  2.6<L>  /  TBili  1.1  /  DBili  x   /  AST  178<H>  /  ALT  75<H>  /  AlkPhos  101  10-30    proBNP:   Lipid Profile:

## 2020-10-31 ENCOUNTER — TRANSCRIPTION ENCOUNTER (OUTPATIENT)
Age: 55
End: 2020-10-31

## 2020-10-31 VITALS — RESPIRATION RATE: 18 BRPM | OXYGEN SATURATION: 97 %

## 2020-10-31 LAB
ALBUMIN SERPL ELPH-MCNC: 3 G/DL — LOW (ref 3.5–5.2)
ALP SERPL-CCNC: 108 U/L — SIGNIFICANT CHANGE UP (ref 30–115)
ALT FLD-CCNC: 63 U/L — HIGH (ref 0–41)
ANION GAP SERPL CALC-SCNC: 10 MMOL/L — SIGNIFICANT CHANGE UP (ref 7–14)
AST SERPL-CCNC: 101 U/L — HIGH (ref 0–41)
BASOPHILS # BLD AUTO: 0.03 K/UL — SIGNIFICANT CHANGE UP (ref 0–0.2)
BASOPHILS NFR BLD AUTO: 0.4 % — SIGNIFICANT CHANGE UP (ref 0–1)
BILIRUB SERPL-MCNC: 1.2 MG/DL — SIGNIFICANT CHANGE UP (ref 0.2–1.2)
BUN SERPL-MCNC: 12 MG/DL — SIGNIFICANT CHANGE UP (ref 10–20)
CALCIUM SERPL-MCNC: 8.2 MG/DL — LOW (ref 8.5–10.1)
CHLORIDE SERPL-SCNC: 102 MMOL/L — SIGNIFICANT CHANGE UP (ref 98–110)
CO2 SERPL-SCNC: 26 MMOL/L — SIGNIFICANT CHANGE UP (ref 17–32)
CREAT SERPL-MCNC: 0.9 MG/DL — SIGNIFICANT CHANGE UP (ref 0.7–1.5)
CULTURE RESULTS: NO GROWTH — SIGNIFICANT CHANGE UP
EOSINOPHIL # BLD AUTO: 0.27 K/UL — SIGNIFICANT CHANGE UP (ref 0–0.7)
EOSINOPHIL NFR BLD AUTO: 3.5 % — SIGNIFICANT CHANGE UP (ref 0–8)
GLUCOSE BLDC GLUCOMTR-MCNC: 105 MG/DL — HIGH (ref 70–99)
GLUCOSE BLDC GLUCOMTR-MCNC: 99 MG/DL — SIGNIFICANT CHANGE UP (ref 70–99)
GLUCOSE SERPL-MCNC: 97 MG/DL — SIGNIFICANT CHANGE UP (ref 70–99)
HCT VFR BLD CALC: 27.4 % — LOW (ref 37–47)
HGB BLD-MCNC: 8.5 G/DL — LOW (ref 12–16)
IMM GRANULOCYTES NFR BLD AUTO: 0.9 % — HIGH (ref 0.1–0.3)
LYMPHOCYTES # BLD AUTO: 0.98 K/UL — LOW (ref 1.2–3.4)
LYMPHOCYTES # BLD AUTO: 12.6 % — LOW (ref 20.5–51.1)
MCHC RBC-ENTMCNC: 27.2 PG — SIGNIFICANT CHANGE UP (ref 27–31)
MCHC RBC-ENTMCNC: 31 G/DL — LOW (ref 32–37)
MCV RBC AUTO: 87.8 FL — SIGNIFICANT CHANGE UP (ref 81–99)
MONOCYTES # BLD AUTO: 0.57 K/UL — SIGNIFICANT CHANGE UP (ref 0.1–0.6)
MONOCYTES NFR BLD AUTO: 7.3 % — SIGNIFICANT CHANGE UP (ref 1.7–9.3)
NEUTROPHILS # BLD AUTO: 5.85 K/UL — SIGNIFICANT CHANGE UP (ref 1.4–6.5)
NEUTROPHILS NFR BLD AUTO: 75.3 % — HIGH (ref 42.2–75.2)
NRBC # BLD: 0 /100 WBCS — SIGNIFICANT CHANGE UP (ref 0–0)
PLATELET # BLD AUTO: 223 K/UL — SIGNIFICANT CHANGE UP (ref 130–400)
POTASSIUM SERPL-MCNC: 3.8 MMOL/L — SIGNIFICANT CHANGE UP (ref 3.5–5)
POTASSIUM SERPL-SCNC: 3.8 MMOL/L — SIGNIFICANT CHANGE UP (ref 3.5–5)
PROT SERPL-MCNC: 5.9 G/DL — LOW (ref 6–8)
RBC # BLD: 3.12 M/UL — LOW (ref 4.2–5.4)
RBC # FLD: 19.8 % — HIGH (ref 11.5–14.5)
SODIUM SERPL-SCNC: 138 MMOL/L — SIGNIFICANT CHANGE UP (ref 135–146)
SPECIMEN SOURCE: SIGNIFICANT CHANGE UP
WBC # BLD: 7.77 K/UL — SIGNIFICANT CHANGE UP (ref 4.8–10.8)
WBC # FLD AUTO: 7.77 K/UL — SIGNIFICANT CHANGE UP (ref 4.8–10.8)

## 2020-10-31 PROCEDURE — 71045 X-RAY EXAM CHEST 1 VIEW: CPT | Mod: 26

## 2020-10-31 PROCEDURE — 99238 HOSP IP/OBS DSCHRG MGMT 30/<: CPT

## 2020-10-31 RX ORDER — OXYCODONE AND ACETAMINOPHEN 5; 325 MG/1; MG/1
1 TABLET ORAL EVERY 6 HOURS
Refills: 0 | Status: DISCONTINUED | OUTPATIENT
Start: 2020-10-31 | End: 2020-10-31

## 2020-10-31 RX ORDER — ACETAMINOPHEN 500 MG
650 TABLET ORAL ONCE
Refills: 0 | Status: COMPLETED | OUTPATIENT
Start: 2020-10-31 | End: 2020-10-31

## 2020-10-31 RX ORDER — PANTOPRAZOLE SODIUM 20 MG/1
40 TABLET, DELAYED RELEASE ORAL
Refills: 0 | Status: DISCONTINUED | OUTPATIENT
Start: 2020-10-31 | End: 2020-10-31

## 2020-10-31 RX ORDER — CLOPIDOGREL BISULFATE 75 MG/1
1 TABLET, FILM COATED ORAL
Qty: 30 | Refills: 3
Start: 2020-10-31 | End: 2021-02-27

## 2020-10-31 RX ADMIN — Medication 1 TABLET(S): at 22:57

## 2020-10-31 RX ADMIN — Medication 1 GRAM(S): at 05:27

## 2020-10-31 RX ADMIN — Medication 1 PATCH: at 06:50

## 2020-10-31 RX ADMIN — CHLORHEXIDINE GLUCONATE 1 APPLICATION(S): 213 SOLUTION TOPICAL at 06:50

## 2020-10-31 RX ADMIN — ATORVASTATIN CALCIUM 80 MILLIGRAM(S): 80 TABLET, FILM COATED ORAL at 22:58

## 2020-10-31 RX ADMIN — Medication 650 MILLIGRAM(S): at 01:30

## 2020-10-31 RX ADMIN — Medication 25 MILLIGRAM(S): at 22:57

## 2020-10-31 RX ADMIN — CLOPIDOGREL BISULFATE 75 MILLIGRAM(S): 75 TABLET, FILM COATED ORAL at 12:01

## 2020-10-31 RX ADMIN — Medication 75 MILLIGRAM(S): at 22:57

## 2020-10-31 RX ADMIN — Medication 1 PATCH: at 19:47

## 2020-10-31 RX ADMIN — BUDESONIDE AND FORMOTEROL FUMARATE DIHYDRATE 2 PUFF(S): 160; 4.5 AEROSOL RESPIRATORY (INHALATION) at 19:46

## 2020-10-31 RX ADMIN — Medication 650 MILLIGRAM(S): at 01:00

## 2020-10-31 RX ADMIN — Medication 25 MILLIGRAM(S): at 05:27

## 2020-10-31 RX ADMIN — Medication 25 MILLIGRAM(S): at 14:20

## 2020-10-31 RX ADMIN — Medication 650 MILLIGRAM(S): at 20:18

## 2020-10-31 RX ADMIN — Medication 1 TABLET(S): at 14:20

## 2020-10-31 RX ADMIN — BUDESONIDE AND FORMOTEROL FUMARATE DIHYDRATE 2 PUFF(S): 160; 4.5 AEROSOL RESPIRATORY (INHALATION) at 12:03

## 2020-10-31 RX ADMIN — Medication 1 TABLET(S): at 05:27

## 2020-10-31 RX ADMIN — Medication 650 MILLIGRAM(S): at 19:46

## 2020-10-31 RX ADMIN — Medication 1 PATCH: at 12:07

## 2020-10-31 RX ADMIN — Medication 81 MILLIGRAM(S): at 12:01

## 2020-10-31 RX ADMIN — Medication 100 MICROGRAM(S): at 05:27

## 2020-10-31 RX ADMIN — PANTOPRAZOLE SODIUM 40 MILLIGRAM(S): 20 TABLET, DELAYED RELEASE ORAL at 18:07

## 2020-10-31 NOTE — PROGRESS NOTE ADULT - ATTENDING COMMENTS
Attending Statement: I have personally performed a face to face diagnostic evaluation on this patient. The patient is suffering from sepsis. I have reviewed the above note and agree with the history, exam and suggestions for care, except as I have noted in the text.
I personally interviewed and examined the patient Hb stable rec EGd as outpatient.

## 2020-10-31 NOTE — DISCHARGE NOTE PROVIDER - HOSPITAL COURSE
Patient d/toshia AMA    55 y/o female with PMHx of Type II DM, HFpEF (EF of 72 %), CAD, hyperlipidemia, UGIB 2/2 duodenal ulcer, HTN, Hypothyroidism, COPD, Dextrocardia, Sciatica/Peripheral Neuralgia, and Charcot foot deformity s/p reconstruction on 1/11/19 presented for hypotension, lethargy and black stool. The patient was recently admitted to the hospital for sepsis secondary to acute pyelonephritis and was d/c on PO Vantin (compliant per patient and daughter). Since her discharge the patient has been experiencing worsening fatigue and confusion per daughter. This past Wednesday, the patient experienced 2 episodes of melena, appeared altered to the daughter (seeing things that weren't there) and had persistently low BP. EMS was called.    Pt. was hypotensive on initial encounter to 94/56 mmHg. Labs revealed elevated WBC w/ stable Hb, along w/ JEEVAN and transaminitis. CT Head and A/P negative. CXR showing b/l opacities but patient denies SOB, Cough, Sputum. Patient received Cefepime, Vanco and Narcan in ED, placed on Levo and being admitted for Septic Shock.     Patient tiffany/toshia ALMAGUERA

## 2020-10-31 NOTE — DISCHARGE NOTE NURSING/CASE MANAGEMENT/SOCIAL WORK - NSDCPEEMAIL_GEN_ALL_CORE
[facility] for Tobacco Control email [email or web address]

## 2020-10-31 NOTE — DISCHARGE NOTE PROVIDER - NSDCFUSCHEDAPPT_GEN_ALL_CORE_FT
CALLY HARTMAN ; 12/02/2020 ; NPP Gastro Doc Off [id number] ; 01/11/2021 ; NPP Urogyn [address] [facility]

## 2020-10-31 NOTE — DISCHARGE NOTE PROVIDER - NSDCMRMEDTOKEN_GEN_ALL_CORE_FT
aspirin 81 mg oral delayed release tablet: 1 tab(s) orally once a day  atorvastatin 40 mg oral tablet: 1 tab(s) orally once a day (at bedtime)  budesonide-formoterol 80 mcg-4.5 mcg/inh inhalation aerosol: 2 puff(s) inhaled 2 times a day  calcium carbonate 1250 mg (500 mg elemental calcium) oral tablet: 1 tab(s) orally 3 times a day  docusate sodium 100 mg oral capsule: 1 cap(s) orally 2 times a day  ezetimibe 10 mg oral tablet: 1 tab(s) orally once a day  Janumet 50 mg-1000 mg oral tablet: 1 tab(s) orally 2 times a day  levothyroxine 100 mcg (0.1 mg) oral capsule: 1 cap(s) orally once a day  Lyrica 150 mg oral capsule: 1 tab(s) orally 3 times a day, stop after 2/25  Metoprolol Tartrate 25 mg oral tablet: 0.5 tab(s) orally 2 times a day   pantoprazole 40 mg oral delayed release tablet: 1 tab(s) orally 2 times a day  Plavix 75 mg oral tablet: 1 tab(s) orally once a day   senna oral tablet: 2 tab(s) orally once a day (at bedtime)  sucralfate 1 g oral tablet: 1 tab(s) orally 4 times a day (before meals and at bedtime)   tiZANidine 4 mg oral tablet: 2 tab(s) orally every 8 hours, As Needed  Ventolin HFA 90 mcg/inh inhalation aerosol: 2 puff(s) inhaled 4 times a day, As Needed

## 2020-10-31 NOTE — DISCHARGE NOTE PROVIDER - CARE PROVIDER_API CALL
Fili Patel  Cardiovascular Disease  [address] [place] [address]  Phone: [phone]  Fax: [phone]  Follow Up Time: 1 week

## 2020-10-31 NOTE — CHART NOTE - NSCHARTNOTEFT_GEN_A_CORE
Patient d/c AMA Patient d/c AMA      Attestation:    Was notified by the patient's Resident Physician that the pt was expressing a desire to leave AMA.  I saw and evaluated the patient.     She is clinically sober, appears to have intact insight, judgment and reason and, in my opinion, she has the capacity to make decisions.    The patient was admitted to the ICU w/ shock and was just downgraded. She also has elevated cardiac enzyme levels being monitored for a NSTEMI. I explained to her that I am concerned because she needs continued monitoring, including continuous rhythm monitoring (given her heightened risk of arrhythmias), at this time. I explained that there is a chance that she could develop worsening myocardial ischemia which could lead to cardiac and respiratory failure and even death. The patient expressed understanding of the aforementioned, was able to repeat the aforementioned back to me and still insisted on leaving AMA.    I offered her possible incentives and discussed the available therapeutic options while imploring her to stay. For example, she expressed discontentment at the temperature in her room. I offered to call and ask for the temperature to be adjusted. I gave her time to reconsider her decision and she still asked to leave AMA and is refusing further care.    As I am unable to convince the patient to stay, I discussed her medications in detail and told her which medications to take. She told me that she had a supply of all of her medications at home. I encouraged her to return to the ED as soon as possible if she has any recurrence of symptoms or if she desires further workup. I also told her to follow up with her PMD and Cardiology as soon as possible after d/c. The patient was able to tell me where she would need to go for these follow up visits.     I gave the patient every opportunity to ask questions and answered these queries as they arose. The patient has been treated to the extent that she will allow and she is aware that she can return for further care at any time. Patient d/c AMA      Attestation:    Was notified by the patient's Resident Physician that the pt was expressing a desire to leave AMA.  I saw and evaluated the patient.     She is clinically sober, appears to have intact insight, judgment and reason and, in my opinion, she has the capacity to make decisions.    The patient was admitted to the ICU w/ shock and was just downgraded. She also has elevated cardiac enzyme levels being monitored for a NSTEMI (anti-coagulation was on hold because of a concern for GI bleeding and the patient was being monitored by the Gastroenterology service as well). I explained to her that I am concerned because she needs continued monitoring, including continuous rhythm monitoring (given her heightened risk of arrhythmias) and monitoring of her H&H at this time. I explained that there is a chance that she could develop worsening blood loss and worsening myocardial ischemia which could lead to cardiac and respiratory failure and even death. The patient expressed understanding of the aforementioned, was able to repeat the aforementioned back to me and still insisted on leaving AMA.    I offered her possible incentives and discussed the available therapeutic options while imploring her to stay. For example, she expressed discontentment at the temperature in her room. I offered to call and ask for the temperature to be adjusted. I gave her time to reconsider her decision and she still asked to leave AMA and is refusing further care.    As I am unable to convince the patient to stay, I discussed her medications in detail and told her which medications to take. She told me that she had a supply of all of her medications at home. She asked for a prescription for nicotine patches which I sent. I encouraged her to return to the ED as soon as possible if she has any recurrence of symptoms or if she desires further workup. I also told her to follow up with her PMD and Cardiology as soon as possible after d/c. The patient was able to tell me where she would need to go for these follow up visits.     I also tried to call the patient's daughter (after obtaining consent from the patient). However, the daughter did not respond to several calls.     I gave the patient every opportunity to ask questions and answered these queries as they arose. The patient has been treated to the extent that she will allow and she is aware that she can return for further care at any time.

## 2020-10-31 NOTE — PROGRESS NOTE ADULT - SUBJECTIVE AND OBJECTIVE BOX
Patient is a 55y old  Female who presents with a chief complaint of Septic Shock (30 Oct 2020 18:47)    Over Night Events:  No events overnight.      ROS:   All ROS are negative except HPI     PHYSICAL EXAM  ICU Vital Signs Last 24 Hrs  T(C): 36.1 (31 Oct 2020 08:00), Max: 36.6 (30 Oct 2020 12:00)  T(F): 97 (31 Oct 2020 08:00), Max: 97.8 (30 Oct 2020 12:00)  HR: 72 (31 Oct 2020 10:00) (68 - 80)  BP: 142/75 (31 Oct 2020 10:00) (114/73 - 148/89)  BP(mean): 114 (31 Oct 2020 08:00) (85 - 116)  RR: 24 (31 Oct 2020 10:00) (14 - 33)  SpO2: 94% (31 Oct 2020 10:00) (93% - 99%)    CONSTITUTIONAL:  Well nourished.  NAD    ENT:   Airway patent,   Mouth with normal mucosa.   No thrush    EYES:   Pupils equal,   Round and reactive to light.    CARDIAC:   Normal rate,   Regular rhythm.    No edema    Vascular:  Normal systolic impulse  No Carotid bruits    RESPIRATORY:   No wheezing  Bilateral BS  Normal chest expansion  Not tachypneic,  No use of accessory muscles    GASTROINTESTINAL:  Abdomen soft,   Non-tender,   No guarding,   + BS    MUSCULOSKELETAL:   Range of motion is not limited,  No clubbing, cyanosis    NEUROLOGICAL:   Alert and oriented   No motor  deficits.    SKIN:   Skin normal color for race,   Warm and dry and intact.   No evidence of rash.    PSYCHIATRIC:   Normal mood and affect.   No apparent risk to self or others.    HEMATOLOGICAL:  No cervical  lymphadenopathy.  no inguinal lymphadenopathy      10-30-20 @ 07:01  -  10-31-20 @ 07:00  --------------------------------------------------------  IN:    Heparin: 11 mL    Oral Fluid: 60 mL    Pantoprazole: 230 mL  Total IN: 301 mL    OUT:    Indwelling Catheter - Urethral (mL): 50 mL    Norepinephrine: 0 mL    Voided (mL): 260 mL  Total OUT: 310 mL    Total NET: -9 mL      10-31-20 @ 08:01  -  10-31-20 @ 10:57  --------------------------------------------------------  IN:    Oral Fluid: 100 mL    Pantoprazole: 30 mL  Total IN: 130 mL    OUT:  Total OUT: 0 mL    Total NET: 130 mL      LABS:                          8.5    7.77  )-----------( 223      ( 31 Oct 2020 04:30 )             27.4     138  |  102  |  12  ----------------------------<  97  3.8   |  26  |  0.9    Ca    8.2<L>      31 Oct 2020 04:30    TPro  5.9<L>  /  Alb  3.0<L>  /  TBili  1.2  /  DBili  x   /  AST  101<H>  /  ALT  63<H>  /  AlkPhos  108  10-31      PTT - ( 30 Oct 2020 14:55 )  PTT:35.6 sec                                           CARDIAC MARKERS ( 30 Oct 2020 17:10 )  x     / 0.50 ng/mL / x     / x     / x      CARDIAC MARKERS ( 30 Oct 2020 04:40 )  x     / 0.57 ng/mL / x     / x     / x      CARDIAC MARKERS ( 29 Oct 2020 23:28 )  x     / 0.57 ng/mL / x     / x     / x        LIVER FUNCTIONS - ( 31 Oct 2020 04:30 )  Alb: 3.0 g/dL / Pro: 5.9 g/dL / ALK PHOS: 108 U/L / ALT: 63 U/L / AST: 101 U/L / GGT: x               Culture - Blood (collected 29 Oct 2020 04:31)  Source: .Blood Blood-Peripheral  Preliminary Report (30 Oct 2020 14:01):    No growth to date.    Culture - Blood (collected 28 Oct 2020 23:30)  Source: .Blood Blood-Peripheral  Preliminary Report (30 Oct 2020 07:01):    No growth to date.    Culture - Urine (collected 28 Oct 2020 13:24)  Source: .Urine Clean Catch (Midstream)  Final Report (29 Oct 2020 18:38):    No growth    Culture - Blood (collected 28 Oct 2020 12:55)  Source: .Blood Blood-Peripheral  Preliminary Report (29 Oct 2020 22:02):    No growth to date.    Culture - Blood (collected 28 Oct 2020 12:55)  Source: .Blood Blood-Peripheral  Preliminary Report (29 Oct 2020 22:02):    No growth to date.    MEDICATIONS  (STANDING):  aspirin  chewable 81 milliGRAM(s) Oral daily  atorvastatin 80 milliGRAM(s) Oral at bedtime  budesonide  80 MICROgram(s)/formoterol 4.5 MICROgram(s) Inhaler 2 Puff(s) Inhalation two times a day  calcium carbonate   1250 mG (OsCal) 1 Tablet(s) Oral three times a day  chlorhexidine 4% Liquid 1 Application(s) Topical <User Schedule>  clopidogrel Tablet 75 milliGRAM(s) Oral daily  influenza   Vaccine 0.5 milliLiter(s) IntraMuscular once  levothyroxine 100 MICROGram(s) Oral daily  metoprolol tartrate 25 milliGRAM(s) Oral three times a day  nicotine -   7 mG/24Hr(s) Patch 1 patch Transdermal daily  norepinephrine Infusion 0.06 MICROgram(s)/kG/Min (9.38 mL/Hr) IV Continuous <Continuous>  pantoprazole Infusion 8 mG/Hr (10 mL/Hr) IV Continuous <Continuous>  senna 2 Tablet(s) Oral at bedtime  sucralfate 1 Gram(s) Oral four times a day    MEDICATIONS  (PRN):  ALBUTerol    90 MICROgram(s) HFA Inhaler 2 Puff(s) Inhalation every 6 hours PRN Shortness of Breath and/or Wheezing      < from: TTE Echo Complete w/o Contrast w/ Doppler (10.30.20 @ 09:39) >  Summary:   1. LV Ejection Fraction by Jade's Method with a biplane EF of 62 %.   2. Normal left ventricular size and wall thicknesses, with normal systolic and diastolic function.   3. Moderately enlarged right ventricle.   4. Moderately reduced RV systolic function.   5. Mildly enlarged right atrium.   6. Normal left atrial size.   7. Moderate mitral annular calcification.   8. No evidence of mitral valve regurgitation.   9. Estimated pulmonary artery systolic pressure is 75.6 mmHg assuming a right atrial pressure of 15 mmHg, which is consistent with severe pulmonary hypertension.  10. Pulmonary hypertension is present.  < end of copied text >    New X-rays reviewed:  CXR interpreted by me:   bilateral vascular congestion, cardiomegaly, Dextrocardia

## 2020-10-31 NOTE — CHART NOTE - NSCHARTNOTEFT_GEN_A_CORE
Transfer Note    Transfer from: MICU    Transfer to: (  ) Medicine    ( x ) Telemetry    (  ) RCU      (  ) Palliative    (  ) Stroke Unit    (  ) MICU    (  ) __________________    HPI / CCU COURSE:  55 y/o female with PMHx of Type II DM, HFpEF (EF of 72 %), CAD, hyperlipidemia, UGIB 2/2 duodenal ulcer, HTN, Hypothyroidism, COPD, Dextrocardia, Sciatica/Peripheral Neuralgia, and Charcot foot deformity s/p reconstruction on 1/11/19 presented for hypotension, lethargy and black stool. The patient was recently admitted to the hospital for sepsis secondary to acute pyelonephritis and was d/c on PO Vantin (compliant per patient and daughter). Since her discharge the patient has been experiencing worsening fatigue and confusion per daughter. This past Wednesday, the patient experienced 2 episodes of melena, appeared altered to the daughter (seeing things that weren't there) and had persistently low BP. EMS was called.    Pt. was hypotensive on initial encounter to 94/56 mmHg. Labs revealed elevated WBC w/ stable Hb, along w/ JEEVAN and transaminitis. CT Head and A/P negative. CXR showing b/l opacities but patient denies SOB, Cough, Sputum. Patient received Cefepime, Vanco and Narcan in ED, placed on Levo and being admitted for Septic Shock.           Vital Signs Last 24 Hrs  T(C): 36.2 (31 Oct 2020 12:00), Max: 36.2 (30 Oct 2020 20:00)  T(F): 97.2 (31 Oct 2020 12:00), Max: 97.2 (31 Oct 2020 00:00)  HR: 72 (31 Oct 2020 18:00) (68 - 78)  BP: 114/91 (31 Oct 2020 18:00) (114/73 - 148/89)  BP(mean): 106 (31 Oct 2020 18:00) (85 - 116)  RR: 27 (31 Oct 2020 18:00) (13 - 42)  SpO2: 99% (31 Oct 2020 18:00) (93% - 99%)    I&O's Summary    30 Oct 2020 07:01  -  31 Oct 2020 07:00  --------------------------------------------------------  IN: 301 mL / OUT: 310 mL / NET: -9 mL    31 Oct 2020 08:01  -  31 Oct 2020 19:26  --------------------------------------------------------  IN: 365 mL / OUT: 475 mL / NET: -110 mL        Physical Exam:       LABS:   CARDIAC MARKERS ( 30 Oct 2020 17:10 )  x     / 0.50 ng/mL / x     / x     / x      CARDIAC MARKERS ( 30 Oct 2020 04:40 )  x     / 0.57 ng/mL / x     / x     / x      CARDIAC MARKERS ( 29 Oct 2020 23:28 )  x     / 0.57 ng/mL / x     / x     / x                                  8.5    7.77  )-----------( 223      ( 31 Oct 2020 04:30 )             27.4       10-31    138  |  102  |  12  ----------------------------<  97  3.8   |  26  |  0.9    Ca    8.2<L>      31 Oct 2020 04:30    TPro  5.9<L>  /  Alb  3.0<L>  /  TBili  1.2  /  DBili  x   /  AST  101<H>  /  ALT  63<H>  /  AlkPhos  108  10-31      PTT - ( 30 Oct 2020 14:55 )  PTT:35.6 sec          ECG:    Telemetry:    Imaging:      ASSESSMENT & PLAN:           FOR FOLLOW UP:  [ ]   [ ]   [ ]   [ ]   [ ]   [ ]     Mojgan Lugo MD PGY2 Transfer Note    Transfer from: MICU    Transfer to: (  ) Medicine    ( x ) Telemetry    (  ) RCU      (  ) Palliative    (  ) Stroke Unit    (  ) MICU    (  ) __________________    HPI / CCU COURSE:  55 y/o female with PMHx of Type II DM, HFpEF (EF of 72 %), CAD, hyperlipidemia, UGIB 2/2 duodenal ulcer, HTN, Hypothyroidism, COPD, Dextrocardia, Sciatica/Peripheral Neuralgia, and Charcot foot deformity s/p reconstruction on 1/11/19 presented for hypotension, lethargy and black stool. The patient was recently admitted to the hospital for sepsis secondary to acute pyelonephritis and was d/c on PO Vantin (compliant per patient and daughter). Since her discharge the patient has been experiencing worsening fatigue and confusion per daughter. This past Wednesday, the patient experienced 2 episodes of melena, appeared altered to the daughter (seeing things that weren't there) and had persistently low BP. EMS was called.    Pt. was hypotensive on initial encounter to 94/56 mmHg. Labs revealed elevated WBC w/ stable Hb, along w/ JEEVAN and transaminitis. CT Head and A/P negative. CXR showing b/l opacities but patient denied SOB, cough, sputum. Patient received Cefepime, Vanco and Narcan in ED, placed on Levo and was admitted to the MICU for septic shock. Pt. was noted to have EKG changes, Q waves & elevated cardiac enzymes on EKG. Cardiology was consulted, recommended heparin, ASA, plavix, statin, metoprolol, & echo. Had an episode of melena while in MICU, Hgb remained above 8. On protonix drip. Not on pressors.      Vital Signs Last 24 Hrs  T(C): 36.2 (31 Oct 2020 12:00), Max: 36.2 (30 Oct 2020 20:00)  T(F): 97.2 (31 Oct 2020 12:00), Max: 97.2 (31 Oct 2020 00:00)  HR: 72 (31 Oct 2020 18:00) (68 - 78)  BP: 114/91 (31 Oct 2020 18:00) (114/73 - 148/89)  BP(mean): 106 (31 Oct 2020 18:00) (85 - 116)  RR: 27 (31 Oct 2020 18:00) (13 - 42)  SpO2: 99% (31 Oct 2020 18:00) (93% - 99%)    I&O's Summary    30 Oct 2020 07:01  -  31 Oct 2020 07:00  --------------------------------------------------------  IN: 301 mL / OUT: 310 mL / NET: -9 mL    31 Oct 2020 08:01  -  31 Oct 2020 19:26  --------------------------------------------------------  IN: 365 mL / OUT: 475 mL / NET: -110 mL        Physical Exam:       LABS:   CARDIAC MARKERS ( 30 Oct 2020 17:10 )  x     / 0.50 ng/mL / x     / x     / x      CARDIAC MARKERS ( 30 Oct 2020 04:40 )  x     / 0.57 ng/mL / x     / x     / x      CARDIAC MARKERS ( 29 Oct 2020 23:28 )  x     / 0.57 ng/mL / x     / x     / x                                  8.5    7.77  )-----------( 223      ( 31 Oct 2020 04:30 )             27.4       10-31    138  |  102  |  12  ----------------------------<  97  3.8   |  26  |  0.9    Ca    8.2<L>      31 Oct 2020 04:30    TPro  5.9<L>  /  Alb  3.0<L>  /  TBili  1.2  /  DBili  x   /  AST  101<H>  /  ALT  63<H>  /  AlkPhos  108  10-31      PTT - ( 30 Oct 2020 14:55 )  PTT:35.6 sec          ECG:    Telemetry:    Imaging:      ASSESSMENT & PLAN:     IMPRESSION:    NSTEMI   JEEVAN improving  Pulmonary edema   HO Dextrocardia  Recent therapy for Pyelonephritis  HO COPD   Active smoker    #NSTEMI:  - Inferior wall MI on prior EKGs; Q waves formed  - Troponins currently stable: .57 < .54 < .60  - Cardiology following; continue to f/u cardiology recommendations  - ASA 81mg/daily  - C/w metoprolol (increased from 2x/daily to 3x/daily)  - F/u echo.  - Daily EKG  - Heparin held for now  - AC per cardiology  - Hemoglobin is 8.4 down from 9.37  - GI is following patient; on protonix drip. Continue to follow pt's hgb.    #ID  - Follow up blood cultures  - D/c antibiotics  - F/u procalcitonin     ICU monitoring    For follow-up:  -Morning labs tmrw  -Procalcitonin  -Blood cultures  -Cardiology recommendations  -GI recommendations Transfer Note    Transfer from: MICU    Transfer to: (  ) Medicine    ( x ) Telemetry    (  ) RCU      (  ) Palliative    (  ) Stroke Unit    (  ) MICU    (  ) __________________    HPI / CCU COURSE:  53 y/o female with PMHx of Type II DM, HFpEF (EF of 72 %), CAD, hyperlipidemia, UGIB 2/2 duodenal ulcer, HTN, Hypothyroidism, COPD, Dextrocardia, Sciatica/Peripheral Neuralgia, and Charcot foot deformity s/p reconstruction on 1/11/19 presented for hypotension, lethargy and black stool. The patient was recently admitted to the hospital for sepsis secondary to acute pyelonephritis and was d/c on PO Vantin (compliant per patient and daughter). Since her discharge the patient has been experiencing worsening fatigue and confusion per daughter. This past Wednesday, the patient experienced 2 episodes of melena, appeared altered to the daughter (seeing things that weren't there) and had persistently low BP. EMS was called.    Pt. was hypotensive on initial encounter to 94/56 mmHg. Labs revealed elevated WBC w/ stable Hb, along w/ JEEVAN and transaminitis. CT Head and A/P negative. CXR showing b/l opacities but patient denied SOB, cough, sputum. Patient received Cefepime, Vanco and Narcan in ED, placed on Levo and was admitted to the MICU for septic shock. Pt. was noted to have EKG changes, Q waves & elevated cardiac enzymes on EKG. Cardiology was consulted, recommended heparin, ASA, plavix, statin, metoprolol, & echo. Had an episode of melena while in MICU, Hgb remained above 8. On protonix drip. Not on pressors.      Vital Signs Last 24 Hrs  T(C): 36.2 (31 Oct 2020 12:00), Max: 36.2 (30 Oct 2020 20:00)  T(F): 97.2 (31 Oct 2020 12:00), Max: 97.2 (31 Oct 2020 00:00)  HR: 72 (31 Oct 2020 18:00) (68 - 78)  BP: 114/91 (31 Oct 2020 18:00) (114/73 - 148/89)  BP(mean): 106 (31 Oct 2020 18:00) (85 - 116)  RR: 27 (31 Oct 2020 18:00) (13 - 42)  SpO2: 99% (31 Oct 2020 18:00) (93% - 99%)    I&O's Summary    30 Oct 2020 07:01  -  31 Oct 2020 07:00  --------------------------------------------------------  IN: 301 mL / OUT: 310 mL / NET: -9 mL    31 Oct 2020 08:01  -  31 Oct 2020 19:26  --------------------------------------------------------  IN: 365 mL / OUT: 475 mL / NET: -110 mL      LABS:   CARDIAC MARKERS ( 30 Oct 2020 17:10 )  x     / 0.50 ng/mL / x     / x     / x      CARDIAC MARKERS ( 30 Oct 2020 04:40 )  x     / 0.57 ng/mL / x     / x     / x      CARDIAC MARKERS ( 29 Oct 2020 23:28 )  x     / 0.57 ng/mL / x     / x     / x                                  8.5    7.77  )-----------( 223      ( 31 Oct 2020 04:30 )             27.4       10-31    138  |  102  |  12  ----------------------------<  97  3.8   |  26  |  0.9    Ca    8.2<L>      31 Oct 2020 04:30    TPro  5.9<L>  /  Alb  3.0<L>  /  TBili  1.2  /  DBili  x   /  AST  101<H>  /  ALT  63<H>  /  AlkPhos  108  10-31      PTT - ( 30 Oct 2020 14:55 )  PTT:35.6 sec          Imaging:    CXR (10/31/2020):  Impression:  Pulmonary vascular congestion, stable.    CXR (10/30/2020):  Impression:  Pulmonary vascular congestion. No parenchymal opacity pleural effusion or air leak    Echo (10/30/2020):  Summary:   1. LV Ejection Fraction by Jade's Method with a biplane EF of 62 %.      ASSESSMENT & PLAN:     IMPRESSION:    NSTEMI   JEEVAN improving  Pulmonary edema   HO Dextrocardia  Recent therapy for Pyelonephritis  HO COPD   Active smoker    #NSTEMI:  - Inferior wall MI on prior EKGs; Q waves formed  - Troponins currently stable: .57 < .54 < .60  - Cardiology following; continue to f/u cardiology recommendations  - ASA 81mg/daily  - C/w metoprolol (increased from 2x/daily to 3x/daily)  - F/u echo.  - Daily EKG  - Heparin held for now  - AC per cardiology  - Hemoglobin is 8.4 down from 9.37  - GI is following patient; on protonix drip. Continue to follow pt's hgb.    #ID  - Follow up blood cultures  - D/c antibiotics  - F/u procalcitonin     ICU monitoring    For follow-up:  -Morning labs tmrw  -Procalcitonin  -Blood cultures  -Cardiology recommendations  -GI recommendations Transfer Note    Transfer from: MICU    Transfer to: (  ) Medicine    ( x ) Telemetry    (  ) RCU      (  ) Palliative    (  ) Stroke Unit    (  ) MICU    (  ) __________________    HPI / CCU COURSE:  53 y/o female with PMHx of Type II DM, HFpEF (EF of 72 %), CAD, hyperlipidemia, UGIB 2/2 duodenal ulcer, HTN, Hypothyroidism, COPD, Dextrocardia, Sciatica/Peripheral Neuralgia, and Charcot foot deformity s/p reconstruction on 1/11/19 presented for hypotension, lethargy and black stool. The patient was recently admitted to the hospital for sepsis secondary to acute pyelonephritis and was d/c on PO Vantin (compliant per patient and daughter). Since her discharge the patient has been experiencing worsening fatigue and confusion per daughter. This past Wednesday, the patient experienced 2 episodes of melena, appeared altered to the daughter (seeing things that weren't there) and had persistently low BP. EMS was called.    Pt. was hypotensive on initial encounter to 94/56 mmHg. Labs revealed elevated WBC w/ stable Hb, along w/ JEEVAN and transaminitis. CT Head and A/P negative. CXR showing b/l opacities but patient denied SOB, cough, sputum. Patient received Cefepime, Vanco and Narcan in ED, placed on Levo and was admitted to the MICU for septic shock. Pt. was noted to have EKG changes and elevated cardiac enzymes on EKG. Cardiology was consulted, recommended heparin, ASA, plavix, statin, metoprolol, & echo. Had an episode of melena while in MICU, Hgb remained above 8. On protonix drip. Not on pressors.      Vital Signs Last 24 Hrs  T(C): 36.2 (31 Oct 2020 12:00), Max: 36.2 (30 Oct 2020 20:00)  T(F): 97.2 (31 Oct 2020 12:00), Max: 97.2 (31 Oct 2020 00:00)  HR: 72 (31 Oct 2020 18:00) (68 - 78)  BP: 114/91 (31 Oct 2020 18:00) (114/73 - 148/89)  BP(mean): 106 (31 Oct 2020 18:00) (85 - 116)  RR: 27 (31 Oct 2020 18:00) (13 - 42)  SpO2: 99% (31 Oct 2020 18:00) (93% - 99%)    I&O's Summary    30 Oct 2020 07:01  -  31 Oct 2020 07:00  --------------------------------------------------------  IN: 301 mL / OUT: 310 mL / NET: -9 mL    31 Oct 2020 08:01  -  31 Oct 2020 19:26  --------------------------------------------------------  IN: 365 mL / OUT: 475 mL / NET: -110 mL      LABS:   CARDIAC MARKERS ( 30 Oct 2020 17:10 )  x     / 0.50 ng/mL / x     / x     / x      CARDIAC MARKERS ( 30 Oct 2020 04:40 )  x     / 0.57 ng/mL / x     / x     / x      CARDIAC MARKERS ( 29 Oct 2020 23:28 )  x     / 0.57 ng/mL / x     / x     / x                                  8.5    7.77  )-----------( 223      ( 31 Oct 2020 04:30 )             27.4       10-31    138  |  102  |  12  ----------------------------<  97  3.8   |  26  |  0.9    Ca    8.2<L>      31 Oct 2020 04:30    TPro  5.9<L>  /  Alb  3.0<L>  /  TBili  1.2  /  DBili  x   /  AST  101<H>  /  ALT  63<H>  /  AlkPhos  108  10-31      PTT - ( 30 Oct 2020 14:55 )  PTT:35.6 sec          Imaging:    CXR (10/31/2020):  Impression:  Pulmonary vascular congestion, stable.    CXR (10/30/2020):  Impression:  Pulmonary vascular congestion. No parenchymal opacity pleural effusion or air leak    Echo (10/30/2020):  Summary:   1. LV Ejection Fraction by Jade's Method with a biplane EF of 62 %.      ASSESSMENT & PLAN:     IMPRESSION:    NSTEMI   JEEVAN improving  Pulmonary edema   HO Dextrocardia  Recent therapy for Pyelonephritis  HO COPD   Active smoker    #NSTEMI:  - Inferior wall MI on prior EKGs; Q waves formed  - Troponins currently stable: .57 < .54 < .60  - Cardiology following; continue to f/u cardiology recommendations  - ASA 81mg/daily  - C/w metoprolol (increased from 2x/daily to 3x/daily)  - F/u echo.  - Daily EKG  - Heparin held for now  - AC per cardiology  - Hemoglobin is 8.4 down from 9.37  - GI is following patient; on protonix drip. Continue to follow pt's hgb.    #ID  - Follow up blood cultures  - D/c antibiotics  - F/u procalcitonin     ICU monitoring    For follow-up:  -Morning labs tmrw  -Procalcitonin  -Blood cultures  -Cardiology recommendations  -GI recommendations Transfer Note    Transfer from: MICU    Transfer to: (  ) Medicine    ( x ) Telemetry    (  ) RCU      (  ) Palliative    (  ) Stroke Unit    (  ) MICU    (  ) __________________    HPI / CCU COURSE:  55 y/o female with PMHx of Type II DM, HFpEF (EF of 72 %), CAD, hyperlipidemia, UGIB 2/2 duodenal ulcer, HTN, Hypothyroidism, COPD, Dextrocardia, Sciatica/Peripheral Neuralgia, and Charcot foot deformity s/p reconstruction on 1/11/19 presented for hypotension, lethargy and black stool. The patient was recently admitted to the hospital for sepsis secondary to acute pyelonephritis and was d/c on PO Vantin (compliant per patient and daughter). Since her discharge the patient has been experiencing worsening fatigue and confusion per daughter. This past Wednesday, the patient experienced 2 episodes of melena, appeared altered to the daughter (seeing things that weren't there) and had persistently low BP. EMS was called.    Pt. was hypotensive on initial encounter to 94/56 mmHg. Labs revealed elevated WBC w/ stable Hb, along w/ JEEVAN and transaminitis. CT Head and A/P negative. CXR showing b/l opacities but patient denied SOB, cough, sputum. Patient received Cefepime, Vanco and Narcan in ED, placed on Levo and was admitted to the MICU for septic shock. Pt. was noted to have EKG changes and elevated cardiac enzymes. Cardiology was consulted, recommended heparin, ASA, plavix, statin, metoprolol, & echo. Had an episode of melena while in MICU, Hgb remained above 8. On protonix drip. Not on pressors.      Vital Signs Last 24 Hrs  T(C): 36.2 (31 Oct 2020 12:00), Max: 36.2 (30 Oct 2020 20:00)  T(F): 97.2 (31 Oct 2020 12:00), Max: 97.2 (31 Oct 2020 00:00)  HR: 72 (31 Oct 2020 18:00) (68 - 78)  BP: 114/91 (31 Oct 2020 18:00) (114/73 - 148/89)  BP(mean): 106 (31 Oct 2020 18:00) (85 - 116)  RR: 27 (31 Oct 2020 18:00) (13 - 42)  SpO2: 99% (31 Oct 2020 18:00) (93% - 99%)    I&O's Summary    30 Oct 2020 07:01  -  31 Oct 2020 07:00  --------------------------------------------------------  IN: 301 mL / OUT: 310 mL / NET: -9 mL    31 Oct 2020 08:01  -  31 Oct 2020 19:26  --------------------------------------------------------  IN: 365 mL / OUT: 475 mL / NET: -110 mL      LABS:   CARDIAC MARKERS ( 30 Oct 2020 17:10 )  x     / 0.50 ng/mL / x     / x     / x      CARDIAC MARKERS ( 30 Oct 2020 04:40 )  x     / 0.57 ng/mL / x     / x     / x      CARDIAC MARKERS ( 29 Oct 2020 23:28 )  x     / 0.57 ng/mL / x     / x     / x                                  8.5    7.77  )-----------( 223      ( 31 Oct 2020 04:30 )             27.4       10-31    138  |  102  |  12  ----------------------------<  97  3.8   |  26  |  0.9    Ca    8.2<L>      31 Oct 2020 04:30    TPro  5.9<L>  /  Alb  3.0<L>  /  TBili  1.2  /  DBili  x   /  AST  101<H>  /  ALT  63<H>  /  AlkPhos  108  10-31      PTT - ( 30 Oct 2020 14:55 )  PTT:35.6 sec          Imaging:    CXR (10/31/2020):  Impression:  Pulmonary vascular congestion, stable.    CXR (10/30/2020):  Impression:  Pulmonary vascular congestion. No parenchymal opacity pleural effusion or air leak    Echo (10/30/2020):  Summary:   1. LV Ejection Fraction by Jade's Method with a biplane EF of 62 %.      ASSESSMENT & PLAN:     IMPRESSION:    NSTEMI   JEEVAN improving  Pulmonary edema   HO Dextrocardia  Recent therapy for Pyelonephritis  HO COPD   Active smoker    #NSTEMI:  - Inferior wall MI on prior EKGs; Q waves formed  - Troponins currently stable: .57 < .54 < .60  - Cardiology following; continue to f/u cardiology recommendations  - ASA 81mg/daily  - C/w metoprolol (increased from 2x/daily to 3x/daily)  - F/u echo.  - Daily EKG  - Heparin held for now  - AC per cardiology  - Hemoglobin is 8.4 down from 9.37  - GI is following patient; on protonix drip. Continue to follow pt's hgb.    #ID  - Follow up blood cultures  - D/c antibiotics  - F/u procalcitonin     ICU monitoring    For follow-up:  -Morning labs tmrw  -Procalcitonin  -Blood cultures  -Cardiology recommendations  -GI recommendations

## 2020-10-31 NOTE — DISCHARGE NOTE NURSING/CASE MANAGEMENT/SOCIAL WORK - NSDCPEWEB_GEN_ALL_CORE
[facility] for Tobacco Control website --- [email or web address] --- [email or web address]

## 2020-10-31 NOTE — PROGRESS NOTE ADULT - ASSESSMENT
IMPRESSION:  NSTEMI, IWMI  Pulmonary edema, improving   JEEVAN improving  HO Dextrocardia  Recent therapy for Pyelonephritis  HO COPD   Active smoker       PLAN:    CNS:  No depressants     HEENT: Oral care    PULMONARY:  HOB @ 45 degrees.  Aspiration precautions.  Nebs PRN.   Supplemental O2 as tolerated.  Target SpO2 > 92%.    CARDIOVASCULAR:  ASA. Increase Beta blockers.  FU ECHO.  FU cardiology.  Daily EKG.  DC AC.    GI: GI prophylaxis.  Advance feeds as tolerated.  Protonix bid.  Bowel regimen    RENAL:  Follow up lytes.  Correct as needed.   Monitor UO.    INFECTIOUS DISEASE:  Follow up cultures.      HEMATOLOGICAL:  DVT prophylaxis seq.      ENDOCRINE:  Follow up FS.  Insulin protocol if needed.    MUSCULOSKELETAL:  Bed rest.      D/c R IJ TLC  Dispo:  Downgrade to Telemetry

## 2020-10-31 NOTE — DISCHARGE NOTE PROVIDER - NSDCCPCAREPLAN_GEN_ALL_CORE_FT
PRINCIPAL DISCHARGE DIAGNOSIS  Diagnosis: Septic shock  Assessment and Plan of Treatment:       SECONDARY DISCHARGE DIAGNOSES  Diagnosis: Elevated troponin  Assessment and Plan of Treatment:     Diagnosis: Transaminitis  Assessment and Plan of Treatment:     Diagnosis: JEEVAN (acute kidney injury)  Assessment and Plan of Treatment:

## 2020-10-31 NOTE — DISCHARGE NOTE NURSING/CASE MANAGEMENT/SOCIAL WORK - PATIENT PORTAL LINK FT
You can access the FollowMyHealth Patient Portal offered by [facility] by registering at the following website: [email or web address]. By joining [employer]’s FollowMyHealth portal, you will also be able to view your health information using other applications (apps) compatible with our system.

## 2020-11-01 RX ORDER — NICOTINE POLACRILEX 2 MG
1 GUM BUCCAL
Qty: 14 | Refills: 0
Start: 2020-11-01 | End: 2020-11-14

## 2020-11-03 ENCOUNTER — INPATIENT (INPATIENT)
Facility: HOSPITAL | Age: 55
LOS: 15 days | Discharge: HOME | End: 2020-11-19
Attending: HOSPITALIST | Admitting: HOSPITALIST
Payer: MEDICARE

## 2020-11-03 VITALS
RESPIRATION RATE: 20 BRPM | DIASTOLIC BLOOD PRESSURE: 33 MMHG | OXYGEN SATURATION: 95 % | SYSTOLIC BLOOD PRESSURE: 72 MMHG | TEMPERATURE: 96 F | HEART RATE: 62 BPM

## 2020-11-03 DIAGNOSIS — Z98.890 OTHER SPECIFIED POSTPROCEDURAL STATES: Chronic | ICD-10-CM

## 2020-11-03 DIAGNOSIS — E11.610 TYPE 2 DIABETES MELLITUS WITH DIABETIC NEUROPATHIC ARTHROPATHY: Chronic | ICD-10-CM

## 2020-11-03 LAB
ALBUMIN SERPL ELPH-MCNC: 3.1 G/DL — LOW (ref 3.5–5.2)
ALP SERPL-CCNC: 144 U/L — HIGH (ref 30–115)
ALT FLD-CCNC: 79 U/L — HIGH (ref 0–41)
ANION GAP SERPL CALC-SCNC: 23 MMOL/L — HIGH (ref 7–14)
APPEARANCE UR: ABNORMAL
APTT BLD: 40 SEC — HIGH (ref 27–39.2)
AST SERPL-CCNC: 204 U/L — HIGH (ref 0–41)
BACTERIA # UR AUTO: NEGATIVE — SIGNIFICANT CHANGE UP
BASE EXCESS BLDV CALC-SCNC: -15.4 MMOL/L — LOW (ref -2–2)
BASOPHILS # BLD AUTO: 0.07 K/UL — SIGNIFICANT CHANGE UP (ref 0–0.2)
BASOPHILS NFR BLD AUTO: 0.6 % — SIGNIFICANT CHANGE UP (ref 0–1)
BILIRUB SERPL-MCNC: 1.1 MG/DL — SIGNIFICANT CHANGE UP (ref 0.2–1.2)
BILIRUB UR-MCNC: NEGATIVE — SIGNIFICANT CHANGE UP
BUN SERPL-MCNC: 27 MG/DL — HIGH (ref 10–20)
CA-I SERPL-SCNC: 1.03 MMOL/L — LOW (ref 1.12–1.3)
CALCIUM SERPL-MCNC: 8.1 MG/DL — LOW (ref 8.5–10.1)
CHLORIDE SERPL-SCNC: 90 MMOL/L — LOW (ref 98–110)
CO2 SERPL-SCNC: 11 MMOL/L — LOW (ref 17–32)
COLOR SPEC: ABNORMAL
CREAT SERPL-MCNC: 3.8 MG/DL — HIGH (ref 0.7–1.5)
CULTURE RESULTS: SIGNIFICANT CHANGE UP
CULTURE RESULTS: SIGNIFICANT CHANGE UP
DIFF PNL FLD: SIGNIFICANT CHANGE UP
EOSINOPHIL # BLD AUTO: 0.13 K/UL — SIGNIFICANT CHANGE UP (ref 0–0.7)
EOSINOPHIL NFR BLD AUTO: 1.2 % — SIGNIFICANT CHANGE UP (ref 0–8)
EPI CELLS # UR: 1 /HPF — SIGNIFICANT CHANGE UP (ref 0–5)
GAS PNL BLDV: 127 MMOL/L — LOW (ref 136–145)
GAS PNL BLDV: SIGNIFICANT CHANGE UP
GLUCOSE SERPL-MCNC: 94 MG/DL — SIGNIFICANT CHANGE UP (ref 70–99)
GLUCOSE UR QL: NEGATIVE — SIGNIFICANT CHANGE UP
HCO3 BLDV-SCNC: 14 MMOL/L — LOW (ref 22–29)
HCT VFR BLD CALC: 31.3 % — LOW (ref 37–47)
HCT VFR BLDA CALC: 33.8 % — LOW (ref 34–44)
HGB BLD CALC-MCNC: 11 G/DL — LOW (ref 14–18)
HGB BLD-MCNC: 8.9 G/DL — LOW (ref 12–16)
HYALINE CASTS # UR AUTO: 13 /LPF — HIGH (ref 0–7)
IMM GRANULOCYTES NFR BLD AUTO: 1.4 % — HIGH (ref 0.1–0.3)
INR BLD: 2.32 RATIO — HIGH (ref 0.65–1.3)
KETONES UR-MCNC: SIGNIFICANT CHANGE UP
LACTATE BLDV-MCNC: 9.2 MMOL/L — HIGH (ref 0.5–1.6)
LACTATE SERPL-SCNC: 9.8 MMOL/L — CRITICAL HIGH (ref 0.7–2)
LEUKOCYTE ESTERASE UR-ACNC: ABNORMAL
LYMPHOCYTES # BLD AUTO: 19.9 % — LOW (ref 20.5–51.1)
LYMPHOCYTES # BLD AUTO: 2.17 K/UL — SIGNIFICANT CHANGE UP (ref 1.2–3.4)
MCHC RBC-ENTMCNC: 27.1 PG — SIGNIFICANT CHANGE UP (ref 27–31)
MCHC RBC-ENTMCNC: 28.4 G/DL — LOW (ref 32–37)
MCV RBC AUTO: 95.4 FL — SIGNIFICANT CHANGE UP (ref 81–99)
MONOCYTES # BLD AUTO: 0.5 K/UL — SIGNIFICANT CHANGE UP (ref 0.1–0.6)
MONOCYTES NFR BLD AUTO: 4.6 % — SIGNIFICANT CHANGE UP (ref 1.7–9.3)
NEUTROPHILS # BLD AUTO: 7.91 K/UL — HIGH (ref 1.4–6.5)
NEUTROPHILS NFR BLD AUTO: 72.3 % — SIGNIFICANT CHANGE UP (ref 42.2–75.2)
NITRITE UR-MCNC: NEGATIVE — SIGNIFICANT CHANGE UP
NRBC # BLD: 0 /100 WBCS — SIGNIFICANT CHANGE UP (ref 0–0)
NT-PROBNP SERPL-SCNC: HIGH PG/ML (ref 0–300)
PCO2 BLDV: 49 MMHG — SIGNIFICANT CHANGE UP (ref 41–51)
PH BLDV: 7.07 — LOW (ref 7.26–7.43)
PH UR: 6 — SIGNIFICANT CHANGE UP (ref 5–8)
PLATELET # BLD AUTO: 269 K/UL — SIGNIFICANT CHANGE UP (ref 130–400)
PO2 BLDV: 35 MMHG — SIGNIFICANT CHANGE UP (ref 20–40)
POTASSIUM BLDV-SCNC: 4.9 MMOL/L — SIGNIFICANT CHANGE UP (ref 3.3–5.6)
POTASSIUM SERPL-MCNC: 4.9 MMOL/L — SIGNIFICANT CHANGE UP (ref 3.5–5)
POTASSIUM SERPL-SCNC: 4.9 MMOL/L — SIGNIFICANT CHANGE UP (ref 3.5–5)
PROT SERPL-MCNC: 6.8 G/DL — SIGNIFICANT CHANGE UP (ref 6–8)
PROT UR-MCNC: ABNORMAL
PROTHROM AB SERPL-ACNC: 26.7 SEC — HIGH (ref 9.95–12.87)
RBC # BLD: 3.28 M/UL — LOW (ref 4.2–5.4)
RBC # FLD: 20.9 % — HIGH (ref 11.5–14.5)
RBC CASTS # UR COMP ASSIST: 6 /HPF — HIGH (ref 0–4)
SAO2 % BLDV: 43 % — SIGNIFICANT CHANGE UP
SARS-COV-2 RNA SPEC QL NAA+PROBE: SIGNIFICANT CHANGE UP
SODIUM SERPL-SCNC: 124 MMOL/L — LOW (ref 135–146)
SP GR SPEC: 1.02 — SIGNIFICANT CHANGE UP (ref 1.01–1.03)
SPECIMEN SOURCE: SIGNIFICANT CHANGE UP
SPECIMEN SOURCE: SIGNIFICANT CHANGE UP
TROPONIN T SERPL-MCNC: 0.93 NG/ML — CRITICAL HIGH
UROBILINOGEN FLD QL: SIGNIFICANT CHANGE UP
WBC # BLD: 10.93 K/UL — HIGH (ref 4.8–10.8)
WBC # FLD AUTO: 10.93 K/UL — HIGH (ref 4.8–10.8)
WBC UR QL: 6 /HPF — HIGH (ref 0–5)

## 2020-11-03 PROCEDURE — 99291 CRITICAL CARE FIRST HOUR: CPT | Mod: 25,GC

## 2020-11-03 PROCEDURE — 71045 X-RAY EXAM CHEST 1 VIEW: CPT | Mod: 26,77

## 2020-11-03 PROCEDURE — 93010 ELECTROCARDIOGRAM REPORT: CPT | Mod: 76

## 2020-11-03 PROCEDURE — 71045 X-RAY EXAM CHEST 1 VIEW: CPT | Mod: 26

## 2020-11-03 PROCEDURE — 36556 INSERT NON-TUNNEL CV CATH: CPT

## 2020-11-03 RX ORDER — CHLORHEXIDINE GLUCONATE 213 G/1000ML
1 SOLUTION TOPICAL
Refills: 0 | Status: DISCONTINUED | OUTPATIENT
Start: 2020-11-03 | End: 2020-11-19

## 2020-11-03 RX ORDER — MEROPENEM 1 G/30ML
INJECTION INTRAVENOUS
Refills: 0 | Status: DISCONTINUED | OUTPATIENT
Start: 2020-11-04 | End: 2020-11-05

## 2020-11-03 RX ORDER — VANCOMYCIN HCL 1 G
1000 VIAL (EA) INTRAVENOUS ONCE
Refills: 0 | Status: COMPLETED | OUTPATIENT
Start: 2020-11-03 | End: 2020-11-03

## 2020-11-03 RX ORDER — CLOPIDOGREL BISULFATE 75 MG/1
75 TABLET, FILM COATED ORAL DAILY
Refills: 0 | Status: DISCONTINUED | OUTPATIENT
Start: 2020-11-03 | End: 2020-11-19

## 2020-11-03 RX ORDER — MEROPENEM 1 G/30ML
500 INJECTION INTRAVENOUS EVERY 12 HOURS
Refills: 0 | Status: DISCONTINUED | OUTPATIENT
Start: 2020-11-04 | End: 2020-11-05

## 2020-11-03 RX ORDER — VANCOMYCIN HCL 1 G
VIAL (EA) INTRAVENOUS
Refills: 0 | Status: DISCONTINUED | OUTPATIENT
Start: 2020-11-03 | End: 2020-11-03

## 2020-11-03 RX ORDER — HEPARIN SODIUM 5000 [USP'U]/ML
1200 INJECTION INTRAVENOUS; SUBCUTANEOUS
Qty: 25000 | Refills: 0 | Status: DISCONTINUED | OUTPATIENT
Start: 2020-11-03 | End: 2020-11-03

## 2020-11-03 RX ORDER — SODIUM CHLORIDE 9 MG/ML
2000 INJECTION, SOLUTION INTRAVENOUS ONCE
Refills: 0 | Status: COMPLETED | OUTPATIENT
Start: 2020-11-03 | End: 2020-11-03

## 2020-11-03 RX ORDER — MEROPENEM 1 G/30ML
500 INJECTION INTRAVENOUS ONCE
Refills: 0 | Status: COMPLETED | OUTPATIENT
Start: 2020-11-03 | End: 2020-11-04

## 2020-11-03 RX ORDER — VANCOMYCIN HCL 1 G
750 VIAL (EA) INTRAVENOUS EVERY 24 HOURS
Refills: 0 | Status: DISCONTINUED | OUTPATIENT
Start: 2020-11-03 | End: 2020-11-05

## 2020-11-03 RX ORDER — NOREPINEPHRINE BITARTRATE/D5W 8 MG/250ML
0.05 PLASTIC BAG, INJECTION (ML) INTRAVENOUS
Qty: 8 | Refills: 0 | Status: DISCONTINUED | OUTPATIENT
Start: 2020-11-03 | End: 2020-11-09

## 2020-11-03 RX ORDER — CEFEPIME 1 G/1
2000 INJECTION, POWDER, FOR SOLUTION INTRAMUSCULAR; INTRAVENOUS ONCE
Refills: 0 | Status: COMPLETED | OUTPATIENT
Start: 2020-11-03 | End: 2020-11-03

## 2020-11-03 RX ORDER — HYDROCORTISONE 20 MG
100 TABLET ORAL ONCE
Refills: 0 | Status: COMPLETED | OUTPATIENT
Start: 2020-11-03 | End: 2020-11-03

## 2020-11-03 RX ORDER — SODIUM CHLORIDE 9 MG/ML
500 INJECTION, SOLUTION INTRAVENOUS ONCE
Refills: 0 | Status: COMPLETED | OUTPATIENT
Start: 2020-11-03 | End: 2020-11-03

## 2020-11-03 RX ORDER — INFLUENZA VIRUS VACCINE 15; 15; 15; 15 UG/.5ML; UG/.5ML; UG/.5ML; UG/.5ML
0.5 SUSPENSION INTRAMUSCULAR ONCE
Refills: 0 | Status: DISCONTINUED | OUTPATIENT
Start: 2020-11-03 | End: 2020-11-19

## 2020-11-03 RX ORDER — VANCOMYCIN HCL 1 G
125 VIAL (EA) INTRAVENOUS EVERY 6 HOURS
Refills: 0 | Status: DISCONTINUED | OUTPATIENT
Start: 2020-11-03 | End: 2020-11-05

## 2020-11-03 RX ORDER — ASPIRIN/CALCIUM CARB/MAGNESIUM 324 MG
81 TABLET ORAL DAILY
Refills: 0 | Status: DISCONTINUED | OUTPATIENT
Start: 2020-11-03 | End: 2020-11-19

## 2020-11-03 RX ADMIN — Medication 5.63 MICROGRAM(S)/KG/MIN: at 16:59

## 2020-11-03 RX ADMIN — Medication 250 MILLIGRAM(S): at 18:14

## 2020-11-03 RX ADMIN — CEFEPIME 100 MILLIGRAM(S): 1 INJECTION, POWDER, FOR SOLUTION INTRAMUSCULAR; INTRAVENOUS at 17:56

## 2020-11-03 RX ADMIN — SODIUM CHLORIDE 2000 MILLILITER(S): 9 INJECTION, SOLUTION INTRAVENOUS at 16:58

## 2020-11-03 RX ADMIN — SODIUM CHLORIDE 500 MILLILITER(S): 9 INJECTION, SOLUTION INTRAVENOUS at 19:17

## 2020-11-03 RX ADMIN — Medication 81 MILLIGRAM(S): at 23:10

## 2020-11-03 RX ADMIN — Medication 100 MILLIGRAM(S): at 18:17

## 2020-11-03 RX ADMIN — CLOPIDOGREL BISULFATE 75 MILLIGRAM(S): 75 TABLET, FILM COATED ORAL at 23:11

## 2020-11-03 NOTE — ED ADULT NURSE NOTE - OBJECTIVE STATEMENT
pt biba from home for hypotension weakness lethargy poor po intake for 2 days, pt recently in hospital for sepsis.

## 2020-11-03 NOTE — ED PROVIDER NOTE - NS ED ROS FT
CONSTITUTIONAL: No fevers or chills  RESPIRATORY: As above  CARDIOVASCULAR: No chest pain or palpitations  GASTROINTESTINAL: No abdominal or epigastric pain; No nausea, vomiting, or hematemesis; No diarrhea or constipation; No recent melena or hematochezia  GENITOURINARY: No dysuria, frequency or hematuria  MUSCULOSKELETAL: No joint pain, no muscle pain, no weakness  NEUROLOGICAL: No numbness or weakness  SKIN: No itching or rashes

## 2020-11-03 NOTE — ED PROVIDER NOTE - PHYSICAL EXAMINATION
CONSTITUTIONAL: Lethargic, AAOx3  HEAD: Atraumatic, normocephalic  EYES: EOM intact, PERRLA, conjunctiva and sclera clear  ENT: Supple, no masses, no thyromegaly, no bruits, no JVD; moist mucous membranes  PULMONARY: Clear to auscultation bilaterally; no wheezes, rales, or rhonchi  CARDIOVASCULAR: Regular rate and rhythm; no murmurs, rubs, or gallops  GASTROINTESTINAL: Soft, non-tender, non-distended; bowel sounds present  MUSCULOSKELETAL: LE edema  NEUROLOGY: non-focal  SKIN: No rashes or lesions; warm and dry

## 2020-11-03 NOTE — ED PROVIDER NOTE - CLINICAL SUMMARY MEDICAL DECISION MAKING FREE TEXT BOX
patient with septic shock requiring pressors, central line placed, IVF provided, abx started, patient admitted to ICU

## 2020-11-03 NOTE — ED PROVIDER NOTE - PROGRESS NOTE DETAILS
CASE D/W DR. LUCIO, WILL EVALUATE PT FOR ICU ADMISSION. PT SIGNED OUT TO DR. LONG, FOLLOW UP U/A, TROPONIN, ICU CONSULTATION, REASSESS AND DISPO.

## 2020-11-03 NOTE — ED PROVIDER NOTE - OBJECTIVE STATEMENT
55 y/o female active smoker with PMHx of Type II DM, HFpEF (EF of 72 %), CAD, hyperlipidemia, UGIB 2/2 duodenal ulcer, HTN, Hypothyroidism, COPD, Dextrocardia, Sciatica/Peripheral Neuralgia, and Charcot foot deformity s/p reconstruction on 1/11/19 presents to the ED for worsening lethargy. The patient has had 2 recent admisions for sepsis recently and left AMA from the hospital on 10/31/20. This presentation similar to how she presented last time. Endorses cough and purulent sputum.

## 2020-11-03 NOTE — ED PROVIDER NOTE - ATTENDING CONTRIBUTION TO CARE
I personally evaluated the patient. I reviewed the Resident’s or Physician Assistant’s note (as assigned above), and agree with the findings and plan except as documented in my note.   54 Y/O F HTN, DLD, DM, COPD, HYPOTHYROIDISM, H/O UGIB DUE TO DUODENAL ULCER, CAD, CHF, CHARCOTS FOOT S/P REPAIR, ADMITTED 10/28-10/31 WITH SEPSIS SECONDARY TO PNA, JEEVAN. PT NOW BROUGHT TO THE ED FROM HOME WITH 2 DAYS OF LETHARGY, POOR PO INTAKE I personally evaluated the patient. I reviewed the Resident’s or Physician Assistant’s note (as assigned above), and agree with the findings and plan except as documented in my note.   54 Y/O F HTN, DLD, DM, COPD, HYPOTHYROIDISM, H/O UGIB DUE TO DUODENAL ULCER, CAD, CHF, CHARCOTS FOOT S/P REPAIR, ADMITTED 10/28-10/31 WITH SEPSIS SECONDARY TO PNA, JEEVAN. PT SIGNED OUT AMA. PT NOW BROUGHT TO THE ED FROM HOME WITH 2 DAYS OF LETHARGY, POOR PO INTAKE. AS PER DAUGHTER PT WITH CONFUSION AND DISORIENTATION. NO FEVER. NO V/D. NO CP, SOB, COUGH. NO ABD PAIN. + B/L FOOT AND LEG PAIN WHICH IS CHRONIC AND UNCHANGED. NO BPR OR MELENA. VITALS NOTED. ALERT OX3 CHRONICALLY ILL. SLOW TO RESPOND. NCAT PERRL. EOMI. OP NORMAL. DRY MUCOUS MEMBRANES. NECK SUPPLE. NO JVD. LUNGS WITH RHONCHI B/L. BRADYCARDIA. S1S2. ABD- SOFT NONTENDER. CN 2-12 INTACT. NEURO EXAM NONFOCAL.

## 2020-11-03 NOTE — ED PROVIDER NOTE - CARE PLAN
Principal Discharge DX:	Sepsis  Secondary Diagnosis:	JEEVAN (acute kidney injury)  Secondary Diagnosis:	Hyponatremia  Secondary Diagnosis:	Transaminitis  Secondary Diagnosis:	Lactic acidosis

## 2020-11-04 LAB
ALBUMIN SERPL ELPH-MCNC: 3 G/DL — LOW (ref 3.5–5.2)
ALBUMIN SERPL ELPH-MCNC: 3.2 G/DL — LOW (ref 3.5–5.2)
ALP SERPL-CCNC: 140 U/L — HIGH (ref 30–115)
ALP SERPL-CCNC: 148 U/L — HIGH (ref 30–115)
ALT FLD-CCNC: 80 U/L — HIGH (ref 0–41)
ALT FLD-CCNC: 85 U/L — HIGH (ref 0–41)
AMMONIA BLD-MCNC: 43 UMOL/L — SIGNIFICANT CHANGE UP (ref 11–55)
AMPHET UR-MCNC: NEGATIVE — SIGNIFICANT CHANGE UP
ANION GAP SERPL CALC-SCNC: 17 MMOL/L — HIGH (ref 7–14)
ANION GAP SERPL CALC-SCNC: 20 MMOL/L — HIGH (ref 7–14)
ANION GAP SERPL CALC-SCNC: 21 MMOL/L — HIGH (ref 7–14)
APTT BLD: 42.7 SEC — HIGH (ref 27–39.2)
AST SERPL-CCNC: 183 U/L — HIGH (ref 0–41)
AST SERPL-CCNC: 205 U/L — HIGH (ref 0–41)
BARBITURATES UR SCN-MCNC: NEGATIVE — SIGNIFICANT CHANGE UP
BASOPHILS # BLD AUTO: 0.03 K/UL — SIGNIFICANT CHANGE UP (ref 0–0.2)
BASOPHILS NFR BLD AUTO: 0.2 % — SIGNIFICANT CHANGE UP (ref 0–1)
BENZODIAZ UR-MCNC: NEGATIVE — SIGNIFICANT CHANGE UP
BILIRUB DIRECT SERPL-MCNC: 0.8 MG/DL — HIGH (ref 0–0.2)
BILIRUB DIRECT SERPL-MCNC: 1 MG/DL — HIGH (ref 0–0.2)
BILIRUB INDIRECT FLD-MCNC: 0.2 MG/DL — SIGNIFICANT CHANGE UP (ref 0.2–1.2)
BILIRUB INDIRECT FLD-MCNC: 0.3 MG/DL — SIGNIFICANT CHANGE UP (ref 0.2–1.2)
BILIRUB SERPL-MCNC: 1.1 MG/DL — SIGNIFICANT CHANGE UP (ref 0.2–1.2)
BILIRUB SERPL-MCNC: 1.2 MG/DL — SIGNIFICANT CHANGE UP (ref 0.2–1.2)
BUN SERPL-MCNC: 27 MG/DL — HIGH (ref 10–20)
BUN SERPL-MCNC: 27 MG/DL — HIGH (ref 10–20)
BUN SERPL-MCNC: 29 MG/DL — HIGH (ref 10–20)
CALCIUM SERPL-MCNC: 7.8 MG/DL — LOW (ref 8.5–10.1)
CALCIUM SERPL-MCNC: 8.1 MG/DL — LOW (ref 8.5–10.1)
CALCIUM SERPL-MCNC: 8.1 MG/DL — LOW (ref 8.5–10.1)
CHLORIDE SERPL-SCNC: 92 MMOL/L — LOW (ref 98–110)
CHLORIDE SERPL-SCNC: 93 MMOL/L — LOW (ref 98–110)
CHLORIDE SERPL-SCNC: 95 MMOL/L — LOW (ref 98–110)
CK MB CFR SERPL CALC: 28.4 NG/ML — HIGH (ref 0.6–6.3)
CK MB CFR SERPL CALC: 33 NG/ML — HIGH (ref 0.6–6.3)
CK SERPL-CCNC: 1171 U/L — HIGH (ref 0–225)
CK SERPL-CCNC: 915 U/L — HIGH (ref 0–225)
CO2 SERPL-SCNC: 12 MMOL/L — LOW (ref 17–32)
CO2 SERPL-SCNC: 13 MMOL/L — LOW (ref 17–32)
CO2 SERPL-SCNC: 17 MMOL/L — SIGNIFICANT CHANGE UP (ref 17–32)
COCAINE METAB.OTHER UR-MCNC: NEGATIVE — SIGNIFICANT CHANGE UP
CREAT ?TM UR-MCNC: 128 MG/DL — SIGNIFICANT CHANGE UP
CREAT SERPL-MCNC: 3.7 MG/DL — HIGH (ref 0.7–1.5)
CULTURE RESULTS: NO GROWTH — SIGNIFICANT CHANGE UP
EOSINOPHIL # BLD AUTO: 0.01 K/UL — SIGNIFICANT CHANGE UP (ref 0–0.7)
EOSINOPHIL NFR BLD AUTO: 0.1 % — SIGNIFICANT CHANGE UP (ref 0–8)
GLUCOSE BLDC GLUCOMTR-MCNC: 113 MG/DL — HIGH (ref 70–99)
GLUCOSE BLDC GLUCOMTR-MCNC: 114 MG/DL — HIGH (ref 70–99)
GLUCOSE BLDC GLUCOMTR-MCNC: 131 MG/DL — HIGH (ref 70–99)
GLUCOSE SERPL-MCNC: 110 MG/DL — HIGH (ref 70–99)
GLUCOSE SERPL-MCNC: 118 MG/DL — HIGH (ref 70–99)
GLUCOSE SERPL-MCNC: 125 MG/DL — HIGH (ref 70–99)
HCT VFR BLD CALC: 32.4 % — LOW (ref 37–47)
HCT VFR BLD CALC: 33.6 % — LOW (ref 37–47)
HGB BLD-MCNC: 9.2 G/DL — LOW (ref 12–16)
HGB BLD-MCNC: 9.6 G/DL — LOW (ref 12–16)
IMM GRANULOCYTES NFR BLD AUTO: 1.7 % — HIGH (ref 0.1–0.3)
INR BLD: 2.47 RATIO — HIGH (ref 0.65–1.3)
LACTATE SERPL-SCNC: 1.7 MMOL/L — SIGNIFICANT CHANGE UP (ref 0.7–2)
LACTATE SERPL-SCNC: 6.5 MMOL/L — CRITICAL HIGH (ref 0.7–2)
LYMPHOCYTES # BLD AUTO: 0.93 K/UL — LOW (ref 1.2–3.4)
LYMPHOCYTES # BLD AUTO: 6.7 % — LOW (ref 20.5–51.1)
MAGNESIUM SERPL-MCNC: 1.1 MG/DL — LOW (ref 1.8–2.4)
MAGNESIUM SERPL-MCNC: 2.9 MG/DL — HIGH (ref 1.8–2.4)
MCHC RBC-ENTMCNC: 26.6 PG — LOW (ref 27–31)
MCHC RBC-ENTMCNC: 26.7 PG — LOW (ref 27–31)
MCHC RBC-ENTMCNC: 28.4 G/DL — LOW (ref 32–37)
MCHC RBC-ENTMCNC: 28.6 G/DL — LOW (ref 32–37)
MCV RBC AUTO: 93.6 FL — SIGNIFICANT CHANGE UP (ref 81–99)
MCV RBC AUTO: 93.6 FL — SIGNIFICANT CHANGE UP (ref 81–99)
METHADONE UR-MCNC: NEGATIVE — SIGNIFICANT CHANGE UP
MONOCYTES # BLD AUTO: 0.57 K/UL — SIGNIFICANT CHANGE UP (ref 0.1–0.6)
MONOCYTES NFR BLD AUTO: 4.1 % — SIGNIFICANT CHANGE UP (ref 1.7–9.3)
NEUTROPHILS # BLD AUTO: 12.14 K/UL — HIGH (ref 1.4–6.5)
NEUTROPHILS NFR BLD AUTO: 87.2 % — HIGH (ref 42.2–75.2)
NRBC # BLD: 0 /100 WBCS — SIGNIFICANT CHANGE UP (ref 0–0)
NRBC # BLD: 0 /100 WBCS — SIGNIFICANT CHANGE UP (ref 0–0)
OPIATES UR-MCNC: NEGATIVE — SIGNIFICANT CHANGE UP
PCP SPEC-MCNC: SIGNIFICANT CHANGE UP
PLATELET # BLD AUTO: 329 K/UL — SIGNIFICANT CHANGE UP (ref 130–400)
PLATELET # BLD AUTO: 340 K/UL — SIGNIFICANT CHANGE UP (ref 130–400)
POTASSIUM SERPL-MCNC: 5 MMOL/L — SIGNIFICANT CHANGE UP (ref 3.5–5)
POTASSIUM SERPL-MCNC: 5.2 MMOL/L — HIGH (ref 3.5–5)
POTASSIUM SERPL-MCNC: 5.5 MMOL/L — HIGH (ref 3.5–5)
POTASSIUM SERPL-SCNC: 5 MMOL/L — SIGNIFICANT CHANGE UP (ref 3.5–5)
POTASSIUM SERPL-SCNC: 5.2 MMOL/L — HIGH (ref 3.5–5)
POTASSIUM SERPL-SCNC: 5.5 MMOL/L — HIGH (ref 3.5–5)
PROCALCITONIN SERPL-MCNC: 0.51 NG/ML — HIGH (ref 0.02–0.1)
PROPOXYPHENE QUALITATIVE URINE RESULT: NEGATIVE — SIGNIFICANT CHANGE UP
PROT ?TM UR-MCNC: 183 MG/DLG/24H — SIGNIFICANT CHANGE UP
PROT SERPL-MCNC: 6.4 G/DL — SIGNIFICANT CHANGE UP (ref 6–8)
PROT SERPL-MCNC: 6.7 G/DL — SIGNIFICANT CHANGE UP (ref 6–8)
PROT/CREAT UR-RTO: 1.4 RATIO — HIGH (ref 0–0.2)
PROTHROM AB SERPL-ACNC: 28.4 SEC — HIGH (ref 9.95–12.87)
RBC # BLD: 3.46 M/UL — LOW (ref 4.2–5.4)
RBC # BLD: 3.59 M/UL — LOW (ref 4.2–5.4)
RBC # FLD: 20.3 % — HIGH (ref 11.5–14.5)
RBC # FLD: 20.5 % — HIGH (ref 11.5–14.5)
SODIUM SERPL-SCNC: 126 MMOL/L — LOW (ref 135–146)
SODIUM SERPL-SCNC: 127 MMOL/L — LOW (ref 135–146)
SODIUM SERPL-SCNC: 127 MMOL/L — LOW (ref 135–146)
SODIUM UR-SCNC: <20 MMOL/L — SIGNIFICANT CHANGE UP
SPECIMEN SOURCE: SIGNIFICANT CHANGE UP
TROPONIN T SERPL-MCNC: 0.88 NG/ML — CRITICAL HIGH
TROPONIN T SERPL-MCNC: 1.05 NG/ML — CRITICAL HIGH
UUN UR-MCNC: 144 MG/DL — SIGNIFICANT CHANGE UP
WBC # BLD: 13.82 K/UL — HIGH (ref 4.8–10.8)
WBC # BLD: 13.91 K/UL — HIGH (ref 4.8–10.8)
WBC # FLD AUTO: 13.82 K/UL — HIGH (ref 4.8–10.8)
WBC # FLD AUTO: 13.91 K/UL — HIGH (ref 4.8–10.8)

## 2020-11-04 PROCEDURE — 93970 EXTREMITY STUDY: CPT | Mod: 26

## 2020-11-04 PROCEDURE — 71250 CT THORAX DX C-: CPT | Mod: 26

## 2020-11-04 PROCEDURE — 71045 X-RAY EXAM CHEST 1 VIEW: CPT | Mod: 26

## 2020-11-04 RX ORDER — SUCRALFATE 1 G
1 TABLET ORAL
Refills: 0 | Status: DISCONTINUED | OUTPATIENT
Start: 2020-11-04 | End: 2020-11-19

## 2020-11-04 RX ORDER — DEXTROSE 50 % IN WATER 50 %
50 SYRINGE (ML) INTRAVENOUS ONCE
Refills: 0 | Status: COMPLETED | OUTPATIENT
Start: 2020-11-04 | End: 2020-11-04

## 2020-11-04 RX ORDER — SODIUM ZIRCONIUM CYCLOSILICATE 10 G/10G
10 POWDER, FOR SUSPENSION ORAL ONCE
Refills: 0 | Status: COMPLETED | OUTPATIENT
Start: 2020-11-04 | End: 2020-11-04

## 2020-11-04 RX ORDER — CALCIUM GLUCONATE 100 MG/ML
2 VIAL (ML) INTRAVENOUS ONCE
Refills: 0 | Status: COMPLETED | OUTPATIENT
Start: 2020-11-04 | End: 2020-11-04

## 2020-11-04 RX ORDER — SODIUM CHLORIDE 9 MG/ML
500 INJECTION INTRAMUSCULAR; INTRAVENOUS; SUBCUTANEOUS ONCE
Refills: 0 | Status: COMPLETED | OUTPATIENT
Start: 2020-11-04 | End: 2020-11-04

## 2020-11-04 RX ORDER — SODIUM BICARBONATE 1 MEQ/ML
1300 SYRINGE (ML) INTRAVENOUS THREE TIMES A DAY
Refills: 0 | Status: DISCONTINUED | OUTPATIENT
Start: 2020-11-04 | End: 2020-11-08

## 2020-11-04 RX ORDER — SENNA PLUS 8.6 MG/1
2 TABLET ORAL AT BEDTIME
Refills: 0 | Status: DISCONTINUED | OUTPATIENT
Start: 2020-11-04 | End: 2020-11-19

## 2020-11-04 RX ORDER — LEVOTHYROXINE SODIUM 125 MCG
100 TABLET ORAL DAILY
Refills: 0 | Status: DISCONTINUED | OUTPATIENT
Start: 2020-11-04 | End: 2020-11-09

## 2020-11-04 RX ORDER — ALBUTEROL 90 UG/1
2 AEROSOL, METERED ORAL EVERY 6 HOURS
Refills: 0 | Status: DISCONTINUED | OUTPATIENT
Start: 2020-11-04 | End: 2020-11-09

## 2020-11-04 RX ORDER — PANTOPRAZOLE SODIUM 20 MG/1
40 TABLET, DELAYED RELEASE ORAL
Refills: 0 | Status: DISCONTINUED | OUTPATIENT
Start: 2020-11-04 | End: 2020-11-11

## 2020-11-04 RX ORDER — BUDESONIDE AND FORMOTEROL FUMARATE DIHYDRATE 160; 4.5 UG/1; UG/1
2 AEROSOL RESPIRATORY (INHALATION)
Refills: 0 | Status: DISCONTINUED | OUTPATIENT
Start: 2020-11-04 | End: 2020-11-19

## 2020-11-04 RX ORDER — MAGNESIUM SULFATE 500 MG/ML
2 VIAL (ML) INJECTION
Refills: 0 | Status: COMPLETED | OUTPATIENT
Start: 2020-11-04 | End: 2020-11-04

## 2020-11-04 RX ORDER — INSULIN HUMAN 100 [IU]/ML
10 INJECTION, SOLUTION SUBCUTANEOUS ONCE
Refills: 0 | Status: COMPLETED | OUTPATIENT
Start: 2020-11-04 | End: 2020-11-04

## 2020-11-04 RX ADMIN — CHLORHEXIDINE GLUCONATE 1 APPLICATION(S): 213 SOLUTION TOPICAL at 05:49

## 2020-11-04 RX ADMIN — MEROPENEM 100 MILLIGRAM(S): 1 INJECTION INTRAVENOUS at 18:21

## 2020-11-04 RX ADMIN — Medication 25 GRAM(S): at 11:06

## 2020-11-04 RX ADMIN — Medication 200 GRAM(S): at 01:49

## 2020-11-04 RX ADMIN — Medication 125 MILLIGRAM(S): at 13:51

## 2020-11-04 RX ADMIN — SODIUM CHLORIDE 500 MILLILITER(S): 9 INJECTION INTRAMUSCULAR; INTRAVENOUS; SUBCUTANEOUS at 01:49

## 2020-11-04 RX ADMIN — MEROPENEM 100 MILLIGRAM(S): 1 INJECTION INTRAVENOUS at 05:48

## 2020-11-04 RX ADMIN — MEROPENEM 100 MILLIGRAM(S): 1 INJECTION INTRAVENOUS at 00:46

## 2020-11-04 RX ADMIN — Medication 1 GRAM(S): at 13:52

## 2020-11-04 RX ADMIN — Medication 50 MILLILITER(S): at 01:49

## 2020-11-04 RX ADMIN — SODIUM ZIRCONIUM CYCLOSILICATE 10 GRAM(S): 10 POWDER, FOR SUSPENSION ORAL at 03:31

## 2020-11-04 RX ADMIN — PANTOPRAZOLE SODIUM 40 MILLIGRAM(S): 20 TABLET, DELAYED RELEASE ORAL at 18:22

## 2020-11-04 RX ADMIN — Medication 125 MILLIGRAM(S): at 03:31

## 2020-11-04 RX ADMIN — CLOPIDOGREL BISULFATE 75 MILLIGRAM(S): 75 TABLET, FILM COATED ORAL at 13:52

## 2020-11-04 RX ADMIN — Medication 125 MILLIGRAM(S): at 18:22

## 2020-11-04 RX ADMIN — Medication 1 GRAM(S): at 18:23

## 2020-11-04 RX ADMIN — Medication 25 GRAM(S): at 06:52

## 2020-11-04 RX ADMIN — Medication 250 MILLIGRAM(S): at 01:57

## 2020-11-04 RX ADMIN — PANTOPRAZOLE SODIUM 40 MILLIGRAM(S): 20 TABLET, DELAYED RELEASE ORAL at 05:48

## 2020-11-04 RX ADMIN — Medication 81 MILLIGRAM(S): at 13:53

## 2020-11-04 RX ADMIN — Medication 100 MICROGRAM(S): at 05:48

## 2020-11-04 RX ADMIN — Medication 25 GRAM(S): at 08:55

## 2020-11-04 RX ADMIN — INSULIN HUMAN 10 UNIT(S): 100 INJECTION, SOLUTION SUBCUTANEOUS at 01:49

## 2020-11-04 NOTE — H&P ADULT - NSHPPHYSICALEXAM_GEN_ALL_CORE
CONSTITUTIONAL: Lethargic, AAOx3  HEAD: Atraumatic, normocephalic  EYES: EOM intact, PERRLA, conjunctiva and sclera clear  ENT: Supple, no masses, no thyromegaly, no bruits, no JVD; moist mucous membranes  PULMONARY: Clear to auscultation bilaterally; no wheezes, rales, or rhonchi  CARDIOVASCULAR: Regular rate and rhythm; no murmurs, rubs, or gallops  GASTROINTESTINAL: Soft, non-tender, non-distended; bowel sounds present  MUSCULOSKELETAL: LE edema  NEUROLOGY: non-focal  SKIN: No rashes or lesions; warm and dry ICU Vital Signs Last 24 Hrs  T(C): 36.1 (04 Nov 2020 04:00), Max: 36.6 (04 Nov 2020 00:00)  T(F): 97 (04 Nov 2020 04:00), Max: 97.9 (04 Nov 2020 00:00)  HR: 64 (04 Nov 2020 06:00) (54 - 74)  BP: 94/56 (04 Nov 2020 06:00) (43/33 - 157/80)  BP(mean): 76 (04 Nov 2020 06:00) (33 - 123)  RR: 6 (04 Nov 2020 06:00) (6 - 20)  SpO2: 92% (04 Nov 2020 06:00) (92% - 98%)      CONSTITUTIONAL: Lethargic, AAOx3  HEAD: Atraumatic, normocephalic  EYES: EOM intact, PERRLA, conjunctiva and sclera clear  ENT: Supple, no masses, no thyromegaly, no bruits, no JVD; moist mucous membranes  PULMONARY: Clear to auscultation bilaterally; no wheezes, rales, or rhonchi  CARDIOVASCULAR: Regular rate and rhythm; no murmurs, rubs, or gallops  GASTROINTESTINAL: Soft, non-tender, non-distended; bowel sounds present  MUSCULOSKELETAL: LE edema  NEUROLOGY: non-focal  SKIN: No rashes or lesions; warm and dry

## 2020-11-04 NOTE — CONSULT NOTE ADULT - SUBJECTIVE AND OBJECTIVE BOX
NEPHROLOGY CONSULTATION NOTE    THIS CONSULT IS INCOMPLETE / FULL CONSULT TO FOLLOW    Patient is a 55y Female whom presented to the hospital in septic shock.      Pt is a 55 y.o. F with PMHx of T2DM, HFpEF (EF 72%), CAD, HLD, UGIB 2/2 duodenal ulcer, HTN, Hypothyroidism, COPD not on home O2, dextrocardia, budd-chiari malformation type 1, peripheral neuralgia / sciatica, and charcot foot deformity s/p repair 2019, presented to the ED with hypotension, hypothermia, bradycardia, elevated wbc count, JEEVAN with high anion gap, hyponatremia, and a worsening transaminitis since last admission. She has 2 recent discharges from the hospital with similar presentations: on 10/23 d/c'd after hospital admission from sepsis 2/2 pyelonephritis, re-admitted shortly thereafter for similar presentation and left AMA 10/31. At the time of the second admission she was in JEEVAN with transaminitis, JEEVAN resolved before she left AMA.   On this presentation she is encephalopathic, received 2.5L LR total and 1/2L NS, 2g cefepime and 500mg charis, and was re-admitted to the MICU for septic shock. She ir currently on charis and vanc. Blood and urine cultures from 10/29 and 10/30 respectively, both negative. Nephrology is consulted for the JEEVAN, Cr of 3.7 on admission not improved after fluids.     PAST MEDICAL & SURGICAL HISTORY:  Dextrocardia    Chronic midline low back pain with bilateral sciatica    Peripheral neuralgia    Essential hypertension    Diabetes 1.5, managed as type 2    Charcot&#x27;s arthropathy  R foot    Charcot foot due to diabetes mellitus    H/O shoulder surgery  Right shoulder surgery     delivery delivered      Allergies:  No Known Allergies    Home Medications Reviewed  Hospital Medications:   MEDICATIONS  (STANDING):  ALBUTerol    90 MICROgram(s) HFA Inhaler 2 Puff(s) Inhalation every 6 hours  aspirin  chewable 81 milliGRAM(s) Oral daily  budesonide  80 MICROgram(s)/formoterol 4.5 MICROgram(s) Inhaler 2 Puff(s) Inhalation two times a day  chlorhexidine 4% Liquid 1 Application(s) Topical <User Schedule>  clopidogrel Tablet 75 milliGRAM(s) Oral daily  influenza   Vaccine 0.5 milliLiter(s) IntraMuscular once  levothyroxine 100 MICROGram(s) Oral daily  meropenem  IVPB 500 milliGRAM(s) IV Intermittent every 12 hours  meropenem  IVPB      norepinephrine Infusion 0.05 MICROgram(s)/kG/Min (5.63 mL/Hr) IV Continuous <Continuous>  pantoprazole  Injectable 40 milliGRAM(s) IV Push two times a day  senna 2 Tablet(s) Oral at bedtime  sucralfate 1 Gram(s) Oral four times a day  vancomycin    Solution 125 milliGRAM(s) Oral every 6 hours  vancomycin  IVPB 750 milliGRAM(s) IV Intermittent every 24 hours      SOCIAL HISTORY:  Smokes 1ppd, patient unable to say how many years / cannot recall  Denies any EtOH use past or present     FAMILY HISTORY:  No pertinent family history in first degree relatives      REVIEW OF SYSTEMS:  CONSTITUTIONAL: Patient is lethargic and at times confused. Endorses weakness and feeling cold, denies fevers and chills  EYES/ENT: No visual changes;  No ear or throat pain   NECK: No pain or stiffness  RESPIRATORY: Endorses cough productive of sputum. No wheezing, hemoptysis; No shortness of breath  CARDIOVASCULAR: No chest pain or palpitations.  GASTROINTESTINAL: Endorses diarrhea, multiple times a day unsure of duration. No abdominal or epigastric pain. No nausea, vomiting, or hematemesis; No constipation. No melena or hematochezia.  GENITOURINARY: Endorses oliguria, no desire to void. No dysuria, frequency, foamy urine, urinary urgency, incontinence or hematuria  NEUROLOGICAL: Patient is confused and has difficulty answering questions. No numbness or weakness  SKIN: No itching, burning, rashes, or lesions. Not jaundiced.    VASCULAR: Endorses her legs are more swollen and heavier than usual.   All other review of systems is negative unless indicated above.    VITALS:  T(F): 97 (20 @ 04:00), Max: 97.9 (20 @ 00:00)  HR: 66 (20 @ 10:30)  BP: 79/48 (20 @ 10:30)  RR: 13 (20 @ 10:30)  SpO2: 93% (20 @ 10:30)     @ 07:01  -   @ 07:00  --------------------------------------------------------  IN: 1323 mL / OUT: 115 mL / NET: 1208 mL      Height (cm): 160 ( 00:15)  Weight (kg): 85.7 (:15)  BMI (kg/m2): 33.5 (:15)  BSA (m2): 1.89 ( 00:15)    20 @ 07:01  -  20 @ 07:00  --------------------------------------------------------  IN: 0 mL / OUT: 115 mL / NET: -115 mL      I&O's Detail    2020 07:01  -  2020 07:00  --------------------------------------------------------  IN:    IV PiggyBack: 500 mL    Lactated Ringers Bolus: 250 mL    Norepinephrine: 73 mL    Sodium Chloride 0.9% Bolus: 500 mL  Total IN: 1323 mL    OUT:    Indwelling Catheter - Urethral (mL): 15 mL    Ureteral Catheter (mL): 100 mL  Total OUT: 115 mL    Total NET: 1208 mL        Creatine Kinase, Serum: 915 U/L (20 @ 04:30)  Creatine Kinase, Serum: 1171 U/L (20 @ 00:00)      PHYSICAL EXAM:  Constitutional: Lethargic. Encephalopathic. Cool and pale.   HEENT: anicteric sclera, oropharynx clear, MMM  Neck: No JVD. No thyromegally or masses.   Respiratory: CTAB, no wheezes, rales or rhonchi  Cardiovascular: Dextrocardia, sounds on the right side. S1, S2, RRR. No MRG.  Gastrointestinal: BS+, soft, NT/ND. No fluid wave. No organomegally.   Extremities: +4 pitting edema bilateral LE extending to sacrum. No cyanosis or clubbing.   Neurological: Flapping tremor, asterixis. A/O x 2, confused. Difficulty following commands and answering questions appropriately but able to illicit responses with repeat questioning.   Psychiatric: Lethargic, not anxious or depressed.   : Bautista in place, <50cc output in last 12h, <120 in last 24 s/p 3L IVF total. No CVA tenderness.   Skin: No rashes. Not jaundiced. No purpura.   Vascular Access: Left AC    LABS:      127<L>  |  95<L>  |  27<H>  ----------------------------<  110<H>  5.0   |  12<L>  |  3.7<H>    Ca    8.1<L>      2020 07:00  Mg     1.1         TPro  6.4  /  Alb  3.0<L>  /  TBili  1.1  /  DBili  0.8<H>  /  AST  183<H>  /  ALT  80<H>  /  AlkPhos  140<H>      Creatinine Trend: 3.7 <--, 3.7 <--, 3.8 <--, 0.9 <--, 1.0 <--, 1.8 <--, 2.2 <--, 3.4 <--, 0.6 <--, 0.7 <--, 0.9 <--, 1.1 <--, 1.3 <--, 1.7 <--, 2.0 <--, 2.3 <--                        9.2    13.91 )-----------( 329      ( 2020 04:30 )             32.4     Urine Studies:  Urinalysis Basic - ( 2020 18:00 )    Color: Jumana / Appearance: Slightly Turbid / S.025 / pH:   Gluc:  / Ketone: Trace  / Bili: Negative / Urobili: <2 mg/dL   Blood:  / Protein: 30 mg/dL / Nitrite: Negative   Leuk Esterase: Small / RBC: 6 /HPF / WBC 6 /HPF   Sq Epi:  / Non Sq Epi: 1 /HPF / Bacteria: Negative      Sodium, Random Urine: <20.0 mmoL/L ( @ 00:30)  Creatinine, Random Urine: 128 mg/dL ( @ 00:30)  Protein/Creatinine Ratio Calculation: 1.4 Ratio ( @ 00:30)  Sodium, Random Urine: 25.0 mmoL/L (10-28 @ 21:50)  Creatinine, Random Urine: 38 mg/dL (10-28 @ 21:50)  Protein/Creatinine Ratio Calculation: 1.2 Ratio (10-28 @ 21:50)            RADIOLOGY & ADDITIONAL STUDIES:    < from: Xray Chest 1 View AP/PA (20 @ 05:10) >  Impression:  Bibasilar atelectasis, which is stable.  Neck catheter in the persistent left SVC, better evaluated on recent CT.  < end of copied text >    < from: CT Chest No Cont (20 @ 01:36) >  IMPRESSION:  Since 2019:  1. Diffuse bilateral patchy groundglass opacities, similar to 2019, which may represent pulmonary edema in the appropriate clinical setting. Correlate for possible superimposed infectious or inflammatory process.  2. Stable dilated main pulmonary artery, correlate for pulmonary hypertension.  3. Left IJ central venous catheter terminates within persistent left-sided SVC.  4. Left lower lobe 0.8 cm pulmonary nodule, stable with similar measurement technique since 2019 CT.  < end of copied text >    < from: CT Abdomen and Pelvis No Cont (10.28.20 @ 15:53) >  IMPRESSION:  No CT evidence of intra-abdominal or pelvic abscess or inflammatory process  < end of copied text >    < from: CT Abdomen and Pelvis No Cont (10.28.20 @ 15:53) >  KIDNEYS: No evidence of hydronephrosis, calcified stones, or solid mass.  < end of copied text > NEPHROLOGY CONSULTATION NOTE    THIS CONSULT IS INCOMPLETE / FULL CONSULT TO FOLLOW    Patient is a 55y Female whom presented to the hospital in septic shock.      Pt is a 55 y.o. F with PMHx of T2DM, HFpEF (EF 72%), CAD, HLD, UGIB 2/2 duodenal ulcer, HTN, Hypothyroidism, COPD not on home O2, dextrocardia, budd-chiari malformation type 1, peripheral neuralgia / sciatica, and charcot foot deformity s/p repair 2019, presented to the ED with hypotension, hypothermia, bradycardia, elevated wbc count, JEEVAN with high anion gap, hyponatremia, and a worsening transaminitis since last admission. She has 2 recent discharges from the hospital with similar presentations: on 10/23 d/c'd after hospital admission from sepsis 2/2 pyelonephritis, re-admitted shortly thereafter for similar presentation and left AMA 10/31. At the time of the second admission she was in JEEVAN with transaminitis, JEEVAN resolved before she left AMA.   On this presentation she is encephalopathic, received 2.5L LR total and 1/2L NS, 2g cefepime and 500mg charis, and was re-admitted to the MICU for septic shock. She ir currently on charis and vanc. Blood and urine cultures from 10/29 and 10/30 respectively, both negative. Nephrology is consulted for the JEEVAN, Cr of 3.7 on admission not improved after fluids.     PAST MEDICAL & SURGICAL HISTORY:  Dextrocardia    Chronic midline low back pain with bilateral sciatica    Peripheral neuralgia    Essential hypertension    Diabetes 1.5, managed as type 2    Charcot&#x27;s arthropathy  R foot    Charcot foot due to diabetes mellitus    H/O shoulder surgery  Right shoulder surgery     delivery delivered      Allergies:  No Known Allergies    Home Medications Reviewed  Hospital Medications:   MEDICATIONS  (STANDING):  ALBUTerol    90 MICROgram(s) HFA Inhaler 2 Puff(s) Inhalation every 6 hours  aspirin  chewable 81 milliGRAM(s) Oral daily  budesonide  80 MICROgram(s)/formoterol 4.5 MICROgram(s) Inhaler 2 Puff(s) Inhalation two times a day  chlorhexidine 4% Liquid 1 Application(s) Topical <User Schedule>  clopidogrel Tablet 75 milliGRAM(s) Oral daily  influenza   Vaccine 0.5 milliLiter(s) IntraMuscular once  levothyroxine 100 MICROGram(s) Oral daily  meropenem  IVPB 500 milliGRAM(s) IV Intermittent every 12 hours  meropenem  IVPB      norepinephrine Infusion 0.05 MICROgram(s)/kG/Min (5.63 mL/Hr) IV Continuous <Continuous>  pantoprazole  Injectable 40 milliGRAM(s) IV Push two times a day  senna 2 Tablet(s) Oral at bedtime  sucralfate 1 Gram(s) Oral four times a day  vancomycin    Solution 125 milliGRAM(s) Oral every 6 hours  vancomycin  IVPB 750 milliGRAM(s) IV Intermittent every 24 hours      SOCIAL HISTORY:  Smokes 1ppd, patient unable to say how many years / cannot recall  Denies any EtOH use past or present     FAMILY HISTORY:  No pertinent family history in first degree relatives      REVIEW OF SYSTEMS:  CONSTITUTIONAL: Patient is lethargic and at times confused. Endorses weakness and feeling cold, denies fevers and chills  EYES/ENT: No visual changes;  No ear or throat pain   NECK: No pain or stiffness  RESPIRATORY: Endorses cough productive of sputum. No wheezing, hemoptysis; No shortness of breath  CARDIOVASCULAR: No chest pain or palpitations.  GASTROINTESTINAL: Endorses diarrhea, multiple times a day unsure of duration. No abdominal or epigastric pain. No nausea, vomiting, or hematemesis; No constipation. No melena or hematochezia.  GENITOURINARY: Endorses oliguria, no desire to void. No dysuria, frequency, foamy urine, urinary urgency, incontinence or hematuria  NEUROLOGICAL: Patient is confused and has difficulty answering questions. No numbness or weakness  SKIN: No itching, burning, rashes, or lesions. Not jaundiced.    VASCULAR: Endorses her legs are more swollen and heavier than usual.   All other review of systems is negative unless indicated above.    VITALS:  T(F): 97 (20 @ 04:00), Max: 97.9 (20 @ 00:00)  HR: 66 (20 @ 10:30)  BP: 79/48 (20 @ 10:30)  RR: 13 (20 @ 10:30)  SpO2: 93% (20 @ 10:30)     @ 07:01  -   @ 07:00  --------------------------------------------------------  IN: 1323 mL / OUT: 115 mL / NET: 1208 mL      Height (cm): 160 ( 00:15)  Weight (kg): 85.7 (:15)  BMI (kg/m2): 33.5 (:15)  BSA (m2): 1.89 ( 00:15)    20 @ 07:01  -  20 @ 07:00  --------------------------------------------------------  IN: 0 mL / OUT: 115 mL / NET: -115 mL      I&O's Detail    2020 07:01  -  2020 07:00  --------------------------------------------------------  IN:    IV PiggyBack: 500 mL    Lactated Ringers Bolus: 250 mL    Norepinephrine: 73 mL    Sodium Chloride 0.9% Bolus: 500 mL  Total IN: 1323 mL    OUT:    Indwelling Catheter - Urethral (mL): 15 mL    Ureteral Catheter (mL): 100 mL  Total OUT: 115 mL    Total NET: 1208 mL        Creatine Kinase, Serum: 915 U/L (20 @ 04:30)  Creatine Kinase, Serum: 1171 U/L (20 @ 00:00)      PHYSICAL EXAM:  Constitutional: Lethargic. Encephalopathic. Cool and pale.   HEENT: anicteric sclera, oropharynx clear, MMM  Neck: No JVD. No thyromegally or masses.   Respiratory: CTAB, no wheezes, rales or rhonchi  Cardiovascular: Dextrocardia, sounds on the right side. S1, S2, RRR. No MRG.  Gastrointestinal: BS+, soft, NT/ND. No fluid wave. No organomegally.   Extremities: +4 pitting edema bilateral LE extending to sacrum. No cyanosis or clubbing.   Neurological: Flapping tremor, asterixis. A/O x 2, confused. Difficulty following commands and answering questions appropriately but able to illicit responses with repeat questioning.   Psychiatric: Lethargic, not anxious or depressed.   : Bautista in place, <50cc output in last 12h, <120 in last 24 s/p 3L IVF total. No CVA tenderness.   Skin: No rashes. Not jaundiced. No purpura.   Vascular Access: Left AC    LABS:      127<L>  |  95<L>  |  27<H>  ----------------------------<  110<H>  5.0   |  12<L>  |  3.7<H>    Ca    8.1<L>      2020 07:00  Mg     1.1         TPro  6.4  /  Alb  3.0<L>  /  TBili  1.1  /  DBili  0.8<H>  /  AST  183<H>  /  ALT  80<H>  /  AlkPhos  140<H>      Creatinine Trend: 3.7 <--, 3.7 <--, 3.8 <--, 0.9 <--, 1.0 <--, 1.8 <--, 2.2 <--, 3.4 <--, 0.6 <--, 0.7 <--, 0.9 <--, 1.1 <--, 1.3 <--, 1.7 <--, 2.0 <--, 2.3 <--                        9.2    13.91 )-----------( 329      ( 2020 04:30 )             32.4     Urine Studies:  Urinalysis Basic - ( 2020 18:00 )    Color: Jumana / Appearance: Slightly Turbid / S.025 / pH:   Gluc:  / Ketone: Trace  / Bili: Negative / Urobili: <2 mg/dL   Blood:  / Protein: 30 mg/dL / Nitrite: Negative   Leuk Esterase: Small / RBC: 6 /HPF / WBC 6 /HPF   Sq Epi:  / Non Sq Epi: 1 /HPF / Bacteria: Negative      Sodium, Random Urine: <20.0 mmoL/L ( @ 00:30)  Creatinine, Random Urine: 128 mg/dL ( @ 00:30)  Protein/Creatinine Ratio Calculation: 1.4 Ratio ( @ 00:30)  Sodium, Random Urine: 25.0 mmoL/L (10-28 @ 21:50)  Creatinine, Random Urine: 38 mg/dL (10-28 @ 21:50)  Protein/Creatinine Ratio Calculation: 1.2 Ratio (10-28 @ 21:50)            RADIOLOGY & ADDITIONAL STUDIES:    < from: Xray Chest 1 View AP/PA (20 @ 05:10) >  Impression:  Bibasilar atelectasis, which is stable.  Neck catheter in the persistent left SVC, better evaluated on recent CT.  < end of copied text >    < from: CT Chest No Cont (20 @ 01:36) >  IMPRESSION:  Since 2019:  1. Diffuse bilateral patchy groundglass opacities, similar to 2019, which may represent pulmonary edema in the appropriate clinical setting. Correlate for possible superimposed infectious or inflammatory process.  2. Stable dilated main pulmonary artery, correlate for pulmonary hypertension.  3. Left IJ central venous catheter terminates within persistent left-sided SVC.  4. Left lower lobe 0.8 cm pulmonary nodule, stable with similar measurement technique since 2019 CT.  < end of copied text >    < from: CT Abdomen and Pelvis No Cont (10.28.20 @ 15:53) >  IMPRESSION:  No CT evidence of intra-abdominal or pelvic abscess or inflammatory process  < end of copied text >    < from: CT Abdomen and Pelvis No Cont (10.28.20 @ 15:53) >  KIDNEYS: No evidence of hydronephrosis, calcified stones, or solid mass.  < end of copied text >        < from: US Kidney and Bladder (10.21.20 @ 11:04) >    EXAM:  US KIDNEYS AND BLADDER          PROCEDURE DATE:  10/21/2020      INTERPRETATION:  CLINICAL INFORMATION: Worsening renal function.    COMPARISON: 2020    TECHNIQUE: Sonography of the kidneys and bladder.    FINDINGS:    Right kidney: 10.6 (cm). No renal mass, hydronephrosis or calculi.    Left kidney:  11.4 (cm). No renal mass, hydronephrosis or calculi.    Urinary bladder: No debris or calculus. Bilateral ureteral jets are visualized. Prevoid volume of [id number]. The patient would not void as she was not awake.    IMPRESSION:    Normal renal ultrasound.      ELINOR MEJIAS M.D., ATTENDING RADIOLOGIST  This document has been electronically signed. Oct 21 2020 11:11AM    < end of copied text >

## 2020-11-04 NOTE — CONSULT NOTE ADULT - ASSESSMENT
53 y/o F with extensive PMHx including a few congenital defects, presents to the ED in septic shock after having recently left AMA 10/31 when she was being Tx for similar presentation with an additional d/c on 10/23 for sepsis 2/2 pyelo. She is currently admitted to the MICU, on pressors, for tx of septic shock. Nephrology is following after being consulted for JEEVAN - Cr of 3.7 on admission from baseline of 0.9 on d/c 10/31, not improving after fluids.     # ATN 2/2 unresolved sepsis / hypotension now progressed to septic shock, r/o AIN, r/o infx related GN  - Patient was d/c'd 10/23 after being Tx for sepsis 2/2 pyelo, readmitted after similar presentation and left AMA 10/31, now presenting again with similar presentation  - P/w hypotension, hypothermia, elevated wbc count, hyponatremia, JEEVAN, and transaminitis   - Hypotensive on all 3 admission, currently requiring increasing pressors  - Troponins downtrending 1.05 >> 0.88, 2/2 decreased perfusion   - Cr 3.7 on admission, unchanged after IVF boluses, was 0.9 on 10/31   - Hyponatremia improved to 127 from 124 on admission, s/p 1/2L NS  - AG improved from 24 on admission to 20  - Hypomagnesemic, requiring IV mag, denies EtOH use both past and current  - Coags elevated  - Transaminitis worsened from last admission  - Lactate 6.5 improving from admission  - CK and CKMB downtrending  - UA positive for RBC, WBC, Hyaline casts, LE, and protein.   - UCx negative 10/30, BloodCx negative 10/29  - Protein:Cr 1.4  - Oliguric   - Intravascular volume depletion, Low urine Na. Likely third spacing (edematous on PE)   - Patient likely had a pre-renal JEEVAN on prior admission which has now progressed to ATN from chronically decreased perfusion  - Hold IVF   - c/w charis and vanc   - c/w levo  - continue to monitor lyes and replete   - Strict I/Os  - Check urine eosinophils (r/o AIN 2/2 abx)  - Check complement levels ( r/o infx mediated GN)  - Consider giving diuretic if not making urine by tomorrow      55 y/o F with extensive PMHx including a few congenital defects, presents to the ED in septic shock after having recently left AMA 10/31 when she was being Tx for similar presentation with an additional d/c on 10/23 for sepsis 2/2 pyelo. She is currently admitted to the MICU, on pressors, for tx of septic shock. Nephrology is following after being consulted for JEEVAN - Cr of 3.7 on admission from baseline of 0.9 on d/c 10/31, not improving after fluids.     # JEEVAN - ATN 2/2 septic shock, r/o AIN, r/o infx related GN  - Patient was d/c'd 10/23 after being Tx for sepsis 2/2 pyelo, readmitted after similar presentation and left AMA 10/31, now presenting again with similar presentation  - P/w hypotension, hypothermia, leukocytosis, hyponatremia, JEEVAN, and transaminitis   - Hypotensive on all 3 admission, currently requiring increasing pressors  - Troponin downtrending 1.05 >> 0.88, 2/2 type 2 NSTEMI   - Cr 3.7 on admission, unchanged after IVF boluses, was 0.9 on 10/31   - Hyponatremia improved with iv hydration to 127 from 124 on admission  - AG improved from 24 on admission to 20  - Hypomagnesemic, requiring IV mag, denies EtOH use both past and current  - Coags elevated  - Transaminitis worsened from last admission  - Lactate 6.5 improving from admission  - CK and CKMB downtrending  - UA positive for RBC, WBC, Hyaline casts, LE, and protein.   - UCx negative 10/30, BloodCx negative 10/29  - Protein:Cr 1.4  - Oliguric   - Intravascular volume depletion, Low urine Na. Likely third spacing (edematous on PE)   - Patient likely had a pre-renal JEEVAN on prior admission which has now progressed to ATN from chronically decreased perfusion  - Hold IVF   - c/w charis and vanc   - c/w levo  - continue to monitor lyes and replete   - Strict I/Os  - Check urine eosinophils (r/o AIN 2/2 abx)  - Check complement levels C3/C4 (r/o infx related GN)

## 2020-11-04 NOTE — H&P ADULT - NSHPLABSRESULTS_GEN_ALL_CORE
Labs:                         8.9    10.93 )-----------( 269      ( 2020 16:42 )             31.3     Neutophil% 72.3, Lymphocyte% 19.9, Monocyte% 4.6, Bands% 1.4 20 @ 16:42    2020 16:42    124    |  90     |  27     ----------------------------<  94     4.9     |  11     |  3.8      Ca    8.1        2020 16:42    TPro  6.8    /  Alb  3.1    /  TBili  1.1    /  DBili  x      /  AST  204    /  ALT  79     /  AlkPhos  144    2020 16:42       pTT    40.0             ----< 2.32 INR  (20 @ 16:42)    26.70        PT        Serum Pro-Brain Natriuretic Peptide: 23310 pg/mL (20 @ 16:42)    Troponin 0.93, CKMB --, CK -- 20 @ 16:42        Urinalysis Basic - ( 2020 18:00 )    Color: Jumana / Appearance: Slightly Turbid / S.025 / pH: x  Gluc: x / Ketone: Trace  / Bili: Negative / Urobili: <2 mg/dL   Blood: x / Protein: 30 mg/dL / Nitrite: Negative   Leuk Esterase: Small / RBC: 6 /HPF / WBC 6 /HPF   Sq Epi: x / Non Sq Epi: 1 /HPF / Bacteria: Negative          Culture - Blood (collected 10-29-20 @ 04:31)  Source: .Blood Blood-Peripheral  Final Report (20 @ 13:01):    No Growth Final    Culture - Blood (collected 10-28-20 @ 23:30)  Source: .Blood Blood-Peripheral  Final Report (20 @ 06:01):    No Growth Final    Culture - Blood (collected 10-28-20 @ 12:55)  Source: .Blood Blood-Peripheral  Final Report (20 @ 21:00):    No Growth Final    Culture - Blood (collected 10-28-20 @ 12:55)  Source: .Blood Blood-Peripheral  Final Report (20 @ 21:00):    No Growth Final        COVID-19 PCR: NotDetec ()  COVID-19 PCR: NotDetec (10-28)    Procalcitonin, Serum: 0.44 ng/mL (10-29)  Procalcitonin, Serum: 1.43 ng/mL (10-21)      D-Dimer Assay, Quantitative: 606 ng/mL DDU [0 - 230] (10-29)      Lactate Dehydrogenase, Serum: 457 [50 - 242] (10-21)    Troponin T, Serum: 0.93 ng/mL ()  Troponin T, Serum: 0.50 ng/mL (10-30)  Troponin T, Serum: 0.57 ng/mL (10-30)  Troponin T, Serum: 0.57 ng/mL (10-29)      Radiology: Labs:                         8.9    10.93 )-----------( 269      ( 2020 16:42 )             31.3     Neutophil% 72.3, Lymphocyte% 19.9, Monocyte% 4.6, Bands% 1.4 20 @ 16:42    2020 16:42    124    |  90     |  27     ----------------------------<  94     4.9     |  11     |  3.8      Ca    8.1        2020 16:42    TPro  6.8    /  Alb  3.1    /  TBili  1.1    /  DBili  x      /  AST  204    /  ALT  79     /  AlkPhos  144    2020 16:42       pTT    40.0             ----< 2.32 INR  (20 @ 16:42)    26.70        PT        Serum Pro-Brain Natriuretic Peptide: 68107 pg/mL (20 @ 16:42)    Troponin 0.93, CKMB --, CK -- 20 @ 16:42        Urinalysis Basic - ( 2020 18:00 )    Color: Jumana / Appearance: Slightly Turbid / S.025 / pH: x  Gluc: x / Ketone: Trace  / Bili: Negative / Urobili: <2 mg/dL   Blood: x / Protein: 30 mg/dL / Nitrite: Negative   Leuk Esterase: Small / RBC: 6 /HPF / WBC 6 /HPF   Sq Epi: x / Non Sq Epi: 1 /HPF / Bacteria: Negative          Culture - Blood (collected 10-29-20 @ 04:31)  Source: .Blood Blood-Peripheral  Final Report (20 @ 13:01):    No Growth Final    Culture - Blood (collected 10-28-20 @ 23:30)  Source: .Blood Blood-Peripheral  Final Report (20 @ 06:01):    No Growth Final    Culture - Blood (collected 10-28-20 @ 12:55)  Source: .Blood Blood-Peripheral  Final Report (20 @ 21:00):    No Growth Final    Culture - Blood (collected 10-28-20 @ 12:55)  Source: .Blood Blood-Peripheral  Final Report (20 @ 21:00):    No Growth Final        COVID-19 PCR: NotDetec ()  COVID-19 PCR: NotDetec (10-28)    Procalcitonin, Serum: 0.44 ng/mL (10-29)  Procalcitonin, Serum: 1.43 ng/mL (10-21)      D-Dimer Assay, Quantitative: 606 ng/mL DDU [0 - 230] (10-29)      Lactate Dehydrogenase, Serum: 457 [50 - 242] (10-21)    Troponin T, Serum: 0.93 ng/mL ()  Troponin T, Serum: 0.50 ng/mL (10-30)  Troponin T, Serum: 0.57 ng/mL (10-30)  Troponin T, Serum: 0.57 ng/mL (10-29)      Radiology:    < from: TTE Echo Complete w/o Contrast w/ Doppler (10.30.20 @ 09:39) >     1. LV Ejection Fraction by Jade's Method with a biplane EF of 62 %.   2. Normal left ventricular size and wall thicknesses, with normal systolic and diastolic function.   3. Moderately enlarged right ventricle.   4. Moderately reduced RV systolic function.   5. Mildly enlarged right atrium.   6. Normal left atrial size.   7. Moderate mitral annular calcification.   8. No evidence of mitral valve regurgitation.   9. Estimated pulmonary artery systolic pressure is 75.6 mmHg assuming a right atrial pressure of 15 mmHg, which is consistent with severe pulmonary hypertension.  10. Pulmonary hypertension is present.

## 2020-11-04 NOTE — H&P ADULT - ASSESSMENT
Impression:    Septic Shock  Type2 NSTEMI  HO melena  Type II DM  HFpEF (EF of 72 %)  CAD  UGIB 2/2 duodenal ulcer  HTN  Hypothyroidism  COPD      Plan     CNS: Avoid CNS depressants.  Re-warming blanket if needed.    CVS: ASA/Plavix.  Levo gtt.  Holding Lasix and BB due to shock.  f/u CE.  I<O.   F/u Cardio.    PULMONARY: HOB @ 45 degrees.  Aspiration precautions.  c/w Inhalers    INFECTIOUS DISEASE: UCx and BCx.  Merrem and Vanco.  PO Vanco.    GI: GI PPx w/ IV PPI q12.  Sucralfate.  NPO until more arousable.  Bowel regimen.    RENAL and acid base: Follow up serum and urine lytes.  Correct as needed.   Nephro consult.    Endocrine: Follow up FS.  Insulin protocol if needed.    Hematology: DVT PPx w/ SCD.  Active T&S.  Hold Hep gtt as Hx of GIB.    Dispo: MICU    Code Status: Full Impression:    Septic Shock  Type2 NSTEMI  HO melena  Type II DM  HFpEF (EF of 72 %)  CAD  UGIB 2/2 duodenal ulcer  HTN  Hypothyroidism  COPD  Sever Pulm HTN      Plan     CNS: Avoid CNS depressants.  Re-warming blanket if needed.    CVS: ASA/Plavix.  Levo gtt.  Holding Lasix and BB due to shock.  f/u CE.  I<O.   F/u Cardio.    PULMONARY: HOB @ 45 degrees.  Aspiration precautions.  c/w Inhalers    INFECTIOUS DISEASE: UCx and BCx.  Merrem and Vanco.  PO Vanco.    GI: GI PPx w/ IV PPI q12.  Sucralfate.  NPO until more arousable.  Bowel regimen.    RENAL and acid base: Follow up serum and urine lytes.  Correct as needed.   Nephro consult.    Endocrine: Follow up FS.  Insulin protocol if needed.    Hematology: DVT PPx w/ SCD.  Active T&S.  Hold Hep gtt as Hx of GIB.    Dispo: MICU    Code Status: Full Impression:    Sepsis present on admission  hypotension led to hypoperfusion/ increase LA/ JEEVAN ( prerenal/ ATN)  Type2 NSTEMI  HO melena  Type II DM  HFpEF (EF of 72 %)  CAD  UGIB 2/2 duodenal ulcer  HTN  Hypothyroidism  COPD  Sever Pulm HTN      Plan     CNS: Avoid CNS depressants.     CVS: ASA/Plavix.  Levo gtt.  Holding Lasix and BB due to shock.  f/u CE.  I<O.   F/u Cardio. ivf FOR [place], trend LA    PULMONARY: HOB @ 45 degrees.  Aspiration precautions.  c/w Inhalers    INFECTIOUS DISEASE: UCx and BCx.  Merrem and Vanco.  PO Vanco. ID eval    GI: GI PPx w/ IV PPI q12.  Sucralfate.  po feeds    RENAL and acid base: Follow up serum and urine lytes.  Correct as needed.   Nephro consult.    Endocrine: Follow up FS.  Insulin protocol if needed. cortisoL    Hematology: DVT PPx w/ SCD.  Active T&S.   U TOX  Dispo: MICU    Code Status: Full

## 2020-11-04 NOTE — H&P ADULT - HISTORY OF PRESENT ILLNESS
53 y/o female active smoker with PMHx of Type II DM, HFpEF (EF of 72 %), CAD, hyperlipidemia, UGIB 2/2 duodenal ulcer, HTN, Hypothyroidism, COPD, Dextrocardia, Sciatica/Peripheral Neuralgia, and Charcot foot deformity s/p reconstruction on 1/11/19 presents to the ED for worsening lethargy. The patient has had 2 recent admisions for sepsis recently and left AMA from the hospital on 10/31/20. This presentation similar to how she presented last time. Endorses cough and purulent sputum.    Patient HoTN (72/33mmHg) and hypothermic (35.3 C) on initial encounter and developed periods of bradycardia in to the 50s. Labs revealed elevated WBC w/ stable Hb, along w/ JEEVAN w/ HAGMA (AG 23), worsening Transaminitis from last admission Trop 0.93, BNP 43k and lactate 9.8. CXR unremarkable.     She is being re-admitted to the MICU for septic shock. 53 y/o female active smoker with PMHx of Type II DM, HFpEF (EF of 72 %), CAD, hyperlipidemia, UGIB 2/2 duodenal ulcer, HTN, Hypothyroidism, COPD, Dextrocardia, Sciatica/Peripheral Neuralgia, and Charcot foot deformity s/p reconstruction on 1/11/19 presents to the ED for worsening lethargy. The patient has had 2 recent admisions for sepsis recently and left AMA from the hospital on 10/31/20. This presentation similar to how she presented last time. Endorses cough and purulent sputum.    Patient HoTN (72/33mmHg) and hypothermic (35.3 C) on initial encounter and developed periods of bradycardia in to the 50s. Labs revealed elevated WBC w/ stable Hb, along w/ JEEVAN w/ HAGMA (AG 23), worsening Transaminitis from last admission Trop 0.93, BNP 43k and lactate 9.8. CXR unremarkable.     She is being re-admitted to the MICU for septic shock. in MICU, on levophed 0.065

## 2020-11-04 NOTE — PROGRESS NOTE ADULT - ASSESSMENT
# Sepsis present on admission  # Hypotension led to hypoperfusion/ increase LA/ JEEVAN ( prerenal/ ATN)  # Type2 NSTEMI  # HO melena  # Type II DM  # HFpEF (EF of 72%)  # CAD  - ASA/Plavix  - Levo gtt  - Holding Lasix and BB due to shock    # UGIB 2/2 duodenal ulcer  # HTN  # Hypothyroidism  # COPD  # Sever Pulm HTN          CVS:  f/u CE.  I<O.   F/u Cardio. ivf FOR [place], trend LA    PULMONARY: HOB @ 45 degrees.  Aspiration precautions.  c/w Inhalers    INFECTIOUS DISEASE: UCx and BCx.  Merrem and Vanco.  PO Vanco. ID eval    GI: GI PPx w/ IV PPI q12.  Sucralfate.  po feeds    RENAL and acid base: Follow up serum and urine lytes.  Correct as needed.   Nephro consult.    Endocrine: Follow up FS.  Insulin protocol if needed. cortisoL    Hematology: DVT PPx w/ SCD.  Active T&S.       # Dispo: MICU  # Code Status: Full   55 y/o female active smoker with PMHx of Type II DM, HFpEF (EF of 72 %), CAD, hyperlipidemia, UGIB 2/2 duodenal ulcer, HTN, Hypothyroidism, COPD, Dextrocardia, Sciatica/Peripheral Neuralgia, and Charcot foot deformity s/p reconstruction on 1/11/19 presents to the ED for worsening lethargy. The patient has had 2 recent admisions for sepsis recently and left AMA from the hospital on 10/31/20. This presentation similar to how she presented last time. Endorses cough and purulent sputum. Patient HoTN (72/33mmHg) and hypothermic (35.3 C) on initial encounter and developed periods of bradycardia in to the 50s. Labs revealed elevated WBC w/ stable Hb, along w/ JEEVAN w/ HAGMA (AG 23), worsening Transaminitis from last admission Trop 0.93, BNP 43k and lactate 9.8. CXR unremarkable. She is being re-admitted to the MICU for septic shock. in MICU, on levophed 0.065    # Sepsis present on admission  # Hypotension led to hypoperfusion/ increase LA/ JEEVAN ( prerenal/ ATN)  - IVF for cheetah  - trend Lactate  - C/w meropenem and vancomycin, PO vancomycin  - ID eval    # Type 2 NSTEMI  - Follow up CE    # h/o UGIB 2/2 duodenal ulcer  # HO melena  - GI PPX - IV PPI q12  - Sucralfate  - PO feeds  - Keep active type and screen    # Type II DM  - Follow up FS  - Insulin protocol if needed.   - cortisol level    # HFpEF (EF of 72%)  # CAD  - Follow up CE  - ASA/Plavix  - Levo gtt  - Holding Lasix and BB due to shock      # HTN  # Hypothyroidism  - Continue home meds once stable    # COPD  # Sever Pulm HTN  - continue with inhalers    # Diet: advance as tolerated  # Activity : bedrest  # DVT PPX - SCD  # GI PPS - PPI  # Dispo: MICU  # Code Status: Full

## 2020-11-04 NOTE — CONSULT NOTE ADULT - NUTRITIONAL ASSESSMENT
To do:  - Check urine eosinophils (r/o AIN 2/2 abx)  - Check complement levels ( r/o infx mediated GN)  - Consider giving diuretic if not making urine by tomorrow

## 2020-11-04 NOTE — PROGRESS NOTE ADULT - SUBJECTIVE AND OBJECTIVE BOX
SUBJECTIVE:   LENGTH OF HOSPITAL STAY: 1d    CHIEF COMPLAINT:  Patient is a 55y old  Female who presents with a chief complaint of Septic shock (2020 13:15)      Events over the past 24 hours:  Patient was seen and examined in the bedside this morning. The patient is lying comfortably on the bed. She is saturating well on O2 via NC.     REVIEW OF SYSTEMS  Negative Except as mentioned above.    ALLERGIES:  No Known Allergies      SOCIAL HISTORY:   Smoking: smokes 1 pack/day  Alcohol: -  Drugs: -    MEDICATIONS:  STANDING MEDICATIONS  ALBUTerol    90 MICROgram(s) HFA Inhaler 2 Puff(s) Inhalation every 6 hours  aspirin  chewable 81 milliGRAM(s) Oral daily  budesonide  80 MICROgram(s)/formoterol 4.5 MICROgram(s) Inhaler 2 Puff(s) Inhalation two times a day  chlorhexidine 4% Liquid 1 Application(s) Topical <User Schedule>  clopidogrel Tablet 75 milliGRAM(s) Oral daily  influenza   Vaccine 0.5 milliLiter(s) IntraMuscular once  levothyroxine 100 MICROGram(s) Oral daily  meropenem  IVPB 500 milliGRAM(s) IV Intermittent every 12 hours  meropenem  IVPB      norepinephrine Infusion 0.05 MICROgram(s)/kG/Min IV Continuous <Continuous>  pantoprazole  Injectable 40 milliGRAM(s) IV Push two times a day  senna 2 Tablet(s) Oral at bedtime  sucralfate 1 Gram(s) Oral four times a day  vancomycin    Solution 125 milliGRAM(s) Oral every 6 hours  vancomycin  IVPB 750 milliGRAM(s) IV Intermittent every 24 hours    PRN MEDICATIONS        OBJECTIVE:  VITALS:   T(F): 97 (20 @ 04:00), Max: 97.9 (20 @ 00:00)  HR: 66 (20 @ 10:30) (54 - 74)  BP: 79/48 (20 @ 10:30) (43/33 - 157/80)  BP(mean): 66 (20 @ 10:30) (33 - 123)  RR: 13 (20 @ 10:30) (6 - 20)  SpO2: 93% (20 @ 10:30) (90% - 98%)    I&O's:   I&O's Summary    2020 07:01  -  2020 07:00  --------------------------------------------------------  IN: 1323 mL / OUT: 115 mL / NET: 1208 mL        PHYSICAL EXAM:  General: No acute distress  HEENT: Normocephalic, atraumatic, PERRLA, EOMI - Normal, No pallor, icterus, cyanosis; JVP- not elevated  PULM: b/l equal and clear breath sounds, no wheeze, crepitations  CVS: s1s2 normal, no murmurs, rubs or gallops  GI: Abdomen is soft, non-tender, no organomegaly, BS +  MSK: No joint/limb swelling, tenderness, deformity  SKIN: No rashes noted  NEURO: Alert and Oriented x 1, no focal neurological deficits noted    LABS:                        9.2    13.91 )-----------( 329      ( 2020 04:30 )             32.4             11-04    127<L>  |  95<L>  |  27<H>  ----------------------------<  110<H>  5.0   |  12<L>  |  3.7<H>    Ca    8.1<L>      2020 07:00  Mg     1.1     11-    TPro  6.4  /  Alb  3.0<L>  /  TBili  1.1  /  DBili  0.8<H>  /  AST  183<H>  /  ALT  80<H>  /  AlkPhos  140<H>  11-04    LIVER FUNCTIONS - ( 2020 04:30 )  Alb: 3.0 g/dL / Pro: 6.4 g/dL / ALK PHOS: 140 U/L / ALT: 80 U/L / AST: 183 U/L / GGT: x                 PT/INR - ( 2020 04:30 )   PT: 28.40 sec;   INR: 2.47 ratio         PTT - ( 2020 04:30 )  PTT:42.7 sec  CARDIAC MARKERS ( 2020 04:30 )  x     / 0.88 ng/mL / 915 U/L / x     / 28.4 ng/mL  CARDIAC MARKERS ( 2020 00:00 )  x     / 1.05 ng/mL / 1171 U/L / x     / 33.0 ng/mL  CARDIAC MARKERS ( 2020 16:42 )  x     / 0.93 ng/mL / x     / x     / x            Urinalysis Basic - ( 2020 18:00 )    Color: Jumana / Appearance: Slightly Turbid / S.025 / pH: x  Gluc: x / Ketone: Trace  / Bili: Negative / Urobili: <2 mg/dL   Blood: x / Protein: 30 mg/dL / Nitrite: Negative   Leuk Esterase: Small / RBC: 6 /HPF / WBC 6 /HPF   Sq Epi: x / Non Sq Epi: 1 /HPF / Bacteria: Negative        CAPILLARY BLOOD GLUCOSE      POCT Blood Glucose.: 113 mg/dL (2020 13:55)  POCT Blood Glucose.: 114 mg/dL (2020 04:27)  POCT Blood Glucose.: 131 mg/dL (2020 01:40)  POCT Blood Glucose.: 102 mg/dL (2020 16:04)        RADIOLOGY & ADDITIONAL TESTS:  Xray Chest 1 View AP/PA:   EXAM:  XR CHEST 1 VIEW            PROCEDURE DATE:  2020            INTERPRETATION:  Clinical History / Reason for exam: Sepsis    Comparison : Chest radiograph 10/31/2020 and CT 2020.    Technique/Positioning: AP.    Findings:    Supportdevices: New left neck central catheter with the tip in the persistent left SVC.    Cardiac/mediastinum/hilum: Dextrocardia.    Lung parenchyma/Pleura: Bibasilar linear atelectasis.  3. No pneumothorax or large effusion.    Skeleton/soft tissues: Unremarkable.    Impression:    Bibasilar atelectasis, which is stable.    Neck catheter in the persistent left SVC, better evaluated on recent CT.                YUE MAYO MD; Attending Interventional Radiologist  This document has been electronically signed. 2020 10:03AM (20 @ 05:10)  Xray Chest 1 View AP/PA:   EXAM:  XR CHEST 1 VIEW            PROCEDURE DATE:  2020            INTERPRETATION:  CLINICAL HISTORY: CVC Placement.    COMPARISON: Chest radiograph from earlier the same.    TECHNIQUE: Portable frontal chest radiograph. Adequate positioning.    FINDINGS/  IMPRESSION:    Support devices: Interval placement of a left IJ central line with the tip terminating in the left sided distal SVC.    Cardiac/mediastinum/hilum: Stable dextrocardia.    Lung parenchyma/Pleura: No clear pneumothorax. Vascular congestion and interstitial opacities noted.    Skeleton/soft tissues: Stable.    ANGEL JONES MD; Attending Radiologist  This document has been electronically signed. 2020 11:05AM (20 @ 20:30)                       SUBJECTIVE:   LENGTH OF HOSPITAL STAY: 1d    CHIEF COMPLAINT:  Patient is a 55y old  Female who presents with a chief complaint of Septic shock (2020 13:15)      Events over the past 24 hours:  Patient was seen and examined in the bedside this morning. The patient is lying comfortably on the bed. There were no acute events overnight.    REVIEW OF SYSTEMS  Negative Except as mentioned above.    ALLERGIES:  No Known Allergies      SOCIAL HISTORY:   Smoking: smokes 1 pack/day  Alcohol: -  Drugs: -    MEDICATIONS:  STANDING MEDICATIONS  ALBUTerol    90 MICROgram(s) HFA Inhaler 2 Puff(s) Inhalation every 6 hours  aspirin  chewable 81 milliGRAM(s) Oral daily  budesonide  80 MICROgram(s)/formoterol 4.5 MICROgram(s) Inhaler 2 Puff(s) Inhalation two times a day  chlorhexidine 4% Liquid 1 Application(s) Topical <User Schedule>  clopidogrel Tablet 75 milliGRAM(s) Oral daily  influenza   Vaccine 0.5 milliLiter(s) IntraMuscular once  levothyroxine 100 MICROGram(s) Oral daily  meropenem  IVPB 500 milliGRAM(s) IV Intermittent every 12 hours  meropenem  IVPB      norepinephrine Infusion 0.05 MICROgram(s)/kG/Min IV Continuous <Continuous>  pantoprazole  Injectable 40 milliGRAM(s) IV Push two times a day  senna 2 Tablet(s) Oral at bedtime  sucralfate 1 Gram(s) Oral four times a day  vancomycin    Solution 125 milliGRAM(s) Oral every 6 hours  vancomycin  IVPB 750 milliGRAM(s) IV Intermittent every 24 hours    PRN MEDICATIONS        OBJECTIVE:  VITALS:   T(F): 97 (20 @ 04:00), Max: 97.9 (20 @ 00:00)  HR: 66 (20 @ 10:30) (54 - 74)  BP: 79/48 (20 @ 10:30) (43/33 - 157/80)  BP(mean): 66 (20 @ 10:30) (33 - 123)  RR: 13 (20 @ 10:30) (6 - 20)  SpO2: 93% (20 @ 10:30) (90% - 98%)    I&O's:   I&O's Summary    2020 07:01  -  2020 07:00  --------------------------------------------------------  IN: 1323 mL / OUT: 115 mL / NET: 1208 mL        PHYSICAL EXAM:  General: No acute distress  HEENT: Normocephalic, atraumatic, PERRLA, EOMI - Normal, No pallor, icterus, cyanosis; JVP- not elevated  PULM: b/l equal and clear breath sounds, no wheeze, crepitations  CVS: s1s2 normal, no murmurs, rubs or gallops  GI: Abdomen is soft, non-tender, no organomegaly, BS +  MSK: No joint/limb swelling, tenderness, deformity  SKIN: No rashes noted  NEURO: Alert and Oriented x 2, no focal neurological deficits noted    LABS:                        9.2    13.91 )-----------( 329      ( 2020 04:30 )             32.4             11-04    127<L>  |  95<L>  |  27<H>  ----------------------------<  110<H>  5.0   |  12<L>  |  3.7<H>    Ca    8.1<L>      2020 07:00  Mg     1.1     11-04    TPro  6.4  /  Alb  3.0<L>  /  TBili  1.1  /  DBili  0.8<H>  /  AST  183<H>  /  ALT  80<H>  /  AlkPhos  140<H>  11-04    LIVER FUNCTIONS - ( 2020 04:30 )  Alb: 3.0 g/dL / Pro: 6.4 g/dL / ALK PHOS: 140 U/L / ALT: 80 U/L / AST: 183 U/L / GGT: x                 PT/INR - ( 2020 04:30 )   PT: 28.40 sec;   INR: 2.47 ratio         PTT - ( 2020 04:30 )  PTT:42.7 sec  CARDIAC MARKERS ( 2020 04:30 )  x     / 0.88 ng/mL / 915 U/L / x     / 28.4 ng/mL  CARDIAC MARKERS ( 2020 00:00 )  x     / 1.05 ng/mL / 1171 U/L / x     / 33.0 ng/mL  CARDIAC MARKERS ( 2020 16:42 )  x     / 0.93 ng/mL / x     / x     / x            Urinalysis Basic - ( 2020 18:00 )    Color: Jumana / Appearance: Slightly Turbid / S.025 / pH: x  Gluc: x / Ketone: Trace  / Bili: Negative / Urobili: <2 mg/dL   Blood: x / Protein: 30 mg/dL / Nitrite: Negative   Leuk Esterase: Small / RBC: 6 /HPF / WBC 6 /HPF   Sq Epi: x / Non Sq Epi: 1 /HPF / Bacteria: Negative        CAPILLARY BLOOD GLUCOSE      POCT Blood Glucose.: 113 mg/dL (2020 13:55)  POCT Blood Glucose.: 114 mg/dL (2020 04:27)  POCT Blood Glucose.: 131 mg/dL (2020 01:40)  POCT Blood Glucose.: 102 mg/dL (2020 16:04)        RADIOLOGY & ADDITIONAL TESTS:  Xray Chest 1 View AP/PA:   EXAM:  XR CHEST 1 VIEW            PROCEDURE DATE:  2020            INTERPRETATION:  Clinical History / Reason for exam: Sepsis    Comparison : Chest radiograph 10/31/2020 and CT 2020.    Technique/Positioning: AP.    Findings:    Supportdevices: New left neck central catheter with the tip in the persistent left SVC.    Cardiac/mediastinum/hilum: Dextrocardia.    Lung parenchyma/Pleura: Bibasilar linear atelectasis.  3. No pneumothorax or large effusion.    Skeleton/soft tissues: Unremarkable.    Impression:    Bibasilar atelectasis, which is stable.    Neck catheter in the persistent left SVC, better evaluated on recent CT.                YUE MAYO MD; Attending Interventional Radiologist  This document has been electronically signed. 2020 10:03AM (20 @ 05:10)  Xray Chest 1 View AP/PA:   EXAM:  XR CHEST 1 VIEW            PROCEDURE DATE:  2020            INTERPRETATION:  CLINICAL HISTORY: CVC Placement.    COMPARISON: Chest radiograph from earlier the same.    TECHNIQUE: Portable frontal chest radiograph. Adequate positioning.    FINDINGS/  IMPRESSION:    Support devices: Interval placement of a left IJ central line with the tip terminating in the left sided distal SVC.    Cardiac/mediastinum/hilum: Stable dextrocardia.    Lung parenchyma/Pleura: No clear pneumothorax. Vascular congestion and interstitial opacities noted.    Skeleton/soft tissues: Stable.    ANGEL JONES MD; Attending Radiologist  This document has been electronically signed. 2020 11:05AM (20 @ 20:30)

## 2020-11-05 LAB
ALBUMIN SERPL ELPH-MCNC: 2.8 G/DL — LOW (ref 3.5–5.2)
ALP SERPL-CCNC: 128 U/L — HIGH (ref 30–115)
ALT FLD-CCNC: 57 U/L — HIGH (ref 0–41)
ANION GAP SERPL CALC-SCNC: 13 MMOL/L — SIGNIFICANT CHANGE UP (ref 7–14)
AST SERPL-CCNC: 100 U/L — HIGH (ref 0–41)
BASOPHILS # BLD AUTO: 0.03 K/UL — SIGNIFICANT CHANGE UP (ref 0–0.2)
BASOPHILS NFR BLD AUTO: 0.3 % — SIGNIFICANT CHANGE UP (ref 0–1)
BILIRUB SERPL-MCNC: 0.9 MG/DL — SIGNIFICANT CHANGE UP (ref 0.2–1.2)
BLD GP AB SCN SERPL QL: SIGNIFICANT CHANGE UP
BUN SERPL-MCNC: 30 MG/DL — HIGH (ref 10–20)
C DIFF BY PCR RESULT: NEGATIVE — SIGNIFICANT CHANGE UP
C DIFF TOX GENS STL QL NAA+PROBE: SIGNIFICANT CHANGE UP
CALCIUM SERPL-MCNC: 7.8 MG/DL — LOW (ref 8.5–10.1)
CHLORIDE SERPL-SCNC: 97 MMOL/L — LOW (ref 98–110)
CO2 SERPL-SCNC: 19 MMOL/L — SIGNIFICANT CHANGE UP (ref 17–32)
CORTIS AM PEAK SERPL-MCNC: 13.3 UG/DL — SIGNIFICANT CHANGE UP (ref 6–18.4)
CORTIS AM PEAK SERPL-MCNC: 31.7 UG/DL — HIGH (ref 6–18.4)
CREAT SERPL-MCNC: 3.3 MG/DL — HIGH (ref 0.7–1.5)
EOSINOPHIL # BLD AUTO: 0.02 K/UL — SIGNIFICANT CHANGE UP (ref 0–0.7)
EOSINOPHIL NFR BLD AUTO: 0.2 % — SIGNIFICANT CHANGE UP (ref 0–8)
EOSINOPHIL NFR URNS MANUAL: NEGATIVE — SIGNIFICANT CHANGE UP
GLUCOSE BLDC GLUCOMTR-MCNC: 134 MG/DL — HIGH (ref 70–99)
GLUCOSE SERPL-MCNC: 84 MG/DL — SIGNIFICANT CHANGE UP (ref 70–99)
HCT VFR BLD CALC: 31 % — LOW (ref 37–47)
HGB BLD-MCNC: 9.2 G/DL — LOW (ref 12–16)
IMM GRANULOCYTES NFR BLD AUTO: 0.9 % — HIGH (ref 0.1–0.3)
LYMPHOCYTES # BLD AUTO: 1.03 K/UL — LOW (ref 1.2–3.4)
LYMPHOCYTES # BLD AUTO: 11.9 % — LOW (ref 20.5–51.1)
MAGNESIUM SERPL-MCNC: 2.5 MG/DL — HIGH (ref 1.8–2.4)
MCHC RBC-ENTMCNC: 26.1 PG — LOW (ref 27–31)
MCHC RBC-ENTMCNC: 29.7 G/DL — LOW (ref 32–37)
MCV RBC AUTO: 88.1 FL — SIGNIFICANT CHANGE UP (ref 81–99)
MONOCYTES # BLD AUTO: 0.97 K/UL — HIGH (ref 0.1–0.6)
MONOCYTES NFR BLD AUTO: 11.2 % — HIGH (ref 1.7–9.3)
NEUTROPHILS # BLD AUTO: 6.54 K/UL — HIGH (ref 1.4–6.5)
NEUTROPHILS NFR BLD AUTO: 75.5 % — HIGH (ref 42.2–75.2)
NRBC # BLD: 0 /100 WBCS — SIGNIFICANT CHANGE UP (ref 0–0)
PHOSPHATE SERPL-MCNC: 5.2 MG/DL — HIGH (ref 2.1–4.9)
PLATELET # BLD AUTO: 247 K/UL — SIGNIFICANT CHANGE UP (ref 130–400)
POTASSIUM SERPL-MCNC: 4.8 MMOL/L — SIGNIFICANT CHANGE UP (ref 3.5–5)
POTASSIUM SERPL-SCNC: 4.8 MMOL/L — SIGNIFICANT CHANGE UP (ref 3.5–5)
PROT SERPL-MCNC: 5.9 G/DL — LOW (ref 6–8)
RBC # BLD: 3.52 M/UL — LOW (ref 4.2–5.4)
RBC # FLD: 20.2 % — HIGH (ref 11.5–14.5)
SODIUM SERPL-SCNC: 129 MMOL/L — LOW (ref 135–146)
T4 AB SER-ACNC: 4.8 UG/DL — SIGNIFICANT CHANGE UP (ref 4.6–12)
TROPONIN T SERPL-MCNC: 0.72 NG/ML — CRITICAL HIGH
TSH SERPL-MCNC: 3.79 UIU/ML — SIGNIFICANT CHANGE UP (ref 0.27–4.2)
VANCOMYCIN FLD-MCNC: 22.7 UG/ML — HIGH (ref 5–10)
WBC # BLD: 8.67 K/UL — SIGNIFICANT CHANGE UP (ref 4.8–10.8)
WBC # FLD AUTO: 8.67 K/UL — SIGNIFICANT CHANGE UP (ref 4.8–10.8)

## 2020-11-05 PROCEDURE — 71045 X-RAY EXAM CHEST 1 VIEW: CPT | Mod: 26

## 2020-11-05 RX ORDER — MORPHINE SULFATE 50 MG/1
2 CAPSULE, EXTENDED RELEASE ORAL ONCE
Refills: 0 | Status: DISCONTINUED | OUTPATIENT
Start: 2020-11-05 | End: 2020-11-05

## 2020-11-05 RX ORDER — MEROPENEM 1 G/30ML
750 INJECTION INTRAVENOUS EVERY 24 HOURS
Refills: 0 | Status: DISCONTINUED | OUTPATIENT
Start: 2020-11-05 | End: 2020-11-09

## 2020-11-05 RX ADMIN — Medication 125 MILLIGRAM(S): at 02:19

## 2020-11-05 RX ADMIN — MEROPENEM 100 MILLIGRAM(S): 1 INJECTION INTRAVENOUS at 17:31

## 2020-11-05 RX ADMIN — SENNA PLUS 2 TABLET(S): 8.6 TABLET ORAL at 22:00

## 2020-11-05 RX ADMIN — Medication 81 MILLIGRAM(S): at 12:07

## 2020-11-05 RX ADMIN — Medication 1 GRAM(S): at 12:07

## 2020-11-05 RX ADMIN — MEROPENEM 100 MILLIGRAM(S): 1 INJECTION INTRAVENOUS at 05:25

## 2020-11-05 RX ADMIN — CLOPIDOGREL BISULFATE 75 MILLIGRAM(S): 75 TABLET, FILM COATED ORAL at 12:07

## 2020-11-05 RX ADMIN — Medication 1 GRAM(S): at 00:00

## 2020-11-05 RX ADMIN — MORPHINE SULFATE 2 MILLIGRAM(S): 50 CAPSULE, EXTENDED RELEASE ORAL at 18:57

## 2020-11-05 RX ADMIN — Medication 1 GRAM(S): at 17:31

## 2020-11-05 RX ADMIN — Medication 125 MILLIGRAM(S): at 05:24

## 2020-11-05 RX ADMIN — Medication 1300 MILLIGRAM(S): at 15:12

## 2020-11-05 RX ADMIN — Medication 1300 MILLIGRAM(S): at 05:25

## 2020-11-05 RX ADMIN — ALBUTEROL 2 PUFF(S): 90 AEROSOL, METERED ORAL at 15:12

## 2020-11-05 RX ADMIN — Medication 1300 MILLIGRAM(S): at 22:01

## 2020-11-05 RX ADMIN — BUDESONIDE AND FORMOTEROL FUMARATE DIHYDRATE 2 PUFF(S): 160; 4.5 AEROSOL RESPIRATORY (INHALATION) at 08:15

## 2020-11-05 RX ADMIN — CHLORHEXIDINE GLUCONATE 1 APPLICATION(S): 213 SOLUTION TOPICAL at 05:26

## 2020-11-05 RX ADMIN — ALBUTEROL 2 PUFF(S): 90 AEROSOL, METERED ORAL at 08:15

## 2020-11-05 RX ADMIN — Medication 1 GRAM(S): at 05:25

## 2020-11-05 RX ADMIN — Medication 250 MILLIGRAM(S): at 02:19

## 2020-11-05 RX ADMIN — Medication 1 GRAM(S): at 23:37

## 2020-11-05 RX ADMIN — PANTOPRAZOLE SODIUM 40 MILLIGRAM(S): 20 TABLET, DELAYED RELEASE ORAL at 05:26

## 2020-11-05 RX ADMIN — Medication 100 MICROGRAM(S): at 05:24

## 2020-11-05 RX ADMIN — Medication 1300 MILLIGRAM(S): at 00:00

## 2020-11-05 RX ADMIN — PANTOPRAZOLE SODIUM 40 MILLIGRAM(S): 20 TABLET, DELAYED RELEASE ORAL at 17:31

## 2020-11-05 NOTE — PROGRESS NOTE ADULT - ASSESSMENT
Impression:    Sepsis present on admission  hypotension led to hypoperfusion/ increase LA/ JEEVAN ( prerenal/ ATN) improved  Type2 NSTEMI  HO melena  Type II DM  HFpEF (EF of 72 %)  CAD  UGIB 2/2 duodenal ulcer  HTN  Hypothyroidism  COPD  Sever Pulm HTN  increase INR      Plan     CNS: Avoid CNS depressants.     CVS: ASA/Plavix.  Levo gtt taper    PULMONARY: HOB @ 45 degrees.  Aspiration precautions.  c/w Inhalers    INFECTIOUS DISEASE: UCx and BCx.  Merrem dc vanco    GI: GI PPx w/ IV PPI q12.  Sucralfate.  po feeds    RENAL and acid base: Follow up serum and urine lytes.  Correct as needed.   Nephro consult.    Endocrine: Follow up FS.  Insulin protocol if needed. cortisoL    Hematology: DVT PPx w/ SCD.  Active T&S.     sdu    Code Status: Full

## 2020-11-05 NOTE — PROGRESS NOTE ADULT - SUBJECTIVE AND OBJECTIVE BOX
OVERNIGHT EVENTS: Events noted, on levophed, u tox reviewed, better    Vital Signs Last 24 Hrs  T(C): 36.9 (2020 08:00), Max: 36.9 (2020 08:00)  T(F): 98.5 (2020 08:00), Max: 98.5 (2020 08:00)  HR: 68 (2020 09:00) (64 - 70)  BP: 100/80 (2020 09:00) (73/48 - 146/64)  BP(mean): 93 (2020 09:00) (53 - 114)  RR: 15 (2020 09:00) (7 - 18)  SpO2: 76% (2020 09:00) (76% - 99%)    PHYSICAL EXAMINATION:    GENERAL: Awake, follows commands    HEENT: Head is normocephalic and atraumatic. Extraocular muscles are intact. Mucous membranes are moist.    NECK: Supple.    LUNGS: dec bs r side    HEART: Regular rate and rhythm without murmur.    ABDOMEN: Soft, nontender, and nondistended.      EXTREMITIES: Without any cyanosis, clubbing, rash, lesions or edema.    NEUROLOGIC: Grossly intact.    SKIN: No ulceration or induration present.      LABS:                        9.2    8.67  )-----------( 247      ( 2020 05:06 )             31.0     11-05    129<L>  |  97<L>  |  30<H>  ----------------------------<  84  4.8   |  19  |  3.3<H>    Ca    7.8<L>      2020 05:06  Phos  5.2     11-05  Mg     2.5     11-05    TPro  5.9<L>  /  Alb  2.8<L>  /  TBili  0.9  /  DBili  x   /  AST  100<H>  /  ALT  57<H>  /  AlkPhos  128<H>  11-05    PT/INR - ( 2020 04:30 )   PT: 28.40 sec;   INR: 2.47 ratio         PTT - ( 2020 04:30 )  PTT:42.7 sec  Urinalysis Basic - ( 2020 18:00 )    Color: Jumana / Appearance: Slightly Turbid / S.025 / pH: x  Gluc: x / Ketone: Trace  / Bili: Negative / Urobili: <2 mg/dL   Blood: x / Protein: 30 mg/dL / Nitrite: Negative   Leuk Esterase: Small / RBC: 6 /HPF / WBC 6 /HPF   Sq Epi: x / Non Sq Epi: 1 /HPF / Bacteria: Negative        CARDIAC MARKERS ( 2020 04:30 )  x     / 0.88 ng/mL / 915 U/L / x     / 28.4 ng/mL  CARDIAC MARKERS ( 2020 00:00 )  x     / 1.05 ng/mL / 1171 U/L / x     / 33.0 ng/mL  CARDIAC MARKERS ( 2020 16:42 )  x     / 0.93 ng/mL / x     / x     / x            Serum Pro-Brain Natriuretic Peptide: 42228 pg/mL (20 @ 16:42)    Lactate, Blood: 1.7 mmol/L (20 @ 16:40)    Procalcitonin, Serum: 0.51 ng/mL (20 @ 00:00)        20 @ 07:01  -  20 @ 07:00  --------------------------------------------------------  IN: 740.1 mL / OUT: 770 mL / NET: -29.9 mL    20 @ 07:01  -  20 @ 09:11  --------------------------------------------------------  IN: 0 mL / OUT: 100 mL / NET: -100 mL        MICROBIOLOGY:  Culture Results:   No growth ( @ 18:00)  Culture Results:   No growth to date. ( @ 16:42)  Culture Results:   No growth to date. ( @ 16:42)      MEDICATIONS  (STANDING):  ALBUTerol    90 MICROgram(s) HFA Inhaler 2 Puff(s) Inhalation every 6 hours  aspirin  chewable 81 milliGRAM(s) Oral daily  budesonide  80 MICROgram(s)/formoterol 4.5 MICROgram(s) Inhaler 2 Puff(s) Inhalation two times a day  chlorhexidine 4% Liquid 1 Application(s) Topical <User Schedule>  clopidogrel Tablet 75 milliGRAM(s) Oral daily  influenza   Vaccine 0.5 milliLiter(s) IntraMuscular once  levothyroxine 100 MICROGram(s) Oral daily  meropenem  IVPB 500 milliGRAM(s) IV Intermittent every 12 hours  meropenem  IVPB      norepinephrine Infusion 0.05 MICROgram(s)/kG/Min (5.63 mL/Hr) IV Continuous <Continuous>  pantoprazole  Injectable 40 milliGRAM(s) IV Push two times a day  senna 2 Tablet(s) Oral at bedtime  sodium bicarbonate 1300 milliGRAM(s) Oral three times a day  sucralfate 1 Gram(s) Oral four times a day  vancomycin    Solution 125 milliGRAM(s) Oral every 6 hours  vancomycin  IVPB 750 milliGRAM(s) IV Intermittent every 24 hours    MEDICATIONS  (PRN):      RADIOLOGY & ADDITIONAL STUDIES:

## 2020-11-05 NOTE — PROGRESS NOTE ADULT - ASSESSMENT
PMH HTN, DM, HFpEF, admitted with shock, requiring pressors, c/b JEEVAN.    # JEEVAN likely pre-renal/ATN due to hypotension/shock, at risk for CRS, AIN,  - creatinine trending down   - non oliguric   -remains on pressor   - high anion gap metabolic acidosis due to lactic acidosis, lactate down trending.  continue  sodium bicarb 1300mg TID  - hyponatremia, avoid hypotonic infusions/ improving   -  phos level noted , no binders   - check renal/bladder ultrasound  # Shock - on pressors, on Meropenem and Vancomycin, last level 22, decrease vanco to 500  No acute indication for RRT.   will follow

## 2020-11-05 NOTE — CONSULT NOTE ADULT - ASSESSMENT
55 y/o female active smoker with PMHx of Type II DM, HFpEF (EF of 72 %), CAD, hyperlipidemia, UGIB 2/2 duodenal ulcer, HTN, Hypothyroidism, COPD, Dextrocardia, Sciatica/Peripheral Neuralgia, and Charcot foot deformity s/p reconstruction on 1/11/19 presents to the ED for worsening lethargy.   Impressions:  - Septic shock likely secondary to CAP – improving   - JEEVAN with HAGMA likely secondary from hypotension and subsequent lactic acidosis- improving.   - AMS on admission, transaminitis, elevation off INR - acute liver hepatitis vs acute liver failure    Recommendations    - send sputum culture, step Ag and legionella ag   - fu results of the cultures.   - given patient clinical improvement and no pathogen identified at this time, recommend continuing meropenem 500 mg IV q12 for a complete course of 7-10 days   - KARUNA with possible GI consult for evaluation of the liver.      Preliminary NOTE still have to discuss with attending.    53 y/o female active smoker with PMHx of Type II DM, HFpEF (EF of 72 %), CAD, hyperlipidemia, UGIB 2/2 duodenal ulcer, HTN, Hypothyroidism, COPD, Dextrocardia, Sciatica/Peripheral Neuralgia, and Charcot foot deformity s/p reconstruction on 1/11/19 presents to the ED for worsening lethargy.   Impressions:  - cryptogenic shock likely secondary to CAP – improving   - Septic shock unlikely secondary PNA- imaging not impressive. Would consider a  etiology.  - JEEVAN with HAGMA likely secondary from hypotension and subsequent lactic acidosis- improving.   - AMS on admission, transaminitis, elevation off INR    Recommendations    - given patient clinical improvement and no pathogen identified at this time, recommend increase meropenem 750mg IV q24f  - KARUNA. if transaminitis is elevated consider a MRCP     55 y/o female active smoker with PMHx of Type II DM, HFpEF (EF of 72 %), CAD, hyperlipidemia, UGIB 2/2 duodenal ulcer, HTN, Hypothyroidism, COPD, Dextrocardia, Sciatica/Peripheral Neuralgia, and Charcot foot deformity s/p reconstruction on 1/11/19 presents to the ED for worsening lethargy.   IMPRESSION;  Cryptogenic shock  Doubt PNA etiology of shock with lactate acidemia as CXR/CT without significant consolidation  Would consider a  etiology.  JEEVAN with HAGMA likely secondary from hypotension and subsequent lactic acidosis- improving.   Transaminitis : clinically no acute cholangitis   RECOMMENDATIONS;  BCx   Meropenem 750 mg iv q24h  F/u transaminitis

## 2020-11-05 NOTE — CHART NOTE - NSCHARTNOTEFT_GEN_A_CORE
Transfer from: MICU  Transfer to:  (  ) Medicine    (  ) Telemetry    (  ) RCU    (  ) Palliative    (  ) Stroke Unit    ( X ) Step down unit  Accepting physican:    MICU COURSE:  55 y/o female active smoker with PMHx of Type II DM, HFpEF (EF of 72 %), CAD, hyperlipidemia, UGIB 2/2 duodenal ulcer, HTN, Hypothyroidism, COPD, Dextrocardia, Sciatica/Peripheral Neuralgia, and Charcot foot deformity s/p reconstruction on 1/11/19 presents to the ED for worsening lethargy. The patient has had 2 recent admisions for sepsis recently and left AMA from the hospital on 10/31/20. This presentation similar to how she presented last time. Endorses cough and purulent sputum. Patient HoTN (72/33mmHg) and hypothermic (35.3 C) on initial encounter and developed periods of bradycardia in to the 50s. Labs revealed elevated WBC w/ stable Hb, along w/ JEEVAN w/ HAGMA (AG 23), worsening Transaminitis from last admission Trop 0.93, BNP 43k and lactate 9.8. CXR unremarkable.   She was re-admitted to the MICU for septic shock. In MICU she was on levophed, saturating well on O2 via NC. She has been afebrile.     ASSESSMENT & PLAN:   # Sepsis present on admission  # Hypotension led to hypoperfusion/ increase LA/ JEEVAN ( prerenal/ ATN)  - IVF for cheetah - fluid non-responsive  - trend Lactate  - C/w meropenem   - ID eval    # Type 2 NSTEMI  - Follow up CE  - Cardio eval    # h/o UGIB 2/2 duodenal ulcer  # HO melena  - GI PPX - IV PPI q12  - Sucralfate  - PO feeds  - Keep active type and screen    # Type II DM  - Follow up FS  - Insulin protocol if needed.   - cortisol level - 31.7    # HFpEF (EF of 72%)  # CAD  - Follow up CE  - ASA/Plavix  - Levo gtt - wean as tolerated  - Holding Lasix and BB due to shock    # HTN  # Hypothyroidism  - Continue home meds once stable    # COPD  # Sever Pulm HTN  - continue with inhalers    # Diet: advance as tolerated  # Activity : Increase as tolerated  # DVT PPX - SCD  # GI PPX - PPI  # Dispo: Step down unit  # Code Status: Full      For Follow-Up: Procalcitonin, c3/c4 levels, Trend CBC, CMP, Lactate, phosphate, Follow up ID and Cardio recs      PHYSICAL EXAM:  General: Not in distress; Pallor (-), Icterus (-), Cyanosis (-), Clubbing (-)  HEENT: Pupils equal, round and reactive to light symmetrically, EOM - Normal bilaterally; Hearing - b/l normal; No external discharge noted, JVD (-), Lymphadenopathy(-)  PULM: Bilaterally equal and clear breath sounds, no wheeze, rubs or crackles.   CVS: Normal S1 and S2, no murmurs, rubs, or gallops.   GI: Soft, nondistended, nontender, no masses.  MSK: Edema (-), no joint or muscle tenderness  SKIN: Warm and well perfused, no rashes noted  NEURO:  Alert and Oriented x 3; Cranial Nerves are all grossly intact; Normal strength and sensation in all four extremities.      Vital Signs Last 24 Hrs  T(C): 36.9 (05 Nov 2020 08:00), Max: 36.9 (05 Nov 2020 08:00)  T(F): 98.5 (05 Nov 2020 08:00), Max: 98.5 (05 Nov 2020 08:00)  HR: 72 (05 Nov 2020 12:00) (64 - 76)  BP: 106/48 (05 Nov 2020 11:00) (81/46 - 146/64)  BP(mean): 58 (05 Nov 2020 11:00) (53 - 114)  RR: 11 (05 Nov 2020 12:00) (7 - 18)  SpO2: 94% (05 Nov 2020 12:00) (76% - 99%)    I&O's Summary    04 Nov 2020 07:01  -  05 Nov 2020 07:00  --------------------------------------------------------  IN: 740.1 mL / OUT: 770 mL / NET: -29.9 mL    05 Nov 2020 07:01  -  05 Nov 2020 13:00  --------------------------------------------------------  IN: 25.6 mL / OUT: 350 mL / NET: -324.4 mL          MEDICATIONS  (STANDING):  ALBUTerol    90 MICROgram(s) HFA Inhaler 2 Puff(s) Inhalation every 6 hours  aspirin  chewable 81 milliGRAM(s) Oral daily  budesonide  80 MICROgram(s)/formoterol 4.5 MICROgram(s) Inhaler 2 Puff(s) Inhalation two times a day  chlorhexidine 4% Liquid 1 Application(s) Topical <User Schedule>  clopidogrel Tablet 75 milliGRAM(s) Oral daily  influenza   Vaccine 0.5 milliLiter(s) IntraMuscular once  levothyroxine 100 MICROGram(s) Oral daily  meropenem  IVPB 500 milliGRAM(s) IV Intermittent every 12 hours  meropenem  IVPB      norepinephrine Infusion 0.05 MICROgram(s)/kG/Min (5.63 mL/Hr) IV Continuous <Continuous>  pantoprazole  Injectable 40 milliGRAM(s) IV Push two times a day  senna 2 Tablet(s) Oral at bedtime  sodium bicarbonate 1300 milliGRAM(s) Oral three times a day  sucralfate 1 Gram(s) Oral four times a day    MEDICATIONS  (PRN):        LABS                                            9.2                   Neurophils% (auto):   75.5   (11-05 @ 05:06):    8.67 )-----------(247          Lymphocytes% (auto):  11.9                                          31.0                   Eosinphils% (auto):   0.2      Manual%: Neutrophils x    ; Lymphocytes x    ; Eosinophils x    ; Bands%: x    ; Blasts x                                    129    |  97     |  30                  Calcium: 7.8   / iCa: x      (11-05 @ 05:06)    ----------------------------<  84        Magnesium: 2.5                              4.8     |  19     |  3.3              Phosphorous: 5.2      TPro  5.9    /  Alb  2.8    /  TBili  0.9    /  DBili  x      /  AST  100    /  ALT  57     /  AlkPhos  128    05 Nov 2020 05:06

## 2020-11-05 NOTE — PROGRESS NOTE ADULT - SUBJECTIVE AND OBJECTIVE BOX
seen and examined   no distress   on pressors   lethargic         PAST HISTORY  --------------------------------------------------------------------------------  No significant changes to PMH, PSH, FHx, SHx, unless otherwise noted    ALLERGIES & MEDICATIONS  --------------------------------------------------------------------------------  Allergies    No Known Allergies    Intolerances      Standing Inpatient Medications  ALBUTerol    90 MICROgram(s) HFA Inhaler 2 Puff(s) Inhalation every 6 hours  aspirin  chewable 81 milliGRAM(s) Oral daily  budesonide  80 MICROgram(s)/formoterol 4.5 MICROgram(s) Inhaler 2 Puff(s) Inhalation two times a day  chlorhexidine 4% Liquid 1 Application(s) Topical <User Schedule>  clopidogrel Tablet 75 milliGRAM(s) Oral daily  influenza   Vaccine 0.5 milliLiter(s) IntraMuscular once  levothyroxine 100 MICROGram(s) Oral daily  meropenem  IVPB 500 milliGRAM(s) IV Intermittent every 12 hours  meropenem  IVPB      norepinephrine Infusion 0.05 MICROgram(s)/kG/Min IV Continuous <Continuous>  pantoprazole  Injectable 40 milliGRAM(s) IV Push two times a day  senna 2 Tablet(s) Oral at bedtime  sodium bicarbonate 1300 milliGRAM(s) Oral three times a day  sucralfate 1 Gram(s) Oral four times a day  vancomycin    Solution 125 milliGRAM(s) Oral every 6 hours  vancomycin  IVPB 750 milliGRAM(s) IV Intermittent every 24 hours          VITALS/PHYSICAL EXAM  --------------------------------------------------------------------------------  T(C): 36.6 (11-05-20 @ 00:00), Max: 36.7 (11-04-20 @ 16:00)  HR: 66 (11-05-20 @ 07:00) (64 - 70)  BP: 102/53 (11-05-20 @ 07:00) (73/48 - 146/64)  RR: 17 (11-05-20 @ 07:00) (7 - 18)  SpO2: 94% (11-05-20 @ 07:00) (90% - 99%)  Wt(kg): --  Height (cm): 160 (11-04-20 @ 00:15)  Weight (kg): 85.7 (11-04-20 @ 00:15)  BMI (kg/m2): 33.5 (11-04-20 @ 00:15)  BSA (m2): 1.89 (11-04-20 @ 00:15)      11-04-20 @ 07:01  -  11-05-20 @ 07:00  --------------------------------------------------------  IN: 740.1 mL / OUT: 720 mL / NET: 20.1 mL      Physical Exam:  	Gen: NAD  	Pulm: decrease BS  B/L  	CV: RRR, S1S2; no rub  	Abd: +distended      LABS/STUDIES  --------------------------------------------------------------------------------              9.2    8.67  >-----------<  247      [11-05-20 @ 05:06]              31.0     129  |  97  |  30  ----------------------------<  84      [11-05-20 @ 05:06]  4.8   |  19  |  3.3        Ca     7.8     [11-05-20 @ 05:06]      Mg     2.5     [11-05-20 @ 05:06]      Phos  5.2     [11-05-20 @ 05:06]    TPro  5.9  /  Alb  2.8  /  TBili  0.9  /  DBili  x   /  AST  100  /  ALT  57  /  AlkPhos  128  [11-05-20 @ 05:06]    PT/INR: PT 28.40, INR 2.47       [11-04-20 @ 04:30]  PTT: 42.7       [11-04-20 @ 04:30]    Troponin 0.88      [11-04-20 @ 04:30]        [11-04-20 @ 04:30]    Creatinine Trend:  SCr 3.3 [11-05 @ 05:06]  SCr 3.7 [11-04 @ 16:00]  SCr 3.7 [11-04 @ 07:00]  SCr 3.7 [11-04 @ 00:00]  SCr 3.8 [11-03 @ 16:42]    Urinalysis - [11-03-20 @ 18:00]      Color Jumana / Appearance Slightly Turbid / SG 1.025 / pH 6.0      Gluc Negative / Ketone Trace  / Bili Negative / Urobili <2 mg/dL       Blood Trace / Protein 30 mg/dL / Leuk Est Small / Nitrite Negative      RBC 6 / WBC 6 / Hyaline 13 / Gran  / Sq Epi  / Non Sq Epi 1 / Bacteria Negative    Urine Creatinine 128      [11-04-20 @ 00:30]  Urine Protein 183      [11-04-20 @ 00:30]  Urine Sodium <20.0      [11-04-20 @ 00:30]  Urine Urea Nitrogen 144      [11-04-20 @ 00:30]    Iron 50, TIBC 330, %sat 15      [09-29-20 @ 07:34]  Ferritin 80      [09-29-20 @ 07:34]  HbA1c 6.0      [01-11-19 @ 06:11]  TSH 1.72      [10-29-20 @ 04:50]       seen and examined   no distress   on pressors   lethargic         PAST HISTORY  --------------------------------------------------------------------------------  No significant changes to PMH, PSH, FHx, SHx, unless otherwise noted    ALLERGIES & MEDICATIONS  --------------------------------------------------------------------------------  Allergies    No Known Allergies    Intolerances      Standing Inpatient Medications  ALBUTerol    90 MICROgram(s) HFA Inhaler 2 Puff(s) Inhalation every 6 hours  aspirin  chewable 81 milliGRAM(s) Oral daily  budesonide  80 MICROgram(s)/formoterol 4.5 MICROgram(s) Inhaler 2 Puff(s) Inhalation two times a day  chlorhexidine 4% Liquid 1 Application(s) Topical <User Schedule>  clopidogrel Tablet 75 milliGRAM(s) Oral daily  influenza   Vaccine 0.5 milliLiter(s) IntraMuscular once  levothyroxine 100 MICROGram(s) Oral daily  meropenem  IVPB 500 milliGRAM(s) IV Intermittent every 12 hours  meropenem  IVPB      norepinephrine Infusion 0.05 MICROgram(s)/kG/Min IV Continuous <Continuous>  pantoprazole  Injectable 40 milliGRAM(s) IV Push two times a day  senna 2 Tablet(s) Oral at bedtime  sodium bicarbonate 1300 milliGRAM(s) Oral three times a day  sucralfate 1 Gram(s) Oral four times a day  vancomycin    Solution 125 milliGRAM(s) Oral every 6 hours  vancomycin  IVPB 750 milliGRAM(s) IV Intermittent every 24 hours        v  VITALS/PHYSICAL EXAM  --------------------------------------------------------------------------------  T(C): 36.6 (11-05-20 @ 00:00), Max: 36.7 (11-04-20 @ 16:00)  HR: 66 (11-05-20 @ 07:00) (64 - 70)  BP: 102/53 (11-05-20 @ 07:00) (73/48 - 146/64)  RR: 17 (11-05-20 @ 07:00) (7 - 18)  SpO2: 94% (11-05-20 @ 07:00) (90% - 99%)  Wt(kg): --  Height (cm): 160 (11-04-20 @ 00:15)  Weight (kg): 85.7 (11-04-20 @ 00:15)  BMI (kg/m2): 33.5 (11-04-20 @ 00:15)  BSA (m2): 1.89 (11-04-20 @ 00:15)      11-04-20 @ 07:01  -  11-05-20 @ 07:00  --------------------------------------------------------  IN: 740.1 mL / OUT: 720 mL / NET: 20.1 mL      Physical Exam:  	Gen: NAD  	Pulm: decrease BS  B/L  	CV: RRR, S1S2; no rub  	Abd: +distended      LABS/STUDIES  --------------------------------------------------------------------------------              9.2    8.67  >-----------<  247      [11-05-20 @ 05:06]              31.0     129  |  97  |  30  ----------------------------<  84      [11-05-20 @ 05:06]  4.8   |  19  |  3.3        Ca     7.8     [11-05-20 @ 05:06]      Mg     2.5     [11-05-20 @ 05:06]      Phos  5.2     [11-05-20 @ 05:06]    TPro  5.9  /  Alb  2.8  /  TBili  0.9  /  DBili  x   /  AST  100  /  ALT  57  /  AlkPhos  128  [11-05-20 @ 05:06]    PT/INR: PT 28.40, INR 2.47       [11-04-20 @ 04:30]  PTT: 42.7       [11-04-20 @ 04:30]    Troponin 0.88      [11-04-20 @ 04:30]        [11-04-20 @ 04:30]    Creatinine Trend:  SCr 3.3 [11-05 @ 05:06]  SCr 3.7 [11-04 @ 16:00]  SCr 3.7 [11-04 @ 07:00]  SCr 3.7 [11-04 @ 00:00]  SCr 3.8 [11-03 @ 16:42]    Urinalysis - [11-03-20 @ 18:00]      Color Jumana / Appearance Slightly Turbid / SG 1.025 / pH 6.0      Gluc Negative / Ketone Trace  / Bili Negative / Urobili <2 mg/dL       Blood Trace / Protein 30 mg/dL / Leuk Est Small / Nitrite Negative      RBC 6 / WBC 6 / Hyaline 13 / Gran  / Sq Epi  / Non Sq Epi 1 / Bacteria Negative    Urine Creatinine 128      [11-04-20 @ 00:30]  Urine Protein 183      [11-04-20 @ 00:30]  Urine Sodium <20.0      [11-04-20 @ 00:30]  Urine Urea Nitrogen 144      [11-04-20 @ 00:30]    Iron 50, TIBC 330, %sat 15      [09-29-20 @ 07:34]  Ferritin 80      [09-29-20 @ 07:34]  HbA1c 6.0      [01-11-19 @ 06:11]  TSH 1.72      [10-29-20 @ 04:50]

## 2020-11-05 NOTE — CONSULT NOTE ADULT - SUBJECTIVE AND OBJECTIVE BOX
CALLY HARTMAN  55y, Female  Allergy: No Known Allergies      HPI:  53 y/o female active smoker with PMHx of Type II DM, HFpEF (EF of 72 %), CAD, hyperlipidemia, UGIB 2/2 duodenal ulcer, HTN, Hypothyroidism, COPD, Dextrocardia, Sciatica/Peripheral Neuralgia, and Charcot foot deformity s/p reconstruction on 19 presents to the ED for worsening lethargy. The patient has had 2 recent admisions for sepsis recently and left AMA from the hospital on 10/31/20. This presentation similar to how she presented last time. On her last admission back in , patient was admitted and treated for sepsis secondary to acute pyelonephritis. Currently she endorses cough and purulent sputum.    In the ED p`atient HoTN (72/33mmHg) and hypothermic (35.3 C) on initial encounter and developed periods of bradycardia in to the 50s. Labs revealed elevated WBC (13.82) w/ stable Hb, along w/ JEEVAN w/ HAGMA (AG 23), worsening Transaminitis from last admission Trop 0.93, BNP 43k and lactate 9.8. CXR unremarkable.     She is being re-admitted to the MICU for septic shock. in MICU, on levophed 0.065 (2020 00:11)    FAMILY HISTORY:  No pertinent family history in first degree relatives      PAST MEDICAL & SURGICAL HISTORY:  Dextrocardia    Chronic midline low back pain with bilateral sciatica    Peripheral neuralgia    Essential hypertension    Diabetes 1.5, managed as type 2    Charcot&#x27;s arthropathy  R foot    Charcot foot due to diabetes mellitus    H/O shoulder surgery  Right shoulder surgery     delivery delivered          VITALS:  T(F): 98.5, Max: 98.5 (20 @ 08:00)  HR: 68  BP: 100/80  RR: 15Vital Signs Last 24 Hrs  T(C): 36.9 (2020 08:00), Max: 36.9 (2020 08:00)  T(F): 98.5 (2020 08:00), Max: 98.5 (2020 08:00)  HR: 68 (2020 09:00) (64 - 70)  BP: 100/80 (2020 09:00) (73/48 - 146/64)  BP(mean): 93 (2020 09:00) (53 - 114)  RR: 15 (2020 09:00) (7 - 18)  SpO2: 76% (2020 09:00) (76% - 99%)    TESTS & MEASUREMENTS:                        9.2    8.67  )-----------( 247      ( 2020 05:06 )             31.0     11    129<L>  |  97<L>  |  30<H>  ----------------------------<  84  4.8   |  19  |  3.3<H>    Ca    7.8<L>      2020 05:06  Phos  5.2       Mg     2.5         TPro  5.9<L>  /  Alb  2.8<L>  /  TBili  0.9  /  DBili  x   /  AST  100<H>  /  ALT  57<H>  /  AlkPhos  128<H>      LIVER FUNCTIONS - ( 2020 05:06 )  Alb: 2.8 g/dL / Pro: 5.9 g/dL / ALK PHOS: 128 U/L / ALT: 57 U/L / AST: 100 U/L / GGT: x             Culture - Urine (collected 20 @ 18:00)  Source: .Urine Clean Catch (Midstream)  Final Report (20 @ 17:11):    No growth    Culture - Blood (collected 20 @ 16:42)  Source: .Blood Blood-Peripheral  Preliminary Report (20 @ 23:01):    No growth to date.    Culture - Blood (collected 20 @ 16:42)  Source: .Blood Blood-Peripheral  Preliminary Report (20 @ 23:01):    No growth to date.    Culture - Urine (collected 10-30-20 @ 04:50)  Source: .Urine Clean Catch (Midstream)  Final Report (10-31-20 @ 11:30):    No growth      Urinalysis Basic - ( 2020 18:00 )    Color: Jumana / Appearance: Slightly Turbid / S.025 / pH: x  Gluc: x / Ketone: Trace  / Bili: Negative / Urobili: <2 mg/dL   Blood: x / Protein: 30 mg/dL / Nitrite: Negative   Leuk Esterase: Small / RBC: 6 /HPF / WBC 6 /HPF   Sq Epi: x / Non Sq Epi: 1 /HPF / Bacteria: Negative          RADIOLOGY & ADDITIONAL TESTS:    ANTIBIOTICS:  meropenem  IVPB 500 milliGRAM(s) IV Intermittent every 12 hours  meropenem  IVPB           CALLY HARTMAN  55y, Female  Allergy: No Known Allergies      HPI:  53 y/o female active smoker with PMHx of Type II DM, HFpEF (EF of 72 %), CAD, hyperlipidemia, UGIB 2/2 duodenal ulcer, HTN, Hypothyroidism, COPD, Dextrocardia, Sciatica/Peripheral Neuralgia, and Charcot foot deformity s/p reconstruction on 19 presents to the ED for worsening lethargy. The patient has had 2 recent admisions for sepsis recently and left AMA from the hospital on 10/31/20. This presentation similar to how she presented last time. On her last admission back in , patient was admitted and treated for sepsis secondary to acute pyelonephritis. Currently she endorses cough and purulent sputum.    In the ED patient HoTN (72/33mmHg) and hypothermic (35.3 C) on initial encounter and developed periods of bradycardia in to the 50s. Labs revealed elevated WBC (13.82) w/ stable Hb, along w/ JEEVAN w/ HAGMA (AG 23), worsening Transaminitis from last admission Trop 0.93, BNP 43k and lactate 9.8. CXR unremarkable.     She is being re-admitted to the MICU for septic shock. in MICU, on pressors (2020 00:11)    FAMILY HISTORY:  No pertinent family history in first degree relatives      PAST MEDICAL & SURGICAL HISTORY:  Dextrocardia    Chronic midline low back pain with bilateral sciatica    Peripheral neuralgia    Essential hypertension    Diabetes 1.5, managed as type 2    Charcot&#x27;s arthropathy  R foot    Charcot foot due to diabetes mellitus    H/O shoulder surgery  Right shoulder surgery     delivery delivered          VITALS:  T(F): 98.5, Max: 98.5 (20 @ 08:00)  HR: 68  BP: 100/80  RR: 15Vital Signs Last 24 Hrs  T(C): 36.9 (2020 08:00), Max: 36.9 (2020 08:00)  T(F): 98.5 (2020 08:00), Max: 98.5 (2020 08:00)  HR: 68 (2020 09:00) (64 - 70)  BP: 100/80 (2020 09:00) (73/48 - 146/64)  BP(mean): 93 (2020 09:00) (53 - 114)  RR: 15 (2020 09:00) (7 - 18)  SpO2: 76% (2020 09:00) (76% - 99%)    TESTS & MEASUREMENTS:                        9.2    8.67  )-----------( 247      ( 2020 05:06 )             31.0         129<L>  |  97<L>  |  30<H>  ----------------------------<  84  4.8   |  19  |  3.3<H>    Anion Gap, Serum: 13 mmol/L (20 @ 05:06)   Lactate, Blood: 1.7 mmol/L   Ca    7.8<L>      2020 05:06  Phos  5.2       Mg     2.5         TPro  5.9<L>  /  Alb  2.8<L>  /  TBili  0.9  /  DBili  x   /  AST  100<H>  /  ALT  57<H>  /  AlkPhos  128<H>      LIVER FUNCTIONS - ( 2020 05:06 )  Alb: 2.8 g/dL / Pro: 5.9 g/dL / ALK PHOS: 128 U/L / ALT: 57 U/L / AST: 100 U/L / GGT: x           Hepatic Function Panel in AM (20 @ 04:30)   Indirect Reacting Bilirubin: 0.3 mg/dL   Protein Total, Serum: 6.4 g/dL   Albumin, Serum: 3.0 g/dL   Bilirubin Total, Serum: 1.1 mg/dL   Bilirubin Direct, Serum: 0.8 mg/dL   Alkaline Phosphatase, Serum: 140 U/L   Aspartate Aminotransferase (AST/SGOT): 183 U/L   Alanine Aminotransferase (ALT/SGPT): 80 U/L   PT/INR - ( 2020 04:30 )   PT: 28.40 sec;   INR: 2.47 ratio    PTT - ( 2020 04:30 )  PTT:42.7 sec      Culture - Urine (collected 20 @ 18:00)  Source: .Urine Clean Catch (Midstream)  Final Report (20 @ 17:11):    No growth    Culture - Blood (collected 20 @ 16:42)  Source: .Blood Blood-Peripheral  Preliminary Report (20 @ 23:01):    No growth to date.    Culture - Blood (collected 20 @ 16:42)`  Source: .Blood Blood-Peripheral  Preliminary Report (20 @ 23:01):    No growth to date.    Culture - Urine (collected 10-30-20 @ 04:50)  Source: .Urine Clean Catch (Midstream)  Final Report (10-31-20 @ 11:30):    No growth      Urinalysis Basic - ( 2020 18:00 )    Color: Jumana / Appearance: Slightly Turbid / S.025 / pH: x  Gluc: x / Ketone: Trace  / Bili: Negative / Urobili: <2 mg/dL   Blood: x / Protein: 30 mg/dL / Nitrite: Negative   Leuk Esterase: Small / RBC: 6 /HPF / WBC 6 /HPF   Sq Epi: x / Non Sq Epi: 1 /HPF / Bacteria: Negative    RADIOLOGY & ADDITIONAL TESTS:      < from: Xray Chest 1 View AP/PA (20 @ 05:10) >  Impression:    Bibasilar atelectasis, which is stable.    Neck catheter in the persistent left SVC, better evaluated on recent CT.    < end of copied text >    < from: CT Chest No Cont (20 @ 01:36) >  1. Diffuse bilateral patchy groundglass opacities, similar to 2019, which may represent pulmonary edema in the appropriate clinical setting. Correlate for possible superimposed infectious or inflammatory process.    < end of copied text >    ANTIBIOTICS:    meropenem  IVPB 500 milliGRAM(s) IV Intermittent every 12 hours    Physical exam:    GENERAL: NAD, lying in bed comfortably  CHEST/LUNG: decreased breath sounds on the right. Unlabored breathing  HEART: Regular rate and rhythm; No murmurs, rubs, or gallops  ABDOMEN: Bowel sounds present; Soft, Nontender, Nondistended. No hepatomegally  EXTREMITIES:  2+ Peripheral Pulses, brisk capillary refill. No clubbing, cyanosis, or edema  NERVOUS SYSTEM:  Alert & Oriented X3, speech clear. No deficits   MSK: FROM all 4 extremities, full and equal strength  SKIN: No rashes or lesions       CALLY HARTMAN  55y, Female  Allergy: No Known Allergies      HPI:  55 y/o female active smoker with PMHx of Type II DM, HFpEF (EF of 72 %), CAD, hyperlipidemia, UGIB 2/2 duodenal ulcer, HTN, Hypothyroidism, COPD, Dextrocardia, Sciatica/Peripheral Neuralgia, and Charcot foot deformity s/p reconstruction on 19 presents to the ED for worsening lethargy. The patient has had 2 recent admisions for sepsis recently and left AMA from the hospital on 10/31/20. This presentation similar to how she presented last time. On her last admission back in , patient was admitted and treated for sepsis secondary to acute pyelonephritis. Currently she endorses cough and purulent sputum.    In the ED patient HoTN (72/33mmHg) and hypothermic (35.3 C) on initial encounter and developed periods of bradycardia in to the 50s. Labs revealed elevated WBC (13.82) w/ stable Hb, along w/ JEEVAN w/ HAGMA (AG 23), worsening Transaminitis from last admission Trop 0.93, BNP 43k and lactate 9.8. CXR unremarkable.     She is being re-admitted to the MICU for septic shock. in MICU, on pressors (2020 00:11)    FAMILY HISTORY:  No pertinent family history in first degree relatives      PAST MEDICAL & SURGICAL HISTORY:  Dextrocardia    Chronic midline low back pain with bilateral sciatica    Peripheral neuralgia    Essential hypertension    Diabetes 1.5, managed as type 2    Charcot&#x27;s arthropathy  R foot    Charcot foot due to diabetes mellitus    H/O shoulder surgery  Right shoulder surgery     delivery delivered          VITALS:  T(F): 98.5, Max: 98.5 (20 @ 08:00)  HR: 68  BP: 100/80  RR: 15Vital Signs Last 24 Hrs  T(C): 36.9 (2020 08:00), Max: 36.9 (2020 08:00)  T(F): 98.5 (2020 08:00), Max: 98.5 (2020 08:00)  HR: 68 (2020 09:00) (64 - 70)  BP: 100/80 (2020 09:00) (73/48 - 146/64)  BP(mean): 93 (2020 09:00) (53 - 114)  RR: 15 (2020 09:00) (7 - 18)  SpO2: 76% (2020 09:00) (76% - 99%)    TESTS & MEASUREMENTS:                        9.2    8.67  )-----------( 247      ( 2020 05:06 )             31.0         129<L>  |  97<L>  |  30<H>  ----------------------------<  84  4.8   |  19  |  3.3<H>    Anion Gap, Serum: 13 mmol/L (20 @ 05:06)   Lactate, Blood: 1.7 mmol/L   Ca    7.8<L>      2020 05:06  Phos  5.2       Mg     2.5         TPro  5.9<L>  /  Alb  2.8<L>  /  TBili  0.9  /  DBili  x   /  AST  100<H>  /  ALT  57<H>  /  AlkPhos  128<H>      LIVER FUNCTIONS - ( 2020 05:06 )  Alb: 2.8 g/dL / Pro: 5.9 g/dL / ALK PHOS: 128 U/L / ALT: 57 U/L / AST: 100 U/L / GGT: x           Hepatic Function Panel in AM (20 @ 04:30)   Indirect Reacting Bilirubin: 0.3 mg/dL   Protein Total, Serum: 6.4 g/dL   Albumin, Serum: 3.0 g/dL   Bilirubin Total, Serum: 1.1 mg/dL   Bilirubin Direct, Serum: 0.8 mg/dL   Alkaline Phosphatase, Serum: 140 U/L   Aspartate Aminotransferase (AST/SGOT): 183 U/L   Alanine Aminotransferase (ALT/SGPT): 80 U/L   PT/INR - ( 2020 04:30 )   PT: 28.40 sec;   INR: 2.47 ratio    PTT - ( 2020 04:30 )  PTT:42.7 sec      Culture - Urine (collected 20 @ 18:00)  Source: .Urine Clean Catch (Midstream)  Final Report (20 @ 17:11):    No growth    Culture - Blood (collected 20 @ 16:42)  Source: .Blood Blood-Peripheral  Preliminary Report (20 @ 23:01):    No growth to date.    Culture - Blood (collected 20 @ 16:42)`  Source: .Blood Blood-Peripheral  Preliminary Report (20 @ 23:01):    No growth to date.    Culture - Urine (collected 10-30-20 @ 04:50)  Source: .Urine Clean Catch (Midstream)  Final Report (10-31-20 @ 11:30):    No growth      Urinalysis Basic - ( 2020 18:00 )    Color: Jumana / Appearance: Slightly Turbid / S.025 / pH: x  Gluc: x / Ketone: Trace  / Bili: Negative / Urobili: <2 mg/dL   Blood: x / Protein: 30 mg/dL / Nitrite: Negative   Leuk Esterase: Small / RBC: 6 /HPF / WBC 6 /HPF   Sq Epi: x / Non Sq Epi: 1 /HPF / Bacteria: Negative    RADIOLOGY & ADDITIONAL TESTS:      < from: Xray Chest 1 View AP/PA (20 @ 05:10) >  Impression:    Bibasilar atelectasis, which is stable.    Neck catheter in the persistent left SVC, better evaluated on recent CT.    < end of copied text >    < from: CT Chest No Cont (20 @ 01:36) >  1. Diffuse bilateral patchy groundglass opacities, similar to 2019, which may represent pulmonary edema in the appropriate clinical setting. Correlate for possible superimposed infectious or inflammatory process.    < end of copied text >    ANTIBIOTICS:    meropenem  IVPB 500 milliGRAM(s) IV Intermittent every 12 hours    Physical exam:    GENERAL: lethargy  CHEST/LUNG: decreased breath sounds on the right. Unlabored breathing  HEART: Regular rate and rhythm; No murmurs, rubs, or gallops  ABDOMEN: Bowel sounds present; Soft, Nontender, Nondistended. No hepatomegally  EXTREMITIES:  2+ Peripheral Pulses, brisk capillary refill. No clubbing, cyanosis, or edema  NERVOUS SYSTEM:  Alert & Oriented X3, speech clear. No deficits   MSK: FROM all 4 extremities, full and equal strength  SKIN: No rashes or lesions

## 2020-11-05 NOTE — PROGRESS NOTE ADULT - SUBJECTIVE AND OBJECTIVE BOX
INTERVAL HISTORY: No overnight events.  	  MEDICATIONS:  ALBUTerol    90 MICROgram(s) HFA Inhaler 2 Puff(s) Inhalation every 6 hours  aspirin  chewable 81 milliGRAM(s) Oral daily  budesonide  80 MICROgram(s)/formoterol 4.5 MICROgram(s) Inhaler 2 Puff(s) Inhalation two times a day  chlorhexidine 4% Liquid 1 Application(s) Topical <User Schedule>  clopidogrel Tablet 75 milliGRAM(s) Oral daily  influenza   Vaccine 0.5 milliLiter(s) IntraMuscular once  levothyroxine 100 MICROGram(s) Oral daily  meropenem  IVPB 750 milliGRAM(s) IV Intermittent every 24 hours  norepinephrine Infusion 0.05 MICROgram(s)/kG/Min IV Continuous <Continuous>  pantoprazole  Injectable 40 milliGRAM(s) IV Push two times a day  senna 2 Tablet(s) Oral at bedtime  sodium bicarbonate 1300 milliGRAM(s) Oral three times a day  sucralfate 1 Gram(s) Oral four times a day      REVIEW OF SYSTEMS:  CONSTITUTIONAL: No fever, weight loss, or fatigue  NECK: No pain or stiffness  RESPIRATORY: No cough, wheezing, chills or hemoptysis; No shortness of breath  CARDIOVASCULAR: No chest pain, palpitations, dizziness, or leg swelling  GASTROINTESTINAL: No abdominal or epigastric pain. No nausea, vomiting, or hematemesis; No diarrhea or constipation. No melena or hematochezia.  GENITOURINARY: No dysuria, frequency, hematuria, or incontinence  NEUROLOGICAL: No headaches, memory loss, loss of strength, numbness, or tremors  SKIN: No itching, burning, rashes, or lesions   ENDOCRINE: No heat or cold intolerance; No hair loss  MUSCULOSKELETAL: No joint pain or swelling; No muscle, back, or extremity pain  HEME/LYMPH: No easy bruising, or bleeding gums    PHYSICAL EXAM:  T(C): 37.2 (11-05-20 @ 18:00), Max: 37.2 (11-05-20 @ 18:00)  HR: 80 (11-05-20 @ 18:15) (64 - 82)  BP: 90/49 (11-05-20 @ 18:15) (81/46 - 146/64)  RR: 13 (11-05-20 @ 18:15) (7 - 21)  SpO2: 90% (11-05-20 @ 18:15) (76% - 99%)  Wt(kg): --  I&O's Summary    04 Nov 2020 07:01  -  05 Nov 2020 07:00  --------------------------------------------------------  IN: 740.1 mL / OUT: 770 mL / NET: -29.9 mL    05 Nov 2020 07:01  -  05 Nov 2020 20:03  --------------------------------------------------------  IN: 76.8 mL / OUT: 1500 mL / NET: -1423.2 mL          GENERAL: NAD, well-groomed, well-developed  HEAD:  Atraumatic, Normocephalic  NECK: Supple, No JVD, Normal thyroid  NERVOUS SYSTEM:  Alert & Oriented X3, Good concentration  CHEST/LUNG: Clear to percussion bilaterally; No rales, rhonchi, wheezing, or rubs  HEART: Regular rate and rhythm; No murmurs, rubs, or gallops  ABDOMEN: Soft, Nontender, Nondistended; Bowel sounds present  EXTREMITIES:  2+ Peripheral Pulses, No clubbing, cyanosis, or edema  SKIN: No rashes or lesions    LABS:	 	    CARDIAC MARKERS ( 05 Nov 2020 11:00 )  x     / 0.72 ng/mL / x     / x     / x      CARDIAC MARKERS ( 04 Nov 2020 04:30 )  x     / 0.88 ng/mL / 915 U/L / x     / 28.4 ng/mL  CARDIAC MARKERS ( 04 Nov 2020 00:00 )  x     / 1.05 ng/mL / 1171 U/L / x     / 33.0 ng/mL                            9.2    8.67  )-----------( 247      ( 05 Nov 2020 05:06 )             31.0     11-05    129<L>  |  97<L>  |  30<H>  ----------------------------<  84  4.8   |  19  |  3.3<H>    Ca    7.8<L>      05 Nov 2020 05:06  Phos  5.2     11-05  Mg     2.5     11-05    TPro  5.9<L>  /  Alb  2.8<L>  /  TBili  0.9  /  DBili  x   /  AST  100<H>  /  ALT  57<H>  /  AlkPhos  128<H>  11-05    proBNP:   Lipid Profile:

## 2020-11-06 DIAGNOSIS — J44.9 CHRONIC OBSTRUCTIVE PULMONARY DISEASE, UNSPECIFIED: ICD-10-CM

## 2020-11-06 DIAGNOSIS — I25.2 OLD MYOCARDIAL INFARCTION: ICD-10-CM

## 2020-11-06 DIAGNOSIS — A41.9 SEPSIS, UNSPECIFIED ORGANISM: ICD-10-CM

## 2020-11-06 DIAGNOSIS — I27.20 PULMONARY HYPERTENSION, UNSPECIFIED: ICD-10-CM

## 2020-11-06 DIAGNOSIS — K92.1 MELENA: ICD-10-CM

## 2020-11-06 DIAGNOSIS — R74.01 ELEVATION OF LEVELS OF LIVER TRANSAMINASE LEVELS: ICD-10-CM

## 2020-11-06 DIAGNOSIS — E78.5 HYPERLIPIDEMIA, UNSPECIFIED: ICD-10-CM

## 2020-11-06 DIAGNOSIS — E03.9 HYPOTHYROIDISM, UNSPECIFIED: ICD-10-CM

## 2020-11-06 DIAGNOSIS — I50.32 CHRONIC DIASTOLIC (CONGESTIVE) HEART FAILURE: ICD-10-CM

## 2020-11-06 DIAGNOSIS — Z90.49 ACQUIRED ABSENCE OF OTHER SPECIFIED PARTS OF DIGESTIVE TRACT: ICD-10-CM

## 2020-11-06 DIAGNOSIS — Z79.84 LONG TERM (CURRENT) USE OF ORAL HYPOGLYCEMIC DRUGS: ICD-10-CM

## 2020-11-06 DIAGNOSIS — R71.0 PRECIPITOUS DROP IN HEMATOCRIT: ICD-10-CM

## 2020-11-06 DIAGNOSIS — R65.21 SEVERE SEPSIS WITH SEPTIC SHOCK: ICD-10-CM

## 2020-11-06 DIAGNOSIS — Q24.0 DEXTROCARDIA: ICD-10-CM

## 2020-11-06 DIAGNOSIS — F17.210 NICOTINE DEPENDENCE, CIGARETTES, UNCOMPLICATED: ICD-10-CM

## 2020-11-06 DIAGNOSIS — I25.10 ATHEROSCLEROTIC HEART DISEASE OF NATIVE CORONARY ARTERY WITHOUT ANGINA PECTORIS: ICD-10-CM

## 2020-11-06 DIAGNOSIS — I21.4 NON-ST ELEVATION (NSTEMI) MYOCARDIAL INFARCTION: ICD-10-CM

## 2020-11-06 DIAGNOSIS — G92 TOXIC ENCEPHALOPATHY: ICD-10-CM

## 2020-11-06 DIAGNOSIS — E87.1 HYPO-OSMOLALITY AND HYPONATREMIA: ICD-10-CM

## 2020-11-06 DIAGNOSIS — E13.42 OTHER SPECIFIED DIABETES MELLITUS WITH DIABETIC POLYNEUROPATHY: ICD-10-CM

## 2020-11-06 DIAGNOSIS — E86.1 HYPOVOLEMIA: ICD-10-CM

## 2020-11-06 DIAGNOSIS — I11.0 HYPERTENSIVE HEART DISEASE WITH HEART FAILURE: ICD-10-CM

## 2020-11-06 DIAGNOSIS — Z79.82 LONG TERM (CURRENT) USE OF ASPIRIN: ICD-10-CM

## 2020-11-06 DIAGNOSIS — N10 ACUTE PYELONEPHRITIS: ICD-10-CM

## 2020-11-06 DIAGNOSIS — N17.9 ACUTE KIDNEY FAILURE, UNSPECIFIED: ICD-10-CM

## 2020-11-06 DIAGNOSIS — I82.0 BUDD-CHIARI SYNDROME: ICD-10-CM

## 2020-11-06 DIAGNOSIS — R44.1 VISUAL HALLUCINATIONS: ICD-10-CM

## 2020-11-06 DIAGNOSIS — G89.29 OTHER CHRONIC PAIN: ICD-10-CM

## 2020-11-06 LAB
ALBUMIN SERPL ELPH-MCNC: 3.3 G/DL — LOW (ref 3.5–5.2)
ALP SERPL-CCNC: 127 U/L — HIGH (ref 30–115)
ALT FLD-CCNC: 49 U/L — HIGH (ref 0–41)
ANION GAP SERPL CALC-SCNC: 17 MMOL/L — HIGH (ref 7–14)
APTT BLD: 31.3 SEC — SIGNIFICANT CHANGE UP (ref 27–39.2)
AST SERPL-CCNC: 64 U/L — HIGH (ref 0–41)
BASOPHILS # BLD AUTO: 0.04 K/UL — SIGNIFICANT CHANGE UP (ref 0–0.2)
BASOPHILS NFR BLD AUTO: 0.7 % — SIGNIFICANT CHANGE UP (ref 0–1)
BILIRUB SERPL-MCNC: 1.1 MG/DL — SIGNIFICANT CHANGE UP (ref 0.2–1.2)
BUN SERPL-MCNC: 24 MG/DL — HIGH (ref 10–20)
C3 SERPL-MCNC: 70 MG/DL — LOW (ref 81–157)
C4 SERPL-MCNC: 25 MG/DL — SIGNIFICANT CHANGE UP (ref 13–39)
CALCIUM SERPL-MCNC: 8.4 MG/DL — LOW (ref 8.5–10.1)
CHLORIDE SERPL-SCNC: 104 MMOL/L — SIGNIFICANT CHANGE UP (ref 98–110)
CO2 SERPL-SCNC: 18 MMOL/L — SIGNIFICANT CHANGE UP (ref 17–32)
CREAT SERPL-MCNC: 1.4 MG/DL — SIGNIFICANT CHANGE UP (ref 0.7–1.5)
EOSINOPHIL # BLD AUTO: 0.29 K/UL — SIGNIFICANT CHANGE UP (ref 0–0.7)
EOSINOPHIL NFR BLD AUTO: 5.1 % — SIGNIFICANT CHANGE UP (ref 0–8)
GLUCOSE BLDC GLUCOMTR-MCNC: 109 MG/DL — HIGH (ref 70–99)
GLUCOSE BLDC GLUCOMTR-MCNC: 118 MG/DL — HIGH (ref 70–99)
GLUCOSE BLDC GLUCOMTR-MCNC: 122 MG/DL — HIGH (ref 70–99)
GLUCOSE BLDC GLUCOMTR-MCNC: 124 MG/DL — HIGH (ref 70–99)
GLUCOSE BLDC GLUCOMTR-MCNC: 131 MG/DL — HIGH (ref 70–99)
GLUCOSE BLDC GLUCOMTR-MCNC: 171 MG/DL — HIGH (ref 70–99)
GLUCOSE SERPL-MCNC: 105 MG/DL — HIGH (ref 70–99)
HCT VFR BLD CALC: 31.3 % — LOW (ref 37–47)
HGB BLD-MCNC: 9.5 G/DL — LOW (ref 12–16)
IMM GRANULOCYTES NFR BLD AUTO: 0.7 % — HIGH (ref 0.1–0.3)
INR BLD: 1.38 RATIO — HIGH (ref 0.65–1.3)
LYMPHOCYTES # BLD AUTO: 0.49 K/UL — LOW (ref 1.2–3.4)
LYMPHOCYTES # BLD AUTO: 8.6 % — LOW (ref 20.5–51.1)
MAGNESIUM SERPL-MCNC: 1.8 MG/DL — SIGNIFICANT CHANGE UP (ref 1.8–2.4)
MCHC RBC-ENTMCNC: 27 PG — SIGNIFICANT CHANGE UP (ref 27–31)
MCHC RBC-ENTMCNC: 30.4 G/DL — LOW (ref 32–37)
MCV RBC AUTO: 88.9 FL — SIGNIFICANT CHANGE UP (ref 81–99)
MONOCYTES # BLD AUTO: 0.64 K/UL — HIGH (ref 0.1–0.6)
MONOCYTES NFR BLD AUTO: 11.2 % — HIGH (ref 1.7–9.3)
NEUTROPHILS # BLD AUTO: 4.21 K/UL — SIGNIFICANT CHANGE UP (ref 1.4–6.5)
NEUTROPHILS NFR BLD AUTO: 73.7 % — SIGNIFICANT CHANGE UP (ref 42.2–75.2)
NRBC # BLD: 0 /100 WBCS — SIGNIFICANT CHANGE UP (ref 0–0)
PLATELET # BLD AUTO: 203 K/UL — SIGNIFICANT CHANGE UP (ref 130–400)
POTASSIUM SERPL-MCNC: 4.3 MMOL/L — SIGNIFICANT CHANGE UP (ref 3.5–5)
POTASSIUM SERPL-SCNC: 4.3 MMOL/L — SIGNIFICANT CHANGE UP (ref 3.5–5)
PROT SERPL-MCNC: 6.5 G/DL — SIGNIFICANT CHANGE UP (ref 6–8)
PROTHROM AB SERPL-ACNC: 15.9 SEC — HIGH (ref 9.95–12.87)
RBC # BLD: 3.52 M/UL — LOW (ref 4.2–5.4)
RBC # FLD: 20.7 % — HIGH (ref 11.5–14.5)
SODIUM SERPL-SCNC: 139 MMOL/L — SIGNIFICANT CHANGE UP (ref 135–146)
WBC # BLD: 5.71 K/UL — SIGNIFICANT CHANGE UP (ref 4.8–10.8)
WBC # FLD AUTO: 5.71 K/UL — SIGNIFICANT CHANGE UP (ref 4.8–10.8)

## 2020-11-06 PROCEDURE — 76775 US EXAM ABDO BACK WALL LIM: CPT | Mod: 26

## 2020-11-06 RX ORDER — SODIUM CHLORIDE 9 MG/ML
1000 INJECTION INTRAMUSCULAR; INTRAVENOUS; SUBCUTANEOUS
Refills: 0 | Status: DISCONTINUED | OUTPATIENT
Start: 2020-11-06 | End: 2020-11-07

## 2020-11-06 RX ORDER — ACETAMINOPHEN 500 MG
650 TABLET ORAL EVERY 6 HOURS
Refills: 0 | Status: DISCONTINUED | OUTPATIENT
Start: 2020-11-06 | End: 2020-11-19

## 2020-11-06 RX ADMIN — MEROPENEM 100 MILLIGRAM(S): 1 INJECTION INTRAVENOUS at 06:32

## 2020-11-06 RX ADMIN — SODIUM CHLORIDE 75 MILLILITER(S): 9 INJECTION INTRAMUSCULAR; INTRAVENOUS; SUBCUTANEOUS at 12:04

## 2020-11-06 RX ADMIN — Medication 81 MILLIGRAM(S): at 11:33

## 2020-11-06 RX ADMIN — BUDESONIDE AND FORMOTEROL FUMARATE DIHYDRATE 2 PUFF(S): 160; 4.5 AEROSOL RESPIRATORY (INHALATION) at 23:00

## 2020-11-06 RX ADMIN — PANTOPRAZOLE SODIUM 40 MILLIGRAM(S): 20 TABLET, DELAYED RELEASE ORAL at 06:33

## 2020-11-06 RX ADMIN — Medication 1300 MILLIGRAM(S): at 06:33

## 2020-11-06 RX ADMIN — Medication 1300 MILLIGRAM(S): at 22:16

## 2020-11-06 RX ADMIN — Medication 1 GRAM(S): at 11:33

## 2020-11-06 RX ADMIN — CLOPIDOGREL BISULFATE 75 MILLIGRAM(S): 75 TABLET, FILM COATED ORAL at 11:33

## 2020-11-06 RX ADMIN — Medication 650 MILLIGRAM(S): at 14:35

## 2020-11-06 RX ADMIN — Medication 1 GRAM(S): at 06:33

## 2020-11-06 RX ADMIN — Medication 1300 MILLIGRAM(S): at 13:12

## 2020-11-06 RX ADMIN — PANTOPRAZOLE SODIUM 40 MILLIGRAM(S): 20 TABLET, DELAYED RELEASE ORAL at 17:50

## 2020-11-06 RX ADMIN — Medication 1 GRAM(S): at 17:49

## 2020-11-06 RX ADMIN — Medication 100 MICROGRAM(S): at 06:33

## 2020-11-06 RX ADMIN — Medication 1 GRAM(S): at 23:22

## 2020-11-06 NOTE — PROGRESS NOTE ADULT - SUBJECTIVE AND OBJECTIVE BOX
Nephrology progress note    Patient is seen and examined, events over the last 24 h noted .  Comfortable in bed  Allergies:  No Known Allergies    Hospital Medications:   MEDICATIONS  (STANDING):  ALBUTerol    90 MICROgram(s) HFA Inhaler 2 Puff(s) Inhalation every 6 hours  aspirin  chewable 81 milliGRAM(s) Oral daily  budesonide  80 MICROgram(s)/formoterol 4.5 MICROgram(s) Inhaler 2 Puff(s) Inhalation two times a day  chlorhexidine 4% Liquid 1 Application(s) Topical <User Schedule>  clopidogrel Tablet 75 milliGRAM(s) Oral daily  influenza   Vaccine 0.5 milliLiter(s) IntraMuscular once  levothyroxine 100 MICROGram(s) Oral daily  meropenem  IVPB 750 milliGRAM(s) IV Intermittent every 24 hours  norepinephrine Infusion 0.05 MICROgram(s)/kG/Min (5.63 mL/Hr) IV Continuous <Continuous>  pantoprazole  Injectable 40 milliGRAM(s) IV Push two times a day  senna 2 Tablet(s) Oral at bedtime  sodium bicarbonate 1300 milliGRAM(s) Oral three times a day  sodium chloride 0.9%. 1000 milliLiter(s) (75 mL/Hr) IV Continuous <Continuous>  sucralfate 1 Gram(s) Oral four times a day        VITALS:  T(F): 96.7 (20 @ 07:50), Max: 98.9 (20 @ 18:00)  HR: 89 (20 @ 09:39)  BP: 156/65 (20 @ 09:39)  RR: 20 (20 @ 09:39)  SpO2: 96% (20 @ 09:39)  Wt(kg): --     07:01  -   @ 07:00  --------------------------------------------------------  IN: 740.1 mL / OUT: 770 mL / NET: -29.9 mL     07:01  -   @ 07:00  --------------------------------------------------------  IN: 147.2 mL / OUT: 2800 mL / NET: -2652.8 mL     @ 07:01  -   @ 11:06  --------------------------------------------------------  IN: 14.4 mL / OUT: 0 mL / NET: 14.4 mL          PHYSICAL EXAM:  Constitutional: NAD  HEENT: anicteric sclera, oropharynx clear, MMM  Neck: No JVD  Respiratory: CTAB, no wheezes, rales or rhonchi  Cardiovascular: S1, S2, RRR  Gastrointestinal: BS+, soft, NT/ND  Extremities: No peripheral edema  Neurological: Awake alert  : No CVA tenderness. No salinas.   Skin: No rashes  Vascular Access:    LABS:      139  |  104  |  24<H>  ----------------------------<  105<H>  4.3   |  18  |  1.4  Creatinine Trend: 1.4<--, 3.3<--, 3.7<--, 3.7<--, 3.7<--, 3.8<--    Ca    8.4<L>      2020 07:36  Phos  5.2       Mg     1.8         TPro  6.5  /  Alb  3.3<L>  /  TBili  1.1  /  DBili      /  AST  64<H>  /  ALT  49<H>  /  AlkPhos  127<H>                            9.5    5.71  )-----------( 203      ( 2020 07:36 )             31.3       Urine Studies:  Urinalysis Basic - ( 2020 18:00 )    Color: Jumana / Appearance: Slightly Turbid / S.025 / pH:   Gluc:  / Ketone: Trace  / Bili: Negative / Urobili: <2 mg/dL   Blood:  / Protein: 30 mg/dL / Nitrite: Negative   Leuk Esterase: Small / RBC: 6 /HPF / WBC 6 /HPF   Sq Epi:  / Non Sq Epi: 1 /HPF / Bacteria: Negative      Sodium, Random Urine: <20.0 mmoL/L ( @ 00:30)  Creatinine, Random Urine: 128 mg/dL ( @ 00:30)  Protein/Creatinine Ratio Calculation: 1.4 Ratio ( @ 00:30)    RADIOLOGY & ADDITIONAL STUDIES:  < from: Xray Chest 1 View- PORTABLE-Routine (Xray Chest 1 View- PORTABLE-Routine in AM.) (20 @ 05:37) >  Impression:    Stable left base opacity    < end of copied text >

## 2020-11-06 NOTE — PROGRESS NOTE ADULT - SUBJECTIVE AND OBJECTIVE BOX
CALLY HARTMAN  55y, Female    All available historical data reviewed    OVERNIGHT EVENTS:  no fevers  feels well and has no complaints  no salinas  no BM    ROS:  General: Denies rigors, nightsweats  HEENT: Denies headache, rhinorrhea, sore throat, eye pain  CV: Denies CP, palpitations  PULM: Denies wheezing, hemoptysis  GI: Denies hematemesis, hematochezia, melena  : Denies discharge, hematuria  MSK: Denies arthralgias, myalgias  SKIN: Denies rash, lesions  NEURO: Denies paresthesias, weakness  PSYCH: Denies depression, anxiety    VITALS:  T(F): 96.7, Max: 98.9 (11-05-20 @ 18:00)  HR: 93  BP: 139/83  RR: 20Vital Signs Last 24 Hrs  T(C): 35.9 (06 Nov 2020 12:28), Max: 37.2 (05 Nov 2020 18:00)  T(F): 96.7 (06 Nov 2020 12:28), Max: 98.9 (05 Nov 2020 18:00)  HR: 93 (06 Nov 2020 13:00) (74 - 93)  BP: 139/83 (06 Nov 2020 13:00) (88/49 - 156/65)  BP(mean): 110 (06 Nov 2020 07:50) (63 - 110)  RR: 20 (06 Nov 2020 13:00) (11 - 20)  SpO2: 96% (06 Nov 2020 13:00) (86% - 98%)    TESTS & MEASUREMENTS:                        9.5    5.71  )-----------( 203      ( 06 Nov 2020 07:36 )             31.3     11-06    139  |  104  |  24<H>  ----------------------------<  105<H>  4.3   |  18  |  1.4    Ca    8.4<L>      06 Nov 2020 07:36  Phos  5.2     11-05  Mg     1.8     11-06    TPro  6.5  /  Alb  3.3<L>  /  TBili  1.1  /  DBili  x   /  AST  64<H>  /  ALT  49<H>  /  AlkPhos  127<H>  11-06    LIVER FUNCTIONS - ( 06 Nov 2020 07:36 )  Alb: 3.3 g/dL / Pro: 6.5 g/dL / ALK PHOS: 127 U/L / ALT: 49 U/L / AST: 64 U/L / GGT: x             Culture - Urine (collected 11-03-20 @ 18:00)  Source: .Urine Clean Catch (Midstream)  Final Report (11-04-20 @ 17:11):    No growth    Culture - Blood (collected 11-03-20 @ 16:42)  Source: .Blood Blood-Peripheral  Preliminary Report (11-04-20 @ 23:01):    No growth to date.    Culture - Blood (collected 11-03-20 @ 16:42)  Source: .Blood Blood-Peripheral  Preliminary Report (11-04-20 @ 23:01):    No growth to date.            RADIOLOGY & ADDITIONAL TESTS:  Personal review of radiological diagnostics performed  Echo and EKG results noted when applicable.     MEDICATIONS:  acetaminophen   Tablet .. 650 milliGRAM(s) Oral every 6 hours PRN  ALBUTerol    90 MICROgram(s) HFA Inhaler 2 Puff(s) Inhalation every 6 hours  aspirin  chewable 81 milliGRAM(s) Oral daily  budesonide  80 MICROgram(s)/formoterol 4.5 MICROgram(s) Inhaler 2 Puff(s) Inhalation two times a day  chlorhexidine 4% Liquid 1 Application(s) Topical <User Schedule>  clopidogrel Tablet 75 milliGRAM(s) Oral daily  influenza   Vaccine 0.5 milliLiter(s) IntraMuscular once  levothyroxine 100 MICROGram(s) Oral daily  meropenem  IVPB 750 milliGRAM(s) IV Intermittent every 24 hours  norepinephrine Infusion 0.05 MICROgram(s)/kG/Min IV Continuous <Continuous>  pantoprazole  Injectable 40 milliGRAM(s) IV Push two times a day  senna 2 Tablet(s) Oral at bedtime  sodium bicarbonate 1300 milliGRAM(s) Oral three times a day  sodium chloride 0.9%. 1000 milliLiter(s) IV Continuous <Continuous>  sucralfate 1 Gram(s) Oral four times a day      ANTIBIOTICS:  meropenem  IVPB 750 milliGRAM(s) IV Intermittent every 24 hours

## 2020-11-06 NOTE — PROGRESS NOTE ADULT - ASSESSMENT
Impression:    Sepsis present on admission  hypotension led to hypoperfusion/ increase LA/ JEEVAN ( prerenal/ ATN) improving  Type2 NSTEMI  HFpEF (EF of 72 %)  HO CAD  UGIB 2/2 duodenal ulcer  HO COPD  Severe Pulm HTN      Plan     CNS: Avoid CNS depressants.     CVS: ASA/Plavix.  Wean Leovphed.  FU wiht cardiology.  Cardiology consult appreciated.  NS 75 cc per hour     PULMONARY: HOB @ 45 degrees.  Aspiration precautions.  c/w home Inhalers    INFECTIOUS DISEASE: UCx and BCx.  Merrem for now.  ID consult appreciated     GI: GI PPx w/ IV PPI q12.  Sucralfate.  po feeds    RENAL and acid base: Follow up serum and urine lytes.  Correct as needed.   FU with Renal.  Renal consult appreciated .    Endocrine: Follow up FS.  Insulin protocol if needed. cortisol level     Hematology: DVT PPx w/ SCD.  Active T&S. FU CBC     sdu    Code Status: Full

## 2020-11-06 NOTE — PROGRESS NOTE ADULT - SUBJECTIVE AND OBJECTIVE BOX
SUBJECTIVE:    Currently admitted to medicine for Sepsis    Today is hospital day 3    PAST MEDICAL & SURGICAL HISTORY  Dextrocardia    Chronic midline low back pain with bilateral sciatica    Peripheral neuralgia    Essential hypertension    Diabetes 1.5, managed as type 2    Charcot&#x27;s arthropathy  R foot    Charcot foot due to diabetes mellitus    H/O shoulder surgery  Right shoulder surgery     delivery delivered      MEDICATIONS:  STANDING MEDICATIONS  ALBUTerol    90 MICROgram(s) HFA Inhaler 2 Puff(s) Inhalation every 6 hours  aspirin  chewable 81 milliGRAM(s) Oral daily  budesonide  80 MICROgram(s)/formoterol 4.5 MICROgram(s) Inhaler 2 Puff(s) Inhalation two times a day  chlorhexidine 4% Liquid 1 Application(s) Topical <User Schedule>  clopidogrel Tablet 75 milliGRAM(s) Oral daily  influenza   Vaccine 0.5 milliLiter(s) IntraMuscular once  levothyroxine 100 MICROGram(s) Oral daily  meropenem  IVPB 750 milliGRAM(s) IV Intermittent every 24 hours  norepinephrine Infusion 0.05 MICROgram(s)/kG/Min IV Continuous <Continuous>  pantoprazole  Injectable 40 milliGRAM(s) IV Push two times a day  senna 2 Tablet(s) Oral at bedtime  sodium bicarbonate 1300 milliGRAM(s) Oral three times a day  sodium chloride 0.9%. 1000 milliLiter(s) IV Continuous <Continuous>  sucralfate 1 Gram(s) Oral four times a day      VITALS:   T(F): 96.7  HR: 89  BP: 156/65  RR: 20  SpO2: 96%    LABS:                        9.5    5.71  )-----------( 203      ( 2020 07:36 )             31.3     -    139  |  104  |  24<H>  ----------------------------<  105<H>  4.3   |  18  |  1.4    Ca    8.4<L>      2020 07:36  Phos  5.2     11-05  Mg     1.8         TPro  6.5  /  Alb  3.3<L>  /  TBili  1.1  /  DBili  x   /  AST  64<H>  /  ALT  49<H>  /  AlkPhos  127<H>      PT/INR - ( 2020 07:36 )   PT: 15.90 sec;   INR: 1.38 ratio         PTT - ( 2020 07:36 )  PTT:31.3 sec    Culture - Urine (collected 2020 18:00)  Source: .Urine Clean Catch (Midstream)  Final Report (2020 17:11):    No growth    Culture - Blood (collected 2020 16:42)  Source: .Blood Blood-Peripheral  Preliminary Report (2020 23:01):    No growth to date.    Culture - Blood (collected 2020 16:42)  Source: .Blood Blood-Peripheral  Preliminary Report (2020 23:01):    No growth to date.      CARDIAC MARKERS ( 2020 11:00 )  x     / 0.72 ng/mL / x     / x     / x          RADIOLOGY:    Xray Chest 1 View- PORTABLE-Routine (Xray Chest 1 View- PORTABLE-Routine in AM.) (20 @ 05:37) >  Stable left base opacity    PHYSICAL EXAM:  GEN: No acute distress  HEENT: on 2 liters NC saturating 95 %  LUNGS: breath sounds bilaterally   HEART: S1/S2 present. RRR.   ABD: Soft, non-tender, non-distended.   EXT: NC/NC/NE/2+PP/LING  NEURO: non focal     SUBJECTIVE:    Currently admitted to medicine for Sepsis    Today is hospital day 3.  She is off levophed, significant improvement in the Kidney function with creatinine improving to 1.4    PAST MEDICAL & SURGICAL HISTORY  Dextrocardia    Chronic midline low back pain with bilateral sciatica    Peripheral neuralgia    Essential hypertension    Diabetes 1.5, managed as type 2    Charcot&#x27;s arthropathy  R foot    Charcot foot due to diabetes mellitus    H/O shoulder surgery  Right shoulder surgery     delivery delivered      MEDICATIONS:  STANDING MEDICATIONS  ALBUTerol    90 MICROgram(s) HFA Inhaler 2 Puff(s) Inhalation every 6 hours  aspirin  chewable 81 milliGRAM(s) Oral daily  budesonide  80 MICROgram(s)/formoterol 4.5 MICROgram(s) Inhaler 2 Puff(s) Inhalation two times a day  chlorhexidine 4% Liquid 1 Application(s) Topical <User Schedule>  clopidogrel Tablet 75 milliGRAM(s) Oral daily  influenza   Vaccine 0.5 milliLiter(s) IntraMuscular once  levothyroxine 100 MICROGram(s) Oral daily  meropenem  IVPB 750 milliGRAM(s) IV Intermittent every 24 hours  norepinephrine Infusion 0.05 MICROgram(s)/kG/Min IV Continuous <Continuous>  pantoprazole  Injectable 40 milliGRAM(s) IV Push two times a day  senna 2 Tablet(s) Oral at bedtime  sodium bicarbonate 1300 milliGRAM(s) Oral three times a day  sodium chloride 0.9%. 1000 milliLiter(s) IV Continuous <Continuous>  sucralfate 1 Gram(s) Oral four times a day      VITALS:   T(F): 96.7  HR: 89  BP: 156/65  RR: 20  SpO2: 96%    LABS:                        9.5    5.71  )-----------( 203      ( 2020 07:36 )             31.3     11-    139  |  104  |  24<H>  ----------------------------<  105<H>  4.3   |  18  |  1.4    Ca    8.4<L>      2020 07:36  Phos  5.2     11-05  Mg     1.8     -    TPro  6.5  /  Alb  3.3<L>  /  TBili  1.1  /  DBili  x   /  AST  64<H>  /  ALT  49<H>  /  AlkPhos  127<H>  1106    PT/INR - ( 2020 07:36 )   PT: 15.90 sec;   INR: 1.38 ratio         PTT - ( 2020 07:36 )  PTT:31.3 sec    Culture - Urine (collected 2020 18:00)  Source: .Urine Clean Catch (Midstream)  Final Report (2020 17:11):    No growth    Culture - Blood (collected 2020 16:42)  Source: .Blood Blood-Peripheral  Preliminary Report (2020 23:01):    No growth to date.    Culture - Blood (collected 2020 16:42)  Source: .Blood Blood-Peripheral  Preliminary Report (2020 23:01):    No growth to date.      CARDIAC MARKERS ( 2020 11:00 )  x     / 0.72 ng/mL / x     / x     / x          RADIOLOGY:    Xray Chest 1 View- PORTABLE-Routine (Xray Chest 1 View- PORTABLE-Routine in AM.) (20 @ 05:37) >  Stable left base opacity    TTE Echo Complete w/o Contrast w/ Doppler (10.30. @ 09:39) >   1. LV Ejection Fraction by Jade's Method with a biplane EF of 62 %.   2. Normal left ventricular size and wall thicknesses, with normal systolic and diastolic function.   3. Moderately enlarged right ventricle.   4. Moderately reduced RV systolic function.   5. Mildly enlarged right atrium.   6. Normal left atrial size.   7. Moderate mitral annular calcification.   8. No evidence of mitral valve regurgitation.   9. Estimated pulmonary artery systolic pressure is 75.6 mmHg assuming a right atrial pressure of 15 mmHg, which is consistent with severe pulmonary hypertension.  10. Pulmonary hypertension is present.      PHYSICAL EXAM:  GEN: No acute distress  HEENT: on 2 liters NC saturating 95 %  LUNGS: breath sounds bilaterally   HEART: S1/S2 present. RRR.   ABD: Soft, non-tender, non-distended.   EXT: NC/NC/NE/2+PP/LING  NEURO: non focal

## 2020-11-06 NOTE — PROGRESS NOTE ADULT - ASSESSMENT
55 y/o female active smoker with PMHx of Type II DM, HFpEF (EF of 72 %), CAD, hyperlipidemia, UGIB 2/2 duodenal ulcer, HTN, Hypothyroidism, COPD, Dextrocardia, Sciatica/Peripheral Neuralgia, and Charcot foot deformity s/p reconstruction on 1/11/19 presents to the ED for worsening lethargy.   IMPRESSION;  Cryptogenic shock which has resolved  BCx /UDCx NG  CD NG  Doubt PNA etiology of shock with lactate acidemia as CXR/CT without significant consolidation  Would consider a  etiology.  JEEVAN with HAGMA likely secondary from hypotension and subsequent lactic acidosis- improving.   Transaminitis : clinically no acute cholangitis . Improving  WBC 5.7  RECOMMENDATIONS;  Meropenem 750 mg iv q24h for 5 days  recall prn please

## 2020-11-06 NOTE — PROGRESS NOTE ADULT - ASSESSMENT
55 y/o female active smoker with PMHx of Type II DM, HFpEF (EF of 72 %), CAD, hyperlipidemia, UGIB 2/2 duodenal ulcer, HTN, Hypothyroidism, COPD, Dextrocardia, Sciatica/Peripheral Neuralgia, and Charcot foot deformity s/p reconstruction on 1/11/19 presents to the ED for worsening lethargy. The patient has had 2 recent admisions for sepsis recently and left AMA from the hospital on 10/31/20. This presentation similar to how she presented last time. Endorses cough and purulent sputum. Patient HoTN (72/33mmHg) and hypothermic (35.3 C) on initial encounter and developed periods of bradycardia in to the 50s. Labs revealed elevated WBC w/ stable Hb, along w/ JEEVAN w/ HAGMA (AG 23), worsening Transaminitis from last admission Trop 0.93, BNP 43k and lactate 9.8. CXR unremarkable.   She was re-admitted to the MICU for septic shock. In MICU she was on levophed, saturating well on O2 via NC. She has been afebrile.     ASSESSMENT & PLAN:   # Sepsis present on admission  # Hypotension led to hypoperfusion/ increase LA/ JEEVAN ( prerenal/ ATN)  - IVF for cheetah - fluid non-responsive  - trend Lactate  - C/w meropenem   - ID eval    # Type 2 NSTEMI  - Follow up CE  - Cardio eval    # h/o UGIB 2/2 duodenal ulcer  # HO melena  - GI PPX - IV PPI q12  - Sucralfate  - PO feeds  - Keep active type and screen    # Type II DM  - Follow up FS  - Insulin protocol if needed.   - cortisol level - 31.7    # HFpEF (EF of 72%)  # CAD  - Follow up CE  - ASA/Plavix  - Levo gtt - wean as tolerated  - Holding Lasix and BB due to shock    # HTN  # Hypothyroidism  - Continue home meds once stable    # COPD  # Sever Pulm HTN  - continue with inhalers    # Diet: advance as tolerated  # Activity : Increase as tolerated  # DVT PPX - SCD  # GI PPX - PPI  # Dispo: Step down unit  # Code Status: Full   54 year old female active smoker with pertinent medical history of Type II Diabetes Mellitus, Heart Failure with preserved Ejection Fraction, Coronary Artery Disease, Hyperlipidemia, Upper Gastrointestinal Bleed due to duodenal ulcer, Hypertension, Hypothyroidism, COPD, Dextrocardia, Sciatica/Peripheral Neuralgia, and Charcot foot deformity s/p reconstruction on 1/11/19 presented to the ED for worsening lethargy. The patient has had 2 recent admisions for sepsis recently and left AMA from the hospital on 10/31/20. This presentation similar to how she presented last time. She endorsed wet cough with purulent sputum.   In the ER she was hypotensive (72/33mmHg), hypothermic (35.3 C) and developed periods of bradycardia in to the 50s. The initial labs revealed elevated WBC, Acute Kidney Injury with High Anion Gap Metabolic Acidosis (AG 23), worsening Transaminitis from last admission, elevated Troponin 0.93, elevated BNP 43k and an elevated lactate above 9.   She was admitted to the MICU for septic shock. In MICU she was on levophed, saturating well on O2 via NC. She was stabilized and downgraded to step down unit.     Sepsis present on admission  - resolved now, off levophed  - Possible  source?  - Cultures negative for now  - lactate normal now  - on meropenem and started gentle hydration  - ID following    Acute Kidney   - likely pre-renal/ATN due to hypotension  - Creatinine improved to 1.4 today  - Anion gap 17  - non oliguric   - on sodium bicarbonate, revaluate need tomorrow    NSTEMI  - Normal left ventricular size and wall thicknesses, with normal systolic and diastolic function with severe pulmonary hypertension  - Follow up Cardiac Enzymes  - Cardiologist on board, possible cardiac catheterization when stable  - On Aspirin and Plavix    History of duodenal ulcer  - no gastrointestinal bleed for now  - on protonix  - Sucralfate  - oral feeds  - Keep active type and screen    Type II Diabetes Mellitus  - Follow up FS  - Insulin protocol if needed.     History of Hypothyroidism  - Continue home meds once stable    COPD  - Has severe Pulmonary Hypertension  - continue with inhalers    Diet: Advance as tolerated  Activity : Increase as tolerated  DVT Prophylaxis - SCD  GI Prophylaxis - PPI  Disposition: Step down unit for now  Code Status: Full

## 2020-11-06 NOTE — PROGRESS NOTE ADULT - SUBJECTIVE AND OBJECTIVE BOX
Patient is a 55y old  Female who presents with a chief complaint of sepsis (05 Nov 2020 20:02)        Over Night Events:  on low dose levophed.  No SOB or abdominal pain         ROS:     All ROS are negative except HPI         PHYSICAL EXAM    ICU Vital Signs Last 24 Hrs  T(C): 35.9 (06 Nov 2020 06:00), Max: 37.2 (05 Nov 2020 18:00)  T(F): 96.7 (06 Nov 2020 06:00), Max: 98.9 (05 Nov 2020 18:00)  HR: 85 (06 Nov 2020 06:00) (68 - 85)  BP: 143/79 (06 Nov 2020 06:00) (88/49 - 150/77)  BP(mean): 103 (06 Nov 2020 06:00) (57 - 105)  ABP: --  ABP(mean): --  RR: 20 (06 Nov 2020 06:00) (9 - 21)  SpO2: 96% (06 Nov 2020 02:00) (76% - 98%)      CONSTITUTIONAL:  Well nourished.  NAD    ENT:   Airway patent,   Mouth with normal mucosa.   No thrush    EYES:   Pupils equal,   Round and reactive to light.    CARDIAC:   Normal rate,   Regular rhythm.    No edema      Vascular:  Normal systolic impulse  No Carotid bruits    RESPIRATORY:   No wheezing  Bilateral BS  Normal chest expansion  Not tachypneic,  No use of accessory muscles    GASTROINTESTINAL:  Abdomen soft,   Non-tender,   No guarding,   + BS    MUSCULOSKELETAL:   Range of motion is not limited,  No clubbing, cyanosis    NEUROLOGICAL:   Alert and oriented   No motor  deficits.    SKIN:   Skin normal color for race,   Warm and dry   No evidence of rash.    PSYCHIATRIC:   Normal mood and affect.   No apparent risk to self or others.    HEMATOLOGICAL:  No cervical  lymphadenopathy.  no inguinal lymphadenopathy      11-05-20 @ 07:01  -  11-06-20 @ 07:00  --------------------------------------------------------  IN:    Norepinephrine: 147.2 mL  Total IN: 147.2 mL    OUT:    Indwelling Catheter - Urethral (mL): 700 mL    Voided (mL): 2100 mL  Total OUT: 2800 mL    Total NET: -2652.8 mL          LABS:                            9.2    8.67  )-----------( 247      ( 05 Nov 2020 05:06 )             31.0                                               11-05    129<L>  |  97<L>  |  30<H>  ----------------------------<  84  4.8   |  19  |  3.3<H>    05 Nov 2020 05:06    129<L>  |  97<L>  |  30<H>  ----------------------------<  84     4.8     |  19     |  3.3<H>  04 Nov 2020 16:00    127<L>  |  93<L>  |  29<H>  ----------------------------<  118<H>  5.2<H>   |  17     |  3.7<H>    Ca    7.8<L>      05 Nov 2020 05:06  Ca    8.1<L>      04 Nov 2020 16:00  Phos  5.2<H>     05 Nov 2020 05:06  Mg     2.5<H>     05 Nov 2020 05:06  Mg     2.9<H>     04 Nov 2020 16:00    TPro  5.9<L>  /  Alb  2.8<L>  /  TBili  0.9    /  DBili  x      /  AST  100<H>  /  ALT  57<H>  /  AlkPhos  128<H>  05 Nov 2020 05:06      Ca    7.8<L>      05 Nov 2020 05:06  Phos  5.2     11-05  Mg     2.5     11-05    TPro  5.9<L>  /  Alb  2.8<L>  /  TBili  0.9  /  DBili  x   /  AST  100<H>  /  ALT  57<H>  /  AlkPhos  128<H>  11-05                                                 CARDIAC MARKERS ( 05 Nov 2020 11:00 )  x     / 0.72 ng/mL / x     / x     / x                                                LIVER FUNCTIONS - ( 05 Nov 2020 05:06 )  Alb: 2.8 g/dL / Pro: 5.9 g/dL / ALK PHOS: 128 U/L / ALT: 57 U/L / AST: 100 U/L / GGT: x                                                  Culture - Urine (collected 03 Nov 2020 18:00)  Source: .Urine Clean Catch (Midstream)  Final Report (04 Nov 2020 17:11):    No growth    Culture - Blood (collected 03 Nov 2020 16:42)  Source: .Blood Blood-Peripheral  Preliminary Report (04 Nov 2020 23:01):    No growth to date.    Culture - Blood (collected 03 Nov 2020 16:42)  Source: .Blood Blood-Peripheral  Preliminary Report (04 Nov 2020 23:01):    No growth to date.                                                                                           MEDICATIONS  (STANDING):  ALBUTerol    90 MICROgram(s) HFA Inhaler 2 Puff(s) Inhalation every 6 hours  aspirin  chewable 81 milliGRAM(s) Oral daily  budesonide  80 MICROgram(s)/formoterol 4.5 MICROgram(s) Inhaler 2 Puff(s) Inhalation two times a day  chlorhexidine 4% Liquid 1 Application(s) Topical <User Schedule>  clopidogrel Tablet 75 milliGRAM(s) Oral daily  influenza   Vaccine 0.5 milliLiter(s) IntraMuscular once  levothyroxine 100 MICROGram(s) Oral daily  meropenem  IVPB 750 milliGRAM(s) IV Intermittent every 24 hours  norepinephrine Infusion 0.05 MICROgram(s)/kG/Min (5.63 mL/Hr) IV Continuous <Continuous>  pantoprazole  Injectable 40 milliGRAM(s) IV Push two times a day  senna 2 Tablet(s) Oral at bedtime  sodium bicarbonate 1300 milliGRAM(s) Oral three times a day  sucralfate 1 Gram(s) Oral four times a day    MEDICATIONS  (PRN):      New X-rays reviewed:                                                                                  ECHO    CXR interpreted by me:

## 2020-11-06 NOTE — PROGRESS NOTE ADULT - ASSESSMENT
PMH HTN, DM, HFpEF, admitted with shock, requiring pressors, c/b JEEVAN.    # JEEVAN likely pre-renal/ATN due to hypotension/shock, at risk for CRS, AIN,  - creatinine trending down to 1.4 today  - non oliguric   -on small dose of LEvo - not needed, BP is 150, will be d/cedor   - high anion gap metabolic acidosis due to lactic acidosis, lactate down trending.  continue  sodium bicarb 1300mg TID    -  phos level noted , no binders   CT abd 10/28/20 kidneys no hydro  # ID - on MErrem, infection better controlled  Will sign off  call as needed

## 2020-11-07 LAB
ALBUMIN SERPL ELPH-MCNC: 3.1 G/DL — LOW (ref 3.5–5.2)
ALBUMIN SERPL ELPH-MCNC: 3.5 G/DL — SIGNIFICANT CHANGE UP (ref 3.5–5.2)
ALP SERPL-CCNC: 113 U/L — SIGNIFICANT CHANGE UP (ref 30–115)
ALP SERPL-CCNC: 134 U/L — HIGH (ref 30–115)
ALT FLD-CCNC: 47 U/L — HIGH (ref 0–41)
ALT FLD-CCNC: 51 U/L — HIGH (ref 0–41)
ANION GAP SERPL CALC-SCNC: 13 MMOL/L — SIGNIFICANT CHANGE UP (ref 7–14)
ANION GAP SERPL CALC-SCNC: 19 MMOL/L — HIGH (ref 7–14)
AST SERPL-CCNC: 61 U/L — HIGH (ref 0–41)
AST SERPL-CCNC: 71 U/L — HIGH (ref 0–41)
BASE EXCESS BLDA CALC-SCNC: 2.5 MMOL/L — HIGH (ref -2–2)
BASOPHILS # BLD AUTO: 0.02 K/UL — SIGNIFICANT CHANGE UP (ref 0–0.2)
BASOPHILS NFR BLD AUTO: 0.4 % — SIGNIFICANT CHANGE UP (ref 0–1)
BILIRUB SERPL-MCNC: 1.4 MG/DL — HIGH (ref 0.2–1.2)
BILIRUB SERPL-MCNC: 1.5 MG/DL — HIGH (ref 0.2–1.2)
BUN SERPL-MCNC: 16 MG/DL — SIGNIFICANT CHANGE UP (ref 10–20)
BUN SERPL-MCNC: 17 MG/DL — SIGNIFICANT CHANGE UP (ref 10–20)
CALCIUM SERPL-MCNC: 7.8 MG/DL — LOW (ref 8.5–10.1)
CALCIUM SERPL-MCNC: 8.6 MG/DL — SIGNIFICANT CHANGE UP (ref 8.5–10.1)
CHLORIDE SERPL-SCNC: 105 MMOL/L — SIGNIFICANT CHANGE UP (ref 98–110)
CHLORIDE SERPL-SCNC: 106 MMOL/L — SIGNIFICANT CHANGE UP (ref 98–110)
CK MB CFR SERPL CALC: 12.6 NG/ML — HIGH (ref 0.6–6.3)
CK SERPL-CCNC: 322 U/L — HIGH (ref 0–225)
CO2 SERPL-SCNC: 17 MMOL/L — SIGNIFICANT CHANGE UP (ref 17–32)
CO2 SERPL-SCNC: 24 MMOL/L — SIGNIFICANT CHANGE UP (ref 17–32)
CREAT SERPL-MCNC: 0.9 MG/DL — SIGNIFICANT CHANGE UP (ref 0.7–1.5)
CREAT SERPL-MCNC: 1 MG/DL — SIGNIFICANT CHANGE UP (ref 0.7–1.5)
EOSINOPHIL # BLD AUTO: 0.04 K/UL — SIGNIFICANT CHANGE UP (ref 0–0.7)
EOSINOPHIL NFR BLD AUTO: 0.8 % — SIGNIFICANT CHANGE UP (ref 0–8)
GAS PNL BLDA: SIGNIFICANT CHANGE UP
GLUCOSE BLDC GLUCOMTR-MCNC: 103 MG/DL — HIGH (ref 70–99)
GLUCOSE BLDC GLUCOMTR-MCNC: 87 MG/DL — SIGNIFICANT CHANGE UP (ref 70–99)
GLUCOSE BLDC GLUCOMTR-MCNC: 99 MG/DL — SIGNIFICANT CHANGE UP (ref 70–99)
GLUCOSE SERPL-MCNC: 104 MG/DL — HIGH (ref 70–99)
GLUCOSE SERPL-MCNC: 86 MG/DL — SIGNIFICANT CHANGE UP (ref 70–99)
HCO3 BLDA-SCNC: 27 MMOL/L — SIGNIFICANT CHANGE UP (ref 23–27)
HCT VFR BLD CALC: 31.4 % — LOW (ref 37–47)
HGB BLD-MCNC: 9 G/DL — LOW (ref 12–16)
HOROWITZ INDEX BLDA+IHG-RTO: 40 — SIGNIFICANT CHANGE UP
IMM GRANULOCYTES NFR BLD AUTO: 0.6 % — HIGH (ref 0.1–0.3)
INR BLD: 1.87 RATIO — HIGH (ref 0.65–1.3)
LACTATE SERPL-SCNC: 10.3 MMOL/L — CRITICAL HIGH (ref 0.7–2)
LYMPHOCYTES # BLD AUTO: 0.7 K/UL — LOW (ref 1.2–3.4)
LYMPHOCYTES # BLD AUTO: 14.2 % — LOW (ref 20.5–51.1)
MAGNESIUM SERPL-MCNC: 1.4 MG/DL — LOW (ref 1.8–2.4)
MCHC RBC-ENTMCNC: 26.2 PG — LOW (ref 27–31)
MCHC RBC-ENTMCNC: 28.7 G/DL — LOW (ref 32–37)
MCV RBC AUTO: 91.5 FL — SIGNIFICANT CHANGE UP (ref 81–99)
MONOCYTES # BLD AUTO: 0.51 K/UL — SIGNIFICANT CHANGE UP (ref 0.1–0.6)
MONOCYTES NFR BLD AUTO: 10.3 % — HIGH (ref 1.7–9.3)
NEUTROPHILS # BLD AUTO: 3.64 K/UL — SIGNIFICANT CHANGE UP (ref 1.4–6.5)
NEUTROPHILS NFR BLD AUTO: 73.7 % — SIGNIFICANT CHANGE UP (ref 42.2–75.2)
NRBC # BLD: 0 /100 WBCS — SIGNIFICANT CHANGE UP (ref 0–0)
PCO2 BLDA: 42 MMHG — SIGNIFICANT CHANGE UP (ref 38–42)
PH BLDA: 7.42 — SIGNIFICANT CHANGE UP (ref 7.38–7.42)
PHOSPHATE SERPL-MCNC: 3.1 MG/DL — SIGNIFICANT CHANGE UP (ref 2.1–4.9)
PLATELET # BLD AUTO: 249 K/UL — SIGNIFICANT CHANGE UP (ref 130–400)
PO2 BLDA: 52 MMHG — LOW (ref 78–95)
POTASSIUM SERPL-MCNC: 4.6 MMOL/L — SIGNIFICANT CHANGE UP (ref 3.5–5)
POTASSIUM SERPL-MCNC: 5.5 MMOL/L — HIGH (ref 3.5–5)
POTASSIUM SERPL-SCNC: 4.6 MMOL/L — SIGNIFICANT CHANGE UP (ref 3.5–5)
POTASSIUM SERPL-SCNC: 5.5 MMOL/L — HIGH (ref 3.5–5)
PROCALCITONIN SERPL-MCNC: 0.2 NG/ML — HIGH (ref 0.02–0.1)
PROT SERPL-MCNC: 6.5 G/DL — SIGNIFICANT CHANGE UP (ref 6–8)
PROT SERPL-MCNC: 7.2 G/DL — SIGNIFICANT CHANGE UP (ref 6–8)
PROTHROM AB SERPL-ACNC: 21.5 SEC — HIGH (ref 9.95–12.87)
RBC # BLD: 3.43 M/UL — LOW (ref 4.2–5.4)
RBC # FLD: 21.2 % — HIGH (ref 11.5–14.5)
SAO2 % BLDA: 85 % — LOW (ref 94–98)
SODIUM SERPL-SCNC: 142 MMOL/L — SIGNIFICANT CHANGE UP (ref 135–146)
SODIUM SERPL-SCNC: 142 MMOL/L — SIGNIFICANT CHANGE UP (ref 135–146)
TROPONIN T SERPL-MCNC: 0.31 NG/ML — CRITICAL HIGH
WBC # BLD: 4.94 K/UL — SIGNIFICANT CHANGE UP (ref 4.8–10.8)
WBC # FLD AUTO: 4.94 K/UL — SIGNIFICANT CHANGE UP (ref 4.8–10.8)

## 2020-11-07 PROCEDURE — 99232 SBSQ HOSP IP/OBS MODERATE 35: CPT

## 2020-11-07 PROCEDURE — 93010 ELECTROCARDIOGRAM REPORT: CPT

## 2020-11-07 PROCEDURE — 71045 X-RAY EXAM CHEST 1 VIEW: CPT | Mod: 26

## 2020-11-07 RX ORDER — FUROSEMIDE 40 MG
60 TABLET ORAL ONCE
Refills: 0 | Status: COMPLETED | OUTPATIENT
Start: 2020-11-07 | End: 2020-11-07

## 2020-11-07 RX ORDER — NICOTINE POLACRILEX 2 MG
1 GUM BUCCAL DAILY
Refills: 0 | Status: DISCONTINUED | OUTPATIENT
Start: 2020-11-07 | End: 2020-11-09

## 2020-11-07 RX ORDER — HALOPERIDOL DECANOATE 100 MG/ML
1 INJECTION INTRAMUSCULAR ONCE
Refills: 0 | Status: COMPLETED | OUTPATIENT
Start: 2020-11-07 | End: 2020-11-07

## 2020-11-07 RX ORDER — FUROSEMIDE 40 MG
40 TABLET ORAL
Refills: 0 | Status: DISCONTINUED | OUTPATIENT
Start: 2020-11-07 | End: 2020-11-10

## 2020-11-07 RX ORDER — MAGNESIUM SULFATE 500 MG/ML
2 VIAL (ML) INJECTION ONCE
Refills: 0 | Status: COMPLETED | OUTPATIENT
Start: 2020-11-07 | End: 2020-11-07

## 2020-11-07 RX ADMIN — Medication 1 GRAM(S): at 07:12

## 2020-11-07 RX ADMIN — PANTOPRAZOLE SODIUM 40 MILLIGRAM(S): 20 TABLET, DELAYED RELEASE ORAL at 17:15

## 2020-11-07 RX ADMIN — Medication 1 MILLIGRAM(S): at 20:03

## 2020-11-07 RX ADMIN — HALOPERIDOL DECANOATE 1 MILLIGRAM(S): 100 INJECTION INTRAMUSCULAR at 23:42

## 2020-11-07 RX ADMIN — PANTOPRAZOLE SODIUM 40 MILLIGRAM(S): 20 TABLET, DELAYED RELEASE ORAL at 07:13

## 2020-11-07 RX ADMIN — Medication 1300 MILLIGRAM(S): at 07:12

## 2020-11-07 RX ADMIN — MEROPENEM 100 MILLIGRAM(S): 1 INJECTION INTRAVENOUS at 07:32

## 2020-11-07 RX ADMIN — Medication 1 MILLIGRAM(S): at 01:24

## 2020-11-07 RX ADMIN — Medication 1 MILLIGRAM(S): at 12:49

## 2020-11-07 RX ADMIN — Medication 40 MILLIGRAM(S): at 17:14

## 2020-11-07 RX ADMIN — ALBUTEROL 2 PUFF(S): 90 AEROSOL, METERED ORAL at 22:49

## 2020-11-07 RX ADMIN — CHLORHEXIDINE GLUCONATE 1 APPLICATION(S): 213 SOLUTION TOPICAL at 06:31

## 2020-11-07 RX ADMIN — Medication 60 MILLIGRAM(S): at 09:15

## 2020-11-07 RX ADMIN — Medication 50 GRAM(S): at 10:26

## 2020-11-07 RX ADMIN — Medication 100 MICROGRAM(S): at 07:12

## 2020-11-07 NOTE — PROGRESS NOTE ADULT - SUBJECTIVE AND OBJECTIVE BOX
OVERNIGHT EVENTS: events noted, afebrile, off pressors, renal us reviewed    Vital Signs Last 24 Hrs  T(C): 36.2 (07 Nov 2020 04:00), Max: 36.2 (07 Nov 2020 04:00)  T(F): 97.1 (07 Nov 2020 04:00), Max: 97.1 (07 Nov 2020 04:00)  HR: 69 (07 Nov 2020 04:00) (69 - 100)  BP: 166/93 (07 Nov 2020 04:00) (113/53 - 166/93)  BP(mean): 134 (07 Nov 2020 04:00) (77 - 134)  RR: 20 (07 Nov 2020 04:00) (18 - 20)  SpO2: 96% (06 Nov 2020 16:10) (96% - 96%)    PHYSICAL EXAMINATION:    GENERAL: comfortable    HEENT: Head is normocephalic and atraumatic. Extraocular muscles are intact. Mucous membranes are moist.    NECK: Supple.    LUNGS: dec bs both bases    HEART: Regular rate and rhythm without murmur.    ABDOMEN: Soft, nontender, and nondistended.      EXTREMITIES: Without any cyanosis, clubbing, rash, lesions or edema.    NEUROLOGIC: Grossly intact.    SKIN: No ulceration or induration present.      LABS:                        9.5    5.71  )-----------( 203      ( 06 Nov 2020 07:36 )             31.3     11-06    139  |  104  |  24<H>  ----------------------------<  105<H>  4.3   |  18  |  1.4    Ca    8.4<L>      06 Nov 2020 07:36  Mg     1.8     11-06    TPro  6.5  /  Alb  3.3<L>  /  TBili  1.1  /  DBili  x   /  AST  64<H>  /  ALT  49<H>  /  AlkPhos  127<H>  11-06    PT/INR - ( 06 Nov 2020 07:36 )   PT: 15.90 sec;   INR: 1.38 ratio         PTT - ( 06 Nov 2020 07:36 )  PTT:31.3 sec      CARDIAC MARKERS ( 05 Nov 2020 11:00 )  x     / 0.72 ng/mL / x     / x     / x                Procalcitonin, Serum: 0.20 ng/mL (11-06-20 @ 07:36)        11-05-20 @ 07:01  -  11-06-20 @ 07:00  --------------------------------------------------------  IN: 147.2 mL / OUT: 2800 mL / NET: -2652.8 mL    11-06-20 @ 07:01  -  11-07-20 @ 06:59  --------------------------------------------------------  IN: 389.4 mL / OUT: 1 mL / NET: 388.4 mL        MICROBIOLOGY:  Culture Results:   No growth (11-03 @ 18:00)  Culture Results:   No growth to date. (11-03 @ 16:42)  Culture Results:   No growth to date. (11-03 @ 16:42)      MEDICATIONS  (STANDING):  ALBUTerol    90 MICROgram(s) HFA Inhaler 2 Puff(s) Inhalation every 6 hours  aspirin  chewable 81 milliGRAM(s) Oral daily  budesonide  80 MICROgram(s)/formoterol 4.5 MICROgram(s) Inhaler 2 Puff(s) Inhalation two times a day  chlorhexidine 4% Liquid 1 Application(s) Topical <User Schedule>  clopidogrel Tablet 75 milliGRAM(s) Oral daily  influenza   Vaccine 0.5 milliLiter(s) IntraMuscular once  levothyroxine 100 MICROGram(s) Oral daily  meropenem  IVPB 750 milliGRAM(s) IV Intermittent every 24 hours  norepinephrine Infusion 0.05 MICROgram(s)/kG/Min (5.63 mL/Hr) IV Continuous <Continuous>  pantoprazole  Injectable 40 milliGRAM(s) IV Push two times a day  senna 2 Tablet(s) Oral at bedtime  sodium bicarbonate 1300 milliGRAM(s) Oral three times a day  sodium chloride 0.9%. 1000 milliLiter(s) (75 mL/Hr) IV Continuous <Continuous>  sucralfate 1 Gram(s) Oral four times a day    MEDICATIONS  (PRN):  acetaminophen   Tablet .. 650 milliGRAM(s) Oral every 6 hours PRN Moderate Pain (4 - 6)      RADIOLOGY & ADDITIONAL STUDIES:         OVERNIGHT EVENTS: events noted, afebrile, off pressors, renal us reviewed, still on IVF, this AM, tachypneic, CXR done stat fluid overload    Vital Signs Last 24 Hrs  T(C): 36.2 (07 Nov 2020 04:00), Max: 36.2 (07 Nov 2020 04:00)  T(F): 97.1 (07 Nov 2020 04:00), Max: 97.1 (07 Nov 2020 04:00)  HR: 69 (07 Nov 2020 04:00) (69 - 100)  BP: 166/93 (07 Nov 2020 04:00) (113/53 - 166/93)  BP(mean): 134 (07 Nov 2020 04:00) (77 - 134)  RR: 20 (07 Nov 2020 04:00) (18 - 20)  SpO2: 96% (06 Nov 2020 16:10) (96% - 96%)    PHYSICAL EXAMINATION:    GENERAL: comfortable    HEENT: Head is normocephalic and atraumatic. Extraocular muscles are intact. Mucous membranes are moist.    NECK: Supple.    LUNGS: BL crackle    HEART: MARY ANNE 4/6    ABDOMEN: Soft, nontender, and nondistended.      EXTREMITIES: Without any cyanosis, clubbing, rash, lesions or edema.    NEUROLOGIC: Grossly intact.    SKIN: No ulceration or induration present.      LABS:                        9.5    5.71  )-----------( 203      ( 06 Nov 2020 07:36 )             31.3     11-06    139  |  104  |  24<H>  ----------------------------<  105<H>  4.3   |  18  |  1.4    Ca    8.4<L>      06 Nov 2020 07:36  Mg     1.8     11-06    TPro  6.5  /  Alb  3.3<L>  /  TBili  1.1  /  DBili  x   /  AST  64<H>  /  ALT  49<H>  /  AlkPhos  127<H>  11-06    PT/INR - ( 06 Nov 2020 07:36 )   PT: 15.90 sec;   INR: 1.38 ratio         PTT - ( 06 Nov 2020 07:36 )  PTT:31.3 sec      CARDIAC MARKERS ( 05 Nov 2020 11:00 )  x     / 0.72 ng/mL / x     / x     / x                Procalcitonin, Serum: 0.20 ng/mL (11-06-20 @ 07:36)        11-05-20 @ 07:01  -  11-06-20 @ 07:00  --------------------------------------------------------  IN: 147.2 mL / OUT: 2800 mL / NET: -2652.8 mL    11-06-20 @ 07:01  -  11-07-20 @ 06:59  --------------------------------------------------------  IN: 389.4 mL / OUT: 1 mL / NET: 388.4 mL        MICROBIOLOGY:  Culture Results:   No growth (11-03 @ 18:00)  Culture Results:   No growth to date. (11-03 @ 16:42)  Culture Results:   No growth to date. (11-03 @ 16:42)      MEDICATIONS  (STANDING):  ALBUTerol    90 MICROgram(s) HFA Inhaler 2 Puff(s) Inhalation every 6 hours  aspirin  chewable 81 milliGRAM(s) Oral daily  budesonide  80 MICROgram(s)/formoterol 4.5 MICROgram(s) Inhaler 2 Puff(s) Inhalation two times a day  chlorhexidine 4% Liquid 1 Application(s) Topical <User Schedule>  clopidogrel Tablet 75 milliGRAM(s) Oral daily  influenza   Vaccine 0.5 milliLiter(s) IntraMuscular once  levothyroxine 100 MICROGram(s) Oral daily  meropenem  IVPB 750 milliGRAM(s) IV Intermittent every 24 hours  norepinephrine Infusion 0.05 MICROgram(s)/kG/Min (5.63 mL/Hr) IV Continuous <Continuous>  pantoprazole  Injectable 40 milliGRAM(s) IV Push two times a day  senna 2 Tablet(s) Oral at bedtime  sodium bicarbonate 1300 milliGRAM(s) Oral three times a day  sodium chloride 0.9%. 1000 milliLiter(s) (75 mL/Hr) IV Continuous <Continuous>  sucralfate 1 Gram(s) Oral four times a day    MEDICATIONS  (PRN):  acetaminophen   Tablet .. 650 milliGRAM(s) Oral every 6 hours PRN Moderate Pain (4 - 6)      RADIOLOGY & ADDITIONAL STUDIES:

## 2020-11-07 NOTE — PROGRESS NOTE ADULT - SUBJECTIVE AND OBJECTIVE BOX
CALLY HARTMAN 55y Female  MRN#: [id number]       SUBJECTIVE  Patient is a 55y old Female who presents with a chief complaint of SEPSIS (2020 06:58)      OBJECTIVE  PAST MEDICAL & SURGICAL HISTORY  Dextrocardia    Chronic midline low back pain with bilateral sciatica    Peripheral neuralgia    Essential hypertension    Diabetes 1.5, managed as type 2    Charcot&#x27;s arthropathy  R foot    Charcot foot due to diabetes mellitus    H/O shoulder surgery  Right shoulder surgery     delivery delivered      ALLERGIES:  No Known Allergies    MEDICATIONS:  STANDING MEDICATIONS  ALBUTerol    90 MICROgram(s) HFA Inhaler 2 Puff(s) Inhalation every 6 hours  aspirin  chewable 81 milliGRAM(s) Oral daily  budesonide  80 MICROgram(s)/formoterol 4.5 MICROgram(s) Inhaler 2 Puff(s) Inhalation two times a day  chlorhexidine 4% Liquid 1 Application(s) Topical <User Schedule>  clopidogrel Tablet 75 milliGRAM(s) Oral daily  furosemide   Injectable 40 milliGRAM(s) IV Push two times a day  influenza   Vaccine 0.5 milliLiter(s) IntraMuscular once  levothyroxine 100 MICROGram(s) Oral daily  meropenem  IVPB 750 milliGRAM(s) IV Intermittent every 24 hours  norepinephrine Infusion 0.05 MICROgram(s)/kG/Min IV Continuous <Continuous>  pantoprazole  Injectable 40 milliGRAM(s) IV Push two times a day  senna 2 Tablet(s) Oral at bedtime  sodium bicarbonate 1300 milliGRAM(s) Oral three times a day  sucralfate 1 Gram(s) Oral four times a day    PRN MEDICATIONS  acetaminophen   Tablet .. 650 milliGRAM(s) Oral every 6 hours PRN      VITAL SIGNS: Last 24 Hours  T(C): 36.3 (2020 08:34), Max: 36.3 (2020 08:34)  T(F): 97.4 (2020 08:34), Max: 97.4 (2020 08:34)  HR: 91 (2020 08:34) (69 - 100)  BP: 184/88 (2020 08:34) (113/53 - 184/88)  BP(mean): 126 (2020 08:34) (77 - 134)  RR: 20 (2020 08:34) (18 - 20)  SpO2: 96% (2020 16:10) (96% - 96%)    LABS:                        9.0    4.94  )-----------( 249      ( 2020 08:02 )             31.4         142  |  106  |  17  ----------------------------<  104<H>  4.6   |  17  |  1.0    Ca    8.6      2020 08:02  Phos  3.1       Mg     1.4         TPro  7.2  /  Alb  3.5  /  TBili  1.5<H>  /  DBili  x   /  AST  61<H>  /  ALT  51<H>  /  AlkPhos  134<H>      PT/INR - ( 2020 08:02 )   PT: 21.50 sec;   INR: 1.87 ratio         PTT - ( 2020 07:36 )  PTT:31.3 sec      Lactate, Blood: 10.3 mmol/L <HH> (20 @ 08:02)      CARDIAC MARKERS ( 2020 11:00 )  x     / 0.72 ng/mL / x     / x     / x          PHYSICAL EXAM:  GENERAL: NAD, AAOx2  HEENT:  Atraumatic, Normocephalic.  PULMONARY: Congestion bilaterally  CARDIOVASCULAR: Regular rate and rhythm  GASTROINTESTINAL: Soft, distended  MUSCULOSKELETAL:  2+ Peripheral Pulses, + edema    ADMISSION SUMMARY  Patient is a 55y old Female who presents with a chief complaint of SEPSIS (2020 06:58)      ASSESSMENT & PLAN    Sepsis present on admission  - resolved now, off levophed  - Possible  source?  - Cultures negative for now  - on meropenem   - ID following    Acute Kidney   - likely pre-renal/ATN due to hypotension  - Anion gap 17  - non oliguric   - on sodium bicarbonate, revaluate need tomorrow    NSTEMI  - Normal left ventricular size and wall thicknesses, with normal systolic and diastolic function with severe pulmonary hypertension  - Follow up Cardiac Enzymes  - Cardiologist on board, possible cardiac catheterization when stable  - On Aspirin and Plavix    History of duodenal ulcer  - no gastrointestinal bleed for now  - on protonix  - Sucralfate  - oral feeds  - Keep active type and screen    Type II Diabetes Mellitus  - Follow up FS  - Insulin protocol if needed.     History of Hypothyroidism  - Continue home meds once stable    COPD  - Has severe Pulmonary Hypertension  - continue with inhalers    Diet: Advance as tolerated  Activity : Increase as tolerated  DVT Prophylaxis - SCD  GI Prophylaxis - PPI  Disposition: Step down unit for now  Code Status: Full     CALLY HARTMAN 55y Female  MRN#: [id number]       SUBJECTIVE  Patient is a 55y old Female who presents with a chief complaint of SEPSIS (2020 06:58)      OBJECTIVE  PAST MEDICAL & SURGICAL HISTORY  Dextrocardia    Chronic midline low back pain with bilateral sciatica    Peripheral neuralgia    Essential hypertension    Diabetes 1.5, managed as type 2    Charcot&#x27;s arthropathy  R foot    Charcot foot due to diabetes mellitus    H/O shoulder surgery  Right shoulder surgery     delivery delivered      ALLERGIES:  No Known Allergies    MEDICATIONS:  STANDING MEDICATIONS  ALBUTerol    90 MICROgram(s) HFA Inhaler 2 Puff(s) Inhalation every 6 hours  aspirin  chewable 81 milliGRAM(s) Oral daily  budesonide  80 MICROgram(s)/formoterol 4.5 MICROgram(s) Inhaler 2 Puff(s) Inhalation two times a day  chlorhexidine 4% Liquid 1 Application(s) Topical <User Schedule>  clopidogrel Tablet 75 milliGRAM(s) Oral daily  furosemide   Injectable 40 milliGRAM(s) IV Push two times a day  influenza   Vaccine 0.5 milliLiter(s) IntraMuscular once  levothyroxine 100 MICROGram(s) Oral daily  meropenem  IVPB 750 milliGRAM(s) IV Intermittent every 24 hours  norepinephrine Infusion 0.05 MICROgram(s)/kG/Min IV Continuous <Continuous>  pantoprazole  Injectable 40 milliGRAM(s) IV Push two times a day  senna 2 Tablet(s) Oral at bedtime  sodium bicarbonate 1300 milliGRAM(s) Oral three times a day  sucralfate 1 Gram(s) Oral four times a day    PRN MEDICATIONS  acetaminophen   Tablet .. 650 milliGRAM(s) Oral every 6 hours PRN      VITAL SIGNS: Last 24 Hours  T(C): 36.3 (2020 08:34), Max: 36.3 (2020 08:34)  T(F): 97.4 (2020 08:34), Max: 97.4 (2020 08:34)  HR: 91 (2020 08:34) (69 - 100)  BP: 184/88 (2020 08:34) (113/53 - 184/88)  BP(mean): 126 (2020 08:34) (77 - 134)  RR: 20 (2020 08:34) (18 - 20)  SpO2: 96% (2020 16:10) (96% - 96%)    LABS:                        9.0    4.94  )-----------( 249      ( 2020 08:02 )             31.4         142  |  106  |  17  ----------------------------<  104<H>  4.6   |  17  |  1.0    Ca    8.6      2020 08:02  Phos  3.1       Mg     1.4         TPro  7.2  /  Alb  3.5  /  TBili  1.5<H>  /  DBili  x   /  AST  61<H>  /  ALT  51<H>  /  AlkPhos  134<H>      PT/INR - ( 2020 08:02 )   PT: 21.50 sec;   INR: 1.87 ratio         PTT - ( 2020 07:36 )  PTT:31.3 sec      Lactate, Blood: 10.3 mmol/L <HH> (20 @ 08:02)      CARDIAC MARKERS ( 2020 11:00 )  x     / 0.72 ng/mL / x     / x     / x          PHYSICAL EXAM:  GENERAL: NAD, AAOx2  HEENT:  Atraumatic, Normocephalic.  PULMONARY: Congestion bilaterally  CARDIOVASCULAR: Regular rate and rhythm  GASTROINTESTINAL: Soft, distended  MUSCULOSKELETAL:  2+ Peripheral Pulses, + edema    ADMISSION SUMMARY  Patient is a 55y old Female who presents with a chief complaint of SEPSIS (2020 06:58)      ASSESSMENT & PLAN    Sepsis present on admission  - resolved now, off levophed  - Possible  source?  - Cultures negative for now  - on meropenem, finish ABx course.   - ID following    Acute Kidney- Improving.   - likely pre-renal/ATN due to hypotension  - Anion gap 17  - non oliguric   - on sodium bicarbonate  -Follow up with Nephrology.     NSTEMI  - Normal left ventricular size and wall thicknesses, with normal systolic and diastolic function with severe pulmonary hypertension  - Follow up Cardiac Enzymes  - Cardiologist on board, possible cardiac catheterization when stable  - On Aspirin and Plavix    History of duodenal ulcer  - no gastrointestinal bleed for now  - on protonix  - Sucralfate  - oral feeds  - Keep active type and screen    Type II Diabetes Mellitus  - Follow up FS  - Insulin protocol if needed.     History of Hypothyroidism  - Continue home meds once stable    COPD  - Has severe Pulmonary Hypertension  - continue with inhalers  -Fluid overloaded today. CXR is congested.   -Stop IV fluids. Start on Lasix BID IV. BIPAP as needed.     Diet: Advance as tolerated  Activity : Increase as tolerated  DVT Prophylaxis - SCD  GI Prophylaxis - PPI  Disposition: Step down unit for now  Code Status: Full

## 2020-11-07 NOTE — PROGRESS NOTE ADULT - ASSESSMENT
Impression:    Sepsis present on admission  hypotension led to hypoperfusion/ increase LA/ JEEVAN ( prerenal/ ATN) improving  Type2 NSTEMI  HFpEF (EF of 72 %)  HO CAD  UGIB 2/2 duodenal ulcer  HO COPD  Severe Pulm HTN      Plan     CNS: Avoid CNS depressants.     CVS: ASA/Plavix.  FU wiht cardiology.  Cardiology consult appreciated.  Need R/L heart cath    PULMONARY: HOB @ 45 degrees.  Aspiration precautions.  c/w home Inhalers    INFECTIOUS DISEASE: UCx and BCx.  Merrem for now.  ID consult appreciated CT AP    GI: GI PPx w/ IV PPI q12.  Sucralfate.  po feeds    RENAL and acid base: Follow up serum and urine lytes.  Correct as needed.   FU with Renal.      Endocrine: Follow up FS.  Insulin protocol if needed.     Hematology: DVT PPx w/ SCD.  Active T&S. FU CBC     tele  transfer to hospitalist  Code Status: Full Impression:    Sepsis present on admission  hypotension led to hypoperfusion/ increase LA/ JEEVAN ( prerenal/ ATN) improving  Type2 NSTEMI  HFpEF (EF of 72 %)  HO CAD  UGIB 2/2 duodenal ulcer  HO COPD  Severe Pulm HTN  Fluid overload      Plan     CNS: Avoid CNS depressants.     CVS: ASA/Plavix.  FU wiht cardiology.  Cardiology consult appreciated.  Need R/L heart cathm dc IVF, Lasix 40 IV q 12    PULMONARY: HOB @ 45 degrees.  Aspiration precautions.  c/w home Inhalers, bipap at night and as needed    INFECTIOUS DISEASE: UCx and BCx.  Merrem for now.  ID consult appreciated CT AP when stable    GI: GI PPx w/ IV PPI q12.  Sucralfate.  po feeds    RENAL and acid base: Follow up serum and urine lytes.  Correct as needed.   FU with Renal.      Endocrine: Follow up FS.  Insulin protocol if needed.     Hematology: DVT PPx w/ SCD.  Active T&S. FU CBC     SDU  poor prognosis

## 2020-11-08 LAB
ALBUMIN SERPL ELPH-MCNC: 3.2 G/DL — LOW (ref 3.5–5.2)
ALP SERPL-CCNC: 127 U/L — HIGH (ref 30–115)
ALT FLD-CCNC: 43 U/L — HIGH (ref 0–41)
ANION GAP SERPL CALC-SCNC: 18 MMOL/L — HIGH (ref 7–14)
AST SERPL-CCNC: 52 U/L — HIGH (ref 0–41)
BASE EXCESS BLDA CALC-SCNC: 4.2 MMOL/L — HIGH (ref -2–2)
BILIRUB SERPL-MCNC: 1.8 MG/DL — HIGH (ref 0.2–1.2)
BLD GP AB SCN SERPL QL: SIGNIFICANT CHANGE UP
BUN SERPL-MCNC: 17 MG/DL — SIGNIFICANT CHANGE UP (ref 10–20)
CALCIUM SERPL-MCNC: 8.2 MG/DL — LOW (ref 8.5–10.1)
CHLORIDE SERPL-SCNC: 105 MMOL/L — SIGNIFICANT CHANGE UP (ref 98–110)
CHLORIDE UR-SCNC: 63 — SIGNIFICANT CHANGE UP
CO2 SERPL-SCNC: 23 MMOL/L — SIGNIFICANT CHANGE UP (ref 17–32)
CREAT ?TM UR-MCNC: 89 MG/DL — SIGNIFICANT CHANGE UP
CREAT SERPL-MCNC: 0.9 MG/DL — SIGNIFICANT CHANGE UP (ref 0.7–1.5)
CULTURE RESULTS: SIGNIFICANT CHANGE UP
CULTURE RESULTS: SIGNIFICANT CHANGE UP
GAS PNL BLDA: SIGNIFICANT CHANGE UP
GLUCOSE BLDC GLUCOMTR-MCNC: 107 MG/DL — HIGH (ref 70–99)
GLUCOSE BLDC GLUCOMTR-MCNC: 110 MG/DL — HIGH (ref 70–99)
GLUCOSE BLDC GLUCOMTR-MCNC: 111 MG/DL — HIGH (ref 70–99)
GLUCOSE BLDC GLUCOMTR-MCNC: 91 MG/DL — SIGNIFICANT CHANGE UP (ref 70–99)
GLUCOSE BLDC GLUCOMTR-MCNC: 97 MG/DL — SIGNIFICANT CHANGE UP (ref 70–99)
GLUCOSE SERPL-MCNC: 90 MG/DL — SIGNIFICANT CHANGE UP (ref 70–99)
HCO3 BLDA-SCNC: 29 MMOL/L — SIGNIFICANT CHANGE UP (ref 21–29)
HCT VFR BLD CALC: 31.2 % — LOW (ref 37–47)
HGB BLD-MCNC: 9 G/DL — LOW (ref 12–16)
LACTATE SERPL-SCNC: 4 MMOL/L — CRITICAL HIGH (ref 0.7–2)
MAGNESIUM SERPL-MCNC: 1 MG/DL — LOW (ref 1.8–2.4)
MCHC RBC-ENTMCNC: 26.4 PG — LOW (ref 27–31)
MCHC RBC-ENTMCNC: 28.8 G/DL — LOW (ref 32–37)
MCV RBC AUTO: 91.5 FL — SIGNIFICANT CHANGE UP (ref 81–99)
NRBC # BLD: 2 /100 WBCS — HIGH (ref 0–0)
PCO2 BLDA: 44 MMHG — HIGH (ref 38–42)
PH BLDA: 7.43 — HIGH (ref 7.38–7.42)
PLATELET # BLD AUTO: 200 K/UL — SIGNIFICANT CHANGE UP (ref 130–400)
PO2 BLDA: 40 MMHG — CRITICAL LOW (ref 78–95)
POTASSIUM SERPL-MCNC: 4.7 MMOL/L — SIGNIFICANT CHANGE UP (ref 3.5–5)
POTASSIUM SERPL-SCNC: 4.7 MMOL/L — SIGNIFICANT CHANGE UP (ref 3.5–5)
POTASSIUM UR-SCNC: 44 MMOL/L — SIGNIFICANT CHANGE UP
PROT SERPL-MCNC: 6.6 G/DL — SIGNIFICANT CHANGE UP (ref 6–8)
RBC # BLD: 3.41 M/UL — LOW (ref 4.2–5.4)
RBC # FLD: 20.9 % — HIGH (ref 11.5–14.5)
SAO2 % BLDA: 71 % — LOW (ref 92–96)
SODIUM SERPL-SCNC: 146 MMOL/L — SIGNIFICANT CHANGE UP (ref 135–146)
SODIUM UR-SCNC: 36 MMOL/L — SIGNIFICANT CHANGE UP
SPECIMEN SOURCE: SIGNIFICANT CHANGE UP
SPECIMEN SOURCE: SIGNIFICANT CHANGE UP
WBC # BLD: 7.24 K/UL — SIGNIFICANT CHANGE UP (ref 4.8–10.8)
WBC # FLD AUTO: 7.24 K/UL — SIGNIFICANT CHANGE UP (ref 4.8–10.8)

## 2020-11-08 PROCEDURE — 99232 SBSQ HOSP IP/OBS MODERATE 35: CPT

## 2020-11-08 PROCEDURE — 93010 ELECTROCARDIOGRAM REPORT: CPT

## 2020-11-08 PROCEDURE — 71045 X-RAY EXAM CHEST 1 VIEW: CPT | Mod: 26

## 2020-11-08 RX ORDER — FUROSEMIDE 40 MG
40 TABLET ORAL ONCE
Refills: 0 | Status: COMPLETED | OUTPATIENT
Start: 2020-11-08 | End: 2020-11-08

## 2020-11-08 RX ORDER — HALOPERIDOL DECANOATE 100 MG/ML
1 INJECTION INTRAMUSCULAR ONCE
Refills: 0 | Status: COMPLETED | OUTPATIENT
Start: 2020-11-08 | End: 2020-11-08

## 2020-11-08 RX ADMIN — Medication 40 MILLIGRAM(S): at 05:03

## 2020-11-08 RX ADMIN — MEROPENEM 100 MILLIGRAM(S): 1 INJECTION INTRAVENOUS at 05:04

## 2020-11-08 RX ADMIN — Medication 40 MILLIGRAM(S): at 13:56

## 2020-11-08 RX ADMIN — Medication 40 MILLIGRAM(S): at 17:09

## 2020-11-08 RX ADMIN — CHLORHEXIDINE GLUCONATE 1 APPLICATION(S): 213 SOLUTION TOPICAL at 11:17

## 2020-11-08 RX ADMIN — HALOPERIDOL DECANOATE 1 MILLIGRAM(S): 100 INJECTION INTRAMUSCULAR at 18:30

## 2020-11-08 RX ADMIN — PANTOPRAZOLE SODIUM 40 MILLIGRAM(S): 20 TABLET, DELAYED RELEASE ORAL at 05:03

## 2020-11-08 RX ADMIN — Medication 1 MILLIGRAM(S): at 12:02

## 2020-11-08 RX ADMIN — Medication 1 MILLIGRAM(S): at 06:45

## 2020-11-08 RX ADMIN — PANTOPRAZOLE SODIUM 40 MILLIGRAM(S): 20 TABLET, DELAYED RELEASE ORAL at 17:09

## 2020-11-08 NOTE — PROGRESS NOTE ADULT - SUBJECTIVE AND OBJECTIVE BOX
OVERNIGHT EVENTS: events noted, after bipap/ lasix patient improved, LA improved, CE reviewed    Vital Signs Last 24 Hrs  T(C): 36.8 (08 Nov 2020 04:00), Max: 36.8 (08 Nov 2020 04:00)  T(F): 98.2 (08 Nov 2020 04:00), Max: 98.2 (08 Nov 2020 04:00)  HR: 110 (08 Nov 2020 04:00) (68 - 110)  BP: 158/96 (08 Nov 2020 04:00) (140/88 - 184/88)  BP(mean): 106 (07 Nov 2020 12:16) (106 - 126)  RR: 22 (08 Nov 2020 04:00) (20 - 24)  SpO2: 95% (08 Nov 2020 04:00) (95% - 100%)    PHYSICAL EXAMINATION:    GENERAL: ill looking    HEENT: Head is normocephalic and atraumatic. Extraocular muscles are intact. Mucous membranes are moist.    NECK: Supple.    LUNGS: bl crackles    HEART: MARY ANNE 3/6    ABDOMEN: Soft, nontender, and nondistended.      EXTREMITIES: Without any cyanosis, clubbing, rash, lesions or edema.    NEUROLOGIC: Grossly intact.    SKIN: No ulceration or induration present.      LABS:                        9.0    4.94  )-----------( 249      ( 07 Nov 2020 08:02 )             31.4     11-07    142  |  105  |  16  ----------------------------<  86  5.5<H>   |  24  |  0.9    Ca    7.8<L>      07 Nov 2020 16:16  Phos  3.1     11-07  Mg     1.4     11-07    TPro  6.5  /  Alb  3.1<L>  /  TBili  1.4<H>  /  DBili  x   /  AST  71<H>  /  ALT  47<H>  /  AlkPhos  113  11-07    PT/INR - ( 07 Nov 2020 08:02 )   PT: 21.50 sec;   INR: 1.87 ratio         PTT - ( 06 Nov 2020 07:36 )  PTT:31.3 sec    ABG - ( 07 Nov 2020 11:30 )  pH, Arterial: 7.42  pH, Blood: x     /  pCO2: 42    /  pO2: 52    / HCO3: 27    / Base Excess: 2.5   /  SaO2: 85                CARDIAC MARKERS ( 07 Nov 2020 16:16 )  x     / 0.31 ng/mL / 322 U/L / x     / 12.6 ng/mL          Lactate, Blood: 10.3 mmol/L (11-07-20 @ 08:02)    Procalcitonin, Serum: 0.20 ng/mL (11-06-20 @ 07:36)        11-07-20 @ 07:01  -  11-08-20 @ 07:00  --------------------------------------------------------  IN: 0 mL / OUT: 2300 mL / NET: -2300 mL        MICROBIOLOGY:  Culture Results:   No growth to date. (11-06 @ 07:36)      MEDICATIONS  (STANDING):  ALBUTerol    90 MICROgram(s) HFA Inhaler 2 Puff(s) Inhalation every 6 hours  aspirin  chewable 81 milliGRAM(s) Oral daily  budesonide  80 MICROgram(s)/formoterol 4.5 MICROgram(s) Inhaler 2 Puff(s) Inhalation two times a day  chlorhexidine 4% Liquid 1 Application(s) Topical <User Schedule>  clopidogrel Tablet 75 milliGRAM(s) Oral daily  furosemide   Injectable 40 milliGRAM(s) IV Push two times a day  influenza   Vaccine 0.5 milliLiter(s) IntraMuscular once  levothyroxine 100 MICROGram(s) Oral daily  meropenem  IVPB 750 milliGRAM(s) IV Intermittent every 24 hours  nicotine -   7 mG/24Hr(s) Patch 1 patch Transdermal daily  norepinephrine Infusion 0.05 MICROgram(s)/kG/Min (5.63 mL/Hr) IV Continuous <Continuous>  pantoprazole  Injectable 40 milliGRAM(s) IV Push two times a day  senna 2 Tablet(s) Oral at bedtime  sodium bicarbonate 1300 milliGRAM(s) Oral three times a day  sucralfate 1 Gram(s) Oral four times a day    MEDICATIONS  (PRN):  acetaminophen   Tablet .. 650 milliGRAM(s) Oral every 6 hours PRN Moderate Pain (4 - 6)      RADIOLOGY & ADDITIONAL STUDIES:         OVERNIGHT EVENTS: events noted, after bipap/ lasix patient improved, LA improved, CE reviewed, however still on BIPAP 60 %, Agitated, no fever    Vital Signs Last 24 Hrs  T(C): 36.8 (08 Nov 2020 04:00), Max: 36.8 (08 Nov 2020 04:00)  T(F): 98.2 (08 Nov 2020 04:00), Max: 98.2 (08 Nov 2020 04:00)  HR: 110 (08 Nov 2020 04:00) (68 - 110)  BP: 158/96 (08 Nov 2020 04:00) (140/88 - 184/88)  BP(mean): 106 (07 Nov 2020 12:16) (106 - 126)  RR: 22 (08 Nov 2020 04:00) (20 - 24)  SpO2: 95% (08 Nov 2020 04:00) (95% - 100%)    PHYSICAL EXAMINATION:    GENERAL: ill looking    HEENT: Head is normocephalic and atraumatic. Extraocular muscles are intact. Mucous membranes are moist.    NECK: Supple.    LUNGS: bl crackles    HEART: MARY ANNE 3/6    ABDOMEN: Soft, nontender, and nondistended.      EXTREMITIES: Without any cyanosis, clubbing, rash, lesions or edema.    NEUROLOGIC: non focal    SKIN: No ulceration or induration present.      LABS:                        9.0    4.94  )-----------( 249      ( 07 Nov 2020 08:02 )             31.4     11-07    142  |  105  |  16  ----------------------------<  86  5.5<H>   |  24  |  0.9    Ca    7.8<L>      07 Nov 2020 16:16  Phos  3.1     11-07  Mg     1.4     11-07    TPro  6.5  /  Alb  3.1<L>  /  TBili  1.4<H>  /  DBili  x   /  AST  71<H>  /  ALT  47<H>  /  AlkPhos  113  11-07    PT/INR - ( 07 Nov 2020 08:02 )   PT: 21.50 sec;   INR: 1.87 ratio         PTT - ( 06 Nov 2020 07:36 )  PTT:31.3 sec    ABG - ( 07 Nov 2020 11:30 )  pH, Arterial: 7.42  pH, Blood: x     /  pCO2: 42    /  pO2: 52    / HCO3: 27    / Base Excess: 2.5   /  SaO2: 85                CARDIAC MARKERS ( 07 Nov 2020 16:16 )  x     / 0.31 ng/mL / 322 U/L / x     / 12.6 ng/mL          Lactate, Blood: 10.3 mmol/L (11-07-20 @ 08:02)    Procalcitonin, Serum: 0.20 ng/mL (11-06-20 @ 07:36)        11-07-20 @ 07:01  -  11-08-20 @ 07:00  --------------------------------------------------------  IN: 0 mL / OUT: 2300 mL / NET: -2300 mL        MICROBIOLOGY:  Culture Results:   No growth to date. (11-06 @ 07:36)      MEDICATIONS  (STANDING):  ALBUTerol    90 MICROgram(s) HFA Inhaler 2 Puff(s) Inhalation every 6 hours  aspirin  chewable 81 milliGRAM(s) Oral daily  budesonide  80 MICROgram(s)/formoterol 4.5 MICROgram(s) Inhaler 2 Puff(s) Inhalation two times a day  chlorhexidine 4% Liquid 1 Application(s) Topical <User Schedule>  clopidogrel Tablet 75 milliGRAM(s) Oral daily  furosemide   Injectable 40 milliGRAM(s) IV Push two times a day  influenza   Vaccine 0.5 milliLiter(s) IntraMuscular once  levothyroxine 100 MICROGram(s) Oral daily  meropenem  IVPB 750 milliGRAM(s) IV Intermittent every 24 hours  nicotine -   7 mG/24Hr(s) Patch 1 patch Transdermal daily  norepinephrine Infusion 0.05 MICROgram(s)/kG/Min (5.63 mL/Hr) IV Continuous <Continuous>  pantoprazole  Injectable 40 milliGRAM(s) IV Push two times a day  senna 2 Tablet(s) Oral at bedtime  sodium bicarbonate 1300 milliGRAM(s) Oral three times a day  sucralfate 1 Gram(s) Oral four times a day    MEDICATIONS  (PRN):  acetaminophen   Tablet .. 650 milliGRAM(s) Oral every 6 hours PRN Moderate Pain (4 - 6)      RADIOLOGY & ADDITIONAL STUDIES:         OVERNIGHT EVENTS: events noted, on BIPAP 60%, s/p lasix, dec LA however still tachypneic, CE reviewed, agitated overnight    Vital Signs Last 24 Hrs  T(C): 36.8 (08 Nov 2020 04:00), Max: 36.8 (08 Nov 2020 04:00)  T(F): 98.2 (08 Nov 2020 04:00), Max: 98.2 (08 Nov 2020 04:00)  HR: 110 (08 Nov 2020 04:00) (68 - 110)  BP: 158/96 (08 Nov 2020 04:00) (140/88 - 184/88)  BP(mean): 106 (07 Nov 2020 12:16) (106 - 126)  RR: 22 (08 Nov 2020 04:00) (20 - 24)  SpO2: 95% (08 Nov 2020 04:00) (95% - 100%)    PHYSICAL EXAMINATION:    GENERAL: ill looking    HEENT: Head is normocephalic and atraumatic.    NECK: Supple.    LUNGS: bl crackles    HEART: MARY ANNE 3/6    ABDOMEN: Soft, nontender, and nondistended.      EXTREMITIES: Without any cyanosis, clubbing, rash, lesions or edema.    NEUROLOGIC: non focal    SKIN: No ulceration or induration present.      LABS:                        9.0    4.94  )-----------( 249      ( 07 Nov 2020 08:02 )             31.4     11-07    142  |  105  |  16  ----------------------------<  86  5.5<H>   |  24  |  0.9    Ca    7.8<L>      07 Nov 2020 16:16  Phos  3.1     11-07  Mg     1.4     11-07    TPro  6.5  /  Alb  3.1<L>  /  TBili  1.4<H>  /  DBili  x   /  AST  71<H>  /  ALT  47<H>  /  AlkPhos  113  11-07    PT/INR - ( 07 Nov 2020 08:02 )   PT: 21.50 sec;   INR: 1.87 ratio         PTT - ( 06 Nov 2020 07:36 )  PTT:31.3 sec    ABG - ( 07 Nov 2020 11:30 )  pH, Arterial: 7.42  pH, Blood: x     /  pCO2: 42    /  pO2: 52    / HCO3: 27    / Base Excess: 2.5   /  SaO2: 85                CARDIAC MARKERS ( 07 Nov 2020 16:16 )  x     / 0.31 ng/mL / 322 U/L / x     / 12.6 ng/mL          Lactate, Blood: 10.3 mmol/L (11-07-20 @ 08:02)    Procalcitonin, Serum: 0.20 ng/mL (11-06-20 @ 07:36)        11-07-20 @ 07:01  -  11-08-20 @ 07:00  --------------------------------------------------------  IN: 0 mL / OUT: 2300 mL / NET: -2300 mL        MICROBIOLOGY:  Culture Results:   No growth to date. (11-06 @ 07:36)      MEDICATIONS  (STANDING):  ALBUTerol    90 MICROgram(s) HFA Inhaler 2 Puff(s) Inhalation every 6 hours  aspirin  chewable 81 milliGRAM(s) Oral daily  budesonide  80 MICROgram(s)/formoterol 4.5 MICROgram(s) Inhaler 2 Puff(s) Inhalation two times a day  chlorhexidine 4% Liquid 1 Application(s) Topical <User Schedule>  clopidogrel Tablet 75 milliGRAM(s) Oral daily  furosemide   Injectable 40 milliGRAM(s) IV Push two times a day  influenza   Vaccine 0.5 milliLiter(s) IntraMuscular once  levothyroxine 100 MICROGram(s) Oral daily  meropenem  IVPB 750 milliGRAM(s) IV Intermittent every 24 hours  nicotine -   7 mG/24Hr(s) Patch 1 patch Transdermal daily  norepinephrine Infusion 0.05 MICROgram(s)/kG/Min (5.63 mL/Hr) IV Continuous <Continuous>  pantoprazole  Injectable 40 milliGRAM(s) IV Push two times a day  senna 2 Tablet(s) Oral at bedtime  sodium bicarbonate 1300 milliGRAM(s) Oral three times a day  sucralfate 1 Gram(s) Oral four times a day    MEDICATIONS  (PRN):  acetaminophen   Tablet .. 650 milliGRAM(s) Oral every 6 hours PRN Moderate Pain (4 - 6)      RADIOLOGY & ADDITIONAL STUDIES:

## 2020-11-08 NOTE — CHART NOTE - NSCHARTNOTEFT_GEN_A_CORE
review CXR, worsening l side opacity, bed side us, mid anechoic effusion, will continue diuresis, repeat CXR, spoke at length with daughter full code for now, will upgrade to MICU

## 2020-11-08 NOTE — CHART NOTE - NSCHARTNOTEFT_GEN_A_CORE
56 y/o female active smoker with PMHx of Type II DM, HFpEF (EF of 72 %), CAD, hyperlipidemia, UGIB 2/2 duodenal ulcer, HTN, Hypothyroidism, COPD, Dextrocardia, Sciatica/Peripheral Neuralgia, and Charcot foot deformity s/p reconstruction on 1/11/19 presents to the ED for worsening lethargy. pt admitted to stepdown unit for sepsis unclear source, type 2 NSTEMI and has been BiPAP dependent with tachypnea. pt to be upgraded to MICU for close observation.     Sepsis present on admission: inclear souce  - off levophed  - Possible  source?  - blood and urince Cx are negative for now  - on meropenem, finish ABx course (till 11/10)  - ID following    COPD  - Has severe Pulmonary Hypertension  - continue with inhalers  - Fluid overloaded today. CXR is worsening.   - Stop IV fluids. c/w Lasix BID IV. BIPAP as needed.     Acute Kidney- resolved   - likely pre-renal/ATN due to hypotension  - d/c sodium bicarbonate  - Follow up with Nephrology.     NSTEMI  - Normal left ventricular size and wall thicknesses, with normal systolic and diastolic function with severe pulmonary hypertension  - Cardiologist on board, possible cardiac catheterization when stable  - On Aspirin and Plavix    History of duodenal ulcer  - no gastrointestinal bleed for now  - on protonix  - Sucralfate  - oral feeds  - Keep active type and screen    Type II Diabetes Mellitus  - Follow up FS  - Insulin protocol if needed.     History of Hypothyroidism  - Continue home meds once stable    Diet: Advance as tolerated  Activity : Increase as tolerated  DVT Prophylaxis - SCD  GI Prophylaxis - PPI  Disposition: MICU  Code Status: Full

## 2020-11-08 NOTE — PROGRESS NOTE ADULT - ASSESSMENT
Impression:    Sepsis present on admission  hypotension led to hypoperfusion/ increase LA/ JEEVAN ( prerenal/ ATN) improving  Type2 NSTEMI  HFpEF (EF of 72 %)  HO CAD  UGIB 2/2 duodenal ulcer  HO COPD  Severe Pulm HTN  Fluid overload      Plan     CNS: Avoid CNS depressants.     CVS: ASA/Plavix.  FU wiht cardiology.  Cardiology consult appreciated.  Need R/L heart cath, Lasix 40 IV q 12    PULMONARY: HOB @ 45 degrees.  Aspiration precautions.  c/w home Inhalers, bipap at night and as needed    INFECTIOUS DISEASE: UCx and BCx.  Merrem for now.  ID REVIEWED CT AP when stable    GI: GI PPx w/ IV PPI q12.  Sucralfate.  po feeds    RENAL and acid base: Follow up serum and urine lytes.  Correct as needed.   FU with Renal.  DC PO BICARB, F/UP CMP    Endocrine: Follow up FS.  Insulin protocol if needed.     Hematology: DVT PPx w/ SCD.  Active T&S. FU CBC     SDU  poor prognosis Impression:    Sepsis present on admission  hypotension led to hypoperfusion/ increase LA/ JEEVAN ( prerenal/ ATN) improving  Type2 NSTEMI  HFpEF (EF of 72 %)  HO CAD  UGIB 2/2 duodenal ulcer  HO COPD  Severe Pulm HTN  Fluid overload  agitation      Plan     CNS: Avoid CNS depressants. check ammonia level, head CT when stable    CVS: ASA/Plavix.  FU with cardiology.  Cardiology consult appreciated.  Need R/L heart cath, Lasix 40 IV q 12 for now    PULMONARY: HOB @ 45 degrees.  Aspiration precautions.  c/w home Inhalers, bipap at night and as needed, repeat ABG, repeat CXR    INFECTIOUS DISEASE: UCx and BCx.  Merrem for now.  ID REVIEWED CT AP when stable    GI: GI PPx w/ IV PPI q12.  Sucralfate.  po feeds    RENAL and acid base: Follow up serum and urine lytes.  Correct as needed.   FU with Renal.  DC PO BICARB, F/UP CMP    Endocrine: Follow up FS.  Insulin protocol if needed.     Hematology: DVT PPx w/ SCD.  Active T&S. FU CBC , LE doppler    MICU  poor prognosis Impression:    Sepsis present on admission  hypotension led to hypoperfusion/ increase LA/ JEEVAN ( prerenal/ ATN) improving  Type2 NSTEMI  HFpEF (EF of 72 %)  HO CAD  UGIB 2/2 duodenal ulcer  HO COPD  Severe Pulm HTN  Fluid overload  agitation      Plan     CNS: Avoid CNS depressants. check ammonia level, head CT when stable    CVS: ASA/Plavix.  FU with cardiology.  Cardiology consult appreciated.  Need R/L heart cath, Lasix 40 IV q 12 for now    PULMONARY: HOB @ 45 degrees.  Aspiration precautions.  c/w home Inhalers, bipap at night and as needed, repeat ABG, repeat CXR    INFECTIOUS DISEASE: UCx and BCx.  Merrem for now.  ID REVIEWED CT AP when stable    GI: GI PPx w/ IV PPI q12.  Sucralfate.  po feeds    RENAL and acid base: Follow up serum and urine lytes.  Correct as needed.   FU with Renal.  DC PO BICARB, F/UP CMP    Endocrine: Follow up FS.  Insulin protocol if needed.     Hematology: DVT PPx w/ SCD.  Active T&S. FU CBC , LE doppler    MICU  poor prognosis  review CXR today ,worsening l side opacity/ ? aspiration/ effusion already on abx,, bedside us

## 2020-11-09 LAB
ALBUMIN SERPL ELPH-MCNC: 3.2 G/DL — LOW (ref 3.5–5.2)
ALBUMIN SERPL ELPH-MCNC: 3.3 G/DL — LOW (ref 3.5–5.2)
ALDOST SERPL-MCNC: 19.1 NG/DL — SIGNIFICANT CHANGE UP
ALP SERPL-CCNC: 112 U/L — SIGNIFICANT CHANGE UP (ref 30–115)
ALP SERPL-CCNC: 117 U/L — HIGH (ref 30–115)
ALT FLD-CCNC: 34 U/L — SIGNIFICANT CHANGE UP (ref 0–41)
ALT FLD-CCNC: 37 U/L — SIGNIFICANT CHANGE UP (ref 0–41)
AMMONIA BLD-MCNC: 32 UMOL/L — SIGNIFICANT CHANGE UP (ref 11–55)
ANION GAP SERPL CALC-SCNC: 15 MMOL/L — HIGH (ref 7–14)
ANION GAP SERPL CALC-SCNC: 18 MMOL/L — HIGH (ref 7–14)
AST SERPL-CCNC: 37 U/L — SIGNIFICANT CHANGE UP (ref 0–41)
AST SERPL-CCNC: 42 U/L — HIGH (ref 0–41)
BASOPHILS # BLD AUTO: 0.01 K/UL — SIGNIFICANT CHANGE UP (ref 0–0.2)
BASOPHILS NFR BLD AUTO: 0.1 % — SIGNIFICANT CHANGE UP (ref 0–1)
BILIRUB SERPL-MCNC: 1.4 MG/DL — HIGH (ref 0.2–1.2)
BILIRUB SERPL-MCNC: 1.6 MG/DL — HIGH (ref 0.2–1.2)
BUN SERPL-MCNC: 22 MG/DL — HIGH (ref 10–20)
BUN SERPL-MCNC: 24 MG/DL — HIGH (ref 10–20)
CALCIUM SERPL-MCNC: 8.1 MG/DL — LOW (ref 8.5–10.1)
CALCIUM SERPL-MCNC: 8.3 MG/DL — LOW (ref 8.5–10.1)
CHLORIDE SERPL-SCNC: 107 MMOL/L — SIGNIFICANT CHANGE UP (ref 98–110)
CHLORIDE SERPL-SCNC: 107 MMOL/L — SIGNIFICANT CHANGE UP (ref 98–110)
CO2 SERPL-SCNC: 25 MMOL/L — SIGNIFICANT CHANGE UP (ref 17–32)
CO2 SERPL-SCNC: 26 MMOL/L — SIGNIFICANT CHANGE UP (ref 17–32)
CREAT SERPL-MCNC: 1.1 MG/DL — SIGNIFICANT CHANGE UP (ref 0.7–1.5)
CREAT SERPL-MCNC: 1.2 MG/DL — SIGNIFICANT CHANGE UP (ref 0.7–1.5)
EOSINOPHIL # BLD AUTO: 0.01 K/UL — SIGNIFICANT CHANGE UP (ref 0–0.7)
EOSINOPHIL NFR BLD AUTO: 0.1 % — SIGNIFICANT CHANGE UP (ref 0–8)
GLUCOSE BLDC GLUCOMTR-MCNC: 157 MG/DL — HIGH (ref 70–99)
GLUCOSE SERPL-MCNC: 110 MG/DL — HIGH (ref 70–99)
GLUCOSE SERPL-MCNC: 144 MG/DL — HIGH (ref 70–99)
HCT VFR BLD CALC: 30.4 % — LOW (ref 37–47)
HGB BLD-MCNC: 8.7 G/DL — LOW (ref 12–16)
IMM GRANULOCYTES NFR BLD AUTO: 1.3 % — HIGH (ref 0.1–0.3)
LYMPHOCYTES # BLD AUTO: 0.88 K/UL — LOW (ref 1.2–3.4)
LYMPHOCYTES # BLD AUTO: 11.5 % — LOW (ref 20.5–51.1)
MAGNESIUM SERPL-MCNC: 1 MG/DL — LOW (ref 1.8–2.4)
MAGNESIUM SERPL-MCNC: 2.8 MG/DL — HIGH (ref 1.8–2.4)
MCHC RBC-ENTMCNC: 25.8 PG — LOW (ref 27–31)
MCHC RBC-ENTMCNC: 28.6 G/DL — LOW (ref 32–37)
MCV RBC AUTO: 90.2 FL — SIGNIFICANT CHANGE UP (ref 81–99)
MONOCYTES # BLD AUTO: 0.86 K/UL — HIGH (ref 0.1–0.6)
MONOCYTES NFR BLD AUTO: 11.3 % — HIGH (ref 1.7–9.3)
NEUTROPHILS # BLD AUTO: 5.77 K/UL — SIGNIFICANT CHANGE UP (ref 1.4–6.5)
NEUTROPHILS NFR BLD AUTO: 75.7 % — HIGH (ref 42.2–75.2)
NRBC # BLD: 1 /100 WBCS — HIGH (ref 0–0)
PLATELET # BLD AUTO: 187 K/UL — SIGNIFICANT CHANGE UP (ref 130–400)
POTASSIUM SERPL-MCNC: 4 MMOL/L — SIGNIFICANT CHANGE UP (ref 3.5–5)
POTASSIUM SERPL-MCNC: 4 MMOL/L — SIGNIFICANT CHANGE UP (ref 3.5–5)
POTASSIUM SERPL-SCNC: 4 MMOL/L — SIGNIFICANT CHANGE UP (ref 3.5–5)
POTASSIUM SERPL-SCNC: 4 MMOL/L — SIGNIFICANT CHANGE UP (ref 3.5–5)
PROT SERPL-MCNC: 6.6 G/DL — SIGNIFICANT CHANGE UP (ref 6–8)
PROT SERPL-MCNC: 6.7 G/DL — SIGNIFICANT CHANGE UP (ref 6–8)
RBC # BLD: 3.37 M/UL — LOW (ref 4.2–5.4)
RBC # FLD: 21.1 % — HIGH (ref 11.5–14.5)
SODIUM SERPL-SCNC: 148 MMOL/L — HIGH (ref 135–146)
SODIUM SERPL-SCNC: 150 MMOL/L — HIGH (ref 135–146)
UUN UR-MCNC: 451 MG/DL — SIGNIFICANT CHANGE UP
WBC # BLD: 7.63 K/UL — SIGNIFICANT CHANGE UP (ref 4.8–10.8)
WBC # FLD AUTO: 7.63 K/UL — SIGNIFICANT CHANGE UP (ref 4.8–10.8)

## 2020-11-09 PROCEDURE — 71045 X-RAY EXAM CHEST 1 VIEW: CPT | Mod: 26

## 2020-11-09 RX ORDER — MAGNESIUM SULFATE 500 MG/ML
2 VIAL (ML) INJECTION ONCE
Refills: 0 | Status: DISCONTINUED | OUTPATIENT
Start: 2020-11-09 | End: 2020-11-09

## 2020-11-09 RX ORDER — MEROPENEM 1 G/30ML
1000 INJECTION INTRAVENOUS EVERY 8 HOURS
Refills: 0 | Status: COMPLETED | OUTPATIENT
Start: 2020-11-09 | End: 2020-11-10

## 2020-11-09 RX ORDER — IPRATROPIUM/ALBUTEROL SULFATE 18-103MCG
3 AEROSOL WITH ADAPTER (GRAM) INHALATION EVERY 6 HOURS
Refills: 0 | Status: DISCONTINUED | OUTPATIENT
Start: 2020-11-09 | End: 2020-11-10

## 2020-11-09 RX ORDER — METOPROLOL TARTRATE 50 MG
5 TABLET ORAL EVERY 8 HOURS
Refills: 0 | Status: DISCONTINUED | OUTPATIENT
Start: 2020-11-09 | End: 2020-11-09

## 2020-11-09 RX ORDER — LEVOTHYROXINE SODIUM 125 MCG
50 TABLET ORAL AT BEDTIME
Refills: 0 | Status: DISCONTINUED | OUTPATIENT
Start: 2020-11-09 | End: 2020-11-11

## 2020-11-09 RX ORDER — MAGNESIUM SULFATE 500 MG/ML
2 VIAL (ML) INJECTION
Refills: 0 | Status: DISCONTINUED | OUTPATIENT
Start: 2020-11-09 | End: 2020-11-09

## 2020-11-09 RX ORDER — MEROPENEM 1 G/30ML
1000 INJECTION INTRAVENOUS EVERY 8 HOURS
Refills: 0 | Status: DISCONTINUED | OUTPATIENT
Start: 2020-11-09 | End: 2020-11-09

## 2020-11-09 RX ORDER — LEVOTHYROXINE SODIUM 125 MCG
100 TABLET ORAL AT BEDTIME
Refills: 0 | Status: DISCONTINUED | OUTPATIENT
Start: 2020-11-09 | End: 2020-11-09

## 2020-11-09 RX ORDER — SODIUM CHLORIDE 9 MG/ML
1000 INJECTION, SOLUTION INTRAVENOUS
Refills: 0 | Status: DISCONTINUED | OUTPATIENT
Start: 2020-11-09 | End: 2020-11-10

## 2020-11-09 RX ORDER — METOPROLOL TARTRATE 50 MG
5 TABLET ORAL EVERY 6 HOURS
Refills: 0 | Status: DISCONTINUED | OUTPATIENT
Start: 2020-11-09 | End: 2020-11-11

## 2020-11-09 RX ORDER — MAGNESIUM SULFATE 500 MG/ML
2 VIAL (ML) INJECTION
Refills: 0 | Status: COMPLETED | OUTPATIENT
Start: 2020-11-09 | End: 2020-11-09

## 2020-11-09 RX ADMIN — Medication 40 MILLIGRAM(S): at 17:06

## 2020-11-09 RX ADMIN — MEROPENEM 100 MILLIGRAM(S): 1 INJECTION INTRAVENOUS at 23:02

## 2020-11-09 RX ADMIN — Medication 5 MILLIGRAM(S): at 09:36

## 2020-11-09 RX ADMIN — CLOPIDOGREL BISULFATE 75 MILLIGRAM(S): 75 TABLET, FILM COATED ORAL at 17:05

## 2020-11-09 RX ADMIN — PANTOPRAZOLE SODIUM 40 MILLIGRAM(S): 20 TABLET, DELAYED RELEASE ORAL at 05:01

## 2020-11-09 RX ADMIN — Medication 81 MILLIGRAM(S): at 17:05

## 2020-11-09 RX ADMIN — Medication 50 GRAM(S): at 10:15

## 2020-11-09 RX ADMIN — Medication 3 MILLILITER(S): at 20:36

## 2020-11-09 RX ADMIN — Medication 1 MILLIGRAM(S): at 05:01

## 2020-11-09 RX ADMIN — CHLORHEXIDINE GLUCONATE 1 APPLICATION(S): 213 SOLUTION TOPICAL at 06:19

## 2020-11-09 RX ADMIN — Medication 50 GRAM(S): at 08:00

## 2020-11-09 RX ADMIN — MEROPENEM 100 MILLIGRAM(S): 1 INJECTION INTRAVENOUS at 06:19

## 2020-11-09 RX ADMIN — Medication 5 MILLIGRAM(S): at 23:02

## 2020-11-09 RX ADMIN — PANTOPRAZOLE SODIUM 40 MILLIGRAM(S): 20 TABLET, DELAYED RELEASE ORAL at 17:05

## 2020-11-09 RX ADMIN — SODIUM CHLORIDE 50 MILLILITER(S): 9 INJECTION, SOLUTION INTRAVENOUS at 09:42

## 2020-11-09 RX ADMIN — MEROPENEM 100 MILLIGRAM(S): 1 INJECTION INTRAVENOUS at 13:06

## 2020-11-09 RX ADMIN — Medication 50 GRAM(S): at 12:19

## 2020-11-09 RX ADMIN — Medication 1 GRAM(S): at 17:05

## 2020-11-09 RX ADMIN — Medication 40 MILLIGRAM(S): at 05:01

## 2020-11-09 RX ADMIN — Medication 5 MILLIGRAM(S): at 17:05

## 2020-11-09 NOTE — PROGRESS NOTE ADULT - ASSESSMENT
Impression:    Sepsis present on admission  Type2 NSTEMI  HFpEF (EF of 72 %)  HO CAD  UGIB 2/2 duodenal ulcer  HO COPD  Severe Pulm HTN  Fluid overload / Pulmonary HTN         Plan     CNS: Avoid CNS depressants.     CVS: ASA/Plavix.  FU with cardiology.  Cardiology consult appreciated.  Continue Diuresis after repleting Mg.  Rate Control.  D5W 50 cc per hour      PULMONARY: HOB @ 45 degrees.  Aspiration precautions.  Continue BiPAP 18/12.  Wean O2 as tolerated      INFECTIOUS DISEASE:  Finish ABX course     GI: GI PPx.  NPO for now     RENAL and acid base: Follow up serum and urine lytes.  Correct as needed.       Endocrine: Follow up FS.  Insulin protocol if needed.     Hematology: DVT PPx w/ SCD.  Active T&S. FU CBC    MICU    Poor prognosis

## 2020-11-09 NOTE — PROGRESS NOTE ADULT - SUBJECTIVE AND OBJECTIVE BOX
Patient is a 55y old  Female who presents with a chief complaint of sepsis (08 Nov 2020 07:26)        Over Night Events:  Remains BiPAP dependent.  On 60% O2.  BiPAP 14/8.  Off pressors.          ROS:     All ROS are negative except HPI         PHYSICAL EXAM    ICU Vital Signs Last 24 Hrs  T(C): 36.4 (09 Nov 2020 08:00), Max: 37.7 (08 Nov 2020 17:00)  T(F): 97.5 (09 Nov 2020 08:00), Max: 99.9 (08 Nov 2020 17:00)  HR: 114 (09 Nov 2020 09:00) (102 - 120)  BP: 156/90 (09 Nov 2020 09:00) (142/88 - 168/72)  BP(mean): 122 (09 Nov 2020 09:00) (98 - 134)  ABP: --  ABP(mean): --  RR: 43 (09 Nov 2020 09:00) (19 - 49)  SpO2: 95% (09 Nov 2020 09:00) (91% - 99%)      CONSTITUTIONAL:  Well nourished.  NAD    ENT:   Airway patent,   Mouth with normal mucosa.   No thrush    EYES:   Pupils equal,   Round and reactive to light.    CARDIAC:   Tachycardic   Regular rhythm.    No edema      Vascular:  Normal systolic impulse  No Carotid bruits    RESPIRATORY:   No wheezing  Bilateral BS  Normal chest expansion  Not tachypneic,  No use of accessory muscles    GASTROINTESTINAL:  Abdomen soft,   Non-tender,   No guarding,   + BS    MUSCULOSKELETAL:   Range of motion is not limited,  No clubbing, cyanosis    NEUROLOGICAL:   Alert and oriented   No motor  deficits.    SKIN:   Skin normal color for race,   Warm and dry   No evidence of rash.    PSYCHIATRIC:   Normal mood and affect.   No apparent risk to self or others.    HEMATOLOGICAL:  No cervical  lymphadenopathy.  no inguinal lymphadenopathy      11-08-20 @ 07:01  -  11-09-20 @ 07:00  --------------------------------------------------------  IN:  Total IN: 0 mL    OUT:    Ureteral Catheter (mL): 1820 mL  Total OUT: 1820 mL    Total NET: -1820 mL      11-09-20 @ 07:01  -  11-09-20 @ 09:15  --------------------------------------------------------  IN:  Total IN: 0 mL    OUT:    Ureteral Catheter (mL): 215 mL  Total OUT: 215 mL    Total NET: -215 mL          LABS:                            8.7    7.63  )-----------( 187      ( 09 Nov 2020 04:41 )             30.4                                               11-09    148<H>  |  107  |  22<H>  ----------------------------<  110<H>  4.0   |  26  |  1.2    Ca    8.3<L>      09 Nov 2020 04:41  Mg     1.0     11-09    TPro  6.7  /  Alb  3.3<L>  /  TBili  1.6<H>  /  DBili  x   /  AST  42<H>  /  ALT  37  /  AlkPhos  117<H>  11-09                                                 CARDIAC MARKERS ( 07 Nov 2020 16:16 )  x     / 0.31 ng/mL / 322 U/L / x     / 12.6 ng/mL                                            LIVER FUNCTIONS - ( 09 Nov 2020 04:41 )  Alb: 3.3 g/dL / Pro: 6.7 g/dL / ALK PHOS: 117 U/L / ALT: 37 U/L / AST: 42 U/L / GGT: x                                                                                                                                   ABG - ( 09 Nov 2020 03:40 )  pH, Arterial: 7.40  pH, Blood: x     /  pCO2: 47    /  pO2: 112   / HCO3: 29    / Base Excess: 4.1   /  SaO2: 99                  MEDICATIONS  (STANDING):  ALBUTerol    90 MICROgram(s) HFA Inhaler 2 Puff(s) Inhalation every 6 hours  aspirin  chewable 81 milliGRAM(s) Oral daily  budesonide  80 MICROgram(s)/formoterol 4.5 MICROgram(s) Inhaler 2 Puff(s) Inhalation two times a day  chlorhexidine 4% Liquid 1 Application(s) Topical <User Schedule>  clopidogrel Tablet 75 milliGRAM(s) Oral daily  furosemide   Injectable 40 milliGRAM(s) IV Push two times a day  influenza   Vaccine 0.5 milliLiter(s) IntraMuscular once  levothyroxine Injectable 100 MICROGram(s) IV Push at bedtime  magnesium sulfate  IVPB 2 Gram(s) IV Intermittent every 2 hours  meropenem  IVPB 750 milliGRAM(s) IV Intermittent every 24 hours  nicotine -   7 mG/24Hr(s) Patch 1 patch Transdermal daily  norepinephrine Infusion 0.05 MICROgram(s)/kG/Min (5.63 mL/Hr) IV Continuous <Continuous>  pantoprazole  Injectable 40 milliGRAM(s) IV Push two times a day  senna 2 Tablet(s) Oral at bedtime  sucralfate 1 Gram(s) Oral four times a day    MEDICATIONS  (PRN):  acetaminophen   Tablet .. 650 milliGRAM(s) Oral every 6 hours PRN Moderate Pain (4 - 6)      New X-rays reviewed:                                                                                  ECHO    CXR interpreted by me:

## 2020-11-09 NOTE — PROGRESS NOTE ADULT - SUBJECTIVE AND OBJECTIVE BOX
Patient Information:  CALLY HARTMAN / 55y / Female / MRN#:[id number]    Hospital Day: 6d    Interval History:  Patient seen and examined at bedside. No acute events overnight.    Past Medical History:  Dextrocardia    Chronic midline low back pain with bilateral sciatica    Peripheral neuralgia    Essential hypertension    Diabetes 1.5, managed as type 2    Charcot&#x27;s arthropathy      Past Surgical History:  Charcot foot due to diabetes mellitus    H/O shoulder surgery     delivery delivered      Allergies:  No Known Allergies    Medications:  PRN:  acetaminophen   Tablet .. 650 milliGRAM(s) Oral every 6 hours PRN Moderate Pain (4 - 6)    Standing:  albuterol/ipratropium for Nebulization 3 milliLiter(s) Nebulizer every 6 hours  aspirin  chewable 81 milliGRAM(s) Oral daily  budesonide  80 MICROgram(s)/formoterol 4.5 MICROgram(s) Inhaler 2 Puff(s) Inhalation two times a day  chlorhexidine 4% Liquid 1 Application(s) Topical <User Schedule>  clopidogrel Tablet 75 milliGRAM(s) Oral daily  dextrose 5%. 1000 milliLiter(s) (50 mL/Hr) IV Continuous <Continuous>  furosemide   Injectable 40 milliGRAM(s) IV Push two times a day  influenza   Vaccine 0.5 milliLiter(s) IntraMuscular once  levothyroxine Injectable 50 MICROGram(s) IV Push at bedtime  meropenem  IVPB 1000 milliGRAM(s) IV Intermittent every 8 hours  metoprolol tartrate Injectable 5 milliGRAM(s) IV Push every 6 hours  pantoprazole  Injectable 40 milliGRAM(s) IV Push two times a day  senna 2 Tablet(s) Oral at bedtime  sucralfate 1 Gram(s) Oral four times a day    Home:  budesonide-formoterol 80 mcg-4.5 mcg/inh inhalation aerosol: 2 puff(s) inhaled 2 times a day  Lyrica 150 mg oral capsule: 1 tab(s) orally 3 times a day, stop after   tiZANidine 4 mg oral tablet: 2 tab(s) orally every 8 hours, As Needed  Ventolin HFA 90 mcg/inh inhalation aerosol: 2 puff(s) inhaled 4 times a day, As Needed    Vitals:  T(C): 36.2, Max: 37.7 (20 @ 17:00)  T(F): 97.2, Max: 99.9 (20 @ 17:00)  HR: 98 (94 - 120)  BP: 162/95 (142/88 - 164/84)  RR: 19 (19 - 49)  SpO2: 100% (91% - 100%)    Physical Exam:  General: Awake, alert, NAD  HEENT: Head NC/AT  Heart: tachycardic, regular rhythm  Lungs: Clear to auscultation bilaterally, no wheezing, rales or rhonchi  Abdomen: Soft, nontender, nondistended, BS+  Extremities: No edema, clubbing, cyanosis in upper or lower extremities.  Neuro: NFD    Labs:  CBC ( @ 04:41)                        Hgb: 8.7    WBC: 7.63  )-----------------( Plts: 187                              Hct: 30.4     Chem ( @ 04:41)  Na: 148  |     Cl: 107     |  BUN: 22  -----------------------------------------< Gluc: 110    K: 4.0   |    HCO3: 26  |  Cr: 1.2    Ca 8.3 ( @ 04:41)  Mg 1.0 ( @ 04:41)    LFTs ( @ 04:41)  TPro 6.7  /  Alb 3.3  TBili 1.6  /  DBili     AST 42  /  ALT 37  /  AlkPhos 117    Cardiac Markers ( @ 06:47)  Troponin I X  Troponin T X  CK X  CKMB X  CKMB Units X  Myoglobin X  Lactate 4.0  ESR X    Cardiac Markers ( @ 16:16)  Troponin I X  Troponin T 0.31    CKMB X  CKMB Units 12.6  Myoglobin X  Lactate X  ESR X    Cardiac Markers ( @ 08:02)  Troponin I X  Troponin T X  CK X  CKMB X  CKMB Units X  Myoglobin X  Lactate 10.3  ESR X            Microbiology:  Culture - Blood (collected  @ 07:36)  Source: .Blood None  Preliminary Report ( @ 18:01):    No growth to date.    Culture - Urine (collected  @ 18:00)  Source: .Urine Clean Catch (Midstream)  Final Report ( @ 17:11):    No growth    Culture - Blood (collected  @ 16:42)  Source: .Blood Blood-Peripheral  Final Report ( @ 23:01):    No Growth Final    Culture - Blood (collected  @ 16:42)  Source: .Blood Blood-Peripheral  Final Report ( @ 23:01):    No Growth Final        Radiology:

## 2020-11-09 NOTE — PROGRESS NOTE ADULT - ASSESSMENT
56 y/o female active smoker with PMHx of Type II DM, HFpEF (EF of 72 %), CAD, hyperlipidemia, UGIB 2/2 duodenal ulcer, HTN, Hypothyroidism, COPD, Dextrocardia, Sciatica/Peripheral Neuralgia, and Charcot foot deformity s/p reconstruction on 1/11/19 presents to the ED for worsening lethargy. pt admitted to stepdown unit for sepsis unclear source, type 2 NSTEMI and has been BiPAP dependent with tachypnea. pt upgraded to MICU for close observation.     #Sepsis present on admission, unclear souce  - Off levophed.  - Possible  source?  - Blood and urine Cx are negative.  - On meropenem, finish ABx course (till 11/10) per ID.    #HFpEF  - Echo on 10/30: EF 62%.  - CXR improving.  - Left sided pulmonary edema on US.   - Wean off O2 as tolerated.  - c/w Lasix BID IV. BIPAP as needed.     #COPD  - Severe Pulmonary Hypertension evident on echo.  - c/w inhalers.    #JEEVAN- resolved   - Cr 3.8 on admission.  - Likely pre-renal/ATN due to hypotension.  - D5W at 50 cc/hr for developing hypernatremia.    #NSTEMI  - Troponin 1.05 on admission -> 0.31.  - Normal LV size and wall thickness, with normal systolic and diastolic function with severe pulmonary hypertension.  - Cardiology on board, possible cardiac catheterization when stable.  - On Aspirin and Plavix.  - Rate control: Lopressor q6.    #Hx of duodenal ulcer  - No evidence of GIB.  - c/w Protonix.  - Sucralfate.  - NPO for now.  - Keep active type and screen    #Type II Diabetes Mellitus  - Follow FS.  - Insulin protocol if needed.     #Hx of Hypothyroidism  - Continue home meds once stable.    Diet: NPO  Activity : Increase as tolerated  DVT Prophylaxis: SCD  GI Prophylaxis: PPI  Disposition: MICU  Code Status: Full

## 2020-11-10 LAB
ANION GAP SERPL CALC-SCNC: 19 MMOL/L — HIGH (ref 7–14)
BUN SERPL-MCNC: 29 MG/DL — HIGH (ref 10–20)
CALCIUM SERPL-MCNC: 8.4 MG/DL — LOW (ref 8.5–10.1)
CHLORIDE SERPL-SCNC: 104 MMOL/L — SIGNIFICANT CHANGE UP (ref 98–110)
CO2 SERPL-SCNC: 24 MMOL/L — SIGNIFICANT CHANGE UP (ref 17–32)
CREAT SERPL-MCNC: 1.2 MG/DL — SIGNIFICANT CHANGE UP (ref 0.7–1.5)
GAS PNL BLDA: SIGNIFICANT CHANGE UP
GLUCOSE BLDC GLUCOMTR-MCNC: 164 MG/DL — HIGH (ref 70–99)
GLUCOSE BLDC GLUCOMTR-MCNC: 165 MG/DL — HIGH (ref 70–99)
GLUCOSE SERPL-MCNC: 163 MG/DL — HIGH (ref 70–99)
HCT VFR BLD CALC: 32.4 % — LOW (ref 37–47)
HGB BLD-MCNC: 9.2 G/DL — LOW (ref 12–16)
MCHC RBC-ENTMCNC: 26.3 PG — LOW (ref 27–31)
MCHC RBC-ENTMCNC: 28.4 G/DL — LOW (ref 32–37)
MCV RBC AUTO: 92.6 FL — SIGNIFICANT CHANGE UP (ref 81–99)
NRBC # BLD: 2 /100 WBCS — HIGH (ref 0–0)
PLATELET # BLD AUTO: 188 K/UL — SIGNIFICANT CHANGE UP (ref 130–400)
POTASSIUM SERPL-MCNC: 3.8 MMOL/L — SIGNIFICANT CHANGE UP (ref 3.5–5)
POTASSIUM SERPL-SCNC: 3.8 MMOL/L — SIGNIFICANT CHANGE UP (ref 3.5–5)
RBC # BLD: 3.5 M/UL — LOW (ref 4.2–5.4)
RBC # FLD: 21.3 % — HIGH (ref 11.5–14.5)
SODIUM SERPL-SCNC: 147 MMOL/L — HIGH (ref 135–146)
WBC # BLD: 7.81 K/UL — SIGNIFICANT CHANGE UP (ref 4.8–10.8)
WBC # FLD AUTO: 7.81 K/UL — SIGNIFICANT CHANGE UP (ref 4.8–10.8)

## 2020-11-10 PROCEDURE — 71045 X-RAY EXAM CHEST 1 VIEW: CPT | Mod: 26

## 2020-11-10 RX ORDER — SODIUM CHLORIDE 9 MG/ML
1000 INJECTION, SOLUTION INTRAVENOUS
Refills: 0 | Status: DISCONTINUED | OUTPATIENT
Start: 2020-11-10 | End: 2020-11-11

## 2020-11-10 RX ORDER — FUROSEMIDE 40 MG
20 TABLET ORAL ONCE
Refills: 0 | Status: COMPLETED | OUTPATIENT
Start: 2020-11-10 | End: 2020-11-10

## 2020-11-10 RX ORDER — IPRATROPIUM/ALBUTEROL SULFATE 18-103MCG
3 AEROSOL WITH ADAPTER (GRAM) INHALATION EVERY 6 HOURS
Refills: 0 | Status: DISCONTINUED | OUTPATIENT
Start: 2020-11-10 | End: 2020-11-19

## 2020-11-10 RX ORDER — FUROSEMIDE 40 MG
60 TABLET ORAL EVERY 12 HOURS
Refills: 0 | Status: DISCONTINUED | OUTPATIENT
Start: 2020-11-10 | End: 2020-11-16

## 2020-11-10 RX ORDER — HEPARIN SODIUM 5000 [USP'U]/ML
5000 INJECTION INTRAVENOUS; SUBCUTANEOUS EVERY 8 HOURS
Refills: 0 | Status: DISCONTINUED | OUTPATIENT
Start: 2020-11-10 | End: 2020-11-19

## 2020-11-10 RX ORDER — OXYCODONE AND ACETAMINOPHEN 5; 325 MG/1; MG/1
1 TABLET ORAL EVERY 6 HOURS
Refills: 0 | Status: DISCONTINUED | OUTPATIENT
Start: 2020-11-10 | End: 2020-11-11

## 2020-11-10 RX ADMIN — PANTOPRAZOLE SODIUM 40 MILLIGRAM(S): 20 TABLET, DELAYED RELEASE ORAL at 17:01

## 2020-11-10 RX ADMIN — MEROPENEM 100 MILLIGRAM(S): 1 INJECTION INTRAVENOUS at 14:03

## 2020-11-10 RX ADMIN — Medication 1 GRAM(S): at 11:29

## 2020-11-10 RX ADMIN — Medication 1 GRAM(S): at 17:01

## 2020-11-10 RX ADMIN — HEPARIN SODIUM 5000 UNIT(S): 5000 INJECTION INTRAVENOUS; SUBCUTANEOUS at 21:12

## 2020-11-10 RX ADMIN — BUDESONIDE AND FORMOTEROL FUMARATE DIHYDRATE 2 PUFF(S): 160; 4.5 AEROSOL RESPIRATORY (INHALATION) at 21:13

## 2020-11-10 RX ADMIN — CLOPIDOGREL BISULFATE 75 MILLIGRAM(S): 75 TABLET, FILM COATED ORAL at 11:28

## 2020-11-10 RX ADMIN — Medication 60 MILLIGRAM(S): at 17:01

## 2020-11-10 RX ADMIN — Medication 3 MILLILITER(S): at 01:25

## 2020-11-10 RX ADMIN — OXYCODONE AND ACETAMINOPHEN 1 TABLET(S): 5; 325 TABLET ORAL at 15:17

## 2020-11-10 RX ADMIN — Medication 20 MILLIGRAM(S): at 10:01

## 2020-11-10 RX ADMIN — SODIUM CHLORIDE 100 MILLILITER(S): 9 INJECTION, SOLUTION INTRAVENOUS at 21:14

## 2020-11-10 RX ADMIN — Medication 50 MICROGRAM(S): at 00:16

## 2020-11-10 RX ADMIN — Medication 50 MICROGRAM(S): at 21:14

## 2020-11-10 RX ADMIN — Medication 40 MILLIGRAM(S): at 06:16

## 2020-11-10 RX ADMIN — PANTOPRAZOLE SODIUM 40 MILLIGRAM(S): 20 TABLET, DELAYED RELEASE ORAL at 06:15

## 2020-11-10 RX ADMIN — MEROPENEM 100 MILLIGRAM(S): 1 INJECTION INTRAVENOUS at 06:17

## 2020-11-10 RX ADMIN — Medication 5 MILLIGRAM(S): at 11:29

## 2020-11-10 RX ADMIN — OXYCODONE AND ACETAMINOPHEN 1 TABLET(S): 5; 325 TABLET ORAL at 15:44

## 2020-11-10 RX ADMIN — Medication 5 MILLIGRAM(S): at 17:02

## 2020-11-10 RX ADMIN — CHLORHEXIDINE GLUCONATE 1 APPLICATION(S): 213 SOLUTION TOPICAL at 07:01

## 2020-11-10 RX ADMIN — SENNA PLUS 2 TABLET(S): 8.6 TABLET ORAL at 21:13

## 2020-11-10 RX ADMIN — Medication 5 MILLIGRAM(S): at 06:18

## 2020-11-10 RX ADMIN — HEPARIN SODIUM 5000 UNIT(S): 5000 INJECTION INTRAVENOUS; SUBCUTANEOUS at 14:03

## 2020-11-10 RX ADMIN — Medication 81 MILLIGRAM(S): at 11:28

## 2020-11-10 NOTE — DIETITIAN INITIAL EVALUATION ADULT. - ETIOLOGY
current diet order in setting of requirement of the use of HFNC to reach certain threshold a/w acuity of illness

## 2020-11-10 NOTE — DIETITIAN INITIAL EVALUATION ADULT. - ADD RECOMMEND
RD has noticed that pt has transitioned to Carbohydrate Consistent Clear Liquid diet. HgbA1c is 5.7%. Continue to advance diet as tolerated. if suspected with chewing/swallowing difficulties, please consult speech and language pathologists for eval, or conduct a bedside eval by nursing. GOAL diet should be Carbohydrate Consistent w/ snack. (2) Monitor electrolytes.

## 2020-11-10 NOTE — PROGRESS NOTE ADULT - ASSESSMENT
54 y/o female active smoker with PMHx of Type II DM, HFpEF (EF of 72 %), CAD, hyperlipidemia, UGIB 2/2 duodenal ulcer, HTN, Hypothyroidism, COPD, Dextrocardia, Sciatica/Peripheral Neuralgia, and Charcot foot deformity s/p reconstruction on 1/11/19 presents to the ED for worsening lethargy. pt admitted to stepdown unit for sepsis unclear source, type 2 NSTEMI and has been BiPAP dependent with tachypnea. pt upgraded to MICU for close observation.     #Sepsis present on admission, unclear source  - Off levophed.  - Possible  source?  - Blood and urine Cx are negative.  - Last day of meropenem today (11/10) per ID.    #HFpEF  - Echo on 10/30: EF 62%.  - Worsening CXR with increased bilateral pleural effusions and bilateral opacities.  - Left sided pulmonary edema on US.   - Wean off O2 as tolerated.  - BIPAP alternating with HFNC as needed.    #COPD  - Severe Pulmonary Hypertension evident on echo.  - c/w inhalers.    #JEEVAN- resolved   - Cr 3.8 on admission.  - Likely pre-renal/ATN due to hypotension.  - D5W at 100 cc/hr for hypernatremia/free water deficit correction.    #NSTEMI  - Troponin 1.05 on admission -> 0.31.  - Normal LV size and wall thickness, with normal systolic and diastolic function with severe pulmonary hypertension.  - Cardiology on board, possible cardiac catheterization when stable.  - On Aspirin and Plavix.  - Rate control: Lopressor q6.    #Hx of duodenal ulcer  - No evidence of GIB.  - c/w Protonix.  - Sucralfate.  - If the patient tolerates HFNC, start clears.  - Keep active type and screen    #Type II Diabetes Mellitus  - Follow FS.  - Insulin protocol if needed.     #Hx of Hypothyroidism  - Continue home meds once stable.    Diet: clear liquids  Activity : Increase as tolerated  DVT Prophylaxis: heparin  GI Prophylaxis: PPI  Disposition: MICU  Code Status: Full

## 2020-11-10 NOTE — PROGRESS NOTE ADULT - SUBJECTIVE AND OBJECTIVE BOX
Patient Information:  CALLY HARTMAN / 55y / Female / MRN#:[id number]    Hospital Day: 7d    Interval History:  Patient seen and examined at bedside. No acute events overnight. The patient was able to tolerate high flow nasal cannula overnight and switched to BiPAP again this morning.    Past Medical History:  Dextrocardia    Chronic midline low back pain with bilateral sciatica    Peripheral neuralgia    Essential hypertension    Diabetes 1.5, managed as type 2    Charcot&#x27;s arthropathy      Past Surgical History:  Charcot foot due to diabetes mellitus    H/O shoulder surgery     delivery delivered      Allergies:  No Known Allergies    Medications:  PRN:  acetaminophen   Tablet .. 650 milliGRAM(s) Oral every 6 hours PRN Moderate Pain (4 - 6)  albuterol/ipratropium for Nebulization 3 milliLiter(s) Nebulizer every 6 hours PRN Shortness of Breath    Standing:  aspirin  chewable 81 milliGRAM(s) Oral daily  budesonide  80 MICROgram(s)/formoterol 4.5 MICROgram(s) Inhaler 2 Puff(s) Inhalation two times a day  chlorhexidine 4% Liquid 1 Application(s) Topical <User Schedule>  clopidogrel Tablet 75 milliGRAM(s) Oral daily  dextrose 5%. 1000 milliLiter(s) (100 mL/Hr) IV Continuous <Continuous>  furosemide   Injectable 60 milliGRAM(s) IV Push every 12 hours  heparin   Injectable 5000 Unit(s) SubCutaneous every 8 hours  influenza   Vaccine 0.5 milliLiter(s) IntraMuscular once  levothyroxine Injectable 50 MICROGram(s) IV Push at bedtime  meropenem  IVPB 1000 milliGRAM(s) IV Intermittent every 8 hours  metoprolol tartrate Injectable 5 milliGRAM(s) IV Push every 6 hours  pantoprazole  Injectable 40 milliGRAM(s) IV Push two times a day  senna 2 Tablet(s) Oral at bedtime  sucralfate 1 Gram(s) Oral four times a day    Home:  budesonide-formoterol 80 mcg-4.5 mcg/inh inhalation aerosol: 2 puff(s) inhaled 2 times a day  Lyrica 150 mg oral capsule: 1 tab(s) orally 3 times a day, stop after   tiZANidine 4 mg oral tablet: 2 tab(s) orally every 8 hours, As Needed  Ventolin HFA 90 mcg/inh inhalation aerosol: 2 puff(s) inhaled 4 times a day, As Needed    Vitals:  T(C): 36.9, Max: 36.9 (11-10-20 @ 08:00)  T(F): 98.5, Max: 98.5 (11-10-20 @ 08:00)  HR: 100 (88 - 104)  BP: 157/91 (137/81 - 174/84)  RR: 31 (12 - 52)  SpO2: 99% (90% - 100%)    Physical Exam:  General: NAD  HEENT: Head NC/AT  Heart: RRR; S1/S2; no rubs, murmurs appreciated  Lungs: Clear to auscultation bilaterally, no wheezing, rales or rhonchi  Abdomen: Soft, nontender, nondistended, BS+  Extremities: No edema, clubbing, cyanosis in upper or lower extremities.  Neuro: NFD.    Labs:  CBC (11-10 @ 05:16)                        Hgb: 9.2    WBC: 7.81  )-----------------( Plts: 188                              Hct: 32.4     Chem ( @ 16:12)  Na: 150  |     Cl: 107     |  BUN: 24  -----------------------------------------< Gluc: 144    K: 4.0   |    HCO3: 25  |  Cr: 1.1    Ca 8.1 ( @ 16:12)  Mg 2.8 ( @ 16:20)    LFTs ( @ 16:12)  TPro 6.6  /  Alb 3.2  TBili 1.4  /  DBili     AST 37  /  ALT 34  /  AlkPhos 112    Cardiac Markers ( @ 06:47)  Troponin I X  Troponin T X  CK X  CKMB X  CKMB Units X  Myoglobin X  Lactate 4.0  ESR X    Cardiac Markers ( @ 16:16)  Troponin I X  Troponin T 0.31    CKMB X  CKMB Units 12.6  Myoglobin X  Lactate X  ESR X    Cardiac Markers ( @ 08:02)  Troponin I X  Troponin T X  CK X  CKMB X  CKMB Units X  Myoglobin X  Lactate 10.3  ESR X            Microbiology:  Culture - Blood (collected  @ 07:36)  Source: .Blood None  Preliminary Report ( @ 18:01):    No growth to date.    Culture - Urine (collected  @ 18:00)  Source: .Urine Clean Catch (Midstream)  Final Report ( @ 17:11):    No growth    Culture - Blood (collected  @ 16:42)  Source: .Blood Blood-Peripheral  Final Report ( @ 23:01):    No Growth Final    Culture - Blood (collected  @ 16:42)  Source: .Blood Blood-Peripheral  Final Report ( @ 23:01):    No Growth Final        Radiology:

## 2020-11-10 NOTE — PROGRESS NOTE ADULT - ASSESSMENT
Impression:    Sepsis present on admission  Type2 NSTEMI  HFpEF (EF of 72 %)  HO CAD  UGIB 2/2 duodenal ulcer  HO COPD  Severe Pulm HTN  Pulmonary edema         Plan     CNS: Avoid CNS depressants.     CVS: ASA/Plavix.  FU with cardiology.  Rate Control.  D5W 100 cc per hour      PULMONARY: HOB @ 45 degrees.  Aspiration precautions.  Continue BiPAP 18/12 alternating with HFNCO2.  Wean O2 as tolerated      INFECTIOUS DISEASE:  Finish ABX course     GI: GI PPx.  Clears if tolerated HFNCO2.      RENAL and acid base: Follow up serum and urine lytes.  Correct as needed.       Endocrine: Follow up FS.  Insulin protocol if needed.     Hematology: DVT PPx w/ SCD.  Active T&S. FU CBC    MICU    Poor prognosis

## 2020-11-10 NOTE — DIETITIAN INITIAL EVALUATION ADULT. - CONTINUE CURRENT NUTRITION CARE PLAN
pt to consume and tolerate 50% of all meals and snacks once diet advances upon f/u in 3 days. Meals and snacks. coordination of care. RD to monitor diet order, energy intake, NFPF (appetite, renal profile, electrolytes).

## 2020-11-10 NOTE — PROGRESS NOTE ADULT - SUBJECTIVE AND OBJECTIVE BOX
Patient is a 55y old  Female who presents with a chief complaint of arf (09 Nov 2020 09:15)        Over Night Events:  Was  on HFNCo2 and switched to BiPAP this am.  Off pressors.          ROS:     All ROS are negative except HPI         PHYSICAL EXAM    ICU Vital Signs Last 24 Hrs  T(C): 36.9 (10 Nov 2020 08:00), Max: 36.9 (10 Nov 2020 08:00)  T(F): 98.5 (10 Nov 2020 08:00), Max: 98.5 (10 Nov 2020 08:00)  HR: 88 (10 Nov 2020 08:00) (88 - 104)  BP: 163/89 (10 Nov 2020 08:00) (137/81 - 174/84)  BP(mean): 117 (10 Nov 2020 08:00) (105 - 130)  ABP: --  ABP(mean): --  RR: 17 (10 Nov 2020 08:00) (12 - 52)  SpO2: 96% (10 Nov 2020 08:00) (90% - 100%)      CONSTITUTIONAL:  Ill appearing .  NAD    ENT:   Airway patent,   Mouth with normal mucosa.   No thrush    EYES:   Pupils equal,   Round and reactive to light.    CARDIAC:   Tachycardic   Regular rhythm.    No edema      Vascular:  Normal systolic impulse  No Carotid bruits    RESPIRATORY:   No wheezing  Bilateral BS  Normal chest expansion  Not tachypneic,  No use of accessory muscles    GASTROINTESTINAL:  Abdomen soft,   Non-tender,   No guarding,   + BS    MUSCULOSKELETAL:   Range of motion is not limited,  No clubbing, cyanosis    NEUROLOGICAL:   Lethargic  Follows simple commands   No motor  deficits.    SKIN:   Skin normal color for race,   Warm and dry  No evidence of rash.    PSYCHIATRIC:   Normal mood and affect.   No apparent risk to self or others.    HEMATOLOGICAL:  No cervical  lymphadenopathy.  no inguinal lymphadenopathy      11-09-20 @ 07:01  -  11-10-20 @ 07:00  --------------------------------------------------------  IN:    dextrose 5%: 1150 mL    IV PiggyBack: 200 mL  Total IN: 1350 mL    OUT:    Ureteral Catheter (mL): 1030 mL  Total OUT: 1030 mL    Total NET: 320 mL      11-10-20 @ 07:01  -  11-10-20 @ 09:00  --------------------------------------------------------  IN:    dextrose 5%: 50 mL  Total IN: 50 mL    OUT:    Ureteral Catheter (mL): 100 mL  Total OUT: 100 mL    Total NET: -50 mL          LABS:                            9.2    7.81  )-----------( 188      ( 10 Nov 2020 05:16 )             32.4                                               11-09    150<H>  |  107  |  24<H>  ----------------------------<  144<H>  4.0   |  25  |  1.1    Ca    8.1<L>      09 Nov 2020 16:12  Mg     2.8     11-09    TPro  6.6  /  Alb  3.2<L>  /  TBili  1.4<H>  /  DBili  x   /  AST  37  /  ALT  34  /  AlkPhos  112  11-09                                                                                           LIVER FUNCTIONS - ( 09 Nov 2020 16:12 )  Alb: 3.2 g/dL / Pro: 6.6 g/dL / ALK PHOS: 112 U/L / ALT: 34 U/L / AST: 37 U/L / GGT: x                                                                                                                                   ABG - ( 09 Nov 2020 03:40 )  pH, Arterial: 7.40  pH, Blood: x     /  pCO2: 47    /  pO2: 112   / HCO3: 29    / Base Excess: 4.1   /  SaO2: 99                  MEDICATIONS  (STANDING):  albuterol/ipratropium for Nebulization 3 milliLiter(s) Nebulizer every 6 hours  aspirin  chewable 81 milliGRAM(s) Oral daily  budesonide  80 MICROgram(s)/formoterol 4.5 MICROgram(s) Inhaler 2 Puff(s) Inhalation two times a day  chlorhexidine 4% Liquid 1 Application(s) Topical <User Schedule>  clopidogrel Tablet 75 milliGRAM(s) Oral daily  dextrose 5%. 1000 milliLiter(s) (50 mL/Hr) IV Continuous <Continuous>  furosemide   Injectable 40 milliGRAM(s) IV Push two times a day  influenza   Vaccine 0.5 milliLiter(s) IntraMuscular once  levothyroxine Injectable 50 MICROGram(s) IV Push at bedtime  meropenem  IVPB 1000 milliGRAM(s) IV Intermittent every 8 hours  metoprolol tartrate Injectable 5 milliGRAM(s) IV Push every 6 hours  pantoprazole  Injectable 40 milliGRAM(s) IV Push two times a day  senna 2 Tablet(s) Oral at bedtime  sucralfate 1 Gram(s) Oral four times a day    MEDICATIONS  (PRN):  acetaminophen   Tablet .. 650 milliGRAM(s) Oral every 6 hours PRN Moderate Pain (4 - 6)      New X-rays reviewed:                                                                                  ECHO    CXR interpreted by me:

## 2020-11-10 NOTE — DIETITIAN INITIAL EVALUATION ADULT. - OTHER CALCULATIONS
ABW: 85.7kg,  IBW: 52.2kg ---> CALORIE: 5270-4579 kcal/day (MSJ x 1.1-1.2);  PROTEIN:  52-67 g/day (1.0-1.3 g/kg of IBW) renal status considered;   FLUID: per ICU team

## 2020-11-10 NOTE — DIETITIAN INITIAL EVALUATION ADULT. - OTHER INFO
p/w worsening lethargy. Sepsis. on admission, unclear etiology. Off levophed, on abx per ID. CXR improving. JEEVAN resolving. on meds. DM2 f/u FS.

## 2020-11-10 NOTE — DIETITIAN INITIAL EVALUATION ADULT. - REASON INDICATOR FOR ASSESSMENT
LOS assessment - pt is confused at times, appeared to be on HFNC via ?NIMV. Looks in pain and distressed and uncomfortable. No nutrition hx obtained as a result. NPO status. Rounds note reviewed per 11/10 am but no diet recs discussed. LBM 11/7. Ecchymosis. No edema. Unable to locate RN.

## 2020-11-11 LAB
ALBUMIN SERPL ELPH-MCNC: 3 G/DL — LOW (ref 3.5–5.2)
ALBUMIN SERPL ELPH-MCNC: 3.1 G/DL — LOW (ref 3.5–5.2)
ALP SERPL-CCNC: 111 U/L — SIGNIFICANT CHANGE UP (ref 30–115)
ALP SERPL-CCNC: 111 U/L — SIGNIFICANT CHANGE UP (ref 30–115)
ALT FLD-CCNC: 26 U/L — SIGNIFICANT CHANGE UP (ref 0–41)
ALT FLD-CCNC: 28 U/L — SIGNIFICANT CHANGE UP (ref 0–41)
ANION GAP SERPL CALC-SCNC: 11 MMOL/L — SIGNIFICANT CHANGE UP (ref 7–14)
ANION GAP SERPL CALC-SCNC: 9 MMOL/L — SIGNIFICANT CHANGE UP (ref 7–14)
AST SERPL-CCNC: 38 U/L — SIGNIFICANT CHANGE UP (ref 0–41)
AST SERPL-CCNC: 38 U/L — SIGNIFICANT CHANGE UP (ref 0–41)
BASOPHILS # BLD AUTO: 0.02 K/UL — SIGNIFICANT CHANGE UP (ref 0–0.2)
BASOPHILS NFR BLD AUTO: 0.2 % — SIGNIFICANT CHANGE UP (ref 0–1)
BILIRUB SERPL-MCNC: 1.8 MG/DL — HIGH (ref 0.2–1.2)
BILIRUB SERPL-MCNC: 2.1 MG/DL — HIGH (ref 0.2–1.2)
BUN SERPL-MCNC: 25 MG/DL — HIGH (ref 10–20)
BUN SERPL-MCNC: 29 MG/DL — HIGH (ref 10–20)
CALCIUM SERPL-MCNC: 7.8 MG/DL — LOW (ref 8.5–10.1)
CALCIUM SERPL-MCNC: 8.1 MG/DL — LOW (ref 8.5–10.1)
CHLORIDE SERPL-SCNC: 100 MMOL/L — SIGNIFICANT CHANGE UP (ref 98–110)
CHLORIDE SERPL-SCNC: 91 MMOL/L — LOW (ref 98–110)
CO2 SERPL-SCNC: 33 MMOL/L — HIGH (ref 17–32)
CO2 SERPL-SCNC: 37 MMOL/L — HIGH (ref 17–32)
CREAT SERPL-MCNC: 1 MG/DL — SIGNIFICANT CHANGE UP (ref 0.7–1.5)
CREAT SERPL-MCNC: 1.2 MG/DL — SIGNIFICANT CHANGE UP (ref 0.7–1.5)
CULTURE RESULTS: SIGNIFICANT CHANGE UP
EOSINOPHIL # BLD AUTO: 0.32 K/UL — SIGNIFICANT CHANGE UP (ref 0–0.7)
EOSINOPHIL NFR BLD AUTO: 3.6 % — SIGNIFICANT CHANGE UP (ref 0–8)
GLUCOSE SERPL-MCNC: 127 MG/DL — HIGH (ref 70–99)
GLUCOSE SERPL-MCNC: 142 MG/DL — HIGH (ref 70–99)
HCT VFR BLD CALC: 32.7 % — LOW (ref 37–47)
HGB BLD-MCNC: 9.3 G/DL — LOW (ref 12–16)
IMM GRANULOCYTES NFR BLD AUTO: 0.8 % — HIGH (ref 0.1–0.3)
LYMPHOCYTES # BLD AUTO: 1.38 K/UL — SIGNIFICANT CHANGE UP (ref 1.2–3.4)
LYMPHOCYTES # BLD AUTO: 15.4 % — LOW (ref 20.5–51.1)
MAGNESIUM SERPL-MCNC: 1.5 MG/DL — LOW (ref 1.8–2.4)
MCHC RBC-ENTMCNC: 26 PG — LOW (ref 27–31)
MCHC RBC-ENTMCNC: 28.4 G/DL — LOW (ref 32–37)
MCV RBC AUTO: 91.3 FL — SIGNIFICANT CHANGE UP (ref 81–99)
MONOCYTES # BLD AUTO: 0.64 K/UL — HIGH (ref 0.1–0.6)
MONOCYTES NFR BLD AUTO: 7.2 % — SIGNIFICANT CHANGE UP (ref 1.7–9.3)
NEUTROPHILS # BLD AUTO: 6.51 K/UL — HIGH (ref 1.4–6.5)
NEUTROPHILS NFR BLD AUTO: 72.8 % — SIGNIFICANT CHANGE UP (ref 42.2–75.2)
NRBC # BLD: 0 /100 WBCS — SIGNIFICANT CHANGE UP (ref 0–0)
PLATELET # BLD AUTO: 149 K/UL — SIGNIFICANT CHANGE UP (ref 130–400)
POTASSIUM SERPL-MCNC: 2.9 MMOL/L — LOW (ref 3.5–5)
POTASSIUM SERPL-MCNC: 3.3 MMOL/L — LOW (ref 3.5–5)
POTASSIUM SERPL-SCNC: 2.9 MMOL/L — LOW (ref 3.5–5)
POTASSIUM SERPL-SCNC: 3.3 MMOL/L — LOW (ref 3.5–5)
PROT SERPL-MCNC: 6.2 G/DL — SIGNIFICANT CHANGE UP (ref 6–8)
PROT SERPL-MCNC: 6.6 G/DL — SIGNIFICANT CHANGE UP (ref 6–8)
RBC # BLD: 3.58 M/UL — LOW (ref 4.2–5.4)
RBC # FLD: 20.8 % — HIGH (ref 11.5–14.5)
SODIUM SERPL-SCNC: 137 MMOL/L — SIGNIFICANT CHANGE UP (ref 135–146)
SODIUM SERPL-SCNC: 144 MMOL/L — SIGNIFICANT CHANGE UP (ref 135–146)
SPECIMEN SOURCE: SIGNIFICANT CHANGE UP
WBC # BLD: 8.94 K/UL — SIGNIFICANT CHANGE UP (ref 4.8–10.8)
WBC # FLD AUTO: 8.94 K/UL — SIGNIFICANT CHANGE UP (ref 4.8–10.8)

## 2020-11-11 PROCEDURE — 71045 X-RAY EXAM CHEST 1 VIEW: CPT | Mod: 26

## 2020-11-11 RX ORDER — POTASSIUM CHLORIDE 20 MEQ
20 PACKET (EA) ORAL ONCE
Refills: 0 | Status: COMPLETED | OUTPATIENT
Start: 2020-11-11 | End: 2020-11-11

## 2020-11-11 RX ORDER — POTASSIUM CHLORIDE 20 MEQ
40 PACKET (EA) ORAL ONCE
Refills: 0 | Status: COMPLETED | OUTPATIENT
Start: 2020-11-11 | End: 2020-11-13

## 2020-11-11 RX ORDER — LEVOTHYROXINE SODIUM 125 MCG
25 TABLET ORAL ONCE
Refills: 0 | Status: DISCONTINUED | OUTPATIENT
Start: 2020-11-11 | End: 2020-11-11

## 2020-11-11 RX ORDER — LEVOTHYROXINE SODIUM 125 MCG
100 TABLET ORAL DAILY
Refills: 0 | Status: DISCONTINUED | OUTPATIENT
Start: 2020-11-11 | End: 2020-11-19

## 2020-11-11 RX ORDER — PANTOPRAZOLE SODIUM 20 MG/1
40 TABLET, DELAYED RELEASE ORAL EVERY 12 HOURS
Refills: 0 | Status: DISCONTINUED | OUTPATIENT
Start: 2020-11-11 | End: 2020-11-19

## 2020-11-11 RX ORDER — LEVOTHYROXINE SODIUM 125 MCG
50 TABLET ORAL AT BEDTIME
Refills: 0 | Status: DISCONTINUED | OUTPATIENT
Start: 2020-11-11 | End: 2020-11-11

## 2020-11-11 RX ORDER — POTASSIUM CHLORIDE 20 MEQ
20 PACKET (EA) ORAL
Refills: 0 | Status: COMPLETED | OUTPATIENT
Start: 2020-11-11 | End: 2020-11-11

## 2020-11-11 RX ORDER — METOPROLOL TARTRATE 50 MG
12.5 TABLET ORAL
Refills: 0 | Status: DISCONTINUED | OUTPATIENT
Start: 2020-11-11 | End: 2020-11-19

## 2020-11-11 RX ORDER — SODIUM CHLORIDE 9 MG/ML
1000 INJECTION, SOLUTION INTRAVENOUS
Refills: 0 | Status: DISCONTINUED | OUTPATIENT
Start: 2020-11-11 | End: 2020-11-11

## 2020-11-11 RX ADMIN — Medication 81 MILLIGRAM(S): at 11:51

## 2020-11-11 RX ADMIN — OXYCODONE AND ACETAMINOPHEN 1 TABLET(S): 5; 325 TABLET ORAL at 07:00

## 2020-11-11 RX ADMIN — Medication 50 MILLIEQUIVALENT(S): at 23:12

## 2020-11-11 RX ADMIN — Medication 20 MILLIEQUIVALENT(S): at 11:51

## 2020-11-11 RX ADMIN — Medication 5 MILLIGRAM(S): at 05:47

## 2020-11-11 RX ADMIN — PANTOPRAZOLE SODIUM 40 MILLIGRAM(S): 20 TABLET, DELAYED RELEASE ORAL at 17:01

## 2020-11-11 RX ADMIN — Medication 12.5 MILLIGRAM(S): at 17:01

## 2020-11-11 RX ADMIN — BUDESONIDE AND FORMOTEROL FUMARATE DIHYDRATE 2 PUFF(S): 160; 4.5 AEROSOL RESPIRATORY (INHALATION) at 21:10

## 2020-11-11 RX ADMIN — Medication 60 MILLIGRAM(S): at 17:01

## 2020-11-11 RX ADMIN — CLOPIDOGREL BISULFATE 75 MILLIGRAM(S): 75 TABLET, FILM COATED ORAL at 11:52

## 2020-11-11 RX ADMIN — SENNA PLUS 2 TABLET(S): 8.6 TABLET ORAL at 21:09

## 2020-11-11 RX ADMIN — HEPARIN SODIUM 5000 UNIT(S): 5000 INJECTION INTRAVENOUS; SUBCUTANEOUS at 21:09

## 2020-11-11 RX ADMIN — OXYCODONE AND ACETAMINOPHEN 1 TABLET(S): 5; 325 TABLET ORAL at 06:56

## 2020-11-11 RX ADMIN — Medication 100 MICROGRAM(S): at 11:51

## 2020-11-11 RX ADMIN — Medication 50 MILLIEQUIVALENT(S): at 19:32

## 2020-11-11 RX ADMIN — OXYCODONE AND ACETAMINOPHEN 1 TABLET(S): 5; 325 TABLET ORAL at 22:02

## 2020-11-11 RX ADMIN — Medication 5 MILLIGRAM(S): at 01:18

## 2020-11-11 RX ADMIN — Medication 1 GRAM(S): at 11:51

## 2020-11-11 RX ADMIN — HEPARIN SODIUM 5000 UNIT(S): 5000 INJECTION INTRAVENOUS; SUBCUTANEOUS at 14:15

## 2020-11-11 RX ADMIN — Medication 1 GRAM(S): at 17:01

## 2020-11-11 RX ADMIN — HEPARIN SODIUM 5000 UNIT(S): 5000 INJECTION INTRAVENOUS; SUBCUTANEOUS at 05:46

## 2020-11-11 RX ADMIN — Medication 50 MILLIEQUIVALENT(S): at 21:43

## 2020-11-11 RX ADMIN — PANTOPRAZOLE SODIUM 40 MILLIGRAM(S): 20 TABLET, DELAYED RELEASE ORAL at 05:47

## 2020-11-11 RX ADMIN — Medication 1 GRAM(S): at 23:12

## 2020-11-11 RX ADMIN — Medication 60 MILLIGRAM(S): at 05:46

## 2020-11-11 RX ADMIN — OXYCODONE AND ACETAMINOPHEN 1 TABLET(S): 5; 325 TABLET ORAL at 21:09

## 2020-11-11 RX ADMIN — CHLORHEXIDINE GLUCONATE 1 APPLICATION(S): 213 SOLUTION TOPICAL at 06:50

## 2020-11-11 NOTE — CHART NOTE - NSCHARTNOTEFT_GEN_A_CORE
CCU/ICU Transfer Note    Transfer from: CCU/ICU  Transfer to:  ( X ) Medicine    (  ) Telemetry    (  ) RCU    (  ) Palliative    (  ) Stroke Unit       CCU/ICU COURSE:    55 y/o female active smoker with PMHx of Type II DM, HFpEF (EF of 72 %), CAD, hyperlipidemia, UGIB 2/2 duodenal ulcer, HTN, Hypothyroidism, COPD, Dextrocardia, Sciatica/Peripheral Neuralgia, Charcot foot deformity s/p reconstruction on 1/11/19, and 2 recent admissions for sepsis presented to the ED for worsening lethargy. On presentation, the patient was in septic shock (hypothermia, high WBCs, hypotension) and developed periods of bradycardia in to the 50s. Labs also revealed JEEVAN w/ HAGMA (AG 23), worsening transaminitis from last admission, Trop 0.93, BNP 43k and lactate 9.8. CXR was unremarkable. The patient was admitted to the MICU for septic shock and started on pressors and antibiotics.    The patient improved and pressors were stopped and she was downgraded to step down unit. Her respiratory status worsened and she became BiPAP dependent and was upgraded to MICU again. She had fluid overload and was diuresed with IV Lasix. Her respiratory status improved and she was able to tolerate HFNC and weaning O2.    ----------------------------------------  PHYSICAL EXAM:  General: NAD  HEENT: Head NC/AT  Heart: RRR; S1/S2; no rubs, murmurs appreciated  Lungs: Clear to auscultation bilaterally, no wheezing, rales or rhonchi  Abdomen: Soft, nontender, nondistended, BS+  Extremities: No edema, clubbing, cyanosis in upper or lower extremities.  Neuro: NFD.    ------------------------------------------  ASSESSMENT & PLAN:     56 y/o female active smoker with PMHx of Type II DM, HFpEF (EF of 72 %), CAD, hyperlipidemia, UGIB 2/2 duodenal ulcer, HTN, Hypothyroidism, COPD, Dextrocardia, Sciatica/Peripheral Neuralgia, and Charcot foot deformity s/p reconstruction on 1/11/19 presents to the ED for worsening lethargy. pt admitted to MICU for sepsis unclear source, type 2 NSTEMI and was BiPAP dependent with tachypnea. pt to be downgraded to medical floor.    #Sepsis present on admission, unclear source  - Off levophed.  - Possible  source?  - Blood and urine Cx are negative.  - s/p Meropenem stopped 11/10.    #HFpEF  - Echo on 10/30: EF 62%.  - Improved bilateral pleural effusions and bilateral opacities on CXR  - Wean off O2 as tolerated.  - BIPAP alternating with HFNC as needed.    #COPD  - Severe Pulmonary Hypertension evident on echo.  - c/w inhalers.    #JEEVAN- resolved   - Cr 3.8 on admission.  - Likely pre-renal/ATN due to hypotension.  - D5W at 50 cc/hr for hypernatremia/free water deficit correction.  - f/u BMP--if Na normalized, d/c D5W.    #NSTEMI  - Troponin 1.05 on admission -> 0.31.  - Normal LV size and wall thickness, with normal systolic and diastolic function with severe pulmonary hypertension.  - Cardiology on board, possible cardiac catheterization when stable.  - On Aspirin and Plavix.  - Rate control: Lopressor q6.    #Hx of duodenal ulcer  - No evidence of GIB.  - c/w Protonix.  - Sucralfate.  - If the patient tolerates HFNC, start clears.  - Keep active type and screen    #Type II Diabetes Mellitus  - Follow FS.  - Insulin protocol if needed.     #Hx of Hypothyroidism  - Continue home meds once stable.    Diet: DASH  Activity : Increase as tolerated  DVT Prophylaxis: heparin  GI Prophylaxis: PPI  Disposition: downgrade to medical floor  Code Status: Full    Pending: improvement of respiratory status CCU/ICU Transfer Note    Transfer from: CCU/ICU  Transfer to:  ( X ) Medicine    (  ) Telemetry    (  ) RCU    (  ) Palliative    (  ) Stroke Unit       CCU/ICU COURSE:    53 y/o female active smoker with PMHx of Type II DM, HFpEF (EF of 72 %), CAD, hyperlipidemia, UGIB 2/2 duodenal ulcer, HTN, Hypothyroidism, COPD, Dextrocardia, Sciatica/Peripheral Neuralgia, Charcot foot deformity s/p reconstruction on 1/11/19, and 2 recent admissions for sepsis presented to the ED for worsening lethargy. On presentation, the patient was in septic shock (hypothermia, high WBCs, hypotension) and developed periods of bradycardia in to the 50s. Labs also revealed JEEVAN w/ HAGMA (AG 23), worsening transaminitis from last admission, Trop 0.93, BNP 43k and lactate 9.8. CXR was unremarkable. The patient was admitted to the MICU for septic shock and started on pressors and antibiotics.    The patient improved and pressors were stopped and she was downgraded to step down unit. Her respiratory status worsened and she became BiPAP dependent and was upgraded to MICU again. She had fluid overload and was diuresed with IV Lasix. Her respiratory status improved and she was able to tolerate HFNC and weaning O2.    ----------------------------------------  PHYSICAL EXAM:  General: NAD  HEENT: Head NC/AT  Heart: RRR; S1/S2; no rubs, murmurs appreciated  Lungs: Clear to auscultation bilaterally, no wheezing, rales or rhonchi  Abdomen: Soft, nontender, nondistended, BS+  Extremities: No edema, clubbing, cyanosis in upper or lower extremities.  Neuro: NFD.    ------------------------------------------  ASSESSMENT & PLAN:     56 y/o female active smoker with PMHx of Type II DM, HFpEF (EF of 72 %), CAD, hyperlipidemia, UGIB 2/2 duodenal ulcer, HTN, Hypothyroidism, COPD, Dextrocardia, Sciatica/Peripheral Neuralgia, and Charcot foot deformity s/p reconstruction on 1/11/19 presents to the ED for worsening lethargy. pt admitted to MICU for sepsis unclear source, type 2 NSTEMI and was BiPAP dependent with tachypnea. pt to be downgraded to medical floor.    #Sepsis present on admission, unclear source  - Off levophed.  - Possible  source?  - Blood and urine Cx are negative.  - s/p Meropenem stopped 11/10.    #HFpEF  - Echo on 10/30: EF 62%.  - Improved bilateral pleural effusions and bilateral opacities on CXR.  - Wean off O2 as tolerated.  - BIPAP alternating with HFNC as needed.    #COPD  - Severe Pulmonary Hypertension evident on echo.  - c/w inhalers.    #JEEVAN, hypernatremia- resolved   - Cr 3.8 on admission, normalized.  - Likely pre-renal/ATN due to hypotension.    #NSTEMI  - Troponin 1.05 on admission -> 0.31.  - Normal LV size and wall thickness, with normal systolic and diastolic function with severe pulmonary hypertension.  - Cardiology on board, possible cardiac catheterization when stable.  - On Aspirin and Plavix.  - Rate control: Lopressor q6.    #Hx of duodenal ulcer  - No evidence of GIB.  - c/w Protonix.  - Sucralfate.  - Keep active type and screen.    #Type II Diabetes Mellitus  - Follow FS.  - Insulin protocol if needed.     #Hx of Hypothyroidism  - Levothyroxin resumed.    Diet: DASH  Activity: Increase as tolerated  DVT Prophylaxis: heparin  GI Prophylaxis: PPI  Disposition: downgrade to medical floor  Code Status: Full    Pending: improvement of respiratory status.

## 2020-11-11 NOTE — PHARMACOTHERAPY INTERVENTION NOTE - COMMENTS
kcl 20 meq iv x 3  k= 2.9
d/w team, recommended changing  -pantoprazole to 40mg po q12h  -levothyroxine 100mcg po q24h  -metoprolol 12.5mg q12h (home regimen)
levothyroxine 100mcg IV hs, pt on 100mcg po, recommended adjusting to 50mcg IV hs
GFR 52, recommended adjusting meropenem 1g IV q8h

## 2020-11-11 NOTE — PROGRESS NOTE ADULT - SUBJECTIVE AND OBJECTIVE BOX
Patient is a 55y old  Female who presents with a chief complaint of arf (10 Nov 2020 11:20)        Over Night Events: Remains on HFNCO2.  Feels better.  Off pressors         ROS:     All ROS are negative except HPI         PHYSICAL EXAM    ICU Vital Signs Last 24 Hrs  T(C): 36.9 (11 Nov 2020 03:00), Max: 37.3 (10 Nov 2020 16:00)  T(F): 98.4 (11 Nov 2020 03:00), Max: 99.1 (10 Nov 2020 16:00)  HR: 72 (11 Nov 2020 06:00) (68 - 100)  BP: 154/75 (11 Nov 2020 06:00) (136/75 - 164/88)  BP(mean): 109 (11 Nov 2020 06:00) (90 - 128)  ABP: --  ABP(mean): --  RR: 44 (11 Nov 2020 06:00) (14 - 44)  SpO2: 99% (11 Nov 2020 04:18) (92% - 100%)      CONSTITUTIONAL:  Well nourished.  NAD    ENT:   Airway patent,   Mouth with normal mucosa.   No thrush    EYES:   Pupils equal,   Round and reactive to light.    CARDIAC:   Normal rate,   Regular rhythm.    No edema      Vascular:  Normal systolic impulse  No Carotid bruits    RESPIRATORY:   No wheezing  Bilateral crackles   Normal chest expansion  Not tachypneic,  No use of accessory muscles    GASTROINTESTINAL:  Abdomen soft,   Non-tender,   No guarding,   + BS    MUSCULOSKELETAL:   Range of motion is not limited,  No clubbing, cyanosis    NEUROLOGICAL:   Alert and oriented   No motor  deficits.    SKIN:   Skin normal color for race,   Warm and dry and intact.   No evidence of rash.    PSYCHIATRIC:   Normal mood and affect.   No apparent risk to self or others.    HEMATOLOGICAL:  No cervical  lymphadenopathy.  no inguinal lymphadenopathy      11-10-20 @ 07:01  -  11-11-20 @ 07:00  --------------------------------------------------------  IN:    dextrose 5%: 150 mL    dextrose 5%: 1800 mL    IV PiggyBack: 50 mL  Total IN: 2000 mL    OUT:    Ureteral Catheter (mL): 2330 mL  Total OUT: 2330 mL    Total NET: -330 mL          LABS:                            9.3    8.94  )-----------( 149      ( 11 Nov 2020 04:50 )             32.7                                               11-11    144  |  100  |  29<H>  ----------------------------<  127<H>  3.3<L>   |  33<H>  |  1.2    Ca    7.8<L>      11 Nov 2020 04:50  Mg     1.5     11-11    TPro  6.2  /  Alb  3.1<L>  /  TBili  1.8<H>  /  DBili  x   /  AST  38  /  ALT  28  /  AlkPhos  111  11-11                                                                                           LIVER FUNCTIONS - ( 11 Nov 2020 04:50 )  Alb: 3.1 g/dL / Pro: 6.2 g/dL / ALK PHOS: 111 U/L / ALT: 28 U/L / AST: 38 U/L / GGT: x                                                                                                                                   ABG - ( 10 Nov 2020 23:37 )  pH, Arterial: 7.42  pH, Blood: x     /  pCO2: 55    /  pO2: 73    / HCO3: 35    / Base Excess: 9.3   /  SaO2: 93                  MEDICATIONS  (STANDING):  aspirin  chewable 81 milliGRAM(s) Oral daily  budesonide  80 MICROgram(s)/formoterol 4.5 MICROgram(s) Inhaler 2 Puff(s) Inhalation two times a day  chlorhexidine 4% Liquid 1 Application(s) Topical <User Schedule>  clopidogrel Tablet 75 milliGRAM(s) Oral daily  dextrose 5%. 1000 milliLiter(s) (100 mL/Hr) IV Continuous <Continuous>  furosemide   Injectable 60 milliGRAM(s) IV Push every 12 hours  heparin   Injectable 5000 Unit(s) SubCutaneous every 8 hours  influenza   Vaccine 0.5 milliLiter(s) IntraMuscular once  levothyroxine Injectable 50 MICROGram(s) IV Push at bedtime  metoprolol tartrate Injectable 5 milliGRAM(s) IV Push every 6 hours  pantoprazole  Injectable 40 milliGRAM(s) IV Push two times a day  potassium chloride    Tablet ER 20 milliEquivalent(s) Oral once  senna 2 Tablet(s) Oral at bedtime  sucralfate 1 Gram(s) Oral four times a day    MEDICATIONS  (PRN):  acetaminophen   Tablet .. 650 milliGRAM(s) Oral every 6 hours PRN Moderate Pain (4 - 6)  albuterol/ipratropium for Nebulization 3 milliLiter(s) Nebulizer every 6 hours PRN Shortness of Breath  oxycodone    5 mG/acetaminophen 325 mG 1 Tablet(s) Oral every 6 hours PRN Severe Pain (7 - 10)      New X-rays reviewed:                                                                                  ECHO    CXR interpreted by me:  Improved edema

## 2020-11-11 NOTE — PROGRESS NOTE ADULT - ASSESSMENT
Impression:    Sepsis present on admission  Type2 NSTEMI  HFpEF (EF of 72 %)  HO CAD  UGIB 2/2 duodenal ulcer  HO COPD  Severe Pulm HTN  Pulmonary edema improving         Plan     CNS: Avoid CNS depressants.     CVS: ASA/Plavix.  FU with cardiology.  Rate Control.  D5W 50 cc per hour      PULMONARY: HOB @ 45 degrees.  Aspiration precautions.  Continue BiPAP 18/12 alternating with HFNCO2.  Wean O2 as tolerated      INFECTIOUS DISEASE:  Finish ABX course     GI: GI PPx.  Advance diet .      RENAL and acid base: Follow up serum and urine lytes.  Correct as needed.       Endocrine: Follow up FS.  Insulin protocol if needed.     Hematology: DVT PPx w/ SCD.  Active T&S. FU CBC    MICU    Possible downgrade PM

## 2020-11-12 LAB
ALBUMIN SERPL ELPH-MCNC: 3 G/DL — LOW (ref 3.5–5.2)
ALP SERPL-CCNC: 106 U/L — SIGNIFICANT CHANGE UP (ref 30–115)
ALT FLD-CCNC: 22 U/L — SIGNIFICANT CHANGE UP (ref 0–41)
ANION GAP SERPL CALC-SCNC: 9 MMOL/L — SIGNIFICANT CHANGE UP (ref 7–14)
AST SERPL-CCNC: 33 U/L — SIGNIFICANT CHANGE UP (ref 0–41)
BASOPHILS # BLD AUTO: 0.01 K/UL — SIGNIFICANT CHANGE UP (ref 0–0.2)
BASOPHILS NFR BLD AUTO: 0.1 % — SIGNIFICANT CHANGE UP (ref 0–1)
BILIRUB SERPL-MCNC: 1.8 MG/DL — HIGH (ref 0.2–1.2)
BUN SERPL-MCNC: 22 MG/DL — HIGH (ref 10–20)
CALCIUM SERPL-MCNC: 7.9 MG/DL — LOW (ref 8.5–10.1)
CHLORIDE SERPL-SCNC: 94 MMOL/L — LOW (ref 98–110)
CO2 SERPL-SCNC: 37 MMOL/L — HIGH (ref 17–32)
CREAT SERPL-MCNC: 1 MG/DL — SIGNIFICANT CHANGE UP (ref 0.7–1.5)
EOSINOPHIL # BLD AUTO: 0.65 K/UL — SIGNIFICANT CHANGE UP (ref 0–0.7)
EOSINOPHIL NFR BLD AUTO: 8.9 % — HIGH (ref 0–8)
GLUCOSE BLDC GLUCOMTR-MCNC: 139 MG/DL — HIGH (ref 70–99)
GLUCOSE BLDC GLUCOMTR-MCNC: 147 MG/DL — HIGH (ref 70–99)
GLUCOSE SERPL-MCNC: 94 MG/DL — SIGNIFICANT CHANGE UP (ref 70–99)
HCT VFR BLD CALC: 32.7 % — LOW (ref 37–47)
HGB BLD-MCNC: 9.5 G/DL — LOW (ref 12–16)
IMM GRANULOCYTES NFR BLD AUTO: 0.8 % — HIGH (ref 0.1–0.3)
LYMPHOCYTES # BLD AUTO: 0.89 K/UL — LOW (ref 1.2–3.4)
LYMPHOCYTES # BLD AUTO: 12.2 % — LOW (ref 20.5–51.1)
MAGNESIUM SERPL-MCNC: 1 MG/DL — LOW (ref 1.8–2.4)
MCHC RBC-ENTMCNC: 26.1 PG — LOW (ref 27–31)
MCHC RBC-ENTMCNC: 29.1 G/DL — LOW (ref 32–37)
MCV RBC AUTO: 89.8 FL — SIGNIFICANT CHANGE UP (ref 81–99)
MONOCYTES # BLD AUTO: 0.5 K/UL — SIGNIFICANT CHANGE UP (ref 0.1–0.6)
MONOCYTES NFR BLD AUTO: 6.8 % — SIGNIFICANT CHANGE UP (ref 1.7–9.3)
NEUTROPHILS # BLD AUTO: 5.21 K/UL — SIGNIFICANT CHANGE UP (ref 1.4–6.5)
NEUTROPHILS NFR BLD AUTO: 71.2 % — SIGNIFICANT CHANGE UP (ref 42.2–75.2)
NRBC # BLD: 0 /100 WBCS — SIGNIFICANT CHANGE UP (ref 0–0)
PLATELET # BLD AUTO: 131 K/UL — SIGNIFICANT CHANGE UP (ref 130–400)
POTASSIUM SERPL-MCNC: 3.7 MMOL/L — SIGNIFICANT CHANGE UP (ref 3.5–5)
POTASSIUM SERPL-SCNC: 3.7 MMOL/L — SIGNIFICANT CHANGE UP (ref 3.5–5)
PROT SERPL-MCNC: 6.2 G/DL — SIGNIFICANT CHANGE UP (ref 6–8)
RBC # BLD: 3.64 M/UL — LOW (ref 4.2–5.4)
RBC # FLD: 20.3 % — HIGH (ref 11.5–14.5)
SODIUM SERPL-SCNC: 140 MMOL/L — SIGNIFICANT CHANGE UP (ref 135–146)
WBC # BLD: 7.32 K/UL — SIGNIFICANT CHANGE UP (ref 4.8–10.8)
WBC # FLD AUTO: 7.32 K/UL — SIGNIFICANT CHANGE UP (ref 4.8–10.8)

## 2020-11-12 PROCEDURE — 71045 X-RAY EXAM CHEST 1 VIEW: CPT | Mod: 26

## 2020-11-12 RX ORDER — MAGNESIUM SULFATE 500 MG/ML
2 VIAL (ML) INJECTION
Refills: 0 | Status: COMPLETED | OUTPATIENT
Start: 2020-11-12 | End: 2020-11-12

## 2020-11-12 RX ADMIN — Medication 60 MILLIGRAM(S): at 17:17

## 2020-11-12 RX ADMIN — HEPARIN SODIUM 5000 UNIT(S): 5000 INJECTION INTRAVENOUS; SUBCUTANEOUS at 13:48

## 2020-11-12 RX ADMIN — BUDESONIDE AND FORMOTEROL FUMARATE DIHYDRATE 2 PUFF(S): 160; 4.5 AEROSOL RESPIRATORY (INHALATION) at 21:10

## 2020-11-12 RX ADMIN — HEPARIN SODIUM 5000 UNIT(S): 5000 INJECTION INTRAVENOUS; SUBCUTANEOUS at 21:10

## 2020-11-12 RX ADMIN — PANTOPRAZOLE SODIUM 40 MILLIGRAM(S): 20 TABLET, DELAYED RELEASE ORAL at 05:23

## 2020-11-12 RX ADMIN — Medication 12.5 MILLIGRAM(S): at 17:16

## 2020-11-12 RX ADMIN — SENNA PLUS 2 TABLET(S): 8.6 TABLET ORAL at 21:10

## 2020-11-12 RX ADMIN — Medication 81 MILLIGRAM(S): at 11:50

## 2020-11-12 RX ADMIN — HEPARIN SODIUM 5000 UNIT(S): 5000 INJECTION INTRAVENOUS; SUBCUTANEOUS at 05:24

## 2020-11-12 RX ADMIN — PANTOPRAZOLE SODIUM 40 MILLIGRAM(S): 20 TABLET, DELAYED RELEASE ORAL at 17:16

## 2020-11-12 RX ADMIN — Medication 50 GRAM(S): at 06:38

## 2020-11-12 RX ADMIN — CHLORHEXIDINE GLUCONATE 1 APPLICATION(S): 213 SOLUTION TOPICAL at 07:40

## 2020-11-12 RX ADMIN — Medication 1 GRAM(S): at 11:50

## 2020-11-12 RX ADMIN — BUDESONIDE AND FORMOTEROL FUMARATE DIHYDRATE 2 PUFF(S): 160; 4.5 AEROSOL RESPIRATORY (INHALATION) at 08:47

## 2020-11-12 RX ADMIN — Medication 100 MICROGRAM(S): at 05:23

## 2020-11-12 RX ADMIN — Medication 1 GRAM(S): at 17:16

## 2020-11-12 RX ADMIN — Medication 50 GRAM(S): at 08:49

## 2020-11-12 RX ADMIN — Medication 60 MILLIGRAM(S): at 05:23

## 2020-11-12 RX ADMIN — CLOPIDOGREL BISULFATE 75 MILLIGRAM(S): 75 TABLET, FILM COATED ORAL at 11:50

## 2020-11-12 RX ADMIN — Medication 12.5 MILLIGRAM(S): at 05:23

## 2020-11-12 RX ADMIN — Medication 1 GRAM(S): at 05:23

## 2020-11-12 NOTE — PROGRESS NOTE ADULT - ASSESSMENT
54 y/o female active smoker with PMHx of Type II DM, HFpEF (EF of 72 %), CAD, hyperlipidemia, UGIB 2/2 duodenal ulcer, HTN, Hypothyroidism, COPD, Dextrocardia, Sciatica/Peripheral Neuralgia, and Charcot foot deformity s/p reconstruction on 1/11/19 presents to the ED for worsening lethargy. pt admitted to MICU for sepsis unclear source, type 2 NSTEMI and was BiPAP dependent with tachypnea. pt to be downgraded to medical floor.    #Sepsis present on admission, unclear source  - Off levophed.  - Possible  source?  - Blood and urine Cx are negative.  - s/p Meropenem stopped 11/10.    #HFpEF  - Echo on 10/30: EF 62%.  - Improved bilateral pleural effusions and bilateral opacities on CXR.  - Wean off O2 as tolerated.  - BIPAP alternating with HFNC as needed.    #COPD  - Severe Pulmonary Hypertension evident on echo.  - c/w inhalers.    #JEEVAN, hypernatremia- resolved   - Cr 3.8 on admission, normalized.  - Likely pre-renal/ATN due to hypotension.    #NSTEMI  - Troponin 1.05 on admission -> 0.31.  - Normal LV size and wall thickness, with normal systolic and diastolic function with severe pulmonary hypertension.  - Cardiology on board, possible cardiac catheterization when stable.  - On Aspirin and Plavix.  - Rate control: Lopressor q6.    #Hx of duodenal ulcer  - No evidence of GIB.  - c/w Protonix.  - Sucralfate.  - Keep active type and screen.    #Type II Diabetes Mellitus  - Follow FS.  - Insulin protocol if needed.     #Hx of Hypothyroidism  - Levothyroxin resumed.    Diet: DASH  Activity: Increase as tolerated  DVT Prophylaxis: heparin  GI Prophylaxis: PPI  Disposition: downgrade to medical floor  Code Status: Full    Pending: improvement of respiratory status.

## 2020-11-12 NOTE — PROGRESS NOTE ADULT - ASSESSMENT
Impression:    Sepsis present on admission  Type2 NSTEMI  HFpEF (EF of 72 %)  HO CAD  UGIB 2/2 duodenal ulcer  HO COPD  Severe Pulm HTN  Pulmonary edema improving         Plan     CNS: Avoid CNS depressants.     CVS: ASA/Plavix.  FU with cardiology.  Rate Control.  D5W 50 cc per hour      PULMONARY: HOB @ 45 degrees.  Aspiration precautions.  Continue BiPAP 18/12 alternating with HFNCO2.  Wean O2 as tolerated      INFECTIOUS DISEASE:  Finish ABX course     GI: GI PPx.  Advance diet .      RENAL FU lytes.  Correct as needed     Endocrine: Follow up FS.      Hematology: DVT PPx    Downgrade

## 2020-11-12 NOTE — PROGRESS NOTE ADULT - SUBJECTIVE AND OBJECTIVE BOX
Patient is a 55y old  Female who presents with a chief complaint of arf (10 Nov 2020 11:20)        Over Night Events:  Looks and feels better.  On 30% O2.  60 liters         ROS:     All ROS are negative except HPI         PHYSICAL EXAM    ICU Vital Signs Last 24 Hrs  T(C): 36.6 (12 Nov 2020 08:00), Max: 36.6 (11 Nov 2020 20:00)  T(F): 97.8 (12 Nov 2020 08:00), Max: 97.8 (11 Nov 2020 20:00)  HR: 78 (12 Nov 2020 08:00) (72 - 88)  BP: 144/68 (12 Nov 2020 08:00) (111/62 - 170/78)  BP(mean): 100 (12 Nov 2020 08:00) (81 - 111)  ABP: --  ABP(mean): --  RR: 17 (12 Nov 2020 08:00) (12 - 27)  SpO2: 90% (12 Nov 2020 08:00) (90% - 100%)      CONSTITUTIONAL:  Well nourished.  NAD    ENT:   Airway patent,   Mouth with normal mucosa.   No thrush    EYES:   Pupils equal,   Round and reactive to light.    CARDIAC:   Normal rate,   Regular rhythm.    No edema      Vascular:  Normal systolic impulse  No Carotid bruits    RESPIRATORY:   No wheezing  Bilateral BS  Normal chest expansion  Not tachypneic,  No use of accessory muscles    GASTROINTESTINAL:  Abdomen soft,   Non-tender,   No guarding,   + BS    MUSCULOSKELETAL:   Range of motion is not limited,  No clubbing, cyanosis    NEUROLOGICAL:   Alert and oriented   No motor  deficits.    SKIN:   Skin normal color for race,   Warm and dry and intact.   No evidence of rash.    PSYCHIATRIC:   Normal mood and affect.   No apparent risk to self or others.    HEMATOLOGICAL:  No cervical  lymphadenopathy.  no inguinal lymphadenopathy      11-11-20 @ 07:01  -  11-12-20 @ 07:00  --------------------------------------------------------  IN:    dextrose 5%: 200 mL    dextrose 5%: 500 mL  Total IN: 700 mL    OUT:    Ureteral Catheter (mL): 3505 mL  Total OUT: 3505 mL    Total NET: -2805 mL      11-12-20 @ 07:01 - 11-12-20 @ 08:59  --------------------------------------------------------  IN:    Oral Fluid: 50 mL  Total IN: 50 mL    OUT:    Ureteral Catheter (mL): 650 mL  Total OUT: 650 mL    Total NET: -600 mL          LABS:                            9.5    7.32  )-----------( 131      ( 12 Nov 2020 05:03 )             32.7                                               11-12    140  |  94<L>  |  22<H>  ----------------------------<  94  3.7   |  37<H>  |  1.0    Ca    7.9<L>      12 Nov 2020 05:03  Mg     1.0     11-12    TPro  6.2  /  Alb  3.0<L>  /  TBili  1.8<H>  /  DBili  x   /  AST  33  /  ALT  22  /  AlkPhos  106  11-12                                                                                           LIVER FUNCTIONS - ( 12 Nov 2020 05:03 )  Alb: 3.0 g/dL / Pro: 6.2 g/dL / ALK PHOS: 106 U/L / ALT: 22 U/L / AST: 33 U/L / GGT: x                                                                                                                                   ABG - ( 10 Nov 2020 23:37 )  pH, Arterial: 7.42  pH, Blood: x     /  pCO2: 55    /  pO2: 73    / HCO3: 35    / Base Excess: 9.3   /  SaO2: 93                  MEDICATIONS  (STANDING):  aspirin  chewable 81 milliGRAM(s) Oral daily  budesonide  80 MICROgram(s)/formoterol 4.5 MICROgram(s) Inhaler 2 Puff(s) Inhalation two times a day  chlorhexidine 4% Liquid 1 Application(s) Topical <User Schedule>  clopidogrel Tablet 75 milliGRAM(s) Oral daily  furosemide   Injectable 60 milliGRAM(s) IV Push every 12 hours  heparin   Injectable 5000 Unit(s) SubCutaneous every 8 hours  influenza   Vaccine 0.5 milliLiter(s) IntraMuscular once  levothyroxine 100 MICROGram(s) Oral daily  metoprolol tartrate 12.5 milliGRAM(s) Oral two times a day  pantoprazole    Tablet 40 milliGRAM(s) Oral every 12 hours  potassium chloride   Powder 40 milliEquivalent(s) Oral once  senna 2 Tablet(s) Oral at bedtime  sucralfate 1 Gram(s) Oral four times a day    MEDICATIONS  (PRN):  acetaminophen   Tablet .. 650 milliGRAM(s) Oral every 6 hours PRN Moderate Pain (4 - 6)  albuterol/ipratropium for Nebulization 3 milliLiter(s) Nebulizer every 6 hours PRN Shortness of Breath  oxycodone    5 mG/acetaminophen 325 mG 1 Tablet(s) Oral every 6 hours PRN Severe Pain (7 - 10)      New X-rays reviewed:                                                                                  ECHO    CXR interpreted by me:  Improved edema

## 2020-11-12 NOTE — PROGRESS NOTE ADULT - SUBJECTIVE AND OBJECTIVE BOX
Patient Information:  CALLY HARTMAN / 55y / Female / MRN#:[id number]    Hospital Day: 9d    Interval History:  Patient seen and examined at bedside. The patient is more alert and feels better. Was on BiPAP overnight and currently saturating well on HFNC 60L.    Past Medical History:  Dextrocardia    Chronic midline low back pain with bilateral sciatica    Peripheral neuralgia    Essential hypertension    Diabetes 1.5, managed as type 2    Charcot&#x27;s arthropathy      Past Surgical History:  Charcot foot due to diabetes mellitus    H/O shoulder surgery     delivery delivered      Allergies:  No Known Allergies    Medications:  PRN:  acetaminophen   Tablet .. 650 milliGRAM(s) Oral every 6 hours PRN Moderate Pain (4 - 6)  albuterol/ipratropium for Nebulization 3 milliLiter(s) Nebulizer every 6 hours PRN Shortness of Breath  oxycodone    5 mG/acetaminophen 325 mG 1 Tablet(s) Oral every 6 hours PRN Severe Pain (7 - 10)    Standing:  aspirin  chewable 81 milliGRAM(s) Oral daily  budesonide  80 MICROgram(s)/formoterol 4.5 MICROgram(s) Inhaler 2 Puff(s) Inhalation two times a day  chlorhexidine 4% Liquid 1 Application(s) Topical <User Schedule>  clopidogrel Tablet 75 milliGRAM(s) Oral daily  furosemide   Injectable 60 milliGRAM(s) IV Push every 12 hours  heparin   Injectable 5000 Unit(s) SubCutaneous every 8 hours  influenza   Vaccine 0.5 milliLiter(s) IntraMuscular once  levothyroxine 100 MICROGram(s) Oral daily  metoprolol tartrate 12.5 milliGRAM(s) Oral two times a day  pantoprazole    Tablet 40 milliGRAM(s) Oral every 12 hours  potassium chloride   Powder 40 milliEquivalent(s) Oral once  senna 2 Tablet(s) Oral at bedtime  sucralfate 1 Gram(s) Oral four times a day    Home:  budesonide-formoterol 80 mcg-4.5 mcg/inh inhalation aerosol: 2 puff(s) inhaled 2 times a day  Lyrica 150 mg oral capsule: 1 tab(s) orally 3 times a day, stop after   tiZANidine 4 mg oral tablet: 2 tab(s) orally every 8 hours, As Needed  Ventolin HFA 90 mcg/inh inhalation aerosol: 2 puff(s) inhaled 4 times a day, As Needed    Vitals:  T(C): 36.4, Max: 36.6 (20 @ 20:00)  T(F): 97.6, Max: 97.8 (20 @ 20:00)  HR: 88 (72 - 90)  BP: 140/65 (111/62 - 157/79)  RR: 13 (10 - 26)  SpO2: 92% (86% - 100%)    Physical Exam:  General: NAD  HEENT: Head NC/AT  Heart: RRR; S1/S2; no rubs, murmurs appreciated  Lungs: Clear to auscultation bilaterally, no wheezing, rales or rhonchi  Abdomen: Soft, nontender, nondistended, BS+  Extremities: No edema, clubbing, cyanosis in upper or lower extremities.  Neuro: NFD.    Labs:  CBC ( @ 05:03)                        Hgb: 9.5    WBC: 7.32  )-----------------( Plts: 131                              Hct: 32.7     Chem ( @ 05:03)  Na: 140  |     Cl: 94     |  BUN: 22  -----------------------------------------< Gluc: 94    K: 3.7   |    HCO3: 37  |  Cr: 1.0    Ca 7.9 ( @ 05:03)  Mg 1.0 ( @ 05:03)    LFTs ( @ 05:03)  TPro 6.2  /  Alb 3.0  TBili 1.8  /  DBili     AST 33  /  ALT 22  /  AlkPhos 106            Microbiology:  Culture - Blood (collected  @ 07:36)  Source: .Blood None  Final Report ( @ 18:00):    No Growth Final        Radiology:     Patient Information:  CALLY HARTMAN / 55y / Female / MRN#:[id number]    Hospital Day: 9d    Interval History:  Patient seen and examined at bedside. The patient is more alert and feels better. Was on BiPAP overnight and currently saturating well on HFNC 60L.    Refer to transfer note entered on  and updated today.    Past Medical History:  Dextrocardia    Chronic midline low back pain with bilateral sciatica    Peripheral neuralgia    Essential hypertension    Diabetes 1.5, managed as type 2    Charcot&#x27;s arthropathy      Past Surgical History:  Charcot foot due to diabetes mellitus    H/O shoulder surgery     delivery delivered      Allergies:  No Known Allergies    Medications:  PRN:  acetaminophen   Tablet .. 650 milliGRAM(s) Oral every 6 hours PRN Moderate Pain (4 - 6)  albuterol/ipratropium for Nebulization 3 milliLiter(s) Nebulizer every 6 hours PRN Shortness of Breath  oxycodone    5 mG/acetaminophen 325 mG 1 Tablet(s) Oral every 6 hours PRN Severe Pain (7 - 10)    Standing:  aspirin  chewable 81 milliGRAM(s) Oral daily  budesonide  80 MICROgram(s)/formoterol 4.5 MICROgram(s) Inhaler 2 Puff(s) Inhalation two times a day  chlorhexidine 4% Liquid 1 Application(s) Topical <User Schedule>  clopidogrel Tablet 75 milliGRAM(s) Oral daily  furosemide   Injectable 60 milliGRAM(s) IV Push every 12 hours  heparin   Injectable 5000 Unit(s) SubCutaneous every 8 hours  influenza   Vaccine 0.5 milliLiter(s) IntraMuscular once  levothyroxine 100 MICROGram(s) Oral daily  metoprolol tartrate 12.5 milliGRAM(s) Oral two times a day  pantoprazole    Tablet 40 milliGRAM(s) Oral every 12 hours  potassium chloride   Powder 40 milliEquivalent(s) Oral once  senna 2 Tablet(s) Oral at bedtime  sucralfate 1 Gram(s) Oral four times a day    Home:  budesonide-formoterol 80 mcg-4.5 mcg/inh inhalation aerosol: 2 puff(s) inhaled 2 times a day  Lyrica 150 mg oral capsule: 1 tab(s) orally 3 times a day, stop after   tiZANidine 4 mg oral tablet: 2 tab(s) orally every 8 hours, As Needed  Ventolin HFA 90 mcg/inh inhalation aerosol: 2 puff(s) inhaled 4 times a day, As Needed    Vitals:  T(C): 36.4, Max: 36.6 (20 @ 20:00)  T(F): 97.6, Max: 97.8 (20 @ 20:00)  HR: 88 (72 - 90)  BP: 140/65 (111/62 - 157/79)  RR: 13 (10 - 26)  SpO2: 92% (86% - 100%)    Physical Exam:  General: NAD  HEENT: Head NC/AT  Heart: RRR; S1/S2; no rubs, murmurs appreciated  Lungs: Clear to auscultation bilaterally, no wheezing, rales or rhonchi  Abdomen: Soft, nontender, nondistended, BS+  Extremities: No edema, clubbing, cyanosis in upper or lower extremities.  Neuro: NFD.    Labs:  CBC ( @ 05:03)                        Hgb: 9.5    WBC: 7.32  )-----------------( Plts: 131                              Hct: 32.7     Chem ( @ 05:03)  Na: 140  |     Cl: 94     |  BUN: 22  -----------------------------------------< Gluc: 94    K: 3.7   |    HCO3: 37  |  Cr: 1.0    Ca 7.9 ( 05:03)  Mg 1.0 ( 05:03)    LFTs ( @ 05:03)  TPro 6.2  /  Alb 3.0  TBili 1.8  /  DBili     AST 33  /  ALT 22  /  AlkPhos 106            Microbiology:  Culture - Blood (collected  @ 07:36)  Source: .Blood None  Final Report ( @ 18:00):    No Growth Final        Radiology:

## 2020-11-13 LAB
ALBUMIN SERPL ELPH-MCNC: 3.2 G/DL — LOW (ref 3.5–5.2)
ALP SERPL-CCNC: 130 U/L — HIGH (ref 30–115)
ALT FLD-CCNC: 21 U/L — SIGNIFICANT CHANGE UP (ref 0–41)
ANION GAP SERPL CALC-SCNC: 14 MMOL/L — SIGNIFICANT CHANGE UP (ref 7–14)
AST SERPL-CCNC: 40 U/L — SIGNIFICANT CHANGE UP (ref 0–41)
BASOPHILS # BLD AUTO: 0.03 K/UL — SIGNIFICANT CHANGE UP (ref 0–0.2)
BASOPHILS NFR BLD AUTO: 0.3 % — SIGNIFICANT CHANGE UP (ref 0–1)
BILIRUB SERPL-MCNC: 3 MG/DL — HIGH (ref 0.2–1.2)
BUN SERPL-MCNC: 16 MG/DL — SIGNIFICANT CHANGE UP (ref 10–20)
CALCIUM SERPL-MCNC: 8.5 MG/DL — SIGNIFICANT CHANGE UP (ref 8.5–10.1)
CHLORIDE SERPL-SCNC: 86 MMOL/L — LOW (ref 98–110)
CO2 SERPL-SCNC: 36 MMOL/L — HIGH (ref 17–32)
CREAT SERPL-MCNC: 0.8 MG/DL — SIGNIFICANT CHANGE UP (ref 0.7–1.5)
EOSINOPHIL # BLD AUTO: 0.68 K/UL — SIGNIFICANT CHANGE UP (ref 0–0.7)
EOSINOPHIL NFR BLD AUTO: 7 % — SIGNIFICANT CHANGE UP (ref 0–8)
GLUCOSE BLDC GLUCOMTR-MCNC: 119 MG/DL — HIGH (ref 70–99)
GLUCOSE BLDC GLUCOMTR-MCNC: 137 MG/DL — HIGH (ref 70–99)
GLUCOSE BLDC GLUCOMTR-MCNC: 160 MG/DL — HIGH (ref 70–99)
GLUCOSE BLDC GLUCOMTR-MCNC: 199 MG/DL — HIGH (ref 70–99)
GLUCOSE BLDC GLUCOMTR-MCNC: 202 MG/DL — HIGH (ref 70–99)
GLUCOSE SERPL-MCNC: 130 MG/DL — HIGH (ref 70–99)
HCT VFR BLD CALC: 40.4 % — SIGNIFICANT CHANGE UP (ref 37–47)
HGB BLD-MCNC: 12.3 G/DL — SIGNIFICANT CHANGE UP (ref 12–16)
IMM GRANULOCYTES NFR BLD AUTO: 0.8 % — HIGH (ref 0.1–0.3)
LYMPHOCYTES # BLD AUTO: 0.7 K/UL — LOW (ref 1.2–3.4)
LYMPHOCYTES # BLD AUTO: 7.2 % — LOW (ref 20.5–51.1)
MAGNESIUM SERPL-MCNC: 1.2 MG/DL — LOW (ref 1.8–2.4)
MCHC RBC-ENTMCNC: 26.1 PG — LOW (ref 27–31)
MCHC RBC-ENTMCNC: 30.4 G/DL — LOW (ref 32–37)
MCV RBC AUTO: 85.8 FL — SIGNIFICANT CHANGE UP (ref 81–99)
MONOCYTES # BLD AUTO: 0.79 K/UL — HIGH (ref 0.1–0.6)
MONOCYTES NFR BLD AUTO: 8.1 % — SIGNIFICANT CHANGE UP (ref 1.7–9.3)
NEUTROPHILS # BLD AUTO: 7.48 K/UL — HIGH (ref 1.4–6.5)
NEUTROPHILS NFR BLD AUTO: 76.6 % — HIGH (ref 42.2–75.2)
NRBC # BLD: 0 /100 WBCS — SIGNIFICANT CHANGE UP (ref 0–0)
PLATELET # BLD AUTO: 144 K/UL — SIGNIFICANT CHANGE UP (ref 130–400)
POTASSIUM SERPL-MCNC: 3.6 MMOL/L — SIGNIFICANT CHANGE UP (ref 3.5–5)
POTASSIUM SERPL-SCNC: 3.6 MMOL/L — SIGNIFICANT CHANGE UP (ref 3.5–5)
PROT SERPL-MCNC: 7 G/DL — SIGNIFICANT CHANGE UP (ref 6–8)
RBC # BLD: 4.71 M/UL — SIGNIFICANT CHANGE UP (ref 4.2–5.4)
RBC # FLD: 20.7 % — HIGH (ref 11.5–14.5)
SODIUM SERPL-SCNC: 136 MMOL/L — SIGNIFICANT CHANGE UP (ref 135–146)
WBC # BLD: 9.76 K/UL — SIGNIFICANT CHANGE UP (ref 4.8–10.8)
WBC # FLD AUTO: 9.76 K/UL — SIGNIFICANT CHANGE UP (ref 4.8–10.8)

## 2020-11-13 RX ORDER — MAGNESIUM SULFATE 500 MG/ML
2 VIAL (ML) INJECTION
Refills: 0 | Status: COMPLETED | OUTPATIENT
Start: 2020-11-13 | End: 2020-11-13

## 2020-11-13 RX ADMIN — SENNA PLUS 2 TABLET(S): 8.6 TABLET ORAL at 21:09

## 2020-11-13 RX ADMIN — Medication 1 GRAM(S): at 11:11

## 2020-11-13 RX ADMIN — Medication 40 MILLIEQUIVALENT(S): at 00:25

## 2020-11-13 RX ADMIN — PANTOPRAZOLE SODIUM 40 MILLIGRAM(S): 20 TABLET, DELAYED RELEASE ORAL at 05:20

## 2020-11-13 RX ADMIN — Medication 81 MILLIGRAM(S): at 11:11

## 2020-11-13 RX ADMIN — Medication 12.5 MILLIGRAM(S): at 17:05

## 2020-11-13 RX ADMIN — Medication 1 GRAM(S): at 00:25

## 2020-11-13 RX ADMIN — CHLORHEXIDINE GLUCONATE 1 APPLICATION(S): 213 SOLUTION TOPICAL at 05:17

## 2020-11-13 RX ADMIN — Medication 1 GRAM(S): at 05:21

## 2020-11-13 RX ADMIN — CLOPIDOGREL BISULFATE 75 MILLIGRAM(S): 75 TABLET, FILM COATED ORAL at 11:11

## 2020-11-13 RX ADMIN — HEPARIN SODIUM 5000 UNIT(S): 5000 INJECTION INTRAVENOUS; SUBCUTANEOUS at 21:09

## 2020-11-13 RX ADMIN — Medication 100 MICROGRAM(S): at 05:21

## 2020-11-13 RX ADMIN — Medication 1 GRAM(S): at 23:41

## 2020-11-13 RX ADMIN — HEPARIN SODIUM 5000 UNIT(S): 5000 INJECTION INTRAVENOUS; SUBCUTANEOUS at 05:21

## 2020-11-13 RX ADMIN — BUDESONIDE AND FORMOTEROL FUMARATE DIHYDRATE 2 PUFF(S): 160; 4.5 AEROSOL RESPIRATORY (INHALATION) at 21:09

## 2020-11-13 RX ADMIN — BUDESONIDE AND FORMOTEROL FUMARATE DIHYDRATE 2 PUFF(S): 160; 4.5 AEROSOL RESPIRATORY (INHALATION) at 08:36

## 2020-11-13 RX ADMIN — HEPARIN SODIUM 5000 UNIT(S): 5000 INJECTION INTRAVENOUS; SUBCUTANEOUS at 14:26

## 2020-11-13 RX ADMIN — Medication 60 MILLIGRAM(S): at 05:20

## 2020-11-13 RX ADMIN — Medication 60 MILLIGRAM(S): at 17:05

## 2020-11-13 RX ADMIN — Medication 1 GRAM(S): at 17:05

## 2020-11-13 RX ADMIN — Medication 50 GRAM(S): at 08:24

## 2020-11-13 RX ADMIN — PANTOPRAZOLE SODIUM 40 MILLIGRAM(S): 20 TABLET, DELAYED RELEASE ORAL at 17:05

## 2020-11-13 RX ADMIN — Medication 12.5 MILLIGRAM(S): at 05:21

## 2020-11-13 RX ADMIN — Medication 50 GRAM(S): at 10:03

## 2020-11-13 NOTE — PROGRESS NOTE ADULT - SUBJECTIVE AND OBJECTIVE BOX
Patient Information:  CALLY HARTMAN / 55y / Female / MRN#:[id number]    Hospital Day: 10d    Interval History:  Patient seen and examined at bedside. She looks and feels better today and is comfortable on HFNC.    Past Medical History:  Dextrocardia    Chronic midline low back pain with bilateral sciatica    Peripheral neuralgia    Essential hypertension    Diabetes 1.5, managed as type 2    Charcot&#x27;s arthropathy      Past Surgical History:  Charcot foot due to diabetes mellitus    H/O shoulder surgery     delivery delivered      Allergies:  No Known Allergies    Medications:  PRN:  acetaminophen   Tablet .. 650 milliGRAM(s) Oral every 6 hours PRN Moderate Pain (4 - 6)  albuterol/ipratropium for Nebulization 3 milliLiter(s) Nebulizer every 6 hours PRN Shortness of Breath  oxycodone    5 mG/acetaminophen 325 mG 1 Tablet(s) Oral every 6 hours PRN Severe Pain (7 - 10)    Standing:  aspirin  chewable 81 milliGRAM(s) Oral daily  budesonide  80 MICROgram(s)/formoterol 4.5 MICROgram(s) Inhaler 2 Puff(s) Inhalation two times a day  chlorhexidine 4% Liquid 1 Application(s) Topical <User Schedule>  clopidogrel Tablet 75 milliGRAM(s) Oral daily  furosemide   Injectable 60 milliGRAM(s) IV Push every 12 hours  heparin   Injectable 5000 Unit(s) SubCutaneous every 8 hours  influenza   Vaccine 0.5 milliLiter(s) IntraMuscular once  levothyroxine 100 MICROGram(s) Oral daily  metoprolol tartrate 12.5 milliGRAM(s) Oral two times a day  pantoprazole    Tablet 40 milliGRAM(s) Oral every 12 hours  senna 2 Tablet(s) Oral at bedtime  sucralfate 1 Gram(s) Oral four times a day    Home:  budesonide-formoterol 80 mcg-4.5 mcg/inh inhalation aerosol: 2 puff(s) inhaled 2 times a day  Lyrica 150 mg oral capsule: 1 tab(s) orally 3 times a day, stop after   tiZANidine 4 mg oral tablet: 2 tab(s) orally every 8 hours, As Needed  Ventolin HFA 90 mcg/inh inhalation aerosol: 2 puff(s) inhaled 4 times a day, As Needed    Vitals:  T(C): 36.9, Max: 37 (11-13-20 @ 04:00)  T(F): 98.4, Max: 98.6 (20 @ 04:00)  HR: 88 (88 - 96)  BP: 127/69 (118/69 - 156/69)  RR: 13 (12 - 30)  SpO2: 97% (86% - 97%)    Physical Exam:  General: NAD  HEENT: Head NC/AT  Heart: RRR; S1/S2; no rubs, murmurs appreciated  Lungs: Clear to auscultation bilaterally, no wheezing, rales or rhonchi  Abdomen: Soft, nontender, nondistended, BS+  Extremities: No edema, clubbing, cyanosis in upper or lower extremities.  Neuro: NFD.    Labs:  CBC ( @ 04:43)                        Hgb: 12.3   WBC: 9.76  )-----------------( Plts: 144                              Hct: 40.4     Chem ( @ 04:43)  Na: 136  |     Cl: 86     |  BUN: 16  -----------------------------------------< Gluc: 130    K: 3.6   |    HCO3: 36  |  Cr: 0.8    Ca 8.5 ( @ 04:43)  Mg 1.2 ( @ 04:43)    LFTs ( @ 04:43)  TPro 7.0  /  Alb 3.2  TBili 3.0  /  DBili     AST 40  /  ALT 21  /  AlkPhos 130            Microbiology:    Radiology:

## 2020-11-13 NOTE — CHART NOTE - NSCHARTNOTEFT_GEN_A_CORE
Registered Dietitian Follow-Up    ***Scroll to the bottom for RD recommendation***    Patient Profile Reviewed                           Yes [x]   No []  Nutrition History Previously Obtained        Yes []  No [x]          PERTINENT SUBJECTIVE INFORMATION:  - last visit pt was in pain and distress   - this visit pt not able to answer questions, AOx2-3.   - spoken with RN, pt's mental status is slowly improving. on IV mag-rider today, HFNC.  - ate 50% of breakfast.          PERTINENT MEDICAL INFORMATIONS:  (1) Sepsis on admission. Off Levophed, s/p Abx  (2) HFpEF. on bipap with HFNC prn.   (3) JEEVAN resolved.  (4) NSTEMI on meds. Hx of duodenal ulcer  (5) T2DM.          DIET ORDER:   DASH/TLC        ANTHROPOMETRICS:  - Ht.  160cm  - Wt.    (11/4: 85.7kg  (11/13): 71.6kg - weight dropped significantly in 9 days is unlikely, will monitor trend. possible bedscale error.  - BMI. 33.5  - IBW. 52.5kg       PERTINENT LAB DATA:   11/13: glucose 130, Albumin 3.2, mag 1.2, HgbA1c 5.7  PERTINENT MEDS:   heparin, aspirin, clopidogrel, mag-sul, levothyroxine, metoprolol, protonix, lasix, oxy, acetaminophen,       PHYSICAL FINDINGS  - APPEARANCE:        AOx2-3, 1+ generalized edema  - GI FUNCTION:        LBM 11/13 per EMR  - TUBES:                       - ORAL/MOUTH:      none reported  - SKIN:                        Skin intact        NUTRITION REQUIREMENTS  WEIGHT USED:                          ABW: 85.7kg,  IBW: 52.2kg  ESTIMATED ENERGY NEEDS:       CONTINUE [ x ]      ADJUST [  ]  - from 11/10    ESTIMATED ENERGY NEEDS:         1695-2607 kcal/day (MSJ x 1.1-1.2)  ESTIMATED PROTEIN NEEDS:        52-67 g/day (1.0-1.3 g/kg of IBW)  - aim high  ESTIMATED FLUID NEEDS:             per ICU team    CURRENT NUTRIENT NEEDS:      eating 50% at this time          [ x ] PREVIOUS NUTRITION DIAGNOSIS:   (1) inadequate protein-energy intake             [x  ] ONGOING        [  ] RESOLVED           PATIENT INTERVENTION:    [  x] ORAL        [ ] EN/TF     GOAL/EXPECTED OUTCOME:     pt to consume and tolerate >50% of all meals and rec'd supplements upon f/u in 4 days. Pt to have 1Bm/day.  INDICATOR/MONITORING:       RD to monitor diet order, energy intake, body composition, nutrition focused physical findings (BM, appetite, PO tolerance)  NUTRITION INTERVENTION:        Meals and snacks. Medical food supplements      RECS: (1) Please add GLUCERNA SHAKE q12hr and CARBOHYDRATE CONSISTENT no snack   to current DASH/TLC diet

## 2020-11-13 NOTE — PROGRESS NOTE ADULT - SUBJECTIVE AND OBJECTIVE BOX
Patient is a 55y old  Female who presents with a chief complaint of arf (10 Nov 2020 11:20)        Over Night Events:    none. remains on high flow NC. feels better today    ROS:     All ROS are negative except HPI         PHYSICAL EXAM    ICU Vital Signs Last 24 Hrs  T(C): 36.9 (13 Nov 2020 08:00), Max: 37 (13 Nov 2020 04:00)  T(F): 98.4 (13 Nov 2020 08:00), Max: 98.6 (13 Nov 2020 04:00)  HR: 92 (13 Nov 2020 08:00) (86 - 96)  BP: 122/77 (13 Nov 2020 08:00) (118/69 - 156/69)  BP(mean): 97 (13 Nov 2020 08:00) (87 - 140)  ABP: --  ABP(mean): --  RR: 19 (13 Nov 2020 08:00) (12 - 30)  SpO2: 90% (13 Nov 2020 08:00) (86% - 97%)      CONSTITUTIONAL:  Well nourished.  NAD    ENT:   Airway patent,   Mouth with normal mucosa.   No thrush    EYES:   Pupils equal,   Round and reactive to light.    CARDIAC:   Normal rate,   Regular rhythm.    No edema    Vascular:  Normal systolic impulse  No Carotid bruits    RESPIRATORY:   No wheezing  decreased bibasilar breath sounds  Normal chest expansion  Not tachypneic,  No use of accessory muscles    GASTROINTESTINAL:  Abdomen soft,   Non-tender,   No guarding,   + BS    MUSCULOSKELETAL:   Range of motion is not limited,  No clubbing, cyanosis    NEUROLOGICAL:   Alert and oriented x3  No motor  deficits.    SKIN:   Skin normal color for race,   Warm and dry and intact.   No evidence of rash.    PSYCHIATRIC:   Normal mood and affect.   No apparent risk to self or others.    HEMATOLOGICAL:  No cervical  lymphadenopathy.  no inguinal lymphadenopathy      11-12-20 @ 07:01  -  11-13-20 @ 07:00  --------------------------------------------------------  IN:    IV PiggyBack: 50 mL    Oral Fluid: 985 mL  Total IN: 1035 mL    OUT:    Indwelling Catheter - Urethral (mL): 1545 mL    Ureteral Catheter (mL): 2675 mL  Total OUT: 4220 mL    Total NET: -3185 mL      11-13-20 @ 07:01  -  11-13-20 @ 09:59  --------------------------------------------------------  IN:    IV PiggyBack: 50 mL  Total IN: 50 mL    OUT:  Total OUT: 0 mL    Total NET: 50 mL          LABS:                            12.3   9.76  )-----------( 144      ( 13 Nov 2020 04:43 )             40.4                                               11-13    136  |  86<L>  |  16  ----------------------------<  130<H>  3.6   |  36<H>  |  0.8    Ca    8.5      13 Nov 2020 04:43  Mg     1.2     11-13    TPro  7.0  /  Alb  3.2<L>  /  TBili  3.0<H>  /  DBili  x   /  AST  40  /  ALT  21  /  AlkPhos  130<H>  11-13                                                                                           LIVER FUNCTIONS - ( 13 Nov 2020 04:43 )  Alb: 3.2 g/dL / Pro: 7.0 g/dL / ALK PHOS: 130 U/L / ALT: 21 U/L / AST: 40 U/L / GGT: x                                                                                                                                       MEDICATIONS  (STANDING):  aspirin  chewable 81 milliGRAM(s) Oral daily  budesonide  80 MICROgram(s)/formoterol 4.5 MICROgram(s) Inhaler 2 Puff(s) Inhalation two times a day  chlorhexidine 4% Liquid 1 Application(s) Topical <User Schedule>  clopidogrel Tablet 75 milliGRAM(s) Oral daily  furosemide   Injectable 60 milliGRAM(s) IV Push every 12 hours  heparin   Injectable 5000 Unit(s) SubCutaneous every 8 hours  influenza   Vaccine 0.5 milliLiter(s) IntraMuscular once  levothyroxine 100 MICROGram(s) Oral daily  magnesium sulfate  IVPB 2 Gram(s) IV Intermittent every 2 hours  metoprolol tartrate 12.5 milliGRAM(s) Oral two times a day  pantoprazole    Tablet 40 milliGRAM(s) Oral every 12 hours  senna 2 Tablet(s) Oral at bedtime  sucralfate 1 Gram(s) Oral four times a day    MEDICATIONS  (PRN):  acetaminophen   Tablet .. 650 milliGRAM(s) Oral every 6 hours PRN Moderate Pain (4 - 6)  albuterol/ipratropium for Nebulization 3 milliLiter(s) Nebulizer every 6 hours PRN Shortness of Breath  oxycodone    5 mG/acetaminophen 325 mG 1 Tablet(s) Oral every 6 hours PRN Severe Pain (7 - 10)         Patient is a 55y old  Female who presents with a chief complaint of arf (10 Nov 2020 11:20)        Over Night Events:  Off pressors.  On HFNCO2.  feels better today    ROS:     All ROS are negative except HPI         PHYSICAL EXAM    ICU Vital Signs Last 24 Hrs  T(C): 36.9 (13 Nov 2020 08:00), Max: 37 (13 Nov 2020 04:00)  T(F): 98.4 (13 Nov 2020 08:00), Max: 98.6 (13 Nov 2020 04:00)  HR: 92 (13 Nov 2020 08:00) (86 - 96)  BP: 122/77 (13 Nov 2020 08:00) (118/69 - 156/69)  BP(mean): 97 (13 Nov 2020 08:00) (87 - 140)  ABP: --  ABP(mean): --  RR: 19 (13 Nov 2020 08:00) (12 - 30)  SpO2: 90% (13 Nov 2020 08:00) (86% - 97%)      CONSTITUTIONAL:  Well nourished.  NAD    ENT:   Airway patent,   Mouth with normal mucosa.   No thrush    EYES:   Pupils equal,   Round and reactive to light.    CARDIAC:   Normal rate,   Regular rhythm.    No edema    Vascular:  Normal systolic impulse  No Carotid bruits    RESPIRATORY:   No wheezing  decreased bibasilar breath sounds  Normal chest expansion  Not tachypneic,  No use of accessory muscles    GASTROINTESTINAL:  Abdomen soft,   Non-tender,   No guarding,   + BS    MUSCULOSKELETAL:   Range of motion is not limited,  No clubbing, cyanosis    NEUROLOGICAL:   Alert and oriented x3  No motor  deficits.    SKIN:   Skin normal color for race,   Warm and dry and intact.   No evidence of rash.    PSYCHIATRIC:   Normal mood and affect.   No apparent risk to self or others.    HEMATOLOGICAL:  No cervical  lymphadenopathy.  no inguinal lymphadenopathy      11-12-20 @ 07:01  -  11-13-20 @ 07:00  --------------------------------------------------------  IN:    IV PiggyBack: 50 mL    Oral Fluid: 985 mL  Total IN: 1035 mL    OUT:    Indwelling Catheter - Urethral (mL): 1545 mL    Ureteral Catheter (mL): 2675 mL  Total OUT: 4220 mL    Total NET: -3185 mL      11-13-20 @ 07:01  -  11-13-20 @ 09:59  --------------------------------------------------------  IN:    IV PiggyBack: 50 mL  Total IN: 50 mL    OUT:  Total OUT: 0 mL    Total NET: 50 mL          LABS:                            12.3   9.76  )-----------( 144      ( 13 Nov 2020 04:43 )             40.4                                               11-13    136  |  86<L>  |  16  ----------------------------<  130<H>  3.6   |  36<H>  |  0.8    Ca    8.5      13 Nov 2020 04:43  Mg     1.2     11-13    TPro  7.0  /  Alb  3.2<L>  /  TBili  3.0<H>  /  DBili  x   /  AST  40  /  ALT  21  /  AlkPhos  130<H>  11-13                                                                                           LIVER FUNCTIONS - ( 13 Nov 2020 04:43 )  Alb: 3.2 g/dL / Pro: 7.0 g/dL / ALK PHOS: 130 U/L / ALT: 21 U/L / AST: 40 U/L / GGT: x                                                                                                                                       MEDICATIONS  (STANDING):  aspirin  chewable 81 milliGRAM(s) Oral daily  budesonide  80 MICROgram(s)/formoterol 4.5 MICROgram(s) Inhaler 2 Puff(s) Inhalation two times a day  chlorhexidine 4% Liquid 1 Application(s) Topical <User Schedule>  clopidogrel Tablet 75 milliGRAM(s) Oral daily  furosemide   Injectable 60 milliGRAM(s) IV Push every 12 hours  heparin   Injectable 5000 Unit(s) SubCutaneous every 8 hours  influenza   Vaccine 0.5 milliLiter(s) IntraMuscular once  levothyroxine 100 MICROGram(s) Oral daily  magnesium sulfate  IVPB 2 Gram(s) IV Intermittent every 2 hours  metoprolol tartrate 12.5 milliGRAM(s) Oral two times a day  pantoprazole    Tablet 40 milliGRAM(s) Oral every 12 hours  senna 2 Tablet(s) Oral at bedtime  sucralfate 1 Gram(s) Oral four times a day    MEDICATIONS  (PRN):  acetaminophen   Tablet .. 650 milliGRAM(s) Oral every 6 hours PRN Moderate Pain (4 - 6)  albuterol/ipratropium for Nebulization 3 milliLiter(s) Nebulizer every 6 hours PRN Shortness of Breath  oxycodone    5 mG/acetaminophen 325 mG 1 Tablet(s) Oral every 6 hours PRN Severe Pain (7 - 10)

## 2020-11-13 NOTE — PROGRESS NOTE ADULT - ASSESSMENT
54 y/o female active smoker with PMHx of Type II DM, HFpEF (EF of 72 %), CAD, hyperlipidemia, UGIB 2/2 duodenal ulcer, HTN, Hypothyroidism, COPD, Dextrocardia, Sciatica/Peripheral Neuralgia, and Charcot foot deformity s/p reconstruction on 1/11/19 presents to the ED for worsening lethargy. pt admitted to MICU for sepsis unclear source, type 2 NSTEMI and was BiPAP dependent with tachypnea. pt to be downgraded to medical floor.    #Sepsis present on admission, unclear source  - Off levophed.  - Possible  source?  - Blood and urine Cx are negative.  - s/p Meropenem stopped 11/10.    #HFpEF  - Echo on 10/30: EF 62%.  - Improved bilateral pleural effusions and bilateral opacities on CXR.  - Wean off O2 as tolerated.  - BIPAP alternating with HFNC as needed.    #COPD  - Severe Pulmonary Hypertension evident on echo.  - c/w inhalers.    #JEEVAN, hypernatremia- resolved   - Cr 3.8 on admission, normalized.  - Likely pre-renal/ATN due to hypotension.    #NSTEMI  - Troponin 1.05 on admission -> 0.31.  - Normal LV size and wall thickness, with normal systolic and diastolic function with severe pulmonary hypertension.  - Cardiology on board, possible cardiac catheterization when stable.  - On Aspirin and Plavix.  - Rate control: Lopressor bid.    #Hx of duodenal ulcer  - No evidence of GIB.  - c/w Protonix.  - Sucralfate.  - Keep active type and screen.    #Type II Diabetes Mellitus  - Follow FS.  - Insulin protocol if needed.     #Hx of Hypothyroidism  - Levothyroxin resumed.    Diet: DASH  Activity: Increase as tolerated  DVT Prophylaxis: heparin  GI Prophylaxis: PPI  Disposition: downgrade to medical floor  Code Status: Full    Pending: improvement of respiratory status.

## 2020-11-13 NOTE — PROGRESS NOTE ADULT - ASSESSMENT
Impression:    Sepsis present on admission  Type2 NSTEMI  HFpEF (EF of 72 %)  HO CAD  UGIB 2/2 duodenal ulcer  HO COPD  Severe Pulm HTN  Pulmonary edema improving         Plan     CNS: Avoid CNS depressants.     CVS: ASA/Plavix.  FU with cardiology.  Rate Control.  dc IVF     PULMONARY: HOB @ 45 degrees.  Aspiration precautions.  Continue BiPAP 18/12 alternating with HFNCO2.  Wean O2 as tolerated      INFECTIOUS DISEASE: off antibx.     GI: GI PPx.  Advance diet  as tolerated     RENAL FU lytes.  Correct as needed . replete Mg    Endocrine: Follow up FS.      Hematology: DVT PPx    Downgrade  to floor Impression:    Sepsis present on admission  Type2 NSTEMI  HFpEF (EF of 72 %)  HO CAD  UGIB 2/2 duodenal ulcer  HO COPD  Severe Pulm HTN  Pulmonary edema improving         Plan     CNS: Avoid CNS depressants.     CVS: ASA/Plavix.  FU with cardiology.  Rate Control.  dc IVF     PULMONARY: HOB @ 45 degrees.  Aspiration precautions.  Continue BiPAP 18/12 alternating with HFNCO2.  Wean O2 as tolerated      INFECTIOUS DISEASE: off antibx.     GI: GI PPx.  Advance diet  as tolerated     RENAL FU lytes.  Correct as needed . replete Mg    Endocrine: Follow up FS.      Hematology: DVT PPx    OOB to chair  Downgrade  to floor Impression:    Sepsis present on admission  Type2 NSTEMI  HFpEF (EF of 72 %)  HO CAD  UGIB 2/2 duodenal ulcer  HO COPD  Severe Pulm HTN  Pulmonary edema improving         Plan     CNS: Avoid CNS depressants.     CVS: ASA/Plavix.  FU with cardiology.  Rate Control.      PULMONARY: HOB @ 45 degrees.  Aspiration precautions.  Continue BiPAP 18/12   Wean O2 as tolerated      INFECTIOUS DISEASE: off antibx.     GI: GI PPx.  Advance diet  as tolerated     RENAL FU lytes.  Correct as needed . replete Mg    Endocrine: Follow up FS.      Hematology: DVT PPx    OOB to chair  Downgrade  to SDU

## 2020-11-14 LAB
ALBUMIN SERPL ELPH-MCNC: 3.3 G/DL — LOW (ref 3.5–5.2)
ALP SERPL-CCNC: 128 U/L — HIGH (ref 30–115)
ALT FLD-CCNC: 17 U/L — SIGNIFICANT CHANGE UP (ref 0–41)
ANION GAP SERPL CALC-SCNC: 16 MMOL/L — HIGH (ref 7–14)
AST SERPL-CCNC: 43 U/L — HIGH (ref 0–41)
BASOPHILS # BLD AUTO: 0.04 K/UL — SIGNIFICANT CHANGE UP (ref 0–0.2)
BASOPHILS NFR BLD AUTO: 0.4 % — SIGNIFICANT CHANGE UP (ref 0–1)
BILIRUB SERPL-MCNC: 2.3 MG/DL — HIGH (ref 0.2–1.2)
BUN SERPL-MCNC: 16 MG/DL — SIGNIFICANT CHANGE UP (ref 10–20)
CALCIUM SERPL-MCNC: 8.6 MG/DL — SIGNIFICANT CHANGE UP (ref 8.5–10.1)
CHLORIDE SERPL-SCNC: 84 MMOL/L — LOW (ref 98–110)
CO2 SERPL-SCNC: 36 MMOL/L — HIGH (ref 17–32)
CREAT SERPL-MCNC: 0.9 MG/DL — SIGNIFICANT CHANGE UP (ref 0.7–1.5)
EOSINOPHIL # BLD AUTO: 0.87 K/UL — HIGH (ref 0–0.7)
EOSINOPHIL NFR BLD AUTO: 8.8 % — HIGH (ref 0–8)
GLUCOSE BLDC GLUCOMTR-MCNC: 108 MG/DL — HIGH (ref 70–99)
GLUCOSE BLDC GLUCOMTR-MCNC: 120 MG/DL — HIGH (ref 70–99)
GLUCOSE BLDC GLUCOMTR-MCNC: 129 MG/DL — HIGH (ref 70–99)
GLUCOSE BLDC GLUCOMTR-MCNC: 130 MG/DL — HIGH (ref 70–99)
GLUCOSE BLDC GLUCOMTR-MCNC: 131 MG/DL — HIGH (ref 70–99)
GLUCOSE BLDC GLUCOMTR-MCNC: 159 MG/DL — HIGH (ref 70–99)
GLUCOSE SERPL-MCNC: 136 MG/DL — HIGH (ref 70–99)
HCT VFR BLD CALC: 39.1 % — SIGNIFICANT CHANGE UP (ref 37–47)
HGB BLD-MCNC: 11.8 G/DL — LOW (ref 12–16)
IMM GRANULOCYTES NFR BLD AUTO: 0.4 % — HIGH (ref 0.1–0.3)
LYMPHOCYTES # BLD AUTO: 0.69 K/UL — LOW (ref 1.2–3.4)
LYMPHOCYTES # BLD AUTO: 7 % — LOW (ref 20.5–51.1)
MAGNESIUM SERPL-MCNC: 1.9 MG/DL — SIGNIFICANT CHANGE UP (ref 1.8–2.4)
MCHC RBC-ENTMCNC: 26.2 PG — LOW (ref 27–31)
MCHC RBC-ENTMCNC: 30.2 G/DL — LOW (ref 32–37)
MCV RBC AUTO: 86.9 FL — SIGNIFICANT CHANGE UP (ref 81–99)
MONOCYTES # BLD AUTO: 0.76 K/UL — HIGH (ref 0.1–0.6)
MONOCYTES NFR BLD AUTO: 7.7 % — SIGNIFICANT CHANGE UP (ref 1.7–9.3)
NEUTROPHILS # BLD AUTO: 7.46 K/UL — HIGH (ref 1.4–6.5)
NEUTROPHILS NFR BLD AUTO: 75.7 % — HIGH (ref 42.2–75.2)
NRBC # BLD: 0 /100 WBCS — SIGNIFICANT CHANGE UP (ref 0–0)
PLATELET # BLD AUTO: 127 K/UL — LOW (ref 130–400)
POTASSIUM SERPL-MCNC: 3.2 MMOL/L — LOW (ref 3.5–5)
POTASSIUM SERPL-SCNC: 3.2 MMOL/L — LOW (ref 3.5–5)
PROT SERPL-MCNC: 7.1 G/DL — SIGNIFICANT CHANGE UP (ref 6–8)
RBC # BLD: 4.5 M/UL — SIGNIFICANT CHANGE UP (ref 4.2–5.4)
RBC # FLD: 21.1 % — HIGH (ref 11.5–14.5)
SODIUM SERPL-SCNC: 136 MMOL/L — SIGNIFICANT CHANGE UP (ref 135–146)
WBC # BLD: 9.86 K/UL — SIGNIFICANT CHANGE UP (ref 4.8–10.8)
WBC # FLD AUTO: 9.86 K/UL — SIGNIFICANT CHANGE UP (ref 4.8–10.8)

## 2020-11-14 RX ORDER — POTASSIUM CHLORIDE 20 MEQ
40 PACKET (EA) ORAL EVERY 4 HOURS
Refills: 0 | Status: COMPLETED | OUTPATIENT
Start: 2020-11-14 | End: 2020-11-14

## 2020-11-14 RX ADMIN — Medication 60 MILLIGRAM(S): at 18:24

## 2020-11-14 RX ADMIN — Medication 12.5 MILLIGRAM(S): at 18:24

## 2020-11-14 RX ADMIN — Medication 1 GRAM(S): at 18:24

## 2020-11-14 RX ADMIN — Medication 100 MICROGRAM(S): at 05:00

## 2020-11-14 RX ADMIN — Medication 1 GRAM(S): at 11:23

## 2020-11-14 RX ADMIN — PANTOPRAZOLE SODIUM 40 MILLIGRAM(S): 20 TABLET, DELAYED RELEASE ORAL at 06:15

## 2020-11-14 RX ADMIN — Medication 12.5 MILLIGRAM(S): at 05:00

## 2020-11-14 RX ADMIN — Medication 40 MILLIEQUIVALENT(S): at 12:41

## 2020-11-14 RX ADMIN — SENNA PLUS 2 TABLET(S): 8.6 TABLET ORAL at 21:33

## 2020-11-14 RX ADMIN — Medication 81 MILLIGRAM(S): at 11:23

## 2020-11-14 RX ADMIN — Medication 40 MILLIEQUIVALENT(S): at 14:14

## 2020-11-14 RX ADMIN — Medication 1 GRAM(S): at 23:39

## 2020-11-14 RX ADMIN — Medication 1 GRAM(S): at 05:00

## 2020-11-14 RX ADMIN — PANTOPRAZOLE SODIUM 40 MILLIGRAM(S): 20 TABLET, DELAYED RELEASE ORAL at 18:24

## 2020-11-14 RX ADMIN — CLOPIDOGREL BISULFATE 75 MILLIGRAM(S): 75 TABLET, FILM COATED ORAL at 11:23

## 2020-11-14 RX ADMIN — BUDESONIDE AND FORMOTEROL FUMARATE DIHYDRATE 2 PUFF(S): 160; 4.5 AEROSOL RESPIRATORY (INHALATION) at 11:23

## 2020-11-14 RX ADMIN — Medication 60 MILLIGRAM(S): at 05:00

## 2020-11-14 RX ADMIN — HEPARIN SODIUM 5000 UNIT(S): 5000 INJECTION INTRAVENOUS; SUBCUTANEOUS at 14:14

## 2020-11-14 RX ADMIN — HEPARIN SODIUM 5000 UNIT(S): 5000 INJECTION INTRAVENOUS; SUBCUTANEOUS at 21:33

## 2020-11-14 RX ADMIN — HEPARIN SODIUM 5000 UNIT(S): 5000 INJECTION INTRAVENOUS; SUBCUTANEOUS at 05:00

## 2020-11-14 RX ADMIN — CHLORHEXIDINE GLUCONATE 1 APPLICATION(S): 213 SOLUTION TOPICAL at 06:15

## 2020-11-14 RX ADMIN — BUDESONIDE AND FORMOTEROL FUMARATE DIHYDRATE 2 PUFF(S): 160; 4.5 AEROSOL RESPIRATORY (INHALATION) at 20:51

## 2020-11-14 NOTE — PROGRESS NOTE ADULT - SUBJECTIVE AND OBJECTIVE BOX
Patient is a 55y old  Female who presents with a chief complaint of SOB (13 Nov 2020 09:58)        Over Night Events:  on HFNCO2.  Anxious to go home.  no CP         ROS:     All ROS are negative except HPI         PHYSICAL EXAM    ICU Vital Signs Last 24 Hrs  T(C): 36.3 (14 Nov 2020 04:50), Max: 36.9 (13 Nov 2020 08:00)  T(F): 97.3 (14 Nov 2020 04:50), Max: 98.4 (13 Nov 2020 08:00)  HR: 86 (14 Nov 2020 04:50) (86 - 95)  BP: 127/74 (14 Nov 2020 04:50) (114/67 - 136/61)  BP(mean): 93 (13 Nov 2020 19:00) (85 - 99)  ABP: --  ABP(mean): --  RR: 18 (14 Nov 2020 04:50) (13 - 30)  SpO2: 100% (14 Nov 2020 04:50) (90% - 100%)      CONSTITUTIONAL:  Well nourished.  NAD    ENT:   Airway patent,   Mouth with normal mucosa.   No thrush    EYES:   Pupils equal,   Round and reactive to light.    CARDIAC:   Normal rate,   Regular rhythm.    No edema      Vascular:  Normal systolic impulse  No Carotid bruits    RESPIRATORY:   No wheezing  Bilateral BS  Normal chest expansion  Not tachypneic,  No use of accessory muscles    GASTROINTESTINAL:  Abdomen soft,   Non-tender,   No guarding,   + BS    MUSCULOSKELETAL:   Range of motion is not limited,  No clubbing, cyanosis    NEUROLOGICAL:   Alert and oriented   No motor  deficits.    SKIN:   Skin normal color for race,   Warm and dry and intact.   No evidence of rash.    PSYCHIATRIC:   Normal mood and affect.   No apparent risk to self or others.    HEMATOLOGICAL:  No cervical  lymphadenopathy.  no inguinal lymphadenopathy      11-13-20 @ 07:01  -  11-14-20 @ 07:00  --------------------------------------------------------  IN:    IV PiggyBack: 100 mL    Oral Fluid: 220 mL  Total IN: 320 mL    OUT:    Voided (mL): 700 mL  Total OUT: 700 mL    Total NET: -380 mL          LABS:                            12.3   9.76  )-----------( 144      ( 13 Nov 2020 04:43 )             40.4                                               11-13    136  |  86<L>  |  16  ----------------------------<  130<H>  3.6   |  36<H>  |  0.8    Ca    8.5      13 Nov 2020 04:43  Mg     1.2     11-13    TPro  7.0  /  Alb  3.2<L>  /  TBili  3.0<H>  /  DBili  x   /  AST  40  /  ALT  21  /  AlkPhos  130<H>  11-13                                                                                           LIVER FUNCTIONS - ( 13 Nov 2020 04:43 )  Alb: 3.2 g/dL / Pro: 7.0 g/dL / ALK PHOS: 130 U/L / ALT: 21 U/L / AST: 40 U/L / GGT: x                                                                                                                                       MEDICATIONS  (STANDING):  aspirin  chewable 81 milliGRAM(s) Oral daily  budesonide  80 MICROgram(s)/formoterol 4.5 MICROgram(s) Inhaler 2 Puff(s) Inhalation two times a day  chlorhexidine 4% Liquid 1 Application(s) Topical <User Schedule>  clopidogrel Tablet 75 milliGRAM(s) Oral daily  furosemide   Injectable 60 milliGRAM(s) IV Push every 12 hours  heparin   Injectable 5000 Unit(s) SubCutaneous every 8 hours  influenza   Vaccine 0.5 milliLiter(s) IntraMuscular once  levothyroxine 100 MICROGram(s) Oral daily  metoprolol tartrate 12.5 milliGRAM(s) Oral two times a day  pantoprazole    Tablet 40 milliGRAM(s) Oral every 12 hours  senna 2 Tablet(s) Oral at bedtime  sucralfate 1 Gram(s) Oral four times a day    MEDICATIONS  (PRN):  acetaminophen   Tablet .. 650 milliGRAM(s) Oral every 6 hours PRN Moderate Pain (4 - 6)  albuterol/ipratropium for Nebulization 3 milliLiter(s) Nebulizer every 6 hours PRN Shortness of Breath  oxycodone    5 mG/acetaminophen 325 mG 1 Tablet(s) Oral every 6 hours PRN Severe Pain (7 - 10)      New X-rays reviewed:                                                                                  ECHO    CXR interpreted by me:

## 2020-11-14 NOTE — PROGRESS NOTE ADULT - ASSESSMENT
Impression:    Sepsis present on admission  Type2 NSTEMI  HFpEF (EF of 72 %)  HO CAD  UGIB 2/2 duodenal ulcer  HO COPD  Severe Pulm HTN  Pulmonary edema improving         Plan     CNS: Avoid CNS depressants.     CVS: ASA/Plavix.  FU with cardiology.  Rate Control.  Negative balance      PULMONARY: HOB @ 45 degrees.  Aspiration precautions.  Wean O2 as tolerated.  Repeat CXR     INFECTIOUS DISEASE: off antibx.     GI: GI PPx.  Advance diet  as tolerated     RENAL FU lytes.  Correct as needed .    Endocrine: Follow up FS.      Hematology: DVT PPx    OOB to chair   SDU

## 2020-11-15 LAB
ALBUMIN SERPL ELPH-MCNC: 3.4 G/DL — LOW (ref 3.5–5.2)
ALP SERPL-CCNC: 132 U/L — HIGH (ref 30–115)
ALT FLD-CCNC: 17 U/L — SIGNIFICANT CHANGE UP (ref 0–41)
ANION GAP SERPL CALC-SCNC: 15 MMOL/L — HIGH (ref 7–14)
AST SERPL-CCNC: 44 U/L — HIGH (ref 0–41)
BASOPHILS # BLD AUTO: 0.04 K/UL — SIGNIFICANT CHANGE UP (ref 0–0.2)
BASOPHILS NFR BLD AUTO: 0.4 % — SIGNIFICANT CHANGE UP (ref 0–1)
BILIRUB SERPL-MCNC: 2 MG/DL — HIGH (ref 0.2–1.2)
BUN SERPL-MCNC: 16 MG/DL — SIGNIFICANT CHANGE UP (ref 10–20)
CALCIUM SERPL-MCNC: 8.8 MG/DL — SIGNIFICANT CHANGE UP (ref 8.5–10.1)
CHLORIDE SERPL-SCNC: 89 MMOL/L — LOW (ref 98–110)
CO2 SERPL-SCNC: 34 MMOL/L — HIGH (ref 17–32)
CREAT SERPL-MCNC: 1 MG/DL — SIGNIFICANT CHANGE UP (ref 0.7–1.5)
EOSINOPHIL # BLD AUTO: 0.61 K/UL — SIGNIFICANT CHANGE UP (ref 0–0.7)
EOSINOPHIL NFR BLD AUTO: 6.8 % — SIGNIFICANT CHANGE UP (ref 0–8)
GLUCOSE BLDC GLUCOMTR-MCNC: 100 MG/DL — HIGH (ref 70–99)
GLUCOSE BLDC GLUCOMTR-MCNC: 125 MG/DL — HIGH (ref 70–99)
GLUCOSE BLDC GLUCOMTR-MCNC: 127 MG/DL — HIGH (ref 70–99)
GLUCOSE BLDC GLUCOMTR-MCNC: 95 MG/DL — SIGNIFICANT CHANGE UP (ref 70–99)
GLUCOSE SERPL-MCNC: 128 MG/DL — HIGH (ref 70–99)
HCT VFR BLD CALC: 41.2 % — SIGNIFICANT CHANGE UP (ref 37–47)
HGB BLD-MCNC: 12.2 G/DL — SIGNIFICANT CHANGE UP (ref 12–16)
IMM GRANULOCYTES NFR BLD AUTO: 0.4 % — HIGH (ref 0.1–0.3)
LYMPHOCYTES # BLD AUTO: 0.89 K/UL — LOW (ref 1.2–3.4)
LYMPHOCYTES # BLD AUTO: 9.9 % — LOW (ref 20.5–51.1)
MAGNESIUM SERPL-MCNC: 1.6 MG/DL — LOW (ref 1.8–2.4)
MCHC RBC-ENTMCNC: 26.4 PG — LOW (ref 27–31)
MCHC RBC-ENTMCNC: 29.6 G/DL — LOW (ref 32–37)
MCV RBC AUTO: 89.2 FL — SIGNIFICANT CHANGE UP (ref 81–99)
MONOCYTES # BLD AUTO: 0.92 K/UL — HIGH (ref 0.1–0.6)
MONOCYTES NFR BLD AUTO: 10.2 % — HIGH (ref 1.7–9.3)
NEUTROPHILS # BLD AUTO: 6.48 K/UL — SIGNIFICANT CHANGE UP (ref 1.4–6.5)
NEUTROPHILS NFR BLD AUTO: 72.3 % — SIGNIFICANT CHANGE UP (ref 42.2–75.2)
NRBC # BLD: 0 /100 WBCS — SIGNIFICANT CHANGE UP (ref 0–0)
PLATELET # BLD AUTO: 166 K/UL — SIGNIFICANT CHANGE UP (ref 130–400)
POTASSIUM SERPL-MCNC: 4.4 MMOL/L — SIGNIFICANT CHANGE UP (ref 3.5–5)
POTASSIUM SERPL-SCNC: 4.4 MMOL/L — SIGNIFICANT CHANGE UP (ref 3.5–5)
PROT SERPL-MCNC: 7.4 G/DL — SIGNIFICANT CHANGE UP (ref 6–8)
RBC # BLD: 4.62 M/UL — SIGNIFICANT CHANGE UP (ref 4.2–5.4)
RBC # FLD: 21.9 % — HIGH (ref 11.5–14.5)
SODIUM SERPL-SCNC: 138 MMOL/L — SIGNIFICANT CHANGE UP (ref 135–146)
WBC # BLD: 8.98 K/UL — SIGNIFICANT CHANGE UP (ref 4.8–10.8)
WBC # FLD AUTO: 8.98 K/UL — SIGNIFICANT CHANGE UP (ref 4.8–10.8)

## 2020-11-15 PROCEDURE — 71045 X-RAY EXAM CHEST 1 VIEW: CPT | Mod: 26

## 2020-11-15 PROCEDURE — 99222 1ST HOSP IP/OBS MODERATE 55: CPT

## 2020-11-15 RX ADMIN — PANTOPRAZOLE SODIUM 40 MILLIGRAM(S): 20 TABLET, DELAYED RELEASE ORAL at 06:14

## 2020-11-15 RX ADMIN — BUDESONIDE AND FORMOTEROL FUMARATE DIHYDRATE 2 PUFF(S): 160; 4.5 AEROSOL RESPIRATORY (INHALATION) at 08:51

## 2020-11-15 RX ADMIN — Medication 12.5 MILLIGRAM(S): at 06:14

## 2020-11-15 RX ADMIN — Medication 81 MILLIGRAM(S): at 13:07

## 2020-11-15 RX ADMIN — Medication 60 MILLIGRAM(S): at 06:14

## 2020-11-15 RX ADMIN — Medication 1 GRAM(S): at 06:14

## 2020-11-15 RX ADMIN — PANTOPRAZOLE SODIUM 40 MILLIGRAM(S): 20 TABLET, DELAYED RELEASE ORAL at 18:43

## 2020-11-15 RX ADMIN — HEPARIN SODIUM 5000 UNIT(S): 5000 INJECTION INTRAVENOUS; SUBCUTANEOUS at 06:20

## 2020-11-15 RX ADMIN — Medication 1 GRAM(S): at 18:43

## 2020-11-15 RX ADMIN — CLOPIDOGREL BISULFATE 75 MILLIGRAM(S): 75 TABLET, FILM COATED ORAL at 13:07

## 2020-11-15 RX ADMIN — BUDESONIDE AND FORMOTEROL FUMARATE DIHYDRATE 2 PUFF(S): 160; 4.5 AEROSOL RESPIRATORY (INHALATION) at 21:20

## 2020-11-15 RX ADMIN — CHLORHEXIDINE GLUCONATE 1 APPLICATION(S): 213 SOLUTION TOPICAL at 06:16

## 2020-11-15 RX ADMIN — Medication 12.5 MILLIGRAM(S): at 18:43

## 2020-11-15 RX ADMIN — Medication 60 MILLIGRAM(S): at 18:43

## 2020-11-15 RX ADMIN — SENNA PLUS 2 TABLET(S): 8.6 TABLET ORAL at 22:04

## 2020-11-15 RX ADMIN — HEPARIN SODIUM 5000 UNIT(S): 5000 INJECTION INTRAVENOUS; SUBCUTANEOUS at 22:04

## 2020-11-15 RX ADMIN — HEPARIN SODIUM 5000 UNIT(S): 5000 INJECTION INTRAVENOUS; SUBCUTANEOUS at 13:07

## 2020-11-15 RX ADMIN — Medication 1 GRAM(S): at 13:07

## 2020-11-15 RX ADMIN — Medication 100 MICROGRAM(S): at 06:13

## 2020-11-15 NOTE — CONSULT NOTE ADULT - ASSESSMENT
This is a 55 year old female who presented to the ED with worsening lethargy after leaving AMA prior to this admission. She was admitted with a working diagnosis of ?septic shock with unclear source. Her course has been complicated by acute respiratory failure 2/2 CHF, NSTEMI, and JEEVAN.     ASSESSMENT:  Altered mental status; Could be due to hypoperfusion secondary to shock; May also be due to underlying vascular dementia vs. hospital delirium vs. baseline mentation; Less likely due to  Seizure vs. Chiari I malformation  ?Septic shock; Source unclear; No longer on pressors; Cultures negative  Acute respiratory failure due to CHF; Complicated by Hx of COPD; Improving   NSTEMI; pending possible cardiac cauterization when stable  Acute kidney injury; Mild; Resolved    PLAN:  - The patient was oriented x3 this morning with a mildly flat affect; Answered all questions appropriately  - The differential diagnosis remains wide; She may have experienced hypoperfusion to watershed areas while in shock  - There may be an element of underlying vascular dementia worsened due to her acute condition, complicated by hospital delirium given her apparent waxing/waning; Baseline mentation unclear  - Additionally, seizure cannot entirely be ruled out at this time  - It is also possible her Chiari I malformation may be causing some of her symptoms though onset is generally insidious and this may have just been an incidental finding as she lacks obvious cranial neuropathies, evidence of brainstem compression, and cerebellar dysfunction  ·	Consider MR brain and C-spine if patient can tolerate/cooperate  ·	Order EEG to rule out seizure; May need 24 hour  ·	Frequent orientation; Ambulate with OOB if possible; No daytime naps/sleeping; Allow her to see window/outside    Attending attestation to follow.

## 2020-11-15 NOTE — PROGRESS NOTE ADULT - ASSESSMENT
55 year old female active smoker with pertinent medical history of Type II Diabetes Mellitus, Coronary Artery Disease, Dyslipidemia, Upper Gastrointestinal Bleed due to duodenal ulcer, Hypertension, Hypothyroidism, COPD, Dextrocardia, Sciatica/Peripheral Neuralgia, and Charcot foot deformity s/p reconstruction on 1/11/19 presents to the ED for worsening lethargy. She was initially admitted to MICU for sepsis and respiratory failure. Currently being monitored in the step down unit.     Right Sided Heart Failure  - Echo shows Moderately Reduced Right ventricular Systolic function and Severe pulmonary hypertension  - right sided pleural effusion seen in the x ray  - on lasix 60 mg iv twice daily for now  - Wean off O2 as tolerated, on nasal canula oxygenation, moved it to 2 liters today  - BIPAP at night   - likley secondary to COPD  - on inhalers    Sepsis  - present on admission, resolved  - Off levophed and finished antibiotic course    Acute Kidney Injury  - resolved   - Cr 3.8 on admission, 0.9 on last labs available  - Likely pre-renal/ATN due to hypotension.    NSTEMI  - Troponin 1.05 downtrended to 0.31.  - Normal LV size and wall thickness, with normal left sided systolic and diastolic function, Moderately Reduced Right ventricular Systolic function and Severe pulmonary hypertension  - Cardiology on board, possible cardiac catheterization when stable.  - On Aspirin and Plavix.  - Rate control: Lopressor bid.    History of Duodenal Ulcer  - No evidence of GIB.  - on Protonix.  - Sucralfate.  - Keep active type and screen.    Type II Diabetes Mellitus  - Follow FS. Insulin protocol if needed.     History of Hypothyroidism  - Levothyroxine 100 mcg daily    Diet: DASH  Activity: Increase as tolerated  DVT Prophylaxis: heparin  GI Prophylaxis: PPI  Step down unit  Code Status: Full Code

## 2020-11-15 NOTE — PROGRESS NOTE ADULT - SUBJECTIVE AND OBJECTIVE BOX
Patient is a 55y old  Female who presents with a chief complaint of SOB (13 Nov 2020 09:58)        Over Night Events:  Resting in bed.  On 3 liters O2 per min.  Saturation 100%.  No SOB at rest         ROS:     All ROS are negative except HPI         PHYSICAL EXAM    ICU Vital Signs Last 24 Hrs  T(C): 36.6 (15 Nov 2020 04:00), Max: 36.8 (14 Nov 2020 15:30)  T(F): 97.8 (15 Nov 2020 04:00), Max: 98.2 (14 Nov 2020 15:30)  HR: 96 (15 Nov 2020 04:00) (83 - 96)  BP: 125/73 (15 Nov 2020 04:00) (108/64 - 125/73)  BP(mean): 89 (15 Nov 2020 00:00) (89 - 89)  ABP: --  ABP(mean): --  RR: 18 (15 Nov 2020 04:00) (18 - 20)  SpO2: 99% (15 Nov 2020 04:00) (97% - 100%)      CONSTITUTIONAL:  Well nourished.  NAD    ENT:   Airway patent,   Mouth with normal mucosa.   No thrush    EYES:   Pupils equal,   Round and reactive to light.    CARDIAC:   Normal rate,   Regular rhythm.    No edema      Vascular:  Normal systolic impulse  No Carotid bruits    RESPIRATORY:   No wheezing  Bilateral BS  Normal chest expansion  Not tachypneic,  No use of accessory muscles    GASTROINTESTINAL:  Abdomen soft,   Non-tender,   No guarding,   + BS    MUSCULOSKELETAL:   Range of motion is not limited,  No clubbing, cyanosis    NEUROLOGICAL:   Alert and oriented   No motor  deficits.    SKIN:   Skin normal color for race,   Warm and dry and intact.   No evidence of rash.    PSYCHIATRIC:   Normal mood and affect.   No apparent risk to self or others.    HEMATOLOGICAL:  No cervical  lymphadenopathy.  no inguinal lymphadenopathy      11-13-20 @ 07:01  -  11-14-20 @ 07:00  --------------------------------------------------------  IN:    IV PiggyBack: 100 mL    Oral Fluid: 220 mL  Total IN: 320 mL    OUT:    Voided (mL): 700 mL  Total OUT: 700 mL    Total NET: -380 mL          LABS:                            11.8   9.86  )-----------( 127      ( 14 Nov 2020 06:56 )             39.1                                               11-14    136  |  84<L>  |  16  ----------------------------<  136<H>  3.2<L>   |  36<H>  |  0.9    Ca    8.6      14 Nov 2020 06:56  Mg     1.9     11-14    TPro  7.1  /  Alb  3.3<L>  /  TBili  2.3<H>  /  DBili  x   /  AST  43<H>  /  ALT  17  /  AlkPhos  128<H>  11-14                                                                                           LIVER FUNCTIONS - ( 14 Nov 2020 06:56 )  Alb: 3.3 g/dL / Pro: 7.1 g/dL / ALK PHOS: 128 U/L / ALT: 17 U/L / AST: 43 U/L / GGT: x                                                                                                                                       MEDICATIONS  (STANDING):  aspirin  chewable 81 milliGRAM(s) Oral daily  budesonide  80 MICROgram(s)/formoterol 4.5 MICROgram(s) Inhaler 2 Puff(s) Inhalation two times a day  chlorhexidine 4% Liquid 1 Application(s) Topical <User Schedule>  clopidogrel Tablet 75 milliGRAM(s) Oral daily  furosemide   Injectable 60 milliGRAM(s) IV Push every 12 hours  heparin   Injectable 5000 Unit(s) SubCutaneous every 8 hours  influenza   Vaccine 0.5 milliLiter(s) IntraMuscular once  levothyroxine 100 MICROGram(s) Oral daily  metoprolol tartrate 12.5 milliGRAM(s) Oral two times a day  pantoprazole    Tablet 40 milliGRAM(s) Oral every 12 hours  senna 2 Tablet(s) Oral at bedtime  sucralfate 1 Gram(s) Oral four times a day    MEDICATIONS  (PRN):  acetaminophen   Tablet .. 650 milliGRAM(s) Oral every 6 hours PRN Moderate Pain (4 - 6)  albuterol/ipratropium for Nebulization 3 milliLiter(s) Nebulizer every 6 hours PRN Shortness of Breath  oxycodone    5 mG/acetaminophen 325 mG 1 Tablet(s) Oral every 6 hours PRN Severe Pain (7 - 10)      New X-rays reviewed:                                                                                  ECHO    CXR interpreted by me:

## 2020-11-15 NOTE — CONSULT NOTE ADULT - ATTENDING COMMENTS
Patient examined this evening for AMS and cognitive slowing and decreased short term recall were present.   Suspect toxic metabolic encephalopathy probably related to recent septic shock.  Doubt seizures.  Medications doses of lyrica and tizanidine seem a bit high.  If possible consider trial of lowering lyrica from 450 total a day to 300 mg and tizanidine from 8 mg tid to 4 mg tid.
Saw and examined patient.  Agree with MS4's A&P.  54F PMH HTN, DM, HFpEF, admitted with shock, requiring pressors, c/b JEEVAN.    # JEEVAN likely pre-renal/ATN due to hypotension/shock, at risk for CRS, AIN, IRGN less likely   - anuric, creatinine stable, despite fluid resuscitation, avoid further aggressive hydration, pt with sev pulm HTN  - high anion gap metabolic acidosis due to lactic acidosis, lactate down trending.  Start sodium bicarb 1300mg TID  - hyponatremia, avoid hypotonic infusions  - replete Mg with slow infusion  - check phos level  - check renal/bladder ultrasound  # Shock - on pressors, on Meropenem and Vancomycin, please dose by level   No acute indication for RRT.
I have personally examined the patient and reviewed the documentation above.  Corrections and edits were made wherever needed.

## 2020-11-15 NOTE — PROGRESS NOTE ADULT - SUBJECTIVE AND OBJECTIVE BOX
SUBJECTIVE:    Currently admitted to medicine  Today is hospital day 12  Patient is mentating well. She is on 2 liters of oxygen.     PAST MEDICAL & SURGICAL HISTORY  Dextrocardia    Chronic midline low back pain with bilateral sciatica    Peripheral neuralgia    Essential hypertension    Diabetes 1.5, managed as type 2    Charcot&#x27;s arthropathy  R foot    Charcot foot due to diabetes mellitus    H/O shoulder surgery. Right shoulder surgery     delivery delivered      MEDICATIONS:  STANDING MEDICATIONS  aspirin  chewable 81 milliGRAM(s) Oral daily  budesonide  80 MICROgram(s)/formoterol 4.5 MICROgram(s) Inhaler 2 Puff(s) Inhalation two times a day  chlorhexidine 4% Liquid 1 Application(s) Topical <User Schedule>  clopidogrel Tablet 75 milliGRAM(s) Oral daily  furosemide   Injectable 60 milliGRAM(s) IV Push every 12 hours  heparin   Injectable 5000 Unit(s) SubCutaneous every 8 hours  influenza   Vaccine 0.5 milliLiter(s) IntraMuscular once  levothyroxine 100 MICROGram(s) Oral daily  metoprolol tartrate 12.5 milliGRAM(s) Oral two times a day  pantoprazole    Tablet 40 milliGRAM(s) Oral every 12 hours  senna 2 Tablet(s) Oral at bedtime  sucralfate 1 Gram(s) Oral four times a day    PRN MEDICATIONS  acetaminophen   Tablet .. 650 milliGRAM(s) Oral every 6 hours PRN  albuterol/ipratropium for Nebulization 3 milliLiter(s) Nebulizer every 6 hours PRN  oxycodone    5 mG/acetaminophen 325 mG 1 Tablet(s) Oral every 6 hours PRN    VITALS:   T(F): 97.4  HR: 94  BP: 111/67  RR: 17  SpO2: 99%    LABS:                        11.8   9.86  )-----------( 127      ( 2020 06:56 )             39.1     11-14    136  |  84<L>  |  16  ----------------------------<  136<H>  3.2<L>   |  36<H>  |  0.9    Ca    8.6      2020 06:56  Mg     1.9     11-14    TPro  7.1  /  Alb  3.3<L>  /  TBili  2.3<H>  /  DBili  x   /  AST  43<H>  /  ALT  17  /  AlkPhos  128<H>  11-14      RADIOLOGY:      Xray Chest 1 View- PORTABLE-Routine (Xray Chest 1 View- PORTABLE-Routine in AM.) (. @ 06:02) >  Left lung opacity and right pleural effusion unchanged. No air leak.      TTE Echo Complete w/o Contrast w/ Doppler (10.30. @ 09:39) >   1. LV Ejection Fraction by Jade's Method with a biplane EF of 62 %.   2. Normal left ventricular size and wall thicknesses, with normal systolic and diastolic function.   3. Moderately enlarged right ventricle.   4. Moderately reduced RV systolic function.   5. Mildly enlarged right atrium.   6. Normal left atrial size.   7. Moderate mitral annular calcification.   8. No evidence of mitral valve regurgitation.   9. Estimated pulmonary artery systolic pressure is 75.6 mmHg assuming a right atrial pressure of 15 mmHg, which is consistent with severe pulmonary hypertension.  10. Pulmonary hypertension is present.        PHYSICAL EXAM:  GEN: No acute distress  HEENT: on nasal canula, abrasion around nose  LUNGS: breath sounds bilaterally   HEART: S1/S2 present. RRR.   ABD: Soft, non-tender, non-distended.   EXT: NC/NC/NE/2+  NEURO: AAOX3,  flat affect, intact judgement, slightly effected pam  SKIN: dry scaly skin

## 2020-11-15 NOTE — CONSULT NOTE ADULT - SUBJECTIVE AND OBJECTIVE BOX
DAILY PROGRESS NOTE  ===========================================================    Patient Information:  CALLY HARTMAN  /  55y  /  Female  /  MRN#: [id number]    Hospital Day: 12d     |:::::::::::::::::::::::::::| HISTORY |:::::::::::::::::::::::::::|    HPI:  53 y/o female active smoker with PMHx of Type II DM, HFpEF (EF of 72 %), CAD, hyperlipidemia, UGIB 2/2 duodenal ulcer, HTN, Hypothyroidism, COPD, Dextrocardia, Sciatica/Peripheral Neuralgia, and Charcot foot deformity s/p reconstruction on 19 presents to the ED for worsening lethargy. The patient has had 2 recent admisions for sepsis recently and left AMA from the hospital on 10/31/20. This presentation similar to how she presented last time. Endorses cough and purulent sputum.    Patient HTN (72/33mmHg) and hypothermic (35.3 C) on initial encounter and developed periods of bradycardia in to the 50s. Labs revealed elevated WBC w/ stable Hb, along w/ JEEVAN w/ HAGMA (AG 23), worsening Transaminitis from last admission Trop 0.93, BNP 43k and lactate 9.8. CXR unremarkable.     She is being re-admitted to the MICU for septic shock. in MICU, on levophed 0.065    PAST MEDICAL & SURGICAL HISTORY:  Dextrocardia  Chronic midline low back pain with bilateral sciatica  Peripheral neuralgia  Essential hypertension  Diabetes  Charcot foot due to diabetes mellitus  Upper GI Bleed  COPD  HFpEF  CAD  Hypothyroidism    PAST SURGICAL HISTORY:  H/O shoulder surgery  Right shoulder surgery   delivery delivered    SOCIAL HISTORY:  Single; 3 children  Never completed high school  Disabled (Could not explain disability)  Smoker, denied EtOH and Illicit drug use    ALLERGIES:   NONE    HOME MEDICATIONS:  budesonide-formoterol 80 mcg-4.5 mcg/inh inhalation aerosol: 2 puff(s) inhaled 2 times a day (20 Oct 2020 20:41)  Lyrica 150 mg oral capsule: 1 tab(s) orally 3 times a day, stop after  (20 Oct 2020 20:41)  tiZANidine 4 mg oral tablet: 2 tab(s) orally every 8 hours, As Needed (20 Oct 2020 20:41)  Ventolin HFA 90 mcg/inh inhalation aerosol: 2 puff(s) inhaled 4 times a day, As Needed (20 Oct 2020 20:41)    |:::::::::::::::::::::::::::| SUBJECTIVE | OBJECTIVE |:::::::::::::::::::::::::::|    OVERNIGHT EVENTS: Pulling at lines, required BL wrist restraints.    TODAY: Patient was seen today at bedside. She was able to answer all of my questions appropriately but appeared to have a flat affect. She is oriented to person, place, and time. She was able to provide history. She had no complaints today other than feeling uncomfortable in the bed and she expressed her desire to leave the hospital. She did pull at her lines overnight and required wrist restraints, which were taken off this morning.     Review of systems is otherwise negative.    VITAL SIGNS: Last 24 Hours  T(C): 36.6 (15 Nov 2020 04:00), Max: 36.8 (2020 15:30)  T(F): 97.8 (15 Nov 2020 04:00), Max: 98.2 (2020 15:30)  HR: 96 (15 Nov 2020 04:00) (83 - 96)  BP: 125/73 (15 Nov 2020 04:00) (108/64 - 125/73)  BP(mean): 89 (15 Nov 2020 00:00) (89 - 89)  RR: 18 (15 Nov 2020 04:00) (18 - 20)  SpO2: 99% (15 Nov 2020 04:00) (97% - 100%)    20 @ 07:01  -  11-15-20 @ 07:00  --------------------------------------------------------  IN: 0 mL / OUT: 600 mL / NET: -600 mL      PHYSICAL EXAM:  GENERAL:   Awake, alert; NAD; Mildly flat affect.  HEENT:  Head NC/AT; Conjunctivae pink, Sclera anicteric; Oral mucosa moist.  CARDIO:   Regular-increased rate; Regular rhythm; S1 & S2.  RESP:   No rales, wheezing, or rhonchi appreciated.  GI:   Soft; NT/ND; BS; No guarding; No rebound tenderness.  EXT:   Strength RUE 5/5 and LUE ?4-5/5; Strength LE 5/5; Sensation intact; Coordination intact; PERRL; CN2-12 grossly intact; EOMI; Oriented to person, place and time; No edema; BL foot deformities.   SKIN:   Scaly with patches of erythema     LAB RESULTS:                        11.8   9.86  )-----------( 127      ( 2020 06:56 )             39.1         136  |  84<L>  |  16  ----------------------------<  136<H>  3.2<L>   |  36<H>  |  0.9    Ca    8.6      2020 06:56  Mg     1.9         TPro  7.1  /  Alb  3.3<L>  /  TBili  2.3<H>  /  DBili  x   /  AST  43<H>  /  ALT  17  /  AlkPhos  128<H>      MICROBIOLOGY:  No new cultures    RADIOLOGY:  CT Head No Cont (10.28.20 @ 15:47)  IMPRESSION:  1.  No evidence of acute intracranial pathology. Stable exam since 2017.  2.  Redemonstrated low-lying cerebellar tonsils compatible a Chiari I malformation.    ALLERGIES:  No Known Allergies    ===========================================================

## 2020-11-15 NOTE — PROGRESS NOTE ADULT - ASSESSMENT
Impression:    Sepsis present on admission  Type2 NSTEMI  HFpEF (EF of 72 %)  HO CAD  UGIB 2/2 duodenal ulcer  HO COPD  Severe Pulm HTN  Pulmonary edema improving         Plan     CNS: Avoid CNS depressants.     CVS: ASA/Plavix.  FU with cardiology.  Rate Control.  Keep Negative balance as tolerated       PULMONARY: HOB @ 45 degrees.  Aspiration precautions.  Wean O2 as tolerated.  Repeat CXR     INFECTIOUS DISEASE: Monitor off ABX     GI: GI PPx.  Advance diet  as tolerated     RENAL FU lytes.  Correct as needed .    Endocrine: Follow up FS.      Hematology: DVT PPx    OOB to chair   SDU

## 2020-11-15 NOTE — CONSULT NOTE ADULT - CONSULT REASON
Altered mental status Gen: NAD  Abd: soft, gravid, breech by leopold  pelvic: deferred for attending    sono: breech presentation, back to maternal right

## 2020-11-16 LAB
ALBUMIN SERPL ELPH-MCNC: 3.5 G/DL — SIGNIFICANT CHANGE UP (ref 3.5–5.2)
ALP SERPL-CCNC: 131 U/L — HIGH (ref 30–115)
ALT FLD-CCNC: 18 U/L — SIGNIFICANT CHANGE UP (ref 0–41)
ANION GAP SERPL CALC-SCNC: 15 MMOL/L — HIGH (ref 7–14)
AST SERPL-CCNC: 40 U/L — SIGNIFICANT CHANGE UP (ref 0–41)
BASOPHILS # BLD AUTO: 0.05 K/UL — SIGNIFICANT CHANGE UP (ref 0–0.2)
BASOPHILS NFR BLD AUTO: 0.6 % — SIGNIFICANT CHANGE UP (ref 0–1)
BILIRUB SERPL-MCNC: 1.7 MG/DL — HIGH (ref 0.2–1.2)
BUN SERPL-MCNC: 19 MG/DL — SIGNIFICANT CHANGE UP (ref 10–20)
CALCIUM SERPL-MCNC: 9.3 MG/DL — SIGNIFICANT CHANGE UP (ref 8.5–10.1)
CHLORIDE SERPL-SCNC: 88 MMOL/L — LOW (ref 98–110)
CO2 SERPL-SCNC: 33 MMOL/L — HIGH (ref 17–32)
CREAT SERPL-MCNC: 1.2 MG/DL — SIGNIFICANT CHANGE UP (ref 0.7–1.5)
EOSINOPHIL # BLD AUTO: 0.85 K/UL — HIGH (ref 0–0.7)
EOSINOPHIL NFR BLD AUTO: 9.6 % — HIGH (ref 0–8)
GLUCOSE BLDC GLUCOMTR-MCNC: 115 MG/DL — HIGH (ref 70–99)
GLUCOSE BLDC GLUCOMTR-MCNC: 144 MG/DL — HIGH (ref 70–99)
GLUCOSE BLDC GLUCOMTR-MCNC: 190 MG/DL — HIGH (ref 70–99)
GLUCOSE BLDC GLUCOMTR-MCNC: 93 MG/DL — SIGNIFICANT CHANGE UP (ref 70–99)
GLUCOSE SERPL-MCNC: 228 MG/DL — HIGH (ref 70–99)
HCT VFR BLD CALC: 40.1 % — SIGNIFICANT CHANGE UP (ref 37–47)
HGB BLD-MCNC: 11.9 G/DL — LOW (ref 12–16)
IMM GRANULOCYTES NFR BLD AUTO: 0.5 % — HIGH (ref 0.1–0.3)
LYMPHOCYTES # BLD AUTO: 0.99 K/UL — LOW (ref 1.2–3.4)
LYMPHOCYTES # BLD AUTO: 11.2 % — LOW (ref 20.5–51.1)
MAGNESIUM SERPL-MCNC: 1.4 MG/DL — LOW (ref 1.8–2.4)
MCHC RBC-ENTMCNC: 25.9 PG — LOW (ref 27–31)
MCHC RBC-ENTMCNC: 29.7 G/DL — LOW (ref 32–37)
MCV RBC AUTO: 87.4 FL — SIGNIFICANT CHANGE UP (ref 81–99)
MONOCYTES # BLD AUTO: 0.83 K/UL — HIGH (ref 0.1–0.6)
MONOCYTES NFR BLD AUTO: 9.4 % — HIGH (ref 1.7–9.3)
NEUTROPHILS # BLD AUTO: 6.05 K/UL — SIGNIFICANT CHANGE UP (ref 1.4–6.5)
NEUTROPHILS NFR BLD AUTO: 68.7 % — SIGNIFICANT CHANGE UP (ref 42.2–75.2)
NRBC # BLD: 0 /100 WBCS — SIGNIFICANT CHANGE UP (ref 0–0)
PLATELET # BLD AUTO: 193 K/UL — SIGNIFICANT CHANGE UP (ref 130–400)
POTASSIUM SERPL-MCNC: 3.2 MMOL/L — LOW (ref 3.5–5)
POTASSIUM SERPL-SCNC: 3.2 MMOL/L — LOW (ref 3.5–5)
PROT SERPL-MCNC: 7.3 G/DL — SIGNIFICANT CHANGE UP (ref 6–8)
RBC # BLD: 4.59 M/UL — SIGNIFICANT CHANGE UP (ref 4.2–5.4)
RBC # FLD: 21.6 % — HIGH (ref 11.5–14.5)
SODIUM SERPL-SCNC: 136 MMOL/L — SIGNIFICANT CHANGE UP (ref 135–146)
WBC # BLD: 8.81 K/UL — SIGNIFICANT CHANGE UP (ref 4.8–10.8)
WBC # FLD AUTO: 8.81 K/UL — SIGNIFICANT CHANGE UP (ref 4.8–10.8)

## 2020-11-16 PROCEDURE — 71045 X-RAY EXAM CHEST 1 VIEW: CPT | Mod: 26

## 2020-11-16 RX ORDER — MAGNESIUM SULFATE 500 MG/ML
2 VIAL (ML) INJECTION
Refills: 0 | Status: COMPLETED | OUTPATIENT
Start: 2020-11-16 | End: 2020-11-16

## 2020-11-16 RX ORDER — MAGNESIUM OXIDE 400 MG ORAL TABLET 241.3 MG
400 TABLET ORAL
Refills: 0 | Status: DISCONTINUED | OUTPATIENT
Start: 2020-11-16 | End: 2020-11-19

## 2020-11-16 RX ORDER — POTASSIUM CHLORIDE 20 MEQ
20 PACKET (EA) ORAL
Refills: 0 | Status: COMPLETED | OUTPATIENT
Start: 2020-11-16 | End: 2020-11-16

## 2020-11-16 RX ORDER — FUROSEMIDE 40 MG
40 TABLET ORAL DAILY
Refills: 0 | Status: DISCONTINUED | OUTPATIENT
Start: 2020-11-16 | End: 2020-11-19

## 2020-11-16 RX ADMIN — Medication 12.5 MILLIGRAM(S): at 17:24

## 2020-11-16 RX ADMIN — Medication 50 GRAM(S): at 12:31

## 2020-11-16 RX ADMIN — PANTOPRAZOLE SODIUM 40 MILLIGRAM(S): 20 TABLET, DELAYED RELEASE ORAL at 17:25

## 2020-11-16 RX ADMIN — Medication 20 MILLIEQUIVALENT(S): at 12:31

## 2020-11-16 RX ADMIN — Medication 20 MILLIEQUIVALENT(S): at 15:10

## 2020-11-16 RX ADMIN — BUDESONIDE AND FORMOTEROL FUMARATE DIHYDRATE 2 PUFF(S): 160; 4.5 AEROSOL RESPIRATORY (INHALATION) at 09:46

## 2020-11-16 RX ADMIN — MAGNESIUM OXIDE 400 MG ORAL TABLET 400 MILLIGRAM(S): 241.3 TABLET ORAL at 16:10

## 2020-11-16 RX ADMIN — Medication 1 GRAM(S): at 06:35

## 2020-11-16 RX ADMIN — Medication 20 MILLIEQUIVALENT(S): at 13:58

## 2020-11-16 RX ADMIN — Medication 150 MILLIGRAM(S): at 16:08

## 2020-11-16 RX ADMIN — MAGNESIUM OXIDE 400 MG ORAL TABLET 400 MILLIGRAM(S): 241.3 TABLET ORAL at 12:30

## 2020-11-16 RX ADMIN — Medication 12.5 MILLIGRAM(S): at 06:35

## 2020-11-16 RX ADMIN — CLOPIDOGREL BISULFATE 75 MILLIGRAM(S): 75 TABLET, FILM COATED ORAL at 11:09

## 2020-11-16 RX ADMIN — Medication 1 GRAM(S): at 11:09

## 2020-11-16 RX ADMIN — Medication 1 GRAM(S): at 17:24

## 2020-11-16 RX ADMIN — HEPARIN SODIUM 5000 UNIT(S): 5000 INJECTION INTRAVENOUS; SUBCUTANEOUS at 21:31

## 2020-11-16 RX ADMIN — Medication 60 MILLIGRAM(S): at 06:35

## 2020-11-16 RX ADMIN — Medication 40 MILLIGRAM(S): at 12:31

## 2020-11-16 RX ADMIN — Medication 100 MICROGRAM(S): at 06:35

## 2020-11-16 RX ADMIN — Medication 50 GRAM(S): at 15:10

## 2020-11-16 RX ADMIN — SENNA PLUS 2 TABLET(S): 8.6 TABLET ORAL at 21:32

## 2020-11-16 RX ADMIN — PANTOPRAZOLE SODIUM 40 MILLIGRAM(S): 20 TABLET, DELAYED RELEASE ORAL at 06:35

## 2020-11-16 RX ADMIN — Medication 81 MILLIGRAM(S): at 11:09

## 2020-11-16 RX ADMIN — Medication 50 GRAM(S): at 09:38

## 2020-11-16 RX ADMIN — Medication 50 GRAM(S): at 11:11

## 2020-11-16 RX ADMIN — BUDESONIDE AND FORMOTEROL FUMARATE DIHYDRATE 2 PUFF(S): 160; 4.5 AEROSOL RESPIRATORY (INHALATION) at 21:58

## 2020-11-16 NOTE — PROGRESS NOTE ADULT - ASSESSMENT
Impression:    Sepsis present on admission  Type2 NSTEMI  HFpEF (EF of 72 %)  HO CAD  UGIB 2/2 duodenal ulcer  HO COPD  Severe Pulm HTN  Pulmonary edema improved        Plan     CNS: Avoid CNS depressants.     CVS: ASA/Plavix.  FU with cardiology.  Rate Control.  Lasix 40 mg daily.  Avoid Volume overload      PULMONARY: HOB @ 45 degrees.  Aspiration precaution.     INFECTIOUS DISEASE: Monitor off ABX     GI: GI PPx.  Advance diet  as tolerated     RENAL FU lytes.  Correct as needed .    Endocrine: Follow up FS.      Hematology: DVT PPx    OOB to chair  Transfer to floor

## 2020-11-16 NOTE — PROGRESS NOTE ADULT - SUBJECTIVE AND OBJECTIVE BOX
Patient is a 55y old  Female who presents with a chief complaint of sepsis (15 Nov 2020 08:12)        Over Night Events:  ON RA.  No SOB.  No CP         ROS:     All ROS are negative except HPI         PHYSICAL EXAM    ICU Vital Signs Last 24 Hrs  T(C): 35.8 (16 Nov 2020 07:38), Max: 36.2 (15 Nov 2020 16:12)  T(F): 96.5 (16 Nov 2020 07:38), Max: 97.2 (15 Nov 2020 16:12)  HR: 95 (16 Nov 2020 07:38) (89 - 97)  BP: 117/65 (16 Nov 2020 07:38) (96/54 - 117/65)  BP(mean): 73 (16 Nov 2020 04:00) (73 - 81)  ABP: --  ABP(mean): --  RR: 18 (16 Nov 2020 07:38) (17 - 18)  SpO2: 93% (15 Nov 2020 13:51) (93% - 98%)      CONSTITUTIONAL:  Well nourished.  NAD    ENT:   Airway patent,   Mouth with normal mucosa.   No thrush    EYES:   Pupils equal,   Round and reactive to light.    CARDIAC:   Normal rate,   Regular rhythm.    No edema      Vascular:  Normal systolic impulse  No Carotid bruits    RESPIRATORY:   No wheezing  Bilateral BS  Normal chest expansion  Not tachypneic,  No use of accessory muscles    GASTROINTESTINAL:  Abdomen soft,   Non-tender,   No guarding,   + BS    MUSCULOSKELETAL:   Range of motion is not limited,  No clubbing, cyanosis    NEUROLOGICAL:   Alert and oriented   No motor  deficits.    SKIN:   Skin normal color for race,   Warm and dry and intact.   No evidence of rash.    PSYCHIATRIC:   Normal mood and affect.   No apparent risk to self or others.    HEMATOLOGICAL:  No cervical  lymphadenopathy.  no inguinal lymphadenopathy      11-15-20 @ 07:01  -  11-16-20 @ 07:00  --------------------------------------------------------  IN:    Oral Fluid: 480 mL  Total IN: 480 mL    OUT:  Total OUT: 0 mL    Total NET: 480 mL          LABS:                            12.2   8.98  )-----------( 166      ( 15 Nov 2020 08:44 )             41.2                                               11-15    138  |  89<L>  |  16  ----------------------------<  128<H>  4.4   |  34<H>  |  1.0    Ca    8.8      15 Nov 2020 08:44  Mg     1.6     11-15    TPro  7.4  /  Alb  3.4<L>  /  TBili  2.0<H>  /  DBili  x   /  AST  44<H>  /  ALT  17  /  AlkPhos  132<H>  11-15                                                                                           LIVER FUNCTIONS - ( 15 Nov 2020 08:44 )  Alb: 3.4 g/dL / Pro: 7.4 g/dL / ALK PHOS: 132 U/L / ALT: 17 U/L / AST: 44 U/L / GGT: x                                                                                                                                       MEDICATIONS  (STANDING):  aspirin  chewable 81 milliGRAM(s) Oral daily  budesonide  80 MICROgram(s)/formoterol 4.5 MICROgram(s) Inhaler 2 Puff(s) Inhalation two times a day  chlorhexidine 4% Liquid 1 Application(s) Topical <User Schedule>  clopidogrel Tablet 75 milliGRAM(s) Oral daily  furosemide   Injectable 60 milliGRAM(s) IV Push every 12 hours  heparin   Injectable 5000 Unit(s) SubCutaneous every 8 hours  influenza   Vaccine 0.5 milliLiter(s) IntraMuscular once  levothyroxine 100 MICROGram(s) Oral daily  magnesium sulfate  IVPB 2 Gram(s) IV Intermittent every 2 hours  metoprolol tartrate 12.5 milliGRAM(s) Oral two times a day  pantoprazole    Tablet 40 milliGRAM(s) Oral every 12 hours  senna 2 Tablet(s) Oral at bedtime  sucralfate 1 Gram(s) Oral four times a day    MEDICATIONS  (PRN):  acetaminophen   Tablet .. 650 milliGRAM(s) Oral every 6 hours PRN Moderate Pain (4 - 6)  albuterol/ipratropium for Nebulization 3 milliLiter(s) Nebulizer every 6 hours PRN Shortness of Breath  oxycodone    5 mG/acetaminophen 325 mG 1 Tablet(s) Oral every 6 hours PRN Severe Pain (7 - 10)      New X-rays reviewed:                                                                                  ECHO    CXR interpreted by me:  No infiltrate.  Resolved edema

## 2020-11-16 NOTE — CHART NOTE - NSCHARTNOTEFT_GEN_A_CORE
transfer Note    Transfer from: step down  Transfer to:  ( - ) Medicine    (  ) Telemetry    (  ) RCU    (  ) Palliative    (  ) Stroke Unit         COURSE:    55 year old female active smoker with pertinent medical history of Type II Diabetes Mellitus, Coronary Artery Disease, Dyslipidemia, Upper Gastrointestinal Bleed due to duodenal ulcer, Hypertension, Hypothyroidism, COPD, Dextrocardia, Sciatica/Peripheral Neuralgia, and Charcot foot deformity s/p reconstruction on 1/11/19 presents to the ED for worsening lethargy. She was initially admitted to MICU for sepsis and respiratory failure. Currently being monitored in the step down unit.     Right Sided Heart Failure  - Echo shows Moderately Reduced Right ventricular Systolic function and Severe pulmonary hypertension  - switched to lasix 40 mg oral daily for now  - off nasal canula oxygenation  - BIPAP at night   - secondary to underlying lung condition, poorly controlled COPD?  - on inhalers    Sepsis  - present on admission, resolved  - Off levophed and finished antibiotic course    Acute Kidney Injury  - resolved   - Cr 3.8 on admission, 1.2 on last labs available  - Likely pre-renal/ATN due to hypotension.    NSTEMI  - Troponin 1.05 downtrended to 0.31.  - Normal LV size and wall thickness, with normal left sided systolic and diastolic function, Moderately Reduced Right ventricular Systolic function and Severe pulmonary hypertension  - Cardiology on board, possible cardiac catheterization when stable?  - On Aspirin and Plavix.  - Rate control: Lopressor bid.    History of Duodenal Ulcer  - on Protonix and Sucralfate.  - Hb stable    Type II Diabetes Mellitus  - Follow FS. Insulin protocol if needed.     History of Hypothyroidism  - Levothyroxine 100 mcg daily    Diet: DASH  Activity: Increase as tolerated  DVT Prophylaxis: heparin  GI Prophylaxis: PPI  Code Status: Full Code      For Follow-Up:  CMP in the morning  X-ray in the morning  Volume and Mental Status  Cardiology for NSTEMI?      Vital Signs Last 24 Hrs  T(C): 35.8 (16 Nov 2020 07:38), Max: 36.2 (15 Nov 2020 16:12)  T(F): 96.5 (16 Nov 2020 07:38), Max: 97.2 (15 Nov 2020 16:12)  HR: 95 (16 Nov 2020 07:38) (89 - 97)  BP: 117/65 (16 Nov 2020 07:38) (96/54 - 117/65)  BP(mean): 73 (16 Nov 2020 04:00) (73 - 81)  RR: 18 (16 Nov 2020 07:38) (17 - 18)  SpO2: 93% (15 Nov 2020 13:51) (93% - 98%)  I&O's Summary    15 Nov 2020 07:01  -  16 Nov 2020 07:00  --------------------------------------------------------  IN: 480 mL / OUT: 0 mL / NET: 480 mL          MEDICATIONS  (STANDING):  aspirin  chewable 81 milliGRAM(s) Oral daily  budesonide  80 MICROgram(s)/formoterol 4.5 MICROgram(s) Inhaler 2 Puff(s) Inhalation two times a day  chlorhexidine 4% Liquid 1 Application(s) Topical <User Schedule>  clopidogrel Tablet 75 milliGRAM(s) Oral daily  furosemide   Injectable 60 milliGRAM(s) IV Push every 12 hours  heparin   Injectable 5000 Unit(s) SubCutaneous every 8 hours  influenza   Vaccine 0.5 milliLiter(s) IntraMuscular once  levothyroxine 100 MICROGram(s) Oral daily  magnesium sulfate  IVPB 2 Gram(s) IV Intermittent every 2 hours  metoprolol tartrate 12.5 milliGRAM(s) Oral two times a day  pantoprazole    Tablet 40 milliGRAM(s) Oral every 12 hours  senna 2 Tablet(s) Oral at bedtime  sucralfate 1 Gram(s) Oral four times a day    MEDICATIONS  (PRN):  acetaminophen   Tablet .. 650 milliGRAM(s) Oral every 6 hours PRN Moderate Pain (4 - 6)  albuterol/ipratropium for Nebulization 3 milliLiter(s) Nebulizer every 6 hours PRN Shortness of Breath  oxycodone    5 mG/acetaminophen 325 mG 1 Tablet(s) Oral every 6 hours PRN Severe Pain (7 - 10)        LABS                                            11.9                  Neurophils% (auto):   68.7   (11-16 @ 08:48):    8.81 )-----------(193          Lymphocytes% (auto):  11.2                                          40.1                   Eosinphils% (auto):   9.6      Manual%: Neutrophils x    ; Lymphocytes x    ; Eosinophils x    ; Bands%: x    ; Blasts x                                    136    |  88     |  19                  Calcium: 9.3   / iCa: x      (11-16 @ 08:48)    ----------------------------<  228       Magnesium: 1.4                              3.2     |  33     |  1.2              Phosphorous: x        TPro  7.3    /  Alb  3.5    /  TBili  1.7    /  DBili  x      /  AST  40     /  ALT  18     /  AlkPhos  131    16 Nov 2020 08:48 transfer Note    Transfer from: step down  Transfer to:  ( - ) Medicine    (  ) Telemetry    (  ) RCU    (  ) Palliative    (  ) Stroke Unit         COURSE:    55 year old female active smoker with pertinent medical history of Type II Diabetes Mellitus, Coronary Artery Disease, Dyslipidemia, Upper Gastrointestinal Bleed due to duodenal ulcer, Hypertension, Hypothyroidism, COPD, Dextrocardia, Sciatica/Peripheral Neuralgia, and Charcot foot deformity s/p reconstruction on 1/11/19 presents to the ED for worsening lethargy. She was initially admitted to MICU for sepsis and respiratory failure. She is being evaluated for following conditions:     Right Sided Heart Failure  - Echo shows Moderately Reduced Right ventricular Systolic function and Severe pulmonary hypertension  - switched to lasix 40 mg oral daily for now  - off nasal canula oxygenation  - BIPAP at night   - secondary to underlying lung condition, poorly controlled COPD?  - on inhalers    Acute Kidney Injury and Electrolyte disturbances  - resolved ,Cr 3.8 on admission, 1.2 on last labs available  - Likely pre-renal/ATN due to hypotension.  - giving potassium and magnesium to the patient, monitor daily for now especially since she is diuretics    NSTEMI  - Troponin 1.05 downtrended to 0.31.  - Normal LV size and wall thickness, with normal left sided systolic and diastolic function, Moderately Reduced Right ventricular Systolic function and Severe pulmonary hypertension  - Cardiology on board, possible cardiac catheterization when stable?  - On Aspirin and Plavix.  - Rate control: Lopressor bid.      Sepsis  - present on admission, resolved  - Off levophed and finished antibiotic course    History of Duodenal Ulcer  - on Protonix and Sucralfate.  - Hb stable    Type II Diabetes Mellitus  - Follow FS. Insulin protocol if needed.     History of Hypothyroidism  - Levothyroxine 100 mcg daily    Diet: DASH  Activity: Increase as tolerated  DVT Prophylaxis: heparin  GI Prophylaxis: PPI  Code Status: Full Code      For Follow-Up:  CMP and Magnesium in the morning  X-ray in the morning  Volume and Mental Status  Cardiology for NSTEMI?      Vital Signs Last 24 Hrs  T(C): 35.8 (16 Nov 2020 07:38), Max: 36.2 (15 Nov 2020 16:12)  T(F): 96.5 (16 Nov 2020 07:38), Max: 97.2 (15 Nov 2020 16:12)  HR: 95 (16 Nov 2020 07:38) (89 - 97)  BP: 117/65 (16 Nov 2020 07:38) (96/54 - 117/65)  BP(mean): 73 (16 Nov 2020 04:00) (73 - 81)  RR: 18 (16 Nov 2020 07:38) (17 - 18)  SpO2: 93% (15 Nov 2020 13:51) (93% - 98%)  I&O's Summary    15 Nov 2020 07:01  -  16 Nov 2020 07:00  --------------------------------------------------------  IN: 480 mL / OUT: 0 mL / NET: 480 mL          MEDICATIONS  (STANDING):  aspirin  chewable 81 milliGRAM(s) Oral daily  budesonide  80 MICROgram(s)/formoterol 4.5 MICROgram(s) Inhaler 2 Puff(s) Inhalation two times a day  chlorhexidine 4% Liquid 1 Application(s) Topical <User Schedule>  clopidogrel Tablet 75 milliGRAM(s) Oral daily  furosemide   Injectable 60 milliGRAM(s) IV Push every 12 hours  heparin   Injectable 5000 Unit(s) SubCutaneous every 8 hours  influenza   Vaccine 0.5 milliLiter(s) IntraMuscular once  levothyroxine 100 MICROGram(s) Oral daily  magnesium sulfate  IVPB 2 Gram(s) IV Intermittent every 2 hours  metoprolol tartrate 12.5 milliGRAM(s) Oral two times a day  pantoprazole    Tablet 40 milliGRAM(s) Oral every 12 hours  senna 2 Tablet(s) Oral at bedtime  sucralfate 1 Gram(s) Oral four times a day    MEDICATIONS  (PRN):  acetaminophen   Tablet .. 650 milliGRAM(s) Oral every 6 hours PRN Moderate Pain (4 - 6)  albuterol/ipratropium for Nebulization 3 milliLiter(s) Nebulizer every 6 hours PRN Shortness of Breath  oxycodone    5 mG/acetaminophen 325 mG 1 Tablet(s) Oral every 6 hours PRN Severe Pain (7 - 10)        LABS                                            11.9                  Neurophils% (auto):   68.7   (11-16 @ 08:48):    8.81 )-----------(193          Lymphocytes% (auto):  11.2                                          40.1                   Eosinphils% (auto):   9.6      Manual%: Neutrophils x    ; Lymphocytes x    ; Eosinophils x    ; Bands%: x    ; Blasts x                                    136    |  88     |  19                  Calcium: 9.3   / iCa: x      (11-16 @ 08:48)    ----------------------------<  228       Magnesium: 1.4                              3.2     |  33     |  1.2              Phosphorous: x        TPro  7.3    /  Alb  3.5    /  TBili  1.7    /  DBili  x      /  AST  40     /  ALT  18     /  AlkPhos  131    16 Nov 2020 08:48

## 2020-11-17 LAB
ANION GAP SERPL CALC-SCNC: 11 MMOL/L — SIGNIFICANT CHANGE UP (ref 7–14)
BUN SERPL-MCNC: 24 MG/DL — HIGH (ref 10–20)
CALCIUM SERPL-MCNC: 9.1 MG/DL — SIGNIFICANT CHANGE UP (ref 8.5–10.1)
CHLORIDE SERPL-SCNC: 88 MMOL/L — LOW (ref 98–110)
CO2 SERPL-SCNC: 33 MMOL/L — HIGH (ref 17–32)
CREAT SERPL-MCNC: 1.9 MG/DL — HIGH (ref 0.7–1.5)
GAS PNL BLDA: SIGNIFICANT CHANGE UP
GLUCOSE BLDC GLUCOMTR-MCNC: 114 MG/DL — HIGH (ref 70–99)
GLUCOSE BLDC GLUCOMTR-MCNC: 117 MG/DL — HIGH (ref 70–99)
GLUCOSE BLDC GLUCOMTR-MCNC: 133 MG/DL — HIGH (ref 70–99)
GLUCOSE BLDC GLUCOMTR-MCNC: 173 MG/DL — HIGH (ref 70–99)
GLUCOSE BLDC GLUCOMTR-MCNC: 185 MG/DL — HIGH (ref 70–99)
GLUCOSE SERPL-MCNC: 123 MG/DL — HIGH (ref 70–99)
HCT VFR BLD CALC: 38 % — SIGNIFICANT CHANGE UP (ref 37–47)
HGB BLD-MCNC: 11.4 G/DL — LOW (ref 12–16)
LACTATE SERPL-SCNC: 1 MMOL/L — SIGNIFICANT CHANGE UP (ref 0.7–2)
LACTATE SERPL-SCNC: 3.2 MMOL/L — HIGH (ref 0.7–2)
MAGNESIUM SERPL-MCNC: 2.9 MG/DL — HIGH (ref 1.8–2.4)
MCHC RBC-ENTMCNC: 26.6 PG — LOW (ref 27–31)
MCHC RBC-ENTMCNC: 30 G/DL — LOW (ref 32–37)
MCV RBC AUTO: 88.6 FL — SIGNIFICANT CHANGE UP (ref 81–99)
NRBC # BLD: 0 /100 WBCS — SIGNIFICANT CHANGE UP (ref 0–0)
PLATELET # BLD AUTO: 235 K/UL — SIGNIFICANT CHANGE UP (ref 130–400)
POTASSIUM SERPL-MCNC: 4.5 MMOL/L — SIGNIFICANT CHANGE UP (ref 3.5–5)
POTASSIUM SERPL-SCNC: 4.5 MMOL/L — SIGNIFICANT CHANGE UP (ref 3.5–5)
RBC # BLD: 4.29 M/UL — SIGNIFICANT CHANGE UP (ref 4.2–5.4)
RBC # FLD: 22.3 % — HIGH (ref 11.5–14.5)
SODIUM SERPL-SCNC: 132 MMOL/L — LOW (ref 135–146)
WBC # BLD: 12.95 K/UL — HIGH (ref 4.8–10.8)
WBC # FLD AUTO: 12.95 K/UL — HIGH (ref 4.8–10.8)

## 2020-11-17 PROCEDURE — 71275 CT ANGIOGRAPHY CHEST: CPT | Mod: 26

## 2020-11-17 PROCEDURE — 99233 SBSQ HOSP IP/OBS HIGH 50: CPT

## 2020-11-17 PROCEDURE — 70450 CT HEAD/BRAIN W/O DYE: CPT | Mod: 26

## 2020-11-17 PROCEDURE — 93010 ELECTROCARDIOGRAM REPORT: CPT

## 2020-11-17 PROCEDURE — 71045 X-RAY EXAM CHEST 1 VIEW: CPT | Mod: 26

## 2020-11-17 RX ORDER — SODIUM CHLORIDE 9 MG/ML
500 INJECTION, SOLUTION INTRAVENOUS
Refills: 0 | Status: DISCONTINUED | OUTPATIENT
Start: 2020-11-17 | End: 2020-11-18

## 2020-11-17 RX ADMIN — HEPARIN SODIUM 5000 UNIT(S): 5000 INJECTION INTRAVENOUS; SUBCUTANEOUS at 16:13

## 2020-11-17 RX ADMIN — Medication 1 GRAM(S): at 05:27

## 2020-11-17 RX ADMIN — SODIUM CHLORIDE 75 MILLILITER(S): 9 INJECTION, SOLUTION INTRAVENOUS at 11:28

## 2020-11-17 RX ADMIN — MAGNESIUM OXIDE 400 MG ORAL TABLET 400 MILLIGRAM(S): 241.3 TABLET ORAL at 11:33

## 2020-11-17 RX ADMIN — Medication 81 MILLIGRAM(S): at 11:33

## 2020-11-17 RX ADMIN — BUDESONIDE AND FORMOTEROL FUMARATE DIHYDRATE 2 PUFF(S): 160; 4.5 AEROSOL RESPIRATORY (INHALATION) at 20:36

## 2020-11-17 RX ADMIN — BUDESONIDE AND FORMOTEROL FUMARATE DIHYDRATE 2 PUFF(S): 160; 4.5 AEROSOL RESPIRATORY (INHALATION) at 11:30

## 2020-11-17 RX ADMIN — MAGNESIUM OXIDE 400 MG ORAL TABLET 400 MILLIGRAM(S): 241.3 TABLET ORAL at 10:00

## 2020-11-17 RX ADMIN — SENNA PLUS 2 TABLET(S): 8.6 TABLET ORAL at 22:37

## 2020-11-17 RX ADMIN — Medication 12.5 MILLIGRAM(S): at 05:27

## 2020-11-17 RX ADMIN — PANTOPRAZOLE SODIUM 40 MILLIGRAM(S): 20 TABLET, DELAYED RELEASE ORAL at 05:27

## 2020-11-17 RX ADMIN — HEPARIN SODIUM 5000 UNIT(S): 5000 INJECTION INTRAVENOUS; SUBCUTANEOUS at 22:37

## 2020-11-17 RX ADMIN — Medication 100 MICROGRAM(S): at 05:27

## 2020-11-17 RX ADMIN — HEPARIN SODIUM 5000 UNIT(S): 5000 INJECTION INTRAVENOUS; SUBCUTANEOUS at 05:28

## 2020-11-17 RX ADMIN — Medication 1 GRAM(S): at 23:25

## 2020-11-17 NOTE — CHART NOTE - NSCHARTNOTEFT_GEN_A_CORE
Registered Dietitian Follow-Up     Patient Profile Reviewed                           Yes [x]   No []     Nutrition History Previously Obtained        Yes []  No [x]--unable to obtain as emergency contact unreachable via phone at this time      Pertinent Medical Interventions: Rapid called for patient lethargic since the AM and newly hypoxic to upper 60s pulse ox. On administration of 4L O2 by NC the patient's mentation rapidly improved. STAT CT Head revealed no evidence of acute pathology. AMS possibly secondary to hypoxia secondary to PE vs COPD exacerbation vs sepsis secondary to unknown cause. F/u STAT blood cx, UA, urine cx, lactate, CBC, BMP. F/u STAT CT Angio Chest to r/o PE. F/u STAT COVID-19 PCR swab.    Diet order: DASH/TLC--per RN, pt with suboptimal po intake, consuming <50% of her meals currently. Previous RD recs for oral supplementation remain pending; will communicate with LIP to activate it.      Anthropometrics:  - Ht.  160cm  - Wt. 70.1kg (11/17)--wt fluctuations likely 2/2 bed-scale error   (11/4: 85.7kg  (11/13): 71.6kg  (11/16): 65.9kg   - BMI. 33.5  - IBW. 52.5kg     Pertinent Lab Data: (11/17): Na 132, BUN 24, Cr. 1.9, Gluc 123, GFR 30, POCT 133      Pertinent Meds: heparin, aspirin, plavix, lactated ringers, lasix, mag-ox, synthroid, lopressor, protonix, albuterol, symbicort, senna, carafate      Physical Findings:  - Appearance: confused, no edema noted   - GI function: LBM 11/16   - Oral/Mouth cavity: none reported   - Skin: nasal abrasion      Nutrition Requirements  Weight Used: ABW: 85.7kg,  IBW: 52.2kg; needs continued from RD note on 11/10     Estimated Energy Needs: 5820-3511 kcal/day (MSJ x 1.1-1.2)  Estimated Protein Needs: 52-67 g/day (1.0-1.3 g/kg of IBW)  - aim high  Estimated Fluid Needs: per LIP      Nutrient Intake: not meeting kcal/pro needs at this time       Previous Nutrition Diagnosis: inadequate protein-energy intake (ongoing)      Nutrition Intervention: meals and snacks, medical food supplements    Recommendations:  1. Activate pending diet order from 11/13--all recs discussed with LIP ([phone])     Goal/Expected Outcome: Pt to consume >50% po intake of meals, snacks, and supplements within 3 days      Indicator/Monitoring: RD to monitor diet order, energy intake, renal/glucose profiles, nutrition focused physical findings, body composition

## 2020-11-17 NOTE — PROGRESS NOTE ADULT - SUBJECTIVE AND OBJECTIVE BOX
CALLY HARTMAN 55y Female  MRN#: [id number]       SUBJECTIVE  Patient is a 55y old Female who presents with a chief complaint of sepsis (15 Nov 2020 08:12)  Currently admitted to medicine with the primary diagnosis of Sepsis from unknown source. In the AM the patient was very difficult to arouse but could answer simple questions including name, date, place. Denied any discomfort this AM.    OBJECTIVE  PAST MEDICAL & SURGICAL HISTORY  Dextrocardia    Chronic midline low back pain with bilateral sciatica    Peripheral neuralgia    Essential hypertension    Diabetes 1.5, managed as type 2    Charcot&#x27;s arthropathy  R foot    Charcot foot due to diabetes mellitus    H/O shoulder surgery  Right shoulder surgery     delivery delivered      ALLERGIES:  No Known Allergies    MEDICATIONS:  STANDING MEDICATIONS  aspirin  chewable 81 milliGRAM(s) Oral daily  budesonide  80 MICROgram(s)/formoterol 4.5 MICROgram(s) Inhaler 2 Puff(s) Inhalation two times a day  chlorhexidine 4% Liquid 1 Application(s) Topical <User Schedule>  clopidogrel Tablet 75 milliGRAM(s) Oral daily  furosemide    Tablet 40 milliGRAM(s) Oral daily  heparin   Injectable 5000 Unit(s) SubCutaneous every 8 hours  influenza   Vaccine 0.5 milliLiter(s) IntraMuscular once  lactated ringers. 500 milliLiter(s) IV Continuous <Continuous>  levothyroxine 100 MICROGram(s) Oral daily  magnesium oxide 400 milliGRAM(s) Oral three times a day with meals  metoprolol tartrate 12.5 milliGRAM(s) Oral two times a day  pantoprazole    Tablet 40 milliGRAM(s) Oral every 12 hours  senna 2 Tablet(s) Oral at bedtime  sucralfate 1 Gram(s) Oral four times a day    PRN MEDICATIONS  acetaminophen   Tablet .. 650 milliGRAM(s) Oral every 6 hours PRN  albuterol/ipratropium for Nebulization 3 milliLiter(s) Nebulizer every 6 hours PRN  oxycodone    5 mG/acetaminophen 325 mG 1 Tablet(s) Oral every 6 hours PRN      VITAL SIGNS: Last 24 Hours  T(C): 35.9 (2020 05:22), Max: 36.8 (2020 17:22)  T(F): 96.6 (2020 05:22), Max: 98.3 (2020 01:00)  HR: 94 (2020 05:22) (88 - 96)  BP: 93/55 (2020 05:22) (93/55 - 143/75)  BP(mean): --  RR: 18 (2020 01:00) (18 - 18)  SpO2: 96% (2020 17:22) (96% - 96%)    LABS:                        11.9   8.81  )-----------( 193      ( 2020 08:48 )             40.1         136  |  88<L>  |  19  ----------------------------<  228<H>  3.2<L>   |  33<H>  |  1.2    Ca    9.3      2020 08:48  Mg     1.4         TPro  7.3  /  Alb  3.5  /  TBili  1.7<H>  /  DBili  x   /  AST  40  /  ALT  18  /  AlkPhos  131<H>        RADIOLOGY:      PHYSICAL EXAM:    GENERAL: NAD, well-developed, drowsy  HEENT:  Atraumatic, Normocephalic. EOMI, PERRLA, conjunctiva and sclera clear, No JVD  PULMONARY: Clear to auscultation bilaterally; No wheeze  CARDIOVASCULAR: Regular rate and rhythm; No murmurs, rubs, or gallops  GASTROINTESTINAL: Soft, Nontender, Nondistended; Bowel sounds present  MUSCULOSKELETAL:  2+ Peripheral Pulses, No clubbing, cyanosis, or edema  NEUROLOGY: responsive to simple questions; drowsy  SKIN: No rashes or lesions

## 2020-11-17 NOTE — PROGRESS NOTE ADULT - ASSESSMENT
55 year old female active smoker with pertinent medical history of Type II Diabetes Mellitus, Coronary Artery Disease, Dyslipidemia, Upper Gastrointestinal Bleed due to duodenal ulcer, Hypertension, Hypothyroidism, COPD, Dextrocardia, Sciatica/Peripheral Neuralgia, and Charcot foot deformity s/p reconstruction on 1/11/19 presents to the ED for worsening lethargy. She was initially admitted to MICU for sepsis of unknown cause and respiratory failure, d/g to medicine yesterday.    Respiratory failure likely secondary to acute HFpEF exacerbation, improving  - Echo shows Moderately Reduced Right ventricular Systolic function and Severe pulmonary hypertension  - c/w lasix 40 mg oral daily for now  - off nasal canula oxygenation  - BIPAP at night   - secondary to underlying lung condition, poorly controlled COPD?  - on inhalers    Altered mental status possibly secondary to hospital-induced delirium vs metabolic encephalopathy vs acute neurologic event  - D/c'd Lyrica as patient borderline hypotensive this AM and very drowsy  - F/u repeat CT Head  - Send blood cx, urine cx, UA, repeat CBC, BMP, Mg    Acute Kidney Injury and Electrolyte disturbances, resolved  - resolved ,Cr 3.8 on admission, 1.2 on last labs available  - Likely pre-renal/ATN due to hypotension.  - giving potassium and magnesium to the patient, monitor daily for now especially since she is diuretics    NSTEMI  - Troponin 1.05 downtrended to 0.31.  - Normal LV size and wall thickness, with normal left sided systolic and diastolic function, Moderately Reduced Right ventricular Systolic function and Severe pulmonary hypertension  - F/u w/ Cardio for possible cath when stable; currently w/u for AMS  - On Aspirin and Plavix.  - Rate control: Lopressor bid.    Sepsis  - present on admission, resolved  - Off levophed and finished antibiotic course    History of Duodenal Ulcer  - on Protonix and Sucralfate.  - Hb stable    Type II Diabetes Mellitus  - Follow FS. Insulin protocol if needed.     History of Hypothyroidism  - Levothyroxine 100 mcg daily    Diet: DASH  Activity: Increase as tolerated  DVT Prophylaxis: heparin  GI Prophylaxis: PPI  Code Status: Full Code

## 2020-11-17 NOTE — CHART NOTE - NSCHARTNOTEFT_GEN_A_CORE
Rapid called for patient lethargic since the AM and newly hypoxic to upper 60s pulse ox. On administration of 4L O2 by NC the patient's mentation rapidly improved; she was carrying on a conversation and was AAOx3. Per sister-in-law the patient's family had been trying to arrange for home O2 but was repeatedly denied.    Blood Gas Profile - Arterial (11.17.20 @ 13:22)    pH, Arterial: 7.52: 4L N/C    pCO2, Arterial: 46: 4L N/C mmHg    pO2, Arterial: 67: 4L N/C mmHg    HCO3, Arterial: 37: 4L N/C mmoL/L    Base Excess, Arterial: 12.7: 4L N/C mmoL/L    Oxygen Saturation, Arterial: 93: 4L N/C %    FIO2, Arterial: 36: 4L N/C    Blood Gas Source Arterial: Arterial: 4L N/C    A/P:  STAT CT Head revealed no evidence of acute pathology  AMS possibly secondary to hypoxia secondary to PE vs COPD exacerbation vs sepsis secondary to unknown cause  F/u STAT blood cx, UA, urine cx, lactate, CBC, BMP  F/u STAT CT Angio Chest to r/o PE  C/w NC; titrate down as tolerated  F/u STAT COVID-19 PCR swab.  Patient remains acute at this time.

## 2020-11-18 LAB
ANION GAP SERPL CALC-SCNC: 16 MMOL/L — HIGH (ref 7–14)
APPEARANCE UR: CLEAR — SIGNIFICANT CHANGE UP
BASOPHILS # BLD AUTO: 0.05 K/UL — SIGNIFICANT CHANGE UP (ref 0–0.2)
BASOPHILS NFR BLD AUTO: 0.5 % — SIGNIFICANT CHANGE UP (ref 0–1)
BILIRUB UR-MCNC: NEGATIVE — SIGNIFICANT CHANGE UP
BUN SERPL-MCNC: 21 MG/DL — HIGH (ref 10–20)
CALCIUM SERPL-MCNC: 8.9 MG/DL — SIGNIFICANT CHANGE UP (ref 8.5–10.1)
CHLORIDE SERPL-SCNC: 88 MMOL/L — LOW (ref 98–110)
CO2 SERPL-SCNC: 26 MMOL/L — SIGNIFICANT CHANGE UP (ref 17–32)
COLOR SPEC: SIGNIFICANT CHANGE UP
CREAT SERPL-MCNC: 1.6 MG/DL — HIGH (ref 0.7–1.5)
DIFF PNL FLD: NEGATIVE — SIGNIFICANT CHANGE UP
EOSINOPHIL # BLD AUTO: 1.62 K/UL — HIGH (ref 0–0.7)
EOSINOPHIL NFR BLD AUTO: 16.4 % — HIGH (ref 0–8)
GLUCOSE BLDC GLUCOMTR-MCNC: 128 MG/DL — HIGH (ref 70–99)
GLUCOSE BLDC GLUCOMTR-MCNC: 134 MG/DL — HIGH (ref 70–99)
GLUCOSE SERPL-MCNC: 255 MG/DL — HIGH (ref 70–99)
GLUCOSE UR QL: NEGATIVE — SIGNIFICANT CHANGE UP
HCT VFR BLD CALC: 35.5 % — LOW (ref 37–47)
HGB BLD-MCNC: 10.4 G/DL — LOW (ref 12–16)
IMM GRANULOCYTES NFR BLD AUTO: 0.5 % — HIGH (ref 0.1–0.3)
KETONES UR-MCNC: NEGATIVE — SIGNIFICANT CHANGE UP
LEUKOCYTE ESTERASE UR-ACNC: NEGATIVE — SIGNIFICANT CHANGE UP
LYMPHOCYTES # BLD AUTO: 1.24 K/UL — SIGNIFICANT CHANGE UP (ref 1.2–3.4)
LYMPHOCYTES # BLD AUTO: 12.6 % — LOW (ref 20.5–51.1)
MAGNESIUM SERPL-MCNC: 2 MG/DL — SIGNIFICANT CHANGE UP (ref 1.8–2.4)
MCHC RBC-ENTMCNC: 26.3 PG — LOW (ref 27–31)
MCHC RBC-ENTMCNC: 29.3 G/DL — LOW (ref 32–37)
MCV RBC AUTO: 89.6 FL — SIGNIFICANT CHANGE UP (ref 81–99)
MONOCYTES # BLD AUTO: 0.65 K/UL — HIGH (ref 0.1–0.6)
MONOCYTES NFR BLD AUTO: 6.6 % — SIGNIFICANT CHANGE UP (ref 1.7–9.3)
NEUTROPHILS # BLD AUTO: 6.24 K/UL — SIGNIFICANT CHANGE UP (ref 1.4–6.5)
NEUTROPHILS NFR BLD AUTO: 63.4 % — SIGNIFICANT CHANGE UP (ref 42.2–75.2)
NITRITE UR-MCNC: NEGATIVE — SIGNIFICANT CHANGE UP
NRBC # BLD: 0 /100 WBCS — SIGNIFICANT CHANGE UP (ref 0–0)
PH UR: 6.5 — SIGNIFICANT CHANGE UP (ref 5–8)
PLATELET # BLD AUTO: 272 K/UL — SIGNIFICANT CHANGE UP (ref 130–400)
POTASSIUM SERPL-MCNC: 3.4 MMOL/L — LOW (ref 3.5–5)
POTASSIUM SERPL-SCNC: 3.4 MMOL/L — LOW (ref 3.5–5)
PROT UR-MCNC: SIGNIFICANT CHANGE UP
RBC # BLD: 3.96 M/UL — LOW (ref 4.2–5.4)
RBC # FLD: 22 % — HIGH (ref 11.5–14.5)
SARS-COV-2 RNA SPEC QL NAA+PROBE: SIGNIFICANT CHANGE UP
SODIUM SERPL-SCNC: 130 MMOL/L — LOW (ref 135–146)
SP GR SPEC: 1.02 — SIGNIFICANT CHANGE UP (ref 1.01–1.03)
UROBILINOGEN FLD QL: SIGNIFICANT CHANGE UP
WBC # BLD: 9.85 K/UL — SIGNIFICANT CHANGE UP (ref 4.8–10.8)
WBC # FLD AUTO: 9.85 K/UL — SIGNIFICANT CHANGE UP (ref 4.8–10.8)

## 2020-11-18 PROCEDURE — 99233 SBSQ HOSP IP/OBS HIGH 50: CPT

## 2020-11-18 PROCEDURE — 71045 X-RAY EXAM CHEST 1 VIEW: CPT | Mod: 26

## 2020-11-18 PROCEDURE — 78582 LUNG VENTILAT&PERFUS IMAGING: CPT | Mod: 26

## 2020-11-18 RX ORDER — POTASSIUM CHLORIDE 20 MEQ
20 PACKET (EA) ORAL
Refills: 0 | Status: COMPLETED | OUTPATIENT
Start: 2020-11-18 | End: 2020-11-18

## 2020-11-18 RX ADMIN — Medication 1 GRAM(S): at 19:21

## 2020-11-18 RX ADMIN — CLOPIDOGREL BISULFATE 75 MILLIGRAM(S): 75 TABLET, FILM COATED ORAL at 13:05

## 2020-11-18 RX ADMIN — MAGNESIUM OXIDE 400 MG ORAL TABLET 400 MILLIGRAM(S): 241.3 TABLET ORAL at 09:12

## 2020-11-18 RX ADMIN — PANTOPRAZOLE SODIUM 40 MILLIGRAM(S): 20 TABLET, DELAYED RELEASE ORAL at 05:57

## 2020-11-18 RX ADMIN — Medication 12.5 MILLIGRAM(S): at 05:58

## 2020-11-18 RX ADMIN — Medication 1 GRAM(S): at 05:57

## 2020-11-18 RX ADMIN — MORPHINE SULFATE 2 MILLIGRAM(S): 50 CAPSULE, EXTENDED RELEASE ORAL at 12:58

## 2020-11-18 RX ADMIN — Medication 50 MILLIEQUIVALENT(S): at 14:51

## 2020-11-18 RX ADMIN — Medication 40 MILLIGRAM(S): at 05:57

## 2020-11-18 RX ADMIN — Medication 100 MICROGRAM(S): at 05:57

## 2020-11-18 RX ADMIN — HEPARIN SODIUM 5000 UNIT(S): 5000 INJECTION INTRAVENOUS; SUBCUTANEOUS at 13:08

## 2020-11-18 RX ADMIN — HEPARIN SODIUM 5000 UNIT(S): 5000 INJECTION INTRAVENOUS; SUBCUTANEOUS at 05:58

## 2020-11-18 RX ADMIN — PANTOPRAZOLE SODIUM 40 MILLIGRAM(S): 20 TABLET, DELAYED RELEASE ORAL at 19:20

## 2020-11-18 RX ADMIN — MAGNESIUM OXIDE 400 MG ORAL TABLET 400 MILLIGRAM(S): 241.3 TABLET ORAL at 19:20

## 2020-11-18 RX ADMIN — Medication 81 MILLIGRAM(S): at 13:05

## 2020-11-18 RX ADMIN — Medication 150 MILLIGRAM(S): at 20:23

## 2020-11-18 RX ADMIN — BUDESONIDE AND FORMOTEROL FUMARATE DIHYDRATE 2 PUFF(S): 160; 4.5 AEROSOL RESPIRATORY (INHALATION) at 21:59

## 2020-11-18 RX ADMIN — Medication 12.5 MILLIGRAM(S): at 19:21

## 2020-11-18 RX ADMIN — MAGNESIUM OXIDE 400 MG ORAL TABLET 400 MILLIGRAM(S): 241.3 TABLET ORAL at 13:05

## 2020-11-18 RX ADMIN — Medication 650 MILLIGRAM(S): at 12:58

## 2020-11-18 RX ADMIN — Medication 1 GRAM(S): at 13:07

## 2020-11-18 NOTE — PROVIDER CONTACT NOTE (OTHER) - SITUATION
spoke with MD to inquire about administration of lyrica 150mg, Lasix 40mg, and metoprolol 12.5 with a bp of 108/58 and a hr of 93

## 2020-11-18 NOTE — PROGRESS NOTE ADULT - SUBJECTIVE AND OBJECTIVE BOX
CALLY HARTMAN 55y Female  MRN#: [id number]       SUBJECTIVE  Patient is a 55y old Female who presents with a chief complaint of sepsis of unknown source (2020 11:38)  Currently admitted to medicine with the primary diagnosis of sepsis of unknown origin, resolved, d/g to medicine. In the AM the patient reports feeling well though endorses a dry intermittent cough of 2 days' duration; denies fever, chills.       OBJECTIVE  PAST MEDICAL & SURGICAL HISTORY  Dextrocardia    Chronic midline low back pain with bilateral sciatica    Peripheral neuralgia    Essential hypertension    Diabetes 1.5, managed as type 2    Charcot&#x27;s arthropathy  R foot    Charcot foot due to diabetes mellitus    H/O shoulder surgery  Right shoulder surgery     delivery delivered      ALLERGIES:  No Known Allergies    MEDICATIONS:  STANDING MEDICATIONS  aspirin  chewable 81 milliGRAM(s) Oral daily  budesonide  80 MICROgram(s)/formoterol 4.5 MICROgram(s) Inhaler 2 Puff(s) Inhalation two times a day  chlorhexidine 4% Liquid 1 Application(s) Topical <User Schedule>  clopidogrel Tablet 75 milliGRAM(s) Oral daily  furosemide    Tablet 40 milliGRAM(s) Oral daily  heparin   Injectable 5000 Unit(s) SubCutaneous every 8 hours  influenza   Vaccine 0.5 milliLiter(s) IntraMuscular once  lactated ringers. 500 milliLiter(s) IV Continuous <Continuous>  levothyroxine 100 MICROGram(s) Oral daily  magnesium oxide 400 milliGRAM(s) Oral three times a day with meals  metoprolol tartrate 12.5 milliGRAM(s) Oral two times a day  pantoprazole    Tablet 40 milliGRAM(s) Oral every 12 hours  potassium chloride  20 mEq/100 mL IVPB 20 milliEquivalent(s) IV Intermittent every 2 hours  pregabalin 150 milliGRAM(s) Oral two times a day  senna 2 Tablet(s) Oral at bedtime  sucralfate 1 Gram(s) Oral four times a day    PRN MEDICATIONS  acetaminophen   Tablet .. 650 milliGRAM(s) Oral every 6 hours PRN  albuterol/ipratropium for Nebulization 3 milliLiter(s) Nebulizer every 6 hours PRN      VITAL SIGNS: Last 24 Hours  T(C): 35.7 (2020 04:47), Max: 36.6 (2020 13:10)  T(F): 96.2 (2020 04:47), Max: 97.9 (2020 13:10)  HR: 93 (2020 04:47) (77 - 95)  BP: 108/58 (2020 04:47) (102/59 - 148/63)  BP(mean): --  RR: 18 (2020 05:00) (15 - 20)  SpO2: 98% (2020 05:00) (68% - 98%)    LABS:                        10.4   9.85  )-----------( 272      ( 2020 05:37 )             35.5     18    130<L>  |  88<L>  |  21<H>  ----------------------------<  255<H>  3.4<L>   |  26  |  1.6<H>    Ca    8.9      2020 05:37  Mg     2.0     18      Urinalysis Basic - ( 2020 03:20 )    Color: Light Yellow / Appearance: Clear / S.016 / pH: x  Gluc: x / Ketone: Negative  / Bili: Negative / Urobili: <2 mg/dL   Blood: x / Protein: Trace / Nitrite: Negative   Leuk Esterase: Negative / RBC: x / WBC x   Sq Epi: x / Non Sq Epi: x / Bacteria: x      ABG - ( 2020 13:22 )  pH, Arterial: 7.52  pH, Blood: x     /  pCO2: 46    /  pO2: 67    / HCO3: 37    / Base Excess: 12.7  /  SaO2: 93          Lactate, Blood: 1.0 mmol/L (20 @ 16:38)  Lactate, Blood: 3.2 mmol/L <H> (20 @ 13:41)      RADIOLOGY:    < from: Xray Chest 1 View- PORTABLE-Routine (Xray Chest 1 View- PORTABLE-Routine in AM.) (20 @ 08:48) >  INTERPRETATION:  Clinical History / Reason for exam: Shortness of breath.    Comparison : Chest radiograph prior day.    Technique/Positioning: Frontal portable, low lung volumes.    Findings:    Support devices: None.    Cardiac/mediastinum/hilum: Unremarkable.    Lung parenchyma/Pleura: Dependent atelectasis.    Skeleton/soft tissues: Unremarkable.    Impression:    Dependent atelectasis, low lung volumes. Follow-up as needed.    PHYSICAL EXAM:    GENERAL: NAD, well-developed, AAOx3 this AM  HEENT:  Atraumatic, Normocephalic. EOMI, PERRLA, conjunctiva and sclera clear, No JVD  PULMONARY: Clear to auscultation bilaterally; No wheeze  CARDIOVASCULAR: Regular rate and rhythm; No murmurs, rubs, or gallops  GASTROINTESTINAL: Soft, Nontender, Nondistended; Bowel sounds present  MUSCULOSKELETAL:  2+ Peripheral Pulses, No clubbing, cyanosis, or edema  NEUROLOGY: non-focal  SKIN: No rashes or lesions   CALLY HARTMAN 55y Female  MRN#: [id number]       SUBJECTIVE  Patient is a 55y old Female who presents with a chief complaint of sepsis of unknown source (2020 11:38)  Currently admitted to medicine with the primary diagnosis of sepsis of unknown origin, resolved, d/g to medicine. In the AM the patient reports feeling well though endorses a dry intermittent cough of 2 days' duration; denies fever, chills.     Attending Note: Pt seen and examined at bedside. No cp or sob. Sob improved. No overnight events. RR yesterday.       OBJECTIVE  PAST MEDICAL & SURGICAL HISTORY  Dextrocardia    Chronic midline low back pain with bilateral sciatica    Peripheral neuralgia    Essential hypertension    Diabetes 1.5, managed as type 2    Charcot&#x27;s arthropathy  R foot    Charcot foot due to diabetes mellitus    H/O shoulder surgery  Right shoulder surgery     delivery delivered      ALLERGIES:  No Known Allergies    MEDICATIONS:  STANDING MEDICATIONS  aspirin  chewable 81 milliGRAM(s) Oral daily  budesonide  80 MICROgram(s)/formoterol 4.5 MICROgram(s) Inhaler 2 Puff(s) Inhalation two times a day  chlorhexidine 4% Liquid 1 Application(s) Topical <User Schedule>  clopidogrel Tablet 75 milliGRAM(s) Oral daily  furosemide    Tablet 40 milliGRAM(s) Oral daily  heparin   Injectable 5000 Unit(s) SubCutaneous every 8 hours  influenza   Vaccine 0.5 milliLiter(s) IntraMuscular once  lactated ringers. 500 milliLiter(s) IV Continuous <Continuous>  levothyroxine 100 MICROGram(s) Oral daily  magnesium oxide 400 milliGRAM(s) Oral three times a day with meals  metoprolol tartrate 12.5 milliGRAM(s) Oral two times a day  pantoprazole    Tablet 40 milliGRAM(s) Oral every 12 hours  potassium chloride  20 mEq/100 mL IVPB 20 milliEquivalent(s) IV Intermittent every 2 hours  pregabalin 150 milliGRAM(s) Oral two times a day  senna 2 Tablet(s) Oral at bedtime  sucralfate 1 Gram(s) Oral four times a day    PRN MEDICATIONS  acetaminophen   Tablet .. 650 milliGRAM(s) Oral every 6 hours PRN  albuterol/ipratropium for Nebulization 3 milliLiter(s) Nebulizer every 6 hours PRN      VITAL SIGNS: Last 24 Hours  T(C): 35.7 (2020 04:47), Max: 36.6 (2020 13:10)  T(F): 96.2 (2020 04:47), Max: 97.9 (2020 13:10)  HR: 93 (2020 04:47) (77 - 95)  BP: 108/58 (2020 04:47) (102/59 - 148/63)  BP(mean): --  RR: 18 (2020 05:00) (15 - 20)  SpO2: 98% (2020 05:00) (68% - 98%)    LABS:                        10.4   9.85  )-----------( 272      ( 2020 05:37 )             35.5         130<L>  |  88<L>  |  21<H>  ----------------------------<  255<H>  3.4<L>   |  26  |  1.6<H>    Ca    8.9      2020 05:37  Mg     2.0           Urinalysis Basic - ( 2020 03:20 )    Color: Light Yellow / Appearance: Clear / S.016 / pH: x  Gluc: x / Ketone: Negative  / Bili: Negative / Urobili: <2 mg/dL   Blood: x / Protein: Trace / Nitrite: Negative   Leuk Esterase: Negative / RBC: x / WBC x   Sq Epi: x / Non Sq Epi: x / Bacteria: x      ABG - ( 2020 13:22 )  pH, Arterial: 7.52  pH, Blood: x     /  pCO2: 46    /  pO2: 67    / HCO3: 37    / Base Excess: 12.7  /  SaO2: 93          Lactate, Blood: 1.0 mmol/L (20 @ 16:38)  Lactate, Blood: 3.2 mmol/L <H> (20 @ 13:41)      RADIOLOGY:    < from: Xray Chest 1 View- PORTABLE-Routine (Xray Chest 1 View- PORTABLE-Routine in AM.) (20 @ 08:48) >  INTERPRETATION:  Clinical History / Reason for exam: Shortness of breath.    Comparison : Chest radiograph prior day.    Technique/Positioning: Frontal portable, low lung volumes.    Findings:    Support devices: None.    Cardiac/mediastinum/hilum: Unremarkable.    Lung parenchyma/Pleura: Dependent atelectasis.    Skeleton/soft tissues: Unremarkable.    Impression:    Dependent atelectasis, low lung volumes. Follow-up as needed.    PHYSICAL EXAM:    GENERAL: NAD, well-developed, AAOx3 this AM  HEENT:  Atraumatic, Normocephalic. EOMI, PERRLA, conjunctiva and sclera clear, No JVD  PULMONARY: Clear to auscultation bilaterally; No wheeze  CARDIOVASCULAR: Regular rate and rhythm; No murmurs, rubs, or gallops  GASTROINTESTINAL: Soft, Nontender, Nondistended; Bowel sounds present  MUSCULOSKELETAL:  2+ Peripheral Pulses, No clubbing, cyanosis, or edema  NEUROLOGY: non-focal  SKIN: No rashes or lesions

## 2020-11-18 NOTE — PROGRESS NOTE ADULT - ASSESSMENT
55 year old female active smoker with pertinent medical history of Type II Diabetes Mellitus, Coronary Artery Disease, Dyslipidemia, Upper Gastrointestinal Bleed due to duodenal ulcer, Hypertension, Hypothyroidism, COPD, Dextrocardia, Sciatica/Peripheral Neuralgia, and Charcot foot deformity s/p reconstruction on 1/11/19 presents to the ED for worsening lethargy. She was initially admitted to MICU for sepsis of unknown cause and respiratory failure, d/g to medicine yesterday.    Respiratory failure likely secondary to acute HFpEF exacerbation vs COPD vs COVID-19 pneumonia, less likely, improving  - Echo shows Moderately Reduced Right ventricular Systolic function and Severe pulmonary hypertension  - c/w lasix 40 mg oral daily for now  - C/w 4L O2; titrate down as tolerated  - BIPAP at night   - c/w duonebs q6h, prn  - Will document O2 sat on room air at rest an on ambulation; patient may qualify for home O2 to be delivered to daughter in [place]    Altered mental status possibly secondary to hospital-induced delirium vs metabolic encephalopathy vs acute neurologic event, resolved  - Restarted Lyrica at 150 mg BID (had been on TID but decr d/t acute episode yesterday)  - CT Head negative for acute pathology  - Send blood cx, urine cx, UA, repeat CBC, BMP, Mg    Acute Kidney Injury and Electrolyte disturbances, labile  - Cr 1.6 <-- 1.9  - Likely pre-renal/ATN due to hypotension.  - D/c'd IV fluids as on Lasix daily  - Encourage adequate PO intake  - giving potassium and magnesium to the patient, monitor daily for now especially since she is diuretics    NSTEMI possibly demand ischemia secondary to respiratory failure, resolved  - Troponin 1.05 downtrended to 0.31.  - Normal LV size and wall thickness, with normal left sided systolic and diastolic function, Moderately Reduced Right ventricular Systolic function and Severe pulmonary hypertension  - F/u w/ Cardio for possible cath when stable; currently w/u for AMS  - On Aspirin and Plavix.  - Rate control: Lopressor bid.    Sepsis  - present on admission, resolved  - Off levophed and finished antibiotic course    History of Duodenal Ulcer  - on Protonix and Sucralfate.  - Hb stable    Type II Diabetes Mellitus  - Follow FS. Insulin protocol if needed.     History of Hypothyroidism  - Levothyroxine 100 mcg daily    Diet: DASH  Activity: Increase as tolerated  DVT Prophylaxis: heparin  GI Prophylaxis: PPI  Code Status: Full Code  Dispo: if cleared by Pulm may be d/c'd tomorrow pending home O2 delivery if patient qualifies   55 year old female active smoker with pertinent medical history of Type II Diabetes Mellitus, Coronary Artery Disease, Dyslipidemia, Upper Gastrointestinal Bleed due to duodenal ulcer, Hypertension, Hypothyroidism, COPD, Dextrocardia, Sciatica/Peripheral Neuralgia, and Charcot foot deformity s/p reconstruction on 1/11/19 presents to the ED for worsening lethargy. She was initially admitted to MICU for sepsis of unknown cause and respiratory failure, d/g to medicine yesterday.    Respiratory failure likely secondary to acute chronic HFpEF exacerbation 2/2 Pulm HTN vs COPD with Pulm HTN- NO pneumonia on CT scan   - Echo shows Moderately Reduced Right ventricular Systolic function and Severe pulmonary hypertension  - c/w lasix 40 mg oral daily for now  - C/w 4L O2; titrate down as tolerated  - BIPAP at night   - c/w duonebs q6h, prn  - Will document O2 sat on room air at rest an on ambulation; patient may qualify for home O2 to be delivered to daughter in [place]  - COVID neg previously and pending reswab from yest   - CT no pneumonia, has intersitial edema and pulm HTN   - Attending note: given hx of Pulm HTN and hypoxia, will due vq scan for peripheral PEs, pulm consult, and lower duplex     Metabolic encephalopathy  possibly secondary to hospital-induced delirium vs hypoxia   - Restarted Lyrica at 150 mg BID (had been on TID but decr d/t acute episode yesterday)  - CT Head negative for acute pathology  - Send blood cx, urine cx, UA, repeat CBC, BMP, Mg  - blood cultures neg so far    Acute Kidney Injury and Electrolyte disturbances, labile  - Cr 1.6 <-- 1.9  - Likely pre-renal/ATN due to hypotension.  - D/c'd IV fluids as on Lasix daily  - Encourage adequate PO intake  - giving potassium and magnesium to the patient, monitor daily for now especially since she is diuretics    NSTEMI possibly demand ischemia secondary to respiratory failure, resolved  - Troponin 1.05 downtrended to 0.31.  - Normal LV size and wall thickness, with normal left sided systolic and diastolic function, Moderately Reduced Right ventricular Systolic function and Severe pulmonary hypertension  - F/u w/ Cardio for possible cath when stable; currently w/u for AMS  - On Aspirin and Plavix.  - Rate control: Lopressor bid.    Sepsis  - present on admission, resolved  - Off levophed and finished antibiotic course    #hyponatremia--130 today- euvolemic-will send urine lytes and osm, tsh     History of Duodenal Ulcer  - on Protonix and Sucralfate.  - Hb stable    Type II Diabetes Mellitus  - Follow FS. Insulin protocol if needed.     History of Hypothyroidism  - Levothyroxine 100 mcg daily    Diet: DASH  Activity: Increase as tolerated  DVT Prophylaxis: heparin  GI Prophylaxis: PPI  Code Status: Full Code  Dispo: if cleared by Pulm may be d/c'd tomorrow pending home O2 delivery if patient qualifies

## 2020-11-19 ENCOUNTER — TRANSCRIPTION ENCOUNTER (OUTPATIENT)
Age: 55
End: 2020-11-19

## 2020-11-19 VITALS — OXYGEN SATURATION: 94 % | RESPIRATION RATE: 20 BRPM

## 2020-11-19 LAB
ANION GAP SERPL CALC-SCNC: 12 MMOL/L — SIGNIFICANT CHANGE UP (ref 7–14)
ANISOCYTOSIS BLD QL: SLIGHT — SIGNIFICANT CHANGE UP
BASOPHILS # BLD AUTO: 0.12 K/UL — SIGNIFICANT CHANGE UP (ref 0–0.2)
BASOPHILS NFR BLD AUTO: 1.8 % — HIGH (ref 0–1)
BUN SERPL-MCNC: 18 MG/DL — SIGNIFICANT CHANGE UP (ref 10–20)
CALCIUM SERPL-MCNC: 8.6 MG/DL — SIGNIFICANT CHANGE UP (ref 8.5–10.1)
CHLORIDE SERPL-SCNC: 88 MMOL/L — LOW (ref 98–110)
CO2 SERPL-SCNC: 32 MMOL/L — SIGNIFICANT CHANGE UP (ref 17–32)
CREAT SERPL-MCNC: 1.2 MG/DL — SIGNIFICANT CHANGE UP (ref 0.7–1.5)
EOSINOPHIL # BLD AUTO: 1.11 K/UL — HIGH (ref 0–0.7)
EOSINOPHIL NFR BLD AUTO: 16.8 % — HIGH (ref 0–8)
GIANT PLATELETS BLD QL SMEAR: PRESENT — SIGNIFICANT CHANGE UP
GLUCOSE BLDC GLUCOMTR-MCNC: 100 MG/DL — HIGH (ref 70–99)
GLUCOSE BLDC GLUCOMTR-MCNC: 119 MG/DL — HIGH (ref 70–99)
GLUCOSE SERPL-MCNC: 102 MG/DL — HIGH (ref 70–99)
HCT VFR BLD CALC: 34 % — LOW (ref 37–47)
HGB BLD-MCNC: 10.4 G/DL — LOW (ref 12–16)
LYMPHOCYTES # BLD AUTO: 1.11 K/UL — LOW (ref 1.2–3.4)
LYMPHOCYTES # BLD AUTO: 16.8 % — LOW (ref 20.5–51.1)
MACROCYTES BLD QL: SLIGHT — SIGNIFICANT CHANGE UP
MAGNESIUM SERPL-MCNC: 1.8 MG/DL — SIGNIFICANT CHANGE UP (ref 1.8–2.4)
MANUAL SMEAR VERIFICATION: SIGNIFICANT CHANGE UP
MCHC RBC-ENTMCNC: 27 PG — SIGNIFICANT CHANGE UP (ref 27–31)
MCHC RBC-ENTMCNC: 30.6 G/DL — LOW (ref 32–37)
MCV RBC AUTO: 88.3 FL — SIGNIFICANT CHANGE UP (ref 81–99)
MONOCYTES # BLD AUTO: 0.35 K/UL — SIGNIFICANT CHANGE UP (ref 0.1–0.6)
MONOCYTES NFR BLD AUTO: 5.3 % — SIGNIFICANT CHANGE UP (ref 1.7–9.3)
NEUTROPHILS # BLD AUTO: 3.9 K/UL — SIGNIFICANT CHANGE UP (ref 1.4–6.5)
NEUTROPHILS NFR BLD AUTO: 59.3 % — SIGNIFICANT CHANGE UP (ref 42.2–75.2)
NEUTS HYPERSEG # BLD: PRESENT — SIGNIFICANT CHANGE UP
PLAT MORPH BLD: NORMAL — SIGNIFICANT CHANGE UP
PLATELET # BLD AUTO: 229 K/UL — SIGNIFICANT CHANGE UP (ref 130–400)
POLYCHROMASIA BLD QL SMEAR: SLIGHT — SIGNIFICANT CHANGE UP
POTASSIUM SERPL-MCNC: 3.6 MMOL/L — SIGNIFICANT CHANGE UP (ref 3.5–5)
POTASSIUM SERPL-SCNC: 3.6 MMOL/L — SIGNIFICANT CHANGE UP (ref 3.5–5)
RBC # BLD: 3.85 M/UL — LOW (ref 4.2–5.4)
RBC # FLD: 21.8 % — HIGH (ref 11.5–14.5)
RBC BLD AUTO: ABNORMAL
SMUDGE CELLS # BLD: PRESENT — SIGNIFICANT CHANGE UP
SODIUM SERPL-SCNC: 132 MMOL/L — LOW (ref 135–146)
STOMATOCYTES BLD QL SMEAR: SLIGHT — SIGNIFICANT CHANGE UP
TSH SERPL-MCNC: 4.92 UIU/ML — HIGH (ref 0.27–4.2)
WBC # BLD: 6.58 K/UL — SIGNIFICANT CHANGE UP (ref 4.8–10.8)
WBC # FLD AUTO: 6.58 K/UL — SIGNIFICANT CHANGE UP (ref 4.8–10.8)

## 2020-11-19 PROCEDURE — 99233 SBSQ HOSP IP/OBS HIGH 50: CPT | Mod: GC

## 2020-11-19 RX ORDER — FUROSEMIDE 40 MG
1 TABLET ORAL
Qty: 15 | Refills: 0
Start: 2020-11-19 | End: 2020-12-18

## 2020-11-19 RX ADMIN — MAGNESIUM OXIDE 400 MG ORAL TABLET 400 MILLIGRAM(S): 241.3 TABLET ORAL at 18:13

## 2020-11-19 RX ADMIN — Medication 150 MILLIGRAM(S): at 18:13

## 2020-11-19 RX ADMIN — Medication 1 GRAM(S): at 18:13

## 2020-11-19 RX ADMIN — MAGNESIUM OXIDE 400 MG ORAL TABLET 400 MILLIGRAM(S): 241.3 TABLET ORAL at 11:21

## 2020-11-19 RX ADMIN — Medication 650 MILLIGRAM(S): at 13:54

## 2020-11-19 RX ADMIN — Medication 100 MICROGRAM(S): at 05:43

## 2020-11-19 RX ADMIN — PANTOPRAZOLE SODIUM 40 MILLIGRAM(S): 20 TABLET, DELAYED RELEASE ORAL at 05:50

## 2020-11-19 RX ADMIN — Medication 1 GRAM(S): at 11:21

## 2020-11-19 RX ADMIN — Medication 1 GRAM(S): at 05:50

## 2020-11-19 RX ADMIN — PANTOPRAZOLE SODIUM 40 MILLIGRAM(S): 20 TABLET, DELAYED RELEASE ORAL at 18:13

## 2020-11-19 RX ADMIN — Medication 150 MILLIGRAM(S): at 05:44

## 2020-11-19 RX ADMIN — Medication 81 MILLIGRAM(S): at 11:20

## 2020-11-19 RX ADMIN — CLOPIDOGREL BISULFATE 75 MILLIGRAM(S): 75 TABLET, FILM COATED ORAL at 11:20

## 2020-11-19 RX ADMIN — Medication 12.5 MILLIGRAM(S): at 18:13

## 2020-11-19 NOTE — CHART NOTE - NSCHARTNOTEFT_GEN_A_CORE
Spoke with patient and her sister-in-law bedside.  Pt reports that oxygen is delivered at her sister-in-law's house, where she will be staying.  Spoke with daughter on phone and confirmed that Oxygen is delivered at home.    Patient is ok to be discharged.

## 2020-11-19 NOTE — DISCHARGE NOTE PROVIDER - HOSPITAL COURSE
The patient is a 54 year-old active smoker with a PMHx of Type II DM, HFpEF (EF of 72 %), CAD, hyperlipidemia, UGIB 2/2 duodenal ulcer, HTN, Hypothyroidism, COPD, Dextrocardia, Sciatica/Peripheral Neuralgia, Charcot foot deformity s/p reconstruction on 1/11/19, and 2 recent admissions for sepsis presented to the ED for worsening lethargy. On presentation, the patient was in septic shock (hypothermia, high WBCs, hypotension) and developed periods of bradycardia in to the 50s. Labs also revealed JEEVAN w/ HAGMA (AG 23), worsening transaminitis from last admission, Trop 0.93, BNP 43k and lactate 9.8. CXR was unremarkable. The patient was admitted to the MICU for septic shock and started on pressors and antibiotics; they were then stopped and she was downgraded to step down unit. Her respiratory status worsened and she became BiPAP dependent and was upgraded to MICU again for NSTEMI. She had fluid overload and was diuresed with IV Lasix; transitioned to 40 mg once daily. Her respiratory status improved and she was able to tolerate HFNC and weaning O2; she was then downgraded on 11/16. On 11/17 in the mid-morning the patient had an episode of acute lethargy and hypoxia to 68% SpO2; was given O2 by NC after which she improved and her mentation resolved to baseline AAOx4; a CT-Angio Chest and V/Q scan were both negative for PE; COVID-19 PCR swab negative. She was titrated to 1.5-2L O2 on NC; stable for discharge to home w/ O2 to be cared for by her sister-in-law in [place].

## 2020-11-19 NOTE — PROGRESS NOTE ADULT - NSHPATTENDINGPLANDISCUSS_GEN_ALL_CORE
ICU team
team
CEU team
TEAM
team
resident
ICU
resident
resident

## 2020-11-19 NOTE — DISCHARGE NOTE PROVIDER - NSDCCPCAREPLAN_GEN_ALL_CORE_FT
PRINCIPAL DISCHARGE DIAGNOSIS  Diagnosis: Sepsis due to undetermined organism with acute respiratory failure  Assessment and Plan of Treatment: You were treated with antibiotics and pressors (to keep your blood pressure normal). You now require oxygen at home which will be delivered to your family with whom you will be residing in [place]. Please take your medications as prescribed and follow up with your primary outpatient physician upon discharge. If you develop new or worsening shortness of breath, chest pain or pressure, subjective fever or chills, dizziness, lightheadedness, or any other symptoms or signs that alarm you, please seek immediate medical attention.      SECONDARY DISCHARGE DIAGNOSES  Diagnosis: Pulmonary hypertension  Assessment and Plan of Treatment: Please take the Lasix 40 mg once every other day as prescribed, and continue taking your other medications as prescribed; please follow up with Dr. Patel within 1 week of discharge for follow-up. If you develop new or worsening shortness of breath, chest pain or pressure, or any other alarming signs or symptoms, please seek immediate medical attention.    Diagnosis: Diabetes  Assessment and Plan of Treatment: Please take your medications as prescribed and follow up with your outpatient physician.    Diagnosis: Transaminitis  Assessment and Plan of Treatment:     Diagnosis: Hyponatremia  Assessment and Plan of Treatment:     Diagnosis: JEEVAN (acute kidney injury)  Assessment and Plan of Treatment:

## 2020-11-19 NOTE — DISCHARGE NOTE PROVIDER - NSDCCPGOAL_GEN_ALL_CORE_FT
To get better and follow your care plan as instructed. 81 yo F with PMH HTN, Stage IVB Uterine serous cancer, grade 3 (s/p 3 cycles of chemo with carbo/taxol after BEN-BSO, interval debulking, now with recurrent disease undergoing chemo with temsirolimus after 3 cycles of doxil and 3 cycles of avastin) c/b ascites (for which patient has paracentesis every 4-5weeks last one about 3 weeks ago) on active chemotherapy (oncologist, Dr Pritchett, [facility] who is w/ [facility]), H/O bowel obstruction s/p resection 2016, COPD,  S/P recent adm n/v/d 2/2 bowel prep was admitted to the hospital on 9/23 with  n/v and inability to tolerate PO, DAPHNE, .  Hospiatization was complicated by NSTEMI with decreased EF consistent with CHFrEF.

## 2020-11-19 NOTE — PROGRESS NOTE ADULT - REASON FOR ADMISSION
Lethargy
SEPSIS
SOB
sepsis
sepsis of unknown origin
sepsis secondary to unknown source
altered MS
arf
sepsis of unknown source
hypotension

## 2020-11-19 NOTE — PROGRESS NOTE ADULT - ATTENDING COMMENTS
Mirian Reeder MD  Hospitalist   Spectra: [phone]    Patient is a 55y old  Female who presents with a chief complaint of sepsis of unknown source (17 Nov 2020 11:38)      INTERVAL HPI/OVERNIGHT EVENTS: patient was hard to arouse this am. later in afternoon rapid response was called for unresponsiveness with hypoxia to 60s on room air- patient was placed on supplemental NC with improvement in mental status and oxygenation.     REVIEW OF SYSTEMS: negative  Vital Signs Last 24 Hrs  T(C): 36.6 (17 Nov 2020 17:17), Max: 36.8 (17 Nov 2020 01:00)  T(F): 97.9 (17 Nov 2020 17:17), Max: 98.3 (17 Nov 2020 01:00)  HR: 87 (17 Nov 2020 17:17) (77 - 95)  BP: 102/59 (17 Nov 2020 17:17) (93/55 - 148/63)  BP(mean): --  RR: 15 (17 Nov 2020 17:17) (15 - 20)  SpO2: 93% (17 Nov 2020 13:20) (68% - 93%)    PHYSICAL EXAM:   NAD; Normocephalic;   LUNGS - no wheezing  HEART: S1 S2+   ABDOMEN: Soft, Nontender, non distended  EXTREMITIES: no cyanosis; no edema  NERVOUS SYSTEM:  Awake and alert; no focal neuro deficits appreciated    LABS:                        11.4   12.95 )-----------( 235      ( 17 Nov 2020 13:41 )             38.0     11-17    132<L>  |  88<L>  |  24<H>  ----------------------------<  123<H>  4.5   |  33<H>  |  1.9<H>    Ca    9.1      17 Nov 2020 13:41  Mg     1.4     11-16    TPro  7.3  /  Alb  3.5  /  TBili  1.7<H>  /  DBili  x   /  AST  40  /  ALT  18  /  AlkPhos  131<H>  11-16        CAPILLARY BLOOD GLUCOSE      POCT Blood Glucose.: 114 mg/dL (17 Nov 2020 16:56)  POCT Blood Glucose.: 133 mg/dL (17 Nov 2020 13:09)  POCT Blood Glucose.: 173 mg/dL (17 Nov 2020 11:53)  POCT Blood Glucose.: 117 mg/dL (17 Nov 2020 08:27)  POCT Blood Glucose.: 93 mg/dL (16 Nov 2020 21:20)      A/P:-  Pt is seen and evaluated by bedside.   1. Acute hypoxic respiratory failure   2. acute HFpEF   3. COPD   4. metabolic encephalopathy   5. JEEVAN -resolved   6. history of duodenal ulcer  7. DM   8. hypothyroidism     - patient was noted encephalopathic and hypoxic this afternoon   - mental status improved with supplemental O2  - daughter at bedside reports that she intermittently have this kind of episode which improves with supplemental O2  - CT head this am - no acute pathology noted   - will rule out COVID given episode of hypoxia   - contact and droplet isolation   - CTA negative for PE  - Echo shows Moderately Reduced Right ventricular Systolic function and Severe pulmonary hypertension  - continue with lasix 40 daily   - keep sats above 92%   - avoid sedative medications  - avoid nephrotoxic meds - renal function improving   - basal bolus insulin with sliding scale   - target fs of 140-180  - continue home dose synthroid      Dispo: acute   Plan of care was discussed with patient ; all questions and concerns were addressed and care was aligned with patient's wishes.
#Progress Note Handoff  Pending (specify):  vq scan, duplex, pulm, o2 set up, pt from [place] and may need O2 delivery there set up  Family discussion: spoke to lisa her daughter and updated plan   Disposition: Home__going to [place]_/SNF___/Other___/Unknown at this time___  Pt seen during COVID 19 Pandemic.
#Progress Note Handoff  Pending (specify): o2 delivery, if delivered can be DCed  Family discussion: family aware of plan  Disposition: Home_x to [place] __/SNF___/Other___/Unknown at this time___  Pt seen during COVID 19 Pandemic.
Seen and examined with the pulmonary fellow at the bed side.  Impression and plan as outlined above.

## 2020-11-19 NOTE — DISCHARGE NOTE NURSING/CASE MANAGEMENT/SOCIAL WORK - PATIENT PORTAL LINK FT
You can access the FollowMyHealth Patient Portal offered by [facility] by registering at the following website: [email or web address]. By joining [facility]’s FollowMyHealth portal, you will also be able to view your health information using other applications (apps) compatible with our system.

## 2020-11-19 NOTE — PROGRESS NOTE ADULT - ASSESSMENT
55 year old female active smoker with pertinent medical history of Type II Diabetes Mellitus, Coronary Artery Disease, Dyslipidemia, Upper Gastrointestinal Bleed due to duodenal ulcer, Hypertension, Hypothyroidism, COPD, Dextrocardia, Sciatica/Peripheral Neuralgia, and Charcot foot deformity s/p reconstruction on 1/11/19 presents to the ED for worsening lethargy. She was initially admitted to MICU for sepsis of unknown cause and respiratory failure, d/g to medicine yesterday.    Respiratory failure likely secondary to COPD exacerbation vs acute on chronic HFpEF exacerbation in setting of severe pulmonary hypertension  - c/w lasix 40 mg oral daily  - Now on 1.5 L O2; titrate as tolerated though patient will likely go home w/ O2  - BIPAP at night; c/w duonebs q6h, prn  - COVID negative x 2, most recently 11/17  - CT angio chest and V/Q scan both negative for PE  - SpO2 86% on room air on ambulation; was 60s-80s at rest; 95-98% at rest and ambulation on between 1.5 and 4L O2; patient has COPD and therefore needs home O2; she is aware she will be going home on O2; she was tested in a chronic stable state.    Metabolic encephalopathy  possibly secondary to hospital-induced delirium vs hypoxia, resolved  - Restarted Lyrica at 150 mg BID (had been on TID but decr d/t acute episode yesterday)  - CT Head negative for acute pathology  - blood cultures neg so far    Acute Kidney Injury and Electrolyte disturbances, resolved  - Cr 1.2 <-- 1.6 <-- 1.9  - Likely pre-renal/ATN due to hypotension.  - D/c'd IV fluids as on Lasix daily  - Encourage adequate PO intake  - giving potassium and magnesium to the patient, monitor daily for now especially since she is diuretics    NSTEMI possibly demand ischemia secondary to respiratory failure, resolved  - Troponin 1.05 downtrended to 0.31.  - Normal LV size and wall thickness, with normal left sided systolic and diastolic function, Moderately Reduced Right ventricular Systolic function and Severe pulmonary hypertension  - F/u w/ Cardio for possible cath when stable; currently w/u for AMS  - On Aspirin and Plavix.  - Rate control: Lopressor bid.    Sepsis  - present on admission, resolved  - Off levophed and finished antibiotic course    #hyponatremia--132 today- euvolemic-will send urine lytes and osm, tsh     History of Duodenal Ulcer  - on Protonix and Sucralfate.  - Hb stable    Type II Diabetes Mellitus  - Follow FS. Insulin protocol if needed.     History of Hypothyroidism  - Levothyroxine 100 mcg daily    Diet: DASH  Activity: Increase as tolerated  DVT Prophylaxis: heparin  GI Prophylaxis: PPI  Code Status: Full Code  Dispo: if cleared by Pulm may be d/c'd pending home O2 delivery if patient qualifies 55 year old female active smoker with pertinent medical history of Type II Diabetes Mellitus, Coronary Artery Disease, Dyslipidemia, Upper Gastrointestinal Bleed due to duodenal ulcer, Hypertension, Hypothyroidism, COPD, Dextrocardia, Sciatica/Peripheral Neuralgia, and Charcot foot deformity s/p reconstruction on 1/11/19 presents to the ED for worsening lethargy. She was initially admitted to MICU for sepsis of unknown cause and respiratory failure, d/g to medicine yesterday.    Respiratory failure likely secondary to COPD exacerbation vs acute on chronic HFpEF exacerbation in setting of severe pulmonary hypertension  - c/w lasix 40 mg oral daily  - Now on 1.5 L O2; titrate as tolerated though patient will likely go home w/ O2  - BIPAP at night; c/w duonebs q6h, prn  - COVID negative x 2, most recently 11/17  - CT angio chest and V/Q scan both negative for PE  - SpO2 86% on room air on ambulation; was 60s-80s at rest; 95-98% at rest and ambulation on between 1.5 and 4L O2; patient has COPD and therefore needs home O2; she is aware she will be going home on O2; she was tested in a chronic stable state.  - will need home o2  - will DC on lasix 40 mg q 48 hrs    Metabolic encephalopathy  possibly secondary to hospital-induced delirium vs hypoxia, resolved  - Restarted Lyrica at 150 mg BID (had been on TID but decr d/t acute episode yesterday)  - CT Head negative for acute pathology  - blood cultures neg so far    Acute Kidney Injury and Electrolyte disturbances, resolved  - Cr 1.2 <-- 1.6 <-- 1.9  - Likely pre-renal/ATN due to hypotension.  - D/c'd IV fluids as on Lasix daily  - Encourage adequate PO intake  - giving potassium and magnesium to the patient, monitor daily for now especially since she is diuretics    NSTEMI possibly demand ischemia secondary to respiratory failure, resolved  - Troponin 1.05 downtrended to 0.31.  - Normal LV size and wall thickness, with normal left sided systolic and diastolic function, Moderately Reduced Right ventricular Systolic function and Severe pulmonary hypertension  - F/u w/ Cardio for possible cath when stable; currently w/u for AMS  - On Aspirin and Plavix.  - Rate control: Lopressor bid.    Sepsis  - present on admission, resolved  - Off levophed and finished antibiotic course    #hyponatremia--132 today- euvolemic-will send urine lytes and osm, tsh     History of Duodenal Ulcer  - on Protonix and Sucralfate.  - Hb stable    Type II Diabetes Mellitus  - Follow FS. Insulin protocol if needed.     History of Hypothyroidism  - Levothyroxine 100 mcg daily    Diet: DASH  Activity: Increase as tolerated  DVT Prophylaxis: heparin  GI Prophylaxis: PPI  Code Status: Full Code  Dispo: if cleared by Pulm may be d/c'd pending home O2 delivery if patient qualifies

## 2020-11-19 NOTE — DISCHARGE NOTE PROVIDER - NSDCFUSCHEDAPPT_GEN_ALL_CORE_FT
CALLY HARTMAN ; 12/02/2020 ; [facility] [id number] ; 01/11/2021 ; NPP [facility] [address]

## 2020-11-19 NOTE — DISCHARGE NOTE PROVIDER - NSDCMRMEDTOKEN_GEN_ALL_CORE_FT
aspirin 81 mg oral delayed release tablet: 1 tab(s) orally once a day  atorvastatin 40 mg oral tablet: 1 tab(s) orally once a day (at bedtime)  budesonide-formoterol 80 mcg-4.5 mcg/inh inhalation aerosol: 2 puff(s) inhaled 2 times a day  calcium carbonate 1250 mg (500 mg elemental calcium) oral tablet: 1 tab(s) orally 3 times a day  docusate sodium 100 mg oral capsule: 1 cap(s) orally 2 times a day  ezetimibe 10 mg oral tablet: 1 tab(s) orally once a day  furosemide 40 mg oral tablet: 1 tab(s) orally every other day   Janumet 50 mg-1000 mg oral tablet: 1 tab(s) orally 2 times a day  levothyroxine 100 mcg (0.1 mg) oral capsule: 1 cap(s) orally once a day  Lyrica 150 mg oral capsule: 1 tab(s) orally 3 times a day, stop after 2/25  Metoprolol Tartrate 25 mg oral tablet: 0.5 tab(s) orally 2 times a day   nicotine 7 mg/24 hr transdermal film, extended release: 1 patch transdermal once a day   pantoprazole 40 mg oral delayed release tablet: 1 tab(s) orally 2 times a day  Plavix 75 mg oral tablet: 1 tab(s) orally once a day   senna oral tablet: 2 tab(s) orally once a day (at bedtime)  sucralfate 1 g oral tablet: 1 tab(s) orally 4 times a day (before meals and at bedtime)   tiZANidine 4 mg oral tablet: 2 tab(s) orally every 8 hours, As Needed  Ventolin HFA 90 mcg/inh inhalation aerosol: 2 puff(s) inhaled 4 times a day, As Needed

## 2020-11-19 NOTE — PROGRESS NOTE ADULT - SUBJECTIVE AND OBJECTIVE BOX
CALLY HARTMAN 55y Female  MRN#: [id number]       SUBJECTIVE  Patient is a 55y old Female who presents with a chief complaint of sepsis of unknown origin (2020 10:25)  Currently admitted to medicine with the primary diagnosis of Sepsis secondary to unknown source. In the AM she was resting comfortably; was ambulating w/ assistance yesterday; agreeable for d/c to home when cleared.    OBJECTIVE  PAST MEDICAL & SURGICAL HISTORY  Dextrocardia    Chronic midline low back pain with bilateral sciatica    Peripheral neuralgia    Essential hypertension    Diabetes 1.5, managed as type 2    Charcot&#x27;s arthropathy  R foot    Charcot foot due to diabetes mellitus    H/O shoulder surgery  Right shoulder surgery     delivery delivered      ALLERGIES:  No Known Allergies    MEDICATIONS:  STANDING MEDICATIONS  aspirin  chewable 81 milliGRAM(s) Oral daily  budesonide  80 MICROgram(s)/formoterol 4.5 MICROgram(s) Inhaler 2 Puff(s) Inhalation two times a day  chlorhexidine 4% Liquid 1 Application(s) Topical <User Schedule>  clopidogrel Tablet 75 milliGRAM(s) Oral daily  furosemide    Tablet 40 milliGRAM(s) Oral daily  heparin   Injectable 5000 Unit(s) SubCutaneous every 8 hours  influenza   Vaccine 0.5 milliLiter(s) IntraMuscular once  levothyroxine 100 MICROGram(s) Oral daily  magnesium oxide 400 milliGRAM(s) Oral three times a day with meals  metoprolol tartrate 12.5 milliGRAM(s) Oral two times a day  pantoprazole    Tablet 40 milliGRAM(s) Oral every 12 hours  pregabalin 150 milliGRAM(s) Oral two times a day  senna 2 Tablet(s) Oral at bedtime  sucralfate 1 Gram(s) Oral four times a day    PRN MEDICATIONS  acetaminophen   Tablet .. 650 milliGRAM(s) Oral every 6 hours PRN  albuterol/ipratropium for Nebulization 3 milliLiter(s) Nebulizer every 6 hours PRN      VITAL SIGNS: Last 24 Hours  T(C): 36 (2020 04:58), Max: 36.5 (2020 22:33)  T(F): 96.8 (2020 04:58), Max: 97.7 (2020 22:33)  HR: 99 (2020 04:58) (91 - 99)  BP: 93/55 (2020 04:58) (93/55 - 117/69)  BP(mean): --  RR: 18 (2020 04:58) (18 - 18)  SpO2: --    LABS:                        10.4   6.58  )-----------( 229      ( 2020 05:35 )             34.0         132<L>  |  88<L>  |  18  ----------------------------<  102<H>  3.6   |  32  |  1.2    Ca    8.6      2020 05:35  Mg     1.8           Urinalysis Basic - ( 2020 03:20 )    Color: Light Yellow / Appearance: Clear / S.016 / pH: x  Gluc: x / Ketone: Negative  / Bili: Negative / Urobili: <2 mg/dL   Blood: x / Protein: Trace / Nitrite: Negative   Leuk Esterase: Negative / RBC: x / WBC x   Sq Epi: x / Non Sq Epi: x / Bacteria: x      ABG - ( 2020 13:22 )  pH, Arterial: 7.52  pH, Blood: x     /  pCO2: 46    /  pO2: 67    / HCO3: 37    / Base Excess: 12.7  /  SaO2: 93          Culture - Urine (collected 2020 03:20)  Source: .Urine Clean Catch (Midstream)  Preliminary Report (2020 07:27):    50,000 - 99,000 CFU/mL Gram Negative Rods    Culture - Blood (collected 2020 13:41)  Source: .Blood Blood  Preliminary Report (2020 23:01):    No growth to date.    RADIOLOGY:    < from: Xray Chest 1 View- PORTABLE-Routine (Xray Chest 1 View- PORTABLE-Routine in AM.) (20 @ 08:48) >  INTERPRETATION:  Clinical History / Reason for exam: Shortness of breath.    Comparison : Chest radiograph prior day.    Technique/Positioning: Frontal portable, low lung volumes.    Findings:    Support devices: None.    Cardiac/mediastinum/hilum: Unremarkable.    Lung parenchyma/Pleura: Dependent atelectasis.    Skeleton/soft tissues: Unremarkable.    Impression:    Dependent atelectasis, low lung volumes. Follow-up as needed.      PHYSICAL EXAM:    GENERAL: NAD, well-developed, AAOx3  HEENT:  Atraumatic, Normocephalic  PULMONARY: Slight bilateral expiratory wheeze; on 1.5 L O2 in the AM  CARDIOVASCULAR: Regular rate and rhythm; No murmurs, rubs, or gallops  GASTROINTESTINAL: Soft, Nontender, Nondistended; Bowel sounds present  MUSCULOSKELETAL: No clubbing, cyanosis, or edema  NEUROLOGY: non-focal  SKIN: No rashes or lesions       CALLY HARTMAN 55y Female  MRN#: [id number]       SUBJECTIVE  Patient is a 55y old Female who presents with a chief complaint of sepsis of unknown origin (2020 10:25)  Currently admitted to medicine with the primary diagnosis of Sepsis secondary to unknown source. In the AM she was resting comfortably; was ambulating w/ assistance yesterday; agreeable for d/c to home when cleared.    Attending note: Pt seen and examined at bedside. No cp or sob. no ovenright events     OBJECTIVE  PAST MEDICAL & SURGICAL HISTORY  Dextrocardia    Chronic midline low back pain with bilateral sciatica    Peripheral neuralgia    Essential hypertension    Diabetes 1.5, managed as type 2    Charcot&#x27;s arthropathy  R foot    Charcot foot due to diabetes mellitus    H/O shoulder surgery  Right shoulder surgery     delivery delivered      ALLERGIES:  No Known Allergies    MEDICATIONS:  STANDING MEDICATIONS  aspirin  chewable 81 milliGRAM(s) Oral daily  budesonide  80 MICROgram(s)/formoterol 4.5 MICROgram(s) Inhaler 2 Puff(s) Inhalation two times a day  chlorhexidine 4% Liquid 1 Application(s) Topical <User Schedule>  clopidogrel Tablet 75 milliGRAM(s) Oral daily  furosemide    Tablet 40 milliGRAM(s) Oral daily  heparin   Injectable 5000 Unit(s) SubCutaneous every 8 hours  influenza   Vaccine 0.5 milliLiter(s) IntraMuscular once  levothyroxine 100 MICROGram(s) Oral daily  magnesium oxide 400 milliGRAM(s) Oral three times a day with meals  metoprolol tartrate 12.5 milliGRAM(s) Oral two times a day  pantoprazole    Tablet 40 milliGRAM(s) Oral every 12 hours  pregabalin 150 milliGRAM(s) Oral two times a day  senna 2 Tablet(s) Oral at bedtime  sucralfate 1 Gram(s) Oral four times a day    PRN MEDICATIONS  acetaminophen   Tablet .. 650 milliGRAM(s) Oral every 6 hours PRN  albuterol/ipratropium for Nebulization 3 milliLiter(s) Nebulizer every 6 hours PRN      VITAL SIGNS: Last 24 Hours  T(C): 36 (2020 04:58), Max: 36.5 (2020 22:33)  T(F): 96.8 (2020 04:58), Max: 97.7 (2020 22:33)  HR: 99 (2020 04:58) (91 - 99)  BP: 93/55 (2020 04:58) (93/55 - 117/69)  BP(mean): --  RR: 18 (2020 04:58) (18 - 18)  SpO2: --    LABS:                        10.4   6.58  )-----------( 229      ( 2020 05:35 )             34.0     11    132<L>  |  88<L>  |  18  ----------------------------<  102<H>  3.6   |  32  |  1.2    Ca    8.6      2020 05:35  Mg     1.8           Urinalysis Basic - ( 2020 03:20 )    Color: Light Yellow / Appearance: Clear / S.016 / pH: x  Gluc: x / Ketone: Negative  / Bili: Negative / Urobili: <2 mg/dL   Blood: x / Protein: Trace / Nitrite: Negative   Leuk Esterase: Negative / RBC: x / WBC x   Sq Epi: x / Non Sq Epi: x / Bacteria: x      ABG - ( 2020 13:22 )  pH, Arterial: 7.52  pH, Blood: x     /  pCO2: 46    /  pO2: 67    / HCO3: 37    / Base Excess: 12.7  /  SaO2: 93          Culture - Urine (collected 2020 03:20)  Source: .Urine Clean Catch (Midstream)  Preliminary Report (2020 07:27):    50,000 - 99,000 CFU/mL Gram Negative Rods    Culture - Blood (collected 2020 13:41)  Source: .Blood Blood  Preliminary Report (2020 23:01):    No growth to date.    RADIOLOGY:    < from: Xray Chest 1 View- PORTABLE-Routine (Xray Chest 1 View- PORTABLE-Routine in AM.) (20 @ 08:48) >  INTERPRETATION:  Clinical History / Reason for exam: Shortness of breath.    Comparison : Chest radiograph prior day.    Technique/Positioning: Frontal portable, low lung volumes.    Findings:    Support devices: None.    Cardiac/mediastinum/hilum: Unremarkable.    Lung parenchyma/Pleura: Dependent atelectasis.    Skeleton/soft tissues: Unremarkable.    Impression:    Dependent atelectasis, low lung volumes. Follow-up as needed.      PHYSICAL EXAM:    GENERAL: NAD, well-developed, AAOx3  HEENT:  Atraumatic, Normocephalic  PULMONARY: Slight bilateral expiratory wheeze; on 1.5 L O2 in the AM  CARDIOVASCULAR: Regular rate and rhythm; No murmurs, rubs, or gallops  GASTROINTESTINAL: Soft, Nontender, Nondistended; Bowel sounds present  MUSCULOSKELETAL: No clubbing, cyanosis, or edema  NEUROLOGY: non-focal  SKIN: No rashes or lesions

## 2020-11-19 NOTE — DISCHARGE NOTE PROVIDER - PROVIDER TOKENS
PROVIDER:[TOKEN:[[id number]],FOLLOWUP:[1 week],ESTABLISHEDPATIENT:[T]],PROVIDER:[TOKEN:[[id number]],FOLLOWUP:[1 week],ESTABLISHEDPATIENT:[T]]

## 2020-11-19 NOTE — DISCHARGE NOTE PROVIDER - CARE PROVIDER_API CALL
Jose Garza  [address] [facility]  [address] [facility] [address], [address]  [place] [address]  Phone: [phone]  Fax: [phone]  Established Patient  Follow Up Time: 1 week    Fili Patel  Cardiovascular Disease  [address] [place] [address]  Phone: [phone]  Fax: [phone]  Established Patient  Follow Up Time: 1 week

## 2020-11-19 NOTE — DISCHARGE NOTE NURSING/CASE MANAGEMENT/SOCIAL WORK - NSDCPEEMAIL_GEN_ALL_CORE
[facility] email [email or web address]

## 2020-11-20 LAB
-  AMIKACIN: SIGNIFICANT CHANGE UP
-  AMOXICILLIN/CLAVULANIC ACID: SIGNIFICANT CHANGE UP
-  AMPICILLIN/SULBACTAM: SIGNIFICANT CHANGE UP
-  AMPICILLIN: SIGNIFICANT CHANGE UP
-  AZTREONAM: SIGNIFICANT CHANGE UP
-  CEFAZOLIN: SIGNIFICANT CHANGE UP
-  CEFEPIME: SIGNIFICANT CHANGE UP
-  CEFOTAXIME: SIGNIFICANT CHANGE UP
-  CEFOXITIN: SIGNIFICANT CHANGE UP
-  CEFTAZIDIME: SIGNIFICANT CHANGE UP
-  CEFTAZIDIME: SIGNIFICANT CHANGE UP
-  CEFTRIAXONE: SIGNIFICANT CHANGE UP
-  CEFUROXIME: SIGNIFICANT CHANGE UP
-  CIPROFLOXACIN: SIGNIFICANT CHANGE UP
-  ERTAPENEM: SIGNIFICANT CHANGE UP
-  GENTAMICIN: SIGNIFICANT CHANGE UP
-  LEVOFLOXACIN: SIGNIFICANT CHANGE UP
-  LEVOFLOXACIN: SIGNIFICANT CHANGE UP
-  MEROPENEM: SIGNIFICANT CHANGE UP
-  MINOCYCLINE: SIGNIFICANT CHANGE UP
-  NITROFURANTOIN: SIGNIFICANT CHANGE UP
-  PIPERACILLIN/TAZOBACTAM: SIGNIFICANT CHANGE UP
-  TIGECYCLINE: SIGNIFICANT CHANGE UP
-  TOBRAMYCIN: SIGNIFICANT CHANGE UP
-  TRIMETHOPRIM/SULFAMETHOXAZOLE: SIGNIFICANT CHANGE UP
-  TRIMETHOPRIM/SULFAMETHOXAZOLE: SIGNIFICANT CHANGE UP
CULTURE RESULTS: SIGNIFICANT CHANGE UP
METHOD TYPE: SIGNIFICANT CHANGE UP
METHOD TYPE: SIGNIFICANT CHANGE UP
ORGANISM # SPEC MICROSCOPIC CNT: SIGNIFICANT CHANGE UP
SPECIMEN SOURCE: SIGNIFICANT CHANGE UP

## 2020-11-22 LAB
CULTURE RESULTS: SIGNIFICANT CHANGE UP
SPECIMEN SOURCE: SIGNIFICANT CHANGE UP

## 2020-11-23 DIAGNOSIS — E11.42 TYPE 2 DIABETES MELLITUS WITH DIABETIC POLYNEUROPATHY: ICD-10-CM

## 2020-11-23 DIAGNOSIS — E87.8 OTHER DISORDERS OF ELECTROLYTE AND FLUID BALANCE, NOT ELSEWHERE CLASSIFIED: ICD-10-CM

## 2020-11-23 DIAGNOSIS — J44.9 CHRONIC OBSTRUCTIVE PULMONARY DISEASE, UNSPECIFIED: ICD-10-CM

## 2020-11-23 DIAGNOSIS — K26.9 DUODENAL ULCER, UNSPECIFIED AS ACUTE OR CHRONIC, WITHOUT HEMORRHAGE OR PERFORATION: ICD-10-CM

## 2020-11-23 DIAGNOSIS — I11.0 HYPERTENSIVE HEART DISEASE WITH HEART FAILURE: ICD-10-CM

## 2020-11-23 DIAGNOSIS — Q24.0 DEXTROCARDIA: ICD-10-CM

## 2020-11-23 DIAGNOSIS — E87.2 ACIDOSIS: ICD-10-CM

## 2020-11-23 DIAGNOSIS — I27.20 PULMONARY HYPERTENSION, UNSPECIFIED: ICD-10-CM

## 2020-11-23 DIAGNOSIS — R57.8 OTHER SHOCK: ICD-10-CM

## 2020-11-23 DIAGNOSIS — A41.9 SEPSIS, UNSPECIFIED ORGANISM: ICD-10-CM

## 2020-11-23 DIAGNOSIS — R65.21 SEVERE SEPSIS WITH SEPTIC SHOCK: ICD-10-CM

## 2020-11-23 DIAGNOSIS — N17.0 ACUTE KIDNEY FAILURE WITH TUBULAR NECROSIS: ICD-10-CM

## 2020-11-23 DIAGNOSIS — I50.33 ACUTE ON CHRONIC DIASTOLIC (CONGESTIVE) HEART FAILURE: ICD-10-CM

## 2020-11-23 DIAGNOSIS — J96.01 ACUTE RESPIRATORY FAILURE WITH HYPOXIA: ICD-10-CM

## 2020-11-23 DIAGNOSIS — F17.210 NICOTINE DEPENDENCE, CIGARETTES, UNCOMPLICATED: ICD-10-CM

## 2020-11-23 DIAGNOSIS — G93.41 METABOLIC ENCEPHALOPATHY: ICD-10-CM

## 2020-11-23 DIAGNOSIS — M54.31 SCIATICA, RIGHT SIDE: ICD-10-CM

## 2020-11-23 DIAGNOSIS — E87.70 FLUID OVERLOAD, UNSPECIFIED: ICD-10-CM

## 2020-11-23 DIAGNOSIS — E78.5 HYPERLIPIDEMIA, UNSPECIFIED: ICD-10-CM

## 2020-11-23 DIAGNOSIS — I21.A1 MYOCARDIAL INFARCTION TYPE 2: ICD-10-CM

## 2020-11-23 DIAGNOSIS — E03.9 HYPOTHYROIDISM, UNSPECIFIED: ICD-10-CM

## 2020-11-23 DIAGNOSIS — E87.1 HYPO-OSMOLALITY AND HYPONATREMIA: ICD-10-CM

## 2020-11-23 DIAGNOSIS — I25.10 ATHEROSCLEROTIC HEART DISEASE OF NATIVE CORONARY ARTERY WITHOUT ANGINA PECTORIS: ICD-10-CM

## 2020-11-23 DIAGNOSIS — M54.32 SCIATICA, LEFT SIDE: ICD-10-CM

## 2020-12-02 ENCOUNTER — APPOINTMENT (OUTPATIENT)
Dept: GASTROENTEROLOGY | Facility: CLINIC | Age: 55
End: 2020-12-02
Payer: MEDICARE

## 2020-12-02 DIAGNOSIS — K26.9 DUODENAL ULCER, UNSPECIFIED AS ACUTE OR CHRONIC, W/OUT HEMORRHAGE OR PERFORATION: ICD-10-CM

## 2020-12-02 DIAGNOSIS — K21.9 GASTRO-ESOPHAGEAL REFLUX DISEASE W/OUT ESOPHAGITIS: ICD-10-CM

## 2020-12-02 PROCEDURE — 99443: CPT | Mod: 95

## 2020-12-02 RX ORDER — FAMOTIDINE 40 MG/1
40 TABLET, FILM COATED ORAL
Qty: 30 | Refills: 3 | Status: ACTIVE | COMMUNITY
Start: 2020-12-02 | End: 1900-01-01

## 2020-12-02 NOTE — ASSESSMENT
[FreeTextEntry1] : Patient is 56 y/o female with recent admission to [facility] for a bleeding Duodenal ulcer. Patient had EGD done while admitted. No dysplasia seen. Patient feels occasional bloating. No bloody nor black stools. Will setup for EGD. \par \par Duodenal Ulcer / GERD\par - Pepcid 40mg sent to pharmacy\par - Sent Carafate\par - Setup for EGD

## 2020-12-02 NOTE — REASON FOR VISIT
[Follow-Up: _____] : a [unfilled] follow-up visit [FreeTextEntry1] : s/p hospitalization EGD 09/30/2020- Dr Gallegos

## 2020-12-02 NOTE — HISTORY OF PRESENT ILLNESS
[Home] : at home, [unfilled] , at the time of the visit. [Medical Office: ([facility])___] : at the medical office located in  [Verbal consent obtained from patient] : the patient, [unfilled] [de-identified] : Patient is 56 y/o female with recent admission to [facility] for a bleeding Duodenal ulcer. Patient had EGD done while admitted. No dysplasia seen. Patient feels occasional bloating. No bloody nor black stools.

## 2020-12-16 NOTE — ED PROVIDER NOTE - NSTIMEPROVIDERCAREINITIATE_GEN_ER
MS RN NOTE



Patient is uncooperative with turning & repositioning at this time. screams & emotional & 
refuses to be repositioned. attempted to turn & reposition & make her comfortable but 
patient refused to be touched at this time. left leg contracted & patient refused her left 
leg to be touched at all. PRN tylenol was given for pain, per patient request, will retry to 
turn & reposition the patient if patient allows. 03-Nov-2020 16:06

## 2020-12-29 ENCOUNTER — RX RENEWAL (OUTPATIENT)
Age: 55
End: 2020-12-29

## 2020-12-29 RX ORDER — SUCRALFATE 1 G/1
1 TABLET ORAL
Qty: 90 | Refills: 0 | Status: ACTIVE | COMMUNITY
Start: 2020-12-02 | End: 1900-01-01

## 2021-01-11 ENCOUNTER — APPOINTMENT (OUTPATIENT)
Dept: UROGYNECOLOGY | Facility: CLINIC | Age: 56
End: 2021-01-11

## 2021-04-16 NOTE — H&P ADULT - NSHPSOCIALHISTORY_GEN_ALL_CORE
Recommended artificial tears to use: 1 drop 4x a day in both eyes. Tobacco use 1PPD since age 20  Denied ETOH and Illicit drug use

## 2021-08-09 NOTE — OCCUPATIONAL THERAPY INITIAL EVALUATION ADULT - GROOMING, PREVIOUS LEVEL OF FUNCTION, OT EVAL
independent You can access the FollowMyHealth Patient Portal offered by [facility] by registering at the following website: [email or web address]. By joining [facility] FollowMyHealth portal, you will also be able to view your health information using other applications (apps) compatible with our system.

## 2021-09-29 NOTE — ED ADULT NURSE NOTE - NSFALLRSKASSESSDT_ED_ALL_ED
pt a&O x4, pt c.o abd pain 9/10. no n/v/d. last BM yesterday with blood. Not passing gas, abd distended/bloated. no fever chills, PMH prostate and colon CA 2014.  no current home medications. pt also c.o fo rectal pain worsening during BM.
20-Oct-2020 19:07

## 2021-11-10 NOTE — PROVIDER CONTACT NOTE (OTHER) - NAME OF MD/NP/PA/DO NOTIFIED:
Hepatology Follow-up Clinic note  Melita Cortes   Date of Birth 1969  Date of Service 11/9/2021    Reason for follow-up: ETOH hepatitis          Assessment/plan:   Melita Cortes is a 52 year old female with ETOH hepatitis complicated by history of ascites requiring paracentesis. and esophageal varices/PHG. Last ETOH intake was mid-September. She has good insight and support. MELD-Na 32 (previously 31), driven by Tbili 15 and INR 2.87 . Bilirubin is primarily indirect. We reviewed and discussed the importance of good nutrition as she recovers as she has not been eating much. She is up to date with HCC and variceal screening.     # Trend labs: hepatic panel, INR next week     # Ascites:   - Start 20 mg furosemide  - Start 25 mg sprionolactone  - BMP in one week   - Paracentesis PRN    - Continue 2000 mg sodium / high protein     # Esophageal candidiasi:   - Complete Fluconoazole x 14 days    # Continue pantoprazole 40 twice daily x 2 months     # Anemia:  (high LHD, high haptoglobin, low folate)   : Will trend CBC  - Continue folate and thiamine x 2 months total    # History of alcohol abuse   - Complete abstinence from alcohol:   - Recommend Rule 25 / CD eval     # Follow-up in clinic with me in one month  # Follow up with Dr. Steele in 6-8 weeks     Eduardo Parry PA-C   [facility] Hepatology clinic    -----------------------------------------------------       HPI:   Melita Cortes is a 52 year old female presenting for follow-up. She is joined by her .     ETOH Hepatitis   Complicated by:  - Ascites  - Mild HE  - No history of GI bleed  EGD: 10/29/21: candida plaques, Grade I EV, moderate PHG, localized erosions in gastric antrum,   HCC: 10/27/21:     Patient was last seen by me on 10/19/2021. She was hospitalized last week due to worsening hyponatremia (122 on admission) ,She was found to have CAP, sepsis and increasing Cr improving with albumin and macrocytic anemia  Pete s/p 1 unit PRBC. Finished antibiotics for community acquired pneumonia,    Still feeling tired. Appetite is still not good. Drinks smoothie and Ensure (1 day). Doesn't eat a lot of solids. Had a paracentesis (5 L) on 10/22 and diagnostic during recent hospitalization, No fluid in legs. Lost some weight 7 lbs.     Feeling more alert. She is taking lactulose.     Patient denies confusion.  Patient also denies melena, hematochezia or hematemesis. Patient denies fevers, sweats or chills.    Last drank in mid September. Denies any problems avoiding alcohol.     Medical hx Surgical hx   Past Medical History:   Diagnosis Date     Alcoholic hepatitis      Cervical high risk HPV (human papillomavirus) test positive 2019, 2020    Past Surgical History:   Procedure Laterality Date     APPENDECTOMY       ESOPHAGOGASTRODUODENOSCOPY, WITH BRUSHINGS N/A 10/29/2021    Procedure: ESOPHAGOGASTRODUODENOSCOPY, WITH BRUSHINGS;  Surgeon: Torey Ruiz MD;  Location:  GI                 Medications:     Current Outpatient Medications   Medication     albuterol (PROAIR HFA/PROVENTIL HFA/VENTOLIN HFA) 108 (90 Base) MCG/ACT inhaler     azithromycin (ZITHROMAX) 250 MG tablet     cefdinir (OMNICEF) 300 MG capsule     fluconazole (DIFLUCAN) 200 MG tablet     folic acid (FOLVITE) 1 MG tablet     lactulose (CHRONULAC) 10 GM/15ML solution     multivitamin (CENTRUM SILVER) tablet     pantoprazole (PROTONIX) 40 MG EC tablet     thiamine (B-1) 100 MG tablet     No current facility-administered medications for this visit.            Allergies:   No Known Allergies         Review of Systems:   10 points ROS was obtained and highlighted in the HPI, otherwise negative.          Physical Exam:   GENERAL: healthy, alert, jaundiced   EYES: Eyes grossly normal to inspection, conjunctivae and sclerae normal  RESP: no audible wheeze, cough, or visible cyanosis.  No visible retractions or increased work of breathing.  Able to speak fully in complete  sentences  NEURO: Cranial nerves grossly intact, mentation intact and speech normal  PSYCH: mentation appears normal, affect normal/bright, judgement and insight intact, normal speech and appearance well-groomed         Data:   Reviewed in person and significant for:    Lab Results   Component Value Date     11/08/2021     09/18/2020      Lab Results   Component Value Date    POTASSIUM 3.6 11/08/2021    POTASSIUM 3.1 09/18/2020     Lab Results   Component Value Date    CHLORIDE 98 11/08/2021    CHLORIDE 106 09/18/2020     Lab Results   Component Value Date    CO2 25 11/08/2021    CO2 25 09/18/2020     Lab Results   Component Value Date    BUN 28 11/08/2021    BUN 2 09/18/2020     Lab Results   Component Value Date    CR 1.13 11/08/2021    CR 0.56 09/18/2020       Lab Results   Component Value Date    WBC 15.3 11/08/2021    WBC 12.8 09/18/2020     Lab Results   Component Value Date    HGB 8.4 11/08/2021    HGB 12.3 09/18/2020     Lab Results   Component Value Date    HCT 24.4 11/08/2021    HCT 36.4 09/18/2020     Lab Results   Component Value Date     11/08/2021     09/18/2020     Lab Results   Component Value Date     11/08/2021     09/18/2020       Lab Results   Component Value Date    AST 87 11/08/2021     09/18/2020     Lab Results   Component Value Date    ALT 42 11/08/2021    ALT 57 09/18/2020     No results found for: BILICONJ   Lab Results   Component Value Date    BILITOTAL 15.6 11/08/2021    BILITOTAL 1.1 09/18/2020       Lab Results   Component Value Date    ALBUMIN 2.7 11/08/2021    ALBUMIN 2.8 09/18/2020     Lab Results   Component Value Date    PROTTOTAL 5.3 11/08/2021    PROTTOTAL 7.2 09/18/2020      Lab Results   Component Value Date    ALKPHOS 88 11/08/2021    ALKPHOS 261 09/18/2020       Lab Results   Component Value Date    INR 2.87 11/08/2021     MELD-Na score: 32 at 11/8/2021  6:14 AM  MELD score: 30 at 11/8/2021  6:14 AM  Calculated from:  Serum  Creatinine: 1.13 mg/dL at 11/8/2021  6:14 AM  Serum Sodium: 132 mmol/L at 11/8/2021  6:14 AM  Total Bilirubin: 15.6 mg/dL at 11/8/2021  6:14 AM  INR(ratio): 2.87 at 11/8/2021  6:14 AM  Age: 52 years    MRI/MRCP:   10/27/21:   MAGNETIC RESONANCE CHOLANGIOPANCREATOGRAPHY  10/27/2021 8:57 AM      HISTORY: Cirrhosis; cholestolestasis (Tbili 20's), mildly elevated  transaminases. No fitting picture with alcohol hepatitis.     COMPARISON: CT scan from 9/25/2020     TECHNIQUE: Multiplanar, multisequence images of the abdomen acquired  before and after administration of 7 mL Gadavist intravenous contrast.  MRCP images and coronal 3-D thin section T2-weighted MRCP images  through the biliary tree. 3D image reformatting was performed on the  acquisition scanner.     FINDINGS: There is no cholelithiasis. There is gallbladder wall  thickening and distention which is nonspecific in the presence of  ascites. There is no intra or extrahepatic biliary dilatation. There  is no choledocholithiasis. No biliary strictures. The pancreatic duct  is not dilated. No pancreatic duct strictures. No significant  sidebranch visualization. No cystic lesions within the pancreas. The  signal intensity of the liver demonstrates no hepatic steatosis. The  signal intensity of the pancreas within normal limits without evidence  for pancreatitis. Visualized kidneys demonstrate unremarkable signal  intensity without hydronephrosis.  Adrenal glands are unremarkable.  Spleen is enlarged measuring 14.9 cm. Visualized osseous structures  unremarkable. After administration of intravenous contrast, solid  organs demonstrate no enhancing focal lesions. There is diffuse  heterogeneous enhancement of the liver. Recanalized periumbilical vein  and probable gastroesophageal varices compatible with portal  hypertension. Moderate to large amount of ascites.                                                                      IMPRESSION:   1. No evidence for  choledocholithiasis or biliary obstruction.  2. Moderate to large amount of ascites.  3. Heterogeneously enhancing liver, question acute hepatitis.  4. Splenomegaly and portal systemic collaterals compatible with portal  hypertension.

## 2022-01-19 NOTE — ED PROVIDER NOTE - EMPLOYMENT
Additional Notes: Patient consent was obtained to proceed with the visit and recommended plan of care after discussion of all risks and benefits, including the risks of COVID-19 exposure.
Detail Level: Simple
N/A

## 2022-10-14 NOTE — H&P ADULT - NSHPREVIEWOFSYSTEMS_GEN_ALL_CORE
FMLA paperwork for daughter received via handoff. Will be placed in provider folder for signature upon completion.     Fax: None listed.    Please e-mail to [email or web address]      Fouzia Camargo CMA   CONSTITUTIONAL: No fevers or chills  RESPIRATORY: As above  CARDIOVASCULAR: No chest pain or palpitations  GASTROINTESTINAL: No abdominal or epigastric pain; No nausea, vomiting, or hematemesis; No diarrhea or constipation; No recent melena or hematochezia  GENITOURINARY: No dysuria, frequency or hematuria  MUSCULOSKELETAL: No joint pain, no muscle pain, no weakness  NEUROLOGICAL: No numbness or weakness  SKIN: No itching or rashes

## 2023-06-09 NOTE — DISCHARGE NOTE PROVIDER - NSDCADMDATE_GEN_ALL_CORE_FT
Zamzam    See pt Mychart message   Derm referral cued if you agree    Cristin Gris RN   [facility]         25-Sep-2020 23:22

## 2023-08-16 NOTE — PRE-ANESTHESIA EVALUATION ADULT - TEMPERATURE IN CELSIUS (DEGREES C)
36.8 Xolair Pregnancy And Lactation Text: This medication is Pregnancy Category B and is considered safe during pregnancy. This medication is excreted in breast milk.

## 2023-08-28 NOTE — PATIENT PROFILE ADULT - NSPROHMDIABETMGMTSTRAT_GEN_A_NUR
each           Manual Therapy         Skilled input: verbal instruction/cues, tactile instruction/cues and as detailed above    Writer verbally educated and received verbal consent for hand placement, positioning of patient, and techniques to be performed today from patient for therapist position for techniques and hand placement and palpation for techniques as described above and how they are pertinent to the patient's plan of care.  Home Exercise Program  Access Code: [email or web address]  URL: [email or web address]  Date: 08/28/2023  Prepared by: Opal Perkins    Exercises  - Wrist AROM Flexion Extension  - 3 x daily - 7 x weekly - 1 sets - 10 reps  - Wrist Radial Ulnar Deviation AROM  - 3 x daily - 7 x weekly - 1 sets - 10 reps  - Seated Shoulder Flexion Slide at Table Top with Forearm in Neutral  - 3 x daily - 7 x weekly - 1 sets - 5-10 reps  - Supine Shoulder Flexion Extension AAROM with Dowel  - 1 x daily - 7 x weekly - 1 sets - 5-10 reps  - Supine Shoulder Horizontal Abduction Adduction AAROM with Dowel  - 1 x daily - 7 x weekly - 1 sets - 5-10 reps  - Supine Shoulder Horizontal Abduction Adduction AAROM with Dowel  - 1 x daily - 7 x weekly - 3 sets - 10 reps  - Seated Isometric Shoulder Flexion  - 1 x daily - 7 x weekly - 1 sets - 5 reps - 10 hold  - Isometric Shoulder Extension at Wall  - 1 x daily - 7 x weekly - 1 sets - 5 reps - 10 hold  - Isometric Shoulder External Rotation  - 1 x daily - 7 x weekly - 1 sets - 5 reps - 10 hold  - Isometric Shoulder Internal Rotation  - 1 x daily - 7 x weekly - 1 sets - 5 reps - 10 hold  - Isometric Wrist Extension Pronated  - 1 x daily - 7 x weekly - 1 sets - 5 reps - 10 hold  - Seated Isometric Wrist Radial Deviation with Manual Resistance  - 1 x daily - 7 x weekly - 1 sets - 5 reps - 10 hold  - Seated Isometric Wrist Flexion Neutral (Mirrored)  - 1 x daily - 7 x weekly - 1 sets - 5 reps - 10 hold  - Seated Isometric Wrist Ulnar Deviation with Manual  Resistance  - 1 x daily - 7 x weekly - 1 sets - 5 reps - 10 hold  - Seated Wrist Extension with Dumbbell  - 1 x daily - 3 x weekly - 1-2 sets - 10 reps  - Seated Wrist Radial Deviation with Dumbbell  - 1 x daily - 3 x weekly - 1-2 sets - 10 reps  - Wrist AROM Flexion Extension  - 1 x daily - 3 x weekly - 1-2 sets - 10 reps      ASSESSMENT                                                                                                            Tolerated gentle progression with L shoulder/wrist ROM and L wrist strengthening well overall with minimal increase in pain this date.  Pain/symptoms after session (out of 10): 3  Education:   - Results of above outlined education: Verbalizes understanding and Demonstrates understanding    PLAN                                                                                                                           Suggestions for next session as indicated: Progress per plan of care       Therapy procedure time and total treatment time can be found documented on the Time Entry flowsheet   diet modification/insulin therapy

## 2024-01-29 NOTE — ED PROVIDER NOTE - CRITICAL CARE ATTESTATION
"Subjective     Jayant Allison is a 13 y.o. male who presents with Cough (X yesterday with congestion, fever, nausea, vomiting, body aches.  Brother has Flu A. )            Onset yesterday or day dry cough, nasal congestion, fever, body aches.  Nausea vomiting and diarrhea today.  No blood in vomit or stool.  No abdominal pain.  Exposed to influenza/sibling.  No shortness of breath or wheezing.  No other aggravating alleviating factors.        Review of Systems   Constitutional:  Positive for malaise/fatigue. Negative for weight loss.   Eyes:  Negative for discharge and redness.   Musculoskeletal:  Negative for joint pain.   Skin:  Negative for itching and rash.              Objective     Pulse (!) 143   Temp 36.2 °C (97.2 °F) (Temporal)   Resp 26   Ht 1.486 m (4' 10.5\")   Wt 53.5 kg (118 lb)   SpO2 98%   BMI 24.24 kg/m²      Physical Exam  Constitutional:       General: He is active.      Appearance: Normal appearance. He is well-developed. He is not toxic-appearing.   HENT:      Head: Normocephalic.      Right Ear: Tympanic membrane normal.      Left Ear: Tympanic membrane normal.      Nose: Congestion present.      Mouth/Throat:      Mouth: Mucous membranes are moist.      Pharynx: Oropharynx is clear. No posterior oropharyngeal erythema.   Eyes:      Conjunctiva/sclera: Conjunctivae normal.   Cardiovascular:      Rate and Rhythm: Normal rate and regular rhythm.      Heart sounds: Normal heart sounds.   Pulmonary:      Effort: Pulmonary effort is normal.      Breath sounds: Normal breath sounds. No wheezing.   Musculoskeletal:      Cervical back: Neck supple.   Lymphadenopathy:      Cervical: No cervical adenopathy.   Skin:     General: Skin is warm and dry.      Findings: No rash.   Neurological:      Mental Status: He is alert.                             Assessment & Plan        1. Influenza-like illness    - oseltamivir (TAMIFLU) 6 mg/mL Recon Susp; Take 12.5 mL by mouth 2 times a day for 5 days.  " Dispense: 125 mL; Refill: 0    2. Exposure to influenza    - oseltamivir (TAMIFLU) 6 mg/mL Recon Susp; Take 12.5 mL by mouth 2 times a day for 5 days.  Dispense: 125 mL; Refill: 0    3. Nausea    - ondansetron (ZOFRAN ODT) 4 MG TABLET DISPERSIBLE; Take 1 Tablet by mouth every 8 hours as needed for Nausea/Vomiting.  Dispense: 6 Tablet; Refill: 0  - ondansetron (Zofran ODT) dispertab 4 mg     Differential diagnosis, natural history, supportive care, and indications for immediate follow-up were discussed.                 I have personally provided the amount of critical care time documented below concurrently with the resident/fellow.  This time excludes time spent on separate procedures and time spent teaching. I have reviewed the resident’s/fellow’s documentation and I agree with the assessment and plan of care

## 2024-02-20 NOTE — PATIENT PROFILE ADULT - NSTOBACCOCESSATIONEDU3_GEN_A_NUR
Chief Complaint   Patient presents with   • Derm Problem   • Office Visit     History of Present Illness:    Javi Encarnacion presents with:     Complaint:  skin lesions - pt request skin cancer screening  Duration: months to years  Location:  entire body  Previous treatments:  none to date  Patient is here for skin cancer screening examination of multiple skin lesions on body today.    Complaint:  skin lesion(s)  Duration:  years  Location:  Left cheek  Past or Current treatments:  None  Other: Rough     Personal/Family Hx of Skin Cancer   He does  have a personal history of skin cancer;  SCCIS   He does not  have a family history of skin cancer.     Physical Examination:    Total Body Examination of the feet, legs, buttocks, back, chest, abdomen, arms, hands, neck, scalp, and face revealed the following significant skin-related findings:   Scattered hyperpigmented macules in sun exposed areas consistent with solar lentigines  Multiple tan to hyperpigmented macules, no ugly duckling, no concerning dermoscopic features  Multiple compressible bright red papules consistent with cherry angiomas  Stuck on, verrucous papule with gyriform surface change consistent with seborrheic keratosis    Gritty pink papule(s) with white to yellow scale with a background of photodamage on the: upper back, scalp, left cheek       Assessment and Plan:    Diagnoses and all orders for this visit:  Sun-damaged skin  -      compounded derm cream; 9427 FLUOROURACIL 5%CALCIPOTRIENE 0.005% 1:1 CREAM -  Apply thin layer to scalp, forehead, cheeks, and nose bid for 5 days.  May cause irritation and scabbing. Don't apply near the eyes.  Screening exam for skin cancer  Cherry angioma  Lentigines  Multiple nevi  Seborrheic keratosis  AK (actinic keratosis)  -     DESTRUCTION PREMALIGNANT 1ST LESION  -     DESTRUCTION PREMALIGNANT 2-14 LESIONS EACH  -      compounded derm cream; 9427 FLUOROURACIL 5%CALCIPOTRIENE 0.005% 1:1 CREAM -  Apply thin  layer to scalp, forehead, cheeks, and nose bid for 5 days.  May cause irritation and scabbing. Don't apply near the eyes.  History of squamous cell carcinoma in situ (SCCIS)  Callus, right palm  Nail dystrophy     Patient has chronic actinic damage (ie, chronic sun damage) which increases risk for skin cancer.  Sunscreen recommendations provided - active ingredients of zinc or titanium dioxide.      Three Actinic Keratoses found on exam represent progression/exacerbation of underlying chronic actinic damage.     Counseling included discussion of the side effect profile of efudex (AKA 5FU cream) which includes risk of significant rash, irritation, and scabbing that can last weeks.  Pt verbalized understanding and desires to pursue treatment.     Procedures Performed:    Liquid nitrogen procedure  Diagnosis:  Actinic Keratosis  Location(s): upper back, scalp, left cheek   Quantity: 3    Verbal consent obtained.  Confirmed correct patient, procedure, side and site.   Patient informed of alternative treatments and the likely effects of freezing including local pain/swelling, blister formation, and the development of a scab.  The patient was also informed of possible side effects including infection, color changes, and scar formation.  The chance for incomplete therapeutic response and the need for further liquid nitrogen treatments was discussed.  Cryotherapy administered to lesion(s) until adequate freeze depth and diameter achieved with rapid freeze and slow thaw.  Procedure tolerated well.  There were no complications.     Return in about 1 year (around 2/20/2025) for skin check.     On 2/20/2024, ISabiha CMA scribed the services personally performed by Román Roca MD  The documentation recorded by the scribe accurately and completely reflects the service(s) I personally performed and the decisions made by me.      Learning behavioral activities to cope with urges.  For example, distraction and changing routines

## 2024-04-13 NOTE — ED PROVIDER NOTE - INPATIENT RESIDENT/ACP NOTIFIED
Jason Hollins is a 64 y.o. male on day 39 of admission presenting with Soft tissue sarcoma (Multi).      Subjective   4/13: S/P SLED overnight with 2L fluid removed - pt tolerated it well. Pt seen resting in bed. Trach tube FiO2 minimal. Off levophed since last night. Remains anuric - just 75mL (after straight showed 100mL). Sister-in-law at bedside.      Objective          Vitals 24HR  Heart Rate:  [71-97]   Temp:  [35.7 °C (96.3 °F)-36.8 °C (98.2 °F)]   Resp:  [0-42]   BP: ()/(52-82)   Weight:  [119 kg (261 lb 14.5 oz)]   SpO2:  [89 %-100 %]         Intake/Output last 3 Shifts:    Intake/Output Summary (Last 24 hours) at 4/13/2024 1307  Last data filed at 4/13/2024 1214  Gross per 24 hour   Intake 2528.9 ml   Output 2250 ml   Net 278.9 ml       Physical Exam  General appearance: intubated, trached  Neck: trach  Heart: RRR  Lungs: FiO2 40% on vent via trach  : no Marino  Neuro: alert, follows commands  Dialysis ACCESS:  right I J trialysis    Current Facility-Administered Medications:     albumin human 25 % solution 25 g, 25 g, intravenous, q6h, Gracie Skaggs, APRN-CNP, Last Rate: 100 mL/hr at 04/13/24 1214, 25 g at 04/13/24 1214    alteplase (Cathflo Activase) injection 2 mg, 2 mg, intra-catheter, PRN, Mariluz Dailey MD    calcium gluconate in NS IV 1 g, 1 g, intravenous, q6h PRN, Mariluz Dailey MD, Stopped at 04/13/24 1039    calcium gluconate in NS IV 2 g, 2 g, intravenous, q6h PRN, Mariluz Dailey MD, Last Rate: 100 mL/hr at 04/04/24 1118, 2 g at 04/04/24 1118    dextrose 50 % injection 12.5 g, 12.5 g, intravenous, q15 min PRN, Mariluz Dailey MD    fludrocortisone (Florinef) tablet 0.1 mg, 0.1 mg, nasogastric tube, q12h, Mariluz Dailey MD, 0.1 mg at 04/13/24 1213    glucagon (Glucagen) injection 1 mg, 1 mg, intramuscular, q15 min PRN, Mariluz Dailey MD    heparin (porcine) injection 5,000 Units, 5,000 Units, subcutaneous, q8h, Mariluz Dailey MD, 5,000 Units at 04/13/24 0601    hydrocortisone sod succ  (PF) (Solu-CORTEF) injection 50 mg, 50 mg, intravenous, q6h, Mariluz Dailey MD, 50 mg at 04/13/24 1009    HYDROmorphone (Dilaudid) injection 0.2 mg, 0.2 mg, intravenous, q3h PRN, Mariluz Dailey MD, 0.2 mg at 04/12/24 0857    HYDROmorphone (Dilaudid) injection 0.4 mg, 0.4 mg, intravenous, q4h PRN, Mariluz Dailey MD, 0.4 mg at 04/12/24 1039    insulin glargine (Lantus) injection 16 Units, 16 Units, subcutaneous, q12h, Mariluz Dailey MD, 16 Units at 04/13/24 0850    insulin lispro (HumaLOG) injection 0-20 Units, 0-20 Units, subcutaneous, q4h, Mariluz Dailey MD, 8 Units at 04/13/24 1213    lactated Ringer's infusion, 5 mL/hr, intravenous, Continuous, Mariluz Dailey MD, Last Rate: 5 mL/hr at 04/13/24 1200, 5 mL/hr at 04/13/24 1200    magnesium sulfate IV 2 g, 2 g, intravenous, q6h PRN, Mariluz Dailey MD, Stopped at 03/24/24 1726    magnesium sulfate IV 4 g, 4 g, intravenous, q6h PRN, Mariluz Dailey MD, Stopped at 04/05/24 0838    meropenem (Merrem) in sodium chloride 0.9 % 100 mL IV 1 g, 1 g, intravenous, q8h, Mariluz Dailey MD, Stopped at 04/13/24 0633    midodrine (Proamatine) tablet 20 mg, 20 mg, orogastric tube, q8h, Mariluz Dailey MD, 20 mg at 04/13/24 1009    norepinephrine (Levophed) 8 mg in dextrose 5% 250 mL (0.032 mg/mL) infusion (premix), 0.01-0.5 mcg/kg/min (Dosing Weight), intravenous, Continuous, Mariluz Dailey MD, Last Rate: 1.89 mL/hr at 04/13/24 1251, 0.01 mcg/kg/min at 04/13/24 1251    oxyCODONE (Roxicodone) immediate release tablet 5 mg, 5 mg, oral, q6h, Mariluz Dailey MD, 5 mg at 04/13/24 1213    oxygen (O2) therapy, , inhalation, Continuous - Inhalation, Mariluz Dailey MD, Rate Verify at 04/13/24 0840    pantoprazole (ProtoNix) injection 40 mg, 40 mg, intravenous, Daily, Mariluz Dailey MD, 40 mg at 04/13/24 0851    simethicone (Mylicon) chewable tablet 40 mg, 40 mg, nasogastric tube, q6h, PHILLIP Bernal-CNP    sodium chloride (Ocean) 0.65 % nasal spray 1 spray, 1 spray, Each Nostril, 4x  daily PRN, Mariluz Dailey MD      Assessment/Plan   Jason Hollins is a 64 y.o. 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 Hawkins and Dr. Smith on 3/5/24. On 3/20, he developed massive PE and was given TPA, taken to OR in setting of increased swelling at flap site- hematoma for exploration and evacuation. Nephrology following for RUTH.    #Acute Kidney Injury on Dialysis (RUTH-D), anuric  - etiology hemodynamic from hemorrhagic shock  - began on CVVH since 3/21 via R I J trialysis and transitioned to SLED on 4/2  - Cr baseline 0.6  - electrolytes: K and HCO3 WNL, phos 3.8  - urine output 75mL in 24h; bladder scan 100mL; I/O: net +0.5L  - FiO2 40% on - trach tube  - off levophed evening of 4/12  - s/p SLED 4/11-4/12 1.3L fluid removal  - s/p SLED 4/12-4/13 2.0L fluid removed    Myxoid Chondrosarcoma s/p wide resection 3/5/2024  Thrombocytopenia  - s/p OR on 4/11 for thigh debridement    Plan  - will order SLED today for daytime x8h with goal 2L fluid removal  - after today's SLED completed, agree with plan to remove R I J trialysis for line holiday as current access has been in place since 3/27 (16 days)  - consider changing tube feeds to Nepro  - appreciate that today's BMP was drawn >4h after SLED - please continue this practice  - continue bladder scan once per shift to monitor for renal recovery  - do not foresee need for dialysis on 4/14; will re-evaluate for need on 4/15 for either iHD vs SLED    D/w Dr. Kunal Duffy MD  Nephrology Fellow PGY 5  Contact via secure chat  Nephrology Pager # [phone] ([phone])        ICU resident

## 2024-04-23 NOTE — PATIENT PROFILE ADULT - NSPROHMDIABETBLDGLCTARGET_GEN_A_NUR
Follow-up closely with primary care provider.  May use albuterol inhaler as prescribed for mild wheezing.  If you have worsening of difficulty breathing after getting home return to emergency department as soon as possible.   known

## 2024-07-22 NOTE — PATIENT PROFILE ADULT - NSTRANSFERBELONGINGSDISPO_GEN_A_NUR
Patient Education   Preventive Care Advice   This is general advice given by our system to help you stay healthy. However, your care team may have specific advice just for you. Please talk to your care team about your preventive care needs.  Nutrition  Eat 5 or more servings of fruits and vegetables each day.  Try wheat bread, brown rice and whole grain pasta (instead of white bread, rice, and pasta).  Get enough calcium and vitamin D. Check the label on foods and aim for 100% of the RDA (recommended daily allowance).  Lifestyle  Exercise at least 150 minutes each week  (30 minutes a day, 5 days a week).  Do muscle strengthening activities 2 days a week. These help control your weight and prevent disease.  No smoking.  Wear sunscreen to prevent skin cancer.  Have a dental exam and cleaning every 6 months.  Yearly exams  See your health care team every year to talk about:  Any changes in your health.  Any medicines your care team has prescribed.  Preventive care, family planning, and ways to prevent chronic diseases.  Shots (vaccines)   HPV shots (up to age 26), if you've never had them before.  Hepatitis B shots (up to age 59), if you've never had them before.  COVID-19 shot: Get this shot when it's due.  Flu shot: Get a flu shot every year.  Tetanus shot: Get a tetanus shot every 10 years.  Pneumococcal, hepatitis A, and RSV shots: Ask your care team if you need these based on your risk.  Shingles shot (for age 50 and up)  General health tests  Diabetes screening:  Starting at age 35, Get screened for diabetes at least every 3 years.  If you are younger than age 35, ask your care team if you should be screened for diabetes.  Cholesterol test: At age 39, start having a cholesterol test every 5 years, or more often if advised.  Bone density scan (DEXA): At age 50, ask your care team if you should have this scan for osteoporosis (brittle bones).  Hepatitis C: Get tested at least once in your life.  STIs (sexually  transmitted infections)  Before age 24: Ask your care team if you should be screened for STIs.  After age 24: Get screened for STIs if you're at risk. You are at risk for STIs (including HIV) if:  You are sexually active with more than one person.  You don't use condoms every time.  You or a partner was diagnosed with a sexually transmitted infection.  If you are at risk for HIV, ask about PrEP medicine to prevent HIV.  Get tested for HIV at least once in your life, whether you are at risk for HIV or not.  Cancer screening tests  Cervical cancer screening: If you have a cervix, begin getting regular cervical cancer screening tests starting at age 21.  Breast cancer scan (mammogram): If you've ever had breasts, begin having regular mammograms starting at age 40. This is a scan to check for breast cancer.  Colon cancer screening: It is important to start screening for colon cancer at age 45.  Have a colonoscopy test every 10 years (or more often if you're at risk) Or, ask your provider about stool tests like a FIT test every year or Cologuard test every 3 years.  To learn more about your testing options, visit:   .  For help making a decision, visit:   https://bit.ly/yx96563.  Prostate cancer screening test: If you have a prostate, ask your care team if a prostate cancer screening test (PSA) at age 55 is right for you.  Lung cancer screening: If you are a current or former smoker ages 50 to 80, ask your care team if ongoing lung cancer screenings are right for you.  For informational purposes only. Not to replace the advice of your health care provider. Copyright   2023 [facility]. All rights reserved. Clinically reviewed by the [facility]. [employer] [id number] - REV 01/24.  Substance Use Disorder: Care Instructions  Overview     You can improve your life and health by stopping your use of alcohol or drugs. When you don't drink or use drugs, you may feel and sleep better. You may  get along better with your family, friends, and coworkers. There are medicines and programs that can help with substance use disorder.  How can you care for yourself at home?  Here are some ways to help you stay sober and prevent relapse.  If you have been given medicine to help keep you sober or reduce your cravings, be sure to take it exactly as prescribed.  Talk to your doctor about programs that can help you stop using drugs or drinking alcohol.  Do not keep alcohol or drugs in your home.  Plan ahead. Think about what you'll say if other people ask you to drink or use drugs. Try not to spend time with people who drink or use drugs.  Use the time and money spent on drinking or drugs to do something that's important to you.  Preventing a relapse  Have a plan to deal with relapse. Learn to recognize changes in your thinking that lead you to drink or use drugs. Get help before you start to drink or use drugs again.  Try to stay away from situations, friends, or places that may lead you to drink or use drugs.  If you feel the need to drink alcohol or use drugs again, seek help right away. Call a trusted friend or family member. Some people get support from organizations such as Narcotics Anonymous or [employer] or from treatment facilities.  If you relapse, get help as soon as you can. Some people make a plan with another person that outlines what they want that person to do for them if they relapse. The plan usually includes how to handle the relapse and who to notify in case of relapse.  Don't give up. Remember that a relapse doesn't mean that you have failed. Use the experience to learn the triggers that lead you to drink or use drugs. Then quit again. Recovery is a lifelong process. Many people have several relapses before they are able to quit for good.  Follow-up care is a key part of your treatment and safety. Be sure to make and go to all appointments, and call your doctor if you are having problems. It's  "also a good idea to know your test results and keep a list of the medicines you take.  When should you call for help?   Call 911  anytime you think you may need emergency care. For example, call if you or someone else:    Has overdosed or has withdrawal signs. Be sure to tell the emergency workers that you are or someone else is using or trying to quit using drugs. Overdose or withdrawal signs may include:  Losing consciousness.  Seizure.  Seeing or hearing things that aren't there (hallucinations).     Is thinking or talking about suicide or harming others.   Where to get help 24 hours a day, 7 days a week   If you or someone you know talks about suicide, self-harm, a mental health crisis, a substance use crisis, or any other kind of emotional distress, get help right away. You can:    Call the Suicide and Crisis Lifeline at 988.     Call 5-606-278-TALK (1-850.487.9685).     Text HOME to 287454 to access the Crisis Text Line.   Consider saving these numbers in your phone.  Go to [employer] for more information or to chat online.  Call your doctor now or seek immediate medical care if:    You are having withdrawal symptoms. These may include nausea or vomiting, sweating, shakiness, and anxiety.   Watch closely for changes in your health, and be sure to contact your doctor if:    You have a relapse.     You need more help or support to stop.   Where can you learn more?  Go to https://www.[employer].net/patiented  Enter H573 in the search box to learn more about \"Substance Use Disorder: Care Instructions.\"  Current as of: November 15, 2023               Content Version: 14.0    6577-1289 [employer].   Care instructions adapted under license by your healthcare professional. If you have questions about a medical condition or this instruction, always ask your healthcare professional. [employer] disclaims any warranty or liability for your use of this information.         " with patient

## 2024-08-30 NOTE — PROGRESS NOTE ADULT - SUBJECTIVE AND OBJECTIVE BOX
CALLY HARTMAN  53y  Female      Patient is a 53y old  Female who presents with a chief complaint of right foot infection (07 Feb 2019 10:53)      INTERVAL HPI/OVERNIGHT EVENTS:      ******************************* REVIEW OF SYSTEMS:**********************************************    CONSTITUTIONAL: No fever, weight loss, or fatigue  RESPIRATORY: No cough, wheezing, chills or hemoptysis; No shortness of breath  CARDIOVASCULAR: No chest pain, palpitations, dizziness, or leg swelling  GASTROINTESTINAL: No abdominal or epigastric pain. No nausea, vomiting, or hematemesis; No diarrhea or constipation. No melena or hematochezia.  GENITOURINARY: No dysuria, frequency, hematuria, or incontinence  NEUROLOGICAL: No headaches, memory loss, loss of strength, numbness, or tremors  SKIN: No itching, burning, rashes, or lesions   MUSCULOSKELETAL: No joint pain or swelling; No muscle, back, or extremity pain  PSYCHIATRIC: No depression, anxiety, mood swings, or difficulty sleeping    All other review of systems negative    *********************** VITALS ******************************************    T(F): 98.8 (02-07-19 @ 04:38)  HR: 110 (02-07-19 @ 04:38) (93 - 119)  BP: 127/66 (02-07-19 @ 04:38) (104/60 - 127/66)  RR: 18 (02-07-19 @ 04:38) (18 - 18)  SpO2: 97% (02-07-19 @ 10:39) (97% - 97%)    02-06-19 @ 07:01  -  02-07-19 @ 07:00  --------------------------------------------------------  IN: 600 mL / OUT: 800 mL / NET: -200 mL            02-06-19 @ 07:01  -  02-07-19 @ 07:00  --------------------------------------------------------  IN: 600 mL / OUT: 800 mL / NET: -200 mL        ******************************** PHYSICAL EXAM:**************************************************  GENERAL: NAD    PSYCH: no agitation, baseline mentation  HEENT:     NERVOUS SYSTEM:  Alert & Oriented X3, MS  5/5 B/L  UE and LE ; Sensory intact    PULMONARY: [place], CTA    CARDIOVASCULAR: S1S2 RRR    GI: Soft, NT, ND; BS present.    EXTREMITIES: RLE wound with dressing    LYMPH: No lymphadenopathy noted    SKIN: as above   ******************************************************************************************    Consultant(s) Notes Reviewed:  [x ] YES  [ ] NO    Discussed with Consultants/Other Providers [ x] YES     **************************** LABS *******************************************************                          10.8   13.06 )-----------( 374      ( 06 Feb 2019 06:23 )             35.1                 Lactate Trend        CAPILLARY BLOOD GLUCOSE      POCT Blood Glucose.: 249 mg/dL (07 Feb 2019 07:57)          **************************Active Medications *******************************************  No Known Allergies      ALBUTerol    90 MICROgram(s) HFA Inhaler 2 Puff(s) Inhalation every 6 hours PRN  AQUAPHOR (petrolatum Ointment) 1 Application(s) Topical two times a day  aspirin enteric coated 81 milliGRAM(s) Oral daily  atorvastatin 40 milliGRAM(s) Oral at bedtime  BACItracin   Ointment 1 Application(s) Topical three times a day  buDESOnide  80 MICROgram(s)/formoterol 4.5 MICROgram(s) Inhaler 2 Puff(s) Inhalation two times a day  cefepime   IVPB 2000 milliGRAM(s) IV Intermittent every 12 hours  chlorhexidine 4% Liquid 1 Application(s) Topical <User Schedule>  diphenhydrAMINE 50 milliGRAM(s) Oral every 6 hours PRN  DULoxetine 60 milliGRAM(s) Oral daily  enoxaparin Injectable 40 milliGRAM(s) SubCutaneous daily  fluocinonide 0.05% Ointment 1 Application(s) Topical two times a day  hydrocortisone 1% Cream 1 Application(s) Topical two times a day  hydrOXYzine  Oral Tab/Cap - Peds 10 milliGRAM(s) Oral three times a day  ibuprofen  Tablet. 600 milliGRAM(s) Oral every 6 hours PRN  levothyroxine 100 MICROGram(s) Oral daily  loratadine 10 milliGRAM(s) Oral daily  metoprolol tartrate 12.5 milliGRAM(s) Oral two times a day  oxybutynin 5 milliGRAM(s) Oral two times a day  oxyCODONE    5 mG/acetaminophen 325 mG 2 Tablet(s) Oral every 6 hours PRN  oxyCODONE  ER Tablet 10 milliGRAM(s) Oral every 12 hours  pantoprazole    Tablet 40 milliGRAM(s) Oral before breakfast  pregabalin 150 milliGRAM(s) Oral three times a day  rifampin 600 milliGRAM(s) Oral daily  senna 2 Tablet(s) Oral at bedtime  sodium chloride 0.9% lock flush 3 milliLiter(s) IV Push every 8 hours      ***************************************************  RADIOLOGY & ADDITIONAL TESTS:    Imaging Personally Reviewed:  [ ] YES  [ ] NO    HEALTH ISSUES - PROBLEM Dx:
CALLY HARTMAN  53y  Female      Patient is a 53y old  Female who presents with a chief complaint of right foot infection (14 Feb 2019 13:06)      INTERVAL HPI/OVERNIGHT EVENTS: feeling well. pain better controlled      REVIEW OF SYSTEMS:  as above  All other review of systems negative    T(C): 36.6 (02-14-19 @ 14:22), Max: 37 (02-13-19 @ 21:55)  HR: 105 (02-14-19 @ 17:28) (91 - 106)  BP: 132/62 (02-14-19 @ 17:28) (107/56 - 132/62)  RR: 18 (02-14-19 @ 14:22) (18 - 18)  SpO2: --  Wt(kg): --Vital Signs Last 24 Hrs  T(C): 36.6 (14 Feb 2019 14:22), Max: 37 (13 Feb 2019 21:55)  T(F): 97.9 (14 Feb 2019 14:22), Max: 98.6 (13 Feb 2019 21:55)  HR: 105 (14 Feb 2019 17:28) (91 - 106)  BP: 132/62 (14 Feb 2019 17:28) (107/56 - 132/62)  BP(mean): --  RR: 18 (14 Feb 2019 14:22) (18 - 18)  SpO2: --        PHYSICAL EXAM:  GENERAL: NAD  PSYCH: no agitation, baseline mentation  NERVOUS SYSTEM:  Alert & Oriented X3, no new focal deficits  PULMONARY: Clear to percussion bilaterally; No rales, rhonchi, wheezing, or rubs  CARDIOVASCULAR: Regular rate and rhythm; No murmurs, rubs, or gallops  GI: Soft, Nontender, Nondistended; Bowel sounds present  EXTREMITIES: RLE in compressive dressing; UE picc cdi    Consultant(s) Notes Reviewed:  [x ] YES  [ ] NO    Discussed with Consultants/Other Providers [ x] YES     LABS                          10.7   8.83  )-----------( 363      ( 13 Feb 2019 07:38 )             35.4                 Lactate Trend        CAPILLARY BLOOD GLUCOSE      POCT Blood Glucose.: 167 mg/dL (14 Feb 2019 16:51)        RADIOLOGY & ADDITIONAL TESTS:    Imaging Personally Reviewed:  [ ] YES  [ ] NO    HEALTH ISSUES - PROBLEM Dx:          #Progress Note Handoff    Pending (specify):  Consults_________, Tests________, Test Results_______, Other_____wound care through monday then reevaluation____    Family discussion:    Disposition: Home_x__/SNF___/Other________/Unknown at this time________
CALLY HARTMAN  53y  Female      Patient is a 53y old  Female who presents with a chief complaint of right foot infection (18 Feb 2019 19:20)      INTERVAL HPI/OVERNIGHT EVENTS: patient feeling well. excited to go home      REVIEW OF SYSTEMS:  as above  All other review of systems negative    T(C): 36.8 (02-19-19 @ 13:19), Max: 36.9 (02-19-19 @ 05:26)  HR: 96 (02-19-19 @ 13:19) (89 - 104)  BP: 122/75 (02-19-19 @ 13:19) (108/59 - 122/75)  RR: 16 (02-19-19 @ 13:19) (16 - 18)  SpO2: --  Wt(kg): --Vital Signs Last 24 Hrs  T(C): 36.8 (19 Feb 2019 13:19), Max: 36.9 (19 Feb 2019 05:26)  T(F): 98.3 (19 Feb 2019 13:19), Max: 98.5 (19 Feb 2019 05:26)  HR: 96 (19 Feb 2019 13:19) (89 - 104)  BP: 122/75 (19 Feb 2019 13:19) (108/59 - 122/75)  BP(mean): --  RR: 16 (19 Feb 2019 13:19) (16 - 18)  SpO2: --        PHYSICAL EXAM:  GENERAL: NAD  PSYCH: no agitation, baseline mentation  NERVOUS SYSTEM:  Alert & Oriented X3, no new focal deficits  PULMONARY: Clear to percussion bilaterally; No rales, rhonchi, wheezing, or rubs  CARDIOVASCULAR: Regular rate and rhythm; No murmurs, rubs, or gallops  GI: Soft, Nontender, Nondistended; Bowel sounds present rotund  EXTREMITIES:  LE dressing intact, latest pictures reviewed mirror prior examination with improving 3 ulcerations, well granulating; UE picc cdi    Consultant(s) Notes Reviewed:  [x ] YES  [ ] NO    Discussed with Consultants/Other Providers [ x] YES     LABS                  Lactate Trend        CAPILLARY BLOOD GLUCOSE      POCT Blood Glucose.: 109 mg/dL (19 Feb 2019 11:32)        RADIOLOGY & ADDITIONAL TESTS:    Imaging Personally Reviewed:  [ ] YES  [ ] NO    HEALTH ISSUES - PROBLEM Dx:          #Progress Note Handoff    Pending (specify):  Consults_________, Tests________, Test Results_______, Other_________    Family discussion:    Disposition: Home_x_/SNF___/Other________/Unknown at this time________
No
Patient is a 53y old Female who presented with a chief complaint of right foot infection (15 Feb 2019 11:33)  Currently admitted to medicine with the primary diagnosis of Charcot foot due to diabetes mellitus     Today is hospital day 16d. This morning she is resting comfortably in bed and reports no new issues or overnight events.     PAST MEDICAL & SURGICAL HISTORY  Dextrocardia  Chronic midline low back pain with bilateral sciatica  Peripheral neuralgia  Essential hypertension  Diabetes 1.5, managed as type 2  Charcot's arthropathy: R foot  Charcot foot due to diabetes mellitus  H/O shoulder surgery: Right shoulder surgery   delivery delivered    SOCIAL HISTORY:  Negative for smoking/alcohol/drug use.     ALLERGIES:  No Known Allergies    MEDICATIONS:  STANDING MEDICATIONS  AQUAPHOR (petrolatum Ointment) 1 Application(s) Topical two times a day  aspirin enteric coated 81 milliGRAM(s) Oral daily  atorvastatin 40 milliGRAM(s) Oral at bedtime  BACItracin   Ointment 1 Application(s) Topical three times a day  buDESOnide  80 MICROgram(s)/formoterol 4.5 MICROgram(s) Inhaler 2 Puff(s) Inhalation two times a day  cefepime   IVPB 2000 milliGRAM(s) IV Intermittent every 12 hours  chlorhexidine 4% Liquid 1 Application(s) Topical <User Schedule>  dextrose 5%. 1000 milliLiter(s) IV Continuous <Continuous>  dextrose 50% Injectable 12.5 Gram(s) IV Push once  dextrose 50% Injectable 25 Gram(s) IV Push once  dextrose 50% Injectable 25 Gram(s) IV Push once  docusate sodium 100 milliGRAM(s) Oral two times a day  DULoxetine 60 milliGRAM(s) Oral daily  enoxaparin Injectable 40 milliGRAM(s) SubCutaneous daily  fluocinonide 0.05% Ointment 1 Application(s) Topical two times a day  hydrocortisone 1% Cream 1 Application(s) Topical two times a day  hydrOXYzine  Oral Tab/Cap - Peds 10 milliGRAM(s) Oral three times a day  insulin lispro (HumaLOG) corrective regimen sliding scale   SubCutaneous three times a day before meals  levothyroxine 100 MICROGram(s) Oral daily  methocarbamol 500 milliGRAM(s) Oral three times a day  metoprolol tartrate 12.5 milliGRAM(s) Oral two times a day  ondansetron Injectable 4 milliGRAM(s) IV Push two times a day  oxybutynin 5 milliGRAM(s) Oral two times a day  oxyCODONE  ER Tablet 20 milliGRAM(s) Oral every 8 hours  pantoprazole    Tablet 40 milliGRAM(s) Oral before breakfast  pregabalin 150 milliGRAM(s) Oral three times a day  rifampin 600 milliGRAM(s) Oral daily  senna 2 Tablet(s) Oral at bedtime  sodium chloride 0.9% lock flush 3 milliLiter(s) IV Push every 8 hours  vitamin A &amp; D Ointment 1 Application(s) Topical daily    PRN MEDICATIONS  ALBUTerol    90 MICROgram(s) HFA Inhaler 2 Puff(s) Inhalation every 6 hours PRN  dextrose 40% Gel 15 Gram(s) Oral once PRN  diphenhydrAMINE 50 milliGRAM(s) Oral every 4 hours PRN  glucagon  Injectable 1 milliGRAM(s) IntraMuscular once PRN  ibuprofen  Tablet. 600 milliGRAM(s) Oral every 6 hours PRN  oxyCODONE    5 mG/acetaminophen 325 mG 2 Tablet(s) Oral every 6 hours PRN    Home Medications:  aspirin 81 mg oral delayed release tablet: 1 tab(s) orally once a day (10 Ty 2019 14:23)  atorvastatin 40 mg oral tablet: 1 tab(s) orally once a day (10 Ty 2019 14:23)  diazePAM 5 mg oral tablet: 1 tab(s) orally 2 times a day, As Needed (10 Ty 2019 14:23)  DULoxetine 60 mg oral delayed release capsule: 1 cap(s) orally once a day (10 Ty 2019 14:23)  ezetimibe 10 mg oral tablet: 1 tab(s) orally once a day (10 Ty 2019 14:23)  fexofenadine 180 mg oral tablet: 1 tab(s) orally once a day (10 Ty 2019 14:23)  hydrOXYzine hydrochloride 10 mg oral tablet: 2 tab(s) orally 3 times a day (10 Ty 2019 14:23)  Janumet 50 mg-1000 mg oral tablet: 1 tab(s) orally 2 times a day (10 Ty 2019 14:23)  levothyroxine 100 mcg (0.1 mg) oral capsule: 1 cap(s) orally once a day (10 Ty 2019 14:23)  Lyrica 150 mg oral capsule: 1 cap(s) orally 3 times a day (10 Ty 2019 14:23)  Myrbetriq 25 mg oral tablet, extended release: 1 tab(s) orally once a day (10 Ty 2019 14:23)  oxyCODONE 20 mg oral tablet, extended release: 1 tab(s) orally every 12 hours (2019 10:59)  pantoprazole 40 mg oral delayed release tablet: 1 tab(s) orally once a day (10 Ty 2019 14:23)  Qvar 80 mcg/inh inhalation aerosol: 1 puff(s) inhaled 2 times a day (10 Ty 2019 14:23)  ramipril 2.5 mg oral capsule: 1 cap(s) orally once a day (10 Ty 2019 14:23)  tiZANidine 4 mg oral tablet: 2 tab(s) orally every 8 hours (10 Ty 2019 14:23)  Ventolin HFA 90 mcg/inh inhalation aerosol: 2 puff(s) inhaled 4 times a day, As Needed (10 Ty 2019 14:23)    Vital Signs Last 24 Hrs  T(C): 36.2 (15 Feb 2019 04:54), Max: 36.7 (2019 22:00)  T(F): 97.2 (15 Feb 2019 04:54), Max: 98.1 (2019 22:00)  HR: 113 (15 Feb 2019 04:54) (86 - 113)  BP: 120/58 (15 Feb 2019 04:54) (110/52 - 147/65)  BP(mean): --  RR: 18 (15 Feb 2019 04:54) (18 - 18)  SpO2: --      LABS:    RADIOLOGY:  < from: [facility] Duplex Lower Ext Vein Scan, Bilat (19 @ 09:29) >  Impression:  No evidence of deep venous thrombosis or superficial thrombophlebitis in   bilateral lower extremities.  Left Baker's cyst.      < from: Xray Foot AP + Lateral + Oblique, Right (19 @ 16:00) >  Impression:  Status post Charcot reconstruction with talar first metatarsal, calcaneal   cuboid and cuneiform second metatarsal arthrodeses. No evidence of   hardware complication.      PHYSICAL EXAM:  GEN: No acute distress  LUNGS: Clear to auscultation bilaterally   HEART: S1/S2 present. RRR.   ABD: Soft, non-tender, non-distended. Bowel sounds present  EXT: Right lower extremity in dressing, vac placed by podiatry  NEURO: AAOX3
CALLY HARTMAN  53y  Female      Patient is a 53y old  Female who presents with a chief complaint of right foot infection (02 Feb 2019 10:54)      INTERVAL HPI/OVERNIGHT EVENTS: c/o some rue swelling which has since resolved after removal of an IV and use of hot packs. pain in RLE has been well controlled on current regimen. we discussed quitting smoking more, patient not yet ready to quit but mentions she has cut down significantly       REVIEW OF SYSTEMS:  as above  All other review of systems negative    T(C): 36.1 (02-02-19 @ 05:09), Max: 36.4 (02-01-19 @ 21:37)  HR: 122 (02-02-19 @ 14:22) (76 - 122)  BP: 127/62 (02-02-19 @ 14:22) (104/56 - 127/62)  RR: 18 (02-02-19 @ 14:22) (18 - 18)  SpO2: 94% (02-01-19 @ 19:49) (94% - 94%)  Wt(kg): --Vital Signs Last 24 Hrs  T(C): 36.1 (02 Feb 2019 05:09), Max: 36.4 (01 Feb 2019 21:37)  T(F): 97 (02 Feb 2019 05:09), Max: 97.5 (01 Feb 2019 21:37)  HR: 122 (02 Feb 2019 14:22) (76 - 122)  BP: 127/62 (02 Feb 2019 14:22) (104/56 - 127/62)  BP(mean): --  RR: 18 (02 Feb 2019 14:22) (18 - 18)  SpO2: 94% (01 Feb 2019 19:49) (94% - 94%)        PHYSICAL EXAM:  GENERAL: NAD  PSYCH: no agitation, baseline mentation  NERVOUS SYSTEM:  Alert & Oriented X3, no new focal deficits  PULMONARY: Clear to percussion bilaterally; No rales, rhonchi, wheezing, or rubs  CARDIOVASCULAR: Regular rate and rhythm; No murmurs, rubs, or gallops  GI: Soft, Nontender, Nondistended; Bowel sounds present rotund  EXTREMITIES: RLE surgical dressing; L foot similar to prior/ small rash with dry/cracking skin but no induration, RUE no overt thrombophlebitis obviously visualized    Consultant(s) Notes Reviewed:  [x ] YES  [ ] NO    Discussed with Consultants/Other Providers [ x] YES     LABS                          11.7   11.16 )-----------( 393      ( 02 Feb 2019 06:03 )             36.8     02-02    139  |  97<L>  |  6<L>  ----------------------------<  122<H>  4.6   |  28  |  0.7    Ca    9.2      02 Feb 2019 06:03  Mg     1.8     02-02    TPro  6.8  /  Alb  3.4<L>  /  TBili  0.5  /  DBili  x   /  AST  19  /  ALT  12  /  AlkPhos  112  02-02          Lactate Trend  01-30 @ 16:01 Lactate:1.9         CAPILLARY BLOOD GLUCOSE      POCT Blood Glucose.: 174 mg/dL (02 Feb 2019 08:18)        RADIOLOGY & ADDITIONAL TESTS:    Imaging Personally Reviewed:  [ ] YES  [ ] NO    HEALTH ISSUES - PROBLEM Dx:          #Progress Note Handoff    Pending (specify):  Consults_____n____, Tests____n____, Test Results____n___, Other______wound care___    Family discussion: not required    Disposition: Home__x_/SNF___/Other________/Unknown at this time________
CALLY HARTMAN  53y  Female      Patient is a 53y old  Female who presents with a chief complaint of right foot infection (03 Feb 2019 11:59)      INTERVAL HPI/OVERNIGHT EVENTS: no new complaints. pain in foot is controlled. L foot less redness/ swelling. tolerating R foot dressing changes      REVIEW OF SYSTEMS:  as above  All other review of systems negative    T(C): 36.9 (02-03-19 @ 05:20), Max: 36.9 (02-03-19 @ 05:20)  HR: 99 (02-03-19 @ 05:20) (95 - 122)  BP: 106/53 (02-03-19 @ 05:20) (106/53 - 127/62)  RR: 18 (02-03-19 @ 05:20) (18 - 18)  SpO2: --  Wt(kg): --Vital Signs Last 24 Hrs  T(C): 36.9 (03 Feb 2019 05:20), Max: 36.9 (03 Feb 2019 05:20)  T(F): 98.4 (03 Feb 2019 05:20), Max: 98.4 (03 Feb 2019 05:20)  HR: 99 (03 Feb 2019 05:20) (95 - 122)  BP: 106/53 (03 Feb 2019 05:20) (106/53 - 127/62)  BP(mean): --  RR: 18 (03 Feb 2019 05:20) (18 - 18)  SpO2: --        PHYSICAL EXAM:  GENERAL: NAD  PSYCH: no agitation, baseline mentation  NERVOUS SYSTEM:  Alert & Oriented X3, no new focal deficits  PULMONARY: Clear to percussion bilaterally; No rales, rhonchi, wheezing, or rubs  CARDIOVASCULAR: Regular rate and rhythm; No murmurs, rubs, or gallops  GI: Soft, Nontender, Nondistended; Bowel sounds present rotund  EXTREMITIES:  R foot dressed/compressive ace, L foot less erythema only 1+ edema now    Consultant(s) Notes Reviewed:  [x ] YES  [ ] NO    Discussed with Consultants/Other Providers [ x] YES     LABS                          10.7   10.33 )-----------( 377      ( 03 Feb 2019 06:55 )             34.4     02-03    139  |  98  |  7<L>  ----------------------------<  105<H>  4.8   |  25  |  0.7    Ca    8.6      03 Feb 2019 06:55  Mg     1.7     02-03    TPro  5.9<L>  /  Alb  3.2<L>  /  TBili  0.3  /  DBili  x   /  AST  16  /  ALT  11  /  AlkPhos  103  02-03        PT/INR - ( 03 Feb 2019 06:55 )   PT: 12.40 sec;   INR: 1.08 ratio           Lactate Trend  01-30 @ 16:01 Lactate:1.9         CAPILLARY BLOOD GLUCOSE      POCT Blood Glucose.: 126 mg/dL (03 Feb 2019 11:35)        RADIOLOGY & ADDITIONAL TESTS:    Imaging Personally Reviewed:  [ ] YES  [ ] NO    HEALTH ISSUES - PROBLEM Dx:          #Progress Note Handoff    Pending (specify):  Consults______will await podiatry clearance after PICC___, Tests________, Test Results_______, Other____picc monday_____    Family discussion:    Disposition: Home___/SNF___/Other________/Unknown at this time__x possibly home with IV abx vs snf______
CALLY HARTMAN  53y  Female      Patient is a 53y old  Female who presents with a chief complaint of right foot infection (05 Feb 2019 10:08)      INTERVAL HPI/OVERNIGHT EVENTS: feeling well. c/o some pain at L foot where rash is present. also mentions some itching to bilateral shins most prevalent at night      REVIEW OF SYSTEMS:  as above  All other review of systems negative    T(C): 37.1 (02-05-19 @ 13:05), Max: 37.1 (02-05-19 @ 06:05)  HR: 109 (02-05-19 @ 13:05) (98 - 114)  BP: 110/65 (02-05-19 @ 13:05) (110/65 - 135/61)  RR: 18 (02-05-19 @ 13:05) (18 - 18)  SpO2: --  Wt(kg): --Vital Signs Last 24 Hrs  T(C): 37.1 (05 Feb 2019 13:05), Max: 37.1 (05 Feb 2019 06:05)  T(F): 98.7 (05 Feb 2019 13:05), Max: 98.8 (05 Feb 2019 06:05)  HR: 109 (05 Feb 2019 13:05) (98 - 114)  BP: 110/65 (05 Feb 2019 13:05) (110/65 - 135/61)  BP(mean): --  RR: 18 (05 Feb 2019 13:05) (18 - 18)  SpO2: --        PHYSICAL EXAM:  GENERAL: NAD  PSYCH: no agitation, baseline mentation  NERVOUS SYSTEM:  Alert & Oriented X3, no new focal deficits  PULMONARY: Clear to percussion bilaterally; No rales, rhonchi, wheezing, or rubs  CARDIOVASCULAR: Regular rate and rhythm; No murmurs, rubs, or gallops  GI: Soft, Nontender, Nondistended; Bowel sounds present, rotund  EXTREMITIES:  RLE with compressive dressing, pictures from today reviewed, wounds similar to presentation with a bit less erythema, L foot rash as prior, dry skin, edema adjacent to the lateral malleolus but no ligamentous laxity or tenderness upon palpation    Consultant(s) Notes Reviewed:  [x ] YES  [ ] NO    Discussed with Consultants/Other Providers [ x] YES     LABS                          11.0   13.74 )-----------( 433      ( 05 Feb 2019 08:08 )             35.4     02-05    138  |  97<L>  |  8<L>  ----------------------------<  211<H>  4.7   |  22  |  0.7    Ca    8.9      05 Feb 2019 08:08  Mg     1.8     02-05    TPro  6.0  /  Alb  3.1<L>  /  TBili  0.2  /  DBili  x   /  AST  17  /  ALT  11  /  AlkPhos  104  02-04          Lactate Trend        CAPILLARY BLOOD GLUCOSE      POCT Blood Glucose.: 118 mg/dL (05 Feb 2019 17:05)        RADIOLOGY & ADDITIONAL TESTS:    Imaging Personally Reviewed:  [ ] YES  [ ] NO    HEALTH ISSUES - PROBLEM Dx:          #Progress Note Handoff    Pending (specify):  Consults_________, Tests________, Test Results_______, Other_________    Family discussion:    Disposition: Home___/SNF___/Other________/Unknown at this time________
CALLY HARTMAN  53y  Female      Patient is a 53y old  Female who presents with a chief complaint of right foot infection (06 Feb 2019 07:51)      INTERVAL HPI/OVERNIGHT EVENTS:      ******************************* REVIEW OF SYSTEMS:**********************************************      All other review of systems negative    *********************** VITALS ******************************************    T(F): 98.7 (02-06-19 @ 13:01)  HR: 119 (02-06-19 @ 13:01) (85 - 119)  BP: 127/60 (02-06-19 @ 13:01) (114/56 - 129/58)  RR: 18 (02-06-19 @ 13:01) (18 - 18)  SpO2: --    02-06-19 @ 07:01  -  02-06-19 @ 13:15  --------------------------------------------------------  IN: 600 mL / OUT: 800 mL / NET: -200 mL            02-06-19 @ 07:01  -  02-06-19 @ 13:15  --------------------------------------------------------  IN: 600 mL / OUT: 800 mL / NET: -200 mL        ******************************** PHYSICAL EXAM:**************************************************  GENERAL: NAD    PSYCH: no agitation, baseline mentation  HEENT:     NERVOUS SYSTEM:  Alert & Oriented X3,     PULMONARY: [place], CTA    CARDIOVASCULAR: S1S2 RRR    GI: Soft, NT, ND; BS present.    EXTREMITIES:  Right foot wound with dressing    LYMPH: No lymphadenopathy noted    SKIN: as above    ******************************************************************************************    Consultant(s) Notes Reviewed:  [x ] YES  [ ] NO    Discussed with Consultants/Other Providers [ x] YES     **************************** LABS *******************************************************                          10.8   13.06 )-----------( 374      ( 06 Feb 2019 06:23 )             35.1     02-05    138  |  97<L>  |  8<L>  ----------------------------<  211<H>  4.7   |  22  |  0.7    Ca    8.9      05 Feb 2019 08:08  Mg     1.8     02-05            Lactate Trend        CAPILLARY BLOOD GLUCOSE      POCT Blood Glucose.: 220 mg/dL (06 Feb 2019 11:37)          **************************Active Medications *******************************************  No Known Allergies      ALBUTerol    90 MICROgram(s) HFA Inhaler 2 Puff(s) Inhalation every 6 hours PRN  AQUAPHOR (petrolatum Ointment) 1 Application(s) Topical two times a day  aspirin enteric coated 81 milliGRAM(s) Oral daily  atorvastatin 40 milliGRAM(s) Oral at bedtime  BACItracin   Ointment 1 Application(s) Topical three times a day  buDESOnide  80 MICROgram(s)/formoterol 4.5 MICROgram(s) Inhaler 2 Puff(s) Inhalation two times a day  cefepime   IVPB 2000 milliGRAM(s) IV Intermittent every 12 hours  chlorhexidine 4% Liquid 1 Application(s) Topical <User Schedule>  diazepam    Tablet 5 milliGRAM(s) Oral two times a day PRN  diphenhydrAMINE 50 milliGRAM(s) Oral every 6 hours PRN  docusate sodium 100 milliGRAM(s) Oral three times a day  DULoxetine 60 milliGRAM(s) Oral daily  enoxaparin Injectable 40 milliGRAM(s) SubCutaneous daily  fluocinonide 0.05% Ointment 1 Application(s) Topical two times a day  hydrocortisone 1% Cream 1 Application(s) Topical two times a day  hydrOXYzine  Oral Tab/Cap - Peds 10 milliGRAM(s) Oral three times a day  ibuprofen  Tablet. 600 milliGRAM(s) Oral every 6 hours PRN  levothyroxine 100 MICROGram(s) Oral daily  loratadine 10 milliGRAM(s) Oral daily  metoprolol tartrate 12.5 milliGRAM(s) Oral two times a day  morphine ER Tablet 15 milliGRAM(s) Oral two times a day  oxybutynin 5 milliGRAM(s) Oral two times a day  oxyCODONE    5 mG/acetaminophen 325 mG 2 Tablet(s) Oral every 6 hours PRN  pantoprazole    Tablet 40 milliGRAM(s) Oral before breakfast  pregabalin 150 milliGRAM(s) Oral three times a day  rifampin 600 milliGRAM(s) Oral daily  senna 2 Tablet(s) Oral at bedtime  simethicone 80 milliGRAM(s) Chew two times a day PRN  sodium chloride 0.9% lock flush 3 milliLiter(s) IV Push every 8 hours      ***************************************************  RADIOLOGY & ADDITIONAL TESTS:    Imaging Personally Reviewed:  [ ] YES  [ ] NO    HEALTH ISSUES - PROBLEM Dx:
CALLY HARTMAN  53y  Female      Patient is a 53y old  Female who presents with a chief complaint of right foot infection (08 Feb 2019 13:12)      INTERVAL HPI/OVERNIGHT EVENTS:      ******************************* REVIEW OF SYSTEMS:**********************************************    All other review of systems negative    *********************** VITALS ******************************************    T(F): 98.2 (02-09-19 @ 05:23)  HR: 108 (02-09-19 @ 07:12) (85 - 115)  BP: 148/71 (02-09-19 @ 05:23) (81/43 - 148/71)  RR: 18 (02-09-19 @ 05:23) (17 - 22)  SpO2: 98% (02-08-19 @ 18:00) (95% - 98%)    02-09-19 @ 07:01  -  02-09-19 @ 11:35  --------------------------------------------------------  IN: 0 mL / OUT: 100 mL / NET: -100 mL            02-09-19 @ 07:01  -  02-09-19 @ 11:35  --------------------------------------------------------  IN: 0 mL / OUT: 100 mL / NET: -100 mL        ******************************** PHYSICAL EXAM:**************************************************  GENERAL: NAD    PSYCH: no agitation, baseline mentation  HEENT:     NERVOUS SYSTEM:  Alert & Oriented X3,    PULMONARY: [place], CTA    CARDIOVASCULAR: S1S2 RRR    GI: Soft, NT, ND; BS present.    EXTREMITIES:  Right foot wound with dfressing    LYMPH: No lymphadenopathy noted    SKIN: as above    ******************************************************************************************    Consultant(s) Notes Reviewed:  [x ] YES  [ ] NO    Discussed with Consultants/Other Providers [ x] YES     **************************** LABS *******************************************************                          10.3   8.37  )-----------( 334      ( 09 Feb 2019 06:55 )             33.0     02-08    141  |  101  |  6<L>  ----------------------------<  102<H>  4.1   |  23  |  0.6<L>    Ca    9.5      08 Feb 2019 02:52  Phos  4.1     02-08  Mg     2.0     02-09          PT/INR - ( 08 Feb 2019 02:52 )   PT: 12.70 sec;   INR: 1.11 ratio         PTT - ( 08 Feb 2019 02:52 )  PTT:36.0 sec  Lactate Trend        CAPILLARY BLOOD GLUCOSE      POCT Blood Glucose.: 140 mg/dL (09 Feb 2019 07:54)          **************************Active Medications *******************************************  No Known Allergies      ALBUTerol    90 MICROgram(s) HFA Inhaler 2 Puff(s) Inhalation every 6 hours PRN  AQUAPHOR (petrolatum Ointment) 1 Application(s) Topical two times a day  aspirin enteric coated 81 milliGRAM(s) Oral daily  atorvastatin 40 milliGRAM(s) Oral at bedtime  BACItracin   Ointment 1 Application(s) Topical three times a day  buDESOnide  80 MICROgram(s)/formoterol 4.5 MICROgram(s) Inhaler 2 Puff(s) Inhalation two times a day  cefepime   IVPB 2000 milliGRAM(s) IV Intermittent every 12 hours  diphenhydrAMINE 50 milliGRAM(s) Oral every 6 hours PRN  DULoxetine 60 milliGRAM(s) Oral daily  enoxaparin Injectable 40 milliGRAM(s) SubCutaneous daily  fluocinonide 0.05% Ointment 1 Application(s) Topical two times a day  hydrocortisone 1% Cream 1 Application(s) Topical two times a day  hydrOXYzine  Oral Tab/Cap - Peds 10 milliGRAM(s) Oral three times a day  ibuprofen  Tablet. 600 milliGRAM(s) Oral every 6 hours PRN  levothyroxine 100 MICROGram(s) Oral daily  metoprolol tartrate 12.5 milliGRAM(s) Oral two times a day  oxybutynin 5 milliGRAM(s) Oral two times a day  oxyCODONE    5 mG/acetaminophen 325 mG 2 Tablet(s) Oral every 6 hours PRN  oxyCODONE  ER Tablet 20 milliGRAM(s) Oral every 12 hours  pantoprazole    Tablet 40 milliGRAM(s) Oral before breakfast  pregabalin 150 milliGRAM(s) Oral three times a day  rifampin 600 milliGRAM(s) Oral daily  senna 2 Tablet(s) Oral at bedtime  sodium chloride 0.9% lock flush 3 milliLiter(s) IV Push every 8 hours  vitamin A &amp; D Ointment 1 Application(s) Topical daily      ***************************************************  RADIOLOGY & ADDITIONAL TESTS:    Imaging Personally Reviewed:  [ ] YES  [ ] NO    HEALTH ISSUES - PROBLEM Dx:
CALLY HARTMAN  53y  Female      Patient is a 53y old  Female who presents with a chief complaint of right foot infection (09 Feb 2019 11:35)      INTERVAL HPI/OVERNIGHT EVENTS:      ******************************* REVIEW OF SYSTEMS:**********************************************    All other review of systems negative    *********************** VITALS ******************************************    T(F): 98.8 (02-10-19 @ 05:40)  HR: 113 (02-10-19 @ 06:00) (85 - 113)  BP: 105/55 (02-10-19 @ 06:00) (89/53 - 116/56)  RR: 18 (02-10-19 @ 05:40) (18 - 18)  SpO2: 100% (02-10-19 @ 08:00) (95% - 100%)    02-09-19 @ 07:01  -  02-10-19 @ 07:00  --------------------------------------------------------  IN: 0 mL / OUT: 100 mL / NET: -100 mL            02-09-19 @ 07:01  -  02-10-19 @ 07:00  --------------------------------------------------------  IN: 0 mL / OUT: 100 mL / NET: -100 mL        ******************************** PHYSICAL EXAM:**************************************************  GENERAL: NAD    PSYCH: no agitation, baseline mentation  HEENT:     NERVOUS SYSTEM:  Alert & Oriented X3,     PULMONARY: [place], CTA    CARDIOVASCULAR: S1S2 RRR    GI: Soft, NT, ND; BS present.    EXTREMITIES:  Right foot wound with dressing.    LYMPH: No lymphadenopathy noted    SKIN: as above    ******************************************************************************************    Consultant(s) Notes Reviewed:  [x ] YES  [ ] NO    Discussed with Consultants/Other Providers [ x] YES     **************************** LABS *******************************************************                          10.3   8.37  )-----------( 334      ( 09 Feb 2019 06:55 )             33.0       Mg     2.0     02-09            Lactate Trend        CAPILLARY BLOOD GLUCOSE      POCT Blood Glucose.: 116 mg/dL (10 Feb 2019 11:30)          **************************Active Medications *******************************************  No Known Allergies      ALBUTerol    90 MICROgram(s) HFA Inhaler 2 Puff(s) Inhalation every 6 hours PRN  AQUAPHOR (petrolatum Ointment) 1 Application(s) Topical two times a day  aspirin enteric coated 81 milliGRAM(s) Oral daily  atorvastatin 40 milliGRAM(s) Oral at bedtime  BACItracin   Ointment 1 Application(s) Topical three times a day  buDESOnide  80 MICROgram(s)/formoterol 4.5 MICROgram(s) Inhaler 2 Puff(s) Inhalation two times a day  cefepime   IVPB 2000 milliGRAM(s) IV Intermittent every 12 hours  chlorhexidine 4% Liquid 1 Application(s) Topical <User Schedule>  dextrose 40% Gel 15 Gram(s) Oral once PRN  dextrose 5%. 1000 milliLiter(s) IV Continuous <Continuous>  dextrose 50% Injectable 12.5 Gram(s) IV Push once  dextrose 50% Injectable 25 Gram(s) IV Push once  dextrose 50% Injectable 25 Gram(s) IV Push once  diphenhydrAMINE 50 milliGRAM(s) Oral every 6 hours PRN  DULoxetine 60 milliGRAM(s) Oral daily  enoxaparin Injectable 40 milliGRAM(s) SubCutaneous daily  fluocinonide 0.05% Ointment 1 Application(s) Topical two times a day  glucagon  Injectable 1 milliGRAM(s) IntraMuscular once PRN  hydrocortisone 1% Cream 1 Application(s) Topical two times a day  hydrOXYzine  Oral Tab/Cap - Peds 10 milliGRAM(s) Oral three times a day  ibuprofen  Tablet. 600 milliGRAM(s) Oral every 6 hours PRN  insulin lispro (HumaLOG) corrective regimen sliding scale   SubCutaneous three times a day before meals  levothyroxine 100 MICROGram(s) Oral daily  metoprolol tartrate 12.5 milliGRAM(s) Oral two times a day  ondansetron Injectable 4 milliGRAM(s) IV Push every 6 hours PRN  oxybutynin 5 milliGRAM(s) Oral two times a day  oxyCODONE    5 mG/acetaminophen 325 mG 2 Tablet(s) Oral every 6 hours PRN  oxyCODONE  ER Tablet 20 milliGRAM(s) Oral every 12 hours  pantoprazole    Tablet 40 milliGRAM(s) Oral before breakfast  pregabalin 150 milliGRAM(s) Oral three times a day  rifampin 600 milliGRAM(s) Oral daily  senna 2 Tablet(s) Oral at bedtime  sodium chloride 0.9% lock flush 3 milliLiter(s) IV Push every 8 hours  vitamin A &amp; D Ointment 1 Application(s) Topical daily      ***************************************************  RADIOLOGY & ADDITIONAL TESTS:    Imaging Personally Reviewed:  [ ] YES  [ ] NO    HEALTH ISSUES - PROBLEM Dx:
CALLY HARTMAN  53y  Female      Patient is a 53y old  Female who presents with a chief complaint of right foot infection (15 Feb 2019 13:16)      INTERVAL HPI/OVERNIGHT EVENTS: pain well controlled. no new complaints      REVIEW OF SYSTEMS:  as above  All other review of systems negative    T(C): 36.2 (02-15-19 @ 13:00), Max: 36.7 (02-14-19 @ 22:00)  HR: 105 (02-15-19 @ 17:45) (86 - 113)  BP: 115/79 (02-15-19 @ 17:45) (115/56 - 147/65)  RR: 15 (02-15-19 @ 13:00) (15 - 18)  SpO2: --  Wt(kg): --Vital Signs Last 24 Hrs  T(C): 36.2 (15 Feb 2019 13:00), Max: 36.7 (14 Feb 2019 22:00)  T(F): 97.2 (15 Feb 2019 13:00), Max: 98.1 (14 Feb 2019 22:00)  HR: 105 (15 Feb 2019 17:45) (86 - 113)  BP: 115/79 (15 Feb 2019 17:45) (115/56 - 147/65)  BP(mean): --  RR: 15 (15 Feb 2019 13:00) (15 - 18)  SpO2: --      02-15-19 @ 07:01  -  02-15-19 @ 18:43  --------------------------------------------------------  IN: 900 mL / OUT: 450 mL / NET: 450 mL        PHYSICAL EXAM:  GENERAL: NAD  PSYCH: no agitation, baseline mentation  NERVOUS SYSTEM:  Alert & Oriented X3, no new focal deficits  PULMONARY: Clear to percussion bilaterally; No rales, rhonchi, wheezing, or rubs  CARDIOVASCULAR: Regular rate and rhythm; No murmurs, rubs, or gallops  GI: Soft, Nontender, Nondistended; Bowel sounds present rotund  EXTREMITIES: UE PICC CDI, R foot dressed- new pictures reviewed improving- right foot surgical sites with pink granular wound bases and medial foot surgical site with pink granular wound base; no areas of necrosis/ escar formation appreciated, no overt appreciable purulent drainage on pictures    Consultant(s) Notes Reviewed:  [x ] YES  [ ] NO    Discussed with Consultants/Other Providers [ x] YES     LABS                  Lactate Trend        CAPILLARY BLOOD GLUCOSE      POCT Blood Glucose.: 157 mg/dL (15 Feb 2019 17:03)        RADIOLOGY & ADDITIONAL TESTS:    Imaging Personally Reviewed:  [ ] YES  [ ] NO    HEALTH ISSUES - PROBLEM Dx:          #Progress Note Handoff    Pending (specify):  Consults_________, Tests________, Test Results_______, Other_wound vac change sunday________    Family discussion:    Disposition: Home___/SNF___/Other________/Unknown at this time_____x___
CALLY HARTMAN  53y  Female      Patient is a 53y old  Female who presents with a chief complaint of right foot infection (17 Feb 2019 14:35)      INTERVAL HPI/OVERNIGHT EVENTS: no acute complaints. if possible would like to go home tomorrow      REVIEW OF SYSTEMS:  as above  All other review of systems negative    T(C): 35.8 (02-17-19 @ 13:24), Max: 36.5 (02-17-19 @ 06:13)  HR: 110 (02-17-19 @ 13:24) (81 - 110)  BP: 136/72 (02-17-19 @ 17:28) (118/56 - 136/72)  RR: 18 (02-17-19 @ 13:24) (18 - 18)  SpO2: --  Wt(kg): --Vital Signs Last 24 Hrs  T(C): 35.8 (17 Feb 2019 13:24), Max: 36.5 (17 Feb 2019 06:13)  T(F): 96.5 (17 Feb 2019 13:24), Max: 97.7 (17 Feb 2019 06:13)  HR: 110 (17 Feb 2019 13:24) (81 - 110)  BP: 136/72 (17 Feb 2019 17:28) (118/56 - 136/72)  BP(mean): --  RR: 18 (17 Feb 2019 13:24) (18 - 18)  SpO2: --        PHYSICAL EXAM:  GENERAL: NAD  PSYCH: no agitation, baseline mentation  NERVOUS SYSTEM:  Alert & Oriented X3, no new focal deficits  PULMONARY: Clear to percussion bilaterally; No rales, rhonchi, wheezing, or rubs  CARDIOVASCULAR: Regular rate and rhythm; No murmurs, rubs, or gallops  GI: Soft, Nontender, Nondistended; Bowel sounds present rotund  EXTREMITIES:  dressing applied, new pictures reviewed, 3 open areas healing well, no purulent drainage, granulating tissue; PICC cdi    Consultant(s) Notes Reviewed:  [x ] YES  [ ] NO    Discussed with Consultants/Other Providers [ x] YES     LABS                          11.2   7.42  )-----------( 330      ( 17 Feb 2019 06:40 )             36.7     02-17    141  |  105  |  5<L>  ----------------------------<  226<H>  4.5   |  21  |  0.6<L>    Ca    9.3      17 Feb 2019 06:40            Lactate Trend        CAPILLARY BLOOD GLUCOSE      POCT Blood Glucose.: 124 mg/dL (17 Feb 2019 16:42)        RADIOLOGY & ADDITIONAL TESTS:    Imaging Personally Reviewed:  [ ] YES  [ ] NO    HEALTH ISSUES - PROBLEM Dx:          #Progress Note Handoff    Pending (specify):  Consults_________, Tests________, Test Results_______, Other_________    Family discussion:    Disposition: Home_x__/SNF___/Other________/Unknown at this time________
CALLY HARTMAN  53y  Female      Patient is a 53y old  Female who presents with a chief complaint of right foot infection (17 Feb 2019 19:39)      INTERVAL HPI/OVERNIGHT EVENTS: had diffuse body rash rxn to ceftriaxone, tolerated iv clinda      REVIEW OF SYSTEMS:  as above  All other review of systems negative    T(C): 35.7 (02-18-19 @ 13:05), Max: 36.8 (02-18-19 @ 05:07)  HR: 116 (02-18-19 @ 18:03) (95 - 116)  BP: 140/76 (02-18-19 @ 18:03) (97/51 - 140/76)  RR: 18 (02-18-19 @ 18:03) (15 - 18)  SpO2: 100% (02-18-19 @ 08:17) (100% - 100%)  Wt(kg): --Vital Signs Last 24 Hrs  T(C): 35.7 (18 Feb 2019 13:05), Max: 36.8 (18 Feb 2019 05:07)  T(F): 96.2 (18 Feb 2019 13:05), Max: 98.3 (18 Feb 2019 05:07)  HR: 116 (18 Feb 2019 18:03) (95 - 116)  BP: 140/76 (18 Feb 2019 18:03) (97/51 - 140/76)  BP(mean): --  RR: 18 (18 Feb 2019 18:03) (15 - 18)  SpO2: 100% (18 Feb 2019 08:17) (100% - 100%)        PHYSICAL EXAM:  GENERAL: NAD  PSYCH: no agitation, baseline mentation  NERVOUS SYSTEM:  Alert & Oriented X3, no new focal deficits  PULMONARY: Clear to percussion bilaterally; No rales, rhonchi, wheezing, or rubs  CARDIOVASCULAR: Regular rate and rhythm; No murmurs, rubs, or gallops  GI: Soft, Nontender, Nondistended; Bowel sounds present rotund  EXTREMITIES:  LE dressed; on nknee scooter; UE PICC CDI    Consultant(s) Notes Reviewed:  [x ] YES  [ ] NO    Discussed with Consultants/Other Providers [ x] YES     LABS                          11.2   7.42  )-----------( 330      ( 17 Feb 2019 06:40 )             36.7     02-17    141  |  105  |  5<L>  ----------------------------<  226<H>  4.5   |  21  |  0.6<L>    Ca    9.3      17 Feb 2019 06:40            Lactate Trend        CAPILLARY BLOOD GLUCOSE      POCT Blood Glucose.: 169 mg/dL (18 Feb 2019 16:49)        RADIOLOGY & ADDITIONAL TESTS:    Imaging Personally Reviewed:  [ ] YES  [ ] NO    HEALTH ISSUES - PROBLEM Dx:          #Progress Note Handoff    Pending (specify):  Consults_________, Tests________, Test Results_______, Other_________    Family discussion:    Disposition: Home_x__/SNF___/Other________/Unknown at this time________
CALLY HARTMAN  53y, Female      OVERNIGHT EVENTS:  afebrile    ROS negative except as per above    VITALS:  T(F): 98.2, Max: 98.2 (02-04-19 @ 13:53)  HR: 101  BP: 102/58  RR: 18Vital Signs Last 24 Hrs  T(C): 36.8 (04 Feb 2019 13:53), Max: 36.8 (04 Feb 2019 13:53)  T(F): 98.2 (04 Feb 2019 13:53), Max: 98.2 (04 Feb 2019 13:53)  HR: 101 (04 Feb 2019 13:53) (101 - 106)  BP: 102/58 (04 Feb 2019 13:53) (102/58 - 136/76)  BP(mean): --  RR: 18 (04 Feb 2019 13:53) (18 - 18)  SpO2: --    PHYSICAL EXAM:  GENERAL: NAD, well-groomed, well-developed  CHEST/LUNG: Clear to auscultation bilaterally; No rales, rhonchi, wheezing, or rubs  HEART: Regular rate and rhythm; No murmurs, rubs, or gallops  ABDOMEN: Soft, Nontender, Nondistended; Bowel sounds present  EXTREMITIES:  Right foot wound dehiscence at surgical site, erythematous, edematous and tender, sutures in place, no purulence      TESTS & MEASUREMENTS:                        10.8   10.52 )-----------( 365      ( 04 Feb 2019 05:11 )             34.4     02-04    141  |  100  |  7<L>  ----------------------------<  111<H>  4.3   |  25  |  0.6<L>    Ca    9.2      04 Feb 2019 05:11  Mg     1.9     02-04    TPro  6.0  /  Alb  3.1<L>  /  TBili  0.2  /  DBili  x   /  AST  17  /  ALT  11  /  AlkPhos  104  02-04    LIVER FUNCTIONS - ( 04 Feb 2019 05:11 )  Alb: 3.1 g/dL / Pro: 6.0 g/dL / ALK PHOS: 104 U/L / ALT: 11 U/L / AST: 17 U/L / GGT: x             Culture - Other (collected 01-30-19 @ 13:57)  Source: .Other Right 2nd toe ucler  Final Report (02-02-19 @ 21:20):    Few Staphylococcus aureus  Organism: Staphylococcus aureus (02-02-19 @ 21:20)  Organism: Staphylococcus aureus (02-02-19 @ 21:20)      -  Ampicillin/Sulbactam: S <=8/4      -  Cefazolin: S <=4      -  Clindamycin: S 0.5      -  Erythromycin: S 0.5      -  Gentamicin: S <=1 Should not be used as monotherapy      -  Oxacillin: S <=0.25      -  Penicillin: R >8      -  RIF- Rifampin: S <=1 Should not be used as monotherapy      -  Tetra/Doxy: S <=1      -  Trimethoprim/Sulfamethoxazole: S <=0.5/9.5      -  Vancomycin: S 2      Method Type: NOHEMY    Culture - Blood (collected 01-30-19 @ 13:56)  Source: .Blood Blood  Preliminary Report (01-31-19 @ 23:01):    No growth to date.          RADIOLOGY & ADDITIONAL TESTS:    ANTIBIOTICS:    cefepime   IVPB   100 mL/Hr IV Intermittent (01-31-19 @ 11:36)    cefepime   IVPB   100 mL/Hr IV Intermittent (02-04-19 @ 05:21)   100 mL/Hr IV Intermittent (02-03-19 @ 17:18)   100 mL/Hr IV Intermittent (02-03-19 @ 05:06)   100 mL/Hr IV Intermittent (02-02-19 @ 17:27)   100 mL/Hr IV Intermittent (02-01-19 @ 17:43)   100 mL/Hr IV Intermittent (02-01-19 @ 05:13)   100 mL/Hr IV Intermittent (01-31-19 @ 17:14)    piperacillin/tazobactam IVPB.   200 mL/Hr IV Intermittent (01-30-19 @ 17:00)    piperacillin/tazobactam IVPB.   200 mL/Hr IV Intermittent (01-30-19 @ 21:35)    rifampin   600 milliGRAM(s) Oral (02-04-19 @ 12:12)   600 milliGRAM(s) Oral (02-03-19 @ 11:25)   600 milliGRAM(s) Oral (02-02-19 @ 11:43)   600 milliGRAM(s) Oral (02-01-19 @ 11:50)   600 milliGRAM(s) Oral (01-31-19 @ 17:48)    vancomycin  IVPB   250 mL/Hr IV Intermittent (01-30-19 @ 15:08)    vancomycin  IVPB   166.67 mL/Hr IV Intermittent (02-03-19 @ 17:18)   166.67 mL/Hr IV Intermittent (02-03-19 @ 05:05)   166.67 mL/Hr IV Intermittent (02-02-19 @ 17:27)   166.67 mL/Hr IV Intermittent (02-01-19 @ 17:43)   166.67 mL/Hr IV Intermittent (01-31-19 @ 17:48)   166.67 mL/Hr IV Intermittent (01-31-19 @ 05:42)        cefepime   IVPB 2000 milliGRAM(s) IV Intermittent every 12 hours  rifampin 600 milliGRAM(s) Oral daily
CALLY HARTMAN  53y, Female      OVERNIGHT EVENTS:  no acute events overnight  OR today    ROS negative except as per above    VITALS:  T(F): 98.9, Max: 98.9 (02-08-19 @ 07:17)  HR: 117  BP: 133/71  RR: 18Vital Signs Last 24 Hrs  T(C): 37.2 (08 Feb 2019 07:17), Max: 37.2 (08 Feb 2019 07:17)  T(F): 98.9 (08 Feb 2019 07:17), Max: 98.9 (08 Feb 2019 07:17)  HR: 117 (08 Feb 2019 07:17) (73 - 117)  BP: 133/71 (08 Feb 2019 07:17) (116/53 - 145/70)  BP(mean): --  RR: 18 (08 Feb 2019 07:17) (18 - 18)  SpO2: 94% (08 Feb 2019 07:17) (94% - 94%)    PHYSICAL EXAM:  GENERAL: NAD, well-groomed, well-developed  CHEST/LUNG: Clear to auscultation bilaterally; No rales, rhonchi, wheezing, or rubs  HEART: Regular rate and rhythm; No murmurs, rubs, or gallops  ABDOMEN: Soft, Nontender, Nondistended; Bowel sounds present  EXTREMITIES:  Right foot dressings        TESTS & MEASUREMENTS:                        10.8   10.83 )-----------( 355      ( 08 Feb 2019 02:52 )             34.2     02-08    141  |  101  |  6<L>  ----------------------------<  102<H>  4.1   |  23  |  0.6<L>    Ca    9.5      08 Feb 2019 02:52  Phos  4.1     02-08  Mg     1.6     02-08              RADIOLOGY & ADDITIONAL TESTS:    ANTIBIOTICS:    cefepime   IVPB   100 mL/Hr IV Intermittent (01-31-19 @ 11:36)    cefepime   IVPB   100 mL/Hr IV Intermittent (02-08-19 @ 05:32)   100 mL/Hr IV Intermittent (02-07-19 @ 17:38)   100 mL/Hr IV Intermittent (02-07-19 @ 05:23)   100 mL/Hr IV Intermittent (02-06-19 @ 17:43)   100 mL/Hr IV Intermittent (02-06-19 @ 05:45)   100 mL/Hr IV Intermittent (02-05-19 @ 17:38)   100 mL/Hr IV Intermittent (02-05-19 @ 05:27)   100 mL/Hr IV Intermittent (02-04-19 @ 18:02)   100 mL/Hr IV Intermittent (02-04-19 @ 05:21)   100 mL/Hr IV Intermittent (02-03-19 @ 17:18)   100 mL/Hr IV Intermittent (02-03-19 @ 05:06)   100 mL/Hr IV Intermittent (02-02-19 @ 17:27)   100 mL/Hr IV Intermittent (02-01-19 @ 17:43)   100 mL/Hr IV Intermittent (02-01-19 @ 05:13)   100 mL/Hr IV Intermittent (01-31-19 @ 17:14)    piperacillin/tazobactam IVPB.   200 mL/Hr IV Intermittent (01-30-19 @ 17:00)    piperacillin/tazobactam IVPB.   200 mL/Hr IV Intermittent (01-30-19 @ 21:35)    rifampin   600 milliGRAM(s) Oral (02-07-19 @ 11:47)   600 milliGRAM(s) Oral (02-06-19 @ 11:50)   600 milliGRAM(s) Oral (02-05-19 @ 11:44)   600 milliGRAM(s) Oral (02-04-19 @ 12:12)   600 milliGRAM(s) Oral (02-03-19 @ 11:25)   600 milliGRAM(s) Oral (02-02-19 @ 11:43)   600 milliGRAM(s) Oral (02-01-19 @ 11:50)   600 milliGRAM(s) Oral (01-31-19 @ 17:48)    vancomycin  IVPB   250 mL/Hr IV Intermittent (01-30-19 @ 15:08)    vancomycin  IVPB   166.67 mL/Hr IV Intermittent (02-03-19 @ 17:18)   166.67 mL/Hr IV Intermittent (02-03-19 @ 05:05)   166.67 mL/Hr IV Intermittent (02-02-19 @ 17:27)   166.67 mL/Hr IV Intermittent (02-01-19 @ 17:43)   166.67 mL/Hr IV Intermittent (01-31-19 @ 17:48)   166.67 mL/Hr IV Intermittent (01-31-19 @ 05:42)        cefepime   IVPB 2000 milliGRAM(s) IV Intermittent every 12 hours  rifampin 600 milliGRAM(s) Oral daily
CALLY HARTMAN  53y, Female      OVERNIGHT EVENTS:  no acute events overnight  cx reincubated    ROS negative except as per above    VITALS:  T(F): 96.1, Max: 98.7 (01-31-19 @ 14:23)  HR: 95  BP: 92/53  RR: 18Vital Signs Last 24 Hrs  T(C): 35.6 (01 Feb 2019 05:26), Max: 37.1 (31 Jan 2019 14:23)  T(F): 96.1 (01 Feb 2019 05:26), Max: 98.7 (31 Jan 2019 14:23)  HR: 95 (01 Feb 2019 05:26) (84 - 95)  BP: 92/53 (01 Feb 2019 05:26) (92/53 - 120/69)  BP(mean): --  RR: 18 (01 Feb 2019 05:26) (17 - 18)  SpO2: --    PHYSICAL EXAM:  GENERAL: NAD, well-groomed, well-developed  CHEST/LUNG: Clear to auscultation bilaterally; No rales, rhonchi, wheezing, or rubs  HEART: Regular rate and rhythm; No murmurs, rubs, or gallops  ABDOMEN: Soft, Nontender, Nondistended; Bowel sounds present  EXTREMITIES:  Right foot wound dehiscence at surgical site, erythematous, edematous and tender, sutures in place, no purulence      TESTS & MEASUREMENTS:                        9.6    7.79  )-----------( 328      ( 01 Feb 2019 05:45 )             30.7     02-01    143  |  104  |  8<L>  ----------------------------<  94  4.6   |  24  |  1.0    Ca    8.8      01 Feb 2019 05:45  Mg     1.7     02-01    TPro  5.5<L>  /  Alb  2.9<L>  /  TBili  0.4  /  DBili  x   /  AST  16  /  ALT  12  /  AlkPhos  101  02-01    LIVER FUNCTIONS - ( 01 Feb 2019 05:45 )  Alb: 2.9 g/dL / Pro: 5.5 g/dL / ALK PHOS: 101 U/L / ALT: 12 U/L / AST: 16 U/L / GGT: x             Culture - Other (collected 01-30-19 @ 13:57)  Source: .Other Right 2nd toe ucler  Preliminary Report (01-31-19 @ 20:44):    Insufficient growth-culture reincubated    Culture - Blood (collected 01-30-19 @ 13:56)  Source: .Blood Blood  Preliminary Report (01-31-19 @ 23:01):    No growth to date.          RADIOLOGY & ADDITIONAL TESTS:    ANTIBIOTICS:    cefepime   IVPB   100 mL/Hr IV Intermittent (01-31-19 @ 11:36)    cefepime   IVPB   100 mL/Hr IV Intermittent (02-01-19 @ 05:13)   100 mL/Hr IV Intermittent (01-31-19 @ 17:14)    piperacillin/tazobactam IVPB.   200 mL/Hr IV Intermittent (01-30-19 @ 17:00)    piperacillin/tazobactam IVPB.   200 mL/Hr IV Intermittent (01-30-19 @ 21:35)    rifampin   600 milliGRAM(s) Oral (01-31-19 @ 17:48)    vancomycin  IVPB   250 mL/Hr IV Intermittent (01-30-19 @ 15:08)    vancomycin  IVPB   166.67 mL/Hr IV Intermittent (01-31-19 @ 17:48)   166.67 mL/Hr IV Intermittent (01-31-19 @ 05:42)        cefepime   IVPB 2000 milliGRAM(s) IV Intermittent every 12 hours  rifampin 600 milliGRAM(s) Oral daily  vancomycin  IVPB 1250 milliGRAM(s) IV Intermittent every 12 hours
CALLY HARTMNA  53y, Female      OVERNIGHT EVENTS:  afebrile    ROS negative except as per above    VITALS:  T(F): 97.2, Max: 98.1 (02-14-19 @ 22:00)  HR: 113  BP: 120/58  RR: 18Vital Signs Last 24 Hrs  T(C): 36.2 (15 Feb 2019 04:54), Max: 36.7 (14 Feb 2019 22:00)  T(F): 97.2 (15 Feb 2019 04:54), Max: 98.1 (14 Feb 2019 22:00)  HR: 113 (15 Feb 2019 04:54) (86 - 113)  BP: 120/58 (15 Feb 2019 04:54) (110/52 - 147/65)  BP(mean): --  RR: 18 (15 Feb 2019 04:54) (18 - 18)  SpO2: --    PHYSICAL EXAM:  GENERAL: NAD, well-groomed, well-developed  CHEST/LUNG: Clear to auscultation bilaterally; No rales, rhonchi, wheezing, or rubs  HEART: Regular rate and rhythm; No murmurs, rubs, or gallops  ABDOMEN: Soft, Nontender, Nondistended; Bowel sounds present  EXTREMITIES:  Right foot dressings      TESTS & MEASUREMENTS:                  RADIOLOGY & ADDITIONAL TESTS:    ANTIBIOTICS:  cefepime   IVPB   100 mL/Hr IV Intermittent (02-15-19 @ 05:42)   100 mL/Hr IV Intermittent (02-14-19 @ 17:31)   100 mL/Hr IV Intermittent (02-14-19 @ 06:03)   100 mL/Hr IV Intermittent (02-13-19 @ 18:12)   100 mL/Hr IV Intermittent (02-13-19 @ 05:51)   100 mL/Hr IV Intermittent (02-12-19 @ 17:13)   100 mL/Hr IV Intermittent (02-12-19 @ 06:37)   100 mL/Hr IV Intermittent (02-11-19 @ 17:23)   100 mL/Hr IV Intermittent (02-11-19 @ 05:52)   100 mL/Hr IV Intermittent (02-10-19 @ 18:19)   100 mL/Hr IV Intermittent (02-10-19 @ 05:58)   100 mL/Hr IV Intermittent (02-09-19 @ 20:09)   100 mL/Hr IV Intermittent (02-09-19 @ 09:54)   100 mL/Hr IV Intermittent (02-08-19 @ 22:34)    cefepime   IVPB   100 mL/Hr IV Intermittent (01-31-19 @ 11:36)    cefepime   IVPB   100 mL/Hr IV Intermittent (02-08-19 @ 05:32)   100 mL/Hr IV Intermittent (02-07-19 @ 17:38)   100 mL/Hr IV Intermittent (02-07-19 @ 05:23)   100 mL/Hr IV Intermittent (02-06-19 @ 17:43)   100 mL/Hr IV Intermittent (02-06-19 @ 05:45)   100 mL/Hr IV Intermittent (02-05-19 @ 17:38)   100 mL/Hr IV Intermittent (02-05-19 @ 05:27)   100 mL/Hr IV Intermittent (02-04-19 @ 18:02)   100 mL/Hr IV Intermittent (02-04-19 @ 05:21)   100 mL/Hr IV Intermittent (02-03-19 @ 17:18)   100 mL/Hr IV Intermittent (02-03-19 @ 05:06)   100 mL/Hr IV Intermittent (02-02-19 @ 17:27)   100 mL/Hr IV Intermittent (02-01-19 @ 17:43)   100 mL/Hr IV Intermittent (02-01-19 @ 05:13)   100 mL/Hr IV Intermittent (01-31-19 @ 17:14)    piperacillin/tazobactam IVPB.   200 mL/Hr IV Intermittent (01-30-19 @ 17:00)    piperacillin/tazobactam IVPB.   200 mL/Hr IV Intermittent (01-30-19 @ 21:35)    rifampin   600 milliGRAM(s) Oral (02-14-19 @ 13:49)   600 milliGRAM(s) Oral (02-13-19 @ 12:33)   600 milliGRAM(s) Oral (02-12-19 @ 12:04)   600 milliGRAM(s) Oral (02-11-19 @ 12:43)   600 milliGRAM(s) Oral (02-10-19 @ 11:41)   600 milliGRAM(s) Oral (02-09-19 @ 12:00)    rifampin   600 milliGRAM(s) Oral (02-08-19 @ 12:30)   600 milliGRAM(s) Oral (02-07-19 @ 11:47)   600 milliGRAM(s) Oral (02-06-19 @ 11:50)   600 milliGRAM(s) Oral (02-05-19 @ 11:44)   600 milliGRAM(s) Oral (02-04-19 @ 12:12)   600 milliGRAM(s) Oral (02-03-19 @ 11:25)   600 milliGRAM(s) Oral (02-02-19 @ 11:43)   600 milliGRAM(s) Oral (02-01-19 @ 11:50)   600 milliGRAM(s) Oral (01-31-19 @ 17:48)    vancomycin  IVPB   250 mL/Hr IV Intermittent (01-30-19 @ 15:08)    vancomycin  IVPB   166.67 mL/Hr IV Intermittent (02-03-19 @ 17:18)   166.67 mL/Hr IV Intermittent (02-03-19 @ 05:05)   166.67 mL/Hr IV Intermittent (02-02-19 @ 17:27)   166.67 mL/Hr IV Intermittent (02-01-19 @ 17:43)   166.67 mL/Hr IV Intermittent (01-31-19 @ 17:48)   166.67 mL/Hr IV Intermittent (01-31-19 @ 05:42)        cefepime   IVPB 2000 milliGRAM(s) IV Intermittent every 12 hours  rifampin 600 milliGRAM(s) Oral daily
HARTMANCALLY  53y  Female      Patient is a 53y old  Female who presents with a chief complaint of right foot infection (01 Feb 2019 15:24)      INTERVAL HPI/OVERNIGHT EVENTS: patient c/o itching in the left foot today and development of a red dry cracking skin with slight rash. no changes in the R dressed foot today. no other complaints      REVIEW OF SYSTEMS:  as above  All other review of systems negative    T(C): 36.2 (02-01-19 @ 15:04), Max: 36.5 (01-31-19 @ 21:56)  HR: 107 (02-01-19 @ 13:56) (85 - 107)  BP: 124/61 (02-01-19 @ 13:56) (92/53 - 124/61)  RR: 18 (02-01-19 @ 13:56) (18 - 18)  SpO2: --  Wt(kg): --Vital Signs Last 24 Hrs  T(C): 36.2 (01 Feb 2019 15:04), Max: 36.5 (31 Jan 2019 21:56)  T(F): 97.2 (01 Feb 2019 15:04), Max: 97.7 (31 Jan 2019 21:56)  HR: 107 (01 Feb 2019 13:56) (85 - 107)  BP: 124/61 (01 Feb 2019 13:56) (92/53 - 124/61)  BP(mean): --  RR: 18 (01 Feb 2019 13:56) (18 - 18)  SpO2: --        PHYSICAL EXAM:  GENERAL: NAD  PSYCH: no agitation, baseline mentation  NERVOUS SYSTEM:  Alert & Oriented X3, no new focal deficits  PULMONARY: Clear to percussion bilaterally; No rales, rhonchi, wheezing, or rubs  CARDIOVASCULAR: Regular rate and rhythm; No murmurs, rubs, or gallops  GI: Soft, Nontender, Nondistended; Bowel sounds present rotund  EXTREMITIES: R foot well dressed on splint with overlying compressive ace, new pictures reviewed showing similar dehisced wound although a bit of improvement in erythema and edema. there is deep tissue probing at surgical sites but no overt expressible pus, on the left foot is some dry cracking skin and visible excoriations in linear pattern indicative of scratching, no overyling warmth/rubor but there is pitting edema about 2+    Consultant(s) Notes Reviewed:  [x ] YES  [ ] NO    Discussed with Consultants/Other Providers [ x] YES     LABS                          9.6    7.79  )-----------( 328      ( 01 Feb 2019 05:45 )             30.7     02-01    143  |  104  |  8<L>  ----------------------------<  94  4.6   |  24  |  1.0    Ca    8.8      01 Feb 2019 05:45  Mg     1.7     02-01    TPro  5.5<L>  /  Alb  2.9<L>  /  TBili  0.4  /  DBili  x   /  AST  16  /  ALT  12  /  AlkPhos  101  02-01          Lactate Trend  01-30 @ 16:01 Lactate:1.9         CAPILLARY BLOOD GLUCOSE      POCT Blood Glucose.: 98 mg/dL (01 Feb 2019 16:56)        RADIOLOGY & ADDITIONAL TESTS:    Imaging Personally Reviewed:  [ ] YES  [ ] NO    HEALTH ISSUES - PROBLEM Dx:          #Progress Note Handoff    Pending (specify):  Consults_________, Tests________, Test Results_______, Other_________    Family discussion:    Disposition: Home___/SNF___/Other________/Unknown at this time________
HARTMANCALLY  53y  Female      Patient is a 53y old  Female who presents with a chief complaint of right foot infection (10 Feb 2019 11:51)      INTERVAL HPI/OVERNIGHT EVENTS:      ******************************* REVIEW OF SYSTEMS:**********************************************    All other review of systems negative    *********************** VITALS ******************************************    T(F): 98.2 (02-12-19 @ 06:02)  HR: 102 (02-12-19 @ 06:02) (95 - 107)  BP: 113/69 (02-12-19 @ 06:02) (107/69 - 116/58)  RR: 18 (02-12-19 @ 06:02) (18 - 18)  SpO2: --            ******************************** PHYSICAL EXAM:**************************************************  GENERAL: NAD    PSYCH: no agitation, baseline mentation  HEENT:     NERVOUS SYSTEM:  Alert & Oriented X3,   PULMONARY: [place], CTA    CARDIOVASCULAR: S1S2 RRR    GI: Soft, NT, ND; BS present.    EXTREMITIES:  Right foot post surgical wound with dressing and wound vac.    LYMPH: No lymphadenopathy noted    SKIN: as above    ******************************************************************************************    Consultant(s) Notes Reviewed:  [x ] YES  [ ] NO    Discussed with Consultants/Other Providers [ x] YES     **************************** LABS *******************************************************                          10.8   7.94  )-----------( 357      ( 12 Feb 2019 06:44 )             35.7     02-12    135  |  96<L>  |  5<L>  ----------------------------<  228<H>  4.5   |  21  |  0.7    Ca    9.1      12 Feb 2019 06:44  Mg     1.8     02-12            Lactate Trend        CAPILLARY BLOOD GLUCOSE      POCT Blood Glucose.: 122 mg/dL (12 Feb 2019 11:43)          **************************Active Medications *******************************************  No Known Allergies      ALBUTerol    90 MICROgram(s) HFA Inhaler 2 Puff(s) Inhalation every 6 hours PRN  AQUAPHOR (petrolatum Ointment) 1 Application(s) Topical two times a day  aspirin enteric coated 81 milliGRAM(s) Oral daily  atorvastatin 40 milliGRAM(s) Oral at bedtime  BACItracin   Ointment 1 Application(s) Topical three times a day  buDESOnide  80 MICROgram(s)/formoterol 4.5 MICROgram(s) Inhaler 2 Puff(s) Inhalation two times a day  cefepime   IVPB 2000 milliGRAM(s) IV Intermittent every 12 hours  chlorhexidine 4% Liquid 1 Application(s) Topical <User Schedule>  dextrose 40% Gel 15 Gram(s) Oral once PRN  dextrose 5%. 1000 milliLiter(s) IV Continuous <Continuous>  dextrose 50% Injectable 12.5 Gram(s) IV Push once  dextrose 50% Injectable 25 Gram(s) IV Push once  dextrose 50% Injectable 25 Gram(s) IV Push once  diphenhydrAMINE 50 milliGRAM(s) Oral every 4 hours PRN  docusate sodium 100 milliGRAM(s) Oral two times a day  DULoxetine 60 milliGRAM(s) Oral daily  enoxaparin Injectable 40 milliGRAM(s) SubCutaneous daily  fluocinonide 0.05% Ointment 1 Application(s) Topical two times a day  glucagon  Injectable 1 milliGRAM(s) IntraMuscular once PRN  hydrocortisone 1% Cream 1 Application(s) Topical two times a day  hydrOXYzine  Oral Tab/Cap - Peds 10 milliGRAM(s) Oral three times a day  ibuprofen  Tablet. 600 milliGRAM(s) Oral every 6 hours PRN  insulin lispro (HumaLOG) corrective regimen sliding scale   SubCutaneous three times a day before meals  levothyroxine 100 MICROGram(s) Oral daily  methocarbamol 500 milliGRAM(s) Oral three times a day  metoprolol tartrate 12.5 milliGRAM(s) Oral two times a day  ondansetron    Tablet 4 milliGRAM(s) Oral two times a day PRN  oxybutynin 5 milliGRAM(s) Oral two times a day  oxyCODONE    5 mG/acetaminophen 325 mG 2 Tablet(s) Oral every 6 hours PRN  oxyCODONE  ER Tablet 20 milliGRAM(s) Oral every 12 hours  pantoprazole    Tablet 40 milliGRAM(s) Oral before breakfast  pregabalin 150 milliGRAM(s) Oral three times a day  rifampin 600 milliGRAM(s) Oral daily  senna 2 Tablet(s) Oral at bedtime  sodium chloride 0.9% lock flush 3 milliLiter(s) IV Push every 8 hours  vitamin A &amp; D Ointment 1 Application(s) Topical daily      ***************************************************  RADIOLOGY & ADDITIONAL TESTS:    Imaging Personally Reviewed:  [ ] YES  [ ] NO    HEALTH ISSUES - PROBLEM Dx:
HARTMANCALLY WILKINS  53y  Female      Patient is a 53y old  Female who presents with a chief complaint of right foot infection (08 Feb 2019 10:46)      INTERVAL HPI/OVERNIGHT EVENTS:      ******************************* REVIEW OF SYSTEMS:**********************************************    Feeling better.  All other review of systems negative    *********************** VITALS ******************************************    T(F): 98.9 (02-08-19 @ 07:17)  HR: 117 (02-08-19 @ 07:17) (73 - 117)  BP: 133/71 (02-08-19 @ 07:17) (116/53 - 145/70)  RR: 18 (02-08-19 @ 07:17) (18 - 18)  SpO2: 94% (02-08-19 @ 07:17) (94% - 94%)            ******************************** PHYSICAL EXAM:**************************************************  GENERAL: NAD    PSYCH: no agitation, baseline mentation  HEENT:     NERVOUS SYSTEM:  Alert & Oriented X3, MS  5/5 B/L  UE and LE ; Sensory intact    PULMONARY: [place], CTA    CARDIOVASCULAR: S1S2 RRR    GI: Soft, NT, ND; BS present.    EXTREMITIES:  Right foot wound with dressing.     LYMPH: No lymphadenopathy noted    SKIN: as above    ******************************************************************************************    Consultant(s) Notes Reviewed:  [x ] YES  [ ] NO    Discussed with Consultants/Other Providers [ x] YES     **************************** LABS *******************************************************                          10.8   10.83 )-----------( 355      ( 08 Feb 2019 02:52 )             34.2     02-08    141  |  101  |  6<L>  ----------------------------<  102<H>  4.1   |  23  |  0.6<L>    Ca    9.5      08 Feb 2019 02:52  Phos  4.1     02-08  Mg     1.6     02-08          PT/INR - ( 08 Feb 2019 02:52 )   PT: 12.70 sec;   INR: 1.11 ratio         PTT - ( 08 Feb 2019 02:52 )  PTT:36.0 sec  Lactate Trend        CAPILLARY BLOOD GLUCOSE      POCT Blood Glucose.: 152 mg/dL (08 Feb 2019 11:53)          **************************Active Medications *******************************************  No Known Allergies      ALBUTerol    90 MICROgram(s) HFA Inhaler 2 Puff(s) Inhalation every 6 hours PRN  AQUAPHOR (petrolatum Ointment) 1 Application(s) Topical two times a day  aspirin enteric coated 81 milliGRAM(s) Oral daily  atorvastatin 40 milliGRAM(s) Oral at bedtime  BACItracin   Ointment 1 Application(s) Topical three times a day  buDESOnide  80 MICROgram(s)/formoterol 4.5 MICROgram(s) Inhaler 2 Puff(s) Inhalation two times a day  cefepime   IVPB 2000 milliGRAM(s) IV Intermittent every 12 hours  chlorhexidine 4% Liquid 1 Application(s) Topical <User Schedule>  diphenhydrAMINE 50 milliGRAM(s) Oral every 6 hours PRN  DULoxetine 60 milliGRAM(s) Oral daily  enoxaparin Injectable 40 milliGRAM(s) SubCutaneous daily  fluocinonide 0.05% Ointment 1 Application(s) Topical two times a day  hydrocortisone 1% Cream 1 Application(s) Topical two times a day  hydrOXYzine  Oral Tab/Cap - Peds 10 milliGRAM(s) Oral three times a day  ibuprofen  Tablet. 600 milliGRAM(s) Oral every 6 hours PRN  levothyroxine 100 MICROGram(s) Oral daily  metoprolol tartrate 12.5 milliGRAM(s) Oral two times a day  oxybutynin 5 milliGRAM(s) Oral two times a day  oxyCODONE    5 mG/acetaminophen 325 mG 2 Tablet(s) Oral every 6 hours PRN  oxyCODONE  ER Tablet 20 milliGRAM(s) Oral every 12 hours  pantoprazole    Tablet 40 milliGRAM(s) Oral before breakfast  pregabalin 150 milliGRAM(s) Oral three times a day  rifampin 600 milliGRAM(s) Oral daily  senna 2 Tablet(s) Oral at bedtime  sodium chloride 0.9% lock flush 3 milliLiter(s) IV Push every 8 hours  vitamin A &amp; D Ointment 1 Application(s) Topical daily      ***************************************************  RADIOLOGY & ADDITIONAL TESTS:    Imaging Personally Reviewed:  [ ] YES  [ ] NO    HEALTH ISSUES - PROBLEM Dx:
PROGRESS NOTE   Patient is a 53y old  Female who presents with a chief complaint of right foot infection (02 Feb 2019 08:33)    Patient seen at bedside. Patient relates that she has had some itching on her dorsal left foot that just started. Patient has no other complaints at this time.     HPI:  54 yo female with hx DM, CAD, HTN, Hypothyroid, s/p Reconstruction surgery R foot for charcot foot on 1/11/19. Patient was discharged on 1/18/19, not on antibiotics at time of discharge. Since discharge developed dehiscence and infection at surgical sites. Today she had f/u with Dr. Diaz at his office who sent her to hospital for IV antibiotics. Pt reports few days after discharge foot become swollen, red, painful.  She reports pus and small amount of blood draining from the surgical sites. Denies fevers, sweating, sob, chest pain, nausea, vomiting, diarrhea. (30 Jan 2019 17:34)      REVIEW OF SYSTEMS  Constitutional: No weakness, fever, or chills  Eyes/ENT: No visual changes; no vertigo r throat pain  Neck: No pain or stiffness  Respiratory: No cough, wheezing, or shortness of breath  Cardiovascular: No chest pain or palpitations  Gastrointestinal: No abdominal or epigastric pain. No nausea or vomiting  Genitourinary: No dysuria, urgency or incontinence   Neurological: No numbness or tingling  Skin: Itching, burning noted to dorsal left foot  Vascular: Minimal Edema  All other review of systems is negative unless indicated above.    Vital Signs Last 24 Hrs  T(C): 36.1 (02 Feb 2019 05:09), Max: 36.4 (01 Feb 2019 21:37)  T(F): 97 (02 Feb 2019 05:09), Max: 97.5 (01 Feb 2019 21:37)  HR: 107 (02 Feb 2019 05:09) (76 - 107)  BP: 104/56 (02 Feb 2019 05:09) (104/56 - 124/61)  BP(mean): --  RR: 18 (02 Feb 2019 05:09) (18 - 18)  SpO2: 94% (01 Feb 2019 19:49) (94% - 94%)                          11.7   11.16 )-----------( 393      ( 02 Feb 2019 06:03 )             36.8               02-02    139  |  97<L>  |  6<L>  ----------------------------<  122<H>  4.6   |  28  |  0.7    Ca    9.2      02 Feb 2019 06:03  Mg     1.8     02-02    TPro  6.8  /  Alb  3.4<L>  /  TBili  0.5  /  DBili  x   /  AST  19  /  ALT  12  /  AlkPhos  112  02-02      PHYSICAL EXAM  GEN: CALLY HARTMAN is a pleasant well-nourished, well developed 53y Female in no acute distress, alert awake, and oriented to person, place and time.   LE Focused: Surgical sites R foot 1st and 2nd ray dorsal and medial midfoot - Dehiscence, probe to deep tissue, stable per patient Erythema and edema, painful, wound Bases with fibrotic tissue present. No pus was expressed. No sutures present  Lateral midfoot and posterior heel ulcers - stable , sutures intact, no signs of infection    A:   S/p Charcot Recon, Infected, dehisced.    P:  Patient examined and evaluated.  Wound flush with normal saline and peroxide  Applied wet to dry packing, kerlix, webril, Posterior splint, ACE.   NWB R foot  Continue IV abx as per ID  Order placed for benadryl  Order placed for cortisone cream for left foot to be applied twice a day  Order placed for Aquaphor to be applied to LLE twice a day  Podiatry will follow while in house.  Podiatry will continue with local wound Care.    will discuss care plan  with Attending
PROGRESS NOTE   Patient is a 53y old  Female who presents with a chief complaint of right foot infection (09 Feb 2019 11:35)    Patient seen at bedside. Patient relates that is still worried about her foot. Patient has no other complaints at this time.    HPI:  54 yo female with hx DM, CAD, HTN, Hypothyroid, s/p Reconstruction surgery R foot for charcot foot on 1/11/19. Patient was discharged on 1/18/19, not on antibiotics at time of discharge. Since discharge developed dehiscence and infection at surgical sites. Today she had f/u with Dr. Diaz at his office who sent her to hospital for IV antibiotics. Pt reports few days after discharge foot become swollen, red, painful.  She reports pus and small amount of blood draining from the surgical sites. Denies fevers, sweating, sob, chest pain, nausea, vomiting, diarrhea. (30 Jan 2019 17:34)      REVIEW OF SYSTEMS  Constitutional: No weakness, fever, or chills  Eyes/ENT: No visual changes; no vertigo r throat pain  Neck: No pain or stiffness  Respiratory: No cough, wheezing, or shortness of breath  Cardiovascular: No chest pain or palpitations  Gastrointestinal: No abdominal or epigastric pain. No nausea or vomiting  Genitourinary: No dysuria, urgency or incontenince   Neurological: No numbness or tingling  Skin: No itiching, burning, rashes or lesions  Vascular: + Edema  All other review of systems is negative unless indicated above.    Vital Signs Last 24 Hrs  T(C): 37.1 (10 Feb 2019 05:40), Max: 37.1 (10 Feb 2019 05:40)  T(F): 98.8 (10 Feb 2019 05:40), Max: 98.8 (10 Feb 2019 05:40)  HR: 113 (10 Feb 2019 06:00) (85 - 113)  BP: 105/55 (10 Feb 2019 06:00) (89/53 - 116/56)  BP(mean): --  RR: 18 (10 Feb 2019 05:40) (18 - 18)  SpO2: 100% (10 Feb 2019 08:00) (95% - 100%)                          10.3   8.37  )-----------( 334      ( 09 Feb 2019 06:55 )             33.0                 Mg     2.0     02-09        PHYSICAL EXAM  GEN: CALLY HARTMAN is a pleasant well-nourished, well developed 53y Female in no acute distress, alert awake, and oriented to person, place and time.   LE Focused:  Right foot s/p I&D multiple sites, dorsal 1st mpj and dorsal 2nd mpj, medial midfoot with fibrogranular wound bases and minimal erythema and maceration noted periwound. Stable eschar to posterior heel and lateral foot.     A:   Right foot post op infection    P:  Patient examined and evaluated.   Patient dressed with wound vac at 125mmHg/adaptic/bacitracin  Continue IV abx per ID recommendations  Will f/u with attending on any further recommendations
PROGRESS NOTE   Patient is a 53y old  Female who presents with a chief complaint of right foot infection (12 Feb 2019 11:51)    Patient was seen at bedside with attending. Patient relates that she is still having some shooting pain but it has gotten a little better patient has no new complaints at this time.     HPI:  52 yo female with hx DM, CAD, HTN, Hypothyroid, s/p Reconstruction surgery R foot for charcot foot on 1/11/19. Patient was discharged on 1/18/19, not on antibiotics at time of discharge. Since discharge developed dehiscence and infection at surgical sites. Today she had f/u with Dr. Diaz at his office who sent her to hospital for IV antibiotics. Pt reports few days after discharge foot become swollen, red, painful.  She reports pus and small amount of blood draining from the surgical sites. Denies fevers, sweating, sob, chest pain, nausea, vomiting, diarrhea. (30 Jan 2019 17:34)      REVIEW OF SYSTEMS  Constitutional: No weakness, fever, or chills  Eyes/ENT: No visual changes; no vertigo r throat pain  Neck: No pain or stiffness  Respiratory: No cough, wheezing, or shortness of breath  Cardiovascular: No chest pain or palpitations  Gastrointestinal: No abdominal or epigastric pain. No nausea or vomiting  Genitourinary: No dysuria, urgency or incontenince   Neurological: No numbness or tingling  Skin: No itiching, burning, rashes or lesions  Vascular: + Edema  All other review of systems is negative unless indicated above.    Vital Signs Last 24 Hrs  T(C): 36.3 (12 Feb 2019 14:00), Max: 36.8 (12 Feb 2019 06:02)  T(F): 97.3 (12 Feb 2019 14:00), Max: 98.2 (12 Feb 2019 06:02)  HR: 79 (12 Feb 2019 14:00) (79 - 102)  BP: 142/64 (12 Feb 2019 14:00) (113/69 - 142/64)  BP(mean): --  RR: 18 (12 Feb 2019 14:00) (18 - 18)  SpO2: --                          10.8   7.94  )-----------( 357      ( 12 Feb 2019 06:44 )             35.7               02-12    135  |  96<L>  |  5<L>  ----------------------------<  228<H>  4.5   |  21  |  0.7    Ca    9.1      12 Feb 2019 06:44  Mg     1.8     02-12        PHYSICAL EXAM  GEN: CALLY HARTMAN is a pleasant well-nourished, well developed 53y Female in no acute distress, alert awake, and oriented to person, place and time.   LE Focused:  medial right foot ulcer with pink granular base. dorsal toe ulcers with pink granular bases and some macerated tissue periwound. mild edema noted to the digits distally.     A:  Right charcot foot post op infection    P:  Patient examined and evaluated.   Patient dressed with new wound vac @75mmHg   Next take down will be Friday, 2/15  Will f/u with attending for any further recommendations.
PROGRESS NOTE   Patient is a 53y old  Female who presents with a chief complaint of right foot infection (13 Feb 2019 18:21)    Patient seen at bedside with attending. Patient has no new pedal complaints at this time.     HPI:  52 yo female with hx DM, CAD, HTN, Hypothyroid, s/p Reconstruction surgery R foot for charcot foot on 1/11/19. Patient was discharged on 1/18/19, not on antibiotics at time of discharge. Since discharge developed dehiscence and infection at surgical sites. Today she had f/u with Dr. Diaz at his office who sent her to hospital for IV antibiotics. Pt reports few days after discharge foot become swollen, red, painful.  She reports pus and small amount of blood draining from the surgical sites. Denies fevers, sweating, sob, chest pain, nausea, vomiting, diarrhea. (30 Jan 2019 17:34)      REVIEW OF SYSTEMS  Constitutional: No weakness, fever, or chills  Eyes/ENT: No visual changes; no vertigo r throat pain  Neck: No pain or stiffness  Respiratory: No cough, wheezing, or shortness of breath  Cardiovascular: No chest pain or palpitations  Gastrointestinal: No abdominal or epigastric pain. No nausea or vomiting  Genitourinary: No dysuria, urgency or incontinence   Neurological: No numbness or tingling  Skin: No itching burning, rashes or lesions  Vascular: + Edema  All other review of systems is negative unless indicated above.    Vital Signs Last 24 Hrs  T(C): 36.9 (14 Feb 2019 05:21), Max: 37 (13 Feb 2019 21:55)  T(F): 98.4 (14 Feb 2019 05:21), Max: 98.6 (13 Feb 2019 21:55)  HR: 106 (14 Feb 2019 05:21) (103 - 106)  BP: 119/59 (14 Feb 2019 05:21) (107/56 - 119/59)  BP(mean): --  RR: 18 (14 Feb 2019 05:21) (18 - 18)  SpO2: --                          10.7   8.83  )-----------( 363      ( 13 Feb 2019 07:38 )             35.4                       PHYSICAL EXAM  GEN: CALLY HARTMAN is a pleasant well-nourished, well developed 53y Female in no acute distress, alert awake, and oriented to person, place and time.   LE Focused:  pink granular wound bases to dorsal 2 wounds and medial wound without clinical signs of infection    A:  Post-op infection    P:  Patient examined and evaluated  Patient dressed with adaptic/DSD/Kerlix/ACE/Posterior splint  Patient is NWB to right foot  Vac to be reapplied tomorrow  Vac papers filled out and are in the chart  Plan for possible d/c Monday  Will f/u with attending on any further recommendations
PROGRESS NOTE   Patient is a 53y old  Female who presents with a chief complaint of right foot infection (15 Feb 2019 09:38)    Patient seen at bedside. Patient has no new complaints at this time.     HPI:  54 yo female with hx DM, CAD, HTN, Hypothyroid, s/p Reconstruction surgery R foot for charcot foot on 1/11/19. Patient was discharged on 1/18/19, not on antibiotics at time of discharge. Since discharge developed dehiscence and infection at surgical sites. Today she had f/u with Dr. Diaz at his office who sent her to hospital for IV antibiotics. Pt reports few days after discharge foot become swollen, red, painful.  She reports pus and small amount of blood draining from the surgical sites. Denies fevers, sweating, sob, chest pain, nausea, vomiting, diarrhea. (30 Jan 2019 17:34)      REVIEW OF SYSTEMS  Constitutional: No weakness, fever, or chills  Eyes/ENT: No visual changes; no vertigo r throat pain  Neck: No pain or stiffness  Respiratory: No cough, wheezing, or shortness of breath  Cardiovascular: No chest pain or palpitations  Gastrointestinal: No abdominal or epigastric pain. No nausea or vomiting  Genitourinary: No dysuria, urgency or incontinence   Neurological: No numbness or tingling  Skin: No itching burning, rashes or lesions  Vascular: + Edema  All other review of systems is negative unless indicated above.    Vital Signs Last 24 Hrs  T(C): 36.2 (15 Feb 2019 04:54), Max: 36.7 (14 Feb 2019 22:00)  T(F): 97.2 (15 Feb 2019 04:54), Max: 98.1 (14 Feb 2019 22:00)  HR: 113 (15 Feb 2019 04:54) (86 - 113)  BP: 120/58 (15 Feb 2019 04:54) (110/52 - 147/65)  BP(mean): --  RR: 18 (15 Feb 2019 04:54) (18 - 18)  SpO2: --                        PHYSICAL EXAM  GEN: CALLY HARTMAN is a pleasant well-nourished, well developed 53y Female in no acute distress, alert awake, and oriented to person, place and time.   LE Focused:  right foot surgical sites with pink granular wound bases and medial foot surgical site with pink granular wound base.     A:  post-op infection    P:  Patient examined and evlauted.   Patient dressed with wound vac at 75mmHg continous and posterior splint  Next vac change to be 2/17  Patient wound vac papers filled out and in chart  Will f/u with attending on any further recommendations.
PROGRESS NOTE   Patient is a 53y old  Female who presents with a chief complaint of right foot infection (17 Feb 2019 09:02)    Patient seen at bedside. Patient relates that she is ready to leave the hospital, reminded her that she was told by Dr. Diaz it wouldn't be until tomorrow. Patient has no other pedal complaints at this time.     HPI:  54 yo female with hx DM, CAD, HTN, Hypothyroid, s/p Reconstruction surgery R foot for charcot foot on 1/11/19. Patient was discharged on 1/18/19, not on antibiotics at time of discharge. Since discharge developed dehiscence and infection at surgical sites. Today she had f/u with Dr. Diaz at his office who sent her to hospital for IV antibiotics. Pt reports few days after discharge foot become swollen, red, painful.  She reports pus and small amount of blood draining from the surgical sites. Denies fevers, sweating, sob, chest pain, nausea, vomiting, diarrhea. (30 Jan 2019 17:34)      REVIEW OF SYSTEMS  Constitutional: No weakness, fever, or chills  Eyes/ENT: No visual changes; no vertigo r throat pain  Neck: No pain or stiffness  Respiratory: No cough, wheezing, or shortness of breath  Cardiovascular: No chest pain or palpitations  Gastrointestinal: No abdominal or epigastric pain. No nausea or vomiting  Genitourinary: No dysuria, urgency or incontinence   Neurological: No numbness or tingling  Skin: No itching burning, rashes or lesions  Vascular: + Edema  All other review of systems is negative unless indicated above.    Vital Signs Last 24 Hrs  T(C): 35.8 (17 Feb 2019 13:24), Max: 36.5 (17 Feb 2019 06:13)  T(F): 96.5 (17 Feb 2019 13:24), Max: 97.7 (17 Feb 2019 06:13)  HR: 110 (17 Feb 2019 13:24) (81 - 110)  BP: 122/78 (17 Feb 2019 13:24) (118/56 - 136/64)  BP(mean): --  RR: 18 (17 Feb 2019 13:24) (18 - 18)  SpO2: --                          11.2   7.42  )-----------( 330      ( 17 Feb 2019 06:40 )             36.7               02-17    141  |  105  |  5<L>  ----------------------------<  226<H>  4.5   |  21  |  0.6<L>    Ca    9.3      17 Feb 2019 06:40        PHYSICAL EXAM  GEN: CALLY HARTMAN is a pleasant well-nourished, well developed 53y Female in no acute distress, alert awake, and oriented to person, place and time.   LE Focused:  maceration noted periwound to all 3 wounds, 2 dorsal and 1 medial right foot. dorsal wound bases appear slightly fibrotic at this time but mostly pink and granular. Medial wound base appears pink, granular.    A:  postop infaction    P:  Patient examined and evaluated.   Patient dressed with Rukhsana/Moist to dry/Kerlix/webril/Posterior splint  Patient's  to bring home wound vac to hospital tomorrow along with cannister to be applied tomorrow  Patient stable for discharge once home wound vac is applied  Will f/u with attending fir any further recommendations.
PROGRESS NOTE   Pt seen at bedside NAD.      Vital Signs Last 24 Hrs  T(C): 36.9 (06 Feb 2019 05:47), Max: 37.1 (05 Feb 2019 13:05)  T(F): 98.5 (06 Feb 2019 05:47), Max: 98.7 (05 Feb 2019 13:05)  HR: 105 (06 Feb 2019 05:47) (85 - 109)  BP: 123/67 (06 Feb 2019 05:47) (110/65 - 129/58)  BP(mean): --  RR: 18 (06 Feb 2019 05:47) (18 - 18)  SpO2: --                          11.0   13.74 )-----------( 433      ( 05 Feb 2019 08:08 )             35.4               02-05    138  |  97<L>  |  8<L>  ----------------------------<  211<H>  4.7   |  22  |  0.7    Ca    8.9      05 Feb 2019 08:08  Mg     1.8     02-05        PHYSICAL EXAM  LE Focused: Surgical sites R foot 1st and 2nd ray dorsal and medial midfoot - Dehiscence, stable per patient Erythema and edema, painful, wound Bases with fibrotic tissue present- wounds are slowly filling in.  No pus was expressed. Probe to deep tissue. No sutures present. Tenderness and  pain on palaption on the dorsal aspect of the Right foot  Lateral midfoot and posterior heel ulcers - stable , sutures intact, no signs of infection  Left foot rash on the dorsum and lateral aspect, with itching present  A:   S/p Charcot Recon, Infected, dehisced - improving    P:  Patient examined and evaluated.  Wound flush with normal saline and peroxide  Applied wet to dry packing, kerlix, webril, Posterior splint, ACE.   NWB R foot  Pt advised to keep her R foot elevated at all times  Continue IV abx as per ID  Podiatry will follow while in house.  Podiatry will continue with local wound Care.   Recommend Derm consult for left foot rash.   will discuss care plan  with Attending
PROGRESS NOTE   Pt seen at bedside NAD.      Vital Signs Last 24 Hrs  T(C): 37.1 (07 Feb 2019 04:38), Max: 37.1 (06 Feb 2019 13:01)  T(F): 98.8 (07 Feb 2019 04:38), Max: 98.8 (07 Feb 2019 04:38)  HR: 110 (07 Feb 2019 04:38) (93 - 119)  BP: 127/66 (07 Feb 2019 04:38) (104/60 - 127/66)  BP(mean): --  RR: 18 (07 Feb 2019 04:38) (18 - 18)  SpO2: 97% (07 Feb 2019 10:39) (97% - 97%)                          10.8   13.06 )-----------( 374      ( 06 Feb 2019 06:23 )             35.1                       PHYSICAL EXAM  LE Focused: Surgical sites R foot 1st and 2nd ray dorsal and medial midfoot - Dehiscence, stable per patient Erythema and edema, painful, wound Bases with fibrotic tissue present- wounds are slowly filling in.  No pus was expressed. Probe to deep tissue. No sutures present. Tenderness and  pain on palaption on the dorsal aspect of the Right foot  Lateral midfoot and posterior heel ulcers - stable , sutures intact, no signs of infection  Left foot rash on the dorsum and lateral aspect, with itching present      A:   S/p Charcot Recon, Infected, dehisced - improving    P:  Patient examined and evaluated.  Wound flush with normal saline and peroxide  Applied wet to dry packing, kerlix, webril, Posterior splint, ACE.   NWB R foot  Continue IV abx as per ID  Surgery Tomorrow 2/8 3pm Ex dbx st/b left foot with possible application of wound Vac and possible application of oasis graft   Medical clearance with stratification requested  Please optimize labs  NPO after midnight   Podiatry will follow while in house.  Podiatry will continue with local wound Care.    will discuss care plan  with Attending
PROGRESS NOTE   Pt seen at bedside during AM rounds with attending present. Dressing clean and intact. Posterior splint in place. Denies n/v/c/d/f/sob .      Vital Signs Last 24 Hrs  T(C): 36 (31 Jan 2019 05:31), Max: 37.3 (30 Jan 2019 19:37)  T(F): 96.8 (31 Jan 2019 05:31), Max: 99.1 (30 Jan 2019 19:37)  HR: 84 (31 Jan 2019 06:46) (75 - 101)  BP: 98/56 (31 Jan 2019 08:42) (82/48 - 115/85)  BP(mean): --  RR: 18 (31 Jan 2019 05:31) (17 - 18)  SpO2: 96% (30 Jan 2019 23:19) (95% - 96%)                          10.1   8.12  )-----------( 334      ( 31 Jan 2019 06:12 )             32.1               01-31    141  |  101  |  7<L>  ----------------------------<  77  4.8   |  27  |  1.0    Ca    8.5      31 Jan 2019 06:12    TPro  5.4<L>  /  Alb  2.8<L>  /  TBili  <0.2  /  DBili  x   /  AST  18  /  ALT  14  /  AlkPhos  100  01-31      PHYSICAL EXAM  Derm: Surgical sites R foot 1st and 2nd ray dorsal and medial midfoot - Dehiscence, probe to deep tissue, Erythema, edema, Serous drainage,  painful, Discoloration. wound Bases with fibrotic tissue present. No pus was expressed. No sutures present  Lateral midfoot and posterior heel ulcers - stable , sutures intact, no signs of infection  Vascular: Right Dorsalis Pedis and Posterior Tibial pulses diminished. left foot DP and Pt palpable .  Capillary re-fill time less then 3 seconds digits 1-5 bilateral.  R foot warm to touch  Neuro: Protective sensation intact to the level of the digits bilateral. pain on plaption of R foot   MSK: Muscle strength 5/5 all major muscle groups bilateral.    < from: Xray Foot AP + Lateral + Oblique, Right (01.30.19 @ 16:00) >  XAM:  XR FOOT COMP MIN 3 VIEWS RT            PROCEDURE DATE:  01/30/2019            INTERPRETATION:  Clinical History:Right foot pain.    Technique: 2 views of the right foot.    Comparison: X-ray right foot 1/11/2019.    Findings:    Overlying fiberglass cast limits fine bony detail.    Status post Charcot reconstruction with talar first metatarsal, calcaneal   cuboid and cuneiform second metatarsal arthrodeses. No evidence of   hardware complication.    Stable increased calcaneal pitch angle.    No evidence of acute fracture or dislocation. No joint effusion.    Impression:    Status post Charcot reconstruction with talar first metatarsal, calcaneal   cuboid and cuneiform second metatarsal arthrodeses. No evidence of   hardware complication.    Overall stable exam.              BARBARA HOLLY M.D., RESIDENT RADIOLOGIST  This document has been electronically signed.  VERNELL CERRATO M.D., ATTENDING RADIOLOGIST  This document has been electronically signed. Jan 31 2019  8:12AM    < end of copied text >      Assessment:   S/p Charcot Recon, Infected, dehisced.  P:  Chart reviewed and Patient evaluated  Wound flush with normal saline and peroxide  Applied wet to dry packing, kerlix, webril, Posterior splint, ACE.   NWB R foot  ID consult appreciated  Continue IV abx as per ID  recommend PICC line  Podiatry will follow while in house.  Podiatry will continue with local wound Care.    will discuss care plan  with Attending
PROGRESS NOTE   Pt seen at bedside during AM rounds. Dressing clean and intact. Denies n/v/c/d/f/sob.       Vital Signs Last 24 Hrs  T(C): 35.6 (01 Feb 2019 05:26), Max: 37.1 (31 Jan 2019 14:23)  T(F): 96.1 (01 Feb 2019 05:26), Max: 98.7 (31 Jan 2019 14:23)  HR: 95 (01 Feb 2019 05:26) (84 - 95)  BP: 92/53 (01 Feb 2019 05:26) (92/53 - 120/69)  BP(mean): --  RR: 18 (01 Feb 2019 05:26) (17 - 18)  SpO2: --                          9.6    7.79  )-----------( 328      ( 01 Feb 2019 05:45 )             30.7               02-01    143  |  104  |  8<L>  ----------------------------<  94  4.6   |  24  |  1.0    Ca    8.8      01 Feb 2019 05:45  Mg     1.7     02-01    TPro  5.5<L>  /  Alb  2.9<L>  /  TBili  0.4  /  DBili  x   /  AST  16  /  ALT  12  /  AlkPhos  101  02-01      Derm: Surgical sites R foot 1st and 2nd ray dorsal and medial midfoot - Dehiscence, probe to deep tissue, Improved  Erythema and  edema,  painful,  wound Bases with fibrotic tissue present. No pus was expressed. No sutures present  Lateral midfoot and posterior heel ulcers - stable , sutures intact, no signs of infection  Vascular: Right Dorsalis Pedis and Posterior Tibial pulses diminished. left foot DP and Pt palpable .  Capillary re-fill time less then 3 seconds digits 1-5 bilateral.  R foot warm to touch  Neuro: Protective sensation intact to the level of the digits bilateral. pain on plaption of R foot   MSK: Muscle strength 5/5 all major muscle groups bilateral.      Assessment:   S/p Charcot Recon, Infected, dehisced.  P:  Chart reviewed and Patient evaluated  Wound flush with normal saline and peroxide  Applied wet to dry packing, kerlix, webril, Posterior splint, ACE.   NWB R foot  Continue IV abx as per ID  Podiatry will follow while in house.  Podiatry will continue with local wound Care.    will discuss care plan  with Attending
PROGRESS NOTE   Pt seen at bedside. Dressing clean and intact.       Vital Signs Last 24 Hrs  T(C): 36.8 (04 Feb 2019 13:53), Max: 36.8 (04 Feb 2019 13:53)  T(F): 98.2 (04 Feb 2019 13:53), Max: 98.2 (04 Feb 2019 13:53)  HR: 101 (04 Feb 2019 13:53) (101 - 106)  BP: 102/58 (04 Feb 2019 13:53) (102/58 - 136/76)  BP(mean): --  RR: 18 (04 Feb 2019 13:53) (18 - 18)  SpO2: --                          10.8   10.52 )-----------( 365      ( 04 Feb 2019 05:11 )             34.4               02-04    141  |  100  |  7<L>  ----------------------------<  111<H>  4.3   |  25  |  0.6<L>    Ca    9.2      04 Feb 2019 05:11  Mg     1.9     02-04    TPro  6.0  /  Alb  3.1<L>  /  TBili  0.2  /  DBili  x   /  AST  17  /  ALT  11  /  AlkPhos  104  02-04      PHYSICAL EXAM  LE Focused: Surgical sites R foot 1st and 2nd ray dorsal and medial midfoot - Dehiscence, stable per patient Erythema and edema, painful, wound Bases with fibrotic tissue present. No pus was expressed. No sutures present. Tenderness and  pain on palaption on the dorsal aspect of the Right foot  Lateral midfoot and posterior heel ulcers - stable , sutures intact, no signs of infection    A:   S/p Charcot Recon, Infected, dehisced.    P:  Patient examined and evaluated.  Wound flush with normal saline and peroxide  Applied wet to dry packing, kerlix, webril, Posterior splint, ACE.   NWB R foot  Continue IV abx as per ID  Podiatry will follow while in house.  Podiatry will continue with local wound Care.    will discuss care plan  with Attending
PROGRESS NOTE   Pt seen at bedside. Dressing clean and intact.  Patient has no other complaints at this time.       Vital Signs Last 24 Hrs  T(C): 36.9 (03 Feb 2019 05:20), Max: 36.9 (03 Feb 2019 05:20)  T(F): 98.4 (03 Feb 2019 05:20), Max: 98.4 (03 Feb 2019 05:20)  HR: 99 (03 Feb 2019 05:20) (95 - 122)  BP: 106/53 (03 Feb 2019 05:20) (106/53 - 127/62)  BP(mean): --  RR: 18 (03 Feb 2019 05:20) (18 - 18)  SpO2: --                          10.7   10.33 )-----------( 377      ( 03 Feb 2019 06:55 )             34.4               02-03    139  |  98  |  7<L>  ----------------------------<  105<H>  4.8   |  25  |  0.7    Ca    8.6      03 Feb 2019 06:55  Mg     1.7     02-03    TPro  5.9<L>  /  Alb  3.2<L>  /  TBili  0.3  /  DBili  x   /  AST  16  /  ALT  11  /  AlkPhos  103  02-03      PHYSICAL EXAM  LE Focused: Surgical sites R foot 1st and 2nd ray dorsal and medial midfoot - Dehiscence, probe to deep tissue, stable per patient Erythema and edema, painful, wound Bases with fibrotic tissue present. No pus was expressed. No sutures present. Tenderness and sever pain on palaption on the dorsal aspect of the Right foot  Lateral midfoot and posterior heel ulcers - stable , sutures intact, no signs of infection    A:   S/p Charcot Recon, Infected, dehisced.    P:  Patient examined and evaluated.  Wound flush with normal saline and peroxide  Applied wet to dry packing, kerlix, webril, Posterior splint, ACE.   NWB R foot  Continue IV abx as per ID  Recommend PICC line   Podiatry will follow while in house.  Podiatry will continue with local wound Care.    will discuss care plan  with Attending
PROGRESS NOTE   Pt seen at bedside. Dressing clean and intact. Pt complains about pain from the R foot. Pt rolling around the hospital  with the knee scooter.       Vital Signs Last 24 Hrs  T(C): 37.1 (05 Feb 2019 06:05), Max: 37.1 (05 Feb 2019 06:05)  T(F): 98.8 (05 Feb 2019 06:05), Max: 98.8 (05 Feb 2019 06:05)  HR: 114 (05 Feb 2019 06:05) (98 - 114)  BP: 122/65 (05 Feb 2019 06:05) (102/58 - 135/61)  BP(mean): --  RR: 18 (05 Feb 2019 06:05) (18 - 18)  SpO2: --                          11.0   13.74 )-----------( 433      ( 05 Feb 2019 08:08 )             35.4               02-05    138  |  97<L>  |  8<L>  ----------------------------<  211<H>  4.7   |  22  |  0.7    Ca    8.9      05 Feb 2019 08:08  Mg     1.8     02-05    TPro  6.0  /  Alb  3.1<L>  /  TBili  0.2  /  DBili  x   /  AST  17  /  ALT  11  /  AlkPhos  104  02-04      PHYSICAL EXAM  LE Focused: Surgical sites R foot 1st and 2nd ray dorsal and medial midfoot - Dehiscence, stable per patient Erythema and edema, painful, wound Bases with fibrotic tissue present- wounds are slowly filling in.  No pus was expressed. No sutures present. Tenderness and  pain on palaption on the dorsal aspect of the Right foot  Lateral midfoot and posterior heel ulcers - stable , sutures intact, no signs of infection    A:   S/p Charcot Recon, Infected, dehisced.    P:  Patient examined and evaluated.  Wound flush with normal saline and peroxide  Applied wet to dry packing, kerlix, webril, Posterior splint, ACE.   NWB R foot  Pt advised to keep her R foot elevated at all times  Continue IV abx as per ID  Podiatry will follow while in house.  Podiatry will continue with local wound Care.    will discuss care plan  with Attending
Patient is a 53y old Female who presented with a chief complaint of right foot infection (2019 18:57)  Currently admitted to medicine with the primary diagnosis of Charcot foot due to diabetes mellitus     Today is hospital day 18d. This morning she is resting comfortably in bed and reports no new issues or overnight events.     PAST MEDICAL & SURGICAL HISTORY  Dextrocardia  Chronic midline low back pain with bilateral sciatica  Peripheral neuralgia  Essential hypertension  Diabetes 1.5, managed as type 2  Charcot's arthropathy: R foot  Charcot foot due to diabetes mellitus  H/O shoulder surgery: Right shoulder surgery   delivery delivered    SOCIAL HISTORY:  Negative for smoking/alcohol/drug use.     ALLERGIES:  No Known Allergies    MEDICATIONS:  STANDING MEDICATIONS  AQUAPHOR (petrolatum Ointment) 1 Application(s) Topical two times a day  aspirin enteric coated 81 milliGRAM(s) Oral daily  atorvastatin 40 milliGRAM(s) Oral at bedtime  BACItracin   Ointment 1 Application(s) Topical three times a day  buDESOnide  80 MICROgram(s)/formoterol 4.5 MICROgram(s) Inhaler 2 Puff(s) Inhalation two times a day  cefepime   IVPB 2000 milliGRAM(s) IV Intermittent every 12 hours  chlorhexidine 4% Liquid 1 Application(s) Topical <User Schedule>  dextrose 5%. 1000 milliLiter(s) IV Continuous <Continuous>  dextrose 50% Injectable 12.5 Gram(s) IV Push once  dextrose 50% Injectable 25 Gram(s) IV Push once  dextrose 50% Injectable 25 Gram(s) IV Push once  docusate sodium 100 milliGRAM(s) Oral two times a day  DULoxetine 60 milliGRAM(s) Oral daily  enoxaparin Injectable 40 milliGRAM(s) SubCutaneous daily  fluocinonide 0.05% Ointment 1 Application(s) Topical two times a day  hydrocortisone 1% Cream 1 Application(s) Topical two times a day  hydrOXYzine  Oral Tab/Cap - Peds 10 milliGRAM(s) Oral three times a day  insulin lispro (HumaLOG) corrective regimen sliding scale   SubCutaneous three times a day before meals  levothyroxine 100 MICROGram(s) Oral daily  methocarbamol 500 milliGRAM(s) Oral three times a day  metoprolol tartrate 12.5 milliGRAM(s) Oral two times a day  ondansetron Injectable 4 milliGRAM(s) IV Push two times a day  oxybutynin 5 milliGRAM(s) Oral two times a day  oxyCODONE  ER Tablet 20 milliGRAM(s) Oral every 8 hours  pantoprazole    Tablet 40 milliGRAM(s) Oral before breakfast  rifampin 600 milliGRAM(s) Oral daily  senna 2 Tablet(s) Oral at bedtime  sodium chloride 0.9% lock flush 3 milliLiter(s) IV Push every 8 hours  vitamin A &amp; D Ointment 1 Application(s) Topical daily    PRN MEDICATIONS  ALBUTerol    90 MICROgram(s) HFA Inhaler 2 Puff(s) Inhalation every 6 hours PRN  dextrose 40% Gel 15 Gram(s) Oral once PRN  diphenhydrAMINE 50 milliGRAM(s) Oral every 4 hours PRN  glucagon  Injectable 1 milliGRAM(s) IntraMuscular once PRN  ibuprofen  Tablet. 600 milliGRAM(s) Oral every 6 hours PRN  oxyCODONE    5 mG/acetaminophen 325 mG 2 Tablet(s) Oral every 4 hours PRN    Home Medications:  aspirin 81 mg oral delayed release tablet: 1 tab(s) orally once a day (10 Ty 2019 14:23)  atorvastatin 40 mg oral tablet: 1 tab(s) orally once a day (10 Ty 2019 14:23)  diazePAM 5 mg oral tablet: 1 tab(s) orally 2 times a day, As Needed (10 Ty 2019 14:23)  DULoxetine 60 mg oral delayed release capsule: 1 cap(s) orally once a day (10 Ty 2019 14:23)  ezetimibe 10 mg oral tablet: 1 tab(s) orally once a day (10 Ty 2019 14:23)  fexofenadine 180 mg oral tablet: 1 tab(s) orally once a day (10 Ty 2019 14:23)  hydrOXYzine hydrochloride 10 mg oral tablet: 2 tab(s) orally 3 times a day (10 Ty 2019 14:23)  Janumet 50 mg-1000 mg oral tablet: 1 tab(s) orally 2 times a day (10 Ty 2019 14:23)  levothyroxine 100 mcg (0.1 mg) oral capsule: 1 cap(s) orally once a day (10 Ty 2019 14:23)  Lyrica 150 mg oral capsule: 1 cap(s) orally 3 times a day (10 Ty 2019 14:23)  Myrbetriq 25 mg oral tablet, extended release: 1 tab(s) orally once a day (10 Ty 2019 14:23)  oxyCODONE 20 mg oral tablet, extended release: 1 tab(s) orally every 12 hours (2019 10:59)  pantoprazole 40 mg oral delayed release tablet: 1 tab(s) orally once a day (10 Ty 2019 14:23)  Qvar 80 mcg/inh inhalation aerosol: 1 puff(s) inhaled 2 times a day (10 Ty 2019 14:23)  ramipril 2.5 mg oral capsule: 1 cap(s) orally once a day (10 Ty 2019 14:23)  tiZANidine 4 mg oral tablet: 2 tab(s) orally every 8 hours (10 Ty 2019 14:23)  Ventolin HFA 90 mcg/inh inhalation aerosol: 2 puff(s) inhaled 4 times a day, As Needed (10 Ty 2019 14:23)    Vital Signs Last 24 Hrs  T(C): 36.5 (2019 06:13), Max: 36.7 (2019 14:27)  T(F): 97.7 (2019 06:13), Max: 98.1 (2019 14:27)  HR: 110 (2019 06:13) (81 - 110)  BP: 118/56 (2019 06:13) (118/56 - 136/64)  BP(mean): --  RR: 18 (2019 06:13) (18 - 18)  SpO2: --      LABS:                        11.2   7.42  )-----------( 330      ( 2019 06:40 )             36.7         RADIOLOGY:  < from: [facility] Duplex Lower Ext Vein Scan, Bilat (19 @ 09:29) >  Impression:  No evidence of deep venous thrombosis or superficial thrombophlebitis in   bilateral lower extremities.  Left Baker's cyst.      < from: Xray Foot AP + Lateral + Oblique, Right (19 @ 16:00) >  Impression:  Status post Charcot reconstruction with talar first metatarsal, calcaneal   cuboid and cuneiform second metatarsal arthrodeses. No evidence of   hardware complication.    PHYSICAL EXAM:  GEN: No acute distress  LUNGS: Clear to auscultation bilaterally   HEART: S1/S2 present. RRR.   ABD: Soft, non-tender, non-distended. Bowel sounds present  EXT: Right lower extremity in dressing and swollen but non tender and non erythematous (above the dressing), vac placed by podiatry  NEURO: AAOX3
Podiatry Pre-Operative Note   CALLY HARTMAN is a 53y year old Female patient presenting to the operating room for surgical management of the right foot. The patient has failed all conservative measures and requires surgical intervention at this time.       PAST MEDICAL & SURGICAL HISTORY:  Dextrocardia  Chronic midline low back pain with bilateral sciatica  Peripheral neuralgia  Essential hypertension  Diabetes 1.5, managed as type 2  Charcot's arthropathy: R foot  Charcot foot due to diabetes mellitus  H/O shoulder surgery: Right shoulder surgery   delivery delivered      Medications:   ALBUTerol    90 MICROgram(s) HFA Inhaler 2 Puff(s) Inhalation every 6 hours PRN  AQUAPHOR (petrolatum Ointment) 1 Application(s) Topical two times a day  aspirin enteric coated 81 milliGRAM(s) Oral daily  atorvastatin 40 milliGRAM(s) Oral at bedtime  BACItracin   Ointment 1 Application(s) Topical three times a day  buDESOnide  80 MICROgram(s)/formoterol 4.5 MICROgram(s) Inhaler 2 Puff(s) Inhalation two times a day  cefepime   IVPB 2000 milliGRAM(s) IV Intermittent every 12 hours  chlorhexidine 4% Liquid 1 Application(s) Topical <User Schedule>  diphenhydrAMINE 50 milliGRAM(s) Oral every 6 hours PRN  DULoxetine 60 milliGRAM(s) Oral daily  enoxaparin Injectable 40 milliGRAM(s) SubCutaneous daily  fluocinonide 0.05% Ointment 1 Application(s) Topical two times a day  hydrocortisone 1% Cream 1 Application(s) Topical two times a day  hydrOXYzine  Oral Tab/Cap - Peds 10 milliGRAM(s) Oral three times a day  ibuprofen  Tablet. 600 milliGRAM(s) Oral every 6 hours PRN  levothyroxine 100 MICROGram(s) Oral daily  loratadine 10 milliGRAM(s) Oral daily  metoprolol tartrate 12.5 milliGRAM(s) Oral two times a day  oxybutynin 5 milliGRAM(s) Oral two times a day  oxyCODONE    5 mG/acetaminophen 325 mG 2 Tablet(s) Oral every 6 hours PRN  oxyCODONE  ER Tablet 20 milliGRAM(s) Oral every 12 hours  pantoprazole    Tablet 40 milliGRAM(s) Oral before breakfast  pregabalin 150 milliGRAM(s) Oral three times a day  rifampin 600 milliGRAM(s) Oral daily  senna 2 Tablet(s) Oral at bedtime  sodium chloride 0.9% lock flush 3 milliLiter(s) IV Push every 8 hours  vitamin A &amp; D Ointment 1 Application(s) Topical daily    Allergies    No Known Allergies    Intolerances        Consent [x_]  H&P [x_]  Medical Clearance [x_]  EKG [x_]  CXR [x_]  UCG [n/a_]  T&S [_x]                          10.8   10.83 )-----------( 355      ( 2019 02:52 )             34.2     02-08    141  |  101  |  6<L>  ----------------------------<  102<H>  4.1   |  23  |  0.6<L>    Ca    9.5      2019 02:52  Phos  4.1     02-08  Mg     1.6     02-08      PT/INR - ( 2019 02:52 )   PT: 12.70 sec;   INR: 1.11 ratio         PTT - ( 2019 02:52 )  PTT:36.0 sec    CAPILLARY BLOOD GLUCOSE      POCT Blood Glucose.: 82 mg/dL (2019 20:41)  POCT Blood Glucose.: 276 mg/dL (2019 16:53)  POCT Blood Glucose.: 123 mg/dL (2019 11:44)  POCT Blood Glucose.: 249 mg/dL (2019 07:57)            T(C): 36.7 (19 @ 22:02), Max: 36.7 (19 @ 22:02)  HR: 100 (19 @ 17:37) (100 - 113)  BP: 145/70 (19 @ 22:02) (131/70 - 145/70)  RR: 18 (19 @ 22:02) (18 - 18)  SpO2: 94% (19 @ 19:45) (94% - 97%)    Surgeon: Dr. Diaz  Assistant (s): [facility] residents   Planned Procedure: excisional debridement soft tissue/bone w/ NPWT and oasis application right foot     The patient has beenn educated on the above procedure, with all risks and benefits described. No guarentees were given or implied for the aforementioned procedure.  The above assistant(s) have introduced themselves to the patient. The patient consents to their participation in the procedure listed above.
CALLY HARTMAN  53y  Female      Patient is a 53y old  Female who presents with a chief complaint of right foot infection (12 Feb 2019 17:37)      INTERVAL HPI/OVERNIGHT EVENTS: c/o poorly controlled foot pain, otherwise no acute complaints      REVIEW OF SYSTEMS:  as above  All other review of systems negative    T(C): 35.5 (02-13-19 @ 14:19), Max: 36.2 (02-13-19 @ 05:40)  HR: 104 (02-13-19 @ 14:19) (88 - 105)  BP: 114/57 (02-13-19 @ 14:19) (114/57 - 132/63)  RR: 18 (02-13-19 @ 14:19) (18 - 18)  SpO2: --  Wt(kg): --Vital Signs Last 24 Hrs  T(C): 35.5 (13 Feb 2019 14:19), Max: 36.2 (13 Feb 2019 05:40)  T(F): 95.9 (13 Feb 2019 14:19), Max: 97.2 (13 Feb 2019 05:40)  HR: 104 (13 Feb 2019 14:19) (88 - 105)  BP: 114/57 (13 Feb 2019 14:19) (114/57 - 132/63)  BP(mean): --  RR: 18 (13 Feb 2019 14:19) (18 - 18)  SpO2: --        PHYSICAL EXAM:  GENERAL: NAD  PSYCH: no agitation, baseline mentation  NERVOUS SYSTEM:  Alert & Oriented X3, no new focal deficits  PULMONARY: Clear to percussion bilaterally; No rales, rhonchi, wheezing, or rubs  CARDIOVASCULAR: Regular rate and rhythm; No murmurs, rubs, or gallops  GI: Soft, Nontender, Nondistended; Bowel sounds present, rotund  EXTREMITIES: R charcot foot medial right foot ulcer with pink granular base. dorsal toe ulcers with pink granular bases and some macerated tissue periwound, new pictures reviewed, currently dressed with compressive/ ace dressing    Consultant(s) Notes Reviewed:  [x ] YES  [ ] NO    Discussed with Consultants/Other Providers [ x] YES     LABS                          10.7   8.83  )-----------( 363      ( 13 Feb 2019 07:38 )             35.4     02-12    135  |  96<L>  |  5<L>  ----------------------------<  228<H>  4.5   |  21  |  0.7    Ca    9.1      12 Feb 2019 06:44  Mg     1.8     02-12            Lactate Trend        CAPILLARY BLOOD GLUCOSE      POCT Blood Glucose.: 147 mg/dL (13 Feb 2019 16:48)        RADIOLOGY & ADDITIONAL TESTS:    Imaging Personally Reviewed:  [ ] YES  [ ] NO    HEALTH ISSUES - PROBLEM Dx:          #Progress Note Handoff    Pending (specify):  Consults_________, Tests________, Test Results_______, Other_________    Family discussion:    Disposition: Home___/SNF___/Other________/Unknown at this time________
CALLY HARTMAN  53y  Female      Patient is a 53y old  Female who presents with a chief complaint of right foot infection (15 Feb 2019 18:43)      INTERVAL HPI/OVERNIGHT EVENTS: patient reported losing seal earlier on wound vac. after repositioning by resident then podiatrist, patient reports improvement. no other complaints      REVIEW OF SYSTEMS:  as above  All other review of systems negative    T(C): 36.7 (02-16-19 @ 14:27), Max: 36.7 (02-15-19 @ 22:04)  HR: 102 (02-16-19 @ 14:27) (94 - 108)  BP: 118/71 (02-16-19 @ 14:27) (108/55 - 127/63)  RR: 18 (02-16-19 @ 14:27) (17 - 18)  SpO2: --  Wt(kg): --Vital Signs Last 24 Hrs  T(C): 36.7 (16 Feb 2019 14:27), Max: 36.7 (15 Feb 2019 22:04)  T(F): 98.1 (16 Feb 2019 14:27), Max: 98.1 (16 Feb 2019 14:27)  HR: 102 (16 Feb 2019 14:27) (94 - 108)  BP: 118/71 (16 Feb 2019 14:27) (108/55 - 127/63)  BP(mean): --  RR: 18 (16 Feb 2019 14:27) (17 - 18)  SpO2: --      02-15-19 @ 07:01  -  02-16-19 @ 07:00  --------------------------------------------------------  IN: 900 mL / OUT: 450 mL / NET: 450 mL        PHYSICAL EXAM:  GENERAL: NAD  PSYCH: no agitation, baseline mentation  NERVOUS SYSTEM:  Alert & Oriented X3, no new focal deficits  PULMONARY: Clear to percussion bilaterally; No rales, rhonchi, wheezing, or rubs  CARDIOVASCULAR: Regular rate and rhythm; No murmurs, rubs, or gallops  GI: Soft, Nontender, Nondistended; Bowel sounds present  EXTREMITIES:  foot extensively dressed, not taken down today    Consultant(s) Notes Reviewed:  [x ] YES  [ ] NO    Discussed with Consultants/Other Providers [ x] YES     LABS                  Lactate Trend        CAPILLARY BLOOD GLUCOSE      POCT Blood Glucose.: 127 mg/dL (16 Feb 2019 17:14)        RADIOLOGY & ADDITIONAL TESTS:    Imaging Personally Reviewed:  [ ] YES  [ ] NO    HEALTH ISSUES - PROBLEM Dx:          #Progress Note Handoff    Pending (specify):  Consults_________, Tests________, Test Results_______, Other___dressing take down tomorrow______    Family discussion:    Disposition: Home___/SNF___/Other________/Unknown at this time____x____

## 2025-05-01 NOTE — DIETITIAN INITIAL EVALUATION ADULT. - ENERGY NEEDS
Letter written to start driving again for work - see result note for labs - will complete FMLA forms this week  Energy: 0733-6525 kcal/day (MSJx1.1-1.2 AF)    Protein: 48-57 g/day (1-1.2 g/kg IBW)    Fluids: 1 mL/kcal

## 2025-05-30 NOTE — HISTORY OF PRESENT ILLNESS
[Urinary Incontinence] : urinary incontinence [Urinary Urgency] : urinary urgency [Urinary Frequency] : urinary frequency [Nocturia] : nocturia [Hematuria - Microscopic] : microscopic hematuria [FreeTextEntry1] : Ms. Murillo was seen starting in June for severe urgency frequency. We tested her with cytology, urine studies, cultures, all of which came back as negative and we switched her from Detrol to [place] she comes in today telling me that by day she’s better though not well and by night she still waking up three times with small amounts. none